# Patient Record
Sex: FEMALE | Race: WHITE | NOT HISPANIC OR LATINO | URBAN - METROPOLITAN AREA
[De-identification: names, ages, dates, MRNs, and addresses within clinical notes are randomized per-mention and may not be internally consistent; named-entity substitution may affect disease eponyms.]

---

## 2017-09-05 ENCOUNTER — OUTPATIENT (OUTPATIENT)
Dept: OUTPATIENT SERVICES | Facility: HOSPITAL | Age: 67
LOS: 1 days | End: 2017-09-05
Payer: MEDICARE

## 2017-09-05 DIAGNOSIS — M54.16 RADICULOPATHY, LUMBAR REGION: ICD-10-CM

## 2017-09-05 PROCEDURE — 77003 FLUOROGUIDE FOR SPINE INJECT: CPT

## 2017-09-05 PROCEDURE — 62323 NJX INTERLAMINAR LMBR/SAC: CPT

## 2017-09-19 ENCOUNTER — OUTPATIENT (OUTPATIENT)
Dept: OUTPATIENT SERVICES | Facility: HOSPITAL | Age: 67
LOS: 1 days | End: 2017-09-19
Payer: MEDICARE

## 2017-09-19 DIAGNOSIS — M54.16 RADICULOPATHY, LUMBAR REGION: ICD-10-CM

## 2017-09-19 PROCEDURE — 77003 FLUOROGUIDE FOR SPINE INJECT: CPT

## 2017-09-19 PROCEDURE — 62323 NJX INTERLAMINAR LMBR/SAC: CPT

## 2017-11-01 ENCOUNTER — APPOINTMENT (OUTPATIENT)
Dept: ORTHOPEDIC SURGERY | Facility: CLINIC | Age: 67
End: 2017-11-01
Payer: MEDICARE

## 2017-11-01 VITALS
HEIGHT: 67 IN | BODY MASS INDEX: 39.08 KG/M2 | SYSTOLIC BLOOD PRESSURE: 154 MMHG | DIASTOLIC BLOOD PRESSURE: 73 MMHG | HEART RATE: 92 BPM | WEIGHT: 249 LBS

## 2017-11-01 PROCEDURE — 99203 OFFICE O/P NEW LOW 30 MIN: CPT

## 2017-11-01 PROCEDURE — 72110 X-RAY EXAM L-2 SPINE 4/>VWS: CPT

## 2017-11-01 PROCEDURE — 72170 X-RAY EXAM OF PELVIS: CPT | Mod: 59

## 2018-07-11 ENCOUNTER — APPOINTMENT (OUTPATIENT)
Dept: ORTHOPEDIC SURGERY | Facility: CLINIC | Age: 68
End: 2018-07-11
Payer: MEDICARE

## 2018-07-11 VITALS — HEIGHT: 67 IN | BODY MASS INDEX: 39.08 KG/M2 | WEIGHT: 249 LBS

## 2018-07-11 DIAGNOSIS — M47.816 SPONDYLOSIS W/OUT MYELOPATHY OR RADICULOPATHY, LUMBAR REGION: ICD-10-CM

## 2018-07-11 PROCEDURE — 99214 OFFICE O/P EST MOD 30 MIN: CPT

## 2018-07-15 ENCOUNTER — FORM ENCOUNTER (OUTPATIENT)
Age: 68
End: 2018-07-15

## 2018-07-16 ENCOUNTER — OUTPATIENT (OUTPATIENT)
Dept: OUTPATIENT SERVICES | Facility: HOSPITAL | Age: 68
LOS: 1 days | End: 2018-07-16
Payer: MEDICARE

## 2018-07-16 ENCOUNTER — APPOINTMENT (OUTPATIENT)
Dept: MRI IMAGING | Facility: CLINIC | Age: 68
End: 2018-07-16
Payer: MEDICARE

## 2018-07-16 DIAGNOSIS — Z00.8 ENCOUNTER FOR OTHER GENERAL EXAMINATION: ICD-10-CM

## 2018-07-16 PROCEDURE — 72148 MRI LUMBAR SPINE W/O DYE: CPT | Mod: 26

## 2018-07-16 PROCEDURE — 72148 MRI LUMBAR SPINE W/O DYE: CPT

## 2018-08-23 ENCOUNTER — OUTPATIENT (OUTPATIENT)
Dept: OUTPATIENT SERVICES | Facility: HOSPITAL | Age: 68
LOS: 1 days | End: 2018-08-23
Payer: MEDICARE

## 2018-08-23 DIAGNOSIS — M54.12 RADICULOPATHY, CERVICAL REGION: ICD-10-CM

## 2018-08-23 PROCEDURE — 77003 FLUOROGUIDE FOR SPINE INJECT: CPT

## 2018-08-23 PROCEDURE — 62321 NJX INTERLAMINAR CRV/THRC: CPT

## 2019-09-16 ENCOUNTER — OUTPATIENT (OUTPATIENT)
Dept: OUTPATIENT SERVICES | Facility: HOSPITAL | Age: 69
LOS: 1 days | End: 2019-09-16
Payer: MEDICARE

## 2019-09-16 DIAGNOSIS — M47.812 SPONDYLOSIS WITHOUT MYELOPATHY OR RADICULOPATHY, CERVICAL REGION: ICD-10-CM

## 2019-09-16 PROCEDURE — 64492 INJ PARAVERT F JNT C/T 3 LEV: CPT | Mod: LT

## 2019-09-16 PROCEDURE — 64491 INJ PARAVERT F JNT C/T 2 LEV: CPT | Mod: LT

## 2019-09-16 PROCEDURE — 64490 INJ PARAVERT F JNT C/T 1 LEV: CPT | Mod: LT

## 2019-09-16 PROCEDURE — 77003 FLUOROGUIDE FOR SPINE INJECT: CPT

## 2019-09-30 ENCOUNTER — OUTPATIENT (OUTPATIENT)
Dept: OUTPATIENT SERVICES | Facility: HOSPITAL | Age: 69
LOS: 1 days | End: 2019-09-30
Payer: MEDICARE

## 2019-09-30 DIAGNOSIS — M47.812 SPONDYLOSIS WITHOUT MYELOPATHY OR RADICULOPATHY, CERVICAL REGION: ICD-10-CM

## 2019-09-30 PROCEDURE — 77003 FLUOROGUIDE FOR SPINE INJECT: CPT

## 2019-09-30 PROCEDURE — 64490 INJ PARAVERT F JNT C/T 1 LEV: CPT | Mod: RT

## 2019-09-30 PROCEDURE — 64491 INJ PARAVERT F JNT C/T 2 LEV: CPT | Mod: RT

## 2019-09-30 PROCEDURE — 64492 INJ PARAVERT F JNT C/T 3 LEV: CPT | Mod: RT

## 2019-12-18 ENCOUNTER — INPATIENT (INPATIENT)
Facility: HOSPITAL | Age: 69
LOS: 2 days | Discharge: HOME | DRG: 683 | End: 2019-12-21
Attending: INTERNAL MEDICINE | Admitting: INTERNAL MEDICINE
Payer: MEDICARE

## 2019-12-18 VITALS
TEMPERATURE: 98 F | DIASTOLIC BLOOD PRESSURE: 44 MMHG | SYSTOLIC BLOOD PRESSURE: 70 MMHG | WEIGHT: 223.99 LBS | RESPIRATION RATE: 15 BRPM | OXYGEN SATURATION: 95 % | HEIGHT: 67 IN | HEART RATE: 89 BPM

## 2019-12-18 DIAGNOSIS — G25.81 RESTLESS LEGS SYNDROME: ICD-10-CM

## 2019-12-18 DIAGNOSIS — E78.5 HYPERLIPIDEMIA, UNSPECIFIED: ICD-10-CM

## 2019-12-18 DIAGNOSIS — T14.90XA INJURY, UNSPECIFIED, INITIAL ENCOUNTER: ICD-10-CM

## 2019-12-18 DIAGNOSIS — N17.9 ACUTE KIDNEY FAILURE, UNSPECIFIED: ICD-10-CM

## 2019-12-18 DIAGNOSIS — Z29.9 ENCOUNTER FOR PROPHYLACTIC MEASURES, UNSPECIFIED: ICD-10-CM

## 2019-12-18 DIAGNOSIS — R53.1 WEAKNESS: ICD-10-CM

## 2019-12-18 DIAGNOSIS — G89.29 OTHER CHRONIC PAIN: ICD-10-CM

## 2019-12-18 DIAGNOSIS — R29.6 REPEATED FALLS: ICD-10-CM

## 2019-12-18 DIAGNOSIS — R59.1 GENERALIZED ENLARGED LYMPH NODES: ICD-10-CM

## 2019-12-18 LAB
ALBUMIN SERPL ELPH-MCNC: 3.4 G/DL — SIGNIFICANT CHANGE UP (ref 3.3–5)
ALP SERPL-CCNC: 89 U/L — SIGNIFICANT CHANGE UP (ref 40–120)
ALT FLD-CCNC: 24 U/L — SIGNIFICANT CHANGE UP (ref 12–78)
ANION GAP SERPL CALC-SCNC: 13 MMOL/L — SIGNIFICANT CHANGE UP (ref 5–17)
AST SERPL-CCNC: 65 U/L — HIGH (ref 15–37)
BASOPHILS # BLD AUTO: 0.03 K/UL — SIGNIFICANT CHANGE UP (ref 0–0.2)
BASOPHILS NFR BLD AUTO: 0.3 % — SIGNIFICANT CHANGE UP (ref 0–2)
BILIRUB SERPL-MCNC: 0.4 MG/DL — SIGNIFICANT CHANGE UP (ref 0.2–1.2)
BUN SERPL-MCNC: 70 MG/DL — HIGH (ref 7–23)
CALCIUM SERPL-MCNC: 9.4 MG/DL — SIGNIFICANT CHANGE UP (ref 8.5–10.1)
CHLORIDE SERPL-SCNC: 103 MMOL/L — SIGNIFICANT CHANGE UP (ref 96–108)
CO2 SERPL-SCNC: 18 MMOL/L — LOW (ref 22–31)
CREAT SERPL-MCNC: 4.8 MG/DL — HIGH (ref 0.5–1.3)
EOSINOPHIL # BLD AUTO: 0.07 K/UL — SIGNIFICANT CHANGE UP (ref 0–0.5)
EOSINOPHIL NFR BLD AUTO: 0.6 % — SIGNIFICANT CHANGE UP (ref 0–6)
GLUCOSE SERPL-MCNC: 122 MG/DL — HIGH (ref 70–99)
HCT VFR BLD CALC: 35.6 % — SIGNIFICANT CHANGE UP (ref 34.5–45)
HGB BLD-MCNC: 11.6 G/DL — SIGNIFICANT CHANGE UP (ref 11.5–15.5)
IMM GRANULOCYTES NFR BLD AUTO: 0.7 % — SIGNIFICANT CHANGE UP (ref 0–1.5)
LACTATE SERPL-SCNC: 1.6 MMOL/L — SIGNIFICANT CHANGE UP (ref 0.7–2)
LYMPHOCYTES # BLD AUTO: 1.21 K/UL — SIGNIFICANT CHANGE UP (ref 1–3.3)
LYMPHOCYTES # BLD AUTO: 10.1 % — LOW (ref 13–44)
MCHC RBC-ENTMCNC: 26.9 PG — LOW (ref 27–34)
MCHC RBC-ENTMCNC: 32.6 GM/DL — SIGNIFICANT CHANGE UP (ref 32–36)
MCV RBC AUTO: 82.6 FL — SIGNIFICANT CHANGE UP (ref 80–100)
MONOCYTES # BLD AUTO: 1.07 K/UL — HIGH (ref 0–0.9)
MONOCYTES NFR BLD AUTO: 8.9 % — SIGNIFICANT CHANGE UP (ref 2–14)
NEUTROPHILS # BLD AUTO: 9.51 K/UL — HIGH (ref 1.8–7.4)
NEUTROPHILS NFR BLD AUTO: 79.4 % — HIGH (ref 43–77)
NRBC # BLD: 0 /100 WBCS — SIGNIFICANT CHANGE UP (ref 0–0)
PLATELET # BLD AUTO: 388 K/UL — SIGNIFICANT CHANGE UP (ref 150–400)
POTASSIUM SERPL-MCNC: 5.1 MMOL/L — SIGNIFICANT CHANGE UP (ref 3.5–5.3)
POTASSIUM SERPL-SCNC: 5.1 MMOL/L — SIGNIFICANT CHANGE UP (ref 3.5–5.3)
PROT SERPL-MCNC: 7.9 G/DL — SIGNIFICANT CHANGE UP (ref 6–8.3)
RBC # BLD: 4.31 M/UL — SIGNIFICANT CHANGE UP (ref 3.8–5.2)
RBC # FLD: 15.2 % — HIGH (ref 10.3–14.5)
SODIUM SERPL-SCNC: 134 MMOL/L — LOW (ref 135–145)
TROPONIN I SERPL-MCNC: <.015 NG/ML — SIGNIFICANT CHANGE UP (ref 0.01–0.04)
WBC # BLD: 11.97 K/UL — HIGH (ref 3.8–10.5)
WBC # FLD AUTO: 11.97 K/UL — HIGH (ref 3.8–10.5)

## 2019-12-18 PROCEDURE — 93010 ELECTROCARDIOGRAM REPORT: CPT

## 2019-12-18 PROCEDURE — 71045 X-RAY EXAM CHEST 1 VIEW: CPT | Mod: 26

## 2019-12-18 PROCEDURE — 99285 EMERGENCY DEPT VISIT HI MDM: CPT

## 2019-12-18 PROCEDURE — 72125 CT NECK SPINE W/O DYE: CPT | Mod: 26

## 2019-12-18 RX ORDER — SODIUM CHLORIDE 9 MG/ML
1000 INJECTION INTRAMUSCULAR; INTRAVENOUS; SUBCUTANEOUS ONCE
Refills: 0 | Status: COMPLETED | OUTPATIENT
Start: 2019-12-18 | End: 2019-12-18

## 2019-12-18 RX ORDER — HEPARIN SODIUM 5000 [USP'U]/ML
5000 INJECTION INTRAVENOUS; SUBCUTANEOUS EVERY 12 HOURS
Refills: 0 | Status: DISCONTINUED | OUTPATIENT
Start: 2019-12-18 | End: 2019-12-21

## 2019-12-18 RX ORDER — OXYCODONE AND ACETAMINOPHEN 5; 325 MG/1; MG/1
1 TABLET ORAL ONCE
Refills: 0 | Status: DISCONTINUED | OUTPATIENT
Start: 2019-12-18 | End: 2019-12-18

## 2019-12-18 RX ADMIN — SODIUM CHLORIDE 1000 MILLILITER(S): 9 INJECTION INTRAMUSCULAR; INTRAVENOUS; SUBCUTANEOUS at 22:42

## 2019-12-18 RX ADMIN — SODIUM CHLORIDE 1000 MILLILITER(S): 9 INJECTION INTRAMUSCULAR; INTRAVENOUS; SUBCUTANEOUS at 20:19

## 2019-12-18 RX ADMIN — OXYCODONE AND ACETAMINOPHEN 1 TABLET(S): 5; 325 TABLET ORAL at 23:23

## 2019-12-18 NOTE — H&P ADULT - NEUROLOGICAL DETAILS
alert and oriented x 3/responds to pain/responds to verbal commands/cranial nerves intact/strength decreased/sensation intact

## 2019-12-18 NOTE — ED PROVIDER NOTE - CARE PLAN
Principal Discharge DX:	Acute renal failure (ARF)  Secondary Diagnosis:	Weakness generalized  Secondary Diagnosis:	Multiple falls

## 2019-12-18 NOTE — H&P ADULT - PROBLEM SELECTOR PLAN 9
-cont home dose oxy 10 q 6 -at  home dose oxy 10 q 6 hrs, Change to 5 mg q 6 hr PRN  - Gabapentin 100 mg 3x day -Renal dose

## 2019-12-18 NOTE — ED PROVIDER NOTE - OBJECTIVE STATEMENT
68 y/o female with PMHx Osteoarthritis, Hyperlipidemia, Obesity presents today c/o weakness x 2 months. pt notes frequent falling, notes walking intermittently with assistance. pt notes episode of syncope yesterday in which she was sitting in her car and woke up 3 hours later. pt admits to head injury and neck pain. pt notes chronic pain to general body without acute change. pt notes chronic LE swelling/weeping without acute change. pt denies vomiting, numbness/weakness, fever, chills, chest pain, SOB, abdominal pain, joint pain, hip pain, or any other complaints.

## 2019-12-18 NOTE — H&P ADULT - PROBLEM SELECTOR PLAN 3
-ED BP 70/44 on arrival   - s/p 1000mL IV bolus x3 in ER  -syncope prerenal, induced ATN and syncope -ED BP 70/44 on arrival   - s/p 1000mL IV bolus x3 in ER, IV fluids NS @ 75 ml /hr  -syncope prerenal, induced ATN and syncope -ED BP 70/44 on arrival ,HOLD Torsemide  - s/p 1000mL IV bolus x3 in ER, IV fluids NS @ 75 ml /hr  -syncope prerenal, induced ATN and syncope

## 2019-12-18 NOTE — ED PROVIDER NOTE - ATTENDING CONTRIBUTION TO CARE
68yo female with multiple falls, weakness worsening over the last few days, no LOC, pt also has chronic wound infection to leg  exam: obese, clear lungs, awake but confused  plan: labs, xr, cultures, ct admit for possible placement  agree with assessment and plan of PA

## 2019-12-18 NOTE — H&P ADULT - NSICDXPASTSURGICALHX_GEN_ALL_CORE_FT
PAST SURGICAL HISTORY:  Bladder Disorder Bladder lift 2000    Ex lap in 2009 for abscess in the baldder     H/O arthroscopy of right knee 2010    History of Colonoscopy     History of Hysterectomy and bladder lift in 2000

## 2019-12-18 NOTE — ED ADULT NURSE NOTE - PSH
Bladder Disorder  Bladder lift 2000  Ex lap in 2009 for abscess in the baldder    H/O arthroscopy of right knee  2010  History of Colonoscopy    History of Hysterectomy and bladder lift in 2000

## 2019-12-18 NOTE — ED ADULT NURSE REASSESSMENT NOTE - NS ED NURSE REASSESS COMMENT FT1
pt has lymph edema and chronic venous stasis ulcers to b/l lower extremities.  pt is currently on levaquin for her lower legs.  pt initially prescribed abx 12/6 for 3 weeks.  rash noted underneath b/l breasts and b/l groin.  pt aware she is admitted, granddaughter at bedside and s/w pt daughter Edelmira.  awaiting admitting orders.

## 2019-12-18 NOTE — ED ADULT NURSE REASSESSMENT NOTE - NS ED NURSE REASSESS COMMENT FT1
pt refused allowing RN to straight cath for urine.  educated pt on importance of straight cath for urine collection, states she will provide urine "when she can."  c/o b/l lower extremity pain medicated with percocet as ordered.

## 2019-12-18 NOTE — ED ADULT TRIAGE NOTE - CHIEF COMPLAINT QUOTE
increased weakness and frequent mechanical falls over the last few days, one syncopal episode yesterday

## 2019-12-18 NOTE — ED PROVIDER NOTE - MUSCULOSKELETAL, MLM
Spine appears normal, range of motion is not limited, (+) b/l LE swelling and erythema (chronic as per patient), no increased warmth or discharge.

## 2019-12-18 NOTE — H&P ADULT - PROBLEM SELECTOR PLAN 10
IMPROVE VTE Individual Risk Assessment        RISK                                                          Points  [  ] Previous VTE                                                3  [  ] Thrombophilia                                             2  [ x ] Lower limb paralysis                                   2        (unable to hold up >15 seconds)    [  ] Current Cancer                                            2         (within 6 months)  [  ] Immobilization > 24 hrs                              1  [  ] ICU/CCU stay > 24 hours                            1  [ 1 ] Age > 60                                                    1  IMPROVE VTE Score ____3_____      dvt ppx heparin 5000 subQ q12

## 2019-12-18 NOTE — H&P ADULT - PROBLEM SELECTOR PLAN 8
-b/l weeping erythematous cellulitis wounds  -cont levofloxcine (pt is on a 21 day supply) given by PCP for cellultis   -wound care evaluation -b/l weeping erythematous cellulitis wounds  -cont Levofloxacin (pt is on a 21 day supply) given by PCP for cellultis   -wound care evaluation -PT eval  -fall precautions  -see syncope above

## 2019-12-18 NOTE — ED ADULT NURSE NOTE - NSIMPLEMENTINTERV_GEN_ALL_ED
Implemented All Fall with Harm Risk Interventions:  [place] to call system. Call bell, personal items and telephone within reach. Instruct patient to call for assistance. Room bathroom lighting operational. Non-slip footwear when patient is off stretcher. Physically safe environment: no spills, clutter or unnecessary equipment. Stretcher in lowest position, wheels locked, appropriate side rails in place. Provide visual cue, wrist band, yellow gown, etc. Monitor gait and stability. Monitor for mental status changes and reorient to person, place, and time. Review medications for side effects contributing to fall risk. Reinforce activity limits and safety measures with patient and family. Provide visual clues: red socks.

## 2019-12-18 NOTE — H&P ADULT - NSHPOUTPATIENTPROVIDERS_GEN_ALL_CORE
PMD- PMD- Dr. James Olivares   neuro- Dr. Henley  vascular- Dr. darling islas  [facility] PMD- Dr. James Olivares   neuro- Dr. Henley  vascular- Dr. darling stephen  [facility]

## 2019-12-18 NOTE — H&P ADULT - NSICDXPASTMEDICALHX_GEN_ALL_CORE_FT
PAST MEDICAL HISTORY:  History of Osteoarthritis     Hyperlipidemia     Obesity (BMI 30-39.9)     Restless Leg Syndrome     Traumatic arthropathy involving lower leg right

## 2019-12-18 NOTE — ED ADULT NURSE NOTE - OBJECTIVE STATEMENT
pt arrived from walk in triage accompanied by her granddaughter.  reports multiple syncopal episodes and frequent falls. pt arrived from walk in triage accompanied by her granddaughter.  reports multiple syncopal episodes and frequent falls.  circumoral cyanosis noted.  b/l hands cyanotic and cold.

## 2019-12-18 NOTE — H&P ADULT - PROBLEM SELECTOR PLAN 1
JUAN M with metabolic acidosis  ? Etiology, Pre Renal, Hypotension induced ATN  -IV Fluids NS @ 75 cc/hr, I/O   -HOLD Lasix, Avoid Nephrotoxics, NO NSAIDS  -AM BMP. Renal Sono, UA & Urine Lytes  Renal DR SHELLY Chapa JUAN M with metabolic acidosis  ? Etiology, Pre Renal, Hypotension induced ATN  -IV Fluids NS @ 75 cc/hr, I/O   -HOLD Lasix/home torsemide, Avoid Nephrotoxics, NO NSAIDS  -AM BMP. Renal Sono, UA & Urine Lytes  Renal DR SHELLY Chapa  -eGFR 9, BUN 70, Cr 4.8 JUAN M with metabolic acidosis  ? Etiology, Pre Renal, Hypotension induced ATN  -IV Fluids NS @ 75 cc/hr, I/O   -HOLD Lasix/home torsemide, Avoid Nephrotoxics, NO NSAIDS hold home aspirin 81   -AM BMP. Renal Sono, UA & Urine Lytes  Renal DR SHELLY Chapa  -eGFR 9, BUN 70, Cr 4.8 JUAN M with metabolic acidosis  - Etiology, Pre Renal, Hypotension induced ATN  -IV Fluids NS @ 75 cc/hr, I/O   -HOLD Lasix/home torsemide, Avoid Nephrotoxics, NO NSAIDS hold home aspirin 81   -AM BMP. Renal Sono, UA & Urine Lytes  Renal DR SHELLY Chapa  -eGFR 9, BUN 70, Cr 4.8  -Renal dose ALl Meds

## 2019-12-18 NOTE — H&P ADULT - NSHPSOCIALHISTORY_GEN_ALL_CORE
quit smoking 10 years ago, 25 year pack hx,   1 liquor 1x a month, denies other drug use   lives with , performs all ADLs, uses walker

## 2019-12-18 NOTE — H&P ADULT - ATTENDING COMMENTS
Pt seen, Examined case & care plan d/w pt, residents at detail.  D/W Grand dtr in ER   AM labs   PT Eval   Renal Eval DR Hebert S/DR Miranda  Wound care Eval in AM

## 2019-12-18 NOTE — ED ADULT NURSE NOTE - PMH
History of Osteoarthritis    Hyperlipidemia    Obesity (BMI 30-39.9)    Restless Leg Syndrome    Traumatic arthropathy involving lower leg  right

## 2019-12-18 NOTE — H&P ADULT - HISTORY OF PRESENT ILLNESS
70 YO F PMHX lymphedema b/l LE, cellulitis b/l LEs (on outpt levofloxacin), b/l knee replacements, RLS, obesity, HLD, oA here for "blacking out" and falling this afternoon. pt states she has been blacking out and falling for the last 4 months 2-4x a week. Pt hit the back of her head and back today and admits to LOC. Fall was unwitnessed happened at home. Pt admits to having eaten breakfast in the morning prior to the fall, does not drink enough water, denies dizziness/physical activity/vertigo/palpitations/tinnitis/vision changes prior to fall. Pt also notes she synopsized yesterday sitting in her car and woke up 3 hours later.      ED vitals temp 97.5, HR 89BP 70/44, SpO2 95% on room air. Pt recieved 1000mL IV bolus x3, percocet 5mg x1. Labs wbc 11.97, Na 134, BUN 70, Cr 4.8, glucose 122, AST 65, eGFR 9, troponin negative x1. CT head chronic ischemic changes bilaterally. CT c spine no fracture or dislocation. CXR small hiatal hernia but no acute findings. Xray fluor guided spine inj epidural showed cervical radiculopathy C5-7. EKG 91 bpm NSR. 68 YO F PMHX lymphedema b/l LE, cellulitis b/l LEs (on outpt levofloxacin), b/l knee replacements, RLS, obesity, HLD, oA here for "blacking out" and falling this afternoon. pt states she has been blacking out and falling for the last 4 months 2-4x a week. Pt hit the back of her head and back today and admits to LOC. Fall was unwitnessed happened at home. Pt admits to having eaten breakfast in the morning prior to the fall, does not drink enough water, denies dizziness/physical activity/vertigo/palpitations/tinnitis/vision changes prior to fall. Pt also notes she synopsized yesterday sitting in her car and woke up 3 hours later.      ED vitals temp 97.5, HR 89BP 70/44, SpO2 95% on room air. Pt received 1000mL IV bolus x3, percocet 5mg x1. Labs wbc 11.97, Na 134, BUN 70, Cr 4.8, glucose 122, AST 65, eGFR 9, troponin negative x1. CT head chronic ischemic changes bilaterally. CT c spine no fracture or dislocation. CXR small hiatal hernia but no acute findings. Xray fluor guided spine inj epidural showed cervical radiculopathy C5-7. EKG 91 bpm NSR. 68 YO F PMHX  chronic lymphedema b/l LE, Lumbar radiculopathy , Chronic back pain, , OA, recently Rxed for  cellulitis b/l LEs  at [facility] (on outpt levofloxacin), b/l knee replacements, RLS, obesity, HLD, here for "blacking out" and falling this afternoon. pt states she has been blacking out and falling for the last 4 months 2-4x a week. Pt hit the back of her head and back today and admits to LOC. Fall was unwitnessed happened at home. Pt admits to having eaten breakfast in the morning prior to the fall, does not drink enough water, denies dizziness/physical activity/vertigo/palpitations/tinnitis/vision changes prior to fall. Pt also notes she synopsized yesterday sitting in her car and woke up 3 hours later.    As per pt she was started on PO Diuretics by PMD 2 days ago for her Lower EXT Edema .    ED vitals temp 97.5, HR 89BP 70/44, SpO2 95% on room air. Pt received 1000mL IV bolus x3, percocet 5mg x1. Labs wbc 11.97, Na 134, BUN 70, Cr 4.8, glucose 122, AST 65, eGFR 9, troponin negative x1. CT head chronic ischemic changes bilaterally. CT c spine no fracture or dislocation. CXR small hiatal hernia but no acute findings. Xray fluor guided spine inj epidural showed cervical radiculopathy C5-7. EKG 91 bpm NSR.

## 2019-12-18 NOTE — H&P ADULT - ASSESSMENT
68 YO F PMHX lymphedema b/l LE, cellulitis b/l LEs (on outpt levofloxacin), b/l knee replacements, RLS, obesity, HLD, oA here for "blacking out" and falling this afternoon. Admit for syncope workup.     pt states she has been blacking out and falling for the last 4 months 2-4x a week. Pt hit the back of her head and back today and admits to LOC. Fall was unwitnessed happened at home. Pt admits to having eaten breakfast in the morning prior to the fall, does not drink enough water, denies dizziness/physical activity/vertigo/palpitations/tinnitis/vision changes prior to fall. Pt also notes she synopsized yesterday sitting in her car and woke up 3 hours later.      ED vitals temp 97.5, HR 89BP 70/44, SpO2 95% on room air. Pt recieved 1000mL IV bolus x3, percocet 5mg x1. Labs wbc 11.97, Na 134, BUN 70, Cr 4.8, glucose 122, AST 65, eGFR 9, troponin negative x1. CT head chronic ischemic changes bilaterally. CT c spine no fracture or dislocation. CXR small hiatal hernia but no acute findings. Xray fluor guided spine inj epidural showed cervical radiculopathy C5-7. EKG 91 bpm NSR.

## 2019-12-18 NOTE — H&P ADULT - NSHPREVIEWOFSYSTEMS_GEN_ALL_CORE
CONSTITUTIONAL: denies fever, chills, fatigue, weakness  HEENT: +neck pain, +head pain, denies blurred visions, sore throat, vertigo, dizziness   SKIN: +cellulitis b/l LE, denies new lesions  CARDIOVASCULAR: denies chest pain, chest pressure, palpitations  RESPIRATORY: denies shortness of breath, sputum production  GASTROINTESTINAL: denies nausea, vomiting, diarrhea, abdominal pain  GENITOURINARY: denies dysuria, discharge  NEUROLOGICAL: denies numbness, headache, focal weakness, tinnitus,   MUSCULOSKELETAL: denies new joint pain, muscle aches  HEMATOLOGIC: denies gross bleeding, bruising  LYMPHATICS: +b/l cellulitis and LE swelling, denies enlarged lymph nodes

## 2019-12-18 NOTE — H&P ADULT - PROBLEM SELECTOR PLAN 4
-PT eval  -see syncope above Chronic PVD venous stasis skin changes with Lymphoedema with skin changes with some oozing from skin.  I doubt pt has cellulitis  STOP Levaquin   -Wound care eval

## 2019-12-18 NOTE — H&P ADULT - SKIN
detailed exam martín errythemtous skin b/l LE severe stasis dermatitis erythematous skin b/l LE/warm and dry

## 2019-12-18 NOTE — H&P ADULT - PROBLEM SELECTOR PLAN 2
-pt "blacked out" prior to fall this afternoon, pt synopsized while sitting in car yesterday and woke up 3 hrs later, blacking out 2-4x a week for the last 4 months  -likely 2/2 hypotension in setting of JUAN M   -admit to remote tele  -CT head: no hemorrhage,  only chronic ischemic changes bilaterally  -orthostatics -pt "blacked out" prior to fall this afternoon, pt synopsized while sitting in car yesterday and woke up 3 hrs later, blacking out 2-4x a week for the last 4 months  -likely 2/2 hypotension in setting of JUAN M   -admit to remote tele  -CT head: no hemorrhage,  only chronic ischemic changes bilaterally  -orthostatics VS , IV Fluids Likely from Hypotension/ GIANLUCA, May have orthostasis from meds  - -pt "blacked out" prior to fall this afternoon,  woke up 3 hrs later, blacking out 2-4x a week for the last 4 months  -likely 2/2 hypotension in setting of GIANLUCA , likely Multifactorial with meds, Hypotension, Gianluca, Possible orthostasis  -admit to remote tele  -CT head: no hemorrhage,  only chronic ischemic changes bilaterally  -orthostatics VS , IV Fluids

## 2019-12-19 ENCOUNTER — TRANSCRIPTION ENCOUNTER (OUTPATIENT)
Age: 69
End: 2019-12-19

## 2019-12-19 DIAGNOSIS — I95.9 HYPOTENSION, UNSPECIFIED: ICD-10-CM

## 2019-12-19 DIAGNOSIS — R53.1 WEAKNESS: ICD-10-CM

## 2019-12-19 DIAGNOSIS — I87.2 VENOUS INSUFFICIENCY (CHRONIC) (PERIPHERAL): ICD-10-CM

## 2019-12-19 DIAGNOSIS — R13.10 DYSPHAGIA, UNSPECIFIED: ICD-10-CM

## 2019-12-19 DIAGNOSIS — N17.9 ACUTE KIDNEY FAILURE, UNSPECIFIED: ICD-10-CM

## 2019-12-19 DIAGNOSIS — R55 SYNCOPE AND COLLAPSE: ICD-10-CM

## 2019-12-19 LAB
ALBUMIN SERPL ELPH-MCNC: 2.6 G/DL — LOW (ref 3.3–5)
ALP SERPL-CCNC: 73 U/L — SIGNIFICANT CHANGE UP (ref 40–120)
ALT FLD-CCNC: 22 U/L — SIGNIFICANT CHANGE UP (ref 12–78)
ANION GAP SERPL CALC-SCNC: 11 MMOL/L — SIGNIFICANT CHANGE UP (ref 5–17)
APPEARANCE UR: CLEAR — SIGNIFICANT CHANGE UP
AST SERPL-CCNC: 64 U/L — HIGH (ref 15–37)
BACTERIA # UR AUTO: ABNORMAL
BILIRUB SERPL-MCNC: 0.4 MG/DL — SIGNIFICANT CHANGE UP (ref 0.2–1.2)
BILIRUB UR-MCNC: ABNORMAL
BUN SERPL-MCNC: 72 MG/DL — HIGH (ref 7–23)
CALCIUM SERPL-MCNC: 8.3 MG/DL — LOW (ref 8.5–10.1)
CHLORIDE SERPL-SCNC: 111 MMOL/L — HIGH (ref 96–108)
CO2 SERPL-SCNC: 16 MMOL/L — LOW (ref 22–31)
COLOR SPEC: YELLOW — SIGNIFICANT CHANGE UP
COMMENT - URINE: SIGNIFICANT CHANGE UP
CREAT SERPL-MCNC: 3.8 MG/DL — HIGH (ref 0.5–1.3)
DIFF PNL FLD: ABNORMAL
EPI CELLS # UR: SIGNIFICANT CHANGE UP
GLUCOSE SERPL-MCNC: 92 MG/DL — SIGNIFICANT CHANGE UP (ref 70–99)
GLUCOSE UR QL: NEGATIVE — SIGNIFICANT CHANGE UP
HCT VFR BLD CALC: 29.6 % — LOW (ref 34.5–45)
HCV AB S/CO SERPL IA: 0.12 S/CO — SIGNIFICANT CHANGE UP (ref 0–0.99)
HCV AB SERPL-IMP: SIGNIFICANT CHANGE UP
HGB BLD-MCNC: 9.6 G/DL — LOW (ref 11.5–15.5)
HYALINE CASTS # UR AUTO: ABNORMAL /LPF
KETONES UR-MCNC: ABNORMAL
LEUKOCYTE ESTERASE UR-ACNC: ABNORMAL
MCHC RBC-ENTMCNC: 26.7 PG — LOW (ref 27–34)
MCHC RBC-ENTMCNC: 32.4 GM/DL — SIGNIFICANT CHANGE UP (ref 32–36)
MCV RBC AUTO: 82.5 FL — SIGNIFICANT CHANGE UP (ref 80–100)
NITRITE UR-MCNC: NEGATIVE — SIGNIFICANT CHANGE UP
NRBC # BLD: 0 /100 WBCS — SIGNIFICANT CHANGE UP (ref 0–0)
PH UR: 5 — SIGNIFICANT CHANGE UP (ref 5–8)
PLATELET # BLD AUTO: 297 K/UL — SIGNIFICANT CHANGE UP (ref 150–400)
POTASSIUM SERPL-MCNC: 4.3 MMOL/L — SIGNIFICANT CHANGE UP (ref 3.5–5.3)
POTASSIUM SERPL-SCNC: 4.3 MMOL/L — SIGNIFICANT CHANGE UP (ref 3.5–5.3)
PROT SERPL-MCNC: 6.4 G/DL — SIGNIFICANT CHANGE UP (ref 6–8.3)
PROT UR-MCNC: 25 MG/DL
RBC # BLD: 3.59 M/UL — LOW (ref 3.8–5.2)
RBC # FLD: 15.2 % — HIGH (ref 10.3–14.5)
RBC CASTS # UR COMP ASSIST: SIGNIFICANT CHANGE UP /HPF (ref 0–4)
SODIUM SERPL-SCNC: 138 MMOL/L — SIGNIFICANT CHANGE UP (ref 135–145)
SP GR SPEC: 1.02 — SIGNIFICANT CHANGE UP (ref 1.01–1.02)
UROBILINOGEN FLD QL: NEGATIVE — SIGNIFICANT CHANGE UP
WBC # BLD: 8.81 K/UL — SIGNIFICANT CHANGE UP (ref 3.8–10.5)
WBC # FLD AUTO: 8.81 K/UL — SIGNIFICANT CHANGE UP (ref 3.8–10.5)
WBC UR QL: ABNORMAL

## 2019-12-19 PROCEDURE — 70450 CT HEAD/BRAIN W/O DYE: CPT | Mod: 26

## 2019-12-19 PROCEDURE — 76775 US EXAM ABDO BACK WALL LIM: CPT | Mod: 26

## 2019-12-19 RX ORDER — OXYCODONE HYDROCHLORIDE 5 MG/1
5 TABLET ORAL EVERY 6 HOURS
Refills: 0 | Status: DISCONTINUED | OUTPATIENT
Start: 2019-12-19 | End: 2019-12-21

## 2019-12-19 RX ORDER — GABAPENTIN 400 MG/1
600 CAPSULE ORAL
Refills: 0 | Status: DISCONTINUED | OUTPATIENT
Start: 2019-12-19 | End: 2019-12-19

## 2019-12-19 RX ORDER — SODIUM CHLORIDE 9 MG/ML
500 INJECTION INTRAMUSCULAR; INTRAVENOUS; SUBCUTANEOUS ONCE
Refills: 0 | Status: COMPLETED | OUTPATIENT
Start: 2019-12-19 | End: 2019-12-19

## 2019-12-19 RX ORDER — ROPINIROLE 8 MG/1
2 TABLET, FILM COATED, EXTENDED RELEASE ORAL ONCE
Refills: 0 | Status: DISCONTINUED | OUTPATIENT
Start: 2019-12-19 | End: 2019-12-19

## 2019-12-19 RX ORDER — ROPINIROLE 8 MG/1
1 TABLET, FILM COATED, EXTENDED RELEASE ORAL
Qty: 0 | Refills: 0 | DISCHARGE

## 2019-12-19 RX ORDER — GABAPENTIN 400 MG/1
300 CAPSULE ORAL DAILY
Refills: 0 | Status: DISCONTINUED | OUTPATIENT
Start: 2019-12-19 | End: 2019-12-19

## 2019-12-19 RX ORDER — LACTOBACILLUS ACIDOPHILUS 100MM CELL
1 CAPSULE ORAL ONCE
Refills: 0 | Status: DISCONTINUED | OUTPATIENT
Start: 2019-12-19 | End: 2019-12-19

## 2019-12-19 RX ORDER — OXYCODONE HYDROCHLORIDE 5 MG/1
10 TABLET ORAL EVERY 6 HOURS
Refills: 0 | Status: DISCONTINUED | OUTPATIENT
Start: 2019-12-19 | End: 2019-12-19

## 2019-12-19 RX ORDER — SODIUM CHLORIDE 9 MG/ML
3 INJECTION INTRAMUSCULAR; INTRAVENOUS; SUBCUTANEOUS EVERY 8 HOURS
Refills: 0 | Status: DISCONTINUED | OUTPATIENT
Start: 2019-12-19 | End: 2019-12-21

## 2019-12-19 RX ORDER — ASPIRIN/CALCIUM CARB/MAGNESIUM 324 MG
81 TABLET ORAL DAILY
Refills: 0 | Status: DISCONTINUED | OUTPATIENT
Start: 2019-12-19 | End: 2019-12-19

## 2019-12-19 RX ORDER — SENNA PLUS 8.6 MG/1
2 TABLET ORAL AT BEDTIME
Refills: 0 | Status: DISCONTINUED | OUTPATIENT
Start: 2019-12-19 | End: 2019-12-21

## 2019-12-19 RX ORDER — SODIUM CHLORIDE 9 MG/ML
1000 INJECTION INTRAMUSCULAR; INTRAVENOUS; SUBCUTANEOUS
Refills: 0 | Status: DISCONTINUED | OUTPATIENT
Start: 2019-12-19 | End: 2019-12-20

## 2019-12-19 RX ORDER — PRAMIPEXOLE DIHYDROCHLORIDE 0.12 MG/1
0.12 TABLET ORAL DAILY
Refills: 0 | Status: DISCONTINUED | OUTPATIENT
Start: 2019-12-19 | End: 2019-12-21

## 2019-12-19 RX ORDER — GABAPENTIN 400 MG/1
100 CAPSULE ORAL THREE TIMES A DAY
Refills: 0 | Status: DISCONTINUED | OUTPATIENT
Start: 2019-12-19 | End: 2019-12-21

## 2019-12-19 RX ORDER — PRAMIPEXOLE DIHYDROCHLORIDE 0.12 MG/1
1.5 TABLET ORAL DAILY
Refills: 0 | Status: DISCONTINUED | OUTPATIENT
Start: 2019-12-19 | End: 2019-12-19

## 2019-12-19 RX ORDER — ROPINIROLE 8 MG/1
2 TABLET, FILM COATED, EXTENDED RELEASE ORAL DAILY
Refills: 0 | Status: DISCONTINUED | OUTPATIENT
Start: 2019-12-19 | End: 2019-12-19

## 2019-12-19 RX ORDER — LACTOBACILLUS ACIDOPHILUS 100MM CELL
1 CAPSULE ORAL DAILY
Refills: 0 | Status: DISCONTINUED | OUTPATIENT
Start: 2019-12-19 | End: 2019-12-21

## 2019-12-19 RX ADMIN — PRAMIPEXOLE DIHYDROCHLORIDE 0.12 MILLIGRAM(S): 0.12 TABLET ORAL at 12:14

## 2019-12-19 RX ADMIN — OXYCODONE HYDROCHLORIDE 10 MILLIGRAM(S): 5 TABLET ORAL at 07:25

## 2019-12-19 RX ADMIN — SODIUM CHLORIDE 75 MILLILITER(S): 9 INJECTION INTRAMUSCULAR; INTRAVENOUS; SUBCUTANEOUS at 06:30

## 2019-12-19 RX ADMIN — OXYCODONE HYDROCHLORIDE 10 MILLIGRAM(S): 5 TABLET ORAL at 06:24

## 2019-12-19 RX ADMIN — SODIUM CHLORIDE 3 MILLILITER(S): 9 INJECTION INTRAMUSCULAR; INTRAVENOUS; SUBCUTANEOUS at 06:21

## 2019-12-19 RX ADMIN — SENNA PLUS 2 TABLET(S): 8.6 TABLET ORAL at 21:33

## 2019-12-19 RX ADMIN — SODIUM CHLORIDE 3 MILLILITER(S): 9 INJECTION INTRAMUSCULAR; INTRAVENOUS; SUBCUTANEOUS at 21:35

## 2019-12-19 RX ADMIN — Medication 1 TABLET(S): at 12:14

## 2019-12-19 RX ADMIN — SODIUM CHLORIDE 3 MILLILITER(S): 9 INJECTION INTRAMUSCULAR; INTRAVENOUS; SUBCUTANEOUS at 14:30

## 2019-12-19 RX ADMIN — SODIUM CHLORIDE 1000 MILLILITER(S): 9 INJECTION INTRAMUSCULAR; INTRAVENOUS; SUBCUTANEOUS at 10:41

## 2019-12-19 RX ADMIN — HEPARIN SODIUM 5000 UNIT(S): 5000 INJECTION INTRAVENOUS; SUBCUTANEOUS at 19:01

## 2019-12-19 RX ADMIN — SODIUM CHLORIDE 75 MILLILITER(S): 9 INJECTION INTRAMUSCULAR; INTRAVENOUS; SUBCUTANEOUS at 21:34

## 2019-12-19 NOTE — PROGRESS NOTE ADULT - PROBLEM SELECTOR PLAN 1
-BUN 70 and Cr 4.80 on admission. Unclear baseline.  -Etiology possibly pre-renal induced ATN.  -Continue IV Fluids NS @ 75 cc/hr.  -Hold Lasix and torsemide, Avoid Nephrotoxics.  -No NSAIDS. Hold home aspirin.  -Follow up Renal Sono, UA & Urine Lytes  -Renal Dr. Hebert consulted. Follow up urine studies. -BUN 70 and Cr 4.80 on admission. Unclear baseline. Today, BUN 72, Cr 3.80.  -Etiology possibly pre-renal induced ATN.  -Continue IV Fluids NS @ 75 cc/hr.  -Hold Lasix and torsemide, Avoid Nephrotoxics.  -No NSAIDS. Hold home aspirin.  -Follow up Renal Sono, UA & Urine Lytes  -Renal Dr. Hebert consulted. Follow up urine studies. -BUN 70 and Cr 4.80 on admission. Today, BUN 72, Cr 3.80. On 12/6/19, BUN 20, Cr 0.9.  -Etiology possibly pre-renal induced ATN.  -Continue IV Fluids NS @ 75 cc/hr.  -Hold Lasix and torsemide, Avoid Nephrotoxics.  -No NSAIDS. Hold home aspirin.  -Follow up Renal Sono, UA & Urine Lytes  -Renal Dr. Hebert consulted. Follow up urine studies. -BUN 70 and Cr 4.80 on admission. Today, BUN 72, Cr 3.80. On 12/6/19, BUN 20, Cr 0.9.  -Etiology possibly pre-renal induced ATN.  -Continue IV Fluids NS @ 75 cc/hr.  -Hold Lasix and torsemide, Avoid Nephrotoxics.  -No NSAIDS. Hold home aspirin.  -Follow up Renal Sono, UA & Urine Lytes  -Renal Dr. Hebert consulted. Follow up urine studies.  -CK 1299. Trend in the am.  -Iron panel ordered in the am. -BUN 70 and Cr 4.80 on admission. Today, BUN 72, Cr 3.80. On 12/6/19, BUN 20, Cr 0.9.  -Etiology possibly pre-renal induced ATN.  -Continue IV Fluids NS @ 75 cc/hr.  -Hold Lasix and torsemide, Avoid Nephrotoxics.  -No NSAIDS. Hold home aspirin.  -Follow up Renal Sono, UA & Urine Lytes  -Renal Dr. Hebert consulted. Follow up urine studies.  -CK 1299. Trend in the am. IV Fluids   -Iron panel ordered in the am.

## 2019-12-19 NOTE — DIETITIAN INITIAL EVALUATION ADULT. - PROBLEM SELECTOR PLAN 8
-b/l weeping erythematous cellulitis wounds  -cont levofloxcine (pt is on a 21 day supply) given by PCP for cellultis   -wound care evaluation

## 2019-12-19 NOTE — DISCHARGE NOTE PROVIDER - NSDCCPCAREPLAN_GEN_ALL_CORE_FT
PRINCIPAL DISCHARGE DIAGNOSIS  Diagnosis: Acute renal failure (ARF)  Assessment and Plan of Treatment: This was likely due to dehydration. You were given IV fluids and seen by Nephrology. You had a renal sono which showed _______________. Medications that effect the kidney were held. You should follow up with your outpatient provider to follow up a basic metabolic panel.      SECONDARY DISCHARGE DIAGNOSES  Diagnosis: Stasis dermatitis of both legs  Assessment and Plan of Treatment: This is likely due to venous stasis which is a chronic condition. Please use compression stockings. Antibiotics were stopped as there was no indication of an infection.    Diagnosis: Chronic pain  Assessment and Plan of Treatment: Please continue Oxycodone and gabapentin as prescribed.    Diagnosis: Dysphagia  Assessment and Plan of Treatment: You were evaluated by speech pathology and recommended a soft diet. Please make sure to sit upright when eating.    Diagnosis: Multiple falls  Assessment and Plan of Treatment: You were evaluated by physical therapy and recommended ________. PRINCIPAL DISCHARGE DIAGNOSIS  Diagnosis: Weakness generalized  Assessment and Plan of Treatment:       SECONDARY DISCHARGE DIAGNOSES  Diagnosis: Acute renal failure (ARF)  Assessment and Plan of Treatment: This was likely due to dehydration. You were given IV fluids and seen by Nephrology. You had a renal sono which was normal. Medications that effect the kidney were held. You should follow up with your outpatient provider to follow up a basic metabolic panel.    Diagnosis: Chronic pain  Assessment and Plan of Treatment: Please continue Oxycodone and gabapentin as prescribed.    Diagnosis: Dysphagia  Assessment and Plan of Treatment: You were evaluated by speech pathology and recommended a soft diet. Please make sure to sit upright when eating and eat soft foods.    Diagnosis: Multiple falls  Assessment and Plan of Treatment: You were evaluated by physical therapy and recommended ________.    Diagnosis: Stasis dermatitis of both legs  Assessment and Plan of Treatment: This is likely due to venous stasis which is a chronic condition. Please use compression stockings. Antibiotics were stopped as there was no indication of an infection. PRINCIPAL DISCHARGE DIAGNOSIS  Diagnosis: Weakness generalized  Assessment and Plan of Treatment: You were admitted due to several episodes of blacking out and several falls. A neurologist evaluated you in the hospital. We imaged your head, which was negative for any bleed. You were found to have low blood pressure in the hospital.  Fall is likely multifactorial and due to dehydration, medications, and low blood pressure.  You were taking gabapentin 300 mg twice per day. You should only take gabapentin 100 mg three times per day as needed. I sent this new prescription to your pharmacy.  The neurologist recommended for you to take the pramipexole at a lower dose 0.125 mg once per day. I sent this new prescription to your pharmacy.      SECONDARY DISCHARGE DIAGNOSES  Diagnosis: Acute renal failure (ARF)  Assessment and Plan of Treatment: This was likely due to dehydration. You were given IV fluids and seen by Nephrology. You had a renal sono which was normal. Medications that effect the kidney were held. You should follow up with your outpatient provider to follow up a basic metabolic panel.    Diagnosis: Chronic pain  Assessment and Plan of Treatment: Please continue Oxycodone home dose medication. Please continue gabapentin 100 mg three times per day. I sent a new prescription to your pharmacy. Only take this medication as needed.    Diagnosis: Dysphagia  Assessment and Plan of Treatment: You were evaluated by speech pathology and recommended a soft diet. Please make sure to sit upright when eating and eat soft foods.    Diagnosis: Multiple falls  Assessment and Plan of Treatment: You were evaluated by physical therapy and recommended to get physical therapy at home.    Diagnosis: Stasis dermatitis of both legs  Assessment and Plan of Treatment: This is likely due to venous stasis which is a chronic condition. Please use compression stockings. Antibiotics were stopped as there was no indication of an infection. A wound care nurse evaluated you while you were in the hospital.    Diagnosis: Stasis dermatitis of both legs  Assessment and Plan of Treatment: This is likely due to venous stasis which is a chronic condition. Please use compression stockings. Antibiotics were stopped as there was no indication of an infection. PRINCIPAL DISCHARGE DIAGNOSIS  Diagnosis: Weakness generalized  Assessment and Plan of Treatment: You were admitted due to several episodes of blacking out and several falls. A neurologist evaluated you in the hospital. We imaged your head, which was negative for any bleed. You were found to have low blood pressure in the hospital.  Fall is likely multifactorial and due to dehydration, medications, and low blood pressure.  You were taking gabapentin 300 mg twice per day. You should only take gabapentin 100 mg three times per day as needed. I sent this new prescription to your pharmacy.  The neurologist recommended for you to take the pramipexole at a lower dose 0.125 mg once per day. I sent this new prescription to your pharmacy.      SECONDARY DISCHARGE DIAGNOSES  Diagnosis: Stasis dermatitis of both legs  Assessment and Plan of Treatment: This is likely due to venous stasis which is a chronic condition. Please use compression stockings. Antibiotics were stopped as there was no indication of an infection.  Wound care Instructions:  1. lidocaine 2% to ulcerations prior to application of clean dressings  2. Apply xeroform dressings to bilateral lower extremeties   3. Apply abdominal pads   4. Cover with Danica wrap  Follow up with vascular surgeon upon discharge.    Diagnosis: Chronic pain  Assessment and Plan of Treatment: Please continue Oxycodone home dose medication. Please continue gabapentin 100 mg three times per day. I sent a new prescription to your pharmacy. Only take this medication as needed.    Diagnosis: Dysphagia  Assessment and Plan of Treatment: You were evaluated by speech pathology and recommended a soft diet. Please make sure to sit upright when eating and eat soft foods.    Diagnosis: Multiple falls  Assessment and Plan of Treatment: You were evaluated by physical therapy and recommended to get physical therapy at home.    Diagnosis: Stasis dermatitis of both legs  Assessment and Plan of Treatment: This is likely due to venous stasis which is a chronic condition. Please use compression stockings. Antibiotics were stopped as there was no indication of an infection. A wound care nurse evaluated you while you were in the hospital.    Diagnosis: Acute renal failure (ARF)  Assessment and Plan of Treatment: This was likely due to dehydration. You were given IV fluids and seen by Nephrology. You had a renal sono which was normal. Medications that effect the kidney were held. You should follow up with your outpatient provider to follow up a basic metabolic panel. PRINCIPAL DISCHARGE DIAGNOSIS  Diagnosis: Weakness generalized  Assessment and Plan of Treatment: You were admitted due to several episodes of blacking out and several falls  at home .likely multifactorial 2/2 Positive orthostasis , with low blood pressure ,2/2 pain meds , Gabapentene,   - You got IV Fluids , STOP  Torsemide while in Hospital.  - A neurologist evaluated you in the hospital. CT scan head   which was negative for any Stroke.  - You were found to have low blood pressure in the hospital.   Fall is likely multifactorial and due to dehydration, medications, and low blood pressure.  You were taking gabapentin 300 mg twice per day.   -You should only take gabapentin 100 mg three times per day as needed. I sent this new prescription to your pharmacy.  The neurologist recommended for you to take the pramipexole at a lower dose 0.125 mg once per day. I sent this new prescription to your pharmacy.      SECONDARY DISCHARGE DIAGNOSES  Diagnosis: Stasis dermatitis of both legs  Assessment and Plan of Treatment: This is likely due to  chronic venous stasis  & Chronic Lymphedema which is a chronic condition. Please Keep your b/l lower EXT elevated  use compression stockings.  - Antibiotics -Levaquin was stopped as there was no indication of an infection/ Cellulitis of legs as per ID   - A wound care nurse evaluated you while you were in the hospital. Continue [facility] care for wound care.    Diagnosis: Acute renal failure (ARF)  Assessment and Plan of Treatment: This was likely due to dehydration.  from Torsemide .JUAN M with Metabolic acidosis  -You were given IV fluids and seen by Nephrology.  - You had a renal sono which was normal.   -Medications that effect the Renal  Function  were held  & dose readjusted  ,   -Restart Torsemide 20 mg 1 tab next week M/W/F   -You should follow up with your outpatient provider to follow up BMP in 1 week with PMD metabolic panel. BMP    Diagnosis: Anemia  Assessment and Plan of Treatment: 2/2 JUAN M, IV Hydrations   Repeat CBC in 1 week with PMD    Diagnosis: Chronic pain  Assessment and Plan of Treatment: Please continue Oxycodone 5 mg 3-4 x day as needed, Decrease the dose in half , follow up with Pain menegement DR Bardales as out pt  - Please continue gabapentin 100 mg three times per day as needed.  - I sent a new prescription to your pharmacy. Only take this medication as needed.    Diagnosis: Stasis dermatitis of both legs  Assessment and Plan of Treatment: This is likely due to chronic  venous stasis & chronic Lymph edema  which is a chronic condition. Please use compression stockings. Antibiotics were stopped as there was no indication of an infection.  Wound care Instructions:  1. lidocaine 2% to ulcerations prior to application of clean dressings  2. Apply xeroform dressings to bilateral lower extremeties   3. Apply abdominal pads daily  4. Cover with Danica wrap daily  Follow up with vascular surgeon upon discharge.    Diagnosis: Lymphedema of both lower extremities  Assessment and Plan of Treatment: Lymphedema of both lower extremities, keep B/L Lower EXT elevated   -Torsemide 20 mg 1 tab on Monday/Wednesday/Friday  -Wound care dressing with home care    Diagnosis: Dysphagia  Assessment and Plan of Treatment: You were evaluated by speech pathology and recommended a soft diet. Please make sure to sit upright when eating and eat soft foods.    Diagnosis: Multiple falls  Assessment and Plan of Treatment: You were evaluated by physical therapy and recommended to get physical therapy at home.

## 2019-12-19 NOTE — CONSULT NOTE ADULT - SUBJECTIVE AND OBJECTIVE BOX
Patient is a 69y old  Female who presents with a chief complaint of frequent falls, "blacking out" (19 Dec 2019 14:43)       HPI:  68 YO F PMHX lymphedema b/l LE, cellulitis b/l LEs (on outpt levofloxacin), b/l knee replacements, RLS, obesity, HLD, oA here for "blacking out" and falling this afternoon. pt states she has been blacking out and falling for the last 4 months 2-4x a week. Pt hit the back of her head and back today and admits to LOC. Fall was unwitnessed happened at home. Pt admits to having eaten breakfast in the morning prior to the fall, does not drink enough water, denies dizziness/physical activity/vertigo/palpitations/tinnitis/vision changes prior to fall. Pt also notes she synopsized yesterday sitting in her car and woke up 3 hours later.      ED vitals temp 97.5, HR 89BP 70/44, SpO2 95% on room air. Pt received 1000mL IV bolus x3, percocet 5mg x1. Labs wbc 11.97, Na 134, BUN 70, Cr 4.8, glucose 122, AST 65, eGFR 9, troponin negative x1. CT head chronic ischemic changes bilaterally. CT c spine no fracture or dislocation. CXR small hiatal hernia but no acute findings. Xray fluor guided spine inj epidural showed cervical radiculopathy C5-7. EKG 91 bpm NSR. (18 Dec 2019 23:50)       PAST MEDICAL & SURGICAL HISTORY:  Obesity (BMI 30-39.9)  Traumatic arthropathy involving lower leg: right  History of Osteoarthritis  Hyperlipidemia  Restless Leg Syndrome  H/O arthroscopy of right knee:   Bladder Disorder: Bladder lift   History of Colonoscopy  Ex lap in  for abscess in the baldder  History of Hysterectomy and bladder lift in        FAMILY HISTORY:  NC    Social History:Non smoker    MEDICATIONS  (STANDING):  heparin  Injectable 5000 Unit(s) SubCutaneous every 12 hours  lactobacillus acidophilus 1 Tablet(s) Oral daily  pramipexole 0.125 milliGRAM(s) Oral daily  senna 2 Tablet(s) Oral at bedtime  sodium chloride 0.9% lock flush 3 milliLiter(s) IV Push every 8 hours  sodium chloride 0.9%. 1000 milliLiter(s) (75 mL/Hr) IV Continuous <Continuous>    MEDICATIONS  (PRN):  gabapentin 100 milliGRAM(s) Oral three times a day PRN pain  oxyCODONE    IR 5 milliGRAM(s) Oral every 6 hours PRN Moderate Pain (4 - 6)   Meds reviewed    Allergies    No Known Allergies    Intolerances         REVIEW OF SYSTEMS:    CONSTITUTIONAL:  No weight loss   EYES: No eye pain, visual disturbances, or discharge  ENMT:  No difficulty hearing, tinnitus, vertigo; No sinus or throat pain  NECK: No pain or stiffness  BREASTS: No pain, masses, or nipple discharge  RESPIRATORY: No SOB. No wheeze. No SERRA  CARDIOVASCULAR: No chest pain, palpitations, dizziness,   GASTROINTESTINAL: No abdominal or epigastric pain. No nausea, vomiting, or hematemesis; No diarrhea or constipation. No melena or hematochezia.Trunckal obesity  GENITOURINARY: No dysuria, frequency, hematuria, or incontinence  NEUROLOGICAL: Lethargy, LOC  SKIN: Diffuse erythema, no blisters  LYMPH NODES: No enlarged glands  ENDOCRINE: No heat or cold intolerance; No hair loss  MUSCULOSKELETAL: No joint pain or swelling   PSYCHIATRIC: No depression, anxiety, mood swings, or difficulty sleeping  HEME/LYMPH: No easy bruising, or bleeding gums  ALLERY AND IMMUNOLOGIC: No hives or eczema      Vital Signs Last 24 Hrs  T(C): 36.4 (19 Dec 2019 15:09), Max: 36.9 (18 Dec 2019 20:50)  T(F): 97.6 (19 Dec 2019 15:09), Max: 98.5 (18 Dec 2019 20:50)  HR: 78 (19 Dec 2019 15:09) (75 - 89)  BP: 90/56 (19 Dec 2019 05:35) (70/44 - 110/53)  BP(mean): --  RR: 96 (19 Dec 2019 15:09) (15 - 96)  SpO2: 96% (19 Dec 2019 05:35) (94% - 100%)  Daily Height in cm: 170.18 (18 Dec 2019 23:50)    Daily Weight in k.2 (19 Dec 2019 14:42)    PHYSICAL EXAM:    GENERAL: NAD  HEAD:  Atraumatic, Normocephalic  EYES: EOMI, conjunctiva and sclera clear  ENMT: no drainage from nares, no drainage from ears  NECK: Supple, neck  veins full  NERVOUS SYSTEM:  Awake answers questions  CHEST/LUNG: Clear to percussion bilaterally; No rales, rhonchi, wheezing, or rubs  HEART: Regular rate and rhythm; No murmurs, rubs, or gallops  ABDOMEN: Soft, Nontender, Nondistended; Bowel sounds present,  EXTREMITIES:  Edema+++  SKIN: No rashes or lesions, pale      LABS:                        9.6    8.81  )-----------( 297      ( 19 Dec 2019 08:21 )             29.6         138  |  111<H>  |  72<H>  ----------------------------<  92  4.3   |  16<L>  |  3.80<H>    Ca    8.3<L>      19 Dec 2019 08:20    TPro  6.4  /  Alb  2.6<L>  /  TBili  0.4  /  DBili  x   /  AST  64<H>  /  ALT  22  /  AlkPhos  73        Urinalysis Basic - ( 19 Dec 2019 11:29 )    Color: Yellow / Appearance: Clear / S.020 / pH: x  Gluc: x / Ketone: Trace  / Bili: Small / Urobili: Negative   Blood: x / Protein: 25 mg/dL / Nitrite: Negative   Leuk Esterase: Moderate / RBC: 0-2 /HPF / WBC 26-50   Sq Epi: x / Non Sq Epi: Few / Bacteria: Moderate              RADIOLOGY & ADDITIONAL TESTS

## 2019-12-19 NOTE — PHARMACOTHERAPY INTERVENTION NOTE - COMMENTS
Patient with restless leg syndrome presenting with blackouts on pramipexole and ropinerole - discussed with Dr Willard and neuro consult to reduce dosage (s/e include syncope and orthostatic hypotension, current SBP 90); accepted

## 2019-12-19 NOTE — PROGRESS NOTE ADULT - ASSESSMENT
70 YO F PMHX lymphedema b/l LE, cellulitis b/l LEs (on outpt levofloxacin), b/l knee replacements, RLS, obesity, HLD, oA here for "blacking out" and falling this afternoon. Admit for syncope workup.     pt states she has been blacking out and falling for the last 4 months 2-4x a week. Pt hit the back of her head and back today and admits to LOC. Fall was unwitnessed happened at home. Pt admits to having eaten breakfast in the morning prior to the fall, does not drink enough water, denies dizziness/physical activity/vertigo/palpitations/tinnitis/vision changes prior to fall. Pt also notes she synopsized yesterday sitting in her car and woke up 3 hours later.      ED vitals temp 97.5, HR 89BP 70/44, SpO2 95% on room air. Pt recieved 1000mL IV bolus x3, percocet 5mg x1. Labs wbc 11.97, Na 134, BUN 70, Cr 4.8, glucose 122, AST 65, eGFR 9, troponin negative x1. CT head chronic ischemic changes bilaterally. CT c spine no fracture or dislocation. CXR small hiatal hernia but no acute findings. Xray fluor guided spine inj epidural showed cervical radiculopathy C5-7. EKG 91 bpm NSR. 68 YO F PMHX lymphedema b/l LE, cellulitis b/l LEs (on outpt levofloxacin), b/l knee replacements, RLS, obesity, HLD, oA here for "blacking out" and falling this afternoon. Admit for syncope workup.

## 2019-12-19 NOTE — SWALLOW BEDSIDE ASSESSMENT ADULT - SWALLOW EVAL: RECOMMENDED FEEDING/EATING TECHNIQUES
maintain upright posture during/after eating for 30 mins/alternate food with liquid/position upright (90 degrees)/oral hygiene/allow for swallow between intakes/crush medication (when feasible)/small sips/bites

## 2019-12-19 NOTE — DISCHARGE NOTE PROVIDER - NSDCACTIVITY_GEN_ALL_CORE
No restrictions No restrictions/Bathing allowed Do not drive or operate machinery/Walking - Indoors allowed/No heavy lifting/straining/Bathing allowed/No restrictions

## 2019-12-19 NOTE — PROGRESS NOTE ADULT - PROBLEM SELECTOR PLAN 3
-ED BP 70/44 on arrival   -s/p 1000mL IV bolus x3 in ER  -Syncope prerenal, induced ATN and syncope -ED BP 70/44 on arrival. 90/56 this morning. Continue to monitor manually.  -s/p 1000mL IV bolus x3 in ER. Continue IV NS @ 75 ccs/hr.  -Syncope prerenal, induced ATN and syncope -ED BP 70/44 on arrival. 90/56 this morning. Continue to monitor manually.  -+ Orthostasis -IV Fluids   -s/p 1000mL IV bolus x3 in ER. Continue IV NS @ 75 ccs/hr.  -Syncope prerenal, induced ATN and syncope

## 2019-12-19 NOTE — PROGRESS NOTE ADULT - PROBLEM SELECTOR PLAN 5
-Physical therapy consulted.  -Fall precautions Patient does not take ropinirole at home. Patient takes pramipexole 1.5 mg PO daily at home, which has been known to cause syncope and hypotension.  -Discussed with neurology and will provide lowest dose 0.125 mg PO daily. Patient does not take ropinirole at home. Patient takes pramipexole 1.5 mg PO daily at home, which has been known to cause syncope and hypotension.  -Discussed with neurology and will provide lowest dose 0.125 mg PO daily.  -Iron panel ordered. FOllow up results. Likely Acute in setting of 2/2 JUAN M, & IV Hydrations with Fluids   CBC in AM

## 2019-12-19 NOTE — PATIENT PROFILE ADULT - COMPLETE THE FOLLOWING IF THE PATIENT REFUSES THE INFLUENZA VACCINE:
How Severe Are Your Spot(S)?: moderate Have Your Spot(S) Been Treated In The Past?: has not been treated Hpi Title: Evaluation of a Skin Lesion Risks/benefits discussed with patient/surrogate

## 2019-12-19 NOTE — DIETITIAN INITIAL EVALUATION ADULT. - PROBLEM SELECTOR PLAN 2
-pt "blacked out" prior to fall this afternoon, pt synopsized while sitting in car yesterday and woke up 3 hrs later, blacking out 2-4x a week for the last 4 months  -likely 2/2 hypotension in setting of JUAN M   -admit to remote tele  -CT head: no hemorrhage,  only chronic ischemic changes bilaterally  -orthostatics

## 2019-12-19 NOTE — CONSULT NOTE ADULT - SUBJECTIVE AND OBJECTIVE BOX
For history of frequent falls, suspect most likely this is multifactorial  decondition component.  possible development of peripheral neuropathy and questionable any type of spinal disc disease from OA.  Doubt that this is a new cerebrovascular accident.  For episode of loss of consciousness, suspect most likely this was syncopal event secondary to cerebral hypoperfusion with the patient being hypotensive. no history to suggest underlying epilepsy.  tele eval if needed  monitor sbp   if needed mirapex rec 0.5 mg, if needed gabapentin rec only 100 tid do to kidney function For history of frequent falls, suspect most likely this is multifactorial  decondition component.  possible development of peripheral neuropathy and questionable any type of spinal disc disease from OA.  Doubt that this is a new cerebrovascular accident.  For episode of loss of consciousness, suspect most likely this was syncopal event secondary to cerebral hypoperfusion with the patient being hypotensive. no history to suggest underlying epilepsy.  tele eval if needed  monitor sbp   if needed mirapex rec 0.125 mg, if needed gabapentin rec only 100 tid do to kidney function

## 2019-12-19 NOTE — SWALLOW BEDSIDE ASSESSMENT ADULT - PHARYNGEAL PHASE
Within functional limits Delayed pharyngeal swallow/Complaints of pharyngeal stasis/Decreased laryngeal elevation

## 2019-12-19 NOTE — DIETITIAN INITIAL EVALUATION ADULT. - SIGNS/SYMPTOMS
as evidenced by elevated BUN 72, Cr 3.8, GFR 11L as evidenced by as evidenced by pt with disordered eating patterns, poor quality diet

## 2019-12-19 NOTE — PROGRESS NOTE ADULT - PROBLEM SELECTOR PLAN 7
Chronic lymphedematous changes bilateral lower extremities. -Continue home dose oxycodone IR 10 mg PO q6h.  -iSTOPed patient. -Continue home dose oxycodone IR 10 mg PO q6h prn.  -iSTOPed patient. -Patient reports difficulty swallowing this morning while eating breakfast.  -Bedside speech eval ordered. Rec soft with thin liquids.

## 2019-12-19 NOTE — CHART NOTE - NSCHARTNOTEFT_GEN_A_CORE
Reference #: [id number]    Others' Prescriptions  Patient Name:	Christina Damian	YOB: 1950  Address:	[address] [place] [address]	Sex:	Female  Rx Written	Rx Dispensed	Drug	Quantity	Days Supply	Prescriber Name  10/07/2019	10/08/2019	oxycodone hcl 10 mg tablet	120	30	StamatosGary  08/26/2019	09/03/2019	oxycodone hcl 10 mg tablet	120	30	StamatosGary  07/29/2019	08/01/2019	oxycodone hcl 10 mg tablet	120	30	Gary Bardales  Patient Name:	Christina Damian	YOB: 1950  Address:	[address] [place] [address]	Sex:	Female  Rx Written	Rx Dispensed	Drug	Quantity	Days Supply	Prescriber Name  05/24/2019	05/30/2019	oxycodone hcl 10 mg tablet	120	30	StamatosGary  04/24/2019	04/27/2019	oxycodone hcl 10 mg tablet	120	30	StamatosGary  03/15/2019	03/18/2019	oxycodone hcl 10 mg tablet	120	30	StamatosGary  02/13/2019	02/14/2019	oxycodone hcl 10 mg tablet	120	30	StamasekousGary  01/11/2019	01/12/2019	oxycodone hcl 10 mg tablet	120	30	SusanmaGary mccrary

## 2019-12-19 NOTE — SWALLOW BEDSIDE ASSESSMENT ADULT - SWALLOW EVAL: PROGNOSIS
DX CONT: penetration noted.                                                                                                                                                                                         PROGNOSIS: Good for recommended diet

## 2019-12-19 NOTE — DISCHARGE NOTE PROVIDER - PROVIDER TOKENS
FREE:[LAST:[Elise],FIRST:[Nathanael],PHONE:[phone]],FAX:[phone]],ADDRESS:[[address] [place] [address]],ESTABLISHEDPATIENT:[T]] FREE:[LAST:[Elise],FIRST:[Nathanael],PHONE:[phone]],FAX:[phone]],ADDRESS:[[address] [place] [address]],ESTABLISHEDPATIENT:[T]],PROVIDER:[[id number]]]

## 2019-12-19 NOTE — DISCHARGE NOTE PROVIDER - NSDCFUADDINST_GEN_ALL_CORE_FT
Follow up with Dr Olivares next week , Recheck CBC, CMP next week with PMD  Follow up with your Pain management MD to cut back your pain meds.

## 2019-12-19 NOTE — PHYSICAL THERAPY INITIAL EVALUATION ADULT - PERTINENT HX OF CURRENT PROBLEM, REHAB EVAL
68 YO F presents for "blacking out" and falling . pt states she has been blacking out and falling x4 months 2-4x a week. Pt hit the back of her head and back today and admits to LOC. Fall was unwitnessed happened at home.  Pt also notes she synopsized day prior to admission sitting in her car and woke up 3 hours later. troponin negative x1. CT head chronic ischemic changes bilaterally. CT c spine no fracture or dislocation. CXR small hiatal hernia but no acute findings.

## 2019-12-19 NOTE — DIETITIAN INITIAL EVALUATION ADULT. - ADD RECOMMEND
Diet education provided on the following topics: reduced intake of concentrated sweets (candy, chocolate, sugar sweetened beverages), balanced healthy meals (protein, vegetable, starch), small frequent meals and snacks. Will monitor need for additional nutrition support.

## 2019-12-19 NOTE — PROGRESS NOTE ADULT - PROBLEM SELECTOR PLAN 2
-Patient admitted for syncopal episode while sitting in car day of admission. History of 'blacking out' 2-4 times per week for the last 4 months.  -Syncope possibly due to hypotension in setting of JUAN M.  -Continue remote tele.  -CT head: no hemorrhage, only chronic ischemic changes.  -orthostatics  -Pending blood culture, urine culture. -Patient admitted for syncopal episode while sitting in car day of admission. History of 'blacking out' 2-4 times per week for the last 4 months.  -Neurology (Dr. Amezcua) consulted - most likely fall is multifactorial decondition component, possible development of peripheral neuropathy and questional type of spinal disc disease from OA. Doubts that this is a new CVA. Suspect syncopal event 2/2 cerebral hypoperfusion due to hypotension. No Hx to suggest epilepsy.  -Continue remote telemetry.  -CT head: no hemorrhage, only chronic ischemic changes.  -Orthostatics negative.  -Pending blood culture, urine culture. -Patient admitted for syncopal episode while sitting in car day of admission. History of 'blacking out' 2-4 times per week for the last 4 months.  -Neurology (Dr. Amezcua) consulted - most likely fall is multifactorial decondition component, possible development of peripheral neuropathy and questional type of spinal disc disease from OA. Doubts that this is a new CVA. Suspect syncopal event 2/2 cerebral hypoperfusion due to hypotension. No Hx to suggest epilepsy.  -Continue remote telemetry.  -CT head: no hemorrhage, only chronic ischemic changes.  -Orthostatics negative. Repeat orthostatics positive (108/ -> 78/49)  -Pending blood culture, urine culture. -Patient admitted for syncopal episode while sitting in car day of admission. History of 'blacking out' 2-4 times per week for the last 4 months.  -Neurology (Dr. Amezcua) consulted - most likely fall is multifactorial decondition component, possible development of peripheral neuropathy and questional type of spinal disc disease from OA. Doubts that this is a new CVA. Suspect syncopal event 2/2 cerebral hypoperfusion due to hypotension. No Hx to suggest epilepsy.  -Continue remote telemetry.  -CT head: no hemorrhage, only chronic ischemic changes.  -Orthostatics negative. Repeat orthostatics positive (108/64 -> 78/49)  -Pending blood culture, urine culture.

## 2019-12-19 NOTE — DIETITIAN INITIAL EVALUATION ADULT. - OTHER INFO
69 year old female with PMH of 69 year old female with PMH of lymphedema B/L lower extremity, OA s/p B/L knee replacements admitted for frequent falls, "blacking out".    Visited with daughter at bedside who provided majority of subjective information as pt c/o discomfort due to lower extremity edema. Overall, pt appeared disengaged and required much encouragement to participate in interview. Lives at home with ex- who has early onset dementia.    Diet recall: breakfast - usually skips, lunch - egg salad sandwich on whole wheat bread, and dinner - ?? whatever leftovers she reheats (spare ribs). Per daughter, consumes lots of sweets - candy, croissants, ice cream, iced tea all throughout the day. Does not typically consume balanced healthy meals daily. Limited food acceptance of fruits/vegetables. Diet recall indicates that diet is of poor quality and disordered eating patterns present.    -230 pounds per patient. Noted with +2 B/L lower edema. Takes biotin, L-lysine, B12, lactobacillus acidophilus prior to admission. +BM 12/18. Per daughter, has endorsed recent difficulty swallowing the past couple of days. Seen by SLP today with recommendation for soft diet with thin liquids.

## 2019-12-19 NOTE — DIETITIAN INITIAL EVALUATION ADULT. - PROBLEM SELECTOR PLAN 1
JUAN M with metabolic acidosis  ? Etiology, Pre Renal, Hypotension induced ATN  -IV Fluids NS @ 75 cc/hr, I/O   -HOLD Lasix/home torsemide, Avoid Nephrotoxics, NO NSAIDS hold home aspirin 81   -AM BMP. Renal Sono, UA & Urine Lytes  Renal DR SHELLY Chapa  -eGFR 9, BUN 70, Cr 4.8

## 2019-12-19 NOTE — CONSULT NOTE ADULT - PROBLEM SELECTOR RECOMMENDATION 9
reviewed photos from recent admission at [facility] and there does appear to have been sig swelling and erythema of right leg during that admission. No evidence for acute cellulitis currently and do not recommend oral or IV abx at this point.  PT would benefit for better control of LE edema but this needs to be managed in context of ARF. As discussed with Dr Peterson would involve our wound care team for treatment recs

## 2019-12-19 NOTE — CONSULT NOTE ADULT - ASSESSMENT
70 YO F PMHX lymphedema b/l LE, cellulitis report of b/l LEs (on outpt levofloxacin), b/l knee replacements, RLS, obesity, HLD, oA here for "blacking out" and falling  for the last 4 months 2-4x a week. Pt hit the back of her head and back today and admits to LOC.

## 2019-12-19 NOTE — DISCHARGE NOTE PROVIDER - CARE PROVIDER_API CALL
Nathanael Olivares  [address] [place] [id number]  Phone: [phone]  Fax: [phone]  Established Patient  Follow Up Time: Nathanael Olivares  [address] [place] [address]  Phone: [phone]  Fax: [phone]  Established Patient  Follow Up Time:     Rich Vallejo)  Vascular Surgery  [address] [facility] [address], [place] [address]  Phone: [phone]  Fax: [phone]  Follow Up Time:

## 2019-12-19 NOTE — CONSULT NOTE ADULT - SUBJECTIVE AND OBJECTIVE BOX
HPI:  70 YO F PMHX lymphedema b/l LE, cellulitis b/l LEs (on outpt levofloxacin), b/l knee replacements, RLS, obesity, HLD, oA here for "blacking out" and falling this afternoon. pt states she has been blacking out and falling for the last 4 months 2-4x a week. Pt hit the back of her head and back today and admits to LOC. Fall was unwitnessed happened at home. Pt admits to having eaten breakfast in the morning prior to the fall, does not drink enough water, denies dizziness/physical activity/vertigo/palpitations/tinnitis/vision changes prior to fall. Pt also notes she synopsized yesterday sitting in her car and woke up 3 hours later.      ED vitals temp 97.5, HR 89BP 70/44, SpO2 95% on room air. Pt received 1000mL IV bolus x3, percocet 5mg x1. Labs wbc 11.97, Na 134, BUN 70, Cr 4.8, glucose 122, AST 65, eGFR 9, troponin negative x1. CT head chronic ischemic changes bilaterally. CT c spine no fracture or dislocation. CXR small hiatal hernia but no acute findings. Xray fluor guided spine inj epidural showed cervical radiculopathy C5-7. EKG 91 bpm NSR. (18 Dec 2019 23:50)      PAST MEDICAL & SURGICAL HISTORY:  Obesity (BMI 30-39.9)  Traumatic arthropathy involving lower leg: right  History of Osteoarthritis  Hyperlipidemia  Restless Leg Syndrome  H/O arthroscopy of right knee:   Bladder Disorder: Bladder lift   History of Colonoscopy  Ex lap in  for abscess in the baldder  History of Hysterectomy and bladder lift in       Antimicrobials      Immunological      Other  gabapentin 100 milliGRAM(s) Oral three times a day PRN  heparin  Injectable 5000 Unit(s) SubCutaneous every 12 hours  lactobacillus acidophilus 1 Tablet(s) Oral daily  oxyCODONE    IR 5 milliGRAM(s) Oral every 6 hours PRN  pramipexole 0.125 milliGRAM(s) Oral daily  senna 2 Tablet(s) Oral at bedtime  sodium chloride 0.9% lock flush 3 milliLiter(s) IV Push every 8 hours  sodium chloride 0.9%. 1000 milliLiter(s) IV Continuous <Continuous>      Allergies    No Known Allergies    Intolerances        SOCIAL HISTORY:  Social History:  quit smoking 10 years ago, 25 year pack hx,   1 liquor 1x a month, denies other drug use   lives with , performs all ADLs, uses walker (18 Dec 2019 23:50)      FAMILY HISTORY: noncontrib      ROS:    EYES:  Negative  blurry vision or double vision  GASTROINTESTINAL:  Negative for nausea, vomiting, diarrhea  -otherwise negative except for subjective    Vital Signs Last 24 Hrs  T(C): 36.2 (19 Dec 2019 05:35), Max: 36.9 (18 Dec 2019 20:50)  T(F): 97.2 (19 Dec 2019 05:35), Max: 98.5 (18 Dec 2019 20:50)  HR: 75 (19 Dec 2019 05:35) (75 - 89)  BP: 90/56 (19 Dec 2019 05:35) (70/44 - 110/53)  BP(mean): --  RR: 17 (19 Dec 2019 05:35) (15 - 18)  SpO2: 96% (19 Dec 2019 05:35) (94% - 100%)    PE:  WDWN in no distress  HEENT:  NC, PERRL, sclerae anicteric, conjunctivae clear, EOMI.  Sinuses nontender, no nasal exudate.  No buccal or pharyngeal lesions, erythema or exudate  Neck:  Supple, no adenopathy  Lungs:  No accessory muscle use, bilaterally clear to auscultation  Cor:  RRR, S1, S2, no murmur appreciated  Abd:  Symmetric, normoactive BS.  Soft, nontender, no masses, guarding or rebound.  Liver and spleen not enlarged  Extrem/Skin: open areas on LE with some weeping, chronic changes, edema, minimal warmth and minimal erythema  Neuro: grossly intact  Musc: sig kyphosis    LABS:                        9.6    8.81  )-----------( 297      ( 19 Dec 2019 08:21 )             29.6       WBC Count: 8.81 K/uL (19 @ 08:21)  WBC Count: 11.97 K/uL (19 @ 21:06)          138  |  111<H>  |  72<H>  ----------------------------<  92  4.3   |  16<L>  |  3.80<H>    Ca    8.3<L>      19 Dec 2019 08:20    TPro  6.4  /  Alb  2.6<L>  /  TBili  0.4  /  DBili  x   /  AST  64<H>  /  ALT  22  /  AlkPhos  73        Creatinine, Serum: 3.80 mg/dL (19 @ 08:20)  Creatinine, Serum: 4.80 mg/dL (19 @ 21:06)      Urinalysis Basic - ( 19 Dec 2019 11:29 )    Color: Yellow / Appearance: Clear / S.020 / pH: x  Gluc: x / Ketone: Trace  / Bili: Small / Urobili: Negative   Blood: x / Protein: 25 mg/dL / Nitrite: Negative   Leuk Esterase: Moderate / RBC: 0-2 /HPF / WBC 26-50   Sq Epi: x / Non Sq Epi: Few / Bacteria: Moderate            MICROBIOLOGY:      RADIOLOGY & ADDITIONAL STUDIES:    --

## 2019-12-19 NOTE — PROGRESS NOTE ADULT - PROBLEM SELECTOR PLAN 9
-Continue home dose oxycodone IR 10 mg PO q6h. Heparin 5000U SC q12h for DVT prophylaxis.     IMPROVE VTE Individual Risk Assessment                                               IMPROVE VTE Score ____3_____ -Continue home dose oxycodone IR 10 mg PO q6h prn.  -iSTOPed patient.

## 2019-12-19 NOTE — DIETITIAN INITIAL EVALUATION ADULT. - ENERGY NEEDS
Wt: 224 pounds Ht:, 67 inches BMI: 35.1 kg/m2, IBW: 135 pounds +/-10%, %IBW: 166%  +2 generalized, B/L leg, ankle edema

## 2019-12-19 NOTE — ED ADULT NURSE REASSESSMENT NOTE - NS ED NURSE REASSESS COMMENT FT1
Patient refusing straight cath for urine. Explained to patient the importance of testing urine. Patient is continent. Patient denies urge to urinate. Kwame Alvarado made aware.

## 2019-12-19 NOTE — PHYSICAL THERAPY INITIAL EVALUATION ADULT - ADDITIONAL COMMENTS
pt lives in a house w/ 3 steps/rail to enter.  Pt report is a community ambulator and is able to drive and car.

## 2019-12-19 NOTE — DIETITIAN INITIAL EVALUATION ADULT. - PROBLEM SELECTOR PLAN 3
-ED BP 70/44 on arrival   - s/p 1000mL IV bolus x3 in ER  -syncope prerenal, induced ATN and syncope

## 2019-12-19 NOTE — PROGRESS NOTE ADULT - PROBLEM SELECTOR PLAN 4
-Physical therapy consulted. Patient with weakness and Hx hof multiple falls.  -Physical therapy consulted and recommended to discharge to home with outpatient PT when medically ready.  -Fall precautions Chronic lymphedematous changes bilateral lower extremities. B/L PVD stasis skin changes   -Patient has Hx of R lower extremity cellulitis and was hospitalized in [place], and was prescribed a 3 week course of Levaquin. Chronic venous stasis dermatitis, chronic oozing , pt has out pt Vascular Sx & Home care RN who comes home for wound dressing, Does NOT look infected   -As an outpatient, she has her legs wrapped and changed every 2 days by a visiting nurse.  -Patient was given Levaquin for 3 weeks from [facility] Discussed with ID and no indication to continue Levaquin.

## 2019-12-19 NOTE — SWALLOW BEDSIDE ASSESSMENT ADULT - COMMENTS
Consult received and chart reviewed. The patient was seen at chairside this afternoon for an initial assessment of swallow function, at which time she was alert and cooperative. Patient's daughter present at bedside throughout this evaluation. Patient reporting feelings of pharyngeal stasis with more dense textures.     Per charting, the patient is a "68 YO F PMHX lymphedema b/l LE, cellulitis b/l LEs (on outpt levofloxacin), b/l knee replacements, RLS, obesity, HLD, oA here for "blacking out" and falling this afternoon. pt states she has been blacking out and falling for the last 4 months 2-4x a week. Pt hit the back of her head and back today and admits to LOC. Fall was unwitnessed happened at home. Pt admits to having eaten breakfast in the morning prior to the fall, does not drink enough water, denies dizziness/physical activity/vertigo/palpitations/tinnitis/vision changes prior to fall. Pt also notes she synopsized yesterday sitting in her car and woke up 3 hours later." CXR revealed, "Small hiatal hernia is suspect. No acute finding." Recent CT of the head revealed, "No acute intracranial hemorrhage or acute territorial infarct." Discussed results and recommendations from this evaluation with the patient, patient's daughter, RN, and MD.

## 2019-12-19 NOTE — CONSULT NOTE ADULT - ASSESSMENT
JUAN M on CKD   Metabolic Acidosis  HTN  LE Edema  Anemia  Polypharmacy    -BLCr 1 (per family)  -JUAN M likely 2/2 diuretics in the setting of decreased PO intake, vs hypotensive episodes  -Check urine lytes  -UA reviewed  -Renal US no hydro  -Cont IVF  -Cont to hold diurtics  -Agree with PRN gabapentin, discussed with patient about only taking if needed, was taking 600mg BID at home  -Check Iron studies JUAN M on CKD   Metabolic Acidosis  HTN  LE Edema  Anemia  Polypharmacy    -BLCr 1 (per family)  -JUAN M likely 2/2 diuretics in the setting of decreased PO intake, vs hypotensive episodes  -Check urine lytes  -UA reviewed  -Renal US no hydro  -Cont IVF  -Cont to hold diurtics  -Agree with PRN gabapentin, discussed with patient about only taking if needed, was taking 600mg BID at home  -Check Iron studies       D/W Dr. huber/frieda  Thank you for the consult will cont to follow.

## 2019-12-19 NOTE — DISCHARGE NOTE PROVIDER - NSDCMRMEDTOKEN_GEN_ALL_CORE_FT
Acidophilus oral tablet: 1 tab(s) orally once a day  Aspirin Enteric Coated 81 mg oral delayed release tablet: 1 tab(s) orally once a day  biotin 5000 mcg oral tablet, disintegratin tab(s) orally once a day  gabapentin 300 mg oral capsule: 2 cap(s) orally 2 times a day  lecithin 1200 mg oral capsule: 1 cap(s) orally once a day  levoFLOXacin 500 mg oral tablet: 1 tab(s) orally every 24 hours  lysine 1000 mg oral tablet: 1 tab(s) orally once a day  oxyCODONE 10 mg oral tablet: 1 tab(s) orally every 6 hours  pramipexole 1.5 mg oral tablet: 1 tab(s) orally once a day  torsemide 20 mg oral tablet: 1 tab(s) orally once a day Acidophilus oral tablet: 1 tab(s) orally once a day  Aspirin Enteric Coated 81 mg oral delayed release tablet: 1 tab(s) orally once a day  biotin 5000 mcg oral tablet, disintegratin tab(s) orally once a day  gabapentin 100 mg oral capsule: 1 cap(s) orally 3 times a day as needed  lecithin 1200 mg oral capsule: 1 cap(s) orally once a day  lysine 1000 mg oral tablet: 1 tab(s) orally once a day  oxyCODONE 10 mg oral tablet: 1 tab(s) orally every 6 hours  pramipexole 0.125 mg oral tablet: 1 tab(s) orally once a day   torsemide 20 mg oral tablet: 1 tab(s) orally once a day Acidophilus oral tablet: 1 tab(s) orally once a day  Aspirin Enteric Coated 81 mg oral delayed release tablet: 1 tab(s) orally once a day  biotin 5000 mcg oral tablet, disintegratin tab(s) orally once a day  fluticasone 50 mcg/inh nasal spray: 1 spray(s) nasal 2 times a day  gabapentin 100 mg oral capsule: 1 cap(s) orally 3 times a day as needed  lecithin 1200 mg oral capsule: 1 cap(s) orally once a day  lidocaine 2% topical gel with applicator: 1 application topically once a day  lysine 1000 mg oral tablet: 1 tab(s) orally once a day  oxyCODONE 10 mg oral tablet: 0.5 tab(s) orally every 6 hours  5 mg tab q 6 hrs as needed  pramipexole 0.125 mg oral tablet: 1 tab(s) orally once a day   torsemide 20 mg oral tablet: 1 tab(s) orally 3 times a week

## 2019-12-19 NOTE — PROGRESS NOTE ADULT - SUBJECTIVE AND OBJECTIVE BOX
DOCUMENTATION IN PROGRESS    Patient is a 69y old  Female who presents with a chief complaint of frequent falls, "blacking out" (19 Dec 2019 07:42)      INTERVAL HPI: 69 year old female with past medical history of lymphedema b/l LE, cellulitis b/l LEs (on outpt levofloxacin), b/l knee replacements, restless leg syndrome, obesity, HLD, OA here for "blacking out" and falling this afternoon. pt states she has been blacking out and falling for the last 4 months 2-4x a week. Pt hit the back of her head and back today and admits to LOC. Fall was unwitnessed happened at home. Patient admits to having eaten breakfast in the morning prior to the fall, does not drink enough water, denies dizziness/physical activity, vertigo, palpitations, tinnitus vision changes prior to fall. Pt also notes she synopsized yesterday sitting in her car and woke up 3 hours later.      ED vitals temp 97.5, HR 89 BP 70/44, SpO2 95% on room air. Pt received 1000 mL IV bolus x3, percocet 5mg x1. Labs wbc 11.97, Na 134, BUN 70, Cr 4.8, glucose 122, AST 65, eGFR 9, troponin negative x1. CT head chronic ischemic changes bilaterally. CT c spine no fracture or dislocation. CXR small hiatal hernia but no acute findings. Xray fluor guided spine inj epidural showed cervical radiculopathy C5-7. EKG 91 bpm NSR.     :     OVERNIGHT EVENTS: Patient did not urinate overnight. Bladder scan ordered.    Home Medications:  Acidophilus oral tablet: 1 tab(s) orally once a day (19 Dec 2019 02:07)  Aspirin Enteric Coated 81 mg oral delayed release tablet: 1 tab(s) orally once a day (19 Dec 2019 02:07)  biotin 5000 mcg oral tablet, disintegratin tab(s) orally once a day (19 Dec 2019 02:07)  gabapentin 300 mg oral capsule: 2 cap(s) orally 2 times a day (19 Dec 2019 02:07)  lecithin 1200 mg oral capsule: 1 cap(s) orally once a day (19 Dec 2019 02:07)  levoFLOXacin 500 mg oral tablet: 1 tab(s) orally every 24 hours (19 Dec 2019 02:07)  lysine 1000 mg oral tablet: 1 tab(s) orally once a day (19 Dec 2019 02:07)  oxyCODONE 10 mg oral tablet: 1 tab(s) orally every 6 hours (19 Dec 2019 02:07)  pramipexole 1.5 mg oral tablet: 1 tab(s) orally once a day (19 Dec 2019 02:07)  rOPINIRole 2 mg oral tablet, extended release: 1 tab(s) orally once a day (19 Dec 2019 02:07)  torsemide 20 mg oral tablet: 1 tab(s) orally once a day (19 Dec 2019 02:07)      MEDICATIONS  (STANDING):  heparin  Injectable 5000 Unit(s) SubCutaneous every 12 hours  lactobacillus acidophilus 1 Tablet(s) Oral daily  oxyCODONE    IR 10 milliGRAM(s) Oral every 6 hours  pramipexole 1.5 milliGRAM(s) Oral daily  rOPINIRole 2 milliGRAM(s) Oral daily  senna 2 Tablet(s) Oral at bedtime  sodium chloride 0.9% lock flush 3 milliLiter(s) IV Push every 8 hours  sodium chloride 0.9%. 1000 milliLiter(s) (75 mL/Hr) IV Continuous <Continuous>    MEDICATIONS  (PRN):  None    Allergies  No Known Allergies      Social History:  quit smoking 10 years ago, 25 year pack hx,   1 liquor 1x a month, denies other drug use   lives with , performs all ADLs, uses walker (18 Dec 2019 23:50)      REVIEW OF SYSTEMS:  CONSTITUTIONAL: No fever, No chills, No fatigue, No myalgia, No Body ache, No Weakness  EYES: No eye pain,  No visual disturbances, No discharge, NO Redness  ENMT:  No ear pain, No nose bleed, No vertigo; No sinus pain, NO throat pain, No Congestion  NECK: No pain, No stiffness  RESPIRATORY: No cough, NO wheezing, No  hemoptysis, NO  shortness of breath  CARDIOVASCULAR: No chest pain, palpitations  GASTROINTESTINAL: No abdominal pain, NO epigastric pain. No nausea, No vomiting; No diarrhea, No constipation. [  ] BM  GENITOURINARY: No dysuria, No frequency, No urgency, No hematuria, NO incontinence  NEUROLOGICAL: No headaches, No dizziness, No numbness, No tingling, No tremors, No weakness  EXT: No Swelling, No Pain, No Edema  SKIN:  [  ] No itching, burning, rashes, or lesions   MUSCULOSKELETAL: No joint pain ,No Jt swelling; No muscle pain, No back pain, No extremity pain  PSYCHIATRIC: No depression,  No anxiety,  No mood swings ,No difficulty sleeping at night  PAIN SCALE: [  ] None  [  ] Other-  ROS Unable to obtain due to - [  ] Dementia  [  ] Lethargy [  ] Drowsy [  ] Sedated [  ] non verbal  REST OF REVIEW Of SYSTEM - [  ] Normal     Vital Signs Last 24 Hrs  T(C): 36.2 (19 Dec 2019 05:35), Max: 36.9 (18 Dec 2019 20:50)  T(F): 97.2 (19 Dec 2019 05:35), Max: 98.5 (18 Dec 2019 20:50)  HR: 75 (19 Dec 2019 05:35) (75 - 89)  BP: 90/56 (19 Dec 2019 05:35) (70/44 - 110/53). 90/56 this morning.  BP(mean): --  RR: 17 (19 Dec 2019 05:35) (15 - 18)  SpO2: 96% (19 Dec 2019 05:35) (94% - 100%)  Finger Stick          PHYSICAL EXAM:  GENERAL:  [  ] NAD , [  ] well appearing, [  ] Agitated, [  ] Drowsy,  [  ] Lethargy, [  ] confused   HEAD:  [  ] Normal, [  ] Other  EYES:  [  ] EOMI, [  ] PERRLA, [  ] conjunctiva and sclera clear normal, [  ] Other,  [  ] Pallor,[  ] Discharge  ENMT:  [  ] Normal, [  ] Moist mucous membranes, [  ] Good dentition, [  ] No Thrush  NECK:  [  ] Supple, [  ] No JVD, [  ] Normal thyroid, [  ] Lymphadenopathy [  ] Other  CHEST/LUNG:  [  ] Clear to auscultation bilaterally, [  ] Breath Sounds equal B/L / Decrease, [  ] poor effort  [  ] No rales, [  ] No rhonchi  [  ]  No wheezing,   HEART:  [  ] Regular rate and rhythm, [  ] tachycardia, [  ] Bradycardia,  [  ] irregular  [  ] No murmurs, No rubs, No gallops, [  ] PPM in place (Mfr:  )  ABDOMEN:  [  ] Soft, [  ] Nontender, [  ] Nondistended, [  ] No mass, [  ] Bowel sounds present, [  ] obese  NERVOUS SYSTEM:  [  ] Alert & Oriented X3, [  ] Nonfocal  [  ] Confusion  [  ] Encephalopathic [  ] Sedated [  ] Unable to assess, [  ] Dementia [  ] Other-  EXTREMITIES: [  ] 2+ Peripheral Pulses, No clubbing, No cyanosis,  [  ] edema B/L lower EXT. [  ] PVD stasis skin changes B/L Lower EXT, [  ] wound  LYMPH: No lymphadenopathy noted  SKIN:  [  ] No rashes or lesions, [  ] Pressure Ulcers, [  ] ecchymosis, [  ] Skin Tears, [  ] Other    DIET: Regular    LABS: Pending    CARDIAC MARKERS ( 18 Dec 2019 21:06 )  <.015 ng/mL / x     / x     / x     / x          RADIOLOGY & ADDITIONAL TESTS:  CT Head without Contrast (): Chronic ischemic changes bilaterally.  CXR (): Small hiatal hernia is suspect. No acute finding.  CT Cervical Spine without Contrast (): No fracture or dislocation.    HEALTH ISSUES - PROBLEM Dx:  Hypotension: Hypotension  Syncope: Syncope  Prophylactic measure: Prophylactic measure  Chronic pain: Chronic pain  Wound: Wound  Lymphadenopathy: Lymphadenopathy  Hyperlipidemia: Hyperlipidemia  Restless Leg Syndrome: Restless Leg Syndrome  Multiple falls: Multiple falls  Weakness generalized: Weakness generalized  JUAN M (acute kidney injury): JUAN M (acute kidney injury)          Consultant(s) Notes Reviewed:  [  ] YES     Care Discussed with [X] Consultants  [ X ] Patient  [  ] Family [  ] HCP [  ]   [  ] Social Service  [  ] RN, [  ] Physical Therapy,[  ] Palliative care team  DVT PPX: [  ] Lovenox, [ X ] S C Heparin, [  ] Coumadin, [  ] Xarelto, [  ] Eliquis, [  ] Pradaxa, [  ] IV Heparin drip, [  ] SCD [  ] Contraindication 2 to GI Bleed,[  ] Ambulation [  ] Contraindicated 2 to  bleed [  ] Contraindicated 2 to Brain Bleed  Advanced directive: [X  ] None, [  ] DNR/DNI Patient is a 69y old  Female who presents with a chief complaint of frequent falls, "blacking out" (19 Dec 2019 07:42)      INTERVAL HPI: 69 year old female with past medical history of lymphedema b/l LE, cellulitis b/l LEs (on outpt levofloxacin), b/l knee replacements, restless leg syndrome, obesity, HLD, OA here for "blacking out" and falling this afternoon. pt states she has been blacking out and falling for the last 4 months 2-4x a week. Pt hit the back of her head and back today and admits to LOC. Fall was unwitnessed happened at home. Patient admits to having eaten breakfast in the morning prior to the fall, does not drink enough water, denies dizziness/physical activity, vertigo, palpitations, tinnitus vision changes prior to fall. Pt also notes she synopsized yesterday sitting in her car and woke up 3 hours later.      ED vitals temp 97.5, HR 89 BP 70/44, SpO2 95% on room air. Pt received 1000 mL IV bolus x3, percocet 5mg x1. Labs wbc 11.97, Na 134, BUN 70, Cr 4.8, glucose 122, AST 65, eGFR 9, troponin negative x1. CT head chronic ischemic changes bilaterally. CT c spine no fracture or dislocation. CXR small hiatal hernia but no acute findings. Xray fluor guided spine inj epidural showed cervical radiculopathy C5-7. EKG 91 bpm NSR.     : Speech bedside swallow eval ordered / patient reported difficulty swallowing. ID evaluated patient today.    OVERNIGHT EVENTS: Patient reports she had another 'black out' episode this morning where she picked up her bowl of cereal and the next thing she knew, the cereal was on the ground and she did not remember the event. She tolerated breakfast fine, although she reports difficulty swallowing. She urinated without pain or difficulty this morning. Last BM yesterday. She reports to feeling cold today. Denied dizziness, shortness of breath, and nausea.    Home Medications:  Acidophilus oral tablet: 1 tab(s) orally once a day (19 Dec 2019 02:07)  Aspirin Enteric Coated 81 mg oral delayed release tablet: 1 tab(s) orally once a day (19 Dec 2019 02:07)  biotin 5000 mcg oral tablet, disintegratin tab(s) orally once a day (19 Dec 2019 02:07)  gabapentin 300 mg oral capsule: 2 cap(s) orally 2 times a day (19 Dec 2019 02:07)  lecithin 1200 mg oral capsule: 1 cap(s) orally once a day (19 Dec 2019 02:07)  levoFLOXacin 500 mg oral tablet: 1 tab(s) orally every 24 hours (19 Dec 2019 02:07)  lysine 1000 mg oral tablet: 1 tab(s) orally once a day (19 Dec 2019 02:07)  oxyCODONE 10 mg oral tablet: 1 tab(s) orally every 6 hours (19 Dec 2019 02:07)  pramipexole 1.5 mg oral tablet: 1 tab(s) orally once a day (19 Dec 2019 02:07)  rOPINIRole 2 mg oral tablet, extended release: 1 tab(s) orally once a day (19 Dec 2019 02:07)  torsemide 20 mg oral tablet: 1 tab(s) orally once a day (19 Dec 2019 02:07)    MEDICATIONS  (STANDING):  heparin  Injectable 5000 Unit(s) SubCutaneous every 12 hours  lactobacillus acidophilus 1 Tablet(s) Oral daily  oxyCODONE    IR 10 milliGRAM(s) Oral every 6 hours  pramipexole 1.5 milliGRAM(s) Oral daily  rOPINIRole 2 milliGRAM(s) Oral daily  senna 2 Tablet(s) Oral at bedtime  sodium chloride 0.9% lock flush 3 milliLiter(s) IV Push every 8 hours  sodium chloride 0.9%. 1000 milliLiter(s) (75 mL/Hr) IV Continuous <Continuous>    MEDICATIONS  (PRN):  None    Allergies  No Known Allergies      Social History:  quit smoking 10 years ago, 25 year pack hx,   1 liquor 1x a month, denies other drug use   lives with , performs all ADLs, uses walker (18 Dec 2019 23:50)      REVIEW OF SYSTEMS:  CONSTITUTIONAL: No fever, No chills, No myalgia,  EYES: No eye pain,  No visual disturbances, No discharge, NO Redness  ENMT:  No ear pain, No nose bleed, No sinus pain, No throat pain, No Congestion  NECK: No pain, No stiffness  RESPIRATORY: No cough, No wheezing, No  hemoptysis, No shortness of breath  CARDIOVASCULAR: No chest pain, palpitations  GASTROINTESTINAL: No abdominal pain, No epigastric pain. No nausea, No vomiting; No diarrhea, No constipation. [ X ] BM yesterday  GENITOURINARY: No dysuria, No frequency, No urgency, No hematuria  NEUROLOGICAL: Admits to episodes of blacking out. Bilateral hand tremors. No headaches, No dizziness, No numbness, No tingling, No weakness  EXT: No Swelling, No Pain, No Edema  SKIN: B/L leg redness.  [  ] No itching, burning, rashes, or lesions   MUSCULOSKELETAL: Admits to chronic knee pain.  No back pain  PSYCHIATRIC: No depression,  No anxiety  PAIN SCALE: [  ] None  [  ] Other-  ROS Unable to obtain due to - [  ] Dementia  [  ] Lethargy [  ] Drowsy [  ] Sedated [  ] non verbal  REST OF REVIEW Of SYSTEM - [  ] Normal     Vital Signs Last 24 Hrs  T(C): 36.2 (19 Dec 2019 05:35), Max: 36.9 (18 Dec 2019 20:50)  T(F): 97.2 (19 Dec 2019 05:35), Max: 98.5 (18 Dec 2019 20:50)  HR: 75 (19 Dec 2019 05:35) (75 - 89)  BP: 90/56 (19 Dec 2019 05:35) (70/44 - 110/53). 90/56 this morning.  BP(mean): --  RR: 17 (19 Dec 2019 05:35) (15 - 18)  SpO2: 96% (19 Dec 2019 05:35) (94% - 100%)  Finger Stick    PHYSICAL EXAM:  GENERAL:  [ X ] NAD , [  ] well appearing, [  ] Agitated, [  ] Drowsy,  [  ] Lethargy, [ X ] confused (mild)   HEAD:  [ X ] Normal, [ ] Other   EYES:  [ X ] EOMI, [ X ] PERRLA, [  ] conjunctiva and sclera clear normal, [  ] Other,  [  ] Pallor,[  ] Discharge  ENMT:  [ X  ] Normal, [  ] Moist mucous membranes, [  ] Good dentition, [  ] No Thrush  NECK: Neck hunched forward  [  ] Supple, [  ] No JVD, [  ] Normal thyroid, [  ] Lymphadenopathy [  ] Other  CHEST/LUNG:  [ X ] Clear to auscultation bilaterally, [ X ] Breath Sounds equal B/L / Decrease, [  ] poor effort  [  ] No rales, [  ] No rhonchi  [ X ]  No wheezing,   HEART:  [ X ] Regular rate and rhythm, [  ] tachycardia, [  ] Bradycardia,  [  ] irregular  [  ] No murmurs, No rubs, No gallops, [  ] PPM in place (Mfr:  )  ABDOMEN:  [ X ] Soft, [ X ] Nontender, [  ] Nondistended, [ X ] No mass, [ X ] Bowel sounds present, [ X ] obese  NERVOUS SYSTEM:  [X  ] Alert & Oriented X3, [  ] Nonfocal  [ X ] Confusion (mild) [  ] Encephalopathic [  ] Sedated [  ] Unable to assess, [  ] Dementia [  ] Other-  EXTREMITIES: [  ] 2+ Peripheral Pulses, No clubbing, No cyanosis,  [ X ] edema B/L lower EXT (L>R). [ X ] PVD stasis skin changes B/L Lower EXT, [  ] wound  LYMPH: No lymphadenopathy noted  SKIN: B/L lower extremity edema with erythema and scaling (L>R) [  ] No rashes or lesions, [  ] Pressure Ulcers, [  ] ecchymosis, [  ] Skin Tears, [  ] Other    DIET: Regular      LABS:  H/H 9.6/29.6, which decreased from 11.6/35.6 yesterday  BUN 72 (H), Cr 3.80 (H). Cr improved from 4.80. BUN worsened from 70.  CK 1299 (H)               9.6    8.81  )-----------( 297      ( 19 Dec 2019 08:21 )             29.6         138  |  111<H>  |  72<H>  ----------------------------<  92  4.3   |  16<L>  |  3.80<H>    Ca    8.3<L>      19 Dec 2019 08:20  TPro  6.4  /  Alb  2.6<L>  /  TBili  0.4  /  DBili  x   /  AST  64<H>  /  ALT  22  /  AlkPhos  73      CARDIAC MARKERS ( 18 Dec 2019 21:06 )  <.015 ng/mL / x     / x     / x     / x        UA (): WBC 26-50, moderate bacteria, trace ketone, trace blood, small bili, moderate leuk esterase      RADIOLOGY & ADDITIONAL TESTS:  CT Head without Contrast (): Chronic ischemic changes bilaterally.  CXR (): Small hiatal hernia is suspect. No acute finding.  CT Cervical Spine without Contrast (): No fracture or dislocation.    HEALTH ISSUES - PROBLEM Dx:  Hypotension: Hypotension  Syncope: Syncope  Prophylactic measure: Prophylactic measure  Chronic pain: Chronic pain  Wound: Wound  Lymphadenopathy: Lymphadenopathy  Hyperlipidemia: Hyperlipidemia  Restless Leg Syndrome: Restless Leg Syndrome  Multiple falls: Multiple falls  Weakness generalized: Weakness generalized  JUNA M (acute kidney injury): JUAN M (acute kidney injury)          Consultant(s) Notes Reviewed:  [  ] YES     Care Discussed with [X] Consultants  [ X ] Patient  [  ] Family [  ] HCP [  ]   [  ] Social Service  [  ] RN, [  ] Physical Therapy,[  ] Palliative care team  DVT PPX: [  ] Lovenox, [ X ] S C Heparin, [  ] Coumadin, [  ] Xarelto, [  ] Eliquis, [  ] Pradaxa, [  ] IV Heparin drip, [  ] SCD [  ] Contraindication 2 to GI Bleed,[  ] Ambulation [  ] Contraindicated 2 to  bleed [  ] Contraindicated 2 to Brain Bleed  Advanced directive: [X  ] None, [  ] DNR/DNI Patient is a 69y old  Female who presents with a chief complaint of frequent falls, "blacking out" (19 Dec 2019 07:42)      INTERVAL HPI: 69 year old female with past medical history of lymphedema b/l LE, cellulitis b/l LEs (on outpt levofloxacin), b/l knee replacements, restless leg syndrome, obesity, HLD, OA here for "blacking out" and falling this afternoon. pt states she has been blacking out and falling for the last 4 months 2-4x a week. Pt hit the back of her head and back today and admits to LOC. Fall was unwitnessed happened at home. Patient admits to having eaten breakfast in the morning prior to the fall, does not drink enough water, denies dizziness/physical activity, vertigo, palpitations, tinnitus vision changes prior to fall. Pt also notes she synopsized yesterday sitting in her car and woke up 3 hours later.      ED vitals temp 97.5, HR 89 BP 70/44, SpO2 95% on room air. Pt received 1000 mL IV bolus x3, percocet 5mg x1. Labs wbc 11.97, Na 134, BUN 70, Cr 4.8, glucose 122, AST 65, eGFR 9, troponin negative x1. CT head chronic ischemic changes bilaterally. CT c spine no fracture or dislocation. CXR small hiatal hernia but no acute findings. Xray fluor guided spine inj epidural showed cervical radiculopathy C5-7. EKG 91 bpm NSR.     : Speech bedside swallow eval ordered / patient reported difficulty swallowing. ID evaluated patient today.    OVERNIGHT EVENTS: Patient reports she had another 'black out' episode this morning where she picked up her bowl of cereal and the next thing she knew, the cereal was on the ground and she did not remember the event. She tolerated breakfast fine, although she reports difficulty swallowing. She urinated without pain or difficulty this morning. Last BM yesterday. She reports to feeling cold today. Denied dizziness, shortness of breath, and nausea.    Home Medications:  Acidophilus oral tablet: 1 tab(s) orally once a day (19 Dec 2019 02:07)  Aspirin Enteric Coated 81 mg oral delayed release tablet: 1 tab(s) orally once a day (19 Dec 2019 02:07)  biotin 5000 mcg oral tablet, disintegratin tab(s) orally once a day (19 Dec 2019 02:07)  gabapentin 300 mg oral capsule: 2 cap(s) orally 2 times a day (19 Dec 2019 02:07)  lecithin 1200 mg oral capsule: 1 cap(s) orally once a day (19 Dec 2019 02:07)  levoFLOXacin 500 mg oral tablet: 1 tab(s) orally every 24 hours (19 Dec 2019 02:07)  lysine 1000 mg oral tablet: 1 tab(s) orally once a day (19 Dec 2019 02:07)  oxyCODONE 10 mg oral tablet: 1 tab(s) orally every 6 hours (19 Dec 2019 02:07)  pramipexole 1.5 mg oral tablet: 1 tab(s) orally once a day (19 Dec 2019 02:07)  rOPINIRole 2 mg oral tablet, extended release: 1 tab(s) orally once a day (19 Dec 2019 02:07)  torsemide 20 mg oral tablet: 1 tab(s) orally once a day (19 Dec 2019 02:07)    MEDICATIONS  (STANDING):  heparin  Injectable 5000 Unit(s) SubCutaneous every 12 hours  lactobacillus acidophilus 1 Tablet(s) Oral daily  oxyCODONE    IR 10 milliGRAM(s) Oral every 6 hours  pramipexole 1.5 milliGRAM(s) Oral daily  rOPINIRole 2 milliGRAM(s) Oral daily  senna 2 Tablet(s) Oral at bedtime  sodium chloride 0.9% lock flush 3 milliLiter(s) IV Push every 8 hours  sodium chloride 0.9%. 1000 milliLiter(s) (75 mL/Hr) IV Continuous <Continuous>    MEDICATIONS  (PRN):  None    Allergies  No Known Allergies      Social History:  quit smoking 10 years ago, 25 year pack hx,   1 liquor 1x a month, denies other drug use   lives with , performs all ADLs, uses walker (18 Dec 2019 23:50)      REVIEW OF SYSTEMS:  CONSTITUTIONAL: No fever, No chills, No myalgia,  EYES: No eye pain,  No visual disturbances, No discharge, NO Redness  ENMT:  No ear pain, No nose bleed, No sinus pain, No throat pain, No Congestion  NECK: No pain, No stiffness  RESPIRATORY: No cough, No wheezing, No  hemoptysis, No shortness of breath  CARDIOVASCULAR: No chest pain, palpitations  GASTROINTESTINAL: No abdominal pain, No epigastric pain. No nausea, No vomiting; No diarrhea, No constipation. [ X ] BM yesterday  GENITOURINARY: No dysuria, No frequency, No urgency, No hematuria  NEUROLOGICAL: Admits to episodes of blacking out. Bilateral hand tremors. No headaches, No dizziness, No numbness, No tingling, No weakness  EXT: No Swelling, No Pain, No Edema  SKIN: B/L leg redness.  [  ] No itching, burning, rashes, or lesions   MUSCULOSKELETAL: Admits to chronic knee pain.  No back pain  PSYCHIATRIC: No depression,  No anxiety  PAIN SCALE: [  ] None  [  ] Other-  ROS Unable to obtain due to - [  ] Dementia  [  ] Lethargy [  ] Drowsy [  ] Sedated [  ] non verbal  REST OF REVIEW Of SYSTEM - [  ] Normal     Vital Signs Last 24 Hrs  T(C): 36.2 (19 Dec 2019 05:35), Max: 36.9 (18 Dec 2019 20:50)  T(F): 97.2 (19 Dec 2019 05:35), Max: 98.5 (18 Dec 2019 20:50)  HR: 75 (19 Dec 2019 05:35) (75 - 89)  BP: 90/56 (19 Dec 2019 05:35) (70/44 - 110/53). 90/56 this morning.  BP(mean): --  RR: 17 (19 Dec 2019 05:35) (15 - 18)  SpO2: 96% (19 Dec 2019 05:35) (94% - 100%)  Finger Stick    PHYSICAL EXAM:  GENERAL:  [ X ] NAD , [  ] well appearing, [  ] Agitated, [  ] Drowsy,  [  ] Lethargy, [ X ] confused (mild)   HEAD:  [ X ] Normal, [ ] Other   EYES:  [ X ] EOMI, [ X ] PERRLA, [  ] conjunctiva and sclera clear normal, [  ] Other,  [  ] Pallor,[  ] Discharge  ENMT:  [ X  ] Normal, [  ] Moist mucous membranes, [  ] Good dentition, [  ] No Thrush  NECK: Neck hunched forward  [  ] Supple, [  ] No JVD, [  ] Normal thyroid, [  ] Lymphadenopathy [  ] Other  CHEST/LUNG:  [ X ] Clear to auscultation bilaterally, [ X ] Breath Sounds equal B/L / Decrease, [  ] poor effort  [  ] No rales, [  ] No rhonchi  [ X ]  No wheezing,   HEART:  [ X ] Regular rate and rhythm, [  ] tachycardia, [  ] Bradycardia,  [  ] irregular  [  ] No murmurs, No rubs, No gallops, [  ] PPM in place (Mfr:  )  ABDOMEN:  [ X ] Soft, [ X ] Nontender, [  ] Nondistended, [ X ] No mass, [ X ] Bowel sounds present, [ X ] obese  NERVOUS SYSTEM:  [X  ] Alert & Oriented X3, [  ] Nonfocal  [ X ] Confusion (mild) [  ] Encephalopathic [  ] Sedated [  ] Unable to assess, [  ] Dementia [  ] Other-  EXTREMITIES: [  ] 2+ Peripheral Pulses, No clubbing, No cyanosis,  [ X ] edema B/L lower EXT (L>R). [ X ] PVD stasis skin changes B/L Lower EXT, [  ] wound  LYMPH: No lymphadenopathy noted  SKIN: B/L lower extremity edema with erythema and scaling (L>R) [  ] No rashes or lesions, [  ] Pressure Ulcers, [  ] ecchymosis, [  ] Skin Tears, [  ] Other    DIET: Regular      LABS:  H/H 9.6/29.6, which decreased from 11.6/35.6 yesterday  BUN 72 (H), Cr 3.80 (H). Cr improved from 4.80. BUN worsened from 70.  CK 1299 (H)               9.6    8.81  )-----------( 297      ( 19 Dec 2019 08:21 )             29.6         138  |  111<H>  |  72<H>  ----------------------------<  92  4.3   |  16<L>  |  3.80<H>    Ca    8.3<L>      19 Dec 2019 08:20  TPro  6.4  /  Alb  2.6<L>  /  TBili  0.4  /  DBili  x   /  AST  64<H>  /  ALT  22  /  AlkPhos  73      CARDIAC MARKERS ( 18 Dec 2019 21:06 )  <.015 ng/mL / x     / x     / x     / x        UA (): WBC 26-50, moderate bacteria, trace ketone, trace blood, small bili, moderate leuk esterase      RADIOLOGY & ADDITIONAL TESTS:  US Kidneys (): No hydronephrosis  CT Head without Contrast (): Chronic ischemic changes bilaterally.  CXR (): Small hiatal hernia is suspect. No acute finding.  CT Cervical Spine without Contrast (): No fracture or dislocation.    HEALTH ISSUES - PROBLEM Dx:  Hypotension  Syncope  Prophylactic measure  Chronic pain  Wound  Lymphadenopathy  Hyperlipidemia  Restless Leg Syndrome  Multiple falls  Weakness generalized  JUAN M (acute kidney injury)      Consultant(s) Notes Reviewed:  [ X ] YES     Care Discussed with [X] Consultants  [ X ] Patient  [  ] Family [  ] HCP [  ]   [  ] Social Service  [  ] RN, [  ] Physical Therapy,[  ] Palliative care team  DVT PPX: [  ] Lovenox, [ X ] S C Heparin, [  ] Coumadin, [  ] Xarelto, [  ] Eliquis, [  ] Pradaxa, [  ] IV Heparin drip, [  ] SCD [  ] Contraindication 2 to GI Bleed,[  ] Ambulation [  ] Contraindicated 2 to  bleed [  ] Contraindicated 2 to Brain Bleed  Advanced directive: [X  ] None, [  ] DNR/DNI Patient is a 69y old  Female who presents with a chief complaint of frequent falls, "blacking out" (19 Dec 2019 07:42)      INTERVAL HPI: 69 year old female with past medical history of lymphedema b/l LE, cellulitis b/l LEs (on outpt levofloxacin), b/l knee replacements, restless leg syndrome, obesity, HLD, OA here for "blacking out" and falling this afternoon. pt states she has been blacking out and falling for the last 4 months 2-4x a week. Pt hit the back of her head and back today and admits to LOC. Fall was unwitnessed happened at home. Patient admits to having eaten breakfast in the morning prior to the fall, does not drink enough water, denies dizziness/physical activity, vertigo, palpitations, tinnitus vision changes prior to fall. Pt also notes she synopsized yesterday sitting in her car and woke up 3 hours later.      ED vitals temp 97.5, HR 89 BP 70/44, SpO2 95% on room air. Pt received 1000 mL IV bolus x3, percocet 5mg x1. Labs wbc 11.97, Na 134, BUN 70, Cr 4.8, glucose 122, AST 65, eGFR 9, troponin negative x1. CT head chronic ischemic changes bilaterally. CT c spine no fracture or dislocation. CXR small hiatal hernia but no acute findings. Xray fluor guided spine inj epidural showed cervical radiculopathy C5-7. EKG 91 bpm NSR.     : Speech bedside swallow eval ordered / patient reported difficulty swallowing. ID evaluated patient today.    OVERNIGHT EVENTS: Patient reports she had another 'black out' episode this morning where she picked up her bowl of cereal and the next thing she knew, the cereal was on the ground and she did not remember the event. She tolerated breakfast fine, although she reports difficulty swallowing. She urinated without pain or difficulty this morning. Last BM yesterday. She reports to feeling cold today. Denied dizziness, shortness of breath, and nausea. JUAN M improving with IV Fluids. Anemia with JUAN M. check Orthostasis. Renal dose Meds.    Home Medications:  Acidophilus oral tablet: 1 tab(s) orally once a day (19 Dec 2019 02:07)  Aspirin Enteric Coated 81 mg oral delayed release tablet: 1 tab(s) orally once a day (19 Dec 2019 02:07)  biotin 5000 mcg oral tablet, disintegratin tab(s) orally once a day (19 Dec 2019 02:07)  gabapentin 300 mg oral capsule: 2 cap(s) orally 2 times a day (19 Dec 2019 02:07)  lecithin 1200 mg oral capsule: 1 cap(s) orally once a day (19 Dec 2019 02:07)  levoFLOXacin 500 mg oral tablet: 1 tab(s) orally every 24 hours (19 Dec 2019 02:07)  lysine 1000 mg oral tablet: 1 tab(s) orally once a day (19 Dec 2019 02:07)  oxyCODONE 10 mg oral tablet: 1 tab(s) orally every 6 hours (19 Dec 2019 02:07)  pramipexole 1.5 mg oral tablet: 1 tab(s) orally once a day (19 Dec 2019 02:07)  rOPINIRole 2 mg oral tablet, extended release: 1 tab(s) orally once a day (19 Dec 2019 02:07)  torsemide 20 mg oral tablet: 1 tab(s) orally once a day (19 Dec 2019 02:07)    MEDICATIONS  (STANDING):  heparin  Injectable 5000 Unit(s) SubCutaneous every 12 hours  lactobacillus acidophilus 1 Tablet(s) Oral daily  oxyCODONE    IR 10 milliGRAM(s) Oral every 6 hours  pramipexole 1.5 milliGRAM(s) Oral daily  rOPINIRole 2 milliGRAM(s) Oral daily  senna 2 Tablet(s) Oral at bedtime  sodium chloride 0.9% lock flush 3 milliLiter(s) IV Push every 8 hours  sodium chloride 0.9%. 1000 milliLiter(s) (75 mL/Hr) IV Continuous <Continuous>    MEDICATIONS  (PRN):  None    Allergies  No Known Allergies      Social History:  quit smoking 10 years ago, 25 year pack hx,   1 liquor 1x a month, denies other drug use   lives with , performs all ADLs, uses walker (18 Dec 2019 23:50)      REVIEW OF SYSTEMS: i am OK  CONSTITUTIONAL: No fever, No chills, No myalgia,  EYES: No eye pain,  No visual disturbances, No discharge, NO Redness  ENMT:  No ear pain, No nose bleed, No sinus pain, No throat pain, No Congestion  NECK: No pain, No stiffness  RESPIRATORY: No cough, No wheezing, No  hemoptysis, No shortness of breath  CARDIOVASCULAR: No chest pain, palpitations  GASTROINTESTINAL: No abdominal pain, No epigastric pain. No nausea, No vomiting; No diarrhea, No constipation. [ X ] BM yesterday  GENITOURINARY: No dysuria, No frequency, No urgency, No hematuria  NEUROLOGICAL: Admits to episodes of blacking out. Bilateral hand tremors. No headaches, No dizziness, No numbness, No tingling, No weakness  EXT: No Swelling, No Pain, No Edema  SKIN: B/L leg redness.++  [  ] No itching, burning, rashes, or lesions   MUSCULOSKELETAL: Admits to chronic knee pain.  No back pain  PSYCHIATRIC: No depression,  No anxiety  PAIN SCALE: [  ] None  [x  ] Other- B/L lower EXT  ROS Unable to obtain due to - [  ] Dementia  [  ] Lethargy [  ] Drowsy [  ] Sedated [  ] non verbal  REST OF REVIEW Of SYSTEM - [ x ] Normal     Vital Signs Last 24 Hrs  T(C): 36.2 (19 Dec 2019 05:35), Max: 36.9 (18 Dec 2019 20:50)  T(F): 97.2 (19 Dec 2019 05:35), Max: 98.5 (18 Dec 2019 20:50)  HR: 75 (19 Dec 2019 05:35) (75 - 89)  BP: 90/56 (19 Dec 2019 05:35) (70/44 - 110/53). 90/56 this morning.  BP(mean): --  RR: 17 (19 Dec 2019 05:35) (15 - 18)  SpO2: 96% (19 Dec 2019 05:35) (94% - 100%)  Finger Stick    PHYSICAL EXAM:  GENERAL:  [ X ] NAD , [x ] well appearing, [  ] Agitated, [  ] Drowsy,  [  ] Lethargy, [ X ] confused (mild)   HEAD:  [ X ] Normal, [ ] Other   EYES:  [ X ] EOMI, [ X ] PERRLA, [  ] conjunctiva and sclera clear normal, [  ] Other,  [  ] Pallor,[  ] Discharge  ENMT:  [ X  ] Normal, [x  ] Dry mucous membranes, [ x ] Good dentition, [ x ] No Thrush  NECK: Neck hunched forward  [ x ] Supple, [ x ] No JVD, [ x ] Normal thyroid, [  ] Lymphadenopathy [  ] Other  CHEST/LUNG:  [ X ] Clear to auscultation bilaterally, [ X ] Breath Sounds equal B/L / Decrease, [  ] poor effort  [x  ] No rales, [ x ] No rhonchi  [ X ]  No wheezing,   HEART:  [ X ] Regular rate and rhythm, [  ] tachycardia, [  ] Bradycardia,  [  ] irregular  [ x ] No murmurs, No rubs, No gallops, [  ] PPM in place (Mfr:  )  ABDOMEN:  [ X ] Soft, [ X ] Nontender, [ x ] Nondistended, [ X ] No mass, [ X ] Bowel sounds present, [ X ] obese  NERVOUS SYSTEM:  [X  ] Alert & Oriented X 2-3, [x  ] Nonfocal  [ X ] Confusion (mild) [  ] Encephalopathic [  ] Sedated [  ] Unable to assess, [  ] Dementia [  ] Other-  EXTREMITIES: [  ] 2+ Peripheral Pulses, No clubbing, No cyanosis,  [ X ] edema B/L lower EXT (L>R). [ X ] severe  PVD stasis skin changes B/L Lower EXT, with erythema with chronic Lymphedema  B/L Lower EXT, venous stasis changes.  [ x ] wound B/L Lower EXT   LYMPH: No lymphadenopathy noted  SKIN: B/L lower extremity edema with erythema and scaling (L>R) [  ] No rashes or lesions, [  ] Pressure Ulcers, [  ] ecchymosis, [  ] Skin Tears, [  ] Other    DIET: Regular      LABS:  H/H 9.6/29.6, which decreased from 11.6/35.6 yesterday  BUN 72 (H), Cr 3.80 (H). Cr improved from 4.80. BUN worsened from 70.  CK 1299 (H)               9.6    8.81  )-----------( 297      ( 19 Dec 2019 08:21 )             29.6     12-19    138  |  111<H>  |  72<H>  ----------------------------<  92  4.3   |  16<L>  |  3.80<H>    Ca    8.3<L>      19 Dec 2019 08:20  TPro  6.4  /  Alb  2.6<L>  /  TBili  0.4  /  DBili  x   /  AST  64<H>  /  ALT  22  /  AlkPhos  73  12-    CARDIAC MARKERS ( 18 Dec 2019 21:06 )  <.015 ng/mL / x     / x     / x     / x        UA (): WBC 26-50, moderate bacteria, trace ketone, trace blood, small bili, moderate leuk esterase      RADIOLOGY & ADDITIONAL TESTS:  US Kidneys (): No hydronephrosis  CT Head without Contrast (): Chronic ischemic changes bilaterally.  CXR (): Small hiatal hernia is suspect. No acute finding.  CT Cervical Spine without Contrast (): No fracture or dislocation.    HEALTH ISSUES - PROBLEM Dx:  Hypotension  Syncope  Prophylactic measure  Chronic pain  Wound  Lymphadenopathy  Hyperlipidemia  Restless Leg Syndrome  Multiple falls  Weakness generalized  JUAN M (acute kidney injury)      Consultant(s) Notes Reviewed:  [ X ] YES     Care Discussed with [X] Consultants  [ X ] Patient  [ x ] Family [x  ] HCP [  ]   [  ] Social Service  [x  ] RN, [  ] Physical Therapy,[  ] Palliative care team  DVT PPX: [  ] Lovenox, [ X ] S C Heparin, [  ] Coumadin, [  ] Xarelto, [  ] Eliquis, [  ] Pradaxa, [  ] IV Heparin drip, [  ] SCD [  ] Contraindication 2 to GI Bleed,[  ] Ambulation [  ] Contraindicated 2 to  bleed [  ] Contraindicated 2 to Brain Bleed  Advanced directive: [X  ] None, [  ] DNR/DNI

## 2019-12-19 NOTE — DISCHARGE NOTE PROVIDER - HOSPITAL COURSE
HPI: 70 YO F PMHX lymphedema b/l LE, cellulitis b/l LEs (on outpt levofloxacin), b/l knee replacements, RLS, obesity, HLD, oA here for "blacking out" and falling this afternoon. pt states she has been blacking out and falling for the last 4 months 2-4x a week. Pt hit the back of her head and back today and admits to LOC. Fall was unwitnessed happened at home. Pt admits to having eaten breakfast in the morning prior to the fall, does not drink enough water, denies dizziness/physical activity/vertigo/palpitations/tinnitis/vision changes prior to fall. Pt also notes she synopsized yesterday sitting in her car and woke up 3 hours later.          ED vitals temp 97.5, HR 89BP 70/44, SpO2 95% on room air. Pt received 1000mL IV bolus x3, percocet 5mg x1. Labs wbc 11.97, Na 134, BUN 70, Cr 4.8, glucose 122, AST 65, eGFR 9, troponin negative x1. CT head chronic ischemic changes bilaterally. CT c spine no fracture or dislocation. CXR small hiatal hernia but no acute findings. Xray fluor guided spine inj epidural showed cervical radiculopathy C5-7. EKG 91 bpm NSR. (18 Dec 2019 23:50)            ---    HOSPITAL COURSE: 70 YO F PMHX lymphedema b/l LE, cellulitis b/l LEs (on outpt levofloxacin), b/l knee replacements, RLS, obesity, HLD, OA here for "blacking out" and falling this afternoon. Admit for syncope workup and JUAN M. Patient was started on IV fluids and her lasix and torsemide were held. Nephrology, Dr. Hebert was consulted. She had a renal sonogram which showed _____________. Patient had iron panel performed which showed _____________.     Syncope likely related to deconditioning vs. dehydration and hypotension. Neurology, Dr. Amezcua consulted. Blood and urine cultures showed ________________. Patient's home med for RLS, pramipexole was decreased to 0.125mg PO qd for renal dosing. Patient complained of difficulty swallowing which has been a problem for a couple of months. She was seen by speech pathology who recommended a soft diet with thin liquids.             ---    CONSULTANTS:     Neuro- Dr. Amezcua    ID- Dr. Scales HPI: 70 YO F PMHX lymphedema b/l LE, cellulitis b/l LEs (on outpt levofloxacin), b/l knee replacements, RLS, obesity, HLD, oA here for "blacking out" and falling this afternoon. pt states she has been blacking out and falling for the last 4 months 2-4x a week. Pt hit the back of her head and back today and admits to LOC. Fall was unwitnessed happened at home. Pt admits to having eaten breakfast in the morning prior to the fall, does not drink enough water, denies dizziness/physical activity/vertigo/palpitations/tinnitis/vision changes prior to fall. Pt also notes she synopsized yesterday sitting in her car and woke up 3 hours later.          ED vitals temp 97.5, HR 89BP 70/44, SpO2 95% on room air. Pt received 1000mL IV bolus x3, percocet 5mg x1. Labs wbc 11.97, Na 134, BUN 70, Cr 4.8, glucose 122, AST 65, eGFR 9, troponin negative x1. CT head chronic ischemic changes bilaterally. CT c spine no fracture or dislocation. CXR small hiatal hernia but no acute findings. Xray fluor guided spine inj epidural showed cervical radiculopathy C5-7. EKG 91 bpm NSR. (18 Dec 2019 23:50)            ---    HOSPITAL COURSE: 70 YO F PMHX lymphedema b/l LE, cellulitis b/l LEs (on outpt levofloxacin), b/l knee replacements, RLS, obesity, HLD, OA here for "blacking out" and falling this afternoon. Admit for syncope workup and JUA NM. Patient was started on IV fluids and her lasix and torsemide were held. Nephrology, Dr. Hebert was consulted. She had a renal sonogram which showed no hydronephrosis. Patient had iron panel performed which showed iron 26 (slightly low), normal TIBC 292, low iron saturation at 9%.         Syncope likely related to deconditioning vs. dehydration and hypotension. Neurology, Dr. Amezcua consulted. Blood cultures no growth to date. Patient's home med for RLS, pramipexole was decreased to 0.125mg PO qd for renal dosing. Neurologist recommended for patient to take this dose as an outpatient. Patient complained of difficulty swallowing which has been a problem for a couple of months. She was seen by speech pathology who recommended a soft diet with thin liquids.             ---    CONSULTANTS:     Neuro- Dr. Amezcua    ID- Dr. Scales HPI: 68 YO F PMHX lymphedema b/l LE, cellulitis b/l LEs (on outpt levofloxacin), b/l knee replacements, RLS, obesity, HLD, oA here for "blacking out" and falling this afternoon. pt states she has been blacking out and falling for the last 4 months 2-4x a week. Pt hit the back of her head and back today and admits to LOC. Fall was unwitnessed happened at home. Pt admits to having eaten breakfast in the morning prior to the fall, does not drink enough water, denies dizziness/physical activity/vertigo/palpitations/tinnitis/vision changes prior to fall. Pt also notes she synopsized yesterday sitting in her car and woke up 3 hours later.          ED vitals temp 97.5, HR 89BP 70/44, SpO2 95% on room air. Pt received 1000mL IV bolus x3, percocet 5mg x1. Labs wbc 11.97, Na 134, BUN 70, Cr 4.8, glucose 122, AST 65, eGFR 9, troponin negative x1. CT head chronic ischemic changes bilaterally. CT c spine no fracture or dislocation. CXR small hiatal hernia but no acute findings. Xray fluor guided spine inj epidural showed cervical radiculopathy C5-7. EKG 91 bpm NSR. (18 Dec 2019 23:50)            ---    HOSPITAL COURSE: 68 YO F PMHX lymphedema b/l LE, cellulitis b/l LEs (on outpt levofloxacin), b/l knee replacements, RLS, obesity, HLD, OA here for "blacking out" and falling this afternoon. Admit for syncope workup and JUAN M. Patient was started on IV fluids and her lasix and torsemide were held. Nephrology, Dr. Hebert was consulted. She had a renal sonogram which showed no hydronephrosis. Patient had iron panel performed which showed iron 26 (slightly low), normal TIBC 292, low iron saturation at 9%.         Syncope likely related to deconditioning vs. dehydration and hypotension. Neurology, Dr. Amezcua consulted. Blood cultures no growth to date. Patient's home med for RLS, pramipexole was decreased to 0.125mg PO qd for renal dosing. Neurologist recommended for patient to take this dose as an outpatient. Patient complained of difficulty swallowing which has been a problem for a couple of months. She was seen by speech pathology who recommended a soft diet with thin liquids.             ---    CONSULTANTS:     Neuro- Dr. Amezcua    ID- Dr. Scales        Vital Signs            Physical Exam HPI: 70 YO F PMHX lymphedema b/l LE, cellulitis b/l LEs (on outpt levofloxacin), b/l knee replacements, RLS, obesity, HLD, oA here for "blacking out" and falling this afternoon. pt states she has been blacking out and falling for the last 4 months 2-4x a week. Pt hit the back of her head and back today and admits to LOC. Fall was unwitnessed happened at home. Pt admits to having eaten breakfast in the morning prior to the fall, does not drink enough water, denies dizziness/physical activity/vertigo/palpitations/tinnitis/vision changes prior to fall. Pt also notes she synopsized yesterday sitting in her car and woke up 3 hours later.          ED vitals temp 97.5, HR 89BP 70/44, SpO2 95% on room air. Pt received 1000mL IV bolus x3, percocet 5mg x1. Labs wbc 11.97, Na 134, BUN 70, Cr 4.8, glucose 122, AST 65, eGFR 9, troponin negative x1. CT head chronic ischemic changes bilaterally. CT c spine no fracture or dislocation. CXR small hiatal hernia but no acute findings. Xray fluor guided spine inj epidural showed cervical radiculopathy C5-7. EKG 91 bpm NSR. (18 Dec 2019 23:50)            ---    HOSPITAL COURSE: 70 YO F PMHX lymphedema b/l LE, cellulitis b/l LEs (on outpt levofloxacin), b/l knee replacements, RLS, obesity, HLD, OA here for "blacking out" and falling this afternoon. Admit for syncope workup and JUAN M. Patient was started on IV fluids and her lasix and torsemide were held. Nephrology, Dr. Hebert was consulted. She had a renal sonogram which showed no hydronephrosis. Patient had iron panel performed which showed iron 26 (slightly low), normal TIBC 292, low iron saturation at 9%.         Syncope likely related to deconditioning vs. dehydration vs. orthostatic hypotension. Neurology, Dr. Amezcua consulted. Blood cultures no growth to date. Patient's home med for RLS, pramipexole was decreased to 0.125mg PO qd for renal dosing. Neurologist recommended for patient to take this dose as an outpatient. Recommended for patient to decrease home gabapentin dose from 300 mg PO BID to 100 mg PO TID. New pramipexole and gabapentin prescriptions were sent to pharmacy. Patient complained of difficulty swallowing which has been a problem for a couple of months. She was seen by speech pathology who recommended a soft diet with thin liquids.         ID evaluated patient and did not recommend for patient to continue the levaquin for cellulitis. Wound care evaluated patient. Recommended to shower prior to dressing change, lidocaine 2% to ulcerations prior to application of clean dressings; Aquacel, covered by xtrasorb dressing secured with kerlix QOD, ADALBERTO-TBI for compression. RN educated in the care of this patients wound care.         ---    CONSULTANTS:     Neuro- Dr. Amezcua    ID- Dr. Scales        Vital Signs            Physical Exam HPI: 70 YO F PMHX lymphedema b/l LE, cellulitis b/l LEs (on outpt levofloxacin), b/l knee replacements, RLS, obesity, HLD, oA here for "blacking out" and falling this afternoon. pt states she has been blacking out and falling for the last 4 months 2-4x a week. Pt hit the back of her head and back today and admits to LOC. Fall was unwitnessed happened at home. Pt admits to having eaten breakfast in the morning prior to the fall, does not drink enough water, denies dizziness/physical activity/vertigo/palpitations/tinnitis/vision changes prior to fall. Pt also notes she synopsized yesterday sitting in her car and woke up 3 hours later.          ED vitals temp 97.5, HR 89BP 70/44, SpO2 95% on room air. Pt received 1000mL IV bolus x3, percocet 5mg x1. Labs wbc 11.97, Na 134, BUN 70, Cr 4.8, glucose 122, AST 65, eGFR 9, troponin negative x1. CT head chronic ischemic changes bilaterally. CT c spine no fracture or dislocation. CXR small hiatal hernia but no acute findings. Xray fluor guided spine inj epidural showed cervical radiculopathy C5-7. EKG 91 bpm NSR. (18 Dec 2019 23:50)            ---    HOSPITAL COURSE: 70 YO F PMHX lymphedema b/l LE, cellulitis b/l LEs (on outpt levofloxacin), b/l knee replacements, RLS, obesity, HLD, OA here for "blacking out" and falling this afternoon. Admit for syncope workup and JUAN M. Patient was started on IV fluids and her lasix and torsemide were held. Nephrology, Dr. Hebert was consulted. Torsemide on HOLD,  She had a renal sonogram which showed no hydronephrosis. Patient had iron panel performed which showed iron 26 (slightly low), normal TIBC 292, low iron saturation at 9%. , IV Hydration continued, started on Bicitra for metabolic acidosis for 3 doses,         Syncope likely related to deconditioning vs. dehydration vs. orthostatic hypotension. Neurology, Dr. Amezcua consulted. Blood cultures no growth to date. Patient's home med for RLS, pramipexole was decreased to 0.125mg PO qd for renal dosing. Neurologist  Dr Amezcua   recommended for patient to take this dose as an outpatient. Recommended for patient to decrease home gabapentin dose from 300 mg PO BID to 100 mg PO TID. PRN  New pramipexole and gabapentin prescriptions were sent to pharmacy. Patient complained of difficulty swallowing which has been a problem for a couple of months. She was seen by speech pathology who recommended a soft diet with thin liquids. Pt's Cr improved & return to base line normal Value, restart Torsemide M/W/F next week , with repeat Labs with PMD, H/H low stable, improved, Anemia 2/2 JUAN M & IV Fluids.        ID  Dr Scales/Dr Manjarrez evaluated patient and did NOT recommend for patient to continue the Levaquin for cellulitis. Wound care evaluated patient. Recommended to shower prior to dressing change, lidocaine 2% to ulcerations prior to application of clean dressings; Aquacel, covered by xtrasorb dressing secured with kerlix QOD, ADALBERTO-TBI for compression. RN educated in the care of this patients wound care.         ---    CONSULTANTS:     Neuro- Dr. Amezcua    ID- Dr. Scales

## 2019-12-19 NOTE — PROGRESS NOTE ADULT - PROBLEM SELECTOR PLAN 8
-Bilateral weeping erythematous cellulitis wounds  -Patient takes levofloxacin (patient is on a 21 day supply) given by PCP for cellulitis. Currently being held.  -RN Wound care consulted. -Patient reports difficulty swallowing this morning while eating breakfast.  -Bedside speech eval ordered. Follow up results. -Patient reports difficulty swallowing this morning while eating breakfast.  -Bedside speech eval ordered. Rec soft with thin liquids. Patient with weakness and Hx hof multiple falls.  -Physical therapy consulted and recommended to discharge to home with outpatient PT when medically ready.  -Fall precautions

## 2019-12-19 NOTE — SWALLOW BEDSIDE ASSESSMENT ADULT - ASR SWALLOW ASPIRATION MONITOR
change of breathing pattern/throat clearing/oral hygiene/fever/gurgly voice/pneumonia/position upright (90Y)/cough/upper respiratory infection

## 2019-12-19 NOTE — DIETITIAN INITIAL EVALUATION ADULT. - ETIOLOGY
related to renal dysfunction related to related to pt states difficulty making lifestyle changes, lack of value for behavior change

## 2019-12-19 NOTE — SWALLOW BEDSIDE ASSESSMENT ADULT - SWALLOW EVAL: DIAGNOSIS
1. The patient demonstrated functional oral management of puree and thin liquid textures marked by adequate bolus collection, transfer, and posterior transport. 2. The patient demonstrated a mild oral dysphagia for solids marked by delayed bolus collection, transfer, and posterior transport. Trace lingual residue noted subsequent to deglutition which cleared with a liquid wash. 3. The patient demonstrated a functional pharyngeal phase of the swallow for puree and thin liquid textures marked by a timely pharyngeal swallow trigger with adequate hyolaryngeal elevation upon digital palpation without any evidence of airway penetration. 4. The patient demonstrated a mild pharyngeal dysphagia for solid textures marked by a delayed pharyngeal swallow trigger with reduced hyolaryngeal elevation upon digital palpation resulting in multiple swallows and patient report of feelings of pharyngeal stasis which cleared with utilization of a liquid wash. No further overt s/s suggestive of airway

## 2019-12-19 NOTE — PROGRESS NOTE ADULT - PROBLEM SELECTOR PLAN 6
Continue home dose ropinirole 2m ER PO daily and Pramipexole 1.5 mg PO daily. Chronic lymphedematous changes bilateral lower extremities.  -Patient has Hx of B/L weeping erythematous cellulitis and was hospitalized in [place].  -Patient was given Levaquin for 3 weeks. Discussed with ID and no indication to continue Levaquin. Chronic lymphedematous changes bilateral lower extremities.  -Patient has Hx of R lower extremity cellulitis and was hospitalized in [place], and was prescribed a 3 week course of Levaquin.   -As an outpatient, she has her legs wrapped and changed every 2 days by a visiting nurse.  -Patient was given Levaquin for 3 weeks. Discussed with ID and no indication to continue Levaquin. Chronic lymphedematous changes bilateral lower extremities. B/L PVD stasis skin changes   -Patient has Hx of R lower extremity cellulitis and was hospitalized in [place], and was prescribed a 3 week course of Levaquin.   -As an outpatient, she has her legs wrapped and changed every 2 days by a visiting nurse.  -Patient was given Levaquin for 3 weeks. Discussed with ID and no indication to continue Levaquin. Patient does not take ropinirole at home. Patient takes pramipexole 1.5 mg PO daily at home, which has been known to cause syncope and hypotension.  -Discussed with neurology and will provide lowest dose 0.125 mg PO daily.  -Iron panel ordered. FOllow up results.

## 2019-12-20 ENCOUNTER — TRANSCRIPTION ENCOUNTER (OUTPATIENT)
Age: 69
End: 2019-12-20

## 2019-12-20 DIAGNOSIS — I87.2 VENOUS INSUFFICIENCY (CHRONIC) (PERIPHERAL): ICD-10-CM

## 2019-12-20 DIAGNOSIS — I89.0 LYMPHEDEMA, NOT ELSEWHERE CLASSIFIED: ICD-10-CM

## 2019-12-20 LAB
ANION GAP SERPL CALC-SCNC: 11 MMOL/L — SIGNIFICANT CHANGE UP (ref 5–17)
BUN SERPL-MCNC: 58 MG/DL — HIGH (ref 7–23)
CALCIUM SERPL-MCNC: 8.9 MG/DL — SIGNIFICANT CHANGE UP (ref 8.5–10.1)
CALCIUM SERPL-MCNC: 9 MG/DL — SIGNIFICANT CHANGE UP (ref 8.4–10.5)
CHLORIDE SERPL-SCNC: 113 MMOL/L — HIGH (ref 96–108)
CK SERPL-CCNC: 939 U/L — HIGH (ref 26–192)
CO2 SERPL-SCNC: 16 MMOL/L — LOW (ref 22–31)
CREAT SERPL-MCNC: 2 MG/DL — HIGH (ref 0.5–1.3)
FERRITIN SERPL-MCNC: 86 NG/ML — SIGNIFICANT CHANGE UP (ref 15–150)
FOLATE SERPL-MCNC: 4.1 NG/ML — LOW
GLUCOSE SERPL-MCNC: 102 MG/DL — HIGH (ref 70–99)
HBA1C BLD-MCNC: 6.4 % — HIGH (ref 4–5.6)
HCT VFR BLD CALC: 32.6 % — LOW (ref 34.5–45)
HGB BLD-MCNC: 10.6 G/DL — LOW (ref 11.5–15.5)
IRON SATN MFR SERPL: 26 UG/DL — LOW (ref 30–160)
IRON SATN MFR SERPL: 9 % — LOW (ref 14–50)
MCHC RBC-ENTMCNC: 26.4 PG — LOW (ref 27–34)
MCHC RBC-ENTMCNC: 32.5 GM/DL — SIGNIFICANT CHANGE UP (ref 32–36)
MCV RBC AUTO: 81.3 FL — SIGNIFICANT CHANGE UP (ref 80–100)
NRBC # BLD: 0 /100 WBCS — SIGNIFICANT CHANGE UP (ref 0–0)
PLATELET # BLD AUTO: 337 K/UL — SIGNIFICANT CHANGE UP (ref 150–400)
POTASSIUM SERPL-MCNC: 4.3 MMOL/L — SIGNIFICANT CHANGE UP (ref 3.5–5.3)
POTASSIUM SERPL-SCNC: 4.3 MMOL/L — SIGNIFICANT CHANGE UP (ref 3.5–5.3)
PTH-INTACT FLD-MCNC: 46 PG/ML — SIGNIFICANT CHANGE UP (ref 15–65)
RBC # BLD: 4.01 M/UL — SIGNIFICANT CHANGE UP (ref 3.8–5.2)
RBC # FLD: 15.4 % — HIGH (ref 10.3–14.5)
SODIUM SERPL-SCNC: 140 MMOL/L — SIGNIFICANT CHANGE UP (ref 135–145)
TIBC SERPL-MCNC: 292 UG/DL — SIGNIFICANT CHANGE UP (ref 220–430)
TRANSFERRIN SERPL-MCNC: 237 MG/DL — SIGNIFICANT CHANGE UP (ref 200–360)
UIBC SERPL-MCNC: 266 UG/DL — SIGNIFICANT CHANGE UP (ref 110–370)
VIT B12 SERPL-MCNC: 1897 PG/ML — HIGH (ref 232–1245)
WBC # BLD: 8.08 K/UL — SIGNIFICANT CHANGE UP (ref 3.8–10.5)
WBC # FLD AUTO: 8.08 K/UL — SIGNIFICANT CHANGE UP (ref 3.8–10.5)

## 2019-12-20 RX ORDER — CITRIC ACID/SODIUM CITRATE 300-500 MG
30 SOLUTION, ORAL ORAL
Refills: 0 | Status: COMPLETED | OUTPATIENT
Start: 2019-12-20 | End: 2019-12-21

## 2019-12-20 RX ORDER — LIDOCAINE 4 G/100G
1 CREAM TOPICAL DAILY
Refills: 0 | Status: DISCONTINUED | OUTPATIENT
Start: 2019-12-20 | End: 2019-12-21

## 2019-12-20 RX ORDER — PRAMIPEXOLE DIHYDROCHLORIDE 0.12 MG/1
1 TABLET ORAL
Qty: 30 | Refills: 0
Start: 2019-12-20 | End: 2020-01-18

## 2019-12-20 RX ORDER — GABAPENTIN 400 MG/1
1 CAPSULE ORAL
Qty: 90 | Refills: 0
Start: 2019-12-20 | End: 2020-01-18

## 2019-12-20 RX ORDER — SODIUM CHLORIDE 9 MG/ML
1000 INJECTION, SOLUTION INTRAVENOUS
Refills: 0 | Status: DISCONTINUED | OUTPATIENT
Start: 2019-12-20 | End: 2019-12-21

## 2019-12-20 RX ADMIN — OXYCODONE HYDROCHLORIDE 5 MILLIGRAM(S): 5 TABLET ORAL at 22:35

## 2019-12-20 RX ADMIN — Medication 30 MILLILITER(S): at 18:00

## 2019-12-20 RX ADMIN — HEPARIN SODIUM 5000 UNIT(S): 5000 INJECTION INTRAVENOUS; SUBCUTANEOUS at 17:47

## 2019-12-20 RX ADMIN — SODIUM CHLORIDE 3 MILLILITER(S): 9 INJECTION INTRAMUSCULAR; INTRAVENOUS; SUBCUTANEOUS at 05:49

## 2019-12-20 RX ADMIN — OXYCODONE HYDROCHLORIDE 5 MILLIGRAM(S): 5 TABLET ORAL at 23:35

## 2019-12-20 RX ADMIN — LIDOCAINE 1 APPLICATION(S): 4 CREAM TOPICAL at 13:31

## 2019-12-20 RX ADMIN — SODIUM CHLORIDE 60 MILLILITER(S): 9 INJECTION, SOLUTION INTRAVENOUS at 11:06

## 2019-12-20 RX ADMIN — GABAPENTIN 100 MILLIGRAM(S): 400 CAPSULE ORAL at 11:45

## 2019-12-20 RX ADMIN — Medication 1 TABLET(S): at 11:45

## 2019-12-20 RX ADMIN — SODIUM CHLORIDE 3 MILLILITER(S): 9 INJECTION INTRAMUSCULAR; INTRAVENOUS; SUBCUTANEOUS at 21:58

## 2019-12-20 RX ADMIN — HEPARIN SODIUM 5000 UNIT(S): 5000 INJECTION INTRAVENOUS; SUBCUTANEOUS at 05:49

## 2019-12-20 RX ADMIN — PRAMIPEXOLE DIHYDROCHLORIDE 0.12 MILLIGRAM(S): 0.12 TABLET ORAL at 11:45

## 2019-12-20 RX ADMIN — SODIUM CHLORIDE 3 MILLILITER(S): 9 INJECTION INTRAMUSCULAR; INTRAVENOUS; SUBCUTANEOUS at 13:30

## 2019-12-20 NOTE — PROGRESS NOTE ADULT - PROBLEM SELECTOR PLAN 4
Patient with weakness and Hx hof multiple falls.  -Physical therapy consulted and recommended to discharge to home with outpatient PT when medically ready.  -Fall precautions -As an outpatient, patient takes pramipexole 1.5 mg PO daily at home, which has been known to cause syncope and hypotension.  -Discussed with neurology and will provide lowest dose 0.125 mg PO daily.  -Iron panel: Iron 26 (L), TIBC 292 (wnl), % iron 9% (L), transferrin 237 (wnl) Stable H/H , Acute Anemia 2/2 JUAN M & IV Hydration  -CBC in AM

## 2019-12-20 NOTE — PROGRESS NOTE ADULT - SUBJECTIVE AND OBJECTIVE BOX
DOCUMENTATION IN PROGRESS    Patient is a 69y old  Female who presents with a chief complaint of frequent falls, "blacking out" (19 Dec 2019 07:42)    INTERVAL HPI: 69 year old female with past medical history of lymphedema b/l LE, cellulitis b/l LEs (on outpt levofloxacin), b/l knee replacements, restless leg syndrome, obesity, HLD, OA here for "blacking out" and falling this afternoon. pt states she has been blacking out and falling for the last 4 months 2-4x a week. Pt hit the back of her head and back today and admits to LOC. Fall was unwitnessed happened at home. Patient admits to having eaten breakfast in the morning prior to the fall, does not drink enough water, denies dizziness/physical activity, vertigo, palpitations, tinnitus vision changes prior to fall. Pt also notes she synopsized yesterday sitting in her car and woke up 3 hours later.      : Speech bedside swallow eval ordered / patient reported difficulty swallowing. ID evaluated patient today.  : Pt was advised to sleep with legs elevated, although pt insisted on sleeping in chair overnight.    OVERNIGHT EVENTS: NONE    Home Medications:  Acidophilus oral tablet: 1 tab(s) orally once a day (19 Dec 2019 02:07)  Aspirin Enteric Coated 81 mg oral delayed release tablet: 1 tab(s) orally once a day (19 Dec 2019 02:07)  biotin 5000 mcg oral tablet, disintegratin tab(s) orally once a day (19 Dec 2019 02:07)  gabapentin 300 mg oral capsule: 2 cap(s) orally 2 times a day (19 Dec 2019 02:07)  lecithin 1200 mg oral capsule: 1 cap(s) orally once a day (19 Dec 2019 02:07)  levoFLOXacin 500 mg oral tablet: 1 tab(s) orally every 24 hours (19 Dec 2019 02:07)  lysine 1000 mg oral tablet: 1 tab(s) orally once a day (19 Dec 2019 02:07)  oxyCODONE 10 mg oral tablet: 1 tab(s) orally every 6 hours (19 Dec 2019 02:07)  pramipexole 1.5 mg oral tablet: 1 tab(s) orally once a day (19 Dec 2019 02:07)  rOPINIRole 2 mg oral tablet, extended release: 1 tab(s) orally once a day (19 Dec 2019 02:07)  torsemide 20 mg oral tablet: 1 tab(s) orally once a day (19 Dec 2019 02:07)    MEDICATIONS  (STANDING):  heparin  Injectable 5000 Unit(s) SubCutaneous every 12 hours  lactobacillus acidophilus 1 Tablet(s) Oral daily  pramipexole 0.125 milliGRAM(s) Oral daily  senna 2 Tablet(s) Oral at bedtime  sodium chloride 0.9% lock flush 3 milliLiter(s) IV Push every 8 hours  sodium chloride 0.9%. 1000 milliLiter(s) (75 mL/Hr) IV Continuous <Continuous>    MEDICATIONS  (PRN):  gabapentin 100 milliGRAM(s) Oral three times a day PRN pain  oxyCODONE    IR 5 milliGRAM(s) Oral every 6 hours PRN Moderate Pain (4 - 6)    Allergies  No Known Allergies    Social History:  quit smoking 10 years ago, 25 year pack hx,   1 liquor 1x a month, denies other drug use   lives with , performs all ADLs, uses walker (18 Dec 2019 23:50)      REVIEW OF SYSTEMS: i am OK  CONSTITUTIONAL: No fever, No chills, No myalgia,  EYES: No eye pain,  No visual disturbances, No discharge, NO Redness  ENMT:  No ear pain, No nose bleed, No sinus pain, No throat pain, No Congestion  NECK: No pain, No stiffness  RESPIRATORY: No cough, No wheezing, No  hemoptysis, No shortness of breath  CARDIOVASCULAR: No chest pain, palpitations  GASTROINTESTINAL: No abdominal pain, No epigastric pain. No nausea, No vomiting; No diarrhea, No constipation. [ X ] BM yesterday  GENITOURINARY: No dysuria, No frequency, No urgency, No hematuria  NEUROLOGICAL: Admits to episodes of blacking out. Bilateral hand tremors. No headaches, No dizziness, No numbness, No tingling, No weakness  EXT: No Swelling, No Pain, No Edema  SKIN: B/L leg redness.++  [  ] No itching, burning, rashes, or lesions   MUSCULOSKELETAL: Admits to chronic knee pain.  No back pain  PSYCHIATRIC: No depression,  No anxiety  PAIN SCALE: [  ] None  [x  ] Other- B/L lower EXT  ROS Unable to obtain due to - [  ] Dementia  [  ] Lethargy [  ] Drowsy [  ] Sedated [  ] non verbal  REST OF REVIEW Of SYSTEM - [ x ] Normal     Vital Signs Last 24 Hrs  T(C): 36.6 (20 Dec 2019 05:50), Max: 36.6 (20 Dec 2019 05:50)  T(F): 97.8 (20 Dec 2019 05:50), Max: 97.8 (20 Dec 2019 05:50)  HR: 88 (20 Dec 2019 05:50) (78 - 92)  BP: 103/66 (20 Dec 2019 05:50) (100/64 - 103/66)  BP(mean): --  RR: 17 (20 Dec 2019 05:50) (17 - 96)  SpO2: 98% (20 Dec 2019 05:50) (98% - 99%)    PHYSICAL EXAM:  GENERAL:  [ X ] NAD , [x ] well appearing, [  ] Agitated, [  ] Drowsy,  [  ] Lethargy, [ X ] confused (mild)   HEAD:  [ X ] Normal, [ ] Other   EYES:  [ X ] EOMI, [ X ] PERRLA, [  ] conjunctiva and sclera clear normal, [  ] Other,  [  ] Pallor,[  ] Discharge  ENMT:  [ X  ] Normal, [x  ] Dry mucous membranes, [ x ] Good dentition, [ x ] No Thrush  NECK: Neck hunched forward  [ x ] Supple, [ x ] No JVD, [ x ] Normal thyroid, [  ] Lymphadenopathy [  ] Other  CHEST/LUNG:  [ X ] Clear to auscultation bilaterally, [ X ] Breath Sounds equal B/L / Decrease, [  ] poor effort  [x  ] No rales, [ x ] No rhonchi  [ X ]  No wheezing,   HEART:  [ X ] Regular rate and rhythm, [  ] tachycardia, [  ] Bradycardia,  [  ] irregular  [ x ] No murmurs, No rubs, No gallops, [  ] PPM in place (Mfr:  )  ABDOMEN:  [ X ] Soft, [ X ] Nontender, [ x ] Nondistended, [ X ] No mass, [ X ] Bowel sounds present, [ X ] obese  NERVOUS SYSTEM:  [X  ] Alert & Oriented X 2-3, [x  ] Nonfocal  [ X ] Confusion (mild) [  ] Encephalopathic [  ] Sedated [  ] Unable to assess, [  ] Dementia [  ] Other-  EXTREMITIES: [  ] 2+ Peripheral Pulses, No clubbing, No cyanosis,  [ X ] edema B/L lower EXT (L>R). [ X ] severe  PVD stasis skin changes B/L Lower EXT, with erythema with chronic Lymphedema  B/L Lower EXT, venous stasis changes.  [ x ] wound B/L Lower EXT   LYMPH: No lymphadenopathy noted  SKIN: B/L lower extremity edema with erythema and scaling (L>R) [  ] No rashes or lesions, [  ] Pressure Ulcers, [  ] ecchymosis, [  ] Skin Tears, [  ] Other    DIET: Regular    LABS PENDING    UA (): WBC 26-50, moderate bacteria, trace ketone, trace blood, small bili, moderate leuk esterase      RADIOLOGY & ADDITIONAL TESTS:  US Kidneys (): No hydronephrosis  CT Head without Contrast (): Chronic ischemic changes bilaterally.  CXR (): Small hiatal hernia is suspect. No acute finding.  CT Cervical Spine without Contrast (): No fracture or dislocation.    HEALTH ISSUES - PROBLEM Dx:  Hypotension  Syncope  Prophylactic measure  Chronic pain  Wound  Lymphadenopathy  Hyperlipidemia  Restless Leg Syndrome  Multiple falls  Weakness generalized  JUAN M (acute kidney injury)      Consultant(s) Notes Reviewed:  [ X ] YES     Care Discussed with [X] Consultants  [ X ] Patient  [ x ] Family [x  ] HCP [  ]   [  ] Social Service  [x  ] RN, [  ] Physical Therapy,[  ] Palliative care team  DVT PPX: [  ] Lovenox, [ X ] S C Heparin, [  ] Coumadin, [  ] Xarelto, [  ] Eliquis, [  ] Pradaxa, [  ] IV Heparin drip, [  ] SCD [  ] Contraindication 2 to GI Bleed,[  ] Ambulation [  ] Contraindicated 2 to  bleed [  ] Contraindicated 2 to Brain Bleed  Advanced directive: [X  ] None, [  ] DNR/DNI Patient is a 69y old  Female who presents with a chief complaint of frequent falls, "blacking out" (19 Dec 2019 07:42)    INTERVAL HPI: 69 year old female with past medical history of lymphedema b/l LE, cellulitis b/l LEs (on outpt levofloxacin), b/l knee replacements, restless leg syndrome, obesity, HLD, OA here for "blacking out" and falling this afternoon. pt states she has been blacking out and falling for the last 4 months 2-4x a week. Pt hit the back of her head and back today and admits to LOC. Fall was unwitnessed happened at home. Patient admits to having eaten breakfast in the morning prior to the fall, does not drink enough water, denies dizziness/physical activity, vertigo, palpitations, tinnitus vision changes prior to fall. Pt also notes she synopsized yesterday sitting in her car and woke up 3 hours later.      : Speech bedside swallow eval ordered / patient reported difficulty swallowing. ID evaluated patient today.  : Pt was advised to sleep with legs elevated, although pt insisted on sleeping in chair overnight. Pt feels cold overnight.    OVERNIGHT EVENTS: NONE    Home Medications:  Acidophilus oral tablet: 1 tab(s) orally once a day (19 Dec 2019 02:07)  Aspirin Enteric Coated 81 mg oral delayed release tablet: 1 tab(s) orally once a day (19 Dec 2019 02:07)  biotin 5000 mcg oral tablet, disintegratin tab(s) orally once a day (19 Dec 2019 02:07)  gabapentin 300 mg oral capsule: 2 cap(s) orally 2 times a day (19 Dec 2019 02:07)  lecithin 1200 mg oral capsule: 1 cap(s) orally once a day (19 Dec 2019 02:07)  levoFLOXacin 500 mg oral tablet: 1 tab(s) orally every 24 hours (19 Dec 2019 02:07)  lysine 1000 mg oral tablet: 1 tab(s) orally once a day (19 Dec 2019 02:07)  oxyCODONE 10 mg oral tablet: 1 tab(s) orally every 6 hours (19 Dec 2019 02:07)  pramipexole 1.5 mg oral tablet: 1 tab(s) orally once a day (19 Dec 2019 02:07)  rOPINIRole 2 mg oral tablet, extended release: 1 tab(s) orally once a day (19 Dec 2019 02:07)  torsemide 20 mg oral tablet: 1 tab(s) orally once a day (19 Dec 2019 02:07)    MEDICATIONS  (STANDING):  heparin  Injectable 5000 Unit(s) SubCutaneous every 12 hours  lactobacillus acidophilus 1 Tablet(s) Oral daily  pramipexole 0.125 milliGRAM(s) Oral daily  senna 2 Tablet(s) Oral at bedtime  sodium chloride 0.9% lock flush 3 milliLiter(s) IV Push every 8 hours  sodium chloride 0.9%. 1000 milliLiter(s) (75 mL/Hr) IV Continuous <Continuous>    MEDICATIONS  (PRN):  gabapentin 100 milliGRAM(s) Oral three times a day PRN pain  oxyCODONE    IR 5 milliGRAM(s) Oral every 6 hours PRN Moderate Pain (4 - 6)    Allergies  No Known Allergies    Social History:  quit smoking 10 years ago, 25 year pack hx,   1 liquor 1x a month, denies other drug use   lives with , performs all ADLs, uses walker (18 Dec 2019 23:50)      REVIEW OF SYSTEMS: i am OK  CONSTITUTIONAL: No fever, No chills, No myalgia,  EYES: No eye pain,  No visual disturbances, No discharge, NO Redness  ENMT:  No ear pain, No nose bleed, No sinus pain, No throat pain, No Congestion  NECK: No pain, No stiffness  RESPIRATORY: No cough, No wheezing, No  hemoptysis, No shortness of breath  CARDIOVASCULAR: No chest pain, palpitations  GASTROINTESTINAL: No abdominal pain, No epigastric pain. No nausea, No vomiting; No diarrhea, No constipation. [ X ] BM yesterday  GENITOURINARY: No dysuria, No frequency, No urgency, No hematuria  NEUROLOGICAL: Admits to episodes of blacking out. Bilateral hand tremors. No headaches, No dizziness, No numbness, No tingling, No weakness  EXT: No Swelling, No Pain, No Edema  SKIN: B/L leg redness.++  [  ] No itching, burning, rashes, or lesions   MUSCULOSKELETAL: Admits to chronic knee pain.  No back pain  PSYCHIATRIC: No depression,  No anxiety  PAIN SCALE: [  ] None  [x  ] Other- B/L lower EXT  ROS Unable to obtain due to - [  ] Dementia  [  ] Lethargy [  ] Drowsy [  ] Sedated [  ] non verbal  REST OF REVIEW Of SYSTEM - [ x ] Normal     Vital Signs Last 24 Hrs  T(C): 36.6 (20 Dec 2019 05:50), Max: 36.6 (20 Dec 2019 05:50)  T(F): 97.8 (20 Dec 2019 05:50), Max: 97.8 (20 Dec 2019 05:50)  HR: 88 (20 Dec 2019 05:50) (78 - 92)  BP: 103/66 (20 Dec 2019 05:50) (100/64 - 103/66)  BP(mean): --  RR: 17 (20 Dec 2019 05:50) (17 - 96)  SpO2: 98% (20 Dec 2019 05:50) (98% - 99%)    PHYSICAL EXAM:  GENERAL:  [ X ] NAD , [x ] well appearing, [  ] Agitated, [  ] Drowsy,  [  ] Lethargy, [ X ] confused (mild)   HEAD:  [ X ] Normal, [ ] Other   EYES:  [ X ] EOMI, [ X ] PERRLA, [  ] conjunctiva and sclera clear normal, [  ] Other,  [  ] Pallor,[  ] Discharge  ENMT:  [ X  ] Normal, [x  ] Dry mucous membranes, [ x ] Good dentition, [ x ] No Thrush  NECK: Neck hunched forward  [ x ] Supple, [ x ] No JVD, [ x ] Normal thyroid, [  ] Lymphadenopathy [  ] Other  CHEST/LUNG:  [ X ] Clear to auscultation bilaterally, [ X ] Breath Sounds equal B/L / Decrease, [  ] poor effort  [x  ] No rales, [ x ] No rhonchi  [ X ]  No wheezing,   HEART:  [ X ] Regular rate and rhythm, [  ] tachycardia, [  ] Bradycardia,  [  ] irregular  [ x ] No murmurs, No rubs, No gallops, [  ] PPM in place (Mfr:  )  ABDOMEN:  [ X ] Soft, [ X ] Nontender, [ x ] Nondistended, [ X ] No mass, [ X ] Bowel sounds present, [ X ] obese  NERVOUS SYSTEM:  [X  ] Alert & Oriented X 2-3, [x  ] Nonfocal  [ X ] Confusion (mild) [  ] Encephalopathic [  ] Sedated [  ] Unable to assess, [  ] Dementia [  ] Other-  EXTREMITIES: [  ] 2+ Peripheral Pulses, No clubbing, No cyanosis,  [ X ] edema B/L lower EXT (L>R). [ X ] severe  PVD stasis skin changes B/L Lower EXT, with erythema with chronic Lymphedema  B/L Lower EXT, venous stasis changes.  [ x ] wound B/L Lower EXT   LYMPH: No lymphadenopathy noted  SKIN: B/L lower extremity edema with erythema and scaling (L>R) [  ] No rashes or lesions, [  ] Pressure Ulcers, [  ] ecchymosis, [  ] Skin Tears, [  ] Other    DIET: Regular    LABS:  H/H 10.6/32.6 from 9.6/29.6 yesterday.  BUN 58, Cr 2.00, which improved from 72/3.80 yesterday.   today, which improved from 1299.                        10.6   8.08  )-----------( 337      ( 20 Dec 2019 08:35 )             32.6     12-    140  |  113<H>  |  58<H>  ----------------------------<  102<H>  4.3   |  16<L>  |  2.00<H>    Ca    8.9      20 Dec 2019 08:35  TPro  6.4  /  Alb  2.6<L>  /  TBili  0.4  /  DBili  x   /  AST  64<H>  /  ALT  22  /  AlkPhos  73        UA (): WBC 26-50, moderate bacteria, trace ketone, trace blood, small bili, moderate leuk esterase      RADIOLOGY & ADDITIONAL TESTS:  US Kidneys (): No hydronephrosis  CT Head without Contrast (): Chronic ischemic changes bilaterally.  CXR (): Small hiatal hernia is suspect. No acute finding.  CT Cervical Spine without Contrast (): No fracture or dislocation.    HEALTH ISSUES - PROBLEM Dx:  Hypotension  Syncope  Prophylactic measure  Chronic pain  Wound  Lymphadenopathy  Hyperlipidemia  Restless Leg Syndrome  Multiple falls  Weakness generalized  JUAN M (acute kidney injury)      Consultant(s) Notes Reviewed:  [ X ] YES     Care Discussed with [X] Consultants  [ X ] Patient  [ x ] Family [x  ] HCP [  ]   [  ] Social Service  [x  ] RN, [  ] Physical Therapy,[  ] Palliative care team  DVT PPX: [  ] Lovenox, [ X ] S C Heparin, [  ] Coumadin, [  ] Xarelto, [  ] Eliquis, [  ] Pradaxa, [  ] IV Heparin drip, [  ] SCD [  ] Contraindication 2 to GI Bleed,[  ] Ambulation [  ] Contraindicated 2 to  bleed [  ] Contraindicated 2 to Brain Bleed  Advanced directive: [X  ] None, [  ] DNR/DNI Patient is a 69y old  Female who presents with a chief complaint of frequent falls, "blacking out" (19 Dec 2019 07:42)    INTERVAL HPI: 69 year old female with past medical history of lymphedema b/l LE, cellulitis b/l LEs (on outpt levofloxacin), b/l knee replacements, restless leg syndrome, obesity, HLD, OA here for "blacking out" and falling this afternoon. pt states she has been blacking out and falling for the last 4 months 2-4x a week. Pt hit the back of her head and back today and admits to LOC. Fall was unwitnessed happened at home. Patient admits to having eaten breakfast in the morning prior to the fall, does not drink enough water, denies dizziness/physical activity, vertigo, palpitations, tinnitus vision changes prior to fall. Pt also notes she synopsized yesterday sitting in her car and woke up 3 hours later.      : Speech bedside swallow eval ordered 2/ patient reported difficulty swallowing. ID evaluated patient today.  : Pt was advised to sleep with legs elevated, although pt insisted on sleeping in chair overnight. Pt feels cold overnight. seen by Wound Care RN today, Cr Improving,     OVERNIGHT EVENTS: NONE    Home Medications:  Acidophilus oral tablet: 1 tab(s) orally once a day (19 Dec 2019 02:07)  Aspirin Enteric Coated 81 mg oral delayed release tablet: 1 tab(s) orally once a day (19 Dec 2019 02:07)  biotin 5000 mcg oral tablet, disintegratin tab(s) orally once a day (19 Dec 2019 02:07)  gabapentin 300 mg oral capsule: 2 cap(s) orally 2 times a day (19 Dec 2019 02:07)  lecithin 1200 mg oral capsule: 1 cap(s) orally once a day (19 Dec 2019 02:07)  levoFLOXacin 500 mg oral tablet: 1 tab(s) orally every 24 hours (19 Dec 2019 02:07)  lysine 1000 mg oral tablet: 1 tab(s) orally once a day (19 Dec 2019 02:07)  oxyCODONE 10 mg oral tablet: 1 tab(s) orally every 6 hours (19 Dec 2019 02:07)  pramipexole 1.5 mg oral tablet: 1 tab(s) orally once a day (19 Dec 2019 02:07)  rOPINIRole 2 mg oral tablet, extended release: 1 tab(s) orally once a day (19 Dec 2019 02:07)  torsemide 20 mg oral tablet: 1 tab(s) orally once a day (19 Dec 2019 02:07)    MEDICATIONS  (STANDING):  heparin  Injectable 5000 Unit(s) SubCutaneous every 12 hours  lactobacillus acidophilus 1 Tablet(s) Oral daily  pramipexole 0.125 milliGRAM(s) Oral daily  senna 2 Tablet(s) Oral at bedtime  sodium chloride 0.9% lock flush 3 milliLiter(s) IV Push every 8 hours  sodium chloride 0.9%. 1000 milliLiter(s) (75 mL/Hr) IV Continuous <Continuous>    MEDICATIONS  (PRN):  gabapentin 100 milliGRAM(s) Oral three times a day PRN pain  oxyCODONE    IR 5 milliGRAM(s) Oral every 6 hours PRN Moderate Pain (4 - 6)    Allergies  No Known Allergies    Social History:  quit smoking 10 years ago, 25 year pack hx,   1 liquor 1x a month, denies other drug use   lives with , performs all ADLs, uses walker (18 Dec 2019 23:50)      REVIEW OF SYSTEMS: i am OK  CONSTITUTIONAL: No fever, No chills, No myalgia,  EYES: No eye pain,  No visual disturbances, No discharge, NO Redness  ENMT:  No ear pain, No nose bleed, No sinus pain, No throat pain, No Congestion  NECK: No pain, No stiffness  RESPIRATORY: No cough, No wheezing, No  hemoptysis, No shortness of breath  CARDIOVASCULAR: No chest pain, palpitations  GASTROINTESTINAL: No abdominal pain, No epigastric pain. No nausea, No vomiting; No diarrhea, No constipation. [ X ] BM yesterday  GENITOURINARY: No dysuria, No frequency, No urgency, No hematuria  NEUROLOGICAL: Admits to episodes of blacking out. Bilateral hand tremors. No headaches, No dizziness, No numbness, No tingling, No weakness  EXT: No Swelling, No Pain, No Edema  SKIN: B/L leg redness.++  [  ] No itching, burning, rashes, or lesions   MUSCULOSKELETAL: Admits to chronic knee pain.  No back pain  PSYCHIATRIC: No depression,  No anxiety  PAIN SCALE: [  ] None  [x  ] Other- B/L lower EXT apin  ROS Unable to obtain due to - [  ] Dementia  [  ] Lethargy [  ] Drowsy [  ] Sedated [  ] non verbal  REST OF REVIEW Of SYSTEM - [ x ] Normal     Vital Signs Last 24 Hrs  T(C): 36.6 (20 Dec 2019 05:50), Max: 36.6 (20 Dec 2019 05:50)  T(F): 97.8 (20 Dec 2019 05:50), Max: 97.8 (20 Dec 2019 05:50)  HR: 88 (20 Dec 2019 05:50) (78 - 92)  BP: 103/66 (20 Dec 2019 05:50) (100/64 - 103/66)  BP(mean): --  RR: 17 (20 Dec 2019 05:50) (17 - 96)  SpO2: 98% (20 Dec 2019 05:50) (98% - 99%)    PHYSICAL EXAM:  GENERAL:  [ X ] NAD , [x ] well appearing, [  ] Agitated, [  ] Drowsy,  [  ] Lethargy, [ X ] confused (mild)   HEAD:  [ X ] Normal, [ ] Other   EYES:  [ X ] EOMI, [ X ] PERRLA, [  ] conjunctiva and sclera clear normal, [  ] Other,  [  ] Pallor,[  ] Discharge  ENMT:  [ X  ] Normal, [x  ] Dry mucous membranes, [ x ] Good dentition, [ x ] No Thrush  NECK: Neck hunched forward  [ x ] Supple, [ x ] No JVD, [ x ] Normal thyroid, [  ] Lymphadenopathy [  ] Other  CHEST/LUNG:  [ X ] Clear to auscultation bilaterally, [ X ] Breath Sounds equal B/L / Decrease, [  ] poor effort  [x  ] No rales, [ x ] No rhonchi  [ X ]  No wheezing,   HEART:  [ X ] Regular rate and rhythm, [  ] tachycardia, [  ] Bradycardia,  [  ] irregular  [ x ] No murmurs, No rubs, No gallops, [  ] PPM in place (Mfr:  )  ABDOMEN:  [ X ] Soft, [ X ] Nontender, [ x ] Nondistended, [ X ] No mass, [ X ] Bowel sounds present, [ X ] obese  NERVOUS SYSTEM:  [X  ] Alert & Oriented X 2-3, [x  ] Nonfocal  [ X ] Confusion (mild) [  ] Encephalopathic [  ] Sedated [  ] Unable to assess, [  ] Dementia [  ] Other-  EXTREMITIES: [  ] 2+ Peripheral Pulses, No clubbing, No cyanosis,  [ X ] edema B/L lower EXT (L>R). [ X ] severe  PVD stasis skin changes B/L Lower EXT, with erythema with chronic Lymphedema  B/L Lower EXT, venous stasis changes.  [ x ] wound B/L Lower EXT oozing   LYMPH: No lymphadenopathy noted  SKIN: B/L lower extremity edema with erythema and scaling (L>R) [  ] No rashes or lesions, [  ] Pressure Ulcers, [  ] ecchymosis, [  ] Skin Tears, [  ] Other    DIET: Regular    LABS:  H/H 10.6/32.6 from 9.6/29.6 yesterday.  BUN 58, Cr 2.00, which improved from 72/3.80 yesterday.   today, which improved from 1299.                        10.6   8.08  )-----------( 337      ( 20 Dec 2019 08:35 )             32.6     12-    140  |  113<H>  |  58<H>  ----------------------------<  102<H>  4.3   |  16<L>  |  2.00<H>    Ca    8.9      20 Dec 2019 08:35  TPro  6.4  /  Alb  2.6<L>  /  TBili  0.4  /  DBili  x   /  AST  64<H>  /  ALT  22  /  AlkPhos  73        UA (): WBC 26-50, moderate bacteria, trace ketone, trace blood, small bili, moderate leuk esterase      RADIOLOGY & ADDITIONAL TESTS:  US Kidneys (): No hydronephrosis  CT Head without Contrast (): Chronic ischemic changes bilaterally.  CXR (): Small hiatal hernia is suspect. No acute finding.  CT Cervical Spine without Contrast (): No fracture or dislocation.    HEALTH ISSUES - PROBLEM Dx:  Hypotension  Syncope  Prophylactic measure  Chronic pain  Wound  Lymphadenopathy  Hyperlipidemia  Restless Leg Syndrome  Multiple falls  Weakness generalized  JUAN M (acute kidney injury)      Consultant(s) Notes Reviewed:  [ X ] YES     Care Discussed with [X] Consultants  [ X ] Patient  [ x ] Family [x  ] HCP [  ]   [  ] Social Service  [x  ] RN, [  ] Physical Therapy,[  ] Palliative care team  DVT PPX: [  ] Lovenox, [ X ] S C Heparin, [  ] Coumadin, [  ] Xarelto, [  ] Eliquis, [  ] Pradaxa, [  ] IV Heparin drip, [  ] SCD [  ] Contraindication 2 to GI Bleed,[  ] Ambulation [  ] Contraindicated 2 to  bleed [  ] Contraindicated 2 to Brain Bleed  Advanced directive: [X  ] None, [  ] DNR/DNI

## 2019-12-20 NOTE — PROGRESS NOTE ADULT - PROBLEM SELECTOR PLAN 9
Heparin 5000U SC q12h for DVT prophylaxis.     IMPROVE VTE Individual Risk Assessment                                               IMPROVE VTE Score ____3_____ -Patient reports difficulty swallowing this morning while eating breakfast.  -Bedside speech eval ordered. Rec soft with thin liquids.

## 2019-12-20 NOTE — PROGRESS NOTE ADULT - PROBLEM SELECTOR PLAN 1
-BUN 70 and Cr 4.80 on admission. Today, BUN and Cr ______. Yesterday, BUN 72, Cr 3.80. On 12/6/19, BUN 20, Cr 0.9.  -Nephrology on board: JUAN M likely 2/2 diuretics in the setting of decreased PO intake vs. hypotensive episodes. Urine lytes pending. Patient should only take gabapentin prn as patient was taking gabapentin 600 mg PO BID at home.  -Iron studies  -Continue IV Fluids NS @ 75 cc/hr.  -Hold Lasix and torsemide, Avoid Nephrotoxics.  -No NSAIDS. Hold home aspirin.  -Follow up Renal Sono, UA & Urine Lytes  -Renal Dr. Hebert consulted. Follow up urine studies.  -CK 1299. Trend in the am. IV Fluids   -Iron panel ordered in the am. -BUN 70 and Cr 4.80 on admission. Today, BUN 58, Cr 2.00, which improved BUN 72, Cr 3.80. On 12/6/19, BUN 20, Cr 0.9.  -Nephrology on board: JUAN M likely 2/2 diuretics in the setting of decreased PO intake vs. hypotensive episodes. Urine lytes pending. Patient should only take gabapentin prn as patient was taking gabapentin 600 mg PO BID at home.  -Iron studies  -Continue IV Fluids NS @ 75 cc/hr.  -Hold Lasix and torsemide, Avoid Nephrotoxics.  -No NSAIDS. Hold home aspirin.  -Follow up Renal Sono, UA & Urine Lytes  -Renal Dr. Hebert consulted. Follow up urine studies.  - today, which improved from 1299.  -LR @ 60 ccs/hr for 24 hours (stop on 12/21, 09:30).  -Pending iron panel. -BUN 70 and Cr 4.80 on admission. Today, BUN 58, Cr 2.00, which improved BUN 72, Cr 3.80. On 12/6/19, BUN 20, Cr 0.9.  -Nephrology on board: JUAN M likely 2/2 diuretics in the setting of decreased PO intake vs. hypotensive episodes. Urine lytes pending. Patient should only take gabapentin prn as patient was taking gabapentin 600 mg PO BID at home.  -Iron studies  -Continue IV Fluids NS @ 75 cc/hr.  -Hold Lasix and torsemide, Avoid Nephrotoxics.  -No NSAIDS. Hold home aspirin.  -Follow up Renal Sono, UA & Urine Lytes  -Renal Dr. Hebert consulted. Follow up urine studies.  - today, which improved from 1299.  -LR @ 60 ccs/hr for 24 hours (stop on 12/21, 09:30).  -Iron panel: Iron 26 (L), TIBC 292 (wnl), % iron 9% (L), transferrin 237 (wnl) -BUN 70 and Cr 4.80 on admission. Today, BUN 58, Cr 2.00, which improved BUN 72, Cr 3.80. On 12/6/19, BUN 20, Cr 0.9.  -Nephrology on board: JUAN M likely 2/2 diuretics in the setting of decreased PO intake vs. hypotensive episodes. Urine lytes pending. Patient should only take gabapentin prn as patient was taking gabapentin 600 mg PO BID at home.  -Iron studies: iron 26 (L), TIBC 292, iron saturation % 9 (L)  -Continue IV Fluids NS @ 75 cc/hr.  -Hold Lasix and torsemide, Avoid Nephrotoxics.  -No NSAIDS. Hold home aspirin.  -Renal Sono no hydronephrosis, UA & Urine Lytes  -Renal Dr. Hebert consulted. Follow up urine studies.  - today, which improved from 1299.  -LR @ 60 ccs/hr for 24 hours (stop on 12/21, 09:30).  -Iron panel: Iron 26 (L), TIBC 292 (wnl), % iron 9% (L), transferrin 237 (wnl) JUAN M with Metabolic Acidosis -started on Bicitra 2x day for 1 day  --BUN 70 and Cr 4.80 on admission. Today, BUN 58, Cr 2.00, which improved BUN 72, Cr 3.80. On 12/6/19, BUN 20, Cr 0.9.  -Nephrology on board: JUAN M likely 2/2 diuretics in the setting of decreased PO intake vs. hypotensive episodes. Urine lytes pending.   -- gabapentin 600 mg PO BID at home.  -Iron studies: iron 26 (L), TIBC 292, iron saturation % 9 (L)  -Continue IV Fluids NS @ 75 cc/hr.  -Hold Lasix and torsemide, Avoid Nephrotoxics.  -No NSAIDS. Hold home aspirin.  -Renal Sono no hydronephrosis, UA & Urine Lytes  -Renal Dr. Hebert consulted. Follow up urine studies.  - today, which improved from 1299.  -LR @ 60 ccs/hr for 24 hours (stop on 12/21, 09:30).  -Iron panel: Iron 26 (L), TIBC 292 (wnl), % iron 9% (L), transferrin 237 (wnl)

## 2019-12-20 NOTE — PROGRESS NOTE ADULT - PROBLEM SELECTOR PLAN 6
Chronic lymphedematous changes bilateral lower extremities. B/L PVD stasis skin changes   -Patient has Hx of R lower extremity cellulitis and was hospitalized in [place], and was prescribed a 3 week course of Levaquin.   -As an outpatient, she has her legs wrapped and changed every 2 days by a visiting nurse.  -Patient was given Levaquin for 3 weeks. Discussed with ID and no indication to continue Levaquin. Chronic lymphedematous changes bilateral lower extremities. B/L PVD stasis skin changes   -Patient has Hx of R lower extremity cellulitis and was hospitalized in [place], and was prescribed a 3 week course of Levaquin.   -As an outpatient, she has her legs wrapped and changed every 2 days by a visiting nurse.  -Patient was given Levaquin for 3 weeks. Discussed with ID and no indication to continue Levaquin.  -Wound care consulted: shower prior to dressing change, lidocaine 2% to ulcerations prior to application of clean dressings, Aquacel, covered by xtrasorb dressing secured with kerlix QOD. ADALBERTO-TBI for compression. Patient with weakness and Hx hof multiple falls. + Orthostasis   -Physical therapy consulted and recommended to discharge to home with outpatient PT when medically ready.  -Fall precautions, IV Fluids LR for 1 day -As an outpatient, patient takes pramipexole 1.5 mg PO daily at home, which has been known to cause syncope and hypotension.  -Discussed with neurology and will provide lowest dose 0.125 mg PO daily.  -Iron panel: Iron 26 (L), TIBC 292 (wnl), % iron 9% (L), transferrin 237 (wnl)

## 2019-12-20 NOTE — PROGRESS NOTE ADULT - PROBLEM SELECTOR PLAN 3
-ED BP 70/44 on arrival. 90/56 this morning. Continue to monitor manually.  -+ Orthostasis -IV Fluids   -s/p 1000mL IV bolus x3 in ER. Continue IV NS @ 75 ccs/hr.  -Syncope prerenal, induced ATN and syncope -ED BP 70/44 on arrival. 90/56 this morning. Continue to monitor manually.  -+ Orthostasis -IV Fluids x 24 hours.  -Syncope prerenal, induced ATN and syncope -ED BP 70/44 on arrival. 103/66 this morning. Continue to monitor manually.  -+ Orthostasis -IV Fluids x 24 hours.  -Syncope prerenal, induced ATN and syncope Chronic lymphedematous changes bilateral lower extremities. B/L PVD stasis skin changes   -Patient has Hx of R lower extremity cellulitis and was hospitalized in [place], and was prescribed a 3 week course of Levaquin.   -As an outpatient, she has her legs wrapped and changed every 2 days by a visiting nurse.  -Patient was given Levaquin for 3 weeks. Discussed with ID and no indication to continue Levaquin.  -Wound care consulted: shower prior to dressing change, lidocaine 2% to ulcerations prior to application of clean dressings, Aquacel, covered by xtrasorb dressing secured with kerlix QOD. ADALBERTO-TBI for compression.

## 2019-12-20 NOTE — PROGRESS NOTE ADULT - PROBLEM SELECTOR PLAN 5
Patient does not take ropinirole at home. Patient takes pramipexole 1.5 mg PO daily at home, which has been known to cause syncope and hypotension.  -Discussed with neurology and will provide lowest dose 0.125 mg PO daily.  -Iron panel ordered. FOllow up results. Patient does not take ropinirole at home. Patient takes pramipexole 1.5 mg PO daily at home, which has been known to cause syncope and hypotension.  -Discussed with neurology and will provide lowest dose 0.125 mg PO daily.  -Iron panel ordered. Patient does not take ropinirole at home. Patient takes pramipexole 1.5 mg PO daily at home, which has been known to cause syncope and hypotension.  -Discussed with neurology and will provide lowest dose 0.125 mg PO daily.  -Iron panel: Iron 26 (L), TIBC 292 (wnl), % iron 9% (L), transferrin 237 (wnl) -As an outpatient, patient takes pramipexole 1.5 mg PO daily at home, which has been known to cause syncope and hypotension.  -Discussed with neurology and will provide lowest dose 0.125 mg PO daily.  -Iron panel: Iron 26 (L), TIBC 292 (wnl), % iron 9% (L), transferrin 237 (wnl) -ED BP 70/44 on arrival. 103/66 this morning. Continue to monitor manually.  -+ Orthostasis -IV Fluids x 24 hours.  -Syncope prerenal, induced ATN and syncope

## 2019-12-20 NOTE — ADVANCED PRACTICE NURSE CONSULT - ASSESSMENT
Patient is a 70yo female admitted for acute renal failure HX of: Obesity, RLS, HLD, hypotension, syncope, lymphedema presenting with bilateral lower extremity weeping edema with painful ulcerations: legs are weeping copious serous drainage, pain to the touch 10/10.

## 2019-12-20 NOTE — PROGRESS NOTE ADULT - ASSESSMENT
70 YO F PMHX lymphedema b/l LE, cellulitis b/l LEs (on outpt levofloxacin), b/l knee replacements, RLS, obesity, HLD, oA here for "blacking out" and falling this afternoon. Admit for syncope workup.

## 2019-12-20 NOTE — PROGRESS NOTE ADULT - PROBLEM SELECTOR PLAN 7
-Continue home dose oxycodone IR 10 mg PO q6h prn.  -iSTOPed patient. Patient with weakness and Hx hof multiple falls. + Orthostasis   -Physical therapy consulted and recommended to discharge to home with outpatient PT when medically ready.  -Fall precautions, IV Fluids LR for 1 day

## 2019-12-20 NOTE — PROGRESS NOTE ADULT - SUBJECTIVE AND OBJECTIVE BOX
Neurology follow up note    VERONICA [id number]      Interval History:    Patient feels ok no new complaints no leg pain     MEDICATIONS    citric acid/sodium citrate Solution 30 milliLiter(s) Oral two times a day  gabapentin 100 milliGRAM(s) Oral three times a day PRN  heparin  Injectable 5000 Unit(s) SubCutaneous every 12 hours  lactated ringers. 1000 milliLiter(s) IV Continuous <Continuous>  lactobacillus acidophilus 1 Tablet(s) Oral daily  lidocaine 2% Gel 1 Application(s) Topical daily  oxyCODONE    IR 5 milliGRAM(s) Oral every 6 hours PRN  pramipexole 0.125 milliGRAM(s) Oral daily  senna 2 Tablet(s) Oral at bedtime  sodium chloride 0.9% lock flush 3 milliLiter(s) IV Push every 8 hours      Allergies    No Known Allergies    Intolerances            Vital Signs Last 24 Hrs  T(C): 36.6 (20 Dec 2019 05:50), Max: 36.6 (20 Dec 2019 05:50)  T(F): 97.8 (20 Dec 2019 05:50), Max: 97.8 (20 Dec 2019 05:50)  HR: 88 (20 Dec 2019 05:50) (78 - 92)  BP: 103/66 (20 Dec 2019 05:50) (100/64 - 103/66)  BP(mean): --  RR: 17 (20 Dec 2019 05:50) (17 - 96)  SpO2: 98% (20 Dec 2019 05:50) (98% - 99%)      REVIEW OF SYSTEMS:  Constitutional:  No fever, chills, or night sweats.  Head:  Positive episodes of lightheadedness that occurs while standing, but also while sitting.  Eyes:  No double vision or blurry vision.  Ears:  No ringing in the ears.  Neck:  Positive history of occasional neck pain, but has osteoarthritis.  Respiratory:  No shortness of breath.  Cardiovascular:  No chest pain.  Abdomen:  No nausea, vomiting, or abdominal pain.  Extremities/Neurological:  Occasional pain in her legs.  Musculoskeletal:  Occasional joint pain.  Genitourinary:  No burning upon urination.    PHYSICAL EXAMINATION:  .  HEENT:  Head:  Normocephalic, atraumatic.  Eyes:  No scleral icterus.  Ears:  Hearing bilaterally intact.  NECK:  Supple.  RESPIRATORY:  Decreased breath sounds bilaterally, but gives poor effort.  CARDIOVASCULAR:  S1 and S2 heard.  ABDOMEN:  Obese, soft, and nontender.  EXTREMITIES:  Positive signs of poor circulation with redness noticed in bilateral legs.      NEUROLOGIC:  The patient is awake and alert.  Extraocular movements were intact.  The patient's neck was in a flexed position from osteoarthritis.  Motor:  Bilateral upper were 4/5, bilateral lower were 3+/5.  Sensory:  Bilateral upper and lower intact to light touch.  No resting tremor was noted.  The patient was able to ambulate with her walker with a hunched position.              LABS:  CBC Full  -  ( 20 Dec 2019 08:35 )  WBC Count : 8.08 K/uL  RBC Count : 4.01 M/uL  Hemoglobin : 10.6 g/dL  Hematocrit : 32.6 %  Platelet Count - Automated : 337 K/uL  Mean Cell Volume : 81.3 fl  Mean Cell Hemoglobin : 26.4 pg  Mean Cell Hemoglobin Concentration : 32.5 gm/dL  Auto Neutrophil # : x  Auto Lymphocyte # : x  Auto Monocyte # : x  Auto Eosinophil # : x  Auto Basophil # : x  Auto Neutrophil % : x  Auto Lymphocyte % : x  Auto Monocyte % : x  Auto Eosinophil % : x  Auto Basophil % : x    Urinalysis Basic - ( 19 Dec 2019 11:29 )    Color: Yellow / Appearance: Clear / S.020 / pH: x  Gluc: x / Ketone: Trace  / Bili: Small / Urobili: Negative   Blood: x / Protein: 25 mg/dL / Nitrite: Negative   Leuk Esterase: Moderate / RBC: 0-2 /HPF / WBC 26-50   Sq Epi: x / Non Sq Epi: Few / Bacteria: Moderate          140  |  113<H>  |  58<H>  ----------------------------<  102<H>  4.3   |  16<L>  |  2.00<H>    Ca    8.9      20 Dec 2019 08:35    TPro  6.4  /  Alb  2.6<L>  /  TBili  0.4  /  DBili  x   /  AST  64<H>  /  ALT  22  /  AlkPhos  73  12-    Hemoglobin A1C: Hemoglobin A1C, Whole Blood: 6.4 % ( @ 10:37)      LIVER FUNCTIONS - ( 19 Dec 2019 08:20 )  Alb: 2.6 g/dL / Pro: 6.4 g/dL / ALK PHOS: 73 U/L / ALT: 22 U/L / AST: 64 U/L / GGT: x           Vitamin B12 Vitamin B12, Serum: 1897 pg/mL (12-20 @ 10:33)          RADIOLOGY    ANALYSIS AND PLAN:  A 69-year-old with episodes of fall, episode of loss of consciousness, restless leg syndrome.  1.	For episode of loss of consciousness, suspect most likely these are syncopal events, most likely secondary to lack of blood flow to the cerebrum secondary to hypotension.  I doubt that these are underlying seizure events.  2.	I would recommend telemetry evaluation as needed.  3.	Monitor systolic blood pressure.  4.	For history of frequent falls, suspect this could be multifactorial secondary to syncopal event secondary to age-related changes.  The patient is mostly sedentary, spinal disc disease, poor posture, body habitus, and possibly development of underlying peripheral neuropathy.  5.	Fall precaution.  6.	For history of restless leg syndrome secondary to elevated renal function, I will decrease the patient's Mirapex dose of 0.125 mg once a day as needed.  Based on renal function, will adjust the medication accordingly as per patient at present RLS stable   7.	For pain, possibly peripheral neuropathy, peripheral vascular disease, would decrease the patient's gabapentin dose to 100 mg three times a day based on kidney function.  Based on her pain and kidney function, will adjust the dose as needed.  8.	For acute renal failure, would recommend Nephrology followup.  9.	IV fluids if possible.  10.	Fall precaution.  11.	Physical Therapy evaluation.  12.	Spoke with the daughter at the bedside.  13.	Spoke with the patient's outside neurologist, Dr. Henley, who agrees with my plan yesterday  14.	Edelmira, her telephone number is [phone] spoke to her yesterday called today went to voicemail       Physical therapy evaluation if possible and as tolerated.  OOB to chair/ambulation with assistance only if possible.      Greater than 45 minutes spent in direct patient care reviewing  the notes, lab data/ imaging , discussion with multidisciplinary team.

## 2019-12-20 NOTE — PROGRESS NOTE ADULT - PROBLEM SELECTOR PLAN 2
-Patient admitted for syncopal episode while sitting in car day of admission. History of 'blacking out' 2-4 times per week for the last 4 months.  -Neurology (Dr. Amezcua) consulted - most likely fall is multifactorial decondition component, possible development of peripheral neuropathy and questional type of spinal disc disease from OA. Doubts that this is a new CVA. Suspect syncopal event 2/2 cerebral hypoperfusion due to hypotension. No Hx to suggest epilepsy.  -Continue remote telemetry.  -CT head: no hemorrhage, only chronic ischemic changes.  -Orthostatics negative. Repeat orthostatics positive (108/64 -> 78/49)  -Pending blood culture, urine culture. -Patient admitted for syncopal episode while sitting in car day of admission. History of 'blacking out' 2-4 times per week for the last 4 months.  -Neurology (Dr. Amezcua) consulted - most likely fall is multifactorial decondition component, possible development of peripheral neuropathy and questionable type of spinal disc disease from OA. Doubts that this is a new CVA. Suspect syncopal event 2/2 cerebral hypoperfusion due to hypotension. No Hx to suggest epilepsy.  -Continue remote telemetry.  -CT head: no hemorrhage, only chronic ischemic changes.  -Orthostatics negative. Repeat orthostatics positive (108/64 -> 78/49)  -Pending blood culture, urine culture. -Patient admitted for syncopal episode while sitting in car day of admission. History of 'blacking out' 2-4 times per week for the last 4 months.  -Neurology (Dr. Amezcua) consulted - most likely fall is multifactorial decondition component, possible development of peripheral neuropathy and questionable type of spinal disc disease from OA. Doubts that this is a new CVA. Suspect syncopal event 2/2 cerebral hypoperfusion due to hypotension. No Hx to suggest epilepsy.  -Will lower gabapentin dose from home dose of 300 mg BID to 100 mg PO TID prn. Will lower pramipexole dose to lowest dose 0.125 mg PO daily. Will send new gabapentin and pramipexole prescriptions to pharmacy.  -D/c remote telemetry.  -CT head: no hemorrhage, only chronic ischemic changes.  -Orthostatics positive 12/19 (108/64 -> 78/49). Will repeat orthostatics today.  -Blood culture no growth to date.

## 2019-12-20 NOTE — PROGRESS NOTE ADULT - SUBJECTIVE AND OBJECTIVE BOX
[facility], Division of Infectious Diseases  MARIELA eJan A. Lee  [phone]    Name: VERONICA WHALEN  Age: 69y  Gender: Female  MRN: [id number]    Interval History--  Notes reviewed. No new complaints.  Won't go to bed or keep legs elevated unless they are wrapped.    Past Medical History--  Obesity (BMI 30-39.9)  Traumatic arthropathy involving lower leg  History of Osteoarthritis  Hyperlipidemia  Restless Leg Syndrome  H/O arthroscopy of right knee  Bladder Disorder  History of Colonoscopy  Ex lap in 2009 for abscess in the baldder  History of Hysterectomy and bladder lift in 2000      For details regarding the patient's social history, family history, and other miscellaneous elements, please refer the initial infectious diseases consultation and/or the admitting history and physical examination for this admission.    Allergies    No Known Allergies    Intolerances        Medications--  Antibiotics:    Immunologic:    Other:  citric acid/sodium citrate Solution  gabapentin PRN  heparin  Injectable  lactated ringers.  lactobacillus acidophilus  oxyCODONE    IR PRN  pramipexole  senna  sodium chloride 0.9% lock flush      Review of Systems--  A 10-point review of systems was obtained.  Review of systems otherwise negative except as previously noted.    Physical Examination--  Vital Signs: T(F): 97.8 (12-20-19 @ 05:50), Max: 97.8 (12-20-19 @ 05:50)  HR: 88 (12-20-19 @ 05:50)  BP: 103/66 (12-20-19 @ 05:50)  RR: 17 (12-20-19 @ 05:50)  SpO2: 98% (12-20-19 @ 05:50)  Wt(kg): --  General: Nontoxic-appearing Female in no acute distress.  HEENT: AT/NC. Anicteric. Conjunctiva pink and moist. Oropharynx clear. Denti  Neck: Not rigid. No sense of mass.  Nodes: None palpable.  Lungs: Clear bilaterally without rales, wheezing or rhonchi  Heart: Regular rate and rhythm. No Murmur. No rub. No gallop. No palpable thrill.  Abdomen: Bowel sounds present and normoactive. Soft. Nondistended. Nontender. No sense of mass. No organomegaly. Obese.   Extremities: No cyanosis or clubbing. Lymphedema with venous stasis dermatitis, weeping edema. No cellulitis. B TKR incisions well healed.   Skin: Warm. Dry. Good turgor. No rash. No vasculitic stigmata.  Psychiatric: Appropriate affect and mood for situation.         Laboratory Studies--  CBC                        10.6   8.08  )-----------( 337      ( 20 Dec 2019 08:35 )             32.6       Chemistries  12-20    140  |  113<H>  |  58<H>  ----------------------------<  102<H>  4.3   |  16<L>  |  2.00<H>    Ca    8.9      20 Dec 2019 08:35    TPro  6.4  /  Alb  2.6<L>  /  TBili  0.4  /  DBili  x   /  AST  64<H>  /  ALT  22  /  AlkPhos  73  12-19      Culture Data  Culture - Blood (collected 19 Dec 2019 00:26)  Source: .Blood Blood  Preliminary Report (20 Dec 2019 01:01):    No growth to date.    Culture - Blood (collected 19 Dec 2019 00:26)  Source: .Blood Blood  Preliminary Report (20 Dec 2019 01:01):    No growth to date.

## 2019-12-20 NOTE — PROGRESS NOTE ADULT - PROBLEM SELECTOR PLAN 8
-Patient reports difficulty swallowing this morning while eating breakfast.  -Bedside speech eval ordered. Rec soft with thin liquids. -Continue home dose oxycodone IR 10 mg PO q6h prn.  -iSTOPed patient.

## 2019-12-20 NOTE — PROGRESS NOTE ADULT - ASSESSMENT
JUAN M on CKD   Hyperchloremic Metabolic Acidosis  HTN  LE Edema  Anemia  Polypharmacy    -BLCr 1 (per family)  -JUAN M likely 2/2 diuretics in the setting of decreased PO intake, vs hypotensive episodes,  -UA reviewed  -Renal US no hydro  -Cont IVF, change to LR  -Cont to hold diurtics  -Agree with PRN gabapentin, discussed with patient about only taking if needed, was taking 600mg BID at home  -Check Iron studies  -Give 3 doses bicitra  -Creatinine improving     D/W Dr. huber/frieda  Thank you for the consult will cont to follow.

## 2019-12-20 NOTE — PROGRESS NOTE ADULT - SUBJECTIVE AND OBJECTIVE BOX
Patient is a 69y old  Female who presents with a chief complaint of frequent falls, "blacking out" (19 Dec 2019 14:43)       HPI:  70 YO F PMHX lymphedema b/l LE, cellulitis b/l LEs (on outpt levofloxacin), b/l knee replacements, RLS, obesity, HLD, oA here for "blacking out" and falling this afternoon. pt states she has been blacking out and falling for the last 4 months 2-4x a week. Pt hit the back of her head and back today and admits to LOC. Fall was unwitnessed happened at home. Pt admits to having eaten breakfast in the morning prior to the fall, does not drink enough water, denies dizziness/physical activity/vertigo/palpitations/tinnitis/vision changes prior to fall. Pt also notes she synopsized yesterday sitting in her car and woke up 3 hours later.      Patient more awake today.  No N/V/SOB.  No dizziness. Urinating well.     PAST MEDICAL & SURGICAL HISTORY:  Obesity (BMI 30-39.9)  Traumatic arthropathy involving lower leg: right  History of Osteoarthritis  Hyperlipidemia  Restless Leg Syndrome  H/O arthroscopy of right knee:   Bladder Disorder: Bladder lift   History of Colonoscopy  Ex lap in  for abscess in the baldder  History of Hysterectomy and bladder lift in        FAMILY HISTORY:  NC    Social History:Non smoker    MEDICATIONS  (STANDING):  heparin  Injectable 5000 Unit(s) SubCutaneous every 12 hours  lactobacillus acidophilus 1 Tablet(s) Oral daily  pramipexole 0.125 milliGRAM(s) Oral daily  senna 2 Tablet(s) Oral at bedtime  sodium chloride 0.9% lock flush 3 milliLiter(s) IV Push every 8 hours  sodium chloride 0.9%. 1000 milliLiter(s) (75 mL/Hr) IV Continuous <Continuous>    MEDICATIONS  (PRN):  gabapentin 100 milliGRAM(s) Oral three times a day PRN pain  oxyCODONE    IR 5 milliGRAM(s) Oral every 6 hours PRN Moderate Pain (4 - 6)   Meds reviewed    Allergies    No Known Allergies    Intolerances      Vital Signs Last 24 Hrs  T(C): 36.4 (19 Dec 2019 15:09), Max: 36.9 (18 Dec 2019 20:50)  T(F): 97.6 (19 Dec 2019 15:09), Max: 98.5 (18 Dec 2019 20:50)  HR: 78 (19 Dec 2019 15:09) (75 - 89)  BP: 90/56 (19 Dec 2019 05:35) (70/44 - 110/53)  BP(mean): --  RR: 96 (19 Dec 2019 15:09) (15 - 96)  SpO2: 96% (19 Dec 2019 05:35) (94% - 100%)  Daily Height in cm: 170.18 (18 Dec 2019 23:50)    Daily Weight in k.2 (19 Dec 2019 14:42)    PHYSICAL EXAM:    GENERAL: NAD  HEAD:  Atraumatic, Normocephalic  EYES: EOMI, conjunctiva and sclera clear  ENMT: no drainage from nares, no drainage from ears  NECK: Supple, neck  veins full  NERVOUS SYSTEM:  Awake answers questions  CHEST/LUNG: Clear to percussion bilaterally; No rales, rhonchi, wheezing, or rubs  HEART: Regular rate and rhythm; No murmurs, rubs, or gallops  ABDOMEN: Soft, Nontender, Nondistended; Bowel sounds present,  EXTREMITIES:  Edema+++  SKIN: No rashes or lesions, pale      LABS:                        9.6    8.81  )-----------( 297      ( 19 Dec 2019 08:21 )             29.6     12-19    138  |  111<H>  |  72<H>  ----------------------------<  92  4.3   |  16<L>  |  3.80<H>    Ca    8.3<L>      19 Dec 2019 08:20    TPro  6.4  /  Alb  2.6<L>  /  TBili  0.4  /  DBili  x   /  AST  64<H>  /  ALT  22  /  AlkPhos  73  12-19      Urinalysis Basic - ( 19 Dec 2019 11:29 )    Color: Yellow / Appearance: Clear / S.020 / pH: x  Gluc: x / Ketone: Trace  / Bili: Small / Urobili: Negative   Blood: x / Protein: 25 mg/dL / Nitrite: Negative   Leuk Esterase: Moderate / RBC: 0-2 /HPF / WBC 26-50   Sq Epi: x / Non Sq Epi: Few / Bacteria: Moderate              RADIOLOGY & ADDITIONAL TESTS

## 2019-12-20 NOTE — DISCHARGE NOTE NURSING/CASE MANAGEMENT/SOCIAL WORK - PATIENT PORTAL LINK FT
You can access the FollowMyHealth Patient Portal offered by [facility] by registering at the following website: [email or web address]. By joining [employer]’s FollowMyHealth portal, you will also be able to view your health information using other applications (apps) compatible with our system.

## 2019-12-20 NOTE — ADVANCED PRACTICE NURSE CONSULT - RECOMMEDATIONS
1. Shower prior to dressing change  2. lidocaine 2% to ulcerations prior to application of clean dressings  3. Aquacel, covered by xtrasorb dressing secured with kerlix QOD  4. ADALBERTO-TBI for compression  RN educated in the care of this patients wound care  MD made aware of recommendations orders placed. 1. Shower prior to dressing change  2. lidocaine 2% to ulcerations prior to application of clean dressings  3. Aquacel, covered by xtrasorb dressing secured with kerlix QOD  4. ADALBERTO-TBI for compression  RN educated in the care of this patients wound care  MD made aware of recommendations orders placed.   Dressing changed with RN

## 2019-12-20 NOTE — PROGRESS NOTE ADULT - ASSESSMENT
No concern of cellulitis on exam  Patient will remain at increased risk for infection indefinitely.    Suggestions--  Wound care evaluation  Compression  Elevation  Serial exams      D/W patient and her daughter. All questions answered to the best of my ability.     Thank you for the courtesy of this referral.  I'll sign off at this time.     Héctor Manjarrez MD  [phone]

## 2019-12-21 VITALS
OXYGEN SATURATION: 96 % | HEART RATE: 88 BPM | DIASTOLIC BLOOD PRESSURE: 53 MMHG | SYSTOLIC BLOOD PRESSURE: 98 MMHG | TEMPERATURE: 98 F | RESPIRATION RATE: 18 BRPM

## 2019-12-21 DIAGNOSIS — D64.9 ANEMIA, UNSPECIFIED: ICD-10-CM

## 2019-12-21 LAB
-  AMIKACIN: SIGNIFICANT CHANGE UP
-  AZTREONAM: SIGNIFICANT CHANGE UP
-  CEFEPIME: SIGNIFICANT CHANGE UP
-  CEFTAZIDIME: SIGNIFICANT CHANGE UP
-  CIPROFLOXACIN: SIGNIFICANT CHANGE UP
-  GENTAMICIN: SIGNIFICANT CHANGE UP
-  IMIPENEM: SIGNIFICANT CHANGE UP
-  LEVOFLOXACIN: SIGNIFICANT CHANGE UP
-  MEROPENEM: SIGNIFICANT CHANGE UP
-  PIPERACILLIN/TAZOBACTAM: SIGNIFICANT CHANGE UP
-  TOBRAMYCIN: SIGNIFICANT CHANGE UP
ANION GAP SERPL CALC-SCNC: 10 MMOL/L — SIGNIFICANT CHANGE UP (ref 5–17)
APPEARANCE UR: CLEAR — SIGNIFICANT CHANGE UP
BILIRUB UR-MCNC: NEGATIVE — SIGNIFICANT CHANGE UP
BUN SERPL-MCNC: 38 MG/DL — HIGH (ref 7–23)
CALCIUM SERPL-MCNC: 8.8 MG/DL — SIGNIFICANT CHANGE UP (ref 8.5–10.1)
CALCIUM UR-MCNC: 1.7 MG/DL — SIGNIFICANT CHANGE UP
CHLORIDE SERPL-SCNC: 113 MMOL/L — HIGH (ref 96–108)
CHLORIDE UR-SCNC: <20 MMOL/L — SIGNIFICANT CHANGE UP
CO2 SERPL-SCNC: 18 MMOL/L — LOW (ref 22–31)
COLOR SPEC: YELLOW — SIGNIFICANT CHANGE UP
CREAT ?TM UR-MCNC: 111 MG/DL — SIGNIFICANT CHANGE UP
CREAT SERPL-MCNC: 1.2 MG/DL — SIGNIFICANT CHANGE UP (ref 0.5–1.3)
CULTURE RESULTS: SIGNIFICANT CHANGE UP
DIFF PNL FLD: NEGATIVE — SIGNIFICANT CHANGE UP
GLUCOSE SERPL-MCNC: 113 MG/DL — HIGH (ref 70–99)
GLUCOSE UR QL: NEGATIVE — SIGNIFICANT CHANGE UP
HCT VFR BLD CALC: 32.6 % — LOW (ref 34.5–45)
HGB BLD-MCNC: 10.5 G/DL — LOW (ref 11.5–15.5)
KETONES UR-MCNC: NEGATIVE — SIGNIFICANT CHANGE UP
LEUKOCYTE ESTERASE UR-ACNC: ABNORMAL
MCHC RBC-ENTMCNC: 26.4 PG — LOW (ref 27–34)
MCHC RBC-ENTMCNC: 32.2 GM/DL — SIGNIFICANT CHANGE UP (ref 32–36)
MCV RBC AUTO: 81.9 FL — SIGNIFICANT CHANGE UP (ref 80–100)
METHOD TYPE: SIGNIFICANT CHANGE UP
NITRITE UR-MCNC: NEGATIVE — SIGNIFICANT CHANGE UP
NRBC # BLD: 0 /100 WBCS — SIGNIFICANT CHANGE UP (ref 0–0)
ORGANISM # SPEC MICROSCOPIC CNT: SIGNIFICANT CHANGE UP
ORGANISM # SPEC MICROSCOPIC CNT: SIGNIFICANT CHANGE UP
OSMOLALITY UR: 536 MOSM/KG — SIGNIFICANT CHANGE UP (ref 50–1200)
PH UR: 5 — SIGNIFICANT CHANGE UP (ref 5–8)
PLATELET # BLD AUTO: 307 K/UL — SIGNIFICANT CHANGE UP (ref 150–400)
POTASSIUM SERPL-MCNC: 3.9 MMOL/L — SIGNIFICANT CHANGE UP (ref 3.5–5.3)
POTASSIUM SERPL-SCNC: 3.9 MMOL/L — SIGNIFICANT CHANGE UP (ref 3.5–5.3)
POTASSIUM UR-SCNC: 35.1 MMOL/L — SIGNIFICANT CHANGE UP
PROT ?TM UR-MCNC: 23 MG/DL — HIGH (ref 0–12)
PROT UR-MCNC: 25 MG/DL
PROT/CREAT UR-RTO: 0.2 RATIO — SIGNIFICANT CHANGE UP (ref 0–0.2)
RBC # BLD: 3.98 M/UL — SIGNIFICANT CHANGE UP (ref 3.8–5.2)
RBC # FLD: 15.5 % — HIGH (ref 10.3–14.5)
SODIUM SERPL-SCNC: 141 MMOL/L — SIGNIFICANT CHANGE UP (ref 135–145)
SODIUM UR-SCNC: <20 MMOL/L — SIGNIFICANT CHANGE UP
SP GR SPEC: 1.01 — SIGNIFICANT CHANGE UP (ref 1.01–1.02)
SPECIMEN SOURCE: SIGNIFICANT CHANGE UP
UROBILINOGEN FLD QL: NEGATIVE — SIGNIFICANT CHANGE UP
UUN UR-MCNC: 1092 MG/DL — SIGNIFICANT CHANGE UP
WBC # BLD: 6.57 K/UL — SIGNIFICANT CHANGE UP (ref 3.8–10.5)
WBC # FLD AUTO: 6.57 K/UL — SIGNIFICANT CHANGE UP (ref 3.8–10.5)

## 2019-12-21 PROCEDURE — 83935 ASSAY OF URINE OSMOLALITY: CPT

## 2019-12-21 PROCEDURE — 36415 COLL VENOUS BLD VENIPUNCTURE: CPT

## 2019-12-21 PROCEDURE — 83605 ASSAY OF LACTIC ACID: CPT

## 2019-12-21 PROCEDURE — 82570 ASSAY OF URINE CREATININE: CPT

## 2019-12-21 PROCEDURE — 83540 ASSAY OF IRON: CPT

## 2019-12-21 PROCEDURE — 76775 US EXAM ABDO BACK WALL LIM: CPT

## 2019-12-21 PROCEDURE — 70450 CT HEAD/BRAIN W/O DYE: CPT

## 2019-12-21 PROCEDURE — 94640 AIRWAY INHALATION TREATMENT: CPT

## 2019-12-21 PROCEDURE — 84466 ASSAY OF TRANSFERRIN: CPT

## 2019-12-21 PROCEDURE — 83970 ASSAY OF PARATHORMONE: CPT

## 2019-12-21 PROCEDURE — 87186 SC STD MICRODIL/AGAR DIL: CPT

## 2019-12-21 PROCEDURE — 84133 ASSAY OF URINE POTASSIUM: CPT

## 2019-12-21 PROCEDURE — 82746 ASSAY OF FOLIC ACID SERUM: CPT

## 2019-12-21 PROCEDURE — 83550 IRON BINDING TEST: CPT

## 2019-12-21 PROCEDURE — 84540 ASSAY OF URINE/UREA-N: CPT

## 2019-12-21 PROCEDURE — 80048 BASIC METABOLIC PNL TOTAL CA: CPT

## 2019-12-21 PROCEDURE — 82607 VITAMIN B-12: CPT

## 2019-12-21 PROCEDURE — 85027 COMPLETE CBC AUTOMATED: CPT

## 2019-12-21 PROCEDURE — 82550 ASSAY OF CK (CPK): CPT

## 2019-12-21 PROCEDURE — 84300 ASSAY OF URINE SODIUM: CPT

## 2019-12-21 PROCEDURE — 86803 HEPATITIS C AB TEST: CPT

## 2019-12-21 PROCEDURE — 81001 URINALYSIS AUTO W/SCOPE: CPT

## 2019-12-21 PROCEDURE — 82728 ASSAY OF FERRITIN: CPT

## 2019-12-21 PROCEDURE — 82436 ASSAY OF URINE CHLORIDE: CPT

## 2019-12-21 PROCEDURE — 71045 X-RAY EXAM CHEST 1 VIEW: CPT

## 2019-12-21 PROCEDURE — 84484 ASSAY OF TROPONIN QUANT: CPT

## 2019-12-21 PROCEDURE — 72125 CT NECK SPINE W/O DYE: CPT

## 2019-12-21 PROCEDURE — 80053 COMPREHEN METABOLIC PANEL: CPT

## 2019-12-21 PROCEDURE — 82310 ASSAY OF CALCIUM: CPT

## 2019-12-21 PROCEDURE — 84156 ASSAY OF PROTEIN URINE: CPT

## 2019-12-21 PROCEDURE — 87040 BLOOD CULTURE FOR BACTERIA: CPT

## 2019-12-21 PROCEDURE — 93005 ELECTROCARDIOGRAM TRACING: CPT

## 2019-12-21 PROCEDURE — 99285 EMERGENCY DEPT VISIT HI MDM: CPT | Mod: 25

## 2019-12-21 PROCEDURE — 87086 URINE CULTURE/COLONY COUNT: CPT

## 2019-12-21 PROCEDURE — 82340 ASSAY OF CALCIUM IN URINE: CPT

## 2019-12-21 PROCEDURE — 83036 HEMOGLOBIN GLYCOSYLATED A1C: CPT

## 2019-12-21 PROCEDURE — 92610 EVALUATE SWALLOWING FUNCTION: CPT

## 2019-12-21 PROCEDURE — 97162 PT EVAL MOD COMPLEX 30 MIN: CPT

## 2019-12-21 RX ORDER — PRAMIPEXOLE DIHYDROCHLORIDE 0.12 MG/1
1 TABLET ORAL
Qty: 30 | Refills: 0
Start: 2019-12-21 | End: 2020-01-19

## 2019-12-21 RX ORDER — GABAPENTIN 400 MG/1
1 CAPSULE ORAL
Qty: 90 | Refills: 0
Start: 2019-12-21 | End: 2020-01-19

## 2019-12-21 RX ORDER — CITRIC ACID/SODIUM CITRATE 300-500 MG
30 SOLUTION, ORAL ORAL
Refills: 0 | Status: DISCONTINUED | OUTPATIENT
Start: 2019-12-21 | End: 2019-12-21

## 2019-12-21 RX ORDER — FLUTICASONE PROPIONATE 50 MCG
1 SPRAY, SUSPENSION NASAL
Qty: 0 | Refills: 0 | DISCHARGE
Start: 2019-12-21

## 2019-12-21 RX ORDER — FLUTICASONE PROPIONATE 50 MCG
1 SPRAY, SUSPENSION NASAL
Refills: 0 | Status: DISCONTINUED | OUTPATIENT
Start: 2019-12-21 | End: 2019-12-21

## 2019-12-21 RX ORDER — LIDOCAINE 4 G/100G
1 CREAM TOPICAL
Qty: 500 | Refills: 0
Start: 2019-12-21 | End: 2020-03-19

## 2019-12-21 RX ADMIN — SODIUM CHLORIDE 3 MILLILITER(S): 9 INJECTION INTRAMUSCULAR; INTRAVENOUS; SUBCUTANEOUS at 05:58

## 2019-12-21 RX ADMIN — Medication 30 MILLILITER(S): at 07:26

## 2019-12-21 RX ADMIN — Medication 30 MILLILITER(S): at 17:06

## 2019-12-21 RX ADMIN — PRAMIPEXOLE DIHYDROCHLORIDE 0.12 MILLIGRAM(S): 0.12 TABLET ORAL at 14:59

## 2019-12-21 RX ADMIN — Medication 1 TABLET(S): at 14:59

## 2019-12-21 RX ADMIN — OXYCODONE HYDROCHLORIDE 5 MILLIGRAM(S): 5 TABLET ORAL at 09:02

## 2019-12-21 RX ADMIN — Medication 1 SPRAY(S): at 16:43

## 2019-12-21 RX ADMIN — OXYCODONE HYDROCHLORIDE 5 MILLIGRAM(S): 5 TABLET ORAL at 08:03

## 2019-12-21 NOTE — PROGRESS NOTE ADULT - PROBLEM SELECTOR PLAN 3
Chronic lymphedematous changes bilateral lower extremities. B/L PVD stasis skin changes   -Patient has Hx of R lower extremity cellulitis and was hospitalized in [place], and was prescribed a 3 week course of Levaquin.   -As an outpatient, she has her legs wrapped and changed every 2 days by a visiting nurse.  -Patient was given Levaquin for 3 weeks. Discussed with ID and no indication to continue Levaquin.  -Wound care consulted: shower prior to dressing change, lidocaine 2% to ulcerations prior to application of clean dressings, Aquacel, covered by xtrasorb dressing secured with kerlix QOD. ADALBERTO-TBI for compression.

## 2019-12-21 NOTE — PROGRESS NOTE ADULT - REASON FOR ADMISSION
frequent falls, "blacking out"

## 2019-12-21 NOTE — PROGRESS NOTE ADULT - PROBLEM SELECTOR PLAN 10
Heparin 5000U SC q12h for DVT prophylaxis.     IMPROVE VTE Individual Risk Assessment                                               IMPROVE VTE Score ____3_____

## 2019-12-21 NOTE — PROGRESS NOTE ADULT - ASSESSMENT
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  Hyperchloremic Metabolic Acidosis  HTN  LE Edema  Anemia  Polypharmacy    -BLCr 1   -JUAN M likely 2/2 diuretics in the setting of decreased PO intake, vs hypotensive episodes  -Renal indices are back to baseline range  -UA reviewed  -Renal US no hydro  -Off IVF now; oral hydration encouraged  -Can resume low dose diuretics by tomorrow 12/22/19 if patient remains stable  -Agree with PRN gabapentin, discussed with patient about only taking if needed, was taking 600mg BID at home  -Hgb stable; iron studies reviewed; can start on oral Iron BID  -Acidosis is improving; give another 2 doses of Bicitra today      Thank you for the consult will cont to follow.

## 2019-12-21 NOTE — PROGRESS NOTE ADULT - PROBLEM SELECTOR PLAN 4
-As an outpatient, patient takes pramipexole 1.5 mg PO daily at home, which has been known to cause syncope and hypotension.  -Discussed with neurology and will provide lowest dose 0.125 mg PO daily.  -Iron panel: Iron 26 (L), TIBC 292 (wnl), % iron 9% (L), transferrin 237 (wnl)

## 2019-12-21 NOTE — PROGRESS NOTE ADULT - PROBLEM SELECTOR PLAN 9
-Patient reports difficulty swallowing this morning while eating breakfast.  -Bedside speech eval ordered. Rec soft with thin liquids.

## 2019-12-21 NOTE — PROGRESS NOTE ADULT - ATTENDING COMMENTS
Pt seen, Examined, case & care plan d/w pt, Dtr, at detail.  D/C Home today with Home care  PT --> Out pt PT  Total d/c care time is 45 minutes   Copy of albs given to pt on D/C
Pt seen, Examined, case & care plan d/w pt, residents , & wound care RN at detail.  D/W Dr Manjarrez-ID- s/off case.  AM labs  PT --> Out pt PT
Pt seen, Examined, case & care plan d/w pt,s Dtr at detail. All concerns d/w dtr about Fall, Meds/ Pain meds/  Orthostasis with meds.  D/W Renal - Dr STEPHANIE MAYS at detail.  AM labs   Office records obtain fro PMD's office.

## 2019-12-21 NOTE — PROGRESS NOTE ADULT - PROBLEM SELECTOR PLAN 2
Likely Acute anemia 2/2 JUAN M/ IV Hydration   --Iron panel: Iron 26 (L), TIBC 292 (wnl), % iron 9% (L), transferrin 237 (wnl)  H/H stable, today   CBC as out pt

## 2019-12-21 NOTE — PROGRESS NOTE ADULT - PROBLEM SELECTOR PLAN 6
-Patient admitted for syncopal episode while sitting in car day of admission. History of 'blacking out' 2-4 times per week for the last 4 months.  -Neurology (Dr. Amezcua) consulted - most likely fall is multifactorial decondition component, possible development of peripheral neuropathy and questionable type of spinal disc disease from OA. No CVA. Suspect syncopal event 2/2 cerebral hypoperfusion due to hypotension. No Hx to suggest epilepsy.  -Will lower gabapentin dose from home dose of 300 mg BID to 100 mg PO TID prn. Will lower pramipexole dose to lowest dose 0.125 mg PO daily. Will send new gabapentin and pramipexole prescriptions to pharmacy.  -D/c remote telemetry.  -CT head: no hemorrhage, only chronic ischemic changes.  -Orthostatics positive 12/19 (108/64 -> 78/49).   -Blood culture no growth to date.

## 2019-12-21 NOTE — PROGRESS NOTE ADULT - PROBLEM SELECTOR PLAN 1
JUAN M with Metabolic Acidosis -started on Bicitra 2x day & 1 dose today  JUAN M Resolved with IV Fluids , Po diet   -On Bicitra 1 tab 2x day x 3 doses as per DR Miranda , OK to d/c pt today with out pt follow up with PMD  -Nephrology Dr Miranda : JUAN M likely 2/2 diuretics in the setting of decreased PO intake &  hypotensive episodes.   - Meds dose readjusted, Torsemide on HOLD restart as out pt next week, M/W/F   - Avoid Nephrotoxics.-No NSAIDS.   -Renal Sono- no hydronephrosis,  - today, which improved from 1299.

## 2019-12-21 NOTE — PROGRESS NOTE ADULT - SUBJECTIVE AND OBJECTIVE BOX
Patient is a 69y old  Female who presents with a chief complaint of frequent falls, "blacking out" (19 Dec 2019 14:43)       HPI:  70 YO F PMHX lymphedema b/l LE, cellulitis b/l LEs (on outpt levofloxacin), b/l knee replacements, RLS, obesity, HLD, oA here for "blacking out" and falling this afternoon. pt states she has been blacking out and falling for the last 4 months 2-4x a week. Pt hit the back of her head and back today and admits to LOC. Fall was unwitnessed happened at home. Pt admits to having eaten breakfast in the morning prior to the fall, does not drink enough water, denies dizziness/physical activity/vertigo/palpitations/tinnitis/vision changes prior to fall. Pt also notes she synopsized yesterday sitting in her car and woke up 3 hours later.      Patient more awake today.  No N/V/SOB.  No dizziness. Urinating well.     PAST MEDICAL & SURGICAL HISTORY:  Obesity (BMI 30-39.9)  Traumatic arthropathy involving lower leg: right  History of Osteoarthritis  Hyperlipidemia  Restless Leg Syndrome  H/O arthroscopy of right knee:   Bladder Disorder: Bladder lift   History of Colonoscopy  Ex lap in  for abscess in the baldder  History of Hysterectomy and bladder lift in        FAMILY HISTORY:  NC    Social History:Non smoker    MEDICATIONS  (STANDING):  heparin  Injectable 5000 Unit(s) SubCutaneous every 12 hours  lactobacillus acidophilus 1 Tablet(s) Oral daily  pramipexole 0.125 milliGRAM(s) Oral daily  senna 2 Tablet(s) Oral at bedtime  sodium chloride 0.9% lock flush 3 milliLiter(s) IV Push every 8 hours  sodium chloride 0.9%. 1000 milliLiter(s) (75 mL/Hr) IV Continuous <Continuous>    MEDICATIONS  (PRN):  gabapentin 100 milliGRAM(s) Oral three times a day PRN pain  oxyCODONE    IR 5 milliGRAM(s) Oral every 6 hours PRN Moderate Pain (4 - 6)   Meds reviewed    Allergies    No Known Allergies    Intolerances      Vital Signs Last 24 Hrs  T(C): 36.4 (19 Dec 2019 15:09), Max: 36.9 (18 Dec 2019 20:50)  T(F): 97.6 (19 Dec 2019 15:09), Max: 98.5 (18 Dec 2019 20:50)  HR: 78 (19 Dec 2019 15:09) (75 - 89)  BP: 90/56 (19 Dec 2019 05:35) (70/44 - 110/53)  BP(mean): --  RR: 96 (19 Dec 2019 15:09) (15 - 96)  SpO2: 96% (19 Dec 2019 05:35) (94% - 100%)  Daily Height in cm: 170.18 (18 Dec 2019 23:50)    Daily Weight in k.2 (19 Dec 2019 14:42)    PHYSICAL EXAM:    GENERAL: NAD  HEAD:  Atraumatic, Normocephalic  EYES: EOMI, conjunctiva and sclera clear  ENMT: no drainage from nares, no drainage from ears  NECK: Supple, neck  veins full  NERVOUS SYSTEM:  Awake answers questions  CHEST/LUNG: Clear to percussion bilaterally; No rales, rhonchi, wheezing, or rubs  HEART: Regular rate and rhythm; No murmurs, rubs, or gallops  ABDOMEN: Soft, Nontender, Nondistended; Bowel sounds present,  EXTREMITIES:  Edema+++  SKIN: No rashes or lesions, pale      LABS:                         10.5   6.57  )-----------( 307      ( 21 Dec 2019 08:42 )             32.6     12-21    141  |  113<H>  |  38<H>  ----------------------------<  113<H>  3.9   |  18<L>  |  1.20    Ca    8.8      21 Dec 2019 08:42        Urinalysis Basic - ( 21 Dec 2019 00:33 )    Color: Yellow / Appearance: Clear / S.015 / pH: x  Gluc: x / Ketone: Negative  / Bili: Negative / Urobili: Negative   Blood: x / Protein: 25 mg/dL / Nitrite: Negative   Leuk Esterase: Trace / RBC: 0-2 /HPF / WBC 3-5   Sq Epi: x / Non Sq Epi: Occasional / Bacteria: Few              RADIOLOGY & ADDITIONAL TESTS:

## 2019-12-21 NOTE — PROGRESS NOTE ADULT - SUBJECTIVE AND OBJECTIVE BOX
Patient is a 69y old  Female who presents with a chief complaint of frequent falls, "blacking out" (21 Dec 2019 10:45)      INTERVAL HPI:  69 year old female with past medical history of lymphedema b/l LE, cellulitis b/l LEs (on outpt levofloxacin), b/l knee replacements, restless leg syndrome, obesity, HLD, OA here for "blacking out" and falling this afternoon. pt states she has been blacking out and falling for the last 4 months 2-4x a week. Pt hit the back of her head and back today and admits to LOC. Fall was unwitnessed happened at home. Patient admits to having eaten breakfast in the morning prior to the fall, does not drink enough water, denies dizziness/physical activity, vertigo, palpitations, tinnitus vision changes prior to fall. Pt also notes she synopsized yesterday sitting in her car and woke up 3 hours later.      :  Pt seen, examined, Speech bedside swallow eval ordered 2/ patient reported difficulty swallowing. ID evaluated patient today.  :  pt seen, Examined, Pt was advised to sleep with legs elevated, although pt insisted on sleeping in chair overnight. Pt feels cold overnight. seen by Wound Care RN today, Cr Improving,    : pt seen, Examined, ,OOB to chair, feels better, stable for d/c home as d/w Renal- Dr Miranda, pt  eating well, Low stable H/H , JUAN M improved. MA improving, Dtr at bed side .    OVERNIGHT EVENTS: NONE    Home Medications:  Acidophilus oral tablet: 1 tab(s) orally once a day (19 Dec 2019 11:05)  Aspirin Enteric Coated 81 mg oral delayed release tablet: 1 tab(s) orally once a day (19 Dec 2019 11:05)  biotin 5000 mcg oral tablet, disintegratin tab(s) orally once a day (19 Dec 2019 11:05)  fluticasone 50 mcg/inh nasal spray: 1 spray(s) nasal 2 times a day (21 Dec 2019 13:55)  lecithin 1200 mg oral capsule: 1 cap(s) orally once a day (19 Dec 2019 11:05)  lysine 1000 mg oral tablet: 1 tab(s) orally once a day (19 Dec 2019 11:05)  oxyCODONE 10 mg oral tablet: 0.5 tab(s) orally every 6 hours  5 mg tab q 6 hrs as needed (21 Dec 2019 13:47)  torsemide 20 mg oral tablet: 1 tab(s) orally 3 times a week  On // (21 Dec 2019 13:47)      MEDICATIONS  (STANDING):  citric acid/sodium citrate Solution 30 milliLiter(s) Oral two times a day  fluticasone propionate 50 MICROgram(s)/spray Nasal Spray 1 Spray(s) Both Nostrils two times a day  guaiFENesin  milliGRAM(s) Oral every 12 hours  heparin  Injectable 5000 Unit(s) SubCutaneous every 12 hours  lactated ringers. 1000 milliLiter(s) (60 mL/Hr) IV Continuous <Continuous>  lactobacillus acidophilus 1 Tablet(s) Oral daily  lidocaine 2% Gel 1 Application(s) Topical daily  pramipexole 0.125 milliGRAM(s) Oral daily  senna 2 Tablet(s) Oral at bedtime  sodium chloride 0.9% lock flush 3 milliLiter(s) IV Push every 8 hours    MEDICATIONS  (PRN):  gabapentin 100 milliGRAM(s) Oral three times a day PRN pain  oxyCODONE    IR 5 milliGRAM(s) Oral every 6 hours PRN Moderate Pain (4 - 6)      Allergies    No Known Allergies    Intolerances        Social History:  quit smoking 10 years ago, 25 year pack hx,   1 liquor 1x a month, denies other drug use   lives with , performs all ADLs, uses walker (18 Dec 2019 23:50)      REVIEW OF SYSTEMS: i feel better.  CONSTITUTIONAL: No fever, No chills, No fatigue, No myalgia, No Body ache, No Weakness  EYES: No eye pain,  No visual disturbances, No discharge, NO Redness  ENMT:  No ear pain, No nose bleed, No vertigo; No sinus pain, NO throat pain, No Congestion  NECK: No pain, No stiffness  RESPIRATORY: No cough, NO wheezing, No  hemoptysis, NO  shortness of breath  CARDIOVASCULAR: No chest pain, palpitations  GASTROINTESTINAL: No abdominal pain, NO epigastric pain. No nausea, No vomiting; No diarrhea, No constipation. [x  ] BM as per pt  GENITOURINARY: No dysuria, No frequency, No urgency, No hematuria, NO incontinence  NEUROLOGICAL: No headaches, No dizziness, No numbness, No tingling, No tremors, No weakness  EXT: No Swelling, No Pain, No Edema  SKIN:  [ x ] No itching, burning, rashes, or lesions , ++ Pain in legs   MUSCULOSKELETAL: No joint pain ,No Jt swelling; No muscle pain, No back pain, No extremity pain  PSYCHIATRIC: No depression,  No anxiety,  No mood swings ,No difficulty sleeping at night  PAIN SCALE: [ x ] None  [x  ] Other-B/L lower EXT pain & soreness   ROS Unable to obtain due to - [  ] Dementia  [  ] Lethargy [  ] Drowsy [  ] Sedated [  ] non verbal  REST OF REVIEW Of SYSTEM - [x] Normal     Vital Signs Last 24 Hrs  T(C): 36.7 (21 Dec 2019 13:04), Max: 36.9 (20 Dec 2019 21:26)  T(F): 98 (21 Dec 2019 13:04), Max: 98.4 (20 Dec 2019 21:26)  HR: 88 (21 Dec 2019 13:04) (87 - 104)  BP: 98/53 (21 Dec 2019 13:04) (94/62 - 109/68)  BP(mean): --  RR: 18 (21 Dec 2019 13:04) (18 - 18)  SpO2: 96% (21 Dec 2019 13:04) (96% - 97%)  Finger Stick         @ 07:  -   @ 07:00  --------------------------------------------------------  IN: 750 mL / OUT: 0 mL / NET: 750 mL     @ 07:  -   @ 14:28  --------------------------------------------------------  IN: 600 mL / OUT: 0 mL / NET: 600 mL        PHYSICAL EXAM: OOB to chair   GENERAL:  [x  ] NAD , [ x ] well appearing, [  ] Agitated, [  ] Drowsy,  [  ] Lethargy, [  ] confused   HEAD:  [ x ] Normal, [  ] Other  EYES:  [ x ] EOMI, [x ] PERRLA, [  ] conjunctiva and sclera clear normal, [  ] Other,  [  ] Pallor,[  ] Discharge  ENMT:  [ x ] Normal, [ x ] Moist mucous membranes, [x  ] Good dentition, [x  ] No Thrush  NECK:  [ x ] Supple, [ x No JVD, [x] Normal thyroid, [  ] Lymphadenopathy [  ] Other  CHEST/LUNG:  [x  ] Clear to auscultation bilaterally, [ x ] Breath Sounds equal B/L [  ] poor effort  [ x ] No rales, [x  ] No rhonchi  [x]  No wheezing,   HEART:  [x  ] Regular rate and rhythm, [  ] tachycardia, [  ] Bradycardia,  [  ] irregular  [  ] No murmurs, No rubs, No gallops, [  ] PPM in place (Mfr:  )  ABDOMEN:  [ x ] Soft, [ x ] Nontender, [x  ] Nondistended, [x ] No mass, [x  ] Bowel sounds present, [x  ] obese  NERVOUS SYSTEM:  [x  ] Alert & Oriented X3, [ x ] Nonfocal  [  ] Confusion  [  ] Encephalopathic [x  ]severe  PVD stasis skin changes B/L Lower EXT with erythema on legs & B/L Feet with pain /sensitive skin ,some  Oozing , chronic Lymph edema B/L Lower EXT [x  ] wound-B/L Lower ext  with dressings, NO Drainage, NO Necrotic skin, No smell,  LYMPH: No lymphadenopathy noted  SKIN:  [ x ] No rashes or lesions, [  ] Pressure Ulcers, [  ] ecchymosis, [  ] Skin Tears, [x  ] Other- Chronic Stasis dermatitis as above     DIET: DASH,soft    LABS:                        10.5   6.57  )-----------( 307      ( 21 Dec 2019 08:42 )             32.6     21 Dec 2019 08:42    141    |  113    |  38     ----------------------------<  113    3.9     |  18     |  1.20     Ca    8.8        21 Dec 2019 08:42        Urinalysis Basic - ( 21 Dec 2019 00:33 )    Color: Yellow / Appearance: Clear / S.015 / pH: x  Gluc: x / Ketone: Negative  / Bili: Negative / Urobili: Negative   Blood: x / Protein: 25 mg/dL / Nitrite: Negative   Leuk Esterase: Trace / RBC: 0-2 /HPF / WBC 3-5   Sq Epi: x / Non Sq Epi: Occasional / Bacteria: Few        Culture Results:   50,000 - 99,000 CFU/mL Gram Negative Rods ( @ 16:44)  Culture Results:   No growth to date. ( @ 00:26)  Culture Results:   No growth to date. ( @ 00:26)      culture blood  -- .Urine Catheterized  16:44    culture urine  --   @ 16:44      CARDIAC MARKERS ( 20 Dec 2019 08:35 )  x     / x     / 939 U/L / x     / x      CARDIAC MARKERS ( 19 Dec 2019 08:20 )  x     / x     / 1299 U/L / x     / x      CARDIAC MARKERS ( 18 Dec 2019 21:06 )  <.015 ng/mL / x     / x     / x     / x        Culture - Urine (collected 19 Dec 2019 16:44)  Source: .Urine Catheterized  Preliminary Report (21 Dec 2019 00:11):    50,000 - 99,000 CFU/mL Gram Negative Rods    Culture - Blood (collected 19 Dec 2019 00:26)  Source: .Blood Blood  Preliminary Report (20 Dec 2019 01:01):    No growth to date.    Culture - Blood (collected 19 Dec 2019 00:26)  Source: .Blood Blood  Preliminary Report (20 Dec 2019 01:01):    No growth to date.         Anemia Panel:  Iron Total, Serum: 26 ug/dL (19 @ 10:34)  Iron - Total Binding Capacity.: 292 ug/dL (19 @ 10:34)  Ferritin, Serum: 86 ng/mL (19 @ 10:33)  Folate, Serum: 4.1 ng/mL (19 @ 10:33)  Vitamin B12, Serum: 1897 pg/mL (19 @ 10:33)      Thyroid Panel:        RADIOLOGY & ADDITIONAL TESTS: NONE      HEALTH ISSUES - PROBLEM Dx:  Stasis dermatitis of both legs: This is likely due to venous stasis which is a chronic condition. Please use compression stockings. Antibiotics were stopped as there was no indication of an infection. A wound care nurse evaluated you while you were in the hospital.  Weakness generalized  Acute renal failure (ARF): Acute renal failure (ARF)  Stasis dermatitis of both legs: Stasis dermatitis of both legs  Dysphagia: Dysphagia  Hypotension: Hypotension  Syncope: Syncope  Prophylactic measure: Prophylactic measure  Chronic pain: Chronic pain  Wound: Wound  Lymphadenopathy: Lymphadenopathy  Hyperlipidemia: Hyperlipidemia  Restless Leg Syndrome: Restless Leg Syndrome  Multiple falls: Multiple falls  Weakness generalized: Weakness generalized  JUAN M (acute kidney injury): JUAN M (acute kidney injury)  Lymphedema of both lower extremities: Lymphedema of both lower extremities      Consultant(s) Notes Reviewed:  [ x ] YES     Care Discussed with [X] Consultants  [x  ] Patient  [ x ] Family- Dtr [  ] HCP [ x ]   [  ] Social Service  [ x ] RN, [  ] Physical Therapy,[  ] Palliative care team  DVT PPX: [  ] Lovenox, [ x ] S C Heparin, [  ] Coumadin, [  ] Xarelto, [  ] Eliquis, [  ] Pradaxa, [  ] IV Heparin drip, [  ] SCD [  ] Contraindication 2 to GI Bleed,[  ] Ambulation [  ] Contraindicated 2 to  bleed [  ] Contraindicated 2 to Brain Bleed  Advanced directive: [x  ] None, [  ] DNR/DNI

## 2019-12-24 LAB
CULTURE RESULTS: SIGNIFICANT CHANGE UP
CULTURE RESULTS: SIGNIFICANT CHANGE UP
SPECIMEN SOURCE: SIGNIFICANT CHANGE UP
SPECIMEN SOURCE: SIGNIFICANT CHANGE UP

## 2020-01-03 ENCOUNTER — APPOINTMENT (OUTPATIENT)
Dept: WOUND CARE | Facility: HOSPITAL | Age: 70
End: 2020-01-03
Payer: MEDICARE

## 2020-01-03 ENCOUNTER — OUTPATIENT (OUTPATIENT)
Dept: OUTPATIENT SERVICES | Facility: HOSPITAL | Age: 70
LOS: 1 days | Discharge: HOME | End: 2020-01-03
Payer: MEDICARE

## 2020-01-03 VITALS
TEMPERATURE: 97.3 F | OXYGEN SATURATION: 96 % | BODY MASS INDEX: 33.12 KG/M2 | DIASTOLIC BLOOD PRESSURE: 76 MMHG | HEART RATE: 93 BPM | RESPIRATION RATE: 20 BRPM | SYSTOLIC BLOOD PRESSURE: 117 MMHG | HEIGHT: 67 IN | WEIGHT: 211 LBS

## 2020-01-03 DIAGNOSIS — L97.801 NON-PRESSURE CHRONIC ULCER OF OTHER PART OF UNSPECIFIED LOWER LEG LIMITED TO BREAKDOWN OF SKIN: ICD-10-CM

## 2020-01-03 PROCEDURE — 99204 OFFICE O/P NEW MOD 45 MIN: CPT

## 2020-01-03 PROCEDURE — G0463: CPT

## 2020-01-03 RX ORDER — TOFACITINIB 11 MG/1
11 TABLET, FILM COATED, EXTENDED RELEASE ORAL
Qty: 90 | Refills: 0 | Status: DISCONTINUED | COMMUNITY
Start: 2018-05-21 | End: 2020-01-03

## 2020-01-03 RX ORDER — DOXYCYCLINE HYCLATE 100 MG/1
100 TABLET ORAL
Qty: 20 | Refills: 0 | Status: DISCONTINUED | COMMUNITY
Start: 2018-06-08 | End: 2020-01-03

## 2020-01-03 RX ORDER — HYDROXYCHLOROQUINE SULFATE 200 MG/1
200 TABLET, FILM COATED ORAL
Qty: 90 | Refills: 0 | Status: DISCONTINUED | COMMUNITY
Start: 2018-06-27 | End: 2020-01-03

## 2020-01-03 RX ORDER — HYDROMORPHONE HYDROCHLORIDE 2 MG/1
2 TABLET ORAL
Qty: 5 | Refills: 0 | Status: DISCONTINUED | COMMUNITY
Start: 2018-06-06 | End: 2020-01-03

## 2020-01-03 RX ORDER — LEFLUNOMIDE 10 MG/1
10 TABLET, FILM COATED ORAL
Qty: 90 | Refills: 0 | Status: DISCONTINUED | COMMUNITY
Start: 2018-06-27 | End: 2020-01-03

## 2020-01-03 RX ORDER — OXYCODONE HYDROCHLORIDE AND ACETAMINOPHEN 10; 325 MG/1; MG/1
TABLET ORAL
Refills: 0 | Status: DISCONTINUED | COMMUNITY
End: 2020-01-03

## 2020-01-03 RX ORDER — PRAMIPEXOLE DIHYDROCHLORIDE 1.5 MG/1
1.5 TABLET ORAL
Refills: 0 | Status: DISCONTINUED | COMMUNITY
End: 2020-01-03

## 2020-01-03 NOTE — VITALS
[Pain related to present condition?] : The patient's  pain is related to present condition. [] : No [de-identified] : 6/10 [FreeTextEntry3] : Bilateral legs  [FreeTextEntry1] : No direct contact  [FreeTextEntry2] : Gabapentin, Oxycodone [FreeTextEntry4] : Protective dressing

## 2020-01-03 NOTE — PLAN
[FreeTextEntry1] : Silver alginate dressings with Ace wraps.\par Patient states that she is unable to elevate her legs because of pain but will try to do a little. She was made aware that keeping her legs down will make control of her edema. her weeping, and her ulcer care more difficult\par Return one week.

## 2020-01-03 NOTE — PHYSICAL EXAM
[Normal Breath Sounds] : Normal breath sounds [Normal Rate and Rhythm] : normal rate and rhythm [Normal Heart Sounds] : normal heart sounds [2+] : right 2+ [0] : left 0 [Ankle Swelling (On Exam)] : present [Varicose Veins Of Lower Extremities] : bilaterally [Ankle Swelling On The Left] : moderate [] : bilaterally [Ankle Swelling Bilaterally] : severe [Skin Ulcer] : ulcer [Alert] : alert [Oriented to Person] : oriented to person [Oriented to Place] : oriented to place [Oriented to Time] : oriented to time [Calm] : calm [4 x 4] : 4 x 4  [JVD] : no jugular venous distention  [Abdomen Tenderness] : ~T ~M No abdominal tenderness [Purpura] : no purpura  [Petechiae] : no petechiae [Skin Induration] : no induration [de-identified] : Clear. Eyeglasses in place [de-identified] : WDWN obese WF in Nad [de-identified] : Supple [de-identified] : Healed incisions overlying both knees [de-identified] : Both lower legs edematous with thickening and hyperpigmentation of skin. Weeping from both lower legs with skin maceration present. Ulcer posterior right calf with viable soft tissue at base. Hyperesthesia to light palpation and touch of both lower legs. No odor, no pus, no cellulitis. Ulcer right lower leg shows no undermining, no tunneling, no exposure of bone, tendon, muscle. Areas of epidermal loss with dermal exposure at weeping sites both lower legs. [de-identified] : No neurovascular deficits noted.\par  [FreeTextEntry1] : Right posterior leg  [FreeTextEntry2] : 4 [FreeTextEntry3] : 2.8 [FreeTextEntry4] : 0.1 [de-identified] : Serous/sanguinous [de-identified] : Expressed comfort post ACE application. [de-identified] : Woundveil, Silver alginate [de-identified] : Cleansed with NS\par Kerlix  [de-identified] : No neurovascular deficits noted.\par  [FreeTextEntry7] : Left leg - Scattered open weeping areas  [de-identified] : Serous/sanguinous [de-identified] : Expressed comfort post ACE application. [de-identified] : Woundveil then Silver alginate [de-identified] : Cleansed with NS\par Kerlix  [TWNoteComboBox4] : Moderate [de-identified] : Dorsalis Pedis: 0\par Posterior Tibialis: 0\par Doppler pulses: Present\par Extremity color: Pink \par Extremity temperature: Warm \par Capillary refill: < 3 sec\par \par  [de-identified] : Normal [de-identified] : None [de-identified] : None [de-identified] : 100% [de-identified] : No [de-identified] : Ace wraps [de-identified] : 3x Weekly [de-identified] : Primary Dressing [de-identified] : Moderate [de-identified] : Macerated [de-identified] : None [de-identified] : None [de-identified] : 100% [de-identified] : Ace wraps [de-identified] : 3x Weekly [de-identified] : Primary Dressing

## 2020-01-03 NOTE — REASON FOR VISIT
[Consultation] : a consultation visit [Other: _____] : [unfilled] [FreeTextEntry1] : Patient developed wound on the back of her right leg 8 months ago that she describes as a gushing blister like wound. Patient was seeing vascular surgeon Dr. Vergara who was treating patient with edwin-boot. Patient receiving home care ([facility]) every other day. Patient states that the wound isn't getting better nor getting worse.

## 2020-01-03 NOTE — REVIEW OF SYSTEMS
[Arthralgias] : arthralgias [Eyesight Problems] : eyesight problems [Joint Pain] : joint pain [Joint Stiffness] : joint stiffness [Limb Pain] : limb pain [Limb Swelling] : limb swelling [Skin Wound] : skin wound [Easy Bruising] : a tendency for easy bruising [Negative] : Psychiatric [de-identified] : Chronic lower leg pain and tenderness

## 2020-01-03 NOTE — ASSESSMENT
[Verbal] : Verbal [Written] : Written [Demo] : Demo [Patient] : Patient [Home Health Provider] : Home Health Provider [Fair - mild discomfort, physical impairment, low acceptance] : Fair - mild discomfort, physical impairment, low acceptance [Verbalizes knowledge/Understanding] : Verbalizes knowledge/understanding [Dressing changes] : dressing changes [Skin Care] : skin care [Signs and symptoms of infection] : sign and symptoms of infection [Venous Disease] : venous disease [Nutrition] : nutrition [How and When to Call] : how and when to call [Labs and Tests] : labs and tests [Pain Management] : pain management [Compression Therapy] : compression therapy [Patient responsibility to plan of care] : patient responsibility to plan of care [Home Health] : home health [] : Yes [Stable] : stable [Home] : Home [Walker] : Walker [Faxed - Long Term Care/Home Health Agency] : Long Term Care/Home Health Agency: Faxed [FreeTextEntry2] : Compression therapy \par Localized wound care\par Infection prevention \par Edema prevention \par  [FreeTextEntry4] : Auth submitted for vascular studies \par Blood work from December reviewed by MD. Patient with elevated A1C. \par Follow up in 1 week  [FreeTextEntry1] : [facility]

## 2020-01-03 NOTE — HISTORY OF PRESENT ILLNESS
[FreeTextEntry1] : The patient is a 69 year old female who presents with an ulcer of the right lower leg, presumably secondary to venous insufficiency. She has had bilateral total knee surgery as well as "a bad back" and cannot elevate her legs at all. In addition, she has chronic tenderness to both lower legs for which she takes Gabapentin. She claims that the right leg ulcer has been present for eight months. \par .

## 2020-01-07 DIAGNOSIS — M54.16 RADICULOPATHY, LUMBAR REGION: ICD-10-CM

## 2020-01-07 DIAGNOSIS — L97.811 NON-PRESSURE CHRONIC ULCER OF OTHER PART OF RIGHT LOWER LEG LIMITED TO BREAKDOWN OF SKIN: ICD-10-CM

## 2020-01-07 DIAGNOSIS — Z82.49 FAMILY HISTORY OF ISCHEMIC HEART DISEASE AND OTHER DISEASES OF THE CIRCULATORY SYSTEM: ICD-10-CM

## 2020-01-07 DIAGNOSIS — G25.81 RESTLESS LEGS SYNDROME: ICD-10-CM

## 2020-01-07 DIAGNOSIS — I83.228 VARICOSE VEINS OF LEFT LOWER EXTREMITY WITH BOTH ULCER OF OTHER PART OF LOWER EXTREMITY AND INFLAMMATION: ICD-10-CM

## 2020-01-07 DIAGNOSIS — M16.9 OSTEOARTHRITIS OF HIP, UNSPECIFIED: ICD-10-CM

## 2020-01-07 DIAGNOSIS — M47.816 SPONDYLOSIS WITHOUT MYELOPATHY OR RADICULOPATHY, LUMBAR REGION: ICD-10-CM

## 2020-01-07 DIAGNOSIS — L97.821 NON-PRESSURE CHRONIC ULCER OF OTHER PART OF LEFT LOWER LEG LIMITED TO BREAKDOWN OF SKIN: ICD-10-CM

## 2020-01-07 DIAGNOSIS — Z90.710 ACQUIRED ABSENCE OF BOTH CERVIX AND UTERUS: ICD-10-CM

## 2020-01-07 DIAGNOSIS — M17.12 UNILATERAL PRIMARY OSTEOARTHRITIS, LEFT KNEE: ICD-10-CM

## 2020-01-07 DIAGNOSIS — I83.218 VARICOSE VEINS OF RIGHT LOWER EXTREMITY WITH BOTH ULCER OF OTHER PART OF LOWER EXTREMITY AND INFLAMMATION: ICD-10-CM

## 2020-01-07 DIAGNOSIS — Z79.82 LONG TERM (CURRENT) USE OF ASPIRIN: ICD-10-CM

## 2020-01-07 DIAGNOSIS — Z72.0 TOBACCO USE: ICD-10-CM

## 2020-01-07 DIAGNOSIS — Z79.899 OTHER LONG TERM (CURRENT) DRUG THERAPY: ICD-10-CM

## 2020-01-07 DIAGNOSIS — E78.00 PURE HYPERCHOLESTEROLEMIA, UNSPECIFIED: ICD-10-CM

## 2020-01-07 DIAGNOSIS — Z96.659 PRESENCE OF UNSPECIFIED ARTIFICIAL KNEE JOINT: ICD-10-CM

## 2020-01-10 ENCOUNTER — OUTPATIENT (OUTPATIENT)
Dept: OUTPATIENT SERVICES | Facility: HOSPITAL | Age: 70
LOS: 1 days | Discharge: HOME | End: 2020-01-10
Payer: MEDICARE

## 2020-01-10 ENCOUNTER — APPOINTMENT (OUTPATIENT)
Dept: WOUND CARE | Facility: HOSPITAL | Age: 70
End: 2020-01-10
Payer: MEDICARE

## 2020-01-10 VITALS
SYSTOLIC BLOOD PRESSURE: 107 MMHG | HEIGHT: 67 IN | WEIGHT: 211 LBS | RESPIRATION RATE: 20 BRPM | BODY MASS INDEX: 33.12 KG/M2 | TEMPERATURE: 97.5 F | HEART RATE: 86 BPM | DIASTOLIC BLOOD PRESSURE: 64 MMHG | OXYGEN SATURATION: 96 %

## 2020-01-10 DIAGNOSIS — I83.218 VARICOSE VEINS OF RIGHT LOWER EXTREMITY WITH BOTH ULCER OF OTHER PART OF LOWER EXTREMITY AND INFLAMMATION: ICD-10-CM

## 2020-01-10 PROCEDURE — G0463: CPT

## 2020-01-10 PROCEDURE — 99213 OFFICE O/P EST LOW 20 MIN: CPT

## 2020-01-10 NOTE — PHYSICAL EXAM
[4 x 4] : 4 x 4  [Abdominal Pad] : Abdominal Pad [Normal Breath Sounds] : Normal breath sounds [Normal Heart Sounds] : normal heart sounds [Normal Rate and Rhythm] : normal rate and rhythm [2+] : left 2+ [0] : right 0 [Ankle Swelling (On Exam)] : present [Ankle Swelling On The Left] : moderate [Varicose Veins Of Lower Extremities] : bilaterally [] : bilaterally [Ankle Swelling Bilaterally] : severe [Skin Ulcer] : ulcer [Alert] : alert [Oriented to Person] : oriented to person [Oriented to Place] : oriented to place [Oriented to Time] : oriented to time [Calm] : calm [JVD] : no jugular venous distention  [Purpura] : no purpura  [Petechiae] : no petechiae [Abdomen Tenderness] : ~T ~M No abdominal tenderness [de-identified] : WDWN obese WF in NAD [Skin Induration] : no induration [de-identified] : Supple [de-identified] : Clear. Eyeglasses in place [de-identified] : Healed incisions overlying both knees [de-identified] : Ulcer right posterior calf very clean with viable soft tissue at base. Hevy weeping from both lower legs with skin maceration present. [FreeTextEntry1] : Posterior leg  [FreeTextEntry3] : 3.0 [FreeTextEntry2] : 3.3 [FreeTextEntry4] : 0.1 [de-identified] : Expressed comfort post ACE application. [de-identified] : Serous/sanguinous [de-identified] : Silver alginate [de-identified] : \par  [de-identified] : Cleansed with Normal Saline. \par Kerlix  [FreeTextEntry7] : Leg - Scattered open weeping areas  [de-identified] : Serous/sanguinous [de-identified] : Silver alginate [de-identified] : Cleansed with Normal Saline. \par Kerlix  [de-identified] : Expressed comfort post ACE application. [TWNoteComboBox4] : Large [TWNoteComboBox1] : Right [de-identified] : Dorsalis Pedis: 0\par Posterior Tibialis: 0\par Doppler pulses: Present\par Extremity color: Pink \par Extremity temperature: Warm \par Capillary refill: < 3 sec\par \par  [de-identified] : Macerated [de-identified] : None [de-identified] : No [de-identified] : Ace wraps [de-identified] : 100% [de-identified] : None [de-identified] : Primary Dressing [de-identified] : Daily [TWNoteComboBox9] : Left [de-identified] : Large [de-identified] : No [de-identified] : None [de-identified] : Macerated [de-identified] : None [de-identified] : No [de-identified] : 100% [de-identified] : Ace wraps [de-identified] : Primary Dressing [de-identified] : Daily

## 2020-01-10 NOTE — ASSESSMENT
[Demo] : Demo [Verbal] : Verbal [Patient] : Patient [Home Health Provider] : Home Health Provider [Fair - mild discomfort, physical impairment, low acceptance] : Fair - mild discomfort, physical impairment, low acceptance [Skin Care] : skin care [Dressing changes] : dressing changes [Verbalizes knowledge/Understanding] : Verbalizes knowledge/understanding [Signs and symptoms of infection] : sign and symptoms of infection [Venous Disease] : venous disease [Nutrition] : nutrition [How and When to Call] : how and when to call [Labs and Tests] : labs and tests [Pain Management] : pain management [Compression Therapy] : compression therapy [Home Health] : home health [Patient responsibility to plan of care] : patient responsibility to plan of care [Stable] : stable [Home] : Home [Walker] : Walker [Faxed - Long Term Care/Home Health Agency] : Long Term Care/Home Health Agency: Faxed [] : No [FreeTextEntry2] : Localized Wound Care \par Compression therapy \par Infection Prevention  \par Edema Control \par  [FreeTextEntry1] : [facility] [FreeTextEntry4] : Pt has Vascular Studies on Monday 1/13/19\par Pt has appointment with Cardiologist Wednesday 1/151/19 for Syncope. \par Pt to F/U to [facility] in 1 Week

## 2020-01-10 NOTE — HISTORY OF PRESENT ILLNESS
[FreeTextEntry1] : The patient is a 69 year old female who presents for follow-up of an ulcer of her right lower leg. She has very heavy weeping from both lower legs with skin maceration.

## 2020-01-10 NOTE — VITALS
[] : No [Pain related to present condition?] : The patient's  pain is not related to present condition.

## 2020-01-10 NOTE — REVIEW OF SYSTEMS
[Eyesight Problems] : eyesight problems [Arthralgias] : arthralgias [Joint Stiffness] : joint stiffness [Joint Pain] : joint pain [Limb Pain] : limb pain [Limb Swelling] : limb swelling [Skin Wound] : skin wound [Easy Bruising] : a tendency for easy bruising [Negative] : Endocrine [de-identified] : Chronic lower leg pain and tenderness

## 2020-01-10 NOTE — PLAN
[FreeTextEntry1] : Silver alginate dressings with ABD pads and Ace wraps\par Dressings to be changed every day in view of the heavy weeping.\par Return one week.

## 2020-01-13 ENCOUNTER — OUTPATIENT (OUTPATIENT)
Dept: OUTPATIENT SERVICES | Facility: HOSPITAL | Age: 70
LOS: 1 days | End: 2020-01-13
Payer: MEDICARE

## 2020-01-13 DIAGNOSIS — M16.9 OSTEOARTHRITIS OF HIP, UNSPECIFIED: ICD-10-CM

## 2020-01-13 DIAGNOSIS — Z96.659 PRESENCE OF UNSPECIFIED ARTIFICIAL KNEE JOINT: ICD-10-CM

## 2020-01-13 DIAGNOSIS — I83.218 VARICOSE VEINS OF RIGHT LOWER EXTREMITY WITH BOTH ULCER OF OTHER PART OF LOWER EXTREMITY AND INFLAMMATION: ICD-10-CM

## 2020-01-13 DIAGNOSIS — L97.821 NON-PRESSURE CHRONIC ULCER OF OTHER PART OF LEFT LOWER LEG LIMITED TO BREAKDOWN OF SKIN: ICD-10-CM

## 2020-01-13 DIAGNOSIS — Z79.82 LONG TERM (CURRENT) USE OF ASPIRIN: ICD-10-CM

## 2020-01-13 DIAGNOSIS — Z90.710 ACQUIRED ABSENCE OF BOTH CERVIX AND UTERUS: ICD-10-CM

## 2020-01-13 DIAGNOSIS — Z82.49 FAMILY HISTORY OF ISCHEMIC HEART DISEASE AND OTHER DISEASES OF THE CIRCULATORY SYSTEM: ICD-10-CM

## 2020-01-13 DIAGNOSIS — M17.12 UNILATERAL PRIMARY OSTEOARTHRITIS, LEFT KNEE: ICD-10-CM

## 2020-01-13 DIAGNOSIS — L97.811 NON-PRESSURE CHRONIC ULCER OF OTHER PART OF RIGHT LOWER LEG LIMITED TO BREAKDOWN OF SKIN: ICD-10-CM

## 2020-01-13 DIAGNOSIS — M54.16 RADICULOPATHY, LUMBAR REGION: ICD-10-CM

## 2020-01-13 DIAGNOSIS — G25.81 RESTLESS LEGS SYNDROME: ICD-10-CM

## 2020-01-13 DIAGNOSIS — I83.228 VARICOSE VEINS OF LEFT LOWER EXTREMITY WITH BOTH ULCER OF OTHER PART OF LOWER EXTREMITY AND INFLAMMATION: ICD-10-CM

## 2020-01-13 DIAGNOSIS — E78.00 PURE HYPERCHOLESTEROLEMIA, UNSPECIFIED: ICD-10-CM

## 2020-01-13 DIAGNOSIS — M47.816 SPONDYLOSIS WITHOUT MYELOPATHY OR RADICULOPATHY, LUMBAR REGION: ICD-10-CM

## 2020-01-13 DIAGNOSIS — Z79.899 OTHER LONG TERM (CURRENT) DRUG THERAPY: ICD-10-CM

## 2020-01-13 DIAGNOSIS — Z72.0 TOBACCO USE: ICD-10-CM

## 2020-01-13 PROCEDURE — 93923 UPR/LXTR ART STDY 3+ LVLS: CPT

## 2020-01-13 PROCEDURE — 93923 UPR/LXTR ART STDY 3+ LVLS: CPT | Mod: 26

## 2020-01-17 ENCOUNTER — APPOINTMENT (OUTPATIENT)
Dept: SURGERY | Facility: HOSPITAL | Age: 70
End: 2020-01-17
Payer: MEDICARE

## 2020-01-17 ENCOUNTER — OUTPATIENT (OUTPATIENT)
Dept: OUTPATIENT SERVICES | Facility: HOSPITAL | Age: 70
LOS: 1 days | Discharge: HOME | End: 2020-01-17
Payer: MEDICARE

## 2020-01-17 ENCOUNTER — APPOINTMENT (OUTPATIENT)
Dept: WOUND CARE | Facility: HOSPITAL | Age: 70
End: 2020-01-17

## 2020-01-17 VITALS
HEART RATE: 84 BPM | SYSTOLIC BLOOD PRESSURE: 146 MMHG | HEIGHT: 67 IN | OXYGEN SATURATION: 98 % | TEMPERATURE: 97.2 F | DIASTOLIC BLOOD PRESSURE: 68 MMHG | RESPIRATION RATE: 20 BRPM | BODY MASS INDEX: 33.12 KG/M2 | WEIGHT: 211 LBS

## 2020-01-17 DIAGNOSIS — I83.218 VARICOSE VEINS OF RIGHT LOWER EXTREMITY WITH BOTH ULCER OF OTHER PART OF LOWER EXTREMITY AND INFLAMMATION: ICD-10-CM

## 2020-01-17 PROCEDURE — 99213 OFFICE O/P EST LOW 20 MIN: CPT

## 2020-01-17 PROCEDURE — G0463: CPT

## 2020-01-17 NOTE — PHYSICAL EXAM
[4 x 4] : 4 x 4  [Abdominal Pad] : Abdominal Pad [Normal Breath Sounds] : Normal breath sounds [Normal Heart Sounds] : normal heart sounds [1+] : left 1+ [Ankle Swelling (On Exam)] : present [Varicose Veins Of Lower Extremities] : bilaterally [Ankle Swelling Bilaterally] : severe [] : bilaterally [Ankle Swelling On The Left] : moderate [Alert] : alert [Oriented to Person] : oriented to person [Calm] : calm [JVD] : no jugular venous distention  [de-identified] : WD/WN in no acute distress. [de-identified] : WNL [de-identified] : ELSYL [de-identified] : WNL [de-identified] : Bilateral stage 4 lymphedema with elephantiasis, right posterior leg ulcer is clean, base is red and viable, moderate weeping, lef leg few minute ulcers, no infection, no cellulitis.  [FreeTextEntry1] : Posterior leg  [FreeTextEntry2] : 3.2 [FreeTextEntry3] : 2.8 [FreeTextEntry4] : 0.1 [de-identified] : Serous/sanguinous [de-identified] : Circulatory and neuromuscular function WNL post ACE application. Patient verbalizes they feel 'comfortable' post ACE application to the lower extremities.  [de-identified] : NSC,Silver alginate,DD  [de-identified] : \par  [FreeTextEntry7] : Leg - Scattered open weeping areas  [de-identified] : Serous/sanguinous [de-identified] : Circulatory and neuromuscular function WNL post ACE application. Patient verbalizes they feel 'comfortable' post ACE application to the lower extremities.  [de-identified] : NSC,Silver alginate,DD  [TWNoteComboBox1] : Right [TWNoteComboBox4] : Large [de-identified] : Macerated [de-identified] : None [de-identified] : None [de-identified] : 100% [de-identified] : No [de-identified] : Ace wraps [de-identified] : 3x Weekly [de-identified] : Secondary Dressing [TWNoteComboBox9] : Left [de-identified] : Large [de-identified] : No [de-identified] : Macerated [de-identified] : None [de-identified] : None [de-identified] : No [de-identified] : 100% [de-identified] : Secondary Dressing [de-identified] : Ace wraps [de-identified] : 3x Weekly

## 2020-01-17 NOTE — ASSESSMENT
[Patient] : Patient [Demo] : Demo [Verbal] : Verbal [Home Health Provider] : Home Health Provider [Verbalizes knowledge/Understanding] : Verbalizes knowledge/understanding [Fair - mild discomfort, physical impairment, low acceptance] : Fair - mild discomfort, physical impairment, low acceptance [Dressing changes] : dressing changes [Signs and symptoms of infection] : sign and symptoms of infection [Skin Care] : skin care [Nutrition] : nutrition [Venous Disease] : venous disease [Labs and Tests] : labs and tests [How and When to Call] : how and when to call [Home Health] : home health [Pain Management] : pain management [Compression Therapy] : compression therapy [Patient responsibility to plan of care] : patient responsibility to plan of care [Stable] : stable [Home] : Home [Walker] : Walker [Faxed - Long Term Care/Home Health Agency] : Long Term Care/Home Health Agency: Faxed [] : Yes [FreeTextEntry2] : Infection Prevention  \par Localized Wound Care \par Compression therapy \par Edema Control/ compression \par Weight management/ nutrition \par  [FreeTextEntry4] : No signs/symptoms of infection.\par MD reviewed vascular studies with pt \par Follow up to [facility] in one week  [FreeTextEntry1] : Bilateral chronic stage 4 lymphedema, stasis ulcers, no infection.

## 2020-01-17 NOTE — PLAN
[facility] -   Critical Care Medicine  Consult Note    Patient Name: Rea Vail  MRN: [id number]  Admission Date: 2/12/2018  Hospital Length of Stay: 0 days  Code Status: Prior  Attending Physician: Kai Escamilla MD   Primary Care Provider: Estiven Oseguera MD   Principal Problem: Acute on chronic respiratory failure with hypoxia and hypercapnia      Subjective:     HPI:  68 y/o hospitalized from 2/7-2/9/2018 for Hypercarbic respiratory failure and new onset Atrial Fibrillation  presnets to Emergency Room with Altered Mental Status x 1 day. He reports progressive dyspnea, wheezing, bilateral leg edema, orthopnea and PND for the past two weeks.Patient reports cough and difficulty breathing and denies vomiting, abdominal pain and diarrhea. Patient denies recent sick contacts.   Patient does not have new pets. Patient does not have a history of asthma. Patient does not have a history of environmental allergens. Patient has not traveled recently. Patient does have a history of smoking. He smoked 3 PPD for 20 years ( 60 pack years). He quit smoking 13 years ago.  PPD was Negative. Brought in by [facility]/ICU Course:  2/12 Confused and disorientated in Emergency Room with elevated pCO2    Past Medical History:   Diagnosis Date    Anemia 2013    Asthma     CHF (congestive heart failure)     COPD (chronic obstructive pulmonary disease)     COPD (chronic obstructive pulmonary disease) 2012    Diverticular disease     Gout 2012    Hyperlipidemia     Hypertension     Other and unspecified hyperlipidemia     Stroke        No past surgical history on file.    Review of patient's allergies indicates:   Allergen Reactions    Cephalexin Anaphylaxis       Family History     Problem Relation (Age of Onset)    Stroke Cousin        Social History Main Topics    Smoking status: Former Smoker     Types: Cigarettes     Quit date: 7/9/2006    Smokeless tobacco: Not on file    Alcohol use  No    Drug use: No    Sexual activity: Not on file         Review of Systems   Constitutional: Positive for diaphoresis and fatigue. Negative for fever and unexpected weight change.   HENT: Positive for congestion. Negative for postnasal drip, rhinorrhea, sinus pressure and sneezing.    Eyes: Negative.    Respiratory: Positive for cough, choking, shortness of breath, wheezing and stridor.    Cardiovascular: Positive for palpitations and leg swelling. Negative for chest pain.   Gastrointestinal: Positive for nausea. Negative for abdominal pain.   Endocrine: Negative.    Genitourinary: Negative.    Musculoskeletal: Positive for arthralgias. Negative for back pain.   Skin: Negative for rash.   Allergic/Immunologic: Negative.    Neurological: Positive for dizziness, weakness and light-headedness. Negative for syncope.   Hematological: Negative.  Negative for adenopathy. Does not bruise/bleed easily.   Psychiatric/Behavioral: Positive for confusion. Negative for dysphoric mood and sleep disturbance. The patient is not nervous/anxious.      Objective:     Vital Signs (Most Recent):  Temp: 97.5 °F (36.4 °C) (02/12/18 1454)  Pulse: 81 (02/12/18 1541)  Resp: 18 (02/12/18 1501)  BP: 115/75 (02/12/18 1541)  SpO2: 96 % (02/12/18 1501) Vital Signs (24h Range):  Temp:  [97.5 °F (36.4 °C)] 97.5 °F (36.4 °C)  Pulse:  [77-81] 81  Resp:  [16-18] 18  SpO2:  [95 %-97 %] 96 %  BP: (110-129)/(68-82) 115/75        There is no height or weight on file to calculate BMI.    No intake or output data in the 24 hours ending 02/12/18 1613    Physical Exam   Constitutional: He appears well-developed and well-nourished.   HENT:   Head: Normocephalic and atraumatic.   Mouth/Throat: Oropharyngeal exudate present.   Eyes: Conjunctivae are normal. Pupils are equal, round, and reactive to light.   Neck: Neck supple. No JVD present. No tracheal deviation present. No thyromegaly present.   Cardiovascular: Normal rate, regular rhythm and normal heart  sounds.    No murmur heard.  Pulmonary/Chest: Effort normal. He has decreased breath sounds. He has wheezes in the right lower field and the left lower field. He has no rhonchi. He has no rales.   Abdominal: Soft. Bowel sounds are normal.   Musculoskeletal: Normal range of motion. He exhibits no edema or tenderness.   Lymphadenopathy:     He has no cervical adenopathy.   Neurological: He displays abnormal reflex. A sensory deficit is present. He exhibits abnormal muscle tone.   Right hemiparesis   Skin: Skin is warm and dry.   Nursing note and vitals reviewed.      Vents:       Lines/Drains/Airways     Peripheral Intravenous Line                 Peripheral IV - Single Lumen 02/09/18 2100 Right Forearm 2 days         Peripheral IV - Single Lumen 02/12/18 1355 Left Forearm less than 1 day                Significant Labs:    CBC/Anemia Profile:    Recent Labs  Lab 02/12/18  1441   WBC 6.18   HGB 12.8*   HCT 44.5      MCV 90   RDW 15.4*        Chemistries:    Recent Labs  Lab 02/12/18  1441      K 3.8   CL 96   CO2 34*   BUN 41*   CREATININE 1.6*   CALCIUM 8.9   ALBUMIN 2.6*   PROT 6.0   BILITOT 0.6   ALKPHOS 80   ALT 28   AST 21       BMP:   Recent Labs  Lab 02/12/18  1441         K 3.8   CL 96   CO2 34*   BUN 41*   CREATININE 1.6*   CALCIUM 8.9     CMP:   Recent Labs  Lab 02/12/18  1441      K 3.8   CL 96   CO2 34*      BUN 41*   CREATININE 1.6*   CALCIUM 8.9   PROT 6.0   ALBUMIN 2.6*   BILITOT 0.6   ALKPHOS 80   AST 21   ALT 28   ANIONGAP 11   EGFRNONAA 43*     All pertinent labs within the past 24 hours have been reviewed.    Significant Imaging:   CXR: I have reviewed all pertinent results/findings within the past 24 hours and my personal findings are:  RLL infitlrate, COPD     Assessment/Plan:     Pulmonary   * Acute on chronic respiratory failure with hypoxia and hypercapnia    Bronchodilators, low flow oxygen , BiPAP        Other   Abnormal CXR    CT of chest             Critical Care Daily Checklist:    A: Awake: RASS Goal/Actual Goal:    Actual:     B: Spontaneous Breathing Trial Performed?     C: SAT & SBT Coordinated?  na                      D: Delirium: CAM-ICU     E: Early Mobility Performed? No   F: Feeding Goal:    Status:     Current Diet Order   No orders of the defined types were placed in this encounter.      AS: Analgesia/Sedation Not needed   T: Thromboembolic Prophylaxis yes   H: HOB > 300 Yes   U: Stress Ulcer Prophylaxis (if needed) added   G: Glucose Control fair   B: Bowel Function     I: Indwelling Catheter (Lines & [place]) Necessity needed   D: De-escalation of Antimicrobials/Pharmacotherapies na    Plan for the day/ETD Admit to ICU from ER    Code Status:  Family/Goals of Care: Prior  discussed     Critical Care Time: 50 minutes  Critical secondary to Patient has a condition that poses threat to life and bodily function: Severe Respiratory Distress     Critical care was time spent personally by me on the following activities: development of treatment plan with patient or surrogate and bedside caregivers, discussions with consultants, evaluation of patient's response to treatment, examination of patient, ordering and performing treatments and interventions, ordering and review of laboratory studies, ordering and review of radiographic studies, pulse oximetry, re-evaluation of patient's condition. This critical care time did not overlap with that of any other provider or involve time for any procedures.    Thank you for your consult. I will follow-up with patient. Please contact us if you have any additional questions.     Bob Ziegler MD  Critical Care Medicine  [facility] -    [FreeTextEntry1] : Silver Alginate, dry dressing, ACE wrap, RTO in one week.\par

## 2020-01-19 DIAGNOSIS — L97.821 NON-PRESSURE CHRONIC ULCER OF OTHER PART OF LEFT LOWER LEG LIMITED TO BREAKDOWN OF SKIN: ICD-10-CM

## 2020-01-19 DIAGNOSIS — I83.228 VARICOSE VEINS OF LEFT LOWER EXTREMITY WITH BOTH ULCER OF OTHER PART OF LOWER EXTREMITY AND INFLAMMATION: ICD-10-CM

## 2020-01-19 DIAGNOSIS — Z82.49 FAMILY HISTORY OF ISCHEMIC HEART DISEASE AND OTHER DISEASES OF THE CIRCULATORY SYSTEM: ICD-10-CM

## 2020-01-19 DIAGNOSIS — E78.00 PURE HYPERCHOLESTEROLEMIA, UNSPECIFIED: ICD-10-CM

## 2020-01-19 DIAGNOSIS — Z72.0 TOBACCO USE: ICD-10-CM

## 2020-01-19 DIAGNOSIS — Z90.710 ACQUIRED ABSENCE OF BOTH CERVIX AND UTERUS: ICD-10-CM

## 2020-01-19 DIAGNOSIS — I83.218 VARICOSE VEINS OF RIGHT LOWER EXTREMITY WITH BOTH ULCER OF OTHER PART OF LOWER EXTREMITY AND INFLAMMATION: ICD-10-CM

## 2020-01-19 DIAGNOSIS — Z79.82 LONG TERM (CURRENT) USE OF ASPIRIN: ICD-10-CM

## 2020-01-19 DIAGNOSIS — Z96.659 PRESENCE OF UNSPECIFIED ARTIFICIAL KNEE JOINT: ICD-10-CM

## 2020-01-19 DIAGNOSIS — Z79.899 OTHER LONG TERM (CURRENT) DRUG THERAPY: ICD-10-CM

## 2020-01-19 DIAGNOSIS — G25.81 RESTLESS LEGS SYNDROME: ICD-10-CM

## 2020-01-19 DIAGNOSIS — M16.9 OSTEOARTHRITIS OF HIP, UNSPECIFIED: ICD-10-CM

## 2020-01-19 DIAGNOSIS — M54.16 RADICULOPATHY, LUMBAR REGION: ICD-10-CM

## 2020-01-19 DIAGNOSIS — L97.811 NON-PRESSURE CHRONIC ULCER OF OTHER PART OF RIGHT LOWER LEG LIMITED TO BREAKDOWN OF SKIN: ICD-10-CM

## 2020-01-19 DIAGNOSIS — M47.816 SPONDYLOSIS WITHOUT MYELOPATHY OR RADICULOPATHY, LUMBAR REGION: ICD-10-CM

## 2020-01-19 DIAGNOSIS — M17.12 UNILATERAL PRIMARY OSTEOARTHRITIS, LEFT KNEE: ICD-10-CM

## 2020-01-21 ENCOUNTER — NON-APPOINTMENT (OUTPATIENT)
Age: 70
End: 2020-01-21

## 2020-01-21 ENCOUNTER — APPOINTMENT (OUTPATIENT)
Dept: CARDIOLOGY | Facility: CLINIC | Age: 70
End: 2020-01-21
Payer: MEDICARE

## 2020-01-21 VITALS
OXYGEN SATURATION: 97 % | SYSTOLIC BLOOD PRESSURE: 113 MMHG | BODY MASS INDEX: 31.39 KG/M2 | HEIGHT: 67 IN | WEIGHT: 200 LBS | DIASTOLIC BLOOD PRESSURE: 73 MMHG | HEART RATE: 88 BPM

## 2020-01-21 DIAGNOSIS — M06.9 RHEUMATOID ARTHRITIS, UNSPECIFIED: ICD-10-CM

## 2020-01-21 DIAGNOSIS — R09.89 OTHER SPECIFIED SYMPTOMS AND SIGNS INVOLVING THE CIRCULATORY AND RESPIRATORY SYSTEMS: ICD-10-CM

## 2020-01-21 DIAGNOSIS — Z72.0 TOBACCO USE: ICD-10-CM

## 2020-01-21 PROCEDURE — 93000 ELECTROCARDIOGRAM COMPLETE: CPT

## 2020-01-21 PROCEDURE — 99205 OFFICE O/P NEW HI 60 MIN: CPT

## 2020-01-21 NOTE — PHYSICAL EXAM
[General Appearance - Well Developed] : well developed [Well Groomed] : well groomed [Normal Appearance] : normal appearance [General Appearance - Well Nourished] : well nourished [No Deformities] : no deformities [General Appearance - In No Acute Distress] : no acute distress [Normal Oral Mucosa] : normal oral mucosa [Normal Jugular Venous A Waves Present] : normal jugular venous A waves present [Normal Jugular Venous V Waves Present] : normal jugular venous V waves present [No Jugular Venous Hagen A Waves] : no jugular venous hagen A waves [Not Palpable] : not palpable [Normal Rate] : normal [Normal S1] : normal S1 [Normal S2] : normal S2 [2+] : left 2+ [1+] : left 1+ [No Abnormalities] : the abdominal aorta was not enlarged and no bruit was heard [Respiration, Rhythm And Depth] : normal respiratory rhythm and effort [Exaggerated Use Of Accessory Muscles For Inspiration] : no accessory muscle use [Auscultation Breath Sounds / Voice Sounds] : lungs were clear to auscultation bilaterally [Bowel Sounds] : normal bowel sounds [Abdomen Soft] : soft [Abdomen Tenderness] : non-tender [Nail Clubbing] : no clubbing of the fingernails [Cyanosis, Localized] : no localized cyanosis [Skin Color & Pigmentation] : normal skin color and pigmentation [Skin Turgor] : normal skin turgor [] : no rash [Oriented To Time, Place, And Person] : oriented to person, place, and time [Impaired Insight] : insight and judgment were intact [No Anxiety] : not feeling anxious [II] : a grade 2 [___ +] : bilateral [unfilled]U+ pretibial pitting edema [S3] : no S3 [S4] : no S4 [Right Carotid Bruit] : no bruit heard over the right carotid [Left Carotid Bruit] : no bruit heard over the left carotid [Left Femoral Bruit] : no bruit heard over the left femoral artery [Right Femoral Bruit] : no bruit heard over the right femoral artery [FreeTextEntry1] : Walks with a walker

## 2020-01-21 NOTE — DISCUSSION/SUMMARY
[FreeTextEntry1] : This is a 70-year-old white female with rheumatoid arthritis, and chronic lymphedema of the legs. Most likely she has no significant underlying heart disease.\par \par She will schedule an echocardiogram to check on her heart structurally and rule out any pulmonary hypertension. Will do a carotid ultrasound as a screening test for vascular disease. If these 2 tests are okay I would not pursue any further cardiac evaluation as she has additional symptoms.\par \par We did discuss her recent hospitalization and the need to keep herself well hydrated. If she starts to get any symptoms of feeling like she might blackout she would need evaluation and probably need hydration.\par \par We did discuss lifestyle including diet, exercise, and weight control. I answered all of her questions.\par \par If any blood work I would appreciate a copy for my review.

## 2020-01-21 NOTE — REVIEW OF SYSTEMS
[Recent Weight Loss (___ Lbs)] : recent [unfilled] ~Ulb weight loss [Eyeglasses] : currently wearing eyeglasses [Joint Pain] : joint pain [Lower Ext Edema] : lower extremity edema [Skin: A Rash] : rash: [Negative] : Genitourinary [Fever] : no fever [Headache] : no headache [Chills] : no chills [Feeling Fatigued] : not feeling fatigued [Blurry Vision] : no blurred vision [Seeing Double (Diplopia)] : no diplopia [Dizziness] : no dizziness [Depression] : no depression [Anxiety] : no anxiety [Excessive Thirst] : no polydipsia [Easy Bleeding] : no tendency for easy bleeding [Easy Bruising] : no tendency for easy bruising [FreeTextEntry3] : Difficulty swallowing

## 2020-01-21 NOTE — HISTORY OF PRESENT ILLNESS
[FreeTextEntry1] : I saw Christina Damian in the office today for cardiac evaluation.She is a 70-year-old white female who was recently admitted to [facility] 12/19 with episodes of frequent falls and blacking out occurring over the past 4 months. In the hospital evaluation revealed acute kidney insufficiency felt to be due to dehydration. Episodes of falling and loss of consciousness were felt to be due to dehydration and poor perfusion to the brain. Creatinine went from 3.8 down to 1.2 with hydration. There was no evidence for CVA. She had been admitted to [facility] previously with similar symptoms. Since the hospitalization she is feeling better. She has had no further episodes of feeling like she would black out.\par \par Her past medical history is significant for the absence of hypertension, diabetes, hyperlipidemia. She has no family history of heart disease. She stopped smoking 6 years ago. She's never had a significant cardiac workup. She has no known history of heart disease.\par \par She is relatively sedentary and walks with a walker. She has no chest pain, shortness of breath, or palpitation.\par \par A resting ECG demonstrates sinus rhythm. There is poor R  Wave progression in the anterior leads.

## 2020-01-24 ENCOUNTER — OUTPATIENT (OUTPATIENT)
Dept: OUTPATIENT SERVICES | Facility: HOSPITAL | Age: 70
LOS: 1 days | Discharge: HOME | End: 2020-01-24
Payer: MEDICARE

## 2020-01-24 ENCOUNTER — APPOINTMENT (OUTPATIENT)
Dept: SURGERY | Facility: HOSPITAL | Age: 70
End: 2020-01-24
Payer: MEDICARE

## 2020-01-24 VITALS
TEMPERATURE: 97 F | RESPIRATION RATE: 20 BRPM | DIASTOLIC BLOOD PRESSURE: 61 MMHG | OXYGEN SATURATION: 100 % | BODY MASS INDEX: 31.39 KG/M2 | WEIGHT: 200 LBS | HEIGHT: 67 IN | HEART RATE: 81 BPM | SYSTOLIC BLOOD PRESSURE: 117 MMHG

## 2020-01-24 DIAGNOSIS — I83.218 VARICOSE VEINS OF RIGHT LOWER EXTREMITY WITH BOTH ULCER OF OTHER PART OF LOWER EXTREMITY AND INFLAMMATION: ICD-10-CM

## 2020-01-24 PROCEDURE — 99213 OFFICE O/P EST LOW 20 MIN: CPT

## 2020-01-24 PROCEDURE — G0463: CPT

## 2020-01-24 NOTE — ASSESSMENT
[Verbal] : Verbal [Patient] : Patient [Good - alert, interested, motivated] : Good - alert, interested, motivated [Verbalizes knowledge/Understanding] : Verbalizes knowledge/understanding [Skin Care] : skin care [Dressing changes] : dressing changes [How and When to Call] : how and when to call [Signs and symptoms of infection] : sign and symptoms of infection [Compression Therapy] : compression therapy [Patient responsibility to plan of care] : patient responsibility to plan of care [Stable] : stable [Home] : Home [Walker] : Walker [Not Applicable - Long Term Care/Home Health Agency] : Long Term Care/Home Health Agency: Not Applicable [] : No [FreeTextEntry2] : Restore Skin Integrity\par Infection Control\par Localized wound care\par  [FreeTextEntry4] : F/U to [facility] in 1 week [FreeTextEntry1] : Bilateral stasis ulcer and chronic lymphedema, no infection.

## 2020-01-24 NOTE — PHYSICAL EXAM
[Normal Breath Sounds] : Normal breath sounds [Normal Heart Sounds] : normal heart sounds [Ankle Swelling (On Exam)] : present [1+] : left 1+ [Varicose Veins Of Lower Extremities] : bilaterally [Ankle Swelling Bilaterally] : severe [] : present [Ankle Swelling On The Left] : moderate [Alert] : alert [Oriented to Person] : oriented to person [Calm] : calm [JVD] : no jugular venous distention  [de-identified] : WD/WN in no acute distress. [de-identified] : WNL [de-identified] : ELSYL [de-identified] : Bilateral stage 4 lymphedema with elephantiasis, right posterior leg ulcer is clean, base is red and viable, moderate weeping, lef leg few minute ulcers, no infection, no cellulitis.  [de-identified] : WNL [FreeTextEntry1] : Right Posterior Leg [FreeTextEntry2] : 3.1 [FreeTextEntry3] : 2.4 [FreeTextEntry4] : 0.2 [de-identified] : Serosanguineous [de-identified] : Intact [de-identified] : Silver Alginate [de-identified] : Cleansed with Normal Saline\par  [FreeTextEntry7] : Left Leg- Scattered Open Weeping areas [de-identified] : Serosanguineous [de-identified] : Silver Alginate [de-identified] : Cleansed with Normal Saline\par  [TWNoteComboBox4] : Moderate [TWNoteComboBox5] : No [de-identified] : No [de-identified] : None [de-identified] : None [de-identified] : 100% [de-identified] : No [de-identified] : Ace wraps [de-identified] : 3x Weekly [de-identified] : Large [de-identified] : No [de-identified] : No [de-identified] : Macerated [de-identified] : None [de-identified] : None [de-identified] : 100% [de-identified] : No [de-identified] : Ace wraps [de-identified] : 3x Weekly

## 2020-01-26 DIAGNOSIS — Z82.49 FAMILY HISTORY OF ISCHEMIC HEART DISEASE AND OTHER DISEASES OF THE CIRCULATORY SYSTEM: ICD-10-CM

## 2020-01-26 DIAGNOSIS — L97.821 NON-PRESSURE CHRONIC ULCER OF OTHER PART OF LEFT LOWER LEG LIMITED TO BREAKDOWN OF SKIN: ICD-10-CM

## 2020-01-26 DIAGNOSIS — Z79.899 OTHER LONG TERM (CURRENT) DRUG THERAPY: ICD-10-CM

## 2020-01-26 DIAGNOSIS — M16.9 OSTEOARTHRITIS OF HIP, UNSPECIFIED: ICD-10-CM

## 2020-01-26 DIAGNOSIS — L97.811 NON-PRESSURE CHRONIC ULCER OF OTHER PART OF RIGHT LOWER LEG LIMITED TO BREAKDOWN OF SKIN: ICD-10-CM

## 2020-01-26 DIAGNOSIS — I83.228 VARICOSE VEINS OF LEFT LOWER EXTREMITY WITH BOTH ULCER OF OTHER PART OF LOWER EXTREMITY AND INFLAMMATION: ICD-10-CM

## 2020-01-26 DIAGNOSIS — G25.81 RESTLESS LEGS SYNDROME: ICD-10-CM

## 2020-01-26 DIAGNOSIS — M54.16 RADICULOPATHY, LUMBAR REGION: ICD-10-CM

## 2020-01-26 DIAGNOSIS — Z96.659 PRESENCE OF UNSPECIFIED ARTIFICIAL KNEE JOINT: ICD-10-CM

## 2020-01-26 DIAGNOSIS — Z79.82 LONG TERM (CURRENT) USE OF ASPIRIN: ICD-10-CM

## 2020-01-26 DIAGNOSIS — M47.816 SPONDYLOSIS WITHOUT MYELOPATHY OR RADICULOPATHY, LUMBAR REGION: ICD-10-CM

## 2020-01-26 DIAGNOSIS — E78.00 PURE HYPERCHOLESTEROLEMIA, UNSPECIFIED: ICD-10-CM

## 2020-01-26 DIAGNOSIS — Z72.0 TOBACCO USE: ICD-10-CM

## 2020-01-26 DIAGNOSIS — I83.218 VARICOSE VEINS OF RIGHT LOWER EXTREMITY WITH BOTH ULCER OF OTHER PART OF LOWER EXTREMITY AND INFLAMMATION: ICD-10-CM

## 2020-01-26 DIAGNOSIS — M17.12 UNILATERAL PRIMARY OSTEOARTHRITIS, LEFT KNEE: ICD-10-CM

## 2020-01-26 DIAGNOSIS — Z90.710 ACQUIRED ABSENCE OF BOTH CERVIX AND UTERUS: ICD-10-CM

## 2020-01-29 ENCOUNTER — APPOINTMENT (OUTPATIENT)
Dept: CARDIOLOGY | Facility: CLINIC | Age: 70
End: 2020-01-29
Payer: MEDICARE

## 2020-01-29 PROCEDURE — 93306 TTE W/DOPPLER COMPLETE: CPT

## 2020-02-06 ENCOUNTER — APPOINTMENT (OUTPATIENT)
Dept: CARDIOLOGY | Facility: CLINIC | Age: 70
End: 2020-02-06
Payer: MEDICARE

## 2020-02-06 PROCEDURE — 93880 EXTRACRANIAL BILAT STUDY: CPT

## 2020-02-07 ENCOUNTER — OUTPATIENT (OUTPATIENT)
Dept: OUTPATIENT SERVICES | Facility: HOSPITAL | Age: 70
LOS: 1 days | Discharge: HOME | End: 2020-02-07
Payer: MEDICARE

## 2020-02-07 ENCOUNTER — APPOINTMENT (OUTPATIENT)
Dept: WOUND CARE | Facility: HOSPITAL | Age: 70
End: 2020-02-07
Payer: MEDICARE

## 2020-02-07 VITALS
OXYGEN SATURATION: 98 % | TEMPERATURE: 97.1 F | SYSTOLIC BLOOD PRESSURE: 113 MMHG | DIASTOLIC BLOOD PRESSURE: 69 MMHG | HEIGHT: 67 IN | RESPIRATION RATE: 18 BRPM | BODY MASS INDEX: 31.39 KG/M2 | HEART RATE: 80 BPM | WEIGHT: 200 LBS

## 2020-02-07 DIAGNOSIS — I83.218 VARICOSE VEINS OF RIGHT LOWER EXTREMITY WITH BOTH ULCER OF OTHER PART OF LOWER EXTREMITY AND INFLAMMATION: ICD-10-CM

## 2020-02-07 PROCEDURE — 99213 OFFICE O/P EST LOW 20 MIN: CPT

## 2020-02-07 PROCEDURE — G0463: CPT

## 2020-02-07 NOTE — HISTORY OF PRESENT ILLNESS
[FreeTextEntry1] : The patient is a 70 year old female who presents for follow-up of a right lower leg ulcer and chronic weeping lymphedema. She is progressing well. Her vascular studies indicated good arterial inflow bilaterally.

## 2020-02-07 NOTE — PHYSICAL EXAM
[1+] : left 1+ [Ankle Swelling (On Exam)] : present [Varicose Veins Of Lower Extremities] : bilaterally [Ankle Swelling Bilaterally] : severe [] : bilaterally [Ankle Swelling On The Left] : moderate [Skin Ulcer] : ulcer [Alert] : alert [Oriented to Person] : oriented to person [Oriented to Place] : oriented to place [Oriented to Time] : oriented to time [Calm] : calm [4 x 4] : 4 x 4  [Abdominal Pad] : Abdominal Pad [JVD] : no jugular venous distention  [Purpura] : no purpura  [Abdomen Tenderness] : ~T ~M No abdominal tenderness [Petechiae] : no petechiae [Skin Induration] : no induration [de-identified] : WD/WN in no acute distress. [de-identified] : WNL [de-identified] : Ulcer posterior right calf clean and granulating well. Weeping lateral left lower leg, but less than before. [de-identified] : ELSYL [FreeTextEntry2] : 3.0 [FreeTextEntry1] : Right posterior Leg [FreeTextEntry3] : 2.3 [FreeTextEntry4] : 0.2 [de-identified] : Radha [de-identified] : Serosanguineous [de-identified] : Intact [FreeTextEntry7] : Left Leg- scattered weeping areas [de-identified] : Cleansed with Normal Saline\par  [de-identified] : Serosanguineous [de-identified] : Wound Veil, Silver Alginate [de-identified] : Scattered macerated areas [de-identified] : Cleansed with Normal Saline\par  [TWNoteComboBox4] : Small [TWNoteComboBox5] : No [de-identified] : No [de-identified] : None [de-identified] : None [de-identified] : No [de-identified] : 100% [de-identified] : Ace wraps [de-identified] : 3x Weekly [de-identified] : Moderate [de-identified] : No [de-identified] : No [de-identified] : None [de-identified] : None [de-identified] : Ace wraps [de-identified] : 3x Weekly

## 2020-02-07 NOTE — PLAN
[FreeTextEntry1] : Patient referred to lymphedema center\par Radha to ulcer right lower leg\par Wound veil and silver alginate to weeping site left lower leg\par Ace wraps\par Return one week

## 2020-02-07 NOTE — ASSESSMENT
[Patient] : Patient [Verbal] : Verbal [Verbalizes knowledge/Understanding] : Verbalizes knowledge/understanding [Good - alert, interested, motivated] : Good - alert, interested, motivated [Signs and symptoms of infection] : sign and symptoms of infection [Skin Care] : skin care [Dressing changes] : dressing changes [How and When to Call] : how and when to call [Patient responsibility to plan of care] : patient responsibility to plan of care [Stable] : stable [Ambulatory] : Ambulatory [Home] : Home [Not Applicable - Long Term Care/Home Health Agency] : Long Term Care/Home Health Agency: Not Applicable [FreeTextEntry2] : Restore Skin Integrity\par Infection Control\par Localized wound care\par  [] : No [FreeTextEntry4] : F/U to [facility] in 1 week\par MD recommended opt follow up with Lymphedema clinic, Information provided. Pt stated understanding.\par

## 2020-02-09 DIAGNOSIS — L97.811 NON-PRESSURE CHRONIC ULCER OF OTHER PART OF RIGHT LOWER LEG LIMITED TO BREAKDOWN OF SKIN: ICD-10-CM

## 2020-02-09 DIAGNOSIS — M54.16 RADICULOPATHY, LUMBAR REGION: ICD-10-CM

## 2020-02-09 DIAGNOSIS — Z90.710 ACQUIRED ABSENCE OF BOTH CERVIX AND UTERUS: ICD-10-CM

## 2020-02-09 DIAGNOSIS — E78.00 PURE HYPERCHOLESTEROLEMIA, UNSPECIFIED: ICD-10-CM

## 2020-02-09 DIAGNOSIS — L97.821 NON-PRESSURE CHRONIC ULCER OF OTHER PART OF LEFT LOWER LEG LIMITED TO BREAKDOWN OF SKIN: ICD-10-CM

## 2020-02-09 DIAGNOSIS — M17.12 UNILATERAL PRIMARY OSTEOARTHRITIS, LEFT KNEE: ICD-10-CM

## 2020-02-09 DIAGNOSIS — Z82.49 FAMILY HISTORY OF ISCHEMIC HEART DISEASE AND OTHER DISEASES OF THE CIRCULATORY SYSTEM: ICD-10-CM

## 2020-02-09 DIAGNOSIS — Z96.659 PRESENCE OF UNSPECIFIED ARTIFICIAL KNEE JOINT: ICD-10-CM

## 2020-02-09 DIAGNOSIS — G25.81 RESTLESS LEGS SYNDROME: ICD-10-CM

## 2020-02-09 DIAGNOSIS — I83.218 VARICOSE VEINS OF RIGHT LOWER EXTREMITY WITH BOTH ULCER OF OTHER PART OF LOWER EXTREMITY AND INFLAMMATION: ICD-10-CM

## 2020-02-09 DIAGNOSIS — I83.228 VARICOSE VEINS OF LEFT LOWER EXTREMITY WITH BOTH ULCER OF OTHER PART OF LOWER EXTREMITY AND INFLAMMATION: ICD-10-CM

## 2020-02-09 DIAGNOSIS — Z79.899 OTHER LONG TERM (CURRENT) DRUG THERAPY: ICD-10-CM

## 2020-02-09 DIAGNOSIS — Z72.0 TOBACCO USE: ICD-10-CM

## 2020-02-09 DIAGNOSIS — Z79.82 LONG TERM (CURRENT) USE OF ASPIRIN: ICD-10-CM

## 2020-02-09 DIAGNOSIS — M47.816 SPONDYLOSIS WITHOUT MYELOPATHY OR RADICULOPATHY, LUMBAR REGION: ICD-10-CM

## 2020-02-09 DIAGNOSIS — M16.9 OSTEOARTHRITIS OF HIP, UNSPECIFIED: ICD-10-CM

## 2020-02-17 LAB
CHOLEST SERPL-MCNC: 133 MG/DL
CHOLEST/HDLC SERPL: 3.2 RATIO
HDLC SERPL-MCNC: 41 MG/DL
LDLC SERPL CALC-MCNC: 73 MG/DL
TRIGL SERPL-MCNC: 95 MG/DL

## 2020-02-18 ENCOUNTER — OUTPATIENT (OUTPATIENT)
Dept: OUTPATIENT SERVICES | Facility: HOSPITAL | Age: 70
LOS: 1 days | Discharge: HOME | End: 2020-02-18
Payer: MEDICARE

## 2020-02-18 ENCOUNTER — APPOINTMENT (OUTPATIENT)
Dept: WOUND CARE | Facility: HOSPITAL | Age: 70
End: 2020-02-18
Payer: MEDICARE

## 2020-02-18 VITALS
BODY MASS INDEX: 32.18 KG/M2 | DIASTOLIC BLOOD PRESSURE: 67 MMHG | OXYGEN SATURATION: 100 % | TEMPERATURE: 97.4 F | SYSTOLIC BLOOD PRESSURE: 107 MMHG | WEIGHT: 205 LBS | RESPIRATION RATE: 18 BRPM | HEART RATE: 78 BPM | HEIGHT: 67 IN

## 2020-02-18 DIAGNOSIS — I83.218 VARICOSE VEINS OF RIGHT LOWER EXTREMITY WITH BOTH ULCER OF OTHER PART OF LOWER EXTREMITY AND INFLAMMATION: ICD-10-CM

## 2020-02-18 PROCEDURE — 99213 OFFICE O/P EST LOW 20 MIN: CPT

## 2020-02-18 PROCEDURE — G0463: CPT

## 2020-02-18 NOTE — HISTORY OF PRESENT ILLNESS
[FreeTextEntry1] : The patient is a 70 year old female who presents for follow-up of weeping edema both lower legs and an ulcer of her right lower leg. She did not keep her appointment at the lymphedema center last week because she could not find a parking space. She has not improved.

## 2020-02-18 NOTE — REVIEW OF SYSTEMS
[Eyesight Problems] : eyesight problems [Arthralgias] : arthralgias [Joint Pain] : joint pain [Joint Stiffness] : joint stiffness [Limb Swelling] : limb swelling [Limb Pain] : limb pain [Skin Wound] : skin wound [Easy Bruising] : a tendency for easy bruising [Negative] : Psychiatric [de-identified] : Chronic lower leg pain and tenderness

## 2020-02-18 NOTE — PHYSICAL EXAM
[4 x 4] : 4 x 4  [Abdominal Pad] : Abdominal Pad [Normal Rate and Rhythm] : normal rate and rhythm [2+] : left 2+ [0] : left 0 [Ankle Swelling (On Exam)] : present [Varicose Veins Of Lower Extremities] : bilaterally [Ankle Swelling On The Left] : moderate [] : present [Ankle Swelling Bilaterally] : severe [Skin Ulcer] : ulcer [Alert] : alert [Oriented to Person] : oriented to person [Oriented to Place] : oriented to place [Oriented to Time] : oriented to time [Calm] : calm [JVD] : no jugular venous distention  [Abdomen Tenderness] : ~T ~M No abdominal tenderness [Purpura] : no purpura  [Petechiae] : no petechiae [Skin Induration] : no induration [de-identified] : WDWN obese WF in NAD [de-identified] : Clear.  [de-identified] : Supple [de-identified] : Healed incisions overlying both knees [de-identified] : Ulcer right posterior calf unchanged in size with viable soft tissue at base. Heavy drainage from left lower leg with extensive skin maceration. [FreeTextEntry1] : Posterior Leg  [FreeTextEntry3] : 2.7 [FreeTextEntry4] : 0.2 [FreeTextEntry2] : 4.0 [de-identified] : with moderate weeping  [FreeTextEntry7] : Leg- scattered weeping areas [de-identified] : Cleansed with Normal saline\par  [de-identified] : with weeping  [de-identified] : Cleansed with Normal saline\par  [TWNoteComboBox4] : Moderate [TWNoteComboBox1] : Right [TWNoteComboBox6] : Venous [de-identified] : No [TWNoteComboBox5] : No [de-identified] : Macerated [de-identified] : None [de-identified] : 100% [de-identified] : None [de-identified] : No [de-identified] : Ace wraps [de-identified] : Large [TWNoteComboBox9] : Left [de-identified] : No [de-identified] : Macerated [de-identified] : Venous [de-identified] : No [de-identified] : None [de-identified] : None [de-identified] : 100% [de-identified] : No [de-identified] : Ace wraps

## 2020-02-18 NOTE — PLAN
[FreeTextEntry1] : Silver alginate to ulcer right lower leg. Silver alginate and ABD pads to left lower leg. Ace wraps to both lower legs. Elevate legs\par Keep next appointment at Lymphedema center\par Continue Diuretics\par Return one week

## 2020-02-18 NOTE — ASSESSMENT
[Verbal] : Verbal [Patient] : Patient [Verbalizes knowledge/Understanding] : Verbalizes knowledge/understanding [Fair - mild discomfort, physical impairment, low acceptance] : Fair - mild discomfort, physical impairment, low acceptance [Dressing changes] : dressing changes [Signs and symptoms of infection] : sign and symptoms of infection [How and When to Call] : how and when to call [Compression Therapy] : compression therapy [Patient responsibility to plan of care] : patient responsibility to plan of care [] : Yes [Stable] : stable [Home] : Home [Ambulatory] : Ambulatory [Not Applicable - Long Term Care/Home Health Agency] : Long Term Care/Home Health Agency: Not Applicable [FreeTextEntry4] : F/U  [FreeTextEntry2] : Infection Prevention\par Edema Control\par F/U

## 2020-02-28 ENCOUNTER — OUTPATIENT (OUTPATIENT)
Dept: OUTPATIENT SERVICES | Facility: HOSPITAL | Age: 70
LOS: 1 days | Discharge: HOME | End: 2020-02-28
Payer: MEDICARE

## 2020-02-28 ENCOUNTER — APPOINTMENT (OUTPATIENT)
Dept: WOUND CARE | Facility: HOSPITAL | Age: 70
End: 2020-02-28
Payer: MEDICARE

## 2020-02-28 VITALS
HEIGHT: 67 IN | WEIGHT: 205 LBS | SYSTOLIC BLOOD PRESSURE: 139 MMHG | OXYGEN SATURATION: 8 % | TEMPERATURE: 97.6 F | RESPIRATION RATE: 20 BRPM | DIASTOLIC BLOOD PRESSURE: 64 MMHG | BODY MASS INDEX: 32.18 KG/M2 | HEART RATE: 83 BPM

## 2020-02-28 DIAGNOSIS — E78.00 PURE HYPERCHOLESTEROLEMIA, UNSPECIFIED: ICD-10-CM

## 2020-02-28 DIAGNOSIS — Z79.82 LONG TERM (CURRENT) USE OF ASPIRIN: ICD-10-CM

## 2020-02-28 DIAGNOSIS — M16.9 OSTEOARTHRITIS OF HIP, UNSPECIFIED: ICD-10-CM

## 2020-02-28 DIAGNOSIS — I83.228 VARICOSE VEINS OF LEFT LOWER EXTREMITY WITH BOTH ULCER OF OTHER PART OF LOWER EXTREMITY AND INFLAMMATION: ICD-10-CM

## 2020-02-28 DIAGNOSIS — M47.816 SPONDYLOSIS WITHOUT MYELOPATHY OR RADICULOPATHY, LUMBAR REGION: ICD-10-CM

## 2020-02-28 DIAGNOSIS — M17.12 UNILATERAL PRIMARY OSTEOARTHRITIS, LEFT KNEE: ICD-10-CM

## 2020-02-28 DIAGNOSIS — M54.16 RADICULOPATHY, LUMBAR REGION: ICD-10-CM

## 2020-02-28 DIAGNOSIS — L97.821 NON-PRESSURE CHRONIC ULCER OF OTHER PART OF LEFT LOWER LEG LIMITED TO BREAKDOWN OF SKIN: ICD-10-CM

## 2020-02-28 DIAGNOSIS — Z72.0 TOBACCO USE: ICD-10-CM

## 2020-02-28 DIAGNOSIS — G25.81 RESTLESS LEGS SYNDROME: ICD-10-CM

## 2020-02-28 DIAGNOSIS — Z82.49 FAMILY HISTORY OF ISCHEMIC HEART DISEASE AND OTHER DISEASES OF THE CIRCULATORY SYSTEM: ICD-10-CM

## 2020-02-28 DIAGNOSIS — I83.218 VARICOSE VEINS OF RIGHT LOWER EXTREMITY WITH BOTH ULCER OF OTHER PART OF LOWER EXTREMITY AND INFLAMMATION: ICD-10-CM

## 2020-02-28 DIAGNOSIS — L97.811 NON-PRESSURE CHRONIC ULCER OF OTHER PART OF RIGHT LOWER LEG LIMITED TO BREAKDOWN OF SKIN: ICD-10-CM

## 2020-02-28 DIAGNOSIS — Z79.899 OTHER LONG TERM (CURRENT) DRUG THERAPY: ICD-10-CM

## 2020-02-28 DIAGNOSIS — Z96.659 PRESENCE OF UNSPECIFIED ARTIFICIAL KNEE JOINT: ICD-10-CM

## 2020-02-28 DIAGNOSIS — Z90.710 ACQUIRED ABSENCE OF BOTH CERVIX AND UTERUS: ICD-10-CM

## 2020-02-28 PROCEDURE — 99213 OFFICE O/P EST LOW 20 MIN: CPT

## 2020-02-28 PROCEDURE — G0463: CPT

## 2020-02-28 NOTE — PLAN
[FreeTextEntry1] : Patient given a written note for lymphedema center with diagnosis codes.\par Silver alginate, wound veil, Ace wraps, leg elevation\par Return one week.

## 2020-02-28 NOTE — ADDENDUM
[FreeTextEntry1] : As an addendum, I called the lymphedema center and was told that the patient has an appointment for 3/5 at 1:00pm. I have supplied a note with diagnosis codes.

## 2020-02-28 NOTE — PHYSICAL EXAM
[4 x 4] : 4 x 4  [Abdominal Pad] : Abdominal Pad [Ankle Swelling (On Exam)] : present [1+] : left 1+ [Ankle Swelling Bilaterally] : severe [Varicose Veins Of Lower Extremities] : bilaterally [] : bilaterally [Ankle Swelling On The Left] : moderate [Skin Ulcer] : ulcer [Alert] : alert [Oriented to Person] : oriented to person [Oriented to Place] : oriented to place [Oriented to Time] : oriented to time [Calm] : calm [Abdomen Tenderness] : ~T ~M No abdominal tenderness [Purpura] : no purpura  [JVD] : no jugular venous distention  [Skin Induration] : no induration [Petechiae] : no petechiae [de-identified] : WNL [de-identified] : WD/WN in no acute distress. [de-identified] : Ulcer right posterior calf clean with more granulation tissue and new skin at margin. Both legs are edematous and weeping, left > right, with maceration of skin.. [de-identified] : ELSYL [FreeTextEntry1] : Right Posterior Leg [FreeTextEntry2] : 3.6 [FreeTextEntry3] : 2.1 [FreeTextEntry4] : 0.3 [de-identified] : Serosanguineous [de-identified] : Silver Alginate [de-identified] : Cleansed with Normal Saline\par  [FreeTextEntry7] : Left Leg- scattered weeping areas [de-identified] : Dry Protective Dressing [de-identified] : Serosanguineous [de-identified] : Cleansed with Normal Saline\par  [TWNoteComboBox4] : Large [de-identified] : No [TWNoteComboBox5] : No [de-identified] : Macerated [de-identified] : None [de-identified] : None [de-identified] : 100% [de-identified] : Ace wraps [de-identified] : No [de-identified] : Daily [de-identified] : Large [de-identified] : No [de-identified] : Macerated [de-identified] : No [de-identified] : Mild [de-identified] : None [de-identified] : Ace wraps [de-identified] : Daily

## 2020-02-28 NOTE — HISTORY OF PRESENT ILLNESS
[FreeTextEntry1] : The patient is a 70 year old female who presents for follow-up of a venous stasis ulcer of her right lower leg as well as weeping edema of both lower legs. She was denied an appointment at lymphedema center because she was told that she needs a prescription.

## 2020-02-28 NOTE — ASSESSMENT
[Verbal] : Verbal [Patient] : Patient [Good - alert, interested, motivated] : Good - alert, interested, motivated [Dressing changes] : dressing changes [Verbalizes knowledge/Understanding] : Verbalizes knowledge/understanding [Signs and symptoms of infection] : sign and symptoms of infection [How and When to Call] : how and when to call [Compression Therapy] : compression therapy [Patient responsibility to plan of care] : patient responsibility to plan of care [Stable] : stable [Home] : Home [Walker] : Walker [Faxed - Long Term Care/Home Health Agency] : Long Term Care/Home Health Agency: Faxed [FreeTextEntry2] : Restore Skin Integrity\par Infection Control\par Localized wound care\par Compression Therapy\par Develop Realistic expectations and measurable treatments nursing POC to reduce, eliminate or develop acceptable pain limit tolerance\par  [] : No [FreeTextEntry4] : Pt was referred to lymphedema clinic, pt stated she has appointment on Thursday 3/5/2020, A referral letter was provided to patient per MD.\par Pt states she takes oxycodone at night to help relieve pain which? does help but during the day time she tolerates the pain. Pt states she elevates and uses compression daily.\par F/U to [facility] in 1 week [FreeTextEntry1] : [facility]

## 2020-03-02 DIAGNOSIS — M54.16 RADICULOPATHY, LUMBAR REGION: ICD-10-CM

## 2020-03-02 DIAGNOSIS — Z82.49 FAMILY HISTORY OF ISCHEMIC HEART DISEASE AND OTHER DISEASES OF THE CIRCULATORY SYSTEM: ICD-10-CM

## 2020-03-02 DIAGNOSIS — Z96.659 PRESENCE OF UNSPECIFIED ARTIFICIAL KNEE JOINT: ICD-10-CM

## 2020-03-02 DIAGNOSIS — L97.821 NON-PRESSURE CHRONIC ULCER OF OTHER PART OF LEFT LOWER LEG LIMITED TO BREAKDOWN OF SKIN: ICD-10-CM

## 2020-03-02 DIAGNOSIS — M16.9 OSTEOARTHRITIS OF HIP, UNSPECIFIED: ICD-10-CM

## 2020-03-02 DIAGNOSIS — Z79.899 OTHER LONG TERM (CURRENT) DRUG THERAPY: ICD-10-CM

## 2020-03-02 DIAGNOSIS — G25.81 RESTLESS LEGS SYNDROME: ICD-10-CM

## 2020-03-02 DIAGNOSIS — L97.811 NON-PRESSURE CHRONIC ULCER OF OTHER PART OF RIGHT LOWER LEG LIMITED TO BREAKDOWN OF SKIN: ICD-10-CM

## 2020-03-02 DIAGNOSIS — E78.00 PURE HYPERCHOLESTEROLEMIA, UNSPECIFIED: ICD-10-CM

## 2020-03-02 DIAGNOSIS — Z79.82 LONG TERM (CURRENT) USE OF ASPIRIN: ICD-10-CM

## 2020-03-02 DIAGNOSIS — M47.816 SPONDYLOSIS WITHOUT MYELOPATHY OR RADICULOPATHY, LUMBAR REGION: ICD-10-CM

## 2020-03-02 DIAGNOSIS — I83.228 VARICOSE VEINS OF LEFT LOWER EXTREMITY WITH BOTH ULCER OF OTHER PART OF LOWER EXTREMITY AND INFLAMMATION: ICD-10-CM

## 2020-03-02 DIAGNOSIS — I83.218 VARICOSE VEINS OF RIGHT LOWER EXTREMITY WITH BOTH ULCER OF OTHER PART OF LOWER EXTREMITY AND INFLAMMATION: ICD-10-CM

## 2020-03-02 DIAGNOSIS — Z90.710 ACQUIRED ABSENCE OF BOTH CERVIX AND UTERUS: ICD-10-CM

## 2020-03-02 DIAGNOSIS — M17.12 UNILATERAL PRIMARY OSTEOARTHRITIS, LEFT KNEE: ICD-10-CM

## 2020-03-02 DIAGNOSIS — Z72.0 TOBACCO USE: ICD-10-CM

## 2020-03-06 ENCOUNTER — OUTPATIENT (OUTPATIENT)
Dept: OUTPATIENT SERVICES | Facility: HOSPITAL | Age: 70
LOS: 1 days | Discharge: HOME | End: 2020-03-06
Payer: MEDICARE

## 2020-03-06 ENCOUNTER — APPOINTMENT (OUTPATIENT)
Dept: OBGYN | Facility: HOSPITAL | Age: 70
End: 2020-03-06
Payer: MEDICARE

## 2020-03-06 VITALS
DIASTOLIC BLOOD PRESSURE: 73 MMHG | TEMPERATURE: 97.1 F | OXYGEN SATURATION: 100 % | RESPIRATION RATE: 21 BRPM | SYSTOLIC BLOOD PRESSURE: 119 MMHG | HEART RATE: 86 BPM

## 2020-03-06 DIAGNOSIS — I83.218 VARICOSE VEINS OF RIGHT LOWER EXTREMITY WITH BOTH ULCER OF OTHER PART OF LOWER EXTREMITY AND INFLAMMATION: ICD-10-CM

## 2020-03-06 PROCEDURE — 99213 OFFICE O/P EST LOW 20 MIN: CPT

## 2020-03-06 PROCEDURE — 29581 APPL MULTLAYER CMPRN SYS LEG: CPT | Mod: 50

## 2020-03-06 NOTE — REVIEW OF SYSTEMS
[Eyesight Problems] : eyesight problems [Arthralgias] : arthralgias [Joint Pain] : joint pain [Joint Stiffness] : joint stiffness [Limb Pain] : limb pain [Limb Swelling] : limb swelling [Skin Wound] : skin wound [Easy Bruising] : a tendency for easy bruising [Negative] : Endocrine [de-identified] : Chronic lower leg pain and tenderness

## 2020-03-06 NOTE — PHYSICAL EXAM
[4 x 4] : 4 x 4  [Abdominal Pad] : Abdominal Pad [JVD] : no jugular venous distention  [Normal Thyroid] : the thyroid was normal [Normal Breath Sounds] : Normal breath sounds [Normal Heart Sounds] : normal heart sounds [Normal Rate and Rhythm] : normal rate and rhythm [] : bilaterally [Ankle Swelling On The Left] : moderate [Abdomen Masses] : No abdominal massess [Abdomen Tenderness] : ~T ~M No abdominal tenderness [Tender] : nontender [Enlarged] : not enlarged [Alert] : not alert [Oriented to Person] : oriented to person [Oriented to Place] : oriented to place [Oriented to Time] : oriented to time [Calm] : calm [de-identified] : elderly WF, NAD, WD,Wn, alert, Ox 3. [de-identified] : No neurovascular deficits noted.\par  [FreeTextEntry1] : Right Posterior Leg [FreeTextEntry2] : 3.1 [FreeTextEntry3] : 1 [FreeTextEntry4] : 0.2 [de-identified] : Serosanguineous [de-identified] : Coban Expressed comfort post Coban application. [de-identified] : Silver Alginate [de-identified] : Cleansed with Normal Saline\par Kerlix \par  [de-identified] : No neurovascular deficits noted.\par  [FreeTextEntry7] : Left Leg- scattered weeping areas [de-identified] : Serosanguineous [de-identified] : Expressed comfort post Coban application.  [de-identified] : Silver alginate [de-identified] : Cleansed with Normal Saline\par Kerlix  [TWNoteComboBox4] : Moderate [TWNoteComboBox5] : No [de-identified] : Macerated [de-identified] : Mild [de-identified] : None [de-identified] : 100% [de-identified] : No [de-identified] : Multilayer other compression wrap [de-identified] : Weekly [de-identified] : Compression [de-identified] : Moderate [de-identified] : Macerated [de-identified] : Mild [de-identified] : 100% [de-identified] : None [de-identified] : No [de-identified] : Multilayer other compression wrap [de-identified] : Weekly [de-identified] : Compression

## 2020-03-06 NOTE — HISTORY OF PRESENT ILLNESS
[FreeTextEntry1] : 71 yo WF, here for f/u for  chronic bilateral VSU. The VSU continue to ooze and painful espec. in the LLE.\par Recent ABIs showed RLE 1.2 and LLE 1.0. Being followed by pain Rx center and has rx for oxycodone.

## 2020-03-06 NOTE — ASSESSMENT
[Verbal] : Verbal [Written] : Written [Patient] : Patient [Home Health Provider] : Home Health Provider [Good - alert, interested, motivated] : Good - alert, interested, motivated [Verbalizes knowledge/Understanding] : Verbalizes knowledge/understanding [Dressing changes] : dressing changes [Skin Care] : skin care [Signs and symptoms of infection] : sign and symptoms of infection [How and When to Call] : how and when to call [Pain Management] : pain management [Compression Therapy] : compression therapy [Home Health] : home health [Patient responsibility to plan of care] : patient responsibility to plan of care [Stable] : stable [Home] : Home [Walker] : Walker [Faxed - Long Term Care/Home Health Agency] : Long Term Care/Home Health Agency: Faxed [Demo] : Demo [] : Yes [FreeTextEntry2] : Restore Skin Integrity\par Infection Control\par Localized wound care\par Compression Therapy\par Acceptable pain tolerance levels deemed to be 4/10.\par \par  [FreeTextEntry4] : Patient seeing pain management monthly. Educated to try and dangle her feet at times to relieve pain and to take pain medication before wound care. \par Suggested patient contact her cardiologist to possible change dieretic regiment.\par Patient went to lymphedema center and she stated they couldn't help her until drainage subsides. \par Follow up 3/10/20 for dressing change. \par Home care on hold [FreeTextEntry1] : [facility]

## 2020-03-06 NOTE — VITALS
[Pain related to present condition?] : The patient's  pain is related to present condition. [Burning] : burning [Tender] : tender [Occasional] : occasional [] : No [de-identified] : 7/10 [FreeTextEntry3] : Bilateral legs weeping areas  [FreeTextEntry1] : Oxycodone. Lowering legs.  [FreeTextEntry2] : Long periods of elevation and direct contact  [FreeTextEntry4] : Legs lowered during treatment and while waiting for the doctor.

## 2020-03-07 ENCOUNTER — OUTPATIENT (OUTPATIENT)
Dept: OUTPATIENT SERVICES | Facility: HOSPITAL | Age: 70
LOS: 1 days | Discharge: HOME | End: 2020-03-07
Payer: MEDICARE

## 2020-03-07 ENCOUNTER — APPOINTMENT (OUTPATIENT)
Dept: OBGYN | Facility: HOSPITAL | Age: 70
End: 2020-03-07
Payer: MEDICARE

## 2020-03-07 VITALS
BODY MASS INDEX: 32.18 KG/M2 | DIASTOLIC BLOOD PRESSURE: 67 MMHG | SYSTOLIC BLOOD PRESSURE: 125 MMHG | OXYGEN SATURATION: 100 % | HEIGHT: 67 IN | RESPIRATION RATE: 18 BRPM | WEIGHT: 205 LBS | HEART RATE: 88 BPM | TEMPERATURE: 97 F

## 2020-03-07 DIAGNOSIS — E11.621 TYPE 2 DIABETES MELLITUS WITH FOOT ULCER: ICD-10-CM

## 2020-03-07 DIAGNOSIS — I83.218 VARICOSE VEINS OF RIGHT LOWER EXTREMITY WITH BOTH ULCER OF OTHER PART OF LOWER EXTREMITY AND INFLAMMATION: ICD-10-CM

## 2020-03-07 PROCEDURE — 29581 APPL MULTLAYER CMPRN SYS LEG: CPT | Mod: 50

## 2020-03-07 PROCEDURE — G0463: CPT | Mod: 25

## 2020-03-07 PROCEDURE — 99214 OFFICE O/P EST MOD 30 MIN: CPT | Mod: 25

## 2020-03-07 RX ORDER — SULFAMETHOXAZOLE AND TRIMETHOPRIM 800; 160 MG/1; MG/1
800-160 TABLET ORAL TWICE DAILY
Qty: 20 | Refills: 0 | Status: COMPLETED | COMMUNITY
Start: 2020-03-07 | End: 2020-03-17

## 2020-03-08 DIAGNOSIS — Z79.899 OTHER LONG TERM (CURRENT) DRUG THERAPY: ICD-10-CM

## 2020-03-08 DIAGNOSIS — E78.00 PURE HYPERCHOLESTEROLEMIA, UNSPECIFIED: ICD-10-CM

## 2020-03-08 DIAGNOSIS — M54.16 RADICULOPATHY, LUMBAR REGION: ICD-10-CM

## 2020-03-08 DIAGNOSIS — Z90.710 ACQUIRED ABSENCE OF BOTH CERVIX AND UTERUS: ICD-10-CM

## 2020-03-08 DIAGNOSIS — G25.81 RESTLESS LEGS SYNDROME: ICD-10-CM

## 2020-03-08 DIAGNOSIS — Z82.49 FAMILY HISTORY OF ISCHEMIC HEART DISEASE AND OTHER DISEASES OF THE CIRCULATORY SYSTEM: ICD-10-CM

## 2020-03-08 DIAGNOSIS — L97.811 NON-PRESSURE CHRONIC ULCER OF OTHER PART OF RIGHT LOWER LEG LIMITED TO BREAKDOWN OF SKIN: ICD-10-CM

## 2020-03-08 DIAGNOSIS — Z96.659 PRESENCE OF UNSPECIFIED ARTIFICIAL KNEE JOINT: ICD-10-CM

## 2020-03-08 DIAGNOSIS — I83.218 VARICOSE VEINS OF RIGHT LOWER EXTREMITY WITH BOTH ULCER OF OTHER PART OF LOWER EXTREMITY AND INFLAMMATION: ICD-10-CM

## 2020-03-08 DIAGNOSIS — Z79.82 LONG TERM (CURRENT) USE OF ASPIRIN: ICD-10-CM

## 2020-03-08 DIAGNOSIS — M16.9 OSTEOARTHRITIS OF HIP, UNSPECIFIED: ICD-10-CM

## 2020-03-08 DIAGNOSIS — Z72.0 TOBACCO USE: ICD-10-CM

## 2020-03-08 DIAGNOSIS — M47.816 SPONDYLOSIS WITHOUT MYELOPATHY OR RADICULOPATHY, LUMBAR REGION: ICD-10-CM

## 2020-03-08 DIAGNOSIS — I83.228 VARICOSE VEINS OF LEFT LOWER EXTREMITY WITH BOTH ULCER OF OTHER PART OF LOWER EXTREMITY AND INFLAMMATION: ICD-10-CM

## 2020-03-08 DIAGNOSIS — I83.893 VARICOSE VEINS OF BILATERAL LOWER EXTREMITIES WITH OTHER COMPLICATIONS: ICD-10-CM

## 2020-03-08 DIAGNOSIS — M17.12 UNILATERAL PRIMARY OSTEOARTHRITIS, LEFT KNEE: ICD-10-CM

## 2020-03-08 DIAGNOSIS — L97.821 NON-PRESSURE CHRONIC ULCER OF OTHER PART OF LEFT LOWER LEG LIMITED TO BREAKDOWN OF SKIN: ICD-10-CM

## 2020-03-09 ENCOUNTER — OUTPATIENT (OUTPATIENT)
Dept: OUTPATIENT SERVICES | Facility: HOSPITAL | Age: 70
LOS: 1 days | Discharge: HOME | End: 2020-03-09
Payer: MEDICARE

## 2020-03-09 ENCOUNTER — APPOINTMENT (OUTPATIENT)
Dept: PLASTIC SURGERY | Facility: HOSPITAL | Age: 70
End: 2020-03-09
Payer: MEDICARE

## 2020-03-09 VITALS
HEART RATE: 97 BPM | RESPIRATION RATE: 20 BRPM | BODY MASS INDEX: 32.18 KG/M2 | OXYGEN SATURATION: 96 % | DIASTOLIC BLOOD PRESSURE: 66 MMHG | WEIGHT: 205 LBS | TEMPERATURE: 97.6 F | SYSTOLIC BLOOD PRESSURE: 122 MMHG | HEIGHT: 67 IN

## 2020-03-09 DIAGNOSIS — Z82.49 FAMILY HISTORY OF ISCHEMIC HEART DISEASE AND OTHER DISEASES OF THE CIRCULATORY SYSTEM: ICD-10-CM

## 2020-03-09 DIAGNOSIS — Z90.710 ACQUIRED ABSENCE OF BOTH CERVIX AND UTERUS: ICD-10-CM

## 2020-03-09 DIAGNOSIS — Z79.82 LONG TERM (CURRENT) USE OF ASPIRIN: ICD-10-CM

## 2020-03-09 DIAGNOSIS — M16.9 OSTEOARTHRITIS OF HIP, UNSPECIFIED: ICD-10-CM

## 2020-03-09 DIAGNOSIS — Z79.899 OTHER LONG TERM (CURRENT) DRUG THERAPY: ICD-10-CM

## 2020-03-09 DIAGNOSIS — Z96.659 PRESENCE OF UNSPECIFIED ARTIFICIAL KNEE JOINT: ICD-10-CM

## 2020-03-09 DIAGNOSIS — I83.218 VARICOSE VEINS OF RIGHT LOWER EXTREMITY WITH BOTH ULCER OF OTHER PART OF LOWER EXTREMITY AND INFLAMMATION: ICD-10-CM

## 2020-03-09 DIAGNOSIS — I83.228 VARICOSE VEINS OF LEFT LOWER EXTREMITY WITH BOTH ULCER OF OTHER PART OF LOWER EXTREMITY AND INFLAMMATION: ICD-10-CM

## 2020-03-09 DIAGNOSIS — L97.801 NON-PRESSURE CHRONIC ULCER OF OTHER PART OF UNSPECIFIED LOWER LEG LIMITED TO BREAKDOWN OF SKIN: ICD-10-CM

## 2020-03-09 DIAGNOSIS — L97.821 NON-PRESSURE CHRONIC ULCER OF OTHER PART OF LEFT LOWER LEG LIMITED TO BREAKDOWN OF SKIN: ICD-10-CM

## 2020-03-09 DIAGNOSIS — E78.00 PURE HYPERCHOLESTEROLEMIA, UNSPECIFIED: ICD-10-CM

## 2020-03-09 DIAGNOSIS — L97.811 NON-PRESSURE CHRONIC ULCER OF OTHER PART OF RIGHT LOWER LEG LIMITED TO BREAKDOWN OF SKIN: ICD-10-CM

## 2020-03-09 DIAGNOSIS — Z72.0 TOBACCO USE: ICD-10-CM

## 2020-03-09 DIAGNOSIS — M54.16 RADICULOPATHY, LUMBAR REGION: ICD-10-CM

## 2020-03-09 DIAGNOSIS — M17.12 UNILATERAL PRIMARY OSTEOARTHRITIS, LEFT KNEE: ICD-10-CM

## 2020-03-09 DIAGNOSIS — G25.81 RESTLESS LEGS SYNDROME: ICD-10-CM

## 2020-03-09 DIAGNOSIS — M47.816 SPONDYLOSIS WITHOUT MYELOPATHY OR RADICULOPATHY, LUMBAR REGION: ICD-10-CM

## 2020-03-09 PROCEDURE — ZZZZZ: CPT

## 2020-03-09 PROCEDURE — G0463: CPT

## 2020-03-10 ENCOUNTER — APPOINTMENT (OUTPATIENT)
Dept: WOUND CARE | Facility: HOSPITAL | Age: 70
End: 2020-03-10

## 2020-03-10 NOTE — PHYSICAL EXAM
[Normal Thyroid] : the thyroid was normal [Normal Breath Sounds] : Normal breath sounds [Normal Heart Sounds] : normal heart sounds [Normal Rate and Rhythm] : normal rate and rhythm [Ankle Swelling (On Exam)] : present [Ankle Swelling Bilaterally] : bilaterally  [] : bilaterally [Ankle Swelling On The Left] : moderate [Alert] : alert [Oriented to Person] : oriented to person [Oriented to Place] : oriented to place [Oriented to Time] : oriented to time [Calm] : calm [4 x 4] : 4 x 4  [Abdominal Pad] : Abdominal Pad [JVD] : no jugular venous distention  [Abdomen Masses] : No abdominal massess [Abdomen Tenderness] : ~T ~M No abdominal tenderness [Tender] : nontender [Enlarged] : not enlarged [de-identified] : elderly adult WF, NAD, WD, WN, alert, Ox 3. afebile [de-identified] : \par  [FreeTextEntry1] : Posterior Leg [FreeTextEntry2] : 3.1 [FreeTextEntry3] : 1.8 [FreeTextEntry4] : 0.2 [de-identified] : Serosanguineous [de-identified] : Coban Expressed comfort post Coban application. [de-identified] : NSC,Silver Alginate,DD  [de-identified] : \par  [FreeTextEntry7] : Left Leg- scattered weeping areas [de-identified] : Serosanguineous/ Green drainage  [de-identified] : Expressed comfort post Coban application.  [de-identified] : NSC,Silver alginate,DD [TWNoteComboBox1] : Right [TWNoteComboBox4] : None [TWNoteComboBox5] : No [de-identified] : Macerated [de-identified] : Mild [de-identified] : None [de-identified] : 100% [de-identified] : No [de-identified] : Multilayer other compression wrap [de-identified] : 2x Weekly [de-identified] : Secondary Dressing [TWNoteComboBox9] : Left [de-identified] : Large [de-identified] : Macerated [de-identified] : Mild [de-identified] : None [de-identified] : 100% [de-identified] : No [de-identified] : Multilayer other compression wrap [de-identified] : 3x Weekly [de-identified] : Secondary Dressing

## 2020-03-10 NOTE — ASSESSMENT
[Verbal] : Verbal [Written] : Written [Demo] : Demo [Patient] : Patient [Home Health Provider] : Home Health Provider [Good - alert, interested, motivated] : Good - alert, interested, motivated [Verbalizes knowledge/Understanding] : Verbalizes knowledge/understanding [Dressing changes] : dressing changes [Skin Care] : skin care [Signs and symptoms of infection] : sign and symptoms of infection [How and When to Call] : how and when to call [Pain Management] : pain management [Compression Therapy] : compression therapy [Home Health] : home health [Patient responsibility to plan of care] : patient responsibility to plan of care [] : Yes [Stable] : stable [Home] : Home [Walker] : Walker [Faxed - Long Term Care/Home Health Agency] : Long Term Care/Home Health Agency: Faxed [FreeTextEntry2] : Infection Prevention\par Weight management/ nutrition \par Compression Therapy\par Achieving pain tolerance levels within the Pts limits of 4/10.\par Focus on maintaining pain level within acceptable limits. \par \par  [FreeTextEntry4] : Pt to follow up Monday 3/9 for dressing change due to increased drainage \par MD escribed Bactrim DS \par  [FreeTextEntry1] : [facility] ON [facility]

## 2020-03-10 NOTE — REVIEW OF SYSTEMS
[Eyesight Problems] : eyesight problems [Arthralgias] : arthralgias [Joint Pain] : joint pain [Joint Stiffness] : joint stiffness [Limb Pain] : limb pain [Limb Swelling] : limb swelling [Skin Wound] : skin wound [Easy Bruising] : a tendency for easy bruising [Negative] : Endocrine [de-identified] : Chronic lower leg pain and tenderness

## 2020-03-10 NOTE — HISTORY OF PRESENT ILLNESS
[FreeTextEntry1] : 71 yo WF, called earlier today stating that her coban wrap was soaking through. Bilateral coban wraps had just been put on yesterday, but pt called and stated that the wetness was dripping down her leg/foot on the left side. Told to come in immediately today. Recent ABIs were 1.0 to 1.2.Some mild erythema seen in the LLE.

## 2020-03-11 ENCOUNTER — APPOINTMENT (OUTPATIENT)
Dept: WOUND CARE | Facility: HOSPITAL | Age: 70
End: 2020-03-11
Payer: MEDICARE

## 2020-03-11 ENCOUNTER — OUTPATIENT (OUTPATIENT)
Dept: OUTPATIENT SERVICES | Facility: HOSPITAL | Age: 70
LOS: 1 days | Discharge: HOME | End: 2020-03-11
Payer: MEDICARE

## 2020-03-11 VITALS
OXYGEN SATURATION: 97 % | WEIGHT: 205 LBS | DIASTOLIC BLOOD PRESSURE: 58 MMHG | TEMPERATURE: 97.3 F | BODY MASS INDEX: 32.18 KG/M2 | SYSTOLIC BLOOD PRESSURE: 128 MMHG | HEART RATE: 78 BPM | HEIGHT: 67 IN | RESPIRATION RATE: 20 BRPM

## 2020-03-11 DIAGNOSIS — I83.218 VARICOSE VEINS OF RIGHT LOWER EXTREMITY WITH BOTH ULCER OF OTHER PART OF LOWER EXTREMITY AND INFLAMMATION: ICD-10-CM

## 2020-03-11 PROCEDURE — G0463: CPT

## 2020-03-11 PROCEDURE — 99213 OFFICE O/P EST LOW 20 MIN: CPT

## 2020-03-12 NOTE — HISTORY OF PRESENT ILLNESS
[FreeTextEntry1] : The patient is a 70 year old female who presents for follow-up of venous stasis ulcers of both legs with chronic lymphedema and heavy weeping.

## 2020-03-12 NOTE — ASSESSMENT
[Verbal] : Verbal [Patient] : Patient [Good - alert, interested, motivated] : Good - alert, interested, motivated [Verbalizes knowledge/Understanding] : Verbalizes knowledge/understanding [Dressing changes] : dressing changes [Skin Care] : skin care [Signs and symptoms of infection] : sign and symptoms of infection [How and When to Call] : how and when to call [Compression Therapy] : compression therapy [Patient responsibility to plan of care] : patient responsibility to plan of care [Stable] : stable [Home] : Home [Ambulatory] : Ambulatory [Not Applicable - Long Term Care/Home Health Agency] : Long Term Care/Home Health Agency: Not Applicable [] : No [FreeTextEntry2] : Restore Skin Integrity\par Infection Control\par Localized wound care\par Compression Therapy\par Develop Realistic expectations and measurable treatments nursing POC to reduce, eliminate or develop acceptable pain limit tolerance [FreeTextEntry3] : unchanged [FreeTextEntry4] : F/U to [facility] Friday for Dressing Change, 1 week for assessment

## 2020-03-12 NOTE — PHYSICAL EXAM
[4 x 4] : 4 x 4  [Abdominal Pad] : Abdominal Pad [Normal Thyroid] : the thyroid was normal [Normal Rate and Rhythm] : normal rate and rhythm [] : bilaterally [Ankle Swelling On The Left] : moderate [Oriented to Person] : oriented to person [Oriented to Place] : oriented to place [Oriented to Time] : oriented to time [Calm] : calm [JVD] : no jugular venous distention  [Abdomen Masses] : No abdominal massess [Abdomen Tenderness] : ~T ~M No abdominal tenderness [Tender] : nontender [Enlarged] : not enlarged [Alert] : not alert [de-identified] : elderly WF, NAD, WD,WN, alert, Ox 3. [de-identified] : Edema both lower legs, left > right. Heavy serous drainage present with maceration  and loss of epidermis both lower legs. Dermis exposed at each site. [FreeTextEntry1] : Right Posterior Leg [FreeTextEntry2] : 3.1 [FreeTextEntry3] : 1.8 [FreeTextEntry4] : 0.2 [de-identified] : Serosanguineous [de-identified] : Silver Alginate [de-identified] : Cleansed with Normal Saline\par  [FreeTextEntry7] : Left Leg [de-identified] : Serosanguineous [de-identified] : Silver Alginate [de-identified] : Cleansed with Normal Saline\par  [TWNoteComboBox4] : Large [TWNoteComboBox5] : No [de-identified] : No [de-identified] : Macerated [de-identified] : None [de-identified] : None [de-identified] : 100% [de-identified] : No [de-identified] : Ace wraps [de-identified] : 2x Weekly [de-identified] : Large [de-identified] : No [de-identified] : No [de-identified] : Macerated [de-identified] : None [de-identified] : None [de-identified] : 100% [de-identified] : No [de-identified] : Ace wraps [de-identified] : 2x Weekly

## 2020-03-12 NOTE — REVIEW OF SYSTEMS
[Eyesight Problems] : eyesight problems [Arthralgias] : arthralgias [Joint Pain] : joint pain [Joint Stiffness] : joint stiffness [Limb Pain] : limb pain [Limb Swelling] : limb swelling [Skin Wound] : skin wound [Easy Bruising] : a tendency for easy bruising [Negative] : Endocrine [de-identified] : Chronic lower leg pain and tenderness

## 2020-03-12 NOTE — VITALS
[Pain related to present condition?] : The patient's  pain is related to present condition. [] : No [FreeTextEntry3] : Bilateral Legs [FreeTextEntry1] : Not Touching [FreeTextEntry2] : Touching

## 2020-03-12 NOTE — PLAN
[FreeTextEntry1] : Patient to speak to her PCP about improving her diuretic regimen.\par Elevate legs\par Silver alginate, ABD pads, bulky dressings, Ace wraps to both lower legs\par Change dressings at least daily\par Return for dressing change in two days\par

## 2020-03-13 ENCOUNTER — APPOINTMENT (OUTPATIENT)
Dept: WOUND CARE | Facility: HOSPITAL | Age: 70
End: 2020-03-13

## 2020-03-13 ENCOUNTER — OUTPATIENT (OUTPATIENT)
Dept: OUTPATIENT SERVICES | Facility: HOSPITAL | Age: 70
LOS: 1 days | Discharge: HOME | End: 2020-03-13
Payer: MEDICARE

## 2020-03-13 VITALS
SYSTOLIC BLOOD PRESSURE: 127 MMHG | RESPIRATION RATE: 20 BRPM | HEART RATE: 84 BPM | WEIGHT: 208 LBS | TEMPERATURE: 98.4 F | BODY MASS INDEX: 32.65 KG/M2 | DIASTOLIC BLOOD PRESSURE: 73 MMHG | OXYGEN SATURATION: 96 % | HEIGHT: 67 IN

## 2020-03-13 DIAGNOSIS — I83.218 VARICOSE VEINS OF RIGHT LOWER EXTREMITY WITH BOTH ULCER OF OTHER PART OF LOWER EXTREMITY AND INFLAMMATION: ICD-10-CM

## 2020-03-13 PROCEDURE — G0463: CPT

## 2020-03-13 PROCEDURE — ZZZZZ: CPT

## 2020-03-14 DIAGNOSIS — G25.81 RESTLESS LEGS SYNDROME: ICD-10-CM

## 2020-03-14 DIAGNOSIS — Z79.899 OTHER LONG TERM (CURRENT) DRUG THERAPY: ICD-10-CM

## 2020-03-14 DIAGNOSIS — Z96.659 PRESENCE OF UNSPECIFIED ARTIFICIAL KNEE JOINT: ICD-10-CM

## 2020-03-14 DIAGNOSIS — L97.821 NON-PRESSURE CHRONIC ULCER OF OTHER PART OF LEFT LOWER LEG LIMITED TO BREAKDOWN OF SKIN: ICD-10-CM

## 2020-03-14 DIAGNOSIS — E78.00 PURE HYPERCHOLESTEROLEMIA, UNSPECIFIED: ICD-10-CM

## 2020-03-14 DIAGNOSIS — Z72.0 TOBACCO USE: ICD-10-CM

## 2020-03-14 DIAGNOSIS — I83.218 VARICOSE VEINS OF RIGHT LOWER EXTREMITY WITH BOTH ULCER OF OTHER PART OF LOWER EXTREMITY AND INFLAMMATION: ICD-10-CM

## 2020-03-14 DIAGNOSIS — L97.811 NON-PRESSURE CHRONIC ULCER OF OTHER PART OF RIGHT LOWER LEG LIMITED TO BREAKDOWN OF SKIN: ICD-10-CM

## 2020-03-14 DIAGNOSIS — Z90.710 ACQUIRED ABSENCE OF BOTH CERVIX AND UTERUS: ICD-10-CM

## 2020-03-14 DIAGNOSIS — M17.12 UNILATERAL PRIMARY OSTEOARTHRITIS, LEFT KNEE: ICD-10-CM

## 2020-03-14 DIAGNOSIS — M54.16 RADICULOPATHY, LUMBAR REGION: ICD-10-CM

## 2020-03-14 DIAGNOSIS — M47.816 SPONDYLOSIS WITHOUT MYELOPATHY OR RADICULOPATHY, LUMBAR REGION: ICD-10-CM

## 2020-03-14 DIAGNOSIS — I83.228 VARICOSE VEINS OF LEFT LOWER EXTREMITY WITH BOTH ULCER OF OTHER PART OF LOWER EXTREMITY AND INFLAMMATION: ICD-10-CM

## 2020-03-14 DIAGNOSIS — Z79.82 LONG TERM (CURRENT) USE OF ASPIRIN: ICD-10-CM

## 2020-03-14 DIAGNOSIS — M16.9 OSTEOARTHRITIS OF HIP, UNSPECIFIED: ICD-10-CM

## 2020-03-14 DIAGNOSIS — Z82.49 FAMILY HISTORY OF ISCHEMIC HEART DISEASE AND OTHER DISEASES OF THE CIRCULATORY SYSTEM: ICD-10-CM

## 2020-03-16 ENCOUNTER — APPOINTMENT (OUTPATIENT)
Dept: SURGERY | Facility: HOSPITAL | Age: 70
End: 2020-03-16
Payer: MEDICARE

## 2020-03-16 ENCOUNTER — OUTPATIENT (OUTPATIENT)
Dept: OUTPATIENT SERVICES | Facility: HOSPITAL | Age: 70
LOS: 1 days | Discharge: HOME | End: 2020-03-16
Payer: MEDICARE

## 2020-03-16 VITALS
BODY MASS INDEX: 32.65 KG/M2 | DIASTOLIC BLOOD PRESSURE: 68 MMHG | SYSTOLIC BLOOD PRESSURE: 115 MMHG | RESPIRATION RATE: 18 BRPM | WEIGHT: 208 LBS | HEIGHT: 67 IN | TEMPERATURE: 98.2 F | OXYGEN SATURATION: 99 % | HEART RATE: 84 BPM

## 2020-03-16 DIAGNOSIS — I83.218 VARICOSE VEINS OF RIGHT LOWER EXTREMITY WITH BOTH ULCER OF OTHER PART OF LOWER EXTREMITY AND INFLAMMATION: ICD-10-CM

## 2020-03-16 DIAGNOSIS — I83.228 VARICOSE VEINS OF LEFT LOWER EXTREMITY WITH BOTH ULCER OF OTHER PART OF LOWER EXTREMITY AND INFLAMMATION: ICD-10-CM

## 2020-03-16 DIAGNOSIS — L97.811 NON-PRESSURE CHRONIC ULCER OF OTHER PART OF RIGHT LOWER LEG LIMITED TO BREAKDOWN OF SKIN: ICD-10-CM

## 2020-03-16 DIAGNOSIS — L97.821 NON-PRESSURE CHRONIC ULCER OF OTHER PART OF LEFT LOWER LEG LIMITED TO BREAKDOWN OF SKIN: ICD-10-CM

## 2020-03-16 PROCEDURE — G0463: CPT

## 2020-03-16 PROCEDURE — ZZZZZ: CPT

## 2020-03-18 ENCOUNTER — APPOINTMENT (OUTPATIENT)
Dept: WOUND CARE | Facility: HOSPITAL | Age: 70
End: 2020-03-18
Payer: MEDICARE

## 2020-03-18 ENCOUNTER — OUTPATIENT (OUTPATIENT)
Dept: OUTPATIENT SERVICES | Facility: HOSPITAL | Age: 70
LOS: 1 days | Discharge: HOME | End: 2020-03-18
Payer: MEDICARE

## 2020-03-18 VITALS
BODY MASS INDEX: 32.65 KG/M2 | HEART RATE: 82 BPM | DIASTOLIC BLOOD PRESSURE: 66 MMHG | SYSTOLIC BLOOD PRESSURE: 114 MMHG | OXYGEN SATURATION: 100 % | RESPIRATION RATE: 18 BRPM | HEIGHT: 67 IN | WEIGHT: 208 LBS | TEMPERATURE: 98.1 F

## 2020-03-18 DIAGNOSIS — I83.218 VARICOSE VEINS OF RIGHT LOWER EXTREMITY WITH BOTH ULCER OF OTHER PART OF LOWER EXTREMITY AND INFLAMMATION: ICD-10-CM

## 2020-03-18 PROCEDURE — 99214 OFFICE O/P EST MOD 30 MIN: CPT

## 2020-03-18 PROCEDURE — G0463: CPT

## 2020-03-18 PROCEDURE — 99213 OFFICE O/P EST LOW 20 MIN: CPT

## 2020-03-18 RX ORDER — ROPINIROLE 5 MG/1
5 TABLET, FILM COATED ORAL AT BEDTIME
Refills: 0 | Status: DISCONTINUED | COMMUNITY
End: 2020-03-18

## 2020-03-18 NOTE — HISTORY OF PRESENT ILLNESS
[FreeTextEntry1] : pt seen for left foot pain. pt relates she has been seeing Dr. Shafer (outside podiatrist) for her left foot pain but was advised to see for a second opinion. pt relates she had an imaging done but is unsure what kind. pt relates that she was advised to wear a CAM boot but relates she has not been able to tolerate it.

## 2020-03-18 NOTE — HISTORY OF PRESENT ILLNESS
[FreeTextEntry1] : The patient is a 70 year old female who presents for follow-up of venous stasis disease of both lower legs with an ulcer of the right calf. Her diuretic regimen has been improved by her PCP, and her legs are improved.

## 2020-03-18 NOTE — PHYSICAL EXAM
[1+] : left 1+ [Ankle Swelling (On Exam)] : present [Ankle Swelling Bilaterally] : bilaterally  [Ankle Swelling On The Left] : moderate [JVD] : no jugular venous distention  [de-identified] : calm [de-identified] : Tenderness to palpation of left plantar and plantar medial calcaneus and along posterior tibial tendon. pt unable to do left heel raise test. [de-identified] : no open lesions to the left foot [de-identified] : Assessed by MD Tanner [FreeTextEntry1] : Right Posterior Leg [de-identified] : Serosanguineous [de-identified] : Intact [de-identified] : Silver Alginate [de-identified] : Cleansed with Normal Saline\par  [de-identified] : Assessed by MD Tanner [FreeTextEntry7] : Left Leg- scattered weeping areas- no open wounds [de-identified] : Serosanguineous [de-identified] : slightly macerated [de-identified] : Silver Alginate [de-identified] : Cleansed with Normal Saline\par  [de-identified] : Left Heel- No open wound  [de-identified] : No Treatment at this time [de-identified] : Cleansed with Normal Saline\par  [TWNoteComboBox4] : Small [TWNoteComboBox5] : No [de-identified] : No [de-identified] : None [de-identified] : None [de-identified] : 100% [de-identified] : No [de-identified] : Ace wraps [de-identified] : 3x Weekly [de-identified] : Moderate [de-identified] : No [de-identified] : No [de-identified] : None [de-identified] : None [de-identified] : None [de-identified] : No [de-identified] : Ace wraps [de-identified] : 3x Weekly

## 2020-03-18 NOTE — VITALS
[Pain related to present condition?] : The patient's  pain is related to present condition. [Throbbing] : throbbing [Occasional] : occasional [] : No [de-identified] : No Pain 0/10

## 2020-03-18 NOTE — ASSESSMENT
[Not Applicable - Long Term Care/Home Health Agency] : Long Term Care/Home Health Agency: Not Applicable [Verbal] : Verbal [Patient] : Patient [Good - alert, interested, motivated] : Good - alert, interested, motivated [Verbalizes knowledge/Understanding] : Verbalizes knowledge/understanding [Dressing changes] : dressing changes [Skin Care] : skin care [Pressure relief] : pressure relief [Signs and symptoms of infection] : sign and symptoms of infection [How and When to Call] : how and when to call [Patient responsibility to plan of care] : patient responsibility to plan of care [] : Yes [Stable] : stable [Home] : Home [Walker] : Walker [Faxed - Long Term Care/Home Health Agency] : Long Term Care/Home Health Agency: Faxed [Compression Therapy] : compression therapy [FreeTextEntry2] : Restore Skin Integrity\par Infection Control\par Localized wound care\par Compression Therapy\par Develop Realistic expectations and measurable treatments nursing POC to reduce, eliminate or develop acceptable pain limit tolerance [FreeTextEntry4] : Photos Taken\par HC initiated today [facility]\par F/U to [facility] in 1 week

## 2020-03-18 NOTE — PLAN
[FreeTextEntry1] : Patient examined and evaluated at this time.\par Continue local wound care and offloading.\par Upon discussion with Dr. Shafer via phone, CT was performed to rule out fracture. No fractures on CT scan at this time. Recommended an MRI to be done for the left lower extremity which patient will have done at an outside facility.

## 2020-03-18 NOTE — ASSESSMENT
[FreeTextEntry2] : Restore Skin Integrity\par Infection Control\par Localized wound care\par Compression Therapy\par Develop Realistic expectations and measurable treatments nursing POC to reduce, eliminate or develop acceptable pain limit tolerance [FreeTextEntry4] : Photos Taken\par F/U to [facility] in Friday and Monday for dressing change, 1 week for assessment [FreeTextEntry1] : [facility]

## 2020-03-18 NOTE — REVIEW OF SYSTEMS
[Eyesight Problems] : eyesight problems [Arthralgias] : arthralgias [Joint Pain] : joint pain [Joint Stiffness] : joint stiffness [Limb Pain] : limb pain [Limb Swelling] : limb swelling [Skin Wound] : skin wound [Easy Bruising] : a tendency for easy bruising [Negative] : Endocrine [de-identified] : Chronic lower leg pain and tenderness

## 2020-03-18 NOTE — PHYSICAL EXAM
[Normal Thyroid] : the thyroid was normal [Normal Rate and Rhythm] : normal rate and rhythm [] : bilaterally [Ankle Swelling On The Left] : moderate [Oriented to Person] : oriented to person [Oriented to Place] : oriented to place [Oriented to Time] : oriented to time [Calm] : calm [4 x 4] : 4 x 4  [Abdominal Pad] : Abdominal Pad [JVD] : no jugular venous distention  [Abdomen Masses] : No abdominal massess [Abdomen Tenderness] : ~T ~M No abdominal tenderness [Tender] : nontender [Enlarged] : not enlarged [Alert] : not alert [de-identified] : WDWN obese elderly WF in NAD [de-identified] : Edema of both lower legs improved somewhat with weeping present in smaller amounts, left > right. Macerated skin of both lower legs improved. Some dermis still exposed, but less than before. Ulcer right calf very clean with viable soft tissue at base. [FreeTextEntry1] : Right Posterior Leg [FreeTextEntry2] : 3.0 [FreeTextEntry3] : 1.8 [FreeTextEntry4] : 0.2 [de-identified] : Serosanguineous [de-identified] : Intact [de-identified] : Silver Alginate [de-identified] : Cleansed with Normal Saline\par  [FreeTextEntry7] : Left Leg- scattered weeping areas no open wound [de-identified] : Serosanguineous [de-identified] : slight maceration [de-identified] : Silver Alginate [de-identified] : Cleansed with Normal Saline\par  [de-identified] : Assessed by MD Tanner [de-identified] : Left Heel- No Open Wound [TWNoteComboBox4] : Small [TWNoteComboBox5] : No [de-identified] : No [de-identified] : None [de-identified] : None [de-identified] : 100% [de-identified] : No [de-identified] : Ace wraps [de-identified] : 2x Weekly [de-identified] : Small [de-identified] : No [de-identified] : No [de-identified] : None [de-identified] : None [de-identified] : 100% [de-identified] : No [de-identified] : Ace wraps [de-identified] : 2x Weekly

## 2020-03-18 NOTE — PLAN
[FreeTextEntry1] : Patient to be seen by podiatry for heel pain\par Continue silver alginate with Ace wraps\par  Return one week

## 2020-03-18 NOTE — REVIEW OF SYSTEMS
[Fever] : no fever [Eye Pain] : no eye pain [Earache] : no earache [Chest Pain] : no chest pain [Cough] : no cough [Abdominal Pain] : no abdominal pain [As Noted in HPI] : as noted in HPI [Limb Pain] : limb pain [Skin Wound] : skin wound [FreeTextEntry9] : Pain to left plantar heel and along PT tendon [de-identified] : venous stasis wound to the right lower extremity

## 2020-03-21 DIAGNOSIS — L97.821 NON-PRESSURE CHRONIC ULCER OF OTHER PART OF LEFT LOWER LEG LIMITED TO BREAKDOWN OF SKIN: ICD-10-CM

## 2020-03-21 DIAGNOSIS — Z79.82 LONG TERM (CURRENT) USE OF ASPIRIN: ICD-10-CM

## 2020-03-21 DIAGNOSIS — Z82.49 FAMILY HISTORY OF ISCHEMIC HEART DISEASE AND OTHER DISEASES OF THE CIRCULATORY SYSTEM: ICD-10-CM

## 2020-03-21 DIAGNOSIS — M65.272 CALCIFIC TENDINITIS, LEFT ANKLE AND FOOT: ICD-10-CM

## 2020-03-21 DIAGNOSIS — E78.00 PURE HYPERCHOLESTEROLEMIA, UNSPECIFIED: ICD-10-CM

## 2020-03-21 DIAGNOSIS — M47.816 SPONDYLOSIS WITHOUT MYELOPATHY OR RADICULOPATHY, LUMBAR REGION: ICD-10-CM

## 2020-03-21 DIAGNOSIS — I83.228 VARICOSE VEINS OF LEFT LOWER EXTREMITY WITH BOTH ULCER OF OTHER PART OF LOWER EXTREMITY AND INFLAMMATION: ICD-10-CM

## 2020-03-21 DIAGNOSIS — Z96.659 PRESENCE OF UNSPECIFIED ARTIFICIAL KNEE JOINT: ICD-10-CM

## 2020-03-21 DIAGNOSIS — Z90.710 ACQUIRED ABSENCE OF BOTH CERVIX AND UTERUS: ICD-10-CM

## 2020-03-21 DIAGNOSIS — G25.81 RESTLESS LEGS SYNDROME: ICD-10-CM

## 2020-03-21 DIAGNOSIS — Z79.899 OTHER LONG TERM (CURRENT) DRUG THERAPY: ICD-10-CM

## 2020-03-21 DIAGNOSIS — M16.9 OSTEOARTHRITIS OF HIP, UNSPECIFIED: ICD-10-CM

## 2020-03-21 DIAGNOSIS — Z72.0 TOBACCO USE: ICD-10-CM

## 2020-03-21 DIAGNOSIS — M17.12 UNILATERAL PRIMARY OSTEOARTHRITIS, LEFT KNEE: ICD-10-CM

## 2020-03-21 DIAGNOSIS — L97.811 NON-PRESSURE CHRONIC ULCER OF OTHER PART OF RIGHT LOWER LEG LIMITED TO BREAKDOWN OF SKIN: ICD-10-CM

## 2020-03-21 DIAGNOSIS — I83.218 VARICOSE VEINS OF RIGHT LOWER EXTREMITY WITH BOTH ULCER OF OTHER PART OF LOWER EXTREMITY AND INFLAMMATION: ICD-10-CM

## 2020-03-21 DIAGNOSIS — M54.16 RADICULOPATHY, LUMBAR REGION: ICD-10-CM

## 2020-03-21 DIAGNOSIS — M72.2 PLANTAR FASCIAL FIBROMATOSIS: ICD-10-CM

## 2020-03-25 ENCOUNTER — APPOINTMENT (OUTPATIENT)
Dept: WOUND CARE | Facility: HOSPITAL | Age: 70
End: 2020-03-25

## 2020-03-25 ENCOUNTER — APPOINTMENT (OUTPATIENT)
Dept: PLASTIC SURGERY | Facility: HOSPITAL | Age: 70
End: 2020-03-25

## 2020-03-27 ENCOUNTER — APPOINTMENT (OUTPATIENT)
Dept: WOUND CARE | Facility: HOSPITAL | Age: 70
End: 2020-03-27
Payer: MEDICARE

## 2020-03-27 ENCOUNTER — OUTPATIENT (OUTPATIENT)
Dept: OUTPATIENT SERVICES | Facility: HOSPITAL | Age: 70
LOS: 1 days | Discharge: HOME | End: 2020-03-27
Payer: MEDICARE

## 2020-03-27 VITALS
WEIGHT: 203 LBS | HEART RATE: 86 BPM | TEMPERATURE: 98.1 F | BODY MASS INDEX: 31.86 KG/M2 | OXYGEN SATURATION: 98 % | DIASTOLIC BLOOD PRESSURE: 76 MMHG | HEIGHT: 67 IN | RESPIRATION RATE: 20 BRPM | SYSTOLIC BLOOD PRESSURE: 138 MMHG

## 2020-03-27 DIAGNOSIS — I83.218 VARICOSE VEINS OF RIGHT LOWER EXTREMITY WITH BOTH ULCER OF OTHER PART OF LOWER EXTREMITY AND INFLAMMATION: ICD-10-CM

## 2020-03-27 PROCEDURE — 99213 OFFICE O/P EST LOW 20 MIN: CPT

## 2020-03-27 PROCEDURE — G0463: CPT

## 2020-03-27 NOTE — PLAN
[FreeTextEntry1] : Patient examined and evaluated at this time.\par Continue local wound care and offloading.\par Will obtain MRI of left foot

## 2020-03-27 NOTE — REVIEW OF SYSTEMS
[Fever] : no fever [Eye Pain] : no eye pain [Earache] : no earache [Chest Pain] : no chest pain [Cough] : no cough [Abdominal Pain] : no abdominal pain [As Noted in HPI] : as noted in HPI [Limb Pain] : limb pain [Skin Wound] : skin wound [FreeTextEntry9] : Pain to left plantar heel and along PT tendon [de-identified] : bilateral lower leg venous stasis ulceration down to skin with stasis dermatitis

## 2020-03-27 NOTE — HISTORY OF PRESENT ILLNESS
[FreeTextEntry1] : pt seen for left foot pain and bilateral lower leg venous stasis ulceration down to skin with stasis dermatitis

## 2020-03-27 NOTE — ASSESSMENT
[Verbal] : Verbal [Patient] : Patient [Good - alert, interested, motivated] : Good - alert, interested, motivated [Verbalizes knowledge/Understanding] : Verbalizes knowledge/understanding [Dressing changes] : dressing changes [Skin Care] : skin care [Signs and symptoms of infection] : sign and symptoms of infection [How and When to Call] : how and when to call [Compression Therapy] : compression therapy [Patient responsibility to plan of care] : patient responsibility to plan of care [Stable] : stable [Home] : Home [Walker] : Walker [Faxed - Long Term Care/Home Health Agency] : Long Term Care/Home Health Agency: Faxed [] : No [FreeTextEntry2] : Infection Prevention\par Localized wound care\par Compression Therapy\par Weight management/ nutrition \par Achieving pain tolerance levels within the Pts limits of 0/10.\par Develop Realistic expectations and measurable goals in nursing POC to reduce, eliminate or develop acceptable pain limit tolerance.\par Utilization of offloading, taking deep breaths and guided imagery to reduce discomfort PRN.  [FreeTextEntry4] : No signs/symptoms of infection. \par Auth submitted for MRI\par Follow up to [facility] in one week for assessment and Monday for dressing change  [FreeTextEntry1] : [facility]

## 2020-03-27 NOTE — PHYSICAL EXAM
[4 x 4] : 4 x 4  [Abdominal Pad] : Abdominal Pad [JVD] : no jugular venous distention  [1+] : left 1+ [Ankle Swelling (On Exam)] : present [Ankle Swelling Bilaterally] : bilaterally  [Ankle Swelling On The Left] : moderate [de-identified] : calm [de-identified] : Tenderness to palpation of left plantar and plantar medial calcaneus and along posterior tibial tendon. pt unable to do left heel raise test. [de-identified] : bilateral lower leg venous stasis ulceration down to skin with stasis dermatitis [FreeTextEntry1] : Right Posterior Leg [FreeTextEntry2] : 3.0 [FreeTextEntry3] : 2.1 [FreeTextEntry4] : 0.2 [de-identified] : Serosanguineous [de-identified] : Intact [de-identified] : Circulatory and neuromuscular function WNL post ACE application. Patient verbalizes they feel 'comfortable' post ACE application to the lower extremities.  [de-identified] : CYNTHIA,Hydrofera blue,DD  [FreeTextEntry7] : Leg- scattered open weeping excoriations less than 1cm2  [de-identified] : Serosanguineous [de-identified] : slight maceration [de-identified] : Circulatory and neuromuscular function WNL post ACE application. Patient verbalizes they feel 'comfortable' post ACE application to the lower extremities.  [de-identified] : CYNTHIA,Hydrofera blue,DD  [TWNoteComboBox1] : Right [TWNoteComboBox4] : Large [TWNoteComboBox5] : No [de-identified] : No [de-identified] : None [de-identified] : None [de-identified] : 100% [de-identified] : No [de-identified] : Ace wraps [de-identified] : 3x Weekly [de-identified] : Secondary Dressing [TWNoteComboBox9] : Left [de-identified] : Large [de-identified] : No [de-identified] : No [de-identified] : None [de-identified] : None [de-identified] : 100% [de-identified] : No [de-identified] : Ace wraps [de-identified] : 3x Weekly [de-identified] : Secondary Dressing

## 2020-03-29 DIAGNOSIS — M17.12 UNILATERAL PRIMARY OSTEOARTHRITIS, LEFT KNEE: ICD-10-CM

## 2020-03-29 DIAGNOSIS — M16.9 OSTEOARTHRITIS OF HIP, UNSPECIFIED: ICD-10-CM

## 2020-03-29 DIAGNOSIS — Z72.0 TOBACCO USE: ICD-10-CM

## 2020-03-29 DIAGNOSIS — Z79.82 LONG TERM (CURRENT) USE OF ASPIRIN: ICD-10-CM

## 2020-03-29 DIAGNOSIS — Z96.659 PRESENCE OF UNSPECIFIED ARTIFICIAL KNEE JOINT: ICD-10-CM

## 2020-03-29 DIAGNOSIS — M65.272 CALCIFIC TENDINITIS, LEFT ANKLE AND FOOT: ICD-10-CM

## 2020-03-29 DIAGNOSIS — E78.00 PURE HYPERCHOLESTEROLEMIA, UNSPECIFIED: ICD-10-CM

## 2020-03-29 DIAGNOSIS — G25.81 RESTLESS LEGS SYNDROME: ICD-10-CM

## 2020-03-29 DIAGNOSIS — Z90.710 ACQUIRED ABSENCE OF BOTH CERVIX AND UTERUS: ICD-10-CM

## 2020-03-29 DIAGNOSIS — I83.218 VARICOSE VEINS OF RIGHT LOWER EXTREMITY WITH BOTH ULCER OF OTHER PART OF LOWER EXTREMITY AND INFLAMMATION: ICD-10-CM

## 2020-03-29 DIAGNOSIS — M54.16 RADICULOPATHY, LUMBAR REGION: ICD-10-CM

## 2020-03-29 DIAGNOSIS — M72.2 PLANTAR FASCIAL FIBROMATOSIS: ICD-10-CM

## 2020-03-29 DIAGNOSIS — L97.811 NON-PRESSURE CHRONIC ULCER OF OTHER PART OF RIGHT LOWER LEG LIMITED TO BREAKDOWN OF SKIN: ICD-10-CM

## 2020-03-29 DIAGNOSIS — I83.228 VARICOSE VEINS OF LEFT LOWER EXTREMITY WITH BOTH ULCER OF OTHER PART OF LOWER EXTREMITY AND INFLAMMATION: ICD-10-CM

## 2020-03-29 DIAGNOSIS — Z79.899 OTHER LONG TERM (CURRENT) DRUG THERAPY: ICD-10-CM

## 2020-03-29 DIAGNOSIS — L97.821 NON-PRESSURE CHRONIC ULCER OF OTHER PART OF LEFT LOWER LEG LIMITED TO BREAKDOWN OF SKIN: ICD-10-CM

## 2020-03-29 DIAGNOSIS — Z82.49 FAMILY HISTORY OF ISCHEMIC HEART DISEASE AND OTHER DISEASES OF THE CIRCULATORY SYSTEM: ICD-10-CM

## 2020-03-29 DIAGNOSIS — M47.816 SPONDYLOSIS WITHOUT MYELOPATHY OR RADICULOPATHY, LUMBAR REGION: ICD-10-CM

## 2020-03-30 ENCOUNTER — OUTPATIENT (OUTPATIENT)
Dept: OUTPATIENT SERVICES | Facility: HOSPITAL | Age: 70
LOS: 1 days | Discharge: HOME | End: 2020-03-30
Payer: MEDICARE

## 2020-03-30 ENCOUNTER — APPOINTMENT (OUTPATIENT)
Dept: WOUND CARE | Facility: HOSPITAL | Age: 70
End: 2020-03-30

## 2020-03-30 DIAGNOSIS — I83.218 VARICOSE VEINS OF RIGHT LOWER EXTREMITY WITH BOTH ULCER OF OTHER PART OF LOWER EXTREMITY AND INFLAMMATION: ICD-10-CM

## 2020-03-30 DIAGNOSIS — I83.228 VARICOSE VEINS OF LEFT LOWER EXTREMITY WITH BOTH ULCER OF OTHER PART OF LOWER EXTREMITY AND INFLAMMATION: ICD-10-CM

## 2020-03-30 DIAGNOSIS — L97.821 NON-PRESSURE CHRONIC ULCER OF OTHER PART OF LEFT LOWER LEG LIMITED TO BREAKDOWN OF SKIN: ICD-10-CM

## 2020-03-30 DIAGNOSIS — L97.811 NON-PRESSURE CHRONIC ULCER OF OTHER PART OF RIGHT LOWER LEG LIMITED TO BREAKDOWN OF SKIN: ICD-10-CM

## 2020-03-30 PROCEDURE — ZZZZZ: CPT

## 2020-03-30 PROCEDURE — G0463: CPT

## 2020-04-03 ENCOUNTER — OUTPATIENT (OUTPATIENT)
Dept: OUTPATIENT SERVICES | Facility: HOSPITAL | Age: 70
LOS: 1 days | Discharge: HOME | End: 2020-04-03
Payer: MEDICARE

## 2020-04-03 ENCOUNTER — APPOINTMENT (OUTPATIENT)
Dept: WOUND CARE | Facility: HOSPITAL | Age: 70
End: 2020-04-03
Payer: MEDICARE

## 2020-04-03 VITALS
HEIGHT: 67 IN | HEART RATE: 103 BPM | WEIGHT: 203 LBS | TEMPERATURE: 97.1 F | DIASTOLIC BLOOD PRESSURE: 78 MMHG | BODY MASS INDEX: 31.86 KG/M2 | RESPIRATION RATE: 20 BRPM | SYSTOLIC BLOOD PRESSURE: 153 MMHG

## 2020-04-03 DIAGNOSIS — I83.218 VARICOSE VEINS OF RIGHT LOWER EXTREMITY WITH BOTH ULCER OF OTHER PART OF LOWER EXTREMITY AND INFLAMMATION: ICD-10-CM

## 2020-04-03 PROCEDURE — G0463: CPT

## 2020-04-03 PROCEDURE — 99213 OFFICE O/P EST LOW 20 MIN: CPT

## 2020-04-03 NOTE — ASSESSMENT
[Dressing changes] : dressing changes [Foot Care] : foot care [Skin Care] : skin care [Pressure relief] : pressure relief [Signs and symptoms of infection] : sign and symptoms of infection [Venous Disease] : venous disease [How and When to Call] : how and when to call [Pain Management] : pain management [Off-loading] : off-loading [Compression Therapy] : compression therapy [Patient responsibility to plan of care] : patient responsibility to plan of care [Stable] : stable [Home] : Home [Walker] : Walker [Faxed - Long Term Care/Home Health Agency] : Long Term Care/Home Health Agency: Faxed [Verbal] : Verbal [Handout] : Handout [Patient] : Patient [Good - alert, interested, motivated] : Good - alert, interested, motivated [Verbalizes knowledge/Understanding] : Verbalizes knowledge/understanding [] : No [FreeTextEntry2] : Promote optimal skin integrity, offloading, infection prevention, edema control\par  [FreeTextEntry3] : right leg wound increased in size [FreeTextEntry4] : Dr. Lea\par MRI for left heel pending authorization\par f/u 1 week [FreeTextEntry1] : [place]

## 2020-04-03 NOTE — PHYSICAL EXAM
[4 x 4] : 4 x 4  [Abdominal Pad] : Abdominal Pad [1+] : left 1+ [Ankle Swelling (On Exam)] : present [Ankle Swelling Bilaterally] : bilaterally  [Ankle Swelling On The Left] : moderate [JVD] : no jugular venous distention  [de-identified] : calm [de-identified] : Tenderness to palpation of left plantar and plantar medial calcaneus and along posterior tibial tendon. pt unable to do left heel raise test. [de-identified] : bilateral lower leg venous stasis ulceration down to skin with stasis dermatitis [FreeTextEntry1] : Right posterior leg - weeping edema [FreeTextEntry2] : 3.6 [FreeTextEntry3] : 2.4 [FreeTextEntry4] : 0.2 [de-identified] : serosanguineous [de-identified] : scattered excoriations [de-identified] : <25% [de-identified] : Hydrofera blue [de-identified] : NSC [FreeTextEntry7] : Left leg - scattered excoriations - weeping edema [de-identified] : serosanguineous [de-identified] : mild maceration [de-identified] : Hydrofera Blue [de-identified] : NSC [TWNoteComboBox4] : Large [de-identified] : None [de-identified] : >75% [de-identified] : Ace wraps [de-identified] : 3x Weekly [de-identified] : Large [de-identified] : None [de-identified] : Ace wraps [de-identified] : 3x Weekly

## 2020-04-03 NOTE — PLAN
[FreeTextEntry1] : Patient examined and evaluated at this time.\par Continue local wound care and offloading.\par Will obtain MRI of left foot, auth pending.

## 2020-04-03 NOTE — REVIEW OF SYSTEMS
[As Noted in HPI] : as noted in HPI [Limb Pain] : limb pain [Skin Wound] : skin wound [Fever] : no fever [Eye Pain] : no eye pain [Earache] : no earache [Chest Pain] : no chest pain [Cough] : no cough [Abdominal Pain] : no abdominal pain [FreeTextEntry9] : Pain to left plantar heel and along PT tendon [de-identified] : bilateral lower leg venous stasis ulceration down to skin with stasis dermatitis

## 2020-04-03 NOTE — VITALS
[Pain related to present condition?] : The patient's  pain is related to present condition. [Sharp] : sharp [Burning] : burning [] : No [de-identified] : B [FreeTextEntry3] : Bilateral legs and left hell [FreeTextEntry1] : offloading [FreeTextEntry2] : dressing changes [FreeTextEntry4] : offloading

## 2020-04-05 DIAGNOSIS — L97.821 NON-PRESSURE CHRONIC ULCER OF OTHER PART OF LEFT LOWER LEG LIMITED TO BREAKDOWN OF SKIN: ICD-10-CM

## 2020-04-05 DIAGNOSIS — M65.272 CALCIFIC TENDINITIS, LEFT ANKLE AND FOOT: ICD-10-CM

## 2020-04-05 DIAGNOSIS — M72.2 PLANTAR FASCIAL FIBROMATOSIS: ICD-10-CM

## 2020-04-05 DIAGNOSIS — Z79.899 OTHER LONG TERM (CURRENT) DRUG THERAPY: ICD-10-CM

## 2020-04-05 DIAGNOSIS — Z79.82 LONG TERM (CURRENT) USE OF ASPIRIN: ICD-10-CM

## 2020-04-05 DIAGNOSIS — Z96.659 PRESENCE OF UNSPECIFIED ARTIFICIAL KNEE JOINT: ICD-10-CM

## 2020-04-05 DIAGNOSIS — M16.9 OSTEOARTHRITIS OF HIP, UNSPECIFIED: ICD-10-CM

## 2020-04-05 DIAGNOSIS — Z82.49 FAMILY HISTORY OF ISCHEMIC HEART DISEASE AND OTHER DISEASES OF THE CIRCULATORY SYSTEM: ICD-10-CM

## 2020-04-05 DIAGNOSIS — M47.816 SPONDYLOSIS WITHOUT MYELOPATHY OR RADICULOPATHY, LUMBAR REGION: ICD-10-CM

## 2020-04-05 DIAGNOSIS — I83.228 VARICOSE VEINS OF LEFT LOWER EXTREMITY WITH BOTH ULCER OF OTHER PART OF LOWER EXTREMITY AND INFLAMMATION: ICD-10-CM

## 2020-04-05 DIAGNOSIS — M54.16 RADICULOPATHY, LUMBAR REGION: ICD-10-CM

## 2020-04-05 DIAGNOSIS — Z90.710 ACQUIRED ABSENCE OF BOTH CERVIX AND UTERUS: ICD-10-CM

## 2020-04-05 DIAGNOSIS — L97.811 NON-PRESSURE CHRONIC ULCER OF OTHER PART OF RIGHT LOWER LEG LIMITED TO BREAKDOWN OF SKIN: ICD-10-CM

## 2020-04-05 DIAGNOSIS — E78.00 PURE HYPERCHOLESTEROLEMIA, UNSPECIFIED: ICD-10-CM

## 2020-04-05 DIAGNOSIS — Z72.0 TOBACCO USE: ICD-10-CM

## 2020-04-05 DIAGNOSIS — G25.81 RESTLESS LEGS SYNDROME: ICD-10-CM

## 2020-04-05 DIAGNOSIS — M17.12 UNILATERAL PRIMARY OSTEOARTHRITIS, LEFT KNEE: ICD-10-CM

## 2020-04-05 DIAGNOSIS — I83.218 VARICOSE VEINS OF RIGHT LOWER EXTREMITY WITH BOTH ULCER OF OTHER PART OF LOWER EXTREMITY AND INFLAMMATION: ICD-10-CM

## 2020-04-10 ENCOUNTER — APPOINTMENT (OUTPATIENT)
Dept: WOUND CARE | Facility: HOSPITAL | Age: 70
End: 2020-04-10
Payer: MEDICARE

## 2020-04-10 ENCOUNTER — OUTPATIENT (OUTPATIENT)
Dept: OUTPATIENT SERVICES | Facility: HOSPITAL | Age: 70
LOS: 1 days | Discharge: HOME | End: 2020-04-10
Payer: MEDICARE

## 2020-04-10 VITALS
BODY MASS INDEX: 31.86 KG/M2 | TEMPERATURE: 98.1 F | RESPIRATION RATE: 20 BRPM | HEART RATE: 71 BPM | WEIGHT: 203 LBS | OXYGEN SATURATION: 96 % | HEIGHT: 67 IN

## 2020-04-10 VITALS — SYSTOLIC BLOOD PRESSURE: 140 MMHG | DIASTOLIC BLOOD PRESSURE: 68 MMHG

## 2020-04-10 DIAGNOSIS — I83.218 VARICOSE VEINS OF RIGHT LOWER EXTREMITY WITH BOTH ULCER OF OTHER PART OF LOWER EXTREMITY AND INFLAMMATION: ICD-10-CM

## 2020-04-10 PROCEDURE — G0463: CPT

## 2020-04-10 PROCEDURE — 99213 OFFICE O/P EST LOW 20 MIN: CPT

## 2020-04-10 NOTE — VITALS
[Pain related to present condition?] : The patient's  pain is related to present condition. [Sharp] : sharp [Burning] : burning [Aching] : aching [Tender] : tender [] : No [FreeTextEntry3] : bilateral legs and left heel [FreeTextEntry1] : offloading heel [FreeTextEntry2] : dressing changes [FreeTextEntry4] : offloading [FreeTextEntry5] : increased diuretic

## 2020-04-10 NOTE — ASSESSMENT
[Verbal] : Verbal [Handout] : Handout [Patient] : Patient [Good - alert, interested, motivated] : Good - alert, interested, motivated [Verbalizes knowledge/Understanding] : Verbalizes knowledge/understanding [Dressing changes] : dressing changes [Skin Care] : skin care [Signs and symptoms of infection] : sign and symptoms of infection [Venous Disease] : venous disease [How and When to Call] : how and when to call [Pain Management] : pain management [Off-loading] : off-loading [Compression Therapy] : compression therapy [Home Health] : home health [Patient responsibility to plan of care] : patient responsibility to plan of care [Stable] : stable [Home] : Home [Walker] : Walker [Faxed - Long Term Care/Home Health Agency] : Long Term Care/Home Health Agency: Faxed [] : No [FreeTextEntry2] : Promote optimal skin integrity, offloading, infection prevention, edema control, pain management\par  [FreeTextEntry4] : Dr. Lea\par Patient reports that she had MRI of left foot this [facility].  Patient reports that she was not able to sit still long enough and had to schedule another MRI next week.  DPM aware.\par Patient reports that she called her PCP regarding increasing diuretic.  PCP increased diuretic dosage to from daily to bid, reporting for a short period of time r/t compromised kidney function.  DPM aware.\par f/u 1 week [FreeTextEntry1] : [facility]

## 2020-04-10 NOTE — REVIEW OF SYSTEMS
[Fever] : no fever [Eye Pain] : no eye pain [Earache] : no earache [Chest Pain] : no chest pain [Cough] : no cough [Abdominal Pain] : no abdominal pain [As Noted in HPI] : as noted in HPI [Limb Pain] : limb pain [Skin Wound] : skin wound [FreeTextEntry9] : Pain to left plantar heel and along PT tendon [de-identified] : bilateral lower leg venous stasis ulceration down to skin with stasis dermatitis

## 2020-04-10 NOTE — PLAN
[FreeTextEntry1] : Patient examined and evaluated at this time.\par Continue local wound care and offloading.\par Will obtain MRI of left foot.

## 2020-04-10 NOTE — PHYSICAL EXAM
[4 x 4] : 4 x 4  [Abdominal Pad] : Abdominal Pad [JVD] : no jugular venous distention  [1+] : left 1+ [Ankle Swelling (On Exam)] : present [Ankle Swelling Bilaterally] : bilaterally  [Ankle Swelling On The Left] : moderate [de-identified] : calm [de-identified] : Tenderness to palpation of left plantar and plantar medial calcaneus and along posterior tibial tendon. pt unable to do left heel raise test. [de-identified] : bilateral lower leg venous stasis ulceration down to skin with stasis dermatitis [FreeTextEntry1] : Right posterior leg - weeping edema [FreeTextEntry2] : 3.3 [FreeTextEntry3] : 2.4 [FreeTextEntry4] : 0.2 [de-identified] : serosanguineous [de-identified] : scattered excoriations [de-identified] : <25% [de-identified] : Hydrofera blue [de-identified] : NSC [FreeTextEntry7] : Left leg - scattered excoriations - weeping edema [de-identified] : serosanguineous [de-identified] : mild maceration [de-identified] : Hydrofera Blue [de-identified] : NSC [TWNoteComboBox4] : Moderate [de-identified] : None [de-identified] : >75% [de-identified] : Ace wraps [de-identified] : 3x Weekly [de-identified] : Moderate [de-identified] : None [de-identified] : Ace wraps [de-identified] : 3x Weekly

## 2020-04-12 DIAGNOSIS — Z79.82 LONG TERM (CURRENT) USE OF ASPIRIN: ICD-10-CM

## 2020-04-12 DIAGNOSIS — L97.821 NON-PRESSURE CHRONIC ULCER OF OTHER PART OF LEFT LOWER LEG LIMITED TO BREAKDOWN OF SKIN: ICD-10-CM

## 2020-04-12 DIAGNOSIS — G25.81 RESTLESS LEGS SYNDROME: ICD-10-CM

## 2020-04-12 DIAGNOSIS — I83.228 VARICOSE VEINS OF LEFT LOWER EXTREMITY WITH BOTH ULCER OF OTHER PART OF LOWER EXTREMITY AND INFLAMMATION: ICD-10-CM

## 2020-04-12 DIAGNOSIS — M17.12 UNILATERAL PRIMARY OSTEOARTHRITIS, LEFT KNEE: ICD-10-CM

## 2020-04-12 DIAGNOSIS — L97.811 NON-PRESSURE CHRONIC ULCER OF OTHER PART OF RIGHT LOWER LEG LIMITED TO BREAKDOWN OF SKIN: ICD-10-CM

## 2020-04-12 DIAGNOSIS — Z79.899 OTHER LONG TERM (CURRENT) DRUG THERAPY: ICD-10-CM

## 2020-04-12 DIAGNOSIS — I83.218 VARICOSE VEINS OF RIGHT LOWER EXTREMITY WITH BOTH ULCER OF OTHER PART OF LOWER EXTREMITY AND INFLAMMATION: ICD-10-CM

## 2020-04-12 DIAGNOSIS — Z90.710 ACQUIRED ABSENCE OF BOTH CERVIX AND UTERUS: ICD-10-CM

## 2020-04-12 DIAGNOSIS — M16.9 OSTEOARTHRITIS OF HIP, UNSPECIFIED: ICD-10-CM

## 2020-04-12 DIAGNOSIS — M47.816 SPONDYLOSIS WITHOUT MYELOPATHY OR RADICULOPATHY, LUMBAR REGION: ICD-10-CM

## 2020-04-12 DIAGNOSIS — Z82.49 FAMILY HISTORY OF ISCHEMIC HEART DISEASE AND OTHER DISEASES OF THE CIRCULATORY SYSTEM: ICD-10-CM

## 2020-04-12 DIAGNOSIS — M65.272 CALCIFIC TENDINITIS, LEFT ANKLE AND FOOT: ICD-10-CM

## 2020-04-12 DIAGNOSIS — Z96.659 PRESENCE OF UNSPECIFIED ARTIFICIAL KNEE JOINT: ICD-10-CM

## 2020-04-12 DIAGNOSIS — E78.00 PURE HYPERCHOLESTEROLEMIA, UNSPECIFIED: ICD-10-CM

## 2020-04-12 DIAGNOSIS — M54.16 RADICULOPATHY, LUMBAR REGION: ICD-10-CM

## 2020-04-12 DIAGNOSIS — Z72.0 TOBACCO USE: ICD-10-CM

## 2020-04-12 DIAGNOSIS — M72.2 PLANTAR FASCIAL FIBROMATOSIS: ICD-10-CM

## 2020-04-17 ENCOUNTER — OUTPATIENT (OUTPATIENT)
Dept: OUTPATIENT SERVICES | Facility: HOSPITAL | Age: 70
LOS: 1 days | Discharge: HOME | End: 2020-04-17
Payer: MEDICARE

## 2020-04-17 ENCOUNTER — APPOINTMENT (OUTPATIENT)
Dept: SURGERY | Facility: HOSPITAL | Age: 70
End: 2020-04-17
Payer: MEDICARE

## 2020-04-17 ENCOUNTER — APPOINTMENT (OUTPATIENT)
Dept: WOUND CARE | Facility: HOSPITAL | Age: 70
End: 2020-04-17

## 2020-04-17 VITALS
OXYGEN SATURATION: 97 % | SYSTOLIC BLOOD PRESSURE: 133 MMHG | HEIGHT: 67 IN | HEART RATE: 91 BPM | WEIGHT: 203 LBS | BODY MASS INDEX: 31.86 KG/M2 | RESPIRATION RATE: 20 BRPM | DIASTOLIC BLOOD PRESSURE: 71 MMHG | TEMPERATURE: 97.7 F

## 2020-04-17 DIAGNOSIS — I83.218 VARICOSE VEINS OF RIGHT LOWER EXTREMITY WITH BOTH ULCER OF OTHER PART OF LOWER EXTREMITY AND INFLAMMATION: ICD-10-CM

## 2020-04-17 PROCEDURE — 99213 OFFICE O/P EST LOW 20 MIN: CPT

## 2020-04-17 PROCEDURE — G0463: CPT

## 2020-04-17 NOTE — ASSESSMENT
[Verbal] : Verbal [Patient] : Patient [Good - alert, interested, motivated] : Good - alert, interested, motivated [Verbalizes knowledge/Understanding] : Verbalizes knowledge/understanding [Dressing changes] : dressing changes [Skin Care] : skin care [Signs and symptoms of infection] : sign and symptoms of infection [How and When to Call] : how and when to call [Labs and Tests] : labs and tests [Compression Therapy] : compression therapy [Patient responsibility to plan of care] : patient responsibility to plan of care [] : Yes [Stable] : stable [Home] : Home [Walker] : Walker [Faxed - Long Term Care/Home Health Agency] : Long Term Care/Home Health Agency: Faxed [FreeTextEntry2] : Restore Skin Integrity\par Infection Control\par Localized wound care\par Compression Therapy\par Develop Realistic expectations and measurable treatments nursing POC to reduce, eliminate or develop acceptable pain limit tolerance [FreeTextEntry4] : Photos Taken\par F/U to [facility] in 1 week [FreeTextEntry1] : Bilateral stasis ulcers are stable, plantar fasciitis is better today.

## 2020-04-17 NOTE — PHYSICAL EXAM
[4 x 4] : 4 x 4  [Abdominal Pad] : Abdominal Pad [JVD] : no jugular venous distention  [Normal Breath Sounds] : Normal breath sounds [Normal Heart Sounds] : normal heart sounds [1+] : left 1+ [0] : left 0 [Ankle Swelling (On Exam)] : present [Varicose Veins Of Lower Extremities] : bilaterally [Ankle Swelling Bilaterally] : severe [] : bilaterally [Ankle Swelling On The Left] : moderate [Alert] : alert [Oriented to Person] : oriented to person [Calm] : calm [de-identified] : WD/WN in no acute distress. Morbidly obese [de-identified] : WNL [de-identified] : ELSYL [de-identified] : WNL [de-identified] : Bilateral legs chronic edema, bilateral ulcers are clean with some weeping, bases are red and viable, no infection, periwound skin is intact with no cellulitis, plantar gasciatis is better today.  [FreeTextEntry1] : Right Posterior Leg- Weeping Edema [FreeTextEntry2] : 3.7 [FreeTextEntry3] : 3.1 [FreeTextEntry4] : 0.2 [de-identified] : Serosanguineous [de-identified] : Hydrofera Blue [de-identified] : Cleansed with Normal Saline\par  [FreeTextEntry7] : Left Leg- Scattered Excoriations- weeping edema [de-identified] : Serosanguineous [de-identified] : Hydrofera Blue [de-identified] : Cleansed with Normal Saline\par  [TWNoteComboBox4] : Large [TWNoteComboBox5] : No [de-identified] : No [de-identified] : Macerated [de-identified] : None [de-identified] : None [de-identified] : 100% [de-identified] : No [de-identified] : Ace wraps [de-identified] : 3x Weekly [de-identified] : Large [de-identified] : No [de-identified] : No [de-identified] : Macerated [de-identified] : None [de-identified] : None [de-identified] : 100% [de-identified] : No [de-identified] : Ace wraps [de-identified] : 3x Weekly

## 2020-04-17 NOTE — PLAN
[FreeTextEntry1] : Right leg Hydrofera Blue, left leg Calcium Alginate, bilateral ACE wrap, RTO in two weeks.

## 2020-04-19 DIAGNOSIS — I83.218 VARICOSE VEINS OF RIGHT LOWER EXTREMITY WITH BOTH ULCER OF OTHER PART OF LOWER EXTREMITY AND INFLAMMATION: ICD-10-CM

## 2020-04-19 DIAGNOSIS — Z79.82 LONG TERM (CURRENT) USE OF ASPIRIN: ICD-10-CM

## 2020-04-19 DIAGNOSIS — Z90.710 ACQUIRED ABSENCE OF BOTH CERVIX AND UTERUS: ICD-10-CM

## 2020-04-19 DIAGNOSIS — M16.9 OSTEOARTHRITIS OF HIP, UNSPECIFIED: ICD-10-CM

## 2020-04-19 DIAGNOSIS — M65.272 CALCIFIC TENDINITIS, LEFT ANKLE AND FOOT: ICD-10-CM

## 2020-04-19 DIAGNOSIS — M17.12 UNILATERAL PRIMARY OSTEOARTHRITIS, LEFT KNEE: ICD-10-CM

## 2020-04-19 DIAGNOSIS — L97.821 NON-PRESSURE CHRONIC ULCER OF OTHER PART OF LEFT LOWER LEG LIMITED TO BREAKDOWN OF SKIN: ICD-10-CM

## 2020-04-19 DIAGNOSIS — L97.811 NON-PRESSURE CHRONIC ULCER OF OTHER PART OF RIGHT LOWER LEG LIMITED TO BREAKDOWN OF SKIN: ICD-10-CM

## 2020-04-19 DIAGNOSIS — G25.81 RESTLESS LEGS SYNDROME: ICD-10-CM

## 2020-04-19 DIAGNOSIS — M54.16 RADICULOPATHY, LUMBAR REGION: ICD-10-CM

## 2020-04-19 DIAGNOSIS — M47.816 SPONDYLOSIS WITHOUT MYELOPATHY OR RADICULOPATHY, LUMBAR REGION: ICD-10-CM

## 2020-04-19 DIAGNOSIS — I83.228 VARICOSE VEINS OF LEFT LOWER EXTREMITY WITH BOTH ULCER OF OTHER PART OF LOWER EXTREMITY AND INFLAMMATION: ICD-10-CM

## 2020-04-19 DIAGNOSIS — Z79.899 OTHER LONG TERM (CURRENT) DRUG THERAPY: ICD-10-CM

## 2020-04-19 DIAGNOSIS — M72.2 PLANTAR FASCIAL FIBROMATOSIS: ICD-10-CM

## 2020-04-19 DIAGNOSIS — Z96.659 PRESENCE OF UNSPECIFIED ARTIFICIAL KNEE JOINT: ICD-10-CM

## 2020-04-19 DIAGNOSIS — E78.00 PURE HYPERCHOLESTEROLEMIA, UNSPECIFIED: ICD-10-CM

## 2020-04-19 DIAGNOSIS — Z72.0 TOBACCO USE: ICD-10-CM

## 2020-04-19 DIAGNOSIS — Z82.49 FAMILY HISTORY OF ISCHEMIC HEART DISEASE AND OTHER DISEASES OF THE CIRCULATORY SYSTEM: ICD-10-CM

## 2020-04-20 ENCOUNTER — APPOINTMENT (OUTPATIENT)
Dept: WOUND CARE | Facility: HOSPITAL | Age: 70
End: 2020-04-20
Payer: MEDICARE

## 2020-04-20 ENCOUNTER — OUTPATIENT (OUTPATIENT)
Dept: OUTPATIENT SERVICES | Facility: HOSPITAL | Age: 70
LOS: 1 days | Discharge: HOME | End: 2020-04-20
Payer: MEDICARE

## 2020-04-20 VITALS
OXYGEN SATURATION: 96 % | WEIGHT: 203 LBS | RESPIRATION RATE: 20 BRPM | HEIGHT: 67 IN | BODY MASS INDEX: 31.86 KG/M2 | TEMPERATURE: 97.8 F | SYSTOLIC BLOOD PRESSURE: 129 MMHG | HEART RATE: 90 BPM | DIASTOLIC BLOOD PRESSURE: 62 MMHG

## 2020-04-20 DIAGNOSIS — M65.272 CALCIFIC TENDINITIS, LEFT ANKLE AND FOOT: ICD-10-CM

## 2020-04-20 DIAGNOSIS — Z82.49 FAMILY HISTORY OF ISCHEMIC HEART DISEASE AND OTHER DISEASES OF THE CIRCULATORY SYSTEM: ICD-10-CM

## 2020-04-20 DIAGNOSIS — I83.228 VARICOSE VEINS OF LEFT LOWER EXTREMITY WITH BOTH ULCER OF OTHER PART OF LOWER EXTREMITY AND INFLAMMATION: ICD-10-CM

## 2020-04-20 DIAGNOSIS — Z72.0 TOBACCO USE: ICD-10-CM

## 2020-04-20 DIAGNOSIS — Z79.82 LONG TERM (CURRENT) USE OF ASPIRIN: ICD-10-CM

## 2020-04-20 DIAGNOSIS — Z96.659 PRESENCE OF UNSPECIFIED ARTIFICIAL KNEE JOINT: ICD-10-CM

## 2020-04-20 DIAGNOSIS — S91.309D UNSPECIFIED OPEN WOUND, UNSPECIFIED FOOT, SUBSEQUENT ENCOUNTER: ICD-10-CM

## 2020-04-20 DIAGNOSIS — Z79.899 OTHER LONG TERM (CURRENT) DRUG THERAPY: ICD-10-CM

## 2020-04-20 DIAGNOSIS — E78.00 PURE HYPERCHOLESTEROLEMIA, UNSPECIFIED: ICD-10-CM

## 2020-04-20 DIAGNOSIS — Z90.710 ACQUIRED ABSENCE OF BOTH CERVIX AND UTERUS: ICD-10-CM

## 2020-04-20 DIAGNOSIS — I83.218 VARICOSE VEINS OF RIGHT LOWER EXTREMITY WITH BOTH ULCER OF OTHER PART OF LOWER EXTREMITY AND INFLAMMATION: ICD-10-CM

## 2020-04-20 DIAGNOSIS — L97.821 NON-PRESSURE CHRONIC ULCER OF OTHER PART OF LEFT LOWER LEG LIMITED TO BREAKDOWN OF SKIN: ICD-10-CM

## 2020-04-20 DIAGNOSIS — M72.2 PLANTAR FASCIAL FIBROMATOSIS: ICD-10-CM

## 2020-04-20 DIAGNOSIS — M54.16 RADICULOPATHY, LUMBAR REGION: ICD-10-CM

## 2020-04-20 DIAGNOSIS — L97.811 NON-PRESSURE CHRONIC ULCER OF OTHER PART OF RIGHT LOWER LEG LIMITED TO BREAKDOWN OF SKIN: ICD-10-CM

## 2020-04-20 DIAGNOSIS — M17.12 UNILATERAL PRIMARY OSTEOARTHRITIS, LEFT KNEE: ICD-10-CM

## 2020-04-20 DIAGNOSIS — G25.81 RESTLESS LEGS SYNDROME: ICD-10-CM

## 2020-04-20 DIAGNOSIS — M47.816 SPONDYLOSIS WITHOUT MYELOPATHY OR RADICULOPATHY, LUMBAR REGION: ICD-10-CM

## 2020-04-20 PROCEDURE — 20550 NJX 1 TENDON SHEATH/LIGAMENT: CPT | Mod: LT

## 2020-04-20 PROCEDURE — 99213 OFFICE O/P EST LOW 20 MIN: CPT

## 2020-04-22 NOTE — REVIEW OF SYSTEMS
[As Noted in HPI] : as noted in HPI [Limb Pain] : limb pain [Skin Wound] : skin wound [Fever] : no fever [Eye Pain] : no eye pain [Cough] : no cough [Earache] : no earache [Chest Pain] : no chest pain [de-identified] : bilateral lower leg venous stasis ulceration down to skin with stasis dermatitis [FreeTextEntry9] : Pain to left plantar heel [Abdominal Pain] : no abdominal pain

## 2020-04-22 NOTE — PHYSICAL EXAM
[Ankle Swelling (On Exam)] : present [1+] : right 1+ [Ankle Swelling On The Left] : moderate [Ankle Swelling Bilaterally] : bilaterally  [JVD] : no jugular venous distention  [de-identified] : bilateral lower leg venous stasis ulceration down to skin with stasis dermatitis [de-identified] : calm [de-identified] : Tenderness to palpation of left plantar and plantar medial calcaneus [de-identified] : Small amount of Blood Loss, No Pain During or Post Procedure, Pt tolerated Well.\par  [de-identified] : Left Plantar Foot- Plantar fascitis- No Open Wound [de-identified] : No treatment [de-identified] : Corticosteroid injection: 1mL of kenalog 40, and 1mL of dexamethasone 4mg/mL [de-identified] : 1% Lidocaine Injection [de-identified] : Cleansed with Normal Saline\par \par  [de-identified] : Other

## 2020-04-22 NOTE — ASSESSMENT
[Verbal] : Verbal [Patient] : Patient [Good - alert, interested, motivated] : Good - alert, interested, motivated [Dressing changes] : dressing changes [Skin Care] : skin care [Verbalizes knowledge/Understanding] : Verbalizes knowledge/understanding [How and When to Call] : how and when to call [Signs and symptoms of infection] : sign and symptoms of infection [Patient responsibility to plan of care] : patient responsibility to plan of care [Stable] : stable [Home] : Home [Walker] : Walker [Not Applicable - Long Term Care/Home Health Agency] : Long Term Care/Home Health Agency: Not Applicable [] : No [FreeTextEntry3] : new [FreeTextEntry4] : F/U to [facility] in 2 weeks [FreeTextEntry2] : Maintain optimal skin integrity\par

## 2020-04-22 NOTE — PHYSICAL EXAM
[1+] : right 1+ [Ankle Swelling (On Exam)] : present [Ankle Swelling Bilaterally] : bilaterally  [Ankle Swelling On The Left] : moderate [JVD] : no jugular venous distention  [de-identified] : calm [de-identified] : bilateral lower leg venous stasis ulceration down to skin with stasis dermatitis [de-identified] : Tenderness to palpation of left plantar and plantar medial calcaneus [de-identified] : Small amount of Blood Loss, No Pain During or Post Procedure, Pt tolerated Well.\par  [de-identified] : No treatment [de-identified] : Left Plantar Foot- Plantar fascitis- No Open Wound [de-identified] : Corticosteroid injection: 1mL of kenalog 40, and 1mL of dexamethasone 4mg/mL [de-identified] : 1% Lidocaine Injection [de-identified] : Cleansed with Normal Saline\par \par  [de-identified] : Other

## 2020-04-22 NOTE — ASSESSMENT
[Verbal] : Verbal [Patient] : Patient [Good - alert, interested, motivated] : Good - alert, interested, motivated [Skin Care] : skin care [Verbalizes knowledge/Understanding] : Verbalizes knowledge/understanding [Dressing changes] : dressing changes [How and When to Call] : how and when to call [Signs and symptoms of infection] : sign and symptoms of infection [Patient responsibility to plan of care] : patient responsibility to plan of care [Stable] : stable [Home] : Home [Not Applicable - Long Term Care/Home Health Agency] : Long Term Care/Home Health Agency: Not Applicable [Walker] : Walker [] : No [FreeTextEntry2] : Maintain optimal skin integrity\par  [FreeTextEntry3] : new [FreeTextEntry4] : F/U to [facility] in 2 weeks

## 2020-04-22 NOTE — PLAN
[FreeTextEntry1] : Patient examined and evaluated at this time.\par Continue local wound care and offloading.\par MRI reviewed with patient.\par Corticosteroid injection consisting of 1mL of 1% lidocaine plain, 1mL of kenalog 40, and 1mL of dexamethasone 4mg/mL administered to the plantar aspect of the left heel. Patient tolerated procedure well and felt relief.

## 2020-04-22 NOTE — REVIEW OF SYSTEMS
[Limb Pain] : limb pain [As Noted in HPI] : as noted in HPI [Skin Wound] : skin wound [Eye Pain] : no eye pain [Fever] : no fever [Chest Pain] : no chest pain [Cough] : no cough [Earache] : no earache [de-identified] : bilateral lower leg venous stasis ulceration down to skin with stasis dermatitis [FreeTextEntry9] : Pain to left plantar heel [Abdominal Pain] : no abdominal pain

## 2020-04-22 NOTE — VITALS
[Stabbing] : stabbing [Throbbing] : throbbing [Occasional] : occasional [Penetrating] : penetrating [Shooting] : shooting [] : No [FreeTextEntry3] : Left Foot

## 2020-04-24 ENCOUNTER — APPOINTMENT (OUTPATIENT)
Dept: SURGERY | Facility: HOSPITAL | Age: 70
End: 2020-04-24

## 2020-05-01 ENCOUNTER — OUTPATIENT (OUTPATIENT)
Dept: OUTPATIENT SERVICES | Facility: HOSPITAL | Age: 70
LOS: 1 days | Discharge: HOME | End: 2020-05-01
Payer: MEDICARE

## 2020-05-01 ENCOUNTER — APPOINTMENT (OUTPATIENT)
Dept: WOUND CARE | Facility: HOSPITAL | Age: 70
End: 2020-05-01
Payer: MEDICARE

## 2020-05-01 VITALS
SYSTOLIC BLOOD PRESSURE: 147 MMHG | RESPIRATION RATE: 99 BRPM | HEART RATE: 94 BPM | WEIGHT: 203 LBS | HEIGHT: 67 IN | DIASTOLIC BLOOD PRESSURE: 82 MMHG | TEMPERATURE: 97.6 F | BODY MASS INDEX: 31.86 KG/M2

## 2020-05-01 DIAGNOSIS — M72.2 PLANTAR FASCIAL FIBROMATOSIS: ICD-10-CM

## 2020-05-01 DIAGNOSIS — E11.621 TYPE 2 DIABETES MELLITUS WITH FOOT ULCER: ICD-10-CM

## 2020-05-01 DIAGNOSIS — M65.272 CALCIFIC TENDINITIS, LEFT ANKLE AND FOOT: ICD-10-CM

## 2020-05-01 PROCEDURE — G0463: CPT

## 2020-05-01 PROCEDURE — 99213 OFFICE O/P EST LOW 20 MIN: CPT

## 2020-05-01 NOTE — PHYSICAL EXAM
[Abdominal Pad] : Abdominal Pad [4 x 4] : 4 x 4  [JVD] : no jugular venous distention  [1+] : left 1+ [Ankle Swelling (On Exam)] : present [Ankle Swelling On The Left] : moderate [de-identified] : calm [Ankle Swelling Bilaterally] : bilaterally  [de-identified] : Tenderness to palpation of left plantar and plantar medial calcaneus [FreeTextEntry1] : Right posterior leg - weeping edema [de-identified] : bilateral lower leg venous stasis ulceration down to skin with stasis dermatitis [FreeTextEntry4] : 0.2 [FreeTextEntry3] : 2.4 [FreeTextEntry2] : 3.6 [de-identified] : serosanguineous [de-identified] : scattered excoriations [de-identified] : Hydrofera blue [de-identified] : NSC [de-identified] : mild maceration [de-identified] : serosanguineous [FreeTextEntry7] : Left leg - scattered excoriations - weeping edema [de-identified] : Hydrofera Blue [de-identified] : NSC [TWNoteComboBox4] : Large [de-identified] : NSC [de-identified] : Left plantar heel - peeling epithelium [de-identified] : False [de-identified] : 100% [de-identified] : None [de-identified] : 3x Weekly [de-identified] : Ace wraps [de-identified] : None [de-identified] : Large [de-identified] : Ace wraps [de-identified] : 3x Weekly

## 2020-05-01 NOTE — ASSESSMENT
[Verbal] : Verbal [Demo] : Demo [Handout] : Handout [Other: ___] : [unfilled] [Good - alert, interested, motivated] : Good - alert, interested, motivated [Verbalizes knowledge/Understanding] : Verbalizes knowledge/understanding [Dressing changes] : dressing changes [Foot Care] : foot care [Skin Care] : skin care [Pressure relief] : pressure relief [Signs and symptoms of infection] : sign and symptoms of infection [Venous Disease] : venous disease [How and When to Call] : how and when to call [Pain Management] : pain management [Off-loading] : off-loading [Compression Therapy] : compression therapy [Home Health] : home health [Patient responsibility to plan of care] : patient responsibility to plan of care [Stable] : stable [Home] : Home [Walker] : Walker [Faxed - Long Term Care/Home Health Agency] : Long Term Care/Home Health Agency: Faxed [FreeTextEntry2] : Promote optimal skin integrity, offloading, infection prevention, edema control, pain management\par  [] : No [FreeTextEntry4] : Dr. Lea\par Patient reports that after steroid injection to left heel wound last week, the pain returned several hours later.  \par DPM recommended that patient return on Monday for MD visit and application of Coban multilayer compression wrap and Tuesday to evaluate Coban tx.\par f/u Monday\par  [FreeTextEntry3] : No change in wound status.  Patient reports left heel pain continues. [FreeTextEntry1] : [facility]

## 2020-05-01 NOTE — VITALS
[Aching] : aching [Stabbing] : stabbing [Throbbing] : throbbing [Shooting] : shooting [FreeTextEntry3] : Left heel [] : No [FreeTextEntry4] : Patient reports that she has an appt. with pain management doctor on Monday. [FreeTextEntry2] : Night time [FreeTextEntry1] : pain medication barely takes the edge off

## 2020-05-01 NOTE — REVIEW OF SYSTEMS
[Eye Pain] : no eye pain [Fever] : no fever [Earache] : no earache [Chest Pain] : no chest pain [Abdominal Pain] : no abdominal pain [Cough] : no cough [As Noted in HPI] : as noted in HPI [Limb Pain] : limb pain [Skin Wound] : skin wound [de-identified] : bilateral lower leg venous stasis ulceration down to skin with stasis dermatitis [FreeTextEntry9] : Pain to left plantar heel

## 2020-05-01 NOTE — PLAN
[FreeTextEntry1] : Patient examined and evaluated at this time.\par Continue local wound care and offloading.\par Patient to return in 1 week for coban compression dressing.

## 2020-05-03 DIAGNOSIS — M16.9 OSTEOARTHRITIS OF HIP, UNSPECIFIED: ICD-10-CM

## 2020-05-03 DIAGNOSIS — Z72.0 TOBACCO USE: ICD-10-CM

## 2020-05-03 DIAGNOSIS — M65.272 CALCIFIC TENDINITIS, LEFT ANKLE AND FOOT: ICD-10-CM

## 2020-05-03 DIAGNOSIS — M72.2 PLANTAR FASCIAL FIBROMATOSIS: ICD-10-CM

## 2020-05-03 DIAGNOSIS — M17.12 UNILATERAL PRIMARY OSTEOARTHRITIS, LEFT KNEE: ICD-10-CM

## 2020-05-03 DIAGNOSIS — E78.00 PURE HYPERCHOLESTEROLEMIA, UNSPECIFIED: ICD-10-CM

## 2020-05-03 DIAGNOSIS — M54.16 RADICULOPATHY, LUMBAR REGION: ICD-10-CM

## 2020-05-03 DIAGNOSIS — Z82.49 FAMILY HISTORY OF ISCHEMIC HEART DISEASE AND OTHER DISEASES OF THE CIRCULATORY SYSTEM: ICD-10-CM

## 2020-05-03 DIAGNOSIS — L97.821 NON-PRESSURE CHRONIC ULCER OF OTHER PART OF LEFT LOWER LEG LIMITED TO BREAKDOWN OF SKIN: ICD-10-CM

## 2020-05-03 DIAGNOSIS — G25.81 RESTLESS LEGS SYNDROME: ICD-10-CM

## 2020-05-03 DIAGNOSIS — Z79.82 LONG TERM (CURRENT) USE OF ASPIRIN: ICD-10-CM

## 2020-05-03 DIAGNOSIS — I83.218 VARICOSE VEINS OF RIGHT LOWER EXTREMITY WITH BOTH ULCER OF OTHER PART OF LOWER EXTREMITY AND INFLAMMATION: ICD-10-CM

## 2020-05-03 DIAGNOSIS — M47.816 SPONDYLOSIS WITHOUT MYELOPATHY OR RADICULOPATHY, LUMBAR REGION: ICD-10-CM

## 2020-05-03 DIAGNOSIS — I83.228 VARICOSE VEINS OF LEFT LOWER EXTREMITY WITH BOTH ULCER OF OTHER PART OF LOWER EXTREMITY AND INFLAMMATION: ICD-10-CM

## 2020-05-03 DIAGNOSIS — Z96.659 PRESENCE OF UNSPECIFIED ARTIFICIAL KNEE JOINT: ICD-10-CM

## 2020-05-03 DIAGNOSIS — Z79.899 OTHER LONG TERM (CURRENT) DRUG THERAPY: ICD-10-CM

## 2020-05-03 DIAGNOSIS — L97.811 NON-PRESSURE CHRONIC ULCER OF OTHER PART OF RIGHT LOWER LEG LIMITED TO BREAKDOWN OF SKIN: ICD-10-CM

## 2020-05-03 DIAGNOSIS — Z90.710 ACQUIRED ABSENCE OF BOTH CERVIX AND UTERUS: ICD-10-CM

## 2020-05-04 ENCOUNTER — OUTPATIENT (OUTPATIENT)
Dept: OUTPATIENT SERVICES | Facility: HOSPITAL | Age: 70
LOS: 1 days | Discharge: HOME | End: 2020-05-04
Payer: MEDICARE

## 2020-05-04 ENCOUNTER — APPOINTMENT (OUTPATIENT)
Dept: WOUND CARE | Facility: HOSPITAL | Age: 70
End: 2020-05-04
Payer: MEDICARE

## 2020-05-04 VITALS
DIASTOLIC BLOOD PRESSURE: 71 MMHG | OXYGEN SATURATION: 98 % | HEIGHT: 67 IN | BODY MASS INDEX: 31.86 KG/M2 | WEIGHT: 203 LBS | RESPIRATION RATE: 20 BRPM | SYSTOLIC BLOOD PRESSURE: 134 MMHG | TEMPERATURE: 97.7 F | HEART RATE: 104 BPM

## 2020-05-04 DIAGNOSIS — M65.272 CALCIFIC TENDINITIS, LEFT ANKLE AND FOOT: ICD-10-CM

## 2020-05-04 PROCEDURE — 99213 OFFICE O/P EST LOW 20 MIN: CPT

## 2020-05-04 PROCEDURE — 29581 APPL MULTLAYER CMPRN SYS LEG: CPT | Mod: 50

## 2020-05-04 NOTE — ASSESSMENT
[Verbal] : Verbal [Patient] : Patient [Verbalizes knowledge/Understanding] : Verbalizes knowledge/understanding [Good - alert, interested, motivated] : Good - alert, interested, motivated [Skin Care] : skin care [Dressing changes] : dressing changes [Compression Therapy] : compression therapy [How and When to Call] : how and when to call [Signs and symptoms of infection] : sign and symptoms of infection [] : Yes [Patient responsibility to plan of care] : patient responsibility to plan of care [Stable] : stable [Home] : Home [Walker] : Walker [Faxed - Long Term Care/Home Health Agency] : Long Term Care/Home Health Agency: Faxed [FreeTextEntry2] : Restore Skin Integrity\par Infection Control\par Localized wound care\par Compression Therapy\par Develop Realistic expectations and measurable treatments nursing POC to reduce, eliminate or develop acceptable pain limit tolerance [FreeTextEntry4] : Photos Taken\par F/U to [facility] Tuesday 5/5/2020 and Weds 5/6/2020 for dressing change, have MD assess drainage for continuation of coban application [FreeTextEntry1] : [facility]

## 2020-05-04 NOTE — VITALS
[Pain related to present condition?] : The patient's  pain is related to present condition. [] : No [FreeTextEntry3] : Left Leg, Right Leg [FreeTextEntry1] : Oxycodone

## 2020-05-04 NOTE — REVIEW OF SYSTEMS
[Fever] : no fever [Eye Pain] : no eye pain [Earache] : no earache [Chest Pain] : no chest pain [Cough] : no cough [Abdominal Pain] : no abdominal pain [Limb Pain] : limb pain [As Noted in HPI] : as noted in HPI [Skin Wound] : skin wound [FreeTextEntry9] : Pain to left plantar heel [de-identified] : bilateral lower leg venous stasis ulceration down to skin with stasis dermatitis

## 2020-05-04 NOTE — PLAN
[FreeTextEntry1] : Patient examined and evaluated at this time.\par Continue local wound care and offloading.\par Patient to return in 1 day for coban compression dressing.

## 2020-05-04 NOTE — PHYSICAL EXAM
[4 x 4] : 4 x 4  [Abdominal Pad] : Abdominal Pad [JVD] : no jugular venous distention  [1+] : left 1+ [Ankle Swelling (On Exam)] : present [Ankle Swelling Bilaterally] : bilaterally  [Ankle Swelling On The Left] : moderate [de-identified] : calm [de-identified] : Tenderness to palpation of left plantar and plantar medial calcaneus [de-identified] : bilateral lower leg venous stasis ulceration down to skin with stasis dermatitis [FreeTextEntry1] : Right Posterior Leg- Weeping Edema [FreeTextEntry2] : 3.5 [FreeTextEntry3] : 2.4 [FreeTextEntry4] : 0.2 [de-identified] : Serosanguineous [de-identified] : Slightly Macerated  [de-identified] : Coban Compression [de-identified] : Hydrofera Blue [FreeTextEntry7] : Left Leg- Scattered excoriations- weeping edema  [de-identified] : Cleansed with Normal Saline\par  [de-identified] : Serosanguineous [de-identified] : Coban Compression [de-identified] : Hydrofera Blue [de-identified] : Cleansed with Normal Saline\par  [de-identified] : Left Plantar Heel- peeling epithelium [de-identified] : No Treatment  [TWNoteComboBox4] : Moderate [TWNoteComboBox5] : No [de-identified] : None [de-identified] : None [de-identified] : No [de-identified] : 100% [de-identified] : Daily [de-identified] : Multilayer other compression wrap [de-identified] : No [de-identified] : No [de-identified] : Large [de-identified] : No [de-identified] : Macerated [de-identified] : None [de-identified] : None [de-identified] : None [de-identified] : Multilayer other compression wrap [de-identified] : Daily

## 2020-05-05 ENCOUNTER — APPOINTMENT (OUTPATIENT)
Dept: SURGERY | Facility: HOSPITAL | Age: 70
End: 2020-05-05
Payer: MEDICARE

## 2020-05-05 ENCOUNTER — OUTPATIENT (OUTPATIENT)
Dept: OUTPATIENT SERVICES | Facility: HOSPITAL | Age: 70
LOS: 1 days | Discharge: HOME | End: 2020-05-05
Payer: MEDICARE

## 2020-05-05 VITALS
OXYGEN SATURATION: 99 % | TEMPERATURE: 98 F | RESPIRATION RATE: 20 BRPM | DIASTOLIC BLOOD PRESSURE: 72 MMHG | HEART RATE: 97 BPM | SYSTOLIC BLOOD PRESSURE: 142 MMHG

## 2020-05-05 DIAGNOSIS — L97.821 NON-PRESSURE CHRONIC ULCER OF OTHER PART OF LEFT LOWER LEG LIMITED TO BREAKDOWN OF SKIN: ICD-10-CM

## 2020-05-05 DIAGNOSIS — Z72.0 TOBACCO USE: ICD-10-CM

## 2020-05-05 DIAGNOSIS — G25.81 RESTLESS LEGS SYNDROME: ICD-10-CM

## 2020-05-05 DIAGNOSIS — Z82.49 FAMILY HISTORY OF ISCHEMIC HEART DISEASE AND OTHER DISEASES OF THE CIRCULATORY SYSTEM: ICD-10-CM

## 2020-05-05 DIAGNOSIS — M17.12 UNILATERAL PRIMARY OSTEOARTHRITIS, LEFT KNEE: ICD-10-CM

## 2020-05-05 DIAGNOSIS — I83.218 VARICOSE VEINS OF RIGHT LOWER EXTREMITY WITH BOTH ULCER OF OTHER PART OF LOWER EXTREMITY AND INFLAMMATION: ICD-10-CM

## 2020-05-05 DIAGNOSIS — L97.811 NON-PRESSURE CHRONIC ULCER OF OTHER PART OF RIGHT LOWER LEG LIMITED TO BREAKDOWN OF SKIN: ICD-10-CM

## 2020-05-05 DIAGNOSIS — I83.228 VARICOSE VEINS OF LEFT LOWER EXTREMITY WITH BOTH ULCER OF OTHER PART OF LOWER EXTREMITY AND INFLAMMATION: ICD-10-CM

## 2020-05-05 DIAGNOSIS — M47.816 SPONDYLOSIS WITHOUT MYELOPATHY OR RADICULOPATHY, LUMBAR REGION: ICD-10-CM

## 2020-05-05 DIAGNOSIS — M16.9 OSTEOARTHRITIS OF HIP, UNSPECIFIED: ICD-10-CM

## 2020-05-05 DIAGNOSIS — M65.272 CALCIFIC TENDINITIS, LEFT ANKLE AND FOOT: ICD-10-CM

## 2020-05-05 DIAGNOSIS — M54.16 RADICULOPATHY, LUMBAR REGION: ICD-10-CM

## 2020-05-05 DIAGNOSIS — Z96.659 PRESENCE OF UNSPECIFIED ARTIFICIAL KNEE JOINT: ICD-10-CM

## 2020-05-05 DIAGNOSIS — E78.00 PURE HYPERCHOLESTEROLEMIA, UNSPECIFIED: ICD-10-CM

## 2020-05-05 DIAGNOSIS — Z79.82 LONG TERM (CURRENT) USE OF ASPIRIN: ICD-10-CM

## 2020-05-05 DIAGNOSIS — Z90.710 ACQUIRED ABSENCE OF BOTH CERVIX AND UTERUS: ICD-10-CM

## 2020-05-05 DIAGNOSIS — Z79.899 OTHER LONG TERM (CURRENT) DRUG THERAPY: ICD-10-CM

## 2020-05-05 PROCEDURE — ZZZZZ: CPT

## 2020-05-05 PROCEDURE — 29581 APPL MULTLAYER CMPRN SYS LEG: CPT | Mod: 50

## 2020-05-06 ENCOUNTER — OUTPATIENT (OUTPATIENT)
Dept: OUTPATIENT SERVICES | Facility: HOSPITAL | Age: 70
LOS: 1 days | Discharge: HOME | End: 2020-05-06
Payer: MEDICARE

## 2020-05-06 ENCOUNTER — APPOINTMENT (OUTPATIENT)
Dept: WOUND CARE | Facility: HOSPITAL | Age: 70
End: 2020-05-06
Payer: MEDICARE

## 2020-05-06 VITALS
RESPIRATION RATE: 20 BRPM | OXYGEN SATURATION: 97 % | HEIGHT: 67 IN | BODY MASS INDEX: 31.86 KG/M2 | DIASTOLIC BLOOD PRESSURE: 72 MMHG | SYSTOLIC BLOOD PRESSURE: 157 MMHG | TEMPERATURE: 97 F | HEART RATE: 96 BPM | WEIGHT: 203 LBS

## 2020-05-06 DIAGNOSIS — I83.218 VARICOSE VEINS OF RIGHT LOWER EXTREMITY WITH BOTH ULCER OF OTHER PART OF LOWER EXTREMITY AND INFLAMMATION: ICD-10-CM

## 2020-05-06 DIAGNOSIS — L97.811 NON-PRESSURE CHRONIC ULCER OF OTHER PART OF RIGHT LOWER LEG LIMITED TO BREAKDOWN OF SKIN: ICD-10-CM

## 2020-05-06 DIAGNOSIS — L97.821 NON-PRESSURE CHRONIC ULCER OF OTHER PART OF LEFT LOWER LEG LIMITED TO BREAKDOWN OF SKIN: ICD-10-CM

## 2020-05-06 DIAGNOSIS — M65.272 CALCIFIC TENDINITIS, LEFT ANKLE AND FOOT: ICD-10-CM

## 2020-05-06 DIAGNOSIS — I83.228 VARICOSE VEINS OF LEFT LOWER EXTREMITY WITH BOTH ULCER OF OTHER PART OF LOWER EXTREMITY AND INFLAMMATION: ICD-10-CM

## 2020-05-06 PROCEDURE — ZZZZZ: CPT

## 2020-05-06 PROCEDURE — 29581 APPL MULTLAYER CMPRN SYS LEG: CPT | Mod: 50

## 2020-05-07 ENCOUNTER — APPOINTMENT (OUTPATIENT)
Dept: OBGYN | Facility: HOSPITAL | Age: 70
End: 2020-05-07
Payer: MEDICARE

## 2020-05-07 ENCOUNTER — OUTPATIENT (OUTPATIENT)
Dept: OUTPATIENT SERVICES | Facility: HOSPITAL | Age: 70
LOS: 1 days | Discharge: HOME | End: 2020-05-07
Payer: MEDICARE

## 2020-05-07 VITALS
WEIGHT: 203 LBS | HEART RATE: 99 BPM | HEIGHT: 67 IN | SYSTOLIC BLOOD PRESSURE: 142 MMHG | BODY MASS INDEX: 31.86 KG/M2 | OXYGEN SATURATION: 99 % | RESPIRATION RATE: 20 BRPM | DIASTOLIC BLOOD PRESSURE: 68 MMHG | TEMPERATURE: 97 F

## 2020-05-07 DIAGNOSIS — L97.811 NON-PRESSURE CHRONIC ULCER OF OTHER PART OF RIGHT LOWER LEG LIMITED TO BREAKDOWN OF SKIN: ICD-10-CM

## 2020-05-07 DIAGNOSIS — M65.272 CALCIFIC TENDINITIS, LEFT ANKLE AND FOOT: ICD-10-CM

## 2020-05-07 DIAGNOSIS — I83.228 VARICOSE VEINS OF LEFT LOWER EXTREMITY WITH BOTH ULCER OF OTHER PART OF LOWER EXTREMITY AND INFLAMMATION: ICD-10-CM

## 2020-05-07 DIAGNOSIS — I83.218 VARICOSE VEINS OF RIGHT LOWER EXTREMITY WITH BOTH ULCER OF OTHER PART OF LOWER EXTREMITY AND INFLAMMATION: ICD-10-CM

## 2020-05-07 DIAGNOSIS — L97.821 NON-PRESSURE CHRONIC ULCER OF OTHER PART OF LEFT LOWER LEG LIMITED TO BREAKDOWN OF SKIN: ICD-10-CM

## 2020-05-07 PROCEDURE — ZZZZZ: CPT

## 2020-05-07 PROCEDURE — 29581 APPL MULTLAYER CMPRN SYS LEG: CPT | Mod: 50

## 2020-05-08 ENCOUNTER — OUTPATIENT (OUTPATIENT)
Dept: OUTPATIENT SERVICES | Facility: HOSPITAL | Age: 70
LOS: 1 days | Discharge: HOME | End: 2020-05-08
Payer: MEDICARE

## 2020-05-08 ENCOUNTER — APPOINTMENT (OUTPATIENT)
Dept: OBGYN | Facility: HOSPITAL | Age: 70
End: 2020-05-08
Payer: MEDICARE

## 2020-05-08 VITALS
OXYGEN SATURATION: 96 % | WEIGHT: 203 LBS | DIASTOLIC BLOOD PRESSURE: 35 MMHG | SYSTOLIC BLOOD PRESSURE: 132 MMHG | TEMPERATURE: 98.5 F | HEART RATE: 87 BPM | BODY MASS INDEX: 31.86 KG/M2 | RESPIRATION RATE: 20 BRPM | HEIGHT: 67 IN

## 2020-05-08 DIAGNOSIS — I83.228 VARICOSE VEINS OF LEFT LOWER EXTREMITY WITH BOTH ULCER OF OTHER PART OF LOWER EXTREMITY AND INFLAMMATION: ICD-10-CM

## 2020-05-08 DIAGNOSIS — I83.218 VARICOSE VEINS OF RIGHT LOWER EXTREMITY WITH BOTH ULCER OF OTHER PART OF LOWER EXTREMITY AND INFLAMMATION: ICD-10-CM

## 2020-05-08 DIAGNOSIS — M65.272 CALCIFIC TENDINITIS, LEFT ANKLE AND FOOT: ICD-10-CM

## 2020-05-08 DIAGNOSIS — L97.811 NON-PRESSURE CHRONIC ULCER OF OTHER PART OF RIGHT LOWER LEG LIMITED TO BREAKDOWN OF SKIN: ICD-10-CM

## 2020-05-08 DIAGNOSIS — L97.821 NON-PRESSURE CHRONIC ULCER OF OTHER PART OF LEFT LOWER LEG LIMITED TO BREAKDOWN OF SKIN: ICD-10-CM

## 2020-05-08 PROCEDURE — ZZZZZ: CPT

## 2020-05-08 PROCEDURE — 29581 APPL MULTLAYER CMPRN SYS LEG: CPT | Mod: 50

## 2020-05-11 ENCOUNTER — OUTPATIENT (OUTPATIENT)
Dept: OUTPATIENT SERVICES | Facility: HOSPITAL | Age: 70
LOS: 1 days | Discharge: HOME | End: 2020-05-11
Payer: MEDICARE

## 2020-05-11 ENCOUNTER — APPOINTMENT (OUTPATIENT)
Dept: OBGYN | Facility: HOSPITAL | Age: 70
End: 2020-05-11
Payer: MEDICARE

## 2020-05-11 VITALS
TEMPERATURE: 97.5 F | BODY MASS INDEX: 31.86 KG/M2 | OXYGEN SATURATION: 96 % | SYSTOLIC BLOOD PRESSURE: 156 MMHG | HEART RATE: 100 BPM | HEIGHT: 67 IN | DIASTOLIC BLOOD PRESSURE: 70 MMHG | RESPIRATION RATE: 20 BRPM | WEIGHT: 203 LBS

## 2020-05-11 DIAGNOSIS — M65.272 CALCIFIC TENDINITIS, LEFT ANKLE AND FOOT: ICD-10-CM

## 2020-05-11 PROCEDURE — 29581 APPL MULTLAYER CMPRN SYS LEG: CPT | Mod: 50

## 2020-05-11 PROCEDURE — 99213 OFFICE O/P EST LOW 20 MIN: CPT

## 2020-05-11 NOTE — PLAN
[FreeTextEntry1] : leg elevation\par Dakin's solution applied.\par hydrofera blue/DD/coban\par Dsg change qod\par F/u 1 wk.

## 2020-05-11 NOTE — PHYSICAL EXAM
[4 x 4] : 4 x 4  [Abdominal Pad] : Abdominal Pad [Normal Thyroid] : the thyroid was normal [Normal Breath Sounds] : Normal breath sounds [Normal Heart Sounds] : normal heart sounds [Normal Rate and Rhythm] : normal rate and rhythm [] : bilaterally [Ankle Swelling On The Left] : moderate [Alert] : alert [Oriented to Person] : oriented to person [Oriented to Place] : oriented to place [Oriented to Time] : oriented to time [Calm] : calm [Abdomen Masses] : No abdominal massess [JVD] : no jugular venous distention  [Abdomen Tenderness] : ~T ~M No abdominal tenderness [Tender] : nontender [de-identified] : elderly WF, NAD, alert, Ox3. [FreeTextEntry1] : Right Posterior Leg [FreeTextEntry2] : 3.4 [FreeTextEntry3] : 2.2 [FreeTextEntry4] : 0.1 [de-identified] : some greenish Serosanguineous [de-identified] : Coban Compression [de-identified] : Hydrofera Blue [de-identified] : Cleansed with Normal Saline\par \par Cleansed with Dankins sol. Rinsed with Normal Saline- Today Only [FreeTextEntry7] : Left Leg- scattered excoriated areas [de-identified] : some greenish drainage, Serosanguineous [de-identified] : Coban Compression [de-identified] : Hydrofera Blue [de-identified] : Cleansed with Normal Saline\par \par Cleansed with Dankins sol. Rinsed with Normal Saline- Today Only [TWNoteComboBox4] : Moderate [TWNoteComboBox5] : No [de-identified] : No [de-identified] : Macerated [de-identified] : Mild [de-identified] : None [de-identified] : 100% [de-identified] : Multilayer other compression wrap [de-identified] : No [de-identified] : Daily [de-identified] : Large [de-identified] : No [de-identified] : No [de-identified] : None [de-identified] : Macerated [de-identified] : None [de-identified] : 100% [de-identified] : No [de-identified] : Multilayer other compression wrap [de-identified] : Daily

## 2020-05-11 NOTE — HISTORY OF PRESENT ILLNESS
[FreeTextEntry1] : 69 yo WF, here for F/u of bilateral chronic VSU/lymphedema of LLE. Has been coming to the [facility] almost daily for dsg changes. Some green drainage noted today bilaterally. Dakin's solution applied. Encouraged leg elevation along with compression.

## 2020-05-11 NOTE — REVIEW OF SYSTEMS
[As Noted in HPI] : as noted in HPI [Limb Pain] : limb pain [Skin Wound] : skin wound [Fever] : no fever [Eye Pain] : no eye pain [Earache] : no earache [Chest Pain] : no chest pain [Cough] : no cough [Abdominal Pain] : no abdominal pain [FreeTextEntry9] : Pain to left plantar heel [de-identified] : bilateral lower leg venous stasis ulceration down to skin with stasis dermatitis

## 2020-05-11 NOTE — ASSESSMENT
[Verbal] : Verbal [Patient] : Patient [Good - alert, interested, motivated] : Good - alert, interested, motivated [Dressing changes] : dressing changes [Verbalizes knowledge/Understanding] : Verbalizes knowledge/understanding [Skin Care] : skin care [Signs and symptoms of infection] : sign and symptoms of infection [How and When to Call] : how and when to call [Compression Therapy] : compression therapy [Patient responsibility to plan of care] : patient responsibility to plan of care [] : Yes [Stable] : stable [Home] : Home [Walker] : Walker [Faxed - Long Term Care/Home Health Agency] : Long Term Care/Home Health Agency: Faxed [FreeTextEntry2] : Restore Skin Integrity\par Infection Control\par Localized wound care\par Compression Therapy\par Develop Realistic expectations and measurable treatments nursing POC to reduce, eliminate or develop acceptable pain limit tolerance [FreeTextEntry4] : Photos Taken\par F/U to [facility] in Friday for dressing change 1 week for assessment \par  [FreeTextEntry1] : [facility]

## 2020-05-14 DIAGNOSIS — Z90.710 ACQUIRED ABSENCE OF BOTH CERVIX AND UTERUS: ICD-10-CM

## 2020-05-14 DIAGNOSIS — Z72.0 TOBACCO USE: ICD-10-CM

## 2020-05-14 DIAGNOSIS — G25.81 RESTLESS LEGS SYNDROME: ICD-10-CM

## 2020-05-14 DIAGNOSIS — Z79.82 LONG TERM (CURRENT) USE OF ASPIRIN: ICD-10-CM

## 2020-05-14 DIAGNOSIS — Z79.899 OTHER LONG TERM (CURRENT) DRUG THERAPY: ICD-10-CM

## 2020-05-14 DIAGNOSIS — L97.821 NON-PRESSURE CHRONIC ULCER OF OTHER PART OF LEFT LOWER LEG LIMITED TO BREAKDOWN OF SKIN: ICD-10-CM

## 2020-05-14 DIAGNOSIS — Z96.659 PRESENCE OF UNSPECIFIED ARTIFICIAL KNEE JOINT: ICD-10-CM

## 2020-05-14 DIAGNOSIS — M47.816 SPONDYLOSIS WITHOUT MYELOPATHY OR RADICULOPATHY, LUMBAR REGION: ICD-10-CM

## 2020-05-14 DIAGNOSIS — L97.811 NON-PRESSURE CHRONIC ULCER OF OTHER PART OF RIGHT LOWER LEG LIMITED TO BREAKDOWN OF SKIN: ICD-10-CM

## 2020-05-14 DIAGNOSIS — M65.272 CALCIFIC TENDINITIS, LEFT ANKLE AND FOOT: ICD-10-CM

## 2020-05-14 DIAGNOSIS — E78.00 PURE HYPERCHOLESTEROLEMIA, UNSPECIFIED: ICD-10-CM

## 2020-05-14 DIAGNOSIS — M17.12 UNILATERAL PRIMARY OSTEOARTHRITIS, LEFT KNEE: ICD-10-CM

## 2020-05-14 DIAGNOSIS — I83.218 VARICOSE VEINS OF RIGHT LOWER EXTREMITY WITH BOTH ULCER OF OTHER PART OF LOWER EXTREMITY AND INFLAMMATION: ICD-10-CM

## 2020-05-14 DIAGNOSIS — I83.228 VARICOSE VEINS OF LEFT LOWER EXTREMITY WITH BOTH ULCER OF OTHER PART OF LOWER EXTREMITY AND INFLAMMATION: ICD-10-CM

## 2020-05-14 DIAGNOSIS — M54.16 RADICULOPATHY, LUMBAR REGION: ICD-10-CM

## 2020-05-14 DIAGNOSIS — Z82.49 FAMILY HISTORY OF ISCHEMIC HEART DISEASE AND OTHER DISEASES OF THE CIRCULATORY SYSTEM: ICD-10-CM

## 2020-05-14 DIAGNOSIS — M16.9 OSTEOARTHRITIS OF HIP, UNSPECIFIED: ICD-10-CM

## 2020-05-15 ENCOUNTER — APPOINTMENT (OUTPATIENT)
Dept: WOUND CARE | Facility: HOSPITAL | Age: 70
End: 2020-05-15
Payer: MEDICARE

## 2020-05-15 ENCOUNTER — OUTPATIENT (OUTPATIENT)
Dept: OUTPATIENT SERVICES | Facility: HOSPITAL | Age: 70
LOS: 1 days | Discharge: HOME | End: 2020-05-15
Payer: MEDICARE

## 2020-05-15 VITALS
DIASTOLIC BLOOD PRESSURE: 76 MMHG | HEIGHT: 67 IN | WEIGHT: 203 LBS | RESPIRATION RATE: 20 BRPM | BODY MASS INDEX: 31.86 KG/M2 | HEART RATE: 90 BPM | TEMPERATURE: 97.6 F | SYSTOLIC BLOOD PRESSURE: 141 MMHG | OXYGEN SATURATION: 95 %

## 2020-05-15 DIAGNOSIS — I83.228 VARICOSE VEINS OF LEFT LOWER EXTREMITY WITH BOTH ULCER OF OTHER PART OF LOWER EXTREMITY AND INFLAMMATION: ICD-10-CM

## 2020-05-15 DIAGNOSIS — M65.272 CALCIFIC TENDINITIS, LEFT ANKLE AND FOOT: ICD-10-CM

## 2020-05-15 DIAGNOSIS — I83.218 VARICOSE VEINS OF RIGHT LOWER EXTREMITY WITH BOTH ULCER OF OTHER PART OF LOWER EXTREMITY AND INFLAMMATION: ICD-10-CM

## 2020-05-15 DIAGNOSIS — L97.821 NON-PRESSURE CHRONIC ULCER OF OTHER PART OF LEFT LOWER LEG LIMITED TO BREAKDOWN OF SKIN: ICD-10-CM

## 2020-05-15 DIAGNOSIS — L97.811 NON-PRESSURE CHRONIC ULCER OF OTHER PART OF RIGHT LOWER LEG LIMITED TO BREAKDOWN OF SKIN: ICD-10-CM

## 2020-05-15 PROCEDURE — ZZZZZ: CPT

## 2020-05-15 PROCEDURE — 29581 APPL MULTLAYER CMPRN SYS LEG: CPT | Mod: 50

## 2020-05-18 ENCOUNTER — APPOINTMENT (OUTPATIENT)
Dept: OBGYN | Facility: HOSPITAL | Age: 70
End: 2020-05-18

## 2020-05-18 ENCOUNTER — OUTPATIENT (OUTPATIENT)
Dept: OUTPATIENT SERVICES | Facility: HOSPITAL | Age: 70
LOS: 1 days | Discharge: HOME | End: 2020-05-18
Payer: MEDICARE

## 2020-05-18 ENCOUNTER — APPOINTMENT (OUTPATIENT)
Dept: WOUND CARE | Facility: HOSPITAL | Age: 70
End: 2020-05-18
Payer: MEDICARE

## 2020-05-18 ENCOUNTER — APPOINTMENT (OUTPATIENT)
Dept: CARDIOLOGY | Facility: CLINIC | Age: 70
End: 2020-05-18

## 2020-05-18 VITALS
WEIGHT: 203 LBS | DIASTOLIC BLOOD PRESSURE: 73 MMHG | HEIGHT: 67 IN | HEART RATE: 88 BPM | BODY MASS INDEX: 31.86 KG/M2 | SYSTOLIC BLOOD PRESSURE: 150 MMHG | OXYGEN SATURATION: 99 % | RESPIRATION RATE: 20 BRPM | TEMPERATURE: 98.1 F

## 2020-05-18 DIAGNOSIS — M65.272 CALCIFIC TENDINITIS, LEFT ANKLE AND FOOT: ICD-10-CM

## 2020-05-18 PROCEDURE — 99213 OFFICE O/P EST LOW 20 MIN: CPT

## 2020-05-18 PROCEDURE — 29581 APPL MULTLAYER CMPRN SYS LEG: CPT | Mod: 50

## 2020-05-18 NOTE — PLAN
[FreeTextEntry1] : Patient examined and evaluated at this time.\par Continue local wound care and offloading.\par Patient to return in 1 week for follow up.

## 2020-05-18 NOTE — REVIEW OF SYSTEMS
[Fever] : no fever [Eye Pain] : no eye pain [Earache] : no earache [Chest Pain] : no chest pain [Abdominal Pain] : no abdominal pain [Cough] : no cough [de-identified] : bilateral lower leg venous stasis ulceration down to skin with stasis dermatitis [FreeTextEntry9] : Pain to left plantar heel

## 2020-05-18 NOTE — ASSESSMENT
[FreeTextEntry2] : Infection Prevention\par Weight management/ nutrition \par Compression Therapy\par Achieving pain tolerance levels within the Pts limits of 0/10.\par Develop Realistic expectations and measurable goals in nursing POC to reduce, eliminate or develop acceptable pain limit tolerance.\par Pt educated on the utilization of offloading, taking deep breaths and guided imagery to reduce discomfort PRN.  [FreeTextEntry4] : No signs/symptoms of infection. \par F/U to [facility] on Wednesday and Friday for dressing change 1 week for assessment \par Pt stated she will follow up to [facility] for dressing changes she no longer wishes to have [facility] come to her home  [FreeTextEntry1] : [facility]

## 2020-05-18 NOTE — PHYSICAL EXAM
[JVD] : no jugular venous distention  [Ankle Swelling On The Left] : moderate [1+] : left 1+ [Abdomen Masses] : No abdominal massess [Abdomen Tenderness] : ~T ~M No abdominal tenderness [Enlarged] : not enlarged [Tender] : nontender [de-identified] : calm [de-identified] : Tenderness to palpation of left plantar and plantar medial calcaneus [de-identified] : bilateral lower leg venous stasis ulceration down to skin with stasis dermatitis [FreeTextEntry1] : Posterior Leg [FreeTextEntry2] : 3.4 [FreeTextEntry3] : 2.2 [de-identified] : some greenish Serosanguineous [FreeTextEntry4] : 0.1 [de-identified] : Coban Compression [de-identified] : CYNTHIA,Hydrofera Blue,DD  [FreeTextEntry7] : Left Leg- scattered excoriated areas [de-identified] : Coban Compression [de-identified] : some greenish drainage, Serosanguineous [TWNoteComboBox1] : Right [de-identified] : CYNTHIA,Hydrofera Blue,DD  [TWNoteComboBox5] : No [TWNoteComboBox4] : Moderate [de-identified] : No [de-identified] : Macerated [de-identified] : Mild [de-identified] : None [de-identified] : 100% [de-identified] : No [de-identified] : Multilayer other compression wrap [de-identified] : Secondary Dressing [de-identified] : 3x Weekly [de-identified] : Large [de-identified] : No [de-identified] : None [de-identified] : No [de-identified] : Macerated [de-identified] : None [de-identified] : 100% [de-identified] : Multilayer other compression wrap [de-identified] : No [de-identified] : 3x Weekly [de-identified] : Secondary Dressing

## 2020-05-20 ENCOUNTER — OUTPATIENT (OUTPATIENT)
Dept: OUTPATIENT SERVICES | Facility: HOSPITAL | Age: 70
LOS: 1 days | Discharge: HOME | End: 2020-05-20
Payer: MEDICARE

## 2020-05-20 ENCOUNTER — APPOINTMENT (OUTPATIENT)
Dept: OBGYN | Facility: HOSPITAL | Age: 70
End: 2020-05-20
Payer: MEDICARE

## 2020-05-20 VITALS
TEMPERATURE: 98 F | WEIGHT: 203 LBS | RESPIRATION RATE: 20 BRPM | OXYGEN SATURATION: 98 % | BODY MASS INDEX: 31.86 KG/M2 | SYSTOLIC BLOOD PRESSURE: 140 MMHG | HEIGHT: 67 IN | DIASTOLIC BLOOD PRESSURE: 78 MMHG | HEART RATE: 91 BPM

## 2020-05-20 DIAGNOSIS — L97.821 NON-PRESSURE CHRONIC ULCER OF OTHER PART OF LEFT LOWER LEG LIMITED TO BREAKDOWN OF SKIN: ICD-10-CM

## 2020-05-20 DIAGNOSIS — M47.816 SPONDYLOSIS WITHOUT MYELOPATHY OR RADICULOPATHY, LUMBAR REGION: ICD-10-CM

## 2020-05-20 DIAGNOSIS — L97.811 NON-PRESSURE CHRONIC ULCER OF OTHER PART OF RIGHT LOWER LEG LIMITED TO BREAKDOWN OF SKIN: ICD-10-CM

## 2020-05-20 DIAGNOSIS — Z96.659 PRESENCE OF UNSPECIFIED ARTIFICIAL KNEE JOINT: ICD-10-CM

## 2020-05-20 DIAGNOSIS — I83.218 VARICOSE VEINS OF RIGHT LOWER EXTREMITY WITH BOTH ULCER OF OTHER PART OF LOWER EXTREMITY AND INFLAMMATION: ICD-10-CM

## 2020-05-20 DIAGNOSIS — M54.16 RADICULOPATHY, LUMBAR REGION: ICD-10-CM

## 2020-05-20 DIAGNOSIS — M16.9 OSTEOARTHRITIS OF HIP, UNSPECIFIED: ICD-10-CM

## 2020-05-20 DIAGNOSIS — Z79.82 LONG TERM (CURRENT) USE OF ASPIRIN: ICD-10-CM

## 2020-05-20 DIAGNOSIS — I83.228 VARICOSE VEINS OF LEFT LOWER EXTREMITY WITH BOTH ULCER OF OTHER PART OF LOWER EXTREMITY AND INFLAMMATION: ICD-10-CM

## 2020-05-20 DIAGNOSIS — M17.12 UNILATERAL PRIMARY OSTEOARTHRITIS, LEFT KNEE: ICD-10-CM

## 2020-05-20 DIAGNOSIS — M65.272 CALCIFIC TENDINITIS, LEFT ANKLE AND FOOT: ICD-10-CM

## 2020-05-20 DIAGNOSIS — Z72.0 TOBACCO USE: ICD-10-CM

## 2020-05-20 DIAGNOSIS — Z82.49 FAMILY HISTORY OF ISCHEMIC HEART DISEASE AND OTHER DISEASES OF THE CIRCULATORY SYSTEM: ICD-10-CM

## 2020-05-20 DIAGNOSIS — Z90.710 ACQUIRED ABSENCE OF BOTH CERVIX AND UTERUS: ICD-10-CM

## 2020-05-20 DIAGNOSIS — E78.00 PURE HYPERCHOLESTEROLEMIA, UNSPECIFIED: ICD-10-CM

## 2020-05-20 DIAGNOSIS — Z79.899 OTHER LONG TERM (CURRENT) DRUG THERAPY: ICD-10-CM

## 2020-05-20 DIAGNOSIS — G25.81 RESTLESS LEGS SYNDROME: ICD-10-CM

## 2020-05-20 PROCEDURE — 29581 APPL MULTLAYER CMPRN SYS LEG: CPT | Mod: 50

## 2020-05-20 PROCEDURE — ZZZZZ: CPT

## 2020-05-22 ENCOUNTER — APPOINTMENT (OUTPATIENT)
Dept: SURGERY | Facility: HOSPITAL | Age: 70
End: 2020-05-22
Payer: MEDICARE

## 2020-05-22 ENCOUNTER — OUTPATIENT (OUTPATIENT)
Dept: OUTPATIENT SERVICES | Facility: HOSPITAL | Age: 70
LOS: 1 days | Discharge: HOME | End: 2020-05-22
Payer: MEDICARE

## 2020-05-22 VITALS
TEMPERATURE: 97.8 F | SYSTOLIC BLOOD PRESSURE: 154 MMHG | WEIGHT: 203 LBS | RESPIRATION RATE: 20 BRPM | HEIGHT: 67 IN | BODY MASS INDEX: 31.86 KG/M2 | HEART RATE: 53 BPM | DIASTOLIC BLOOD PRESSURE: 77 MMHG

## 2020-05-22 DIAGNOSIS — I83.218 VARICOSE VEINS OF RIGHT LOWER EXTREMITY WITH BOTH ULCER OF OTHER PART OF LOWER EXTREMITY AND INFLAMMATION: ICD-10-CM

## 2020-05-22 DIAGNOSIS — L97.811 NON-PRESSURE CHRONIC ULCER OF OTHER PART OF RIGHT LOWER LEG LIMITED TO BREAKDOWN OF SKIN: ICD-10-CM

## 2020-05-22 DIAGNOSIS — L97.821 NON-PRESSURE CHRONIC ULCER OF OTHER PART OF LEFT LOWER LEG LIMITED TO BREAKDOWN OF SKIN: ICD-10-CM

## 2020-05-22 DIAGNOSIS — I83.228 VARICOSE VEINS OF LEFT LOWER EXTREMITY WITH BOTH ULCER OF OTHER PART OF LOWER EXTREMITY AND INFLAMMATION: ICD-10-CM

## 2020-05-22 DIAGNOSIS — M86.272 SUBACUTE OSTEOMYELITIS, LEFT ANKLE AND FOOT: ICD-10-CM

## 2020-05-22 PROCEDURE — 29581 APPL MULTLAYER CMPRN SYS LEG: CPT | Mod: 50

## 2020-05-22 PROCEDURE — ZZZZZ: CPT

## 2020-05-26 ENCOUNTER — OUTPATIENT (OUTPATIENT)
Dept: OUTPATIENT SERVICES | Facility: HOSPITAL | Age: 70
LOS: 1 days | Discharge: HOME | End: 2020-05-26
Payer: MEDICARE

## 2020-05-26 ENCOUNTER — APPOINTMENT (OUTPATIENT)
Dept: WOUND CARE | Facility: HOSPITAL | Age: 70
End: 2020-05-26
Payer: MEDICARE

## 2020-05-26 VITALS
DIASTOLIC BLOOD PRESSURE: 73 MMHG | BODY MASS INDEX: 31.86 KG/M2 | RESPIRATION RATE: 20 BRPM | TEMPERATURE: 97.2 F | WEIGHT: 203 LBS | SYSTOLIC BLOOD PRESSURE: 137 MMHG | HEIGHT: 67 IN | OXYGEN SATURATION: 98 % | HEART RATE: 84 BPM

## 2020-05-26 DIAGNOSIS — M47.816 SPONDYLOSIS WITHOUT MYELOPATHY OR RADICULOPATHY, LUMBAR REGION: ICD-10-CM

## 2020-05-26 DIAGNOSIS — E78.00 PURE HYPERCHOLESTEROLEMIA, UNSPECIFIED: ICD-10-CM

## 2020-05-26 DIAGNOSIS — M54.16 RADICULOPATHY, LUMBAR REGION: ICD-10-CM

## 2020-05-26 DIAGNOSIS — Z96.659 PRESENCE OF UNSPECIFIED ARTIFICIAL KNEE JOINT: ICD-10-CM

## 2020-05-26 DIAGNOSIS — M65.272 CALCIFIC TENDINITIS, LEFT ANKLE AND FOOT: ICD-10-CM

## 2020-05-26 DIAGNOSIS — Z79.82 LONG TERM (CURRENT) USE OF ASPIRIN: ICD-10-CM

## 2020-05-26 DIAGNOSIS — M17.12 UNILATERAL PRIMARY OSTEOARTHRITIS, LEFT KNEE: ICD-10-CM

## 2020-05-26 DIAGNOSIS — Z90.710 ACQUIRED ABSENCE OF BOTH CERVIX AND UTERUS: ICD-10-CM

## 2020-05-26 DIAGNOSIS — G25.81 RESTLESS LEGS SYNDROME: ICD-10-CM

## 2020-05-26 DIAGNOSIS — L97.811 NON-PRESSURE CHRONIC ULCER OF OTHER PART OF RIGHT LOWER LEG LIMITED TO BREAKDOWN OF SKIN: ICD-10-CM

## 2020-05-26 DIAGNOSIS — I83.228 VARICOSE VEINS OF LEFT LOWER EXTREMITY WITH BOTH ULCER OF OTHER PART OF LOWER EXTREMITY AND INFLAMMATION: ICD-10-CM

## 2020-05-26 DIAGNOSIS — I83.218 VARICOSE VEINS OF RIGHT LOWER EXTREMITY WITH BOTH ULCER OF OTHER PART OF LOWER EXTREMITY AND INFLAMMATION: ICD-10-CM

## 2020-05-26 DIAGNOSIS — Z82.49 FAMILY HISTORY OF ISCHEMIC HEART DISEASE AND OTHER DISEASES OF THE CIRCULATORY SYSTEM: ICD-10-CM

## 2020-05-26 DIAGNOSIS — M16.9 OSTEOARTHRITIS OF HIP, UNSPECIFIED: ICD-10-CM

## 2020-05-26 DIAGNOSIS — Z72.0 TOBACCO USE: ICD-10-CM

## 2020-05-26 DIAGNOSIS — Z79.899 OTHER LONG TERM (CURRENT) DRUG THERAPY: ICD-10-CM

## 2020-05-26 DIAGNOSIS — L97.821 NON-PRESSURE CHRONIC ULCER OF OTHER PART OF LEFT LOWER LEG LIMITED TO BREAKDOWN OF SKIN: ICD-10-CM

## 2020-05-26 PROCEDURE — 29581 APPL MULTLAYER CMPRN SYS LEG: CPT | Mod: 50

## 2020-05-26 PROCEDURE — 99213 OFFICE O/P EST LOW 20 MIN: CPT

## 2020-05-26 NOTE — VITALS
[Pain related to present condition?] : The patient's  pain is related to present condition. [Throbbing] : throbbing [Occasional] : occasional [] : No [FreeTextEntry3] : Bilateral legs  [de-identified] : 5/10    Acceptable pain tolerance levels deemed to be 3/10. [FreeTextEntry1] : Dry dressings and Oxycodone  [FreeTextEntry2] : Wet dressings  [FreeTextEntry4] : Multilayer compression therapy and double padded dressings.

## 2020-05-26 NOTE — REVIEW OF SYSTEMS
[Limb Pain] : limb pain [As Noted in HPI] : as noted in HPI [Skin Wound] : skin wound [Fever] : no fever [Eye Pain] : no eye pain [Earache] : no earache [Chest Pain] : no chest pain [Cough] : no cough [Abdominal Pain] : no abdominal pain [de-identified] : bilateral lower leg venous stasis ulceration down to skin with stasis dermatitis [FreeTextEntry9] : Pain to left plantar heel

## 2020-05-26 NOTE — PHYSICAL EXAM
[Normal Thyroid] : the thyroid was normal [Normal Heart Sounds] : normal heart sounds [Normal Breath Sounds] : Normal breath sounds [Normal Rate and Rhythm] : normal rate and rhythm [1+] : left 1+ [Ankle Swelling Bilaterally] : bilaterally  [Ankle Swelling (On Exam)] : present [] : present [Ankle Swelling On The Left] : moderate [Oriented to Person] : oriented to person [Alert] : alert [Oriented to Place] : oriented to place [Oriented to Time] : oriented to time [Calm] : calm [4 x 4] : 4 x 4  [Abdominal Pad] : Abdominal Pad [JVD] : no jugular venous distention  [Abdomen Tenderness] : ~T ~M No abdominal tenderness [Abdomen Masses] : No abdominal massess [Enlarged] : not enlarged [Tender] : nontender [de-identified] : calm [de-identified] : Tenderness to palpation of left plantar and plantar medial calcaneus [de-identified] : bilateral lower leg venous stasis ulceration down to skin with stasis dermatitis [FreeTextEntry2] : 4 [FreeTextEntry1] : Right posterior leg  [FreeTextEntry3] : 2.4 [FreeTextEntry4] : 0.1 [de-identified] : Serous/sanguinous [de-identified] : Coban  Expressed comfort post Coban application. [de-identified] : Cleansed with NS\par Kerlix  [de-identified] : Hydrofera blue  [FreeTextEntry7] : Left leg - Scattered excoriated areas  [de-identified] : Serous/sanguinous [de-identified] : Coban Expressed comfort post Coban application. [de-identified] : Hydrofera blue  [de-identified] : Cleansed with NS\par Kerlix  [TWNoteComboBox4] : Large [de-identified] : Macerated [de-identified] : None [de-identified] : None [de-identified] : No [de-identified] : 100% [de-identified] : 3x Weekly [de-identified] : Multilayer other compression wrap [de-identified] : Primary Dressing [de-identified] : Large [de-identified] : Macerated [de-identified] : None [de-identified] : 100% [de-identified] : None [de-identified] : No [de-identified] : Multilayer other compression wrap [de-identified] : 3x Weekly [de-identified] : Primary Dressing

## 2020-05-26 NOTE — ASSESSMENT
[Verbal] : Verbal [Written] : Written [Demo] : Demo [Patient] : Patient [Fair - mild discomfort, physical impairment, low acceptance] : Fair - mild discomfort, physical impairment, low acceptance [Verbalizes knowledge/Understanding] : Verbalizes knowledge/understanding [Skin Care] : skin care [Dressing changes] : dressing changes [Signs and symptoms of infection] : sign and symptoms of infection [Venous Disease] : venous disease [How and When to Call] : how and when to call [Nutrition] : nutrition [Pain Management] : pain management [Compression Therapy] : compression therapy [Patient responsibility to plan of care] : patient responsibility to plan of care [] : Yes [Stable] : stable [Home] : Home [Walker] : Walker [Not Applicable - Long Term Care/Home Health Agency] : Long Term Care/Home Health Agency: Not Applicable [FreeTextEntry3] : P [FreeTextEntry2] : Infection prevention \par Edema prevention \par Localized wound care \par Promote optimal nutrition\par Compression therapy   [FreeTextEntry4] : Dr. Jerome Rose consult submitted. \par Dressing change 5/28/20 and 5/29/20 then 3 times a week \par Assessment in 1 week

## 2020-05-28 ENCOUNTER — OUTPATIENT (OUTPATIENT)
Dept: OUTPATIENT SERVICES | Facility: HOSPITAL | Age: 70
LOS: 1 days | Discharge: HOME | End: 2020-05-28
Payer: MEDICARE

## 2020-05-28 ENCOUNTER — APPOINTMENT (OUTPATIENT)
Dept: OBGYN | Facility: HOSPITAL | Age: 70
End: 2020-05-28
Payer: MEDICARE

## 2020-05-28 VITALS
TEMPERATURE: 97.5 F | BODY MASS INDEX: 31.86 KG/M2 | OXYGEN SATURATION: 97 % | WEIGHT: 203 LBS | SYSTOLIC BLOOD PRESSURE: 124 MMHG | DIASTOLIC BLOOD PRESSURE: 69 MMHG | RESPIRATION RATE: 20 BRPM | HEART RATE: 92 BPM | HEIGHT: 67 IN

## 2020-05-28 DIAGNOSIS — I83.218 VARICOSE VEINS OF RIGHT LOWER EXTREMITY WITH BOTH ULCER OF OTHER PART OF LOWER EXTREMITY AND INFLAMMATION: ICD-10-CM

## 2020-05-28 DIAGNOSIS — M65.272 CALCIFIC TENDINITIS, LEFT ANKLE AND FOOT: ICD-10-CM

## 2020-05-28 DIAGNOSIS — L97.811 NON-PRESSURE CHRONIC ULCER OF OTHER PART OF RIGHT LOWER LEG LIMITED TO BREAKDOWN OF SKIN: ICD-10-CM

## 2020-05-28 DIAGNOSIS — L97.821 NON-PRESSURE CHRONIC ULCER OF OTHER PART OF LEFT LOWER LEG LIMITED TO BREAKDOWN OF SKIN: ICD-10-CM

## 2020-05-28 DIAGNOSIS — I83.228 VARICOSE VEINS OF LEFT LOWER EXTREMITY WITH BOTH ULCER OF OTHER PART OF LOWER EXTREMITY AND INFLAMMATION: ICD-10-CM

## 2020-05-28 PROCEDURE — ZZZZZ: CPT

## 2020-05-28 PROCEDURE — G0463: CPT

## 2020-05-29 ENCOUNTER — APPOINTMENT (OUTPATIENT)
Dept: SURGERY | Facility: HOSPITAL | Age: 70
End: 2020-05-29
Payer: MEDICARE

## 2020-05-29 ENCOUNTER — OUTPATIENT (OUTPATIENT)
Dept: OUTPATIENT SERVICES | Facility: HOSPITAL | Age: 70
LOS: 1 days | Discharge: HOME | End: 2020-05-29
Payer: MEDICARE

## 2020-05-29 VITALS
SYSTOLIC BLOOD PRESSURE: 137 MMHG | HEIGHT: 67 IN | WEIGHT: 203 LBS | RESPIRATION RATE: 20 BRPM | TEMPERATURE: 97.1 F | DIASTOLIC BLOOD PRESSURE: 70 MMHG | OXYGEN SATURATION: 96 % | BODY MASS INDEX: 31.86 KG/M2 | HEART RATE: 88 BPM

## 2020-05-29 DIAGNOSIS — I83.218 VARICOSE VEINS OF RIGHT LOWER EXTREMITY WITH BOTH ULCER OF OTHER PART OF LOWER EXTREMITY AND INFLAMMATION: ICD-10-CM

## 2020-05-29 DIAGNOSIS — L97.811 NON-PRESSURE CHRONIC ULCER OF OTHER PART OF RIGHT LOWER LEG LIMITED TO BREAKDOWN OF SKIN: ICD-10-CM

## 2020-05-29 DIAGNOSIS — M65.272 CALCIFIC TENDINITIS, LEFT ANKLE AND FOOT: ICD-10-CM

## 2020-05-29 DIAGNOSIS — L97.821 NON-PRESSURE CHRONIC ULCER OF OTHER PART OF LEFT LOWER LEG LIMITED TO BREAKDOWN OF SKIN: ICD-10-CM

## 2020-05-29 DIAGNOSIS — I83.228 VARICOSE VEINS OF LEFT LOWER EXTREMITY WITH BOTH ULCER OF OTHER PART OF LOWER EXTREMITY AND INFLAMMATION: ICD-10-CM

## 2020-05-29 PROCEDURE — 29581 APPL MULTLAYER CMPRN SYS LEG: CPT | Mod: 50

## 2020-05-29 PROCEDURE — ZZZZZ: CPT

## 2020-06-01 ENCOUNTER — OUTPATIENT (OUTPATIENT)
Dept: OUTPATIENT SERVICES | Facility: HOSPITAL | Age: 70
LOS: 1 days | Discharge: HOME | End: 2020-06-01
Payer: MEDICARE

## 2020-06-01 ENCOUNTER — NON-APPOINTMENT (OUTPATIENT)
Age: 70
End: 2020-06-01

## 2020-06-01 ENCOUNTER — APPOINTMENT (OUTPATIENT)
Dept: WOUND CARE | Facility: HOSPITAL | Age: 70
End: 2020-06-01
Payer: MEDICARE

## 2020-06-01 VITALS
HEART RATE: 80 BPM | OXYGEN SATURATION: 96 % | TEMPERATURE: 97.4 F | DIASTOLIC BLOOD PRESSURE: 69 MMHG | SYSTOLIC BLOOD PRESSURE: 134 MMHG | RESPIRATION RATE: 17 BRPM

## 2020-06-01 DIAGNOSIS — M65.272 CALCIFIC TENDINITIS, LEFT ANKLE AND FOOT: ICD-10-CM

## 2020-06-01 PROCEDURE — 29581 APPL MULTLAYER CMPRN SYS LEG: CPT | Mod: 50

## 2020-06-01 PROCEDURE — 99213 OFFICE O/P EST LOW 20 MIN: CPT

## 2020-06-01 NOTE — PLAN
[FreeTextEntry1] : Patient examined and evaluated at this time.\par Continue local wound care and offloading.\par Pt to see Dr. Miranda for vascular evaluation of venous insufficiency.\par Patient to return in 1 week for follow up.

## 2020-06-01 NOTE — REVIEW OF SYSTEMS
[Fever] : no fever [Eye Pain] : no eye pain [Earache] : no earache [Chest Pain] : no chest pain [Cough] : no cough [Abdominal Pain] : no abdominal pain [As Noted in HPI] : as noted in HPI [Skin Wound] : skin wound [FreeTextEntry9] : Pain to left plantar heel [Limb Pain] : limb pain [de-identified] : bilateral lower leg venous stasis ulceration down to skin with stasis dermatitis

## 2020-06-01 NOTE — PHYSICAL EXAM
[4 x 4] : 4 x 4  [Abdominal Pad] : Abdominal Pad [JVD] : no jugular venous distention  [Normal Thyroid] : the thyroid was normal [Normal Breath Sounds] : Normal breath sounds [Normal Heart Sounds] : normal heart sounds [Normal Rate and Rhythm] : normal rate and rhythm [1+] : left 1+ [Ankle Swelling (On Exam)] : present [Ankle Swelling Bilaterally] : bilaterally  [] : bilaterally [Ankle Swelling On The Left] : moderate [Abdomen Masses] : No abdominal massess [Abdomen Tenderness] : ~T ~M No abdominal tenderness [Tender] : nontender [Enlarged] : not enlarged [Alert] : alert [Oriented to Person] : oriented to person [Oriented to Place] : oriented to place [Oriented to Time] : oriented to time [Calm] : calm [de-identified] : calm [de-identified] : Tenderness to palpation of left plantar and plantar medial calcaneus [de-identified] : bilateral lower leg venous stasis ulceration down to skin with stasis dermatitis [de-identified] : No neurovascular deficits noted.\par  [FreeTextEntry1] : Right posterior leg  [FreeTextEntry2] : 3.9 [FreeTextEntry3] : 2.3 [FreeTextEntry4] : 0.1 [de-identified] : Serous/sanguinous [de-identified] : Mild  [de-identified] : Coban Expressed comfort post Coban  application. [de-identified] : Hydrofera blue, Coban [de-identified] : Cleansed with NS\par Xtrasorb\par Kerlix  [de-identified] : No neurovascular deficits noted.\par  [FreeTextEntry7] : Left leg - Scattered excoriated areas   [de-identified] : Serous/sanguinous [de-identified] : Mild  [de-identified] : Coban Expressed comfort post Coban  application. [de-identified] : Cleansed with NS\par Kerlix \par Xtrasorb [de-identified] : Hydrofera blue, Coban  [TWNoteComboBox4] : Moderate [de-identified] : None [de-identified] : Macerated [de-identified] : None [de-identified] : 100% [de-identified] : No [de-identified] : Primary Dressing [de-identified] : Multilayer other compression wrap [de-identified] : 3x Weekly [de-identified] : Macerated [de-identified] : Large [de-identified] : 100% [de-identified] : Mild [de-identified] : None [de-identified] : No [de-identified] : Multilayer other compression wrap [de-identified] : 3x Weekly [de-identified] : Primary Dressing

## 2020-06-01 NOTE — VITALS
[Pain related to present condition?] : The patient's  pain is related to present condition. [Burning] : burning [Occasional] : occasional [] : No [de-identified] : 5/10     Acceptable pain tolerance levels deemed to be 3/10. [FreeTextEntry3] : Bilateral legs wound areas [FreeTextEntry1] : dry dressings  [FreeTextEntry2] : Wet dressings  [FreeTextEntry4] : Patient seeing pain management. Xtrasorb used for extra absorption.

## 2020-06-01 NOTE — ASSESSMENT
[Written] : Written [Verbal] : Verbal [Demo] : Demo [Fair - mild discomfort, physical impairment, low acceptance] : Fair - mild discomfort, physical impairment, low acceptance [Verbalizes knowledge/Understanding] : Verbalizes knowledge/understanding [Dressing changes] : dressing changes [Venous Disease] : venous disease [Skin Care] : skin care [Signs and symptoms of infection] : sign and symptoms of infection [Labs and Tests] : labs and tests [How and When to Call] : how and when to call [Pain Management] : pain management [Compression Therapy] : compression therapy [Patient responsibility to plan of care] : patient responsibility to plan of care [Stable] : stable [Home] : Home [Walker] : Walker [Not Applicable - Long Term Care/Home Health Agency] : Long Term Care/Home Health Agency: Not Applicable [FreeTextEntry4] : Patient scheduled to see Dr. Cano. She stated she is having an " Ultrasound" preformed but patient doesn't know exactly what test it is. \par DPM to talk to Dr. Cano to conform. \par Dressing changes 3x a week \par Follow up uin 1 week  [FreeTextEntry2] : Infection prevention\par Localized wound care \par Acceptable pain tolerance levels deemed to be 3/10.\par Goal of remaining pain free regarding wounds.\par Edema prevention \par \par \par \par  [] : No

## 2020-06-02 DIAGNOSIS — L97.811 NON-PRESSURE CHRONIC ULCER OF OTHER PART OF RIGHT LOWER LEG LIMITED TO BREAKDOWN OF SKIN: ICD-10-CM

## 2020-06-02 DIAGNOSIS — L97.821 NON-PRESSURE CHRONIC ULCER OF OTHER PART OF LEFT LOWER LEG LIMITED TO BREAKDOWN OF SKIN: ICD-10-CM

## 2020-06-02 DIAGNOSIS — M47.816 SPONDYLOSIS WITHOUT MYELOPATHY OR RADICULOPATHY, LUMBAR REGION: ICD-10-CM

## 2020-06-02 DIAGNOSIS — I83.218 VARICOSE VEINS OF RIGHT LOWER EXTREMITY WITH BOTH ULCER OF OTHER PART OF LOWER EXTREMITY AND INFLAMMATION: ICD-10-CM

## 2020-06-02 DIAGNOSIS — Z79.82 LONG TERM (CURRENT) USE OF ASPIRIN: ICD-10-CM

## 2020-06-02 DIAGNOSIS — M65.272 CALCIFIC TENDINITIS, LEFT ANKLE AND FOOT: ICD-10-CM

## 2020-06-02 DIAGNOSIS — Z90.710 ACQUIRED ABSENCE OF BOTH CERVIX AND UTERUS: ICD-10-CM

## 2020-06-02 DIAGNOSIS — Z82.49 FAMILY HISTORY OF ISCHEMIC HEART DISEASE AND OTHER DISEASES OF THE CIRCULATORY SYSTEM: ICD-10-CM

## 2020-06-02 DIAGNOSIS — Z72.0 TOBACCO USE: ICD-10-CM

## 2020-06-02 DIAGNOSIS — M17.12 UNILATERAL PRIMARY OSTEOARTHRITIS, LEFT KNEE: ICD-10-CM

## 2020-06-02 DIAGNOSIS — Z96.659 PRESENCE OF UNSPECIFIED ARTIFICIAL KNEE JOINT: ICD-10-CM

## 2020-06-02 DIAGNOSIS — M16.9 OSTEOARTHRITIS OF HIP, UNSPECIFIED: ICD-10-CM

## 2020-06-02 DIAGNOSIS — I83.228 VARICOSE VEINS OF LEFT LOWER EXTREMITY WITH BOTH ULCER OF OTHER PART OF LOWER EXTREMITY AND INFLAMMATION: ICD-10-CM

## 2020-06-02 DIAGNOSIS — E78.00 PURE HYPERCHOLESTEROLEMIA, UNSPECIFIED: ICD-10-CM

## 2020-06-02 DIAGNOSIS — M54.16 RADICULOPATHY, LUMBAR REGION: ICD-10-CM

## 2020-06-02 DIAGNOSIS — Z79.899 OTHER LONG TERM (CURRENT) DRUG THERAPY: ICD-10-CM

## 2020-06-02 DIAGNOSIS — G25.81 RESTLESS LEGS SYNDROME: ICD-10-CM

## 2020-06-03 ENCOUNTER — OUTPATIENT (OUTPATIENT)
Dept: OUTPATIENT SERVICES | Facility: HOSPITAL | Age: 70
LOS: 1 days | Discharge: HOME | End: 2020-06-03
Payer: MEDICARE

## 2020-06-03 ENCOUNTER — APPOINTMENT (OUTPATIENT)
Dept: WOUND CARE | Facility: HOSPITAL | Age: 70
End: 2020-06-03
Payer: MEDICARE

## 2020-06-03 VITALS
SYSTOLIC BLOOD PRESSURE: 129 MMHG | WEIGHT: 203 LBS | BODY MASS INDEX: 31.86 KG/M2 | OXYGEN SATURATION: 97 % | HEART RATE: 93 BPM | DIASTOLIC BLOOD PRESSURE: 67 MMHG | HEIGHT: 67 IN | TEMPERATURE: 97.7 F | RESPIRATION RATE: 20 BRPM

## 2020-06-03 DIAGNOSIS — L97.821 NON-PRESSURE CHRONIC ULCER OF OTHER PART OF LEFT LOWER LEG LIMITED TO BREAKDOWN OF SKIN: ICD-10-CM

## 2020-06-03 DIAGNOSIS — I83.218 VARICOSE VEINS OF RIGHT LOWER EXTREMITY WITH BOTH ULCER OF OTHER PART OF LOWER EXTREMITY AND INFLAMMATION: ICD-10-CM

## 2020-06-03 DIAGNOSIS — L97.811 NON-PRESSURE CHRONIC ULCER OF OTHER PART OF RIGHT LOWER LEG LIMITED TO BREAKDOWN OF SKIN: ICD-10-CM

## 2020-06-03 DIAGNOSIS — M65.272 CALCIFIC TENDINITIS, LEFT ANKLE AND FOOT: ICD-10-CM

## 2020-06-03 DIAGNOSIS — I83.228 VARICOSE VEINS OF LEFT LOWER EXTREMITY WITH BOTH ULCER OF OTHER PART OF LOWER EXTREMITY AND INFLAMMATION: ICD-10-CM

## 2020-06-03 PROCEDURE — 29581 APPL MULTLAYER CMPRN SYS LEG: CPT | Mod: 50

## 2020-06-03 PROCEDURE — ZZZZZ: CPT

## 2020-06-04 ENCOUNTER — OUTPATIENT (OUTPATIENT)
Dept: OUTPATIENT SERVICES | Facility: HOSPITAL | Age: 70
LOS: 1 days | End: 2020-06-04
Payer: MEDICARE

## 2020-06-04 ENCOUNTER — RESULT REVIEW (OUTPATIENT)
Age: 70
End: 2020-06-04

## 2020-06-04 DIAGNOSIS — I89.0 LYMPHEDEMA, NOT ELSEWHERE CLASSIFIED: ICD-10-CM

## 2020-06-04 PROCEDURE — 93970 EXTREMITY STUDY: CPT

## 2020-06-04 PROCEDURE — 93923 UPR/LXTR ART STDY 3+ LVLS: CPT | Mod: 26

## 2020-06-04 PROCEDURE — 93970 EXTREMITY STUDY: CPT | Mod: 26

## 2020-06-04 PROCEDURE — 93923 UPR/LXTR ART STDY 3+ LVLS: CPT

## 2020-06-05 ENCOUNTER — OUTPATIENT (OUTPATIENT)
Dept: OUTPATIENT SERVICES | Facility: HOSPITAL | Age: 70
LOS: 1 days | Discharge: HOME | End: 2020-06-05
Payer: MEDICARE

## 2020-06-05 ENCOUNTER — APPOINTMENT (OUTPATIENT)
Dept: WOUND CARE | Facility: HOSPITAL | Age: 70
End: 2020-06-05
Payer: MEDICARE

## 2020-06-05 VITALS
DIASTOLIC BLOOD PRESSURE: 70 MMHG | SYSTOLIC BLOOD PRESSURE: 143 MMHG | HEART RATE: 85 BPM | RESPIRATION RATE: 16 BRPM | TEMPERATURE: 97.1 F | OXYGEN SATURATION: 96 %

## 2020-06-05 DIAGNOSIS — I83.218 VARICOSE VEINS OF RIGHT LOWER EXTREMITY WITH BOTH ULCER OF OTHER PART OF LOWER EXTREMITY AND INFLAMMATION: ICD-10-CM

## 2020-06-05 DIAGNOSIS — I83.228 VARICOSE VEINS OF LEFT LOWER EXTREMITY WITH BOTH ULCER OF OTHER PART OF LOWER EXTREMITY AND INFLAMMATION: ICD-10-CM

## 2020-06-05 DIAGNOSIS — L97.811 NON-PRESSURE CHRONIC ULCER OF OTHER PART OF RIGHT LOWER LEG LIMITED TO BREAKDOWN OF SKIN: ICD-10-CM

## 2020-06-05 DIAGNOSIS — M65.272 CALCIFIC TENDINITIS, LEFT ANKLE AND FOOT: ICD-10-CM

## 2020-06-05 DIAGNOSIS — L97.821 NON-PRESSURE CHRONIC ULCER OF OTHER PART OF LEFT LOWER LEG LIMITED TO BREAKDOWN OF SKIN: ICD-10-CM

## 2020-06-05 PROCEDURE — 29581 APPL MULTLAYER CMPRN SYS LEG: CPT | Mod: 50

## 2020-06-05 PROCEDURE — ZZZZZ: CPT

## 2020-06-08 ENCOUNTER — APPOINTMENT (OUTPATIENT)
Dept: WOUND CARE | Facility: HOSPITAL | Age: 70
End: 2020-06-08
Payer: MEDICARE

## 2020-06-08 ENCOUNTER — OUTPATIENT (OUTPATIENT)
Dept: OUTPATIENT SERVICES | Facility: HOSPITAL | Age: 70
LOS: 1 days | Discharge: HOME | End: 2020-06-08
Payer: MEDICARE

## 2020-06-08 VITALS
DIASTOLIC BLOOD PRESSURE: 77 MMHG | HEART RATE: 94 BPM | SYSTOLIC BLOOD PRESSURE: 152 MMHG | HEIGHT: 67 IN | TEMPERATURE: 97.4 F | RESPIRATION RATE: 20 BRPM | WEIGHT: 203 LBS | OXYGEN SATURATION: 98 % | BODY MASS INDEX: 31.86 KG/M2

## 2020-06-08 DIAGNOSIS — L97.821 NON-PRESSURE CHRONIC ULCER OF OTHER PART OF LEFT LOWER LEG LIMITED TO BREAKDOWN OF SKIN: ICD-10-CM

## 2020-06-08 DIAGNOSIS — M16.9 OSTEOARTHRITIS OF HIP, UNSPECIFIED: ICD-10-CM

## 2020-06-08 DIAGNOSIS — Z79.82 LONG TERM (CURRENT) USE OF ASPIRIN: ICD-10-CM

## 2020-06-08 DIAGNOSIS — Z90.710 ACQUIRED ABSENCE OF BOTH CERVIX AND UTERUS: ICD-10-CM

## 2020-06-08 DIAGNOSIS — Z96.659 PRESENCE OF UNSPECIFIED ARTIFICIAL KNEE JOINT: ICD-10-CM

## 2020-06-08 DIAGNOSIS — I83.218 VARICOSE VEINS OF RIGHT LOWER EXTREMITY WITH BOTH ULCER OF OTHER PART OF LOWER EXTREMITY AND INFLAMMATION: ICD-10-CM

## 2020-06-08 DIAGNOSIS — M54.16 RADICULOPATHY, LUMBAR REGION: ICD-10-CM

## 2020-06-08 DIAGNOSIS — M65.272 CALCIFIC TENDINITIS, LEFT ANKLE AND FOOT: ICD-10-CM

## 2020-06-08 DIAGNOSIS — Z72.0 TOBACCO USE: ICD-10-CM

## 2020-06-08 DIAGNOSIS — Z79.899 OTHER LONG TERM (CURRENT) DRUG THERAPY: ICD-10-CM

## 2020-06-08 DIAGNOSIS — M47.816 SPONDYLOSIS WITHOUT MYELOPATHY OR RADICULOPATHY, LUMBAR REGION: ICD-10-CM

## 2020-06-08 DIAGNOSIS — G25.81 RESTLESS LEGS SYNDROME: ICD-10-CM

## 2020-06-08 DIAGNOSIS — M17.12 UNILATERAL PRIMARY OSTEOARTHRITIS, LEFT KNEE: ICD-10-CM

## 2020-06-08 DIAGNOSIS — E78.00 PURE HYPERCHOLESTEROLEMIA, UNSPECIFIED: ICD-10-CM

## 2020-06-08 DIAGNOSIS — Z82.49 FAMILY HISTORY OF ISCHEMIC HEART DISEASE AND OTHER DISEASES OF THE CIRCULATORY SYSTEM: ICD-10-CM

## 2020-06-08 DIAGNOSIS — I83.228 VARICOSE VEINS OF LEFT LOWER EXTREMITY WITH BOTH ULCER OF OTHER PART OF LOWER EXTREMITY AND INFLAMMATION: ICD-10-CM

## 2020-06-08 DIAGNOSIS — L97.811 NON-PRESSURE CHRONIC ULCER OF OTHER PART OF RIGHT LOWER LEG LIMITED TO BREAKDOWN OF SKIN: ICD-10-CM

## 2020-06-08 PROCEDURE — 29581 APPL MULTLAYER CMPRN SYS LEG: CPT | Mod: 50

## 2020-06-08 PROCEDURE — 99213 OFFICE O/P EST LOW 20 MIN: CPT

## 2020-06-08 NOTE — PHYSICAL EXAM
[JVD] : no jugular venous distention  [Normal Thyroid] : the thyroid was normal [Normal Heart Sounds] : normal heart sounds [Normal Breath Sounds] : Normal breath sounds [Normal Rate and Rhythm] : normal rate and rhythm [1+] : left 1+ [Ankle Swelling (On Exam)] : present [Ankle Swelling Bilaterally] : bilaterally  [] : bilaterally [Ankle Swelling On The Left] : moderate [Abdomen Masses] : No abdominal massess [Tender] : nontender [Abdomen Tenderness] : ~T ~M No abdominal tenderness [Enlarged] : not enlarged [Alert] : alert [Oriented to Person] : oriented to person [Oriented to Place] : oriented to place [Oriented to Time] : oriented to time [Calm] : calm [de-identified] : Tenderness to palpation of left plantar and plantar medial calcaneus [de-identified] : calm [FreeTextEntry2] : 3.0 [de-identified] : bilateral lower leg venous stasis ulceration down to skin with stasis dermatitis [FreeTextEntry1] : Right Posterior Leg [FreeTextEntry4] : 0.1 [FreeTextEntry3] : 2.4 [de-identified] : Moderate Serosanguineous [de-identified] : Intact [de-identified] : 100% [de-identified] : Coban [de-identified] : Hydrofera Blue/ Dry Dressing & Kerlix [de-identified] : Large Serosanguineous [FreeTextEntry7] : Left Leg- multiple scattered weeping areas [de-identified] : Cleansed with Normal saline\par  [de-identified] : Coban [de-identified] : Hydrofera Blue/ Dry Dressing & Kerlix [de-identified] : Cleansed with Normal saline\par  [de-identified] : None [de-identified] : No [de-identified] : None

## 2020-06-08 NOTE — REVIEW OF SYSTEMS
[Eye Pain] : no eye pain [Fever] : no fever [Earache] : no earache [Chest Pain] : no chest pain [Cough] : no cough [Abdominal Pain] : no abdominal pain [Limb Pain] : limb pain [As Noted in HPI] : as noted in HPI [Skin Wound] : skin wound [FreeTextEntry9] : Pain to left plantar heel [de-identified] : bilateral lower leg venous stasis ulceration down to skin with stasis dermatitis

## 2020-06-08 NOTE — ASSESSMENT
[Verbal] : Verbal [Demo] : Demo [Patient] : Patient [Fair - mild discomfort, physical impairment, low acceptance] : Fair - mild discomfort, physical impairment, low acceptance [Verbalizes knowledge/Understanding] : Verbalizes knowledge/understanding [Dressing changes] : dressing changes [Foot Care] : foot care [Skin Care] : skin care [Pressure relief] : pressure relief [Signs and symptoms of infection] : sign and symptoms of infection [Venous Disease] : venous disease [How and When to Call] : how and when to call [Off-loading] : off-loading [Compression Therapy] : compression therapy [Patient responsibility to plan of care] : patient responsibility to plan of care [] : Yes [Stable] : stable [Home] : Home [Walker] : Walker [FreeTextEntry4] : Dr Lea/ Photos taken\par Vascular studies results discussed with pt by Dr Lea\par Pt states she has appointment to Dr Jreome Rose (Vascular surgeon) for Consult on Monday, 06/15/20 as had been recommended by Dr Lea\par F/U to [facility] on Wednesday, 06/10/20 & Friday, 06/12/20 for dressing change & then Monday, 06/15/20 for assessment [FreeTextEntry2] : Alteration in skin integrity- promote optimal skin integrity\par

## 2020-06-10 ENCOUNTER — APPOINTMENT (OUTPATIENT)
Dept: PODIATRY | Facility: HOSPITAL | Age: 70
End: 2020-06-10
Payer: MEDICARE

## 2020-06-10 ENCOUNTER — OUTPATIENT (OUTPATIENT)
Dept: OUTPATIENT SERVICES | Facility: HOSPITAL | Age: 70
LOS: 1 days | Discharge: HOME | End: 2020-06-10
Payer: MEDICARE

## 2020-06-10 VITALS
HEART RATE: 96 BPM | HEIGHT: 67 IN | OXYGEN SATURATION: 98 % | SYSTOLIC BLOOD PRESSURE: 149 MMHG | DIASTOLIC BLOOD PRESSURE: 72 MMHG | TEMPERATURE: 97.3 F | RESPIRATION RATE: 20 BRPM | BODY MASS INDEX: 31.86 KG/M2 | WEIGHT: 203 LBS

## 2020-06-10 DIAGNOSIS — L97.812 NON-PRESSURE CHRONIC ULCER OF OTHER PART OF RIGHT LOWER LEG WITH FAT LAYER EXPOSED: ICD-10-CM

## 2020-06-10 DIAGNOSIS — I83.218 VARICOSE VEINS OF RIGHT LOWER EXTREMITY WITH BOTH ULCER OF OTHER PART OF LOWER EXTREMITY AND INFLAMMATION: ICD-10-CM

## 2020-06-10 DIAGNOSIS — M65.272 CALCIFIC TENDINITIS, LEFT ANKLE AND FOOT: ICD-10-CM

## 2020-06-10 DIAGNOSIS — L97.822 NON-PRESSURE CHRONIC ULCER OF OTHER PART OF LEFT LOWER LEG WITH FAT LAYER EXPOSED: ICD-10-CM

## 2020-06-10 DIAGNOSIS — I83.228 VARICOSE VEINS OF LEFT LOWER EXTREMITY WITH BOTH ULCER OF OTHER PART OF LOWER EXTREMITY AND INFLAMMATION: ICD-10-CM

## 2020-06-10 PROCEDURE — 29581 APPL MULTLAYER CMPRN SYS LEG: CPT | Mod: 50

## 2020-06-10 PROCEDURE — ZZZZZ: CPT

## 2020-06-12 ENCOUNTER — APPOINTMENT (OUTPATIENT)
Dept: PODIATRY | Facility: HOSPITAL | Age: 70
End: 2020-06-12
Payer: MEDICARE

## 2020-06-12 ENCOUNTER — OUTPATIENT (OUTPATIENT)
Dept: OUTPATIENT SERVICES | Facility: HOSPITAL | Age: 70
LOS: 1 days | Discharge: HOME | End: 2020-06-12
Payer: MEDICARE

## 2020-06-12 VITALS
DIASTOLIC BLOOD PRESSURE: 65 MMHG | SYSTOLIC BLOOD PRESSURE: 133 MMHG | OXYGEN SATURATION: 99 % | RESPIRATION RATE: 20 BRPM | HEART RATE: 84 BPM | TEMPERATURE: 98 F | BODY MASS INDEX: 31.86 KG/M2 | WEIGHT: 203 LBS | HEIGHT: 67 IN

## 2020-06-12 DIAGNOSIS — L97.811 NON-PRESSURE CHRONIC ULCER OF OTHER PART OF RIGHT LOWER LEG LIMITED TO BREAKDOWN OF SKIN: ICD-10-CM

## 2020-06-12 DIAGNOSIS — L97.821 NON-PRESSURE CHRONIC ULCER OF OTHER PART OF LEFT LOWER LEG LIMITED TO BREAKDOWN OF SKIN: ICD-10-CM

## 2020-06-12 DIAGNOSIS — I83.228 VARICOSE VEINS OF LEFT LOWER EXTREMITY WITH BOTH ULCER OF OTHER PART OF LOWER EXTREMITY AND INFLAMMATION: ICD-10-CM

## 2020-06-12 DIAGNOSIS — I83.218 VARICOSE VEINS OF RIGHT LOWER EXTREMITY WITH BOTH ULCER OF OTHER PART OF LOWER EXTREMITY AND INFLAMMATION: ICD-10-CM

## 2020-06-12 DIAGNOSIS — M65.272 CALCIFIC TENDINITIS, LEFT ANKLE AND FOOT: ICD-10-CM

## 2020-06-12 PROCEDURE — ZZZZZ: CPT

## 2020-06-12 PROCEDURE — 29581 APPL MULTLAYER CMPRN SYS LEG: CPT | Mod: 50

## 2020-06-15 ENCOUNTER — OUTPATIENT (OUTPATIENT)
Dept: OUTPATIENT SERVICES | Facility: HOSPITAL | Age: 70
LOS: 1 days | Discharge: HOME | End: 2020-06-15
Payer: MEDICARE

## 2020-06-15 ENCOUNTER — APPOINTMENT (OUTPATIENT)
Dept: WOUND CARE | Facility: HOSPITAL | Age: 70
End: 2020-06-15
Payer: MEDICARE

## 2020-06-15 ENCOUNTER — NON-APPOINTMENT (OUTPATIENT)
Age: 70
End: 2020-06-15

## 2020-06-15 ENCOUNTER — APPOINTMENT (OUTPATIENT)
Dept: VASCULAR SURGERY | Facility: CLINIC | Age: 70
End: 2020-06-15
Payer: MEDICARE

## 2020-06-15 VITALS
HEIGHT: 67 IN | TEMPERATURE: 98.9 F | WEIGHT: 203 LBS | RESPIRATION RATE: 20 BRPM | SYSTOLIC BLOOD PRESSURE: 153 MMHG | OXYGEN SATURATION: 97 % | DIASTOLIC BLOOD PRESSURE: 77 MMHG | BODY MASS INDEX: 31.86 KG/M2 | HEART RATE: 100 BPM

## 2020-06-15 VITALS
BODY MASS INDEX: 31.86 KG/M2 | DIASTOLIC BLOOD PRESSURE: 76 MMHG | SYSTOLIC BLOOD PRESSURE: 135 MMHG | HEIGHT: 67 IN | WEIGHT: 203 LBS | TEMPERATURE: 98.2 F | RESPIRATION RATE: 20 BRPM | OXYGEN SATURATION: 97 % | HEART RATE: 96 BPM

## 2020-06-15 DIAGNOSIS — M65.272 CALCIFIC TENDINITIS, LEFT ANKLE AND FOOT: ICD-10-CM

## 2020-06-15 DIAGNOSIS — T14.8XXA OTHER INJURY OF UNSPECIFIED BODY REGION, INITIAL ENCOUNTER: ICD-10-CM

## 2020-06-15 PROCEDURE — 29581 APPL MULTLAYER CMPRN SYS LEG: CPT | Mod: 50

## 2020-06-15 PROCEDURE — 99213 OFFICE O/P EST LOW 20 MIN: CPT

## 2020-06-15 PROCEDURE — 99203 OFFICE O/P NEW LOW 30 MIN: CPT

## 2020-06-15 NOTE — ASSESSMENT
[FreeTextEntry1] : 71 yo F with long standing lymphedema. \par Compliant with therapy. \par No arterial insuf. \par R GSV incompetency but main problem is lymphedema. \par \par  [Other: _____] : [unfilled]

## 2020-06-15 NOTE — PHYSICAL EXAM
[2+] : left 2+ [Ankle Swelling (On Exam)] : present [] : not present [Ankle Swelling Bilaterally] : severe [Varicose Veins Of Lower Extremities] : not present [Skin Ulcer] : ulcer [Skin Induration] : induration [de-identified] : B shin-mid thigh lymphedema dermatitis, mild erythema, areas of skin break down

## 2020-06-15 NOTE — PLAN
[FreeTextEntry1] : Patient examined and evaluated at this time.\par Continue local wound care and offloading.\par No vascular intervention at this time.\par Patient to return in 1 week for follow up.

## 2020-06-15 NOTE — HISTORY OF PRESENT ILLNESS
[FreeTextEntry1] : 69 yo F with long standing hx of BLE lymphedema. She states her problems started after B knee replacement surgery. She used to go to a lymphedema clinic prior to COVID pandemia but now they are closed for good. Has been going to  and has been very regular with wraps. She has drainage from both LE. Sleeps in a chair-recleiner. Legs are elevated but not above her heart. Does have back problems and she cant use a bed. Found a  new lymphedema clinic but they would not start therapy till legs stop draining.

## 2020-06-15 NOTE — ASSESSMENT
[Verbal] : Verbal [Demo] : Demo [Written] : Written [Good - alert, interested, motivated] : Good - alert, interested, motivated [Verbalizes knowledge/Understanding] : Verbalizes knowledge/understanding [Patient] : Patient [Dressing changes] : dressing changes [Skin Care] : skin care [Signs and symptoms of infection] : sign and symptoms of infection [Venous Disease] : venous disease [Pain Management] : pain management [How and When to Call] : how and when to call [Compression Therapy] : compression therapy [Patient responsibility to plan of care] : patient responsibility to plan of care [] : Yes [Stable] : stable [Home] : Home [Walker] : Walker [Not Applicable - Long Term Care/Home Health Agency] : Long Term Care/Home Health Agency: Not Applicable [FreeTextEntry2] : Infection prevention\par Localized wound care \par Acceptable pain tolerance levels deemed to be 3/10.\par Goal of remaining pain free regarding wounds.\par Compression therapy\par Edema prevention  [FreeTextEntry4] : Dressing changes 3 times a week \par Follow up in 1 week

## 2020-06-15 NOTE — REVIEW OF SYSTEMS
[As Noted in HPI] : as noted in HPI [Limb Pain] : limb pain [Skin Wound] : skin wound [Eye Pain] : no eye pain [Fever] : no fever [Earache] : no earache [Chest Pain] : no chest pain [Cough] : no cough [Abdominal Pain] : no abdominal pain [FreeTextEntry9] : Pain to left plantar heel [de-identified] : bilateral lower leg venous stasis ulceration down to skin with stasis dermatitis

## 2020-06-15 NOTE — PHYSICAL EXAM
[Normal Thyroid] : the thyroid was normal [Normal Breath Sounds] : Normal breath sounds [Normal Heart Sounds] : normal heart sounds [Normal Rate and Rhythm] : normal rate and rhythm [1+] : left 1+ [Ankle Swelling (On Exam)] : present [Ankle Swelling Bilaterally] : bilaterally  [] : bilaterally [Ankle Swelling On The Left] : moderate [Alert] : alert [Oriented to Person] : oriented to person [Oriented to Place] : oriented to place [Oriented to Time] : oriented to time [Calm] : calm [Please See PDF for [employer] Analytics] : Please See PDF for [employer]. [JVD] : no jugular venous distention  [Abdomen Masses] : No abdominal massess [Abdomen Tenderness] : ~T ~M No abdominal tenderness [Tender] : nontender [Enlarged] : not enlarged [de-identified] : calm [de-identified] : Tenderness to palpation of left plantar and plantar medial calcaneus [de-identified] : bilateral lower leg venous stasis ulceration down to skin with stasis dermatitis

## 2020-06-15 NOTE — VITALS
[Pain related to present condition?] : The patient's  pain is related to present condition. [Sharp] : sharp [Tender] : tender [Occasional] : occasional [] : No [de-identified] : 5/10     Acceptable pain tolerance levels deemed to be 3/10. [FreeTextEntry3] : Bilateral legs  [FreeTextEntry1] : Wound care per MD order  [FreeTextEntry2] : Direct contact  [FreeTextEntry4] : Thick padded dressings

## 2020-06-15 NOTE — DATA REVIEWED
[FreeTextEntry1] : EXAM: DUPLEX SCAN EXT VEINS LOWER BI \par \par \par PROCEDURE DATE: 06/04/2020 \par \par \par \par INTERPRETATION: CLINICAL INFORMATION: Bilateral calf swelling. \par \par COMPARISON: Left lower extremity venous Doppler from May 5, 2015. \par \par TECHNIQUE: Duplex sonography of the BILATERAL LOWER extremity veins with \par color and spectral Doppler, with and without compression. \par \par FINDINGS: \par There is normal compressibility of the bilateral common femoral, femoral and \par popliteal veins. \par Doppler examination shows normal spontaneous and phasic flow. \par \par No calf vein thrombosis is detected. \par \par Venous insufficiency in the greater saphenous veins from the proximal thigh \par to distal thigh. \par \par IMPRESSION: \par No evidence of deep venous thrombosis in either lower extremity. \par \par \par \par ABIs Normal

## 2020-06-15 NOTE — REVIEW OF SYSTEMS
[Limb Swelling] : limb swelling [As Noted in HPI] : as noted in HPI [Skin Wound] : skin wound [Negative] : Heme/Lymph

## 2020-06-16 DIAGNOSIS — Z72.0 TOBACCO USE: ICD-10-CM

## 2020-06-16 DIAGNOSIS — L97.821 NON-PRESSURE CHRONIC ULCER OF OTHER PART OF LEFT LOWER LEG LIMITED TO BREAKDOWN OF SKIN: ICD-10-CM

## 2020-06-16 DIAGNOSIS — M47.816 SPONDYLOSIS WITHOUT MYELOPATHY OR RADICULOPATHY, LUMBAR REGION: ICD-10-CM

## 2020-06-16 DIAGNOSIS — I83.228 VARICOSE VEINS OF LEFT LOWER EXTREMITY WITH BOTH ULCER OF OTHER PART OF LOWER EXTREMITY AND INFLAMMATION: ICD-10-CM

## 2020-06-16 DIAGNOSIS — I83.218 VARICOSE VEINS OF RIGHT LOWER EXTREMITY WITH BOTH ULCER OF OTHER PART OF LOWER EXTREMITY AND INFLAMMATION: ICD-10-CM

## 2020-06-16 DIAGNOSIS — M17.12 UNILATERAL PRIMARY OSTEOARTHRITIS, LEFT KNEE: ICD-10-CM

## 2020-06-16 DIAGNOSIS — L97.811 NON-PRESSURE CHRONIC ULCER OF OTHER PART OF RIGHT LOWER LEG LIMITED TO BREAKDOWN OF SKIN: ICD-10-CM

## 2020-06-16 DIAGNOSIS — Z96.659 PRESENCE OF UNSPECIFIED ARTIFICIAL KNEE JOINT: ICD-10-CM

## 2020-06-16 DIAGNOSIS — Z82.49 FAMILY HISTORY OF ISCHEMIC HEART DISEASE AND OTHER DISEASES OF THE CIRCULATORY SYSTEM: ICD-10-CM

## 2020-06-16 DIAGNOSIS — Z90.710 ACQUIRED ABSENCE OF BOTH CERVIX AND UTERUS: ICD-10-CM

## 2020-06-16 DIAGNOSIS — G25.81 RESTLESS LEGS SYNDROME: ICD-10-CM

## 2020-06-16 DIAGNOSIS — M16.9 OSTEOARTHRITIS OF HIP, UNSPECIFIED: ICD-10-CM

## 2020-06-16 DIAGNOSIS — E78.00 PURE HYPERCHOLESTEROLEMIA, UNSPECIFIED: ICD-10-CM

## 2020-06-16 DIAGNOSIS — M65.272 CALCIFIC TENDINITIS, LEFT ANKLE AND FOOT: ICD-10-CM

## 2020-06-16 DIAGNOSIS — Z79.82 LONG TERM (CURRENT) USE OF ASPIRIN: ICD-10-CM

## 2020-06-16 DIAGNOSIS — Z79.899 OTHER LONG TERM (CURRENT) DRUG THERAPY: ICD-10-CM

## 2020-06-16 DIAGNOSIS — M54.16 RADICULOPATHY, LUMBAR REGION: ICD-10-CM

## 2020-06-17 ENCOUNTER — APPOINTMENT (OUTPATIENT)
Dept: PODIATRY | Facility: HOSPITAL | Age: 70
End: 2020-06-17
Payer: MEDICARE

## 2020-06-17 ENCOUNTER — OUTPATIENT (OUTPATIENT)
Dept: OUTPATIENT SERVICES | Facility: HOSPITAL | Age: 70
LOS: 1 days | Discharge: HOME | End: 2020-06-17
Payer: MEDICARE

## 2020-06-17 VITALS
OXYGEN SATURATION: 97 % | BODY MASS INDEX: 31.86 KG/M2 | RESPIRATION RATE: 20 BRPM | HEART RATE: 81 BPM | TEMPERATURE: 97.7 F | HEIGHT: 67 IN | DIASTOLIC BLOOD PRESSURE: 71 MMHG | WEIGHT: 203 LBS | SYSTOLIC BLOOD PRESSURE: 119 MMHG

## 2020-06-17 DIAGNOSIS — I83.218 VARICOSE VEINS OF RIGHT LOWER EXTREMITY WITH BOTH ULCER OF OTHER PART OF LOWER EXTREMITY AND INFLAMMATION: ICD-10-CM

## 2020-06-17 DIAGNOSIS — M65.272 CALCIFIC TENDINITIS, LEFT ANKLE AND FOOT: ICD-10-CM

## 2020-06-17 DIAGNOSIS — L97.811 NON-PRESSURE CHRONIC ULCER OF OTHER PART OF RIGHT LOWER LEG LIMITED TO BREAKDOWN OF SKIN: ICD-10-CM

## 2020-06-17 DIAGNOSIS — I83.228 VARICOSE VEINS OF LEFT LOWER EXTREMITY WITH BOTH ULCER OF OTHER PART OF LOWER EXTREMITY AND INFLAMMATION: ICD-10-CM

## 2020-06-17 DIAGNOSIS — L97.821 NON-PRESSURE CHRONIC ULCER OF OTHER PART OF LEFT LOWER LEG LIMITED TO BREAKDOWN OF SKIN: ICD-10-CM

## 2020-06-17 PROCEDURE — ZZZZZ: CPT

## 2020-06-17 PROCEDURE — 29581 APPL MULTLAYER CMPRN SYS LEG: CPT | Mod: 50

## 2020-06-19 ENCOUNTER — APPOINTMENT (OUTPATIENT)
Dept: PODIATRY | Facility: HOSPITAL | Age: 70
End: 2020-06-19
Payer: MEDICARE

## 2020-06-19 ENCOUNTER — OUTPATIENT (OUTPATIENT)
Dept: OUTPATIENT SERVICES | Facility: HOSPITAL | Age: 70
LOS: 1 days | Discharge: HOME | End: 2020-06-19
Payer: MEDICARE

## 2020-06-19 VITALS
RESPIRATION RATE: 21 BRPM | DIASTOLIC BLOOD PRESSURE: 76 MMHG | SYSTOLIC BLOOD PRESSURE: 144 MMHG | TEMPERATURE: 97 F | OXYGEN SATURATION: 98 % | HEART RATE: 88 BPM

## 2020-06-19 DIAGNOSIS — I83.228 VARICOSE VEINS OF LEFT LOWER EXTREMITY WITH BOTH ULCER OF OTHER PART OF LOWER EXTREMITY AND INFLAMMATION: ICD-10-CM

## 2020-06-19 DIAGNOSIS — I83.218 VARICOSE VEINS OF RIGHT LOWER EXTREMITY WITH BOTH ULCER OF OTHER PART OF LOWER EXTREMITY AND INFLAMMATION: ICD-10-CM

## 2020-06-19 DIAGNOSIS — M65.272 CALCIFIC TENDINITIS, LEFT ANKLE AND FOOT: ICD-10-CM

## 2020-06-19 DIAGNOSIS — L97.821 NON-PRESSURE CHRONIC ULCER OF OTHER PART OF LEFT LOWER LEG LIMITED TO BREAKDOWN OF SKIN: ICD-10-CM

## 2020-06-19 DIAGNOSIS — L97.811 NON-PRESSURE CHRONIC ULCER OF OTHER PART OF RIGHT LOWER LEG LIMITED TO BREAKDOWN OF SKIN: ICD-10-CM

## 2020-06-19 PROCEDURE — ZZZZZ: CPT

## 2020-06-19 PROCEDURE — 29581 APPL MULTLAYER CMPRN SYS LEG: CPT | Mod: 50

## 2020-06-22 ENCOUNTER — OUTPATIENT (OUTPATIENT)
Dept: OUTPATIENT SERVICES | Facility: HOSPITAL | Age: 70
LOS: 1 days | Discharge: HOME | End: 2020-06-22
Payer: MEDICARE

## 2020-06-22 ENCOUNTER — NON-APPOINTMENT (OUTPATIENT)
Age: 70
End: 2020-06-22

## 2020-06-22 ENCOUNTER — APPOINTMENT (OUTPATIENT)
Dept: WOUND CARE | Facility: HOSPITAL | Age: 70
End: 2020-06-22
Payer: MEDICARE

## 2020-06-22 VITALS
OXYGEN SATURATION: 95 % | SYSTOLIC BLOOD PRESSURE: 144 MMHG | BODY MASS INDEX: 31.86 KG/M2 | WEIGHT: 203 LBS | RESPIRATION RATE: 20 BRPM | HEIGHT: 67 IN | HEART RATE: 89 BPM | TEMPERATURE: 98.6 F | DIASTOLIC BLOOD PRESSURE: 69 MMHG

## 2020-06-22 DIAGNOSIS — M16.9 OSTEOARTHRITIS OF HIP, UNSPECIFIED: ICD-10-CM

## 2020-06-22 DIAGNOSIS — I83.218 VARICOSE VEINS OF RIGHT LOWER EXTREMITY WITH BOTH ULCER OF OTHER PART OF LOWER EXTREMITY AND INFLAMMATION: ICD-10-CM

## 2020-06-22 DIAGNOSIS — M65.272 CALCIFIC TENDINITIS, LEFT ANKLE AND FOOT: ICD-10-CM

## 2020-06-22 DIAGNOSIS — Z96.659 PRESENCE OF UNSPECIFIED ARTIFICIAL KNEE JOINT: ICD-10-CM

## 2020-06-22 DIAGNOSIS — M17.12 UNILATERAL PRIMARY OSTEOARTHRITIS, LEFT KNEE: ICD-10-CM

## 2020-06-22 DIAGNOSIS — M47.816 SPONDYLOSIS WITHOUT MYELOPATHY OR RADICULOPATHY, LUMBAR REGION: ICD-10-CM

## 2020-06-22 DIAGNOSIS — Z79.899 OTHER LONG TERM (CURRENT) DRUG THERAPY: ICD-10-CM

## 2020-06-22 DIAGNOSIS — E78.00 PURE HYPERCHOLESTEROLEMIA, UNSPECIFIED: ICD-10-CM

## 2020-06-22 DIAGNOSIS — M54.16 RADICULOPATHY, LUMBAR REGION: ICD-10-CM

## 2020-06-22 DIAGNOSIS — I83.228 VARICOSE VEINS OF LEFT LOWER EXTREMITY WITH BOTH ULCER OF OTHER PART OF LOWER EXTREMITY AND INFLAMMATION: ICD-10-CM

## 2020-06-22 DIAGNOSIS — Z79.82 LONG TERM (CURRENT) USE OF ASPIRIN: ICD-10-CM

## 2020-06-22 DIAGNOSIS — Z90.710 ACQUIRED ABSENCE OF BOTH CERVIX AND UTERUS: ICD-10-CM

## 2020-06-22 DIAGNOSIS — Z72.0 TOBACCO USE: ICD-10-CM

## 2020-06-22 DIAGNOSIS — G25.81 RESTLESS LEGS SYNDROME: ICD-10-CM

## 2020-06-22 DIAGNOSIS — L97.821 NON-PRESSURE CHRONIC ULCER OF OTHER PART OF LEFT LOWER LEG LIMITED TO BREAKDOWN OF SKIN: ICD-10-CM

## 2020-06-22 DIAGNOSIS — L97.811 NON-PRESSURE CHRONIC ULCER OF OTHER PART OF RIGHT LOWER LEG LIMITED TO BREAKDOWN OF SKIN: ICD-10-CM

## 2020-06-22 DIAGNOSIS — I83.893 VARICOSE VEINS OF BILATERAL LOWER EXTREMITIES WITH OTHER COMPLICATIONS: ICD-10-CM

## 2020-06-22 DIAGNOSIS — Z82.49 FAMILY HISTORY OF ISCHEMIC HEART DISEASE AND OTHER DISEASES OF THE CIRCULATORY SYSTEM: ICD-10-CM

## 2020-06-22 PROCEDURE — G0463: CPT

## 2020-06-22 PROCEDURE — 99213 OFFICE O/P EST LOW 20 MIN: CPT

## 2020-06-22 NOTE — PHYSICAL EXAM
[Please See PDF for [employer] Analytics] : Please See PDF for [employer]. [Normal Thyroid] : the thyroid was normal [Normal Breath Sounds] : Normal breath sounds [Normal Heart Sounds] : normal heart sounds [Normal Rate and Rhythm] : normal rate and rhythm [1+] : left 1+ [Ankle Swelling (On Exam)] : present [Ankle Swelling Bilaterally] : bilaterally  [] : bilaterally [Ankle Swelling On The Left] : moderate [Alert] : alert [Oriented to Person] : oriented to person [Oriented to Place] : oriented to place [Oriented to Time] : oriented to time [Calm] : calm [JVD] : no jugular venous distention  [Abdomen Tenderness] : ~T ~M No abdominal tenderness [Abdomen Masses] : No abdominal massess [Tender] : nontender [Enlarged] : not enlarged [de-identified] : Tenderness to palpation of left plantar and plantar medial calcaneus [de-identified] : bilateral lower leg venous stasis ulceration down to skin with stasis dermatitis [de-identified] : calm

## 2020-06-22 NOTE — PLAN
[FreeTextEntry1] : Patient examined and evaluated at this time.\par Continue local wound care and offloading.\par No vascular intervention at this time.\par Pt advised to use lymphedema pumps at home.\par Patient to return in 1 week for follow up.

## 2020-06-22 NOTE — REVIEW OF SYSTEMS
[As Noted in HPI] : as noted in HPI [Limb Pain] : limb pain [Skin Wound] : skin wound [Fever] : no fever [Eye Pain] : no eye pain [Earache] : no earache [Chest Pain] : no chest pain [Abdominal Pain] : no abdominal pain [FreeTextEntry9] : Pain to left plantar heel [Cough] : no cough [de-identified] : bilateral lower leg venous stasis ulceration down to skin with stasis dermatitis

## 2020-06-22 NOTE — ASSESSMENT
[Verbal] : Verbal [Patient] : Patient [Good - alert, interested, motivated] : Good - alert, interested, motivated [Verbalizes knowledge/Understanding] : Verbalizes knowledge/understanding [Dressing changes] : dressing changes [Skin Care] : skin care [Signs and symptoms of infection] : sign and symptoms of infection [How and When to Call] : how and when to call [Patient responsibility to plan of care] : patient responsibility to plan of care [] : Yes [Home] : Home [Stable] : stable [Walker] : Walker [Not Applicable - Long Term Care/Home Health Agency] : Long Term Care/Home Health Agency: Not Applicable [FreeTextEntry2] : Restore Skin Integrity\par Infection Control\par Localized wound care\par Maintain acceptable pain levels at satisfactory relief.\par Demonstrates use of both nonpharmacological and pharmacological pain relief strategies [FreeTextEntry4] : Photos Taken\par F/U to [facility] for Dressing Change, Please have MD assess Drainage and increased swelling, 1 week for assessment \par Pt to try lymphedema pumps at home and to bring in compression stockings to weds visit.

## 2020-06-24 ENCOUNTER — APPOINTMENT (OUTPATIENT)
Dept: WOUND CARE | Facility: HOSPITAL | Age: 70
End: 2020-06-24
Payer: MEDICARE

## 2020-06-24 ENCOUNTER — OUTPATIENT (OUTPATIENT)
Dept: OUTPATIENT SERVICES | Facility: HOSPITAL | Age: 70
LOS: 1 days | Discharge: HOME | End: 2020-06-24
Payer: MEDICARE

## 2020-06-24 VITALS
BODY MASS INDEX: 31.86 KG/M2 | SYSTOLIC BLOOD PRESSURE: 146 MMHG | HEIGHT: 67 IN | WEIGHT: 203 LBS | DIASTOLIC BLOOD PRESSURE: 67 MMHG | TEMPERATURE: 97.6 F | OXYGEN SATURATION: 97 % | HEART RATE: 82 BPM | RESPIRATION RATE: 20 BRPM

## 2020-06-24 DIAGNOSIS — I83.218 VARICOSE VEINS OF RIGHT LOWER EXTREMITY WITH BOTH ULCER OF OTHER PART OF LOWER EXTREMITY AND INFLAMMATION: ICD-10-CM

## 2020-06-24 DIAGNOSIS — L97.821 NON-PRESSURE CHRONIC ULCER OF OTHER PART OF LEFT LOWER LEG LIMITED TO BREAKDOWN OF SKIN: ICD-10-CM

## 2020-06-24 DIAGNOSIS — M65.272 CALCIFIC TENDINITIS, LEFT ANKLE AND FOOT: ICD-10-CM

## 2020-06-24 DIAGNOSIS — I83.228 VARICOSE VEINS OF LEFT LOWER EXTREMITY WITH BOTH ULCER OF OTHER PART OF LOWER EXTREMITY AND INFLAMMATION: ICD-10-CM

## 2020-06-24 DIAGNOSIS — L97.811 NON-PRESSURE CHRONIC ULCER OF OTHER PART OF RIGHT LOWER LEG LIMITED TO BREAKDOWN OF SKIN: ICD-10-CM

## 2020-06-24 PROCEDURE — G0463: CPT

## 2020-06-24 PROCEDURE — ZZZZZ: CPT

## 2020-06-30 ENCOUNTER — APPOINTMENT (OUTPATIENT)
Dept: WOUND CARE | Facility: HOSPITAL | Age: 70
End: 2020-06-30
Payer: MEDICARE

## 2020-06-30 ENCOUNTER — OUTPATIENT (OUTPATIENT)
Dept: OUTPATIENT SERVICES | Facility: HOSPITAL | Age: 70
LOS: 1 days | Discharge: HOME | End: 2020-06-30
Payer: MEDICARE

## 2020-06-30 VITALS
TEMPERATURE: 98.1 F | WEIGHT: 203 LBS | BODY MASS INDEX: 31.86 KG/M2 | HEIGHT: 67 IN | OXYGEN SATURATION: 97 % | HEART RATE: 104 BPM | SYSTOLIC BLOOD PRESSURE: 149 MMHG | RESPIRATION RATE: 20 BRPM | DIASTOLIC BLOOD PRESSURE: 72 MMHG

## 2020-06-30 DIAGNOSIS — I83.218 VARICOSE VEINS OF RIGHT LOWER EXTREMITY WITH BOTH ULCER OF OTHER PART OF LOWER EXTREMITY AND INFLAMMATION: ICD-10-CM

## 2020-06-30 PROCEDURE — 29581 APPL MULTLAYER CMPRN SYS LEG: CPT | Mod: 50

## 2020-06-30 PROCEDURE — 99213 OFFICE O/P EST LOW 20 MIN: CPT

## 2020-07-01 DIAGNOSIS — Z90.710 ACQUIRED ABSENCE OF BOTH CERVIX AND UTERUS: ICD-10-CM

## 2020-07-01 DIAGNOSIS — M54.16 RADICULOPATHY, LUMBAR REGION: ICD-10-CM

## 2020-07-01 DIAGNOSIS — M47.816 SPONDYLOSIS WITHOUT MYELOPATHY OR RADICULOPATHY, LUMBAR REGION: ICD-10-CM

## 2020-07-01 DIAGNOSIS — M16.9 OSTEOARTHRITIS OF HIP, UNSPECIFIED: ICD-10-CM

## 2020-07-01 DIAGNOSIS — Z72.0 TOBACCO USE: ICD-10-CM

## 2020-07-01 DIAGNOSIS — L97.821 NON-PRESSURE CHRONIC ULCER OF OTHER PART OF LEFT LOWER LEG LIMITED TO BREAKDOWN OF SKIN: ICD-10-CM

## 2020-07-01 DIAGNOSIS — Z79.899 OTHER LONG TERM (CURRENT) DRUG THERAPY: ICD-10-CM

## 2020-07-01 DIAGNOSIS — I83.228 VARICOSE VEINS OF LEFT LOWER EXTREMITY WITH BOTH ULCER OF OTHER PART OF LOWER EXTREMITY AND INFLAMMATION: ICD-10-CM

## 2020-07-01 DIAGNOSIS — Z79.82 LONG TERM (CURRENT) USE OF ASPIRIN: ICD-10-CM

## 2020-07-01 DIAGNOSIS — Z82.49 FAMILY HISTORY OF ISCHEMIC HEART DISEASE AND OTHER DISEASES OF THE CIRCULATORY SYSTEM: ICD-10-CM

## 2020-07-01 DIAGNOSIS — I83.218 VARICOSE VEINS OF RIGHT LOWER EXTREMITY WITH BOTH ULCER OF OTHER PART OF LOWER EXTREMITY AND INFLAMMATION: ICD-10-CM

## 2020-07-01 DIAGNOSIS — L97.811 NON-PRESSURE CHRONIC ULCER OF OTHER PART OF RIGHT LOWER LEG LIMITED TO BREAKDOWN OF SKIN: ICD-10-CM

## 2020-07-01 DIAGNOSIS — M17.12 UNILATERAL PRIMARY OSTEOARTHRITIS, LEFT KNEE: ICD-10-CM

## 2020-07-01 DIAGNOSIS — Z96.659 PRESENCE OF UNSPECIFIED ARTIFICIAL KNEE JOINT: ICD-10-CM

## 2020-07-01 DIAGNOSIS — M65.272 CALCIFIC TENDINITIS, LEFT ANKLE AND FOOT: ICD-10-CM

## 2020-07-01 DIAGNOSIS — E78.00 PURE HYPERCHOLESTEROLEMIA, UNSPECIFIED: ICD-10-CM

## 2020-07-01 DIAGNOSIS — G25.81 RESTLESS LEGS SYNDROME: ICD-10-CM

## 2020-07-07 ENCOUNTER — OUTPATIENT (OUTPATIENT)
Dept: OUTPATIENT SERVICES | Facility: HOSPITAL | Age: 70
LOS: 1 days | Discharge: HOME | End: 2020-07-07
Payer: MEDICARE

## 2020-07-07 ENCOUNTER — APPOINTMENT (OUTPATIENT)
Dept: WOUND CARE | Facility: HOSPITAL | Age: 70
End: 2020-07-07
Payer: MEDICARE

## 2020-07-07 VITALS
HEIGHT: 67 IN | DIASTOLIC BLOOD PRESSURE: 70 MMHG | HEART RATE: 88 BPM | WEIGHT: 203 LBS | RESPIRATION RATE: 20 BRPM | OXYGEN SATURATION: 100 % | BODY MASS INDEX: 31.86 KG/M2 | SYSTOLIC BLOOD PRESSURE: 143 MMHG | TEMPERATURE: 97.5 F

## 2020-07-07 DIAGNOSIS — I83.218 VARICOSE VEINS OF RIGHT LOWER EXTREMITY WITH BOTH ULCER OF OTHER PART OF LOWER EXTREMITY AND INFLAMMATION: ICD-10-CM

## 2020-07-07 PROCEDURE — 99213 OFFICE O/P EST LOW 20 MIN: CPT

## 2020-07-07 PROCEDURE — 29581 APPL MULTLAYER CMPRN SYS LEG: CPT | Mod: 50

## 2020-07-07 NOTE — PLAN
[FreeTextEntry1] : Patient examined and evaluated at this time.\par Continue local wound care and offloading with silver alginate, dry dressing, ACE.\par No vascular intervention at this time.\par Referral to lymphedema clinic.\par Patient to return in 1 week for follow up.

## 2020-07-07 NOTE — ASSESSMENT
[Verbal] : Verbal [Patient] : Patient [Verbalizes knowledge/Understanding] : Verbalizes knowledge/understanding [Good - alert, interested, motivated] : Good - alert, interested, motivated [Dressing changes] : dressing changes [Skin Care] : skin care [Signs and symptoms of infection] : sign and symptoms of infection [Compression Therapy] : compression therapy [How and When to Call] : how and when to call [Patient responsibility to plan of care] : patient responsibility to plan of care [Home] : Home [Stable] : stable [Not Applicable - Long Term Care/Home Health Agency] : Long Term Care/Home Health Agency: Not Applicable [Walker] : Walker [] : No [FreeTextEntry2] : Restore Skin Integrity\par Infection Control\par Localized wound care\par Compression Therapy\par Develop Realistic expectations and measurable treatments nursing POC to reduce, eliminate or develop acceptable pain limit tolerance [FreeTextEntry4] : F/U to [facility] in 1 week

## 2020-07-07 NOTE — REVIEW OF SYSTEMS
[Fever] : no fever [Eye Pain] : no eye pain [Earache] : no earache [Chest Pain] : no chest pain [Cough] : no cough [Abdominal Pain] : no abdominal pain [As Noted in HPI] : as noted in HPI [Limb Pain] : limb pain [FreeTextEntry9] : Pain to left plantar heel [Skin Wound] : skin wound [de-identified] : bilateral lower leg venous stasis ulceration down to skin with stasis dermatitis

## 2020-07-07 NOTE — HISTORY OF PRESENT ILLNESS
[FreeTextEntry1] : pt seen for left foot pain and bilateral lower leg venous stasis ulceration down to skin with stasis dermatitis. pt relates she has been trying to change her dressing at home by herself since her last visit.

## 2020-07-07 NOTE — PHYSICAL EXAM
[4 x 4] : 4 x 4  [Abdominal Pad] : Abdominal Pad [JVD] : no jugular venous distention  [Normal Thyroid] : the thyroid was normal [Normal Breath Sounds] : Normal breath sounds [Normal Heart Sounds] : normal heart sounds [Normal Rate and Rhythm] : normal rate and rhythm [Ankle Swelling (On Exam)] : present [1+] : left 1+ [Ankle Swelling Bilaterally] : bilaterally  [] : bilaterally [Ankle Swelling On The Left] : moderate [Abdomen Tenderness] : ~T ~M No abdominal tenderness [Abdomen Masses] : No abdominal massess [Enlarged] : not enlarged [Tender] : nontender [Oriented to Person] : oriented to person [Alert] : alert [Oriented to Place] : oriented to place [Oriented to Time] : oriented to time [Calm] : calm [de-identified] : Tenderness to palpation of left plantar and plantar medial calcaneus [de-identified] : calm [de-identified] : bilateral lower leg venous stasis ulceration down to skin with stasis dermatitis [FreeTextEntry3] : 2.6 [FreeTextEntry2] : 4.5 [FreeTextEntry1] : Right Posterior Leg [FreeTextEntry4] : 0.2 [de-identified] : Serosanguineous [de-identified] : Calcium Alginate [de-identified] : Excoriated [de-identified] : Cleansed with Normal Saline\par  [de-identified] : 1-25% [FreeTextEntry7] : Left Leg- Scattered weeping areas  [de-identified] : Serosanguineous [de-identified] : Calcium Alginate [de-identified] : Cleansed with Normal Saline\par  [TWNoteComboBox4] : Moderate [de-identified] : None [TWNoteComboBox5] : No [de-identified] : None [de-identified] : No [de-identified] : >75% [de-identified] : Yes [de-identified] : Ace wraps [de-identified] : No [de-identified] : Large [de-identified] : 3x Weekly [de-identified] : None [de-identified] : No [de-identified] : Macerated [de-identified] : None [de-identified] : 100% [de-identified] : No [de-identified] : Ace wraps [de-identified] : 3x Weekly

## 2020-07-07 NOTE — VITALS
[Pain related to present condition?] : The patient's  pain is related to present condition. [] : No [FreeTextEntry1] : Oxycodone  [FreeTextEntry3] : Bilateral Legs

## 2020-07-08 ENCOUNTER — APPOINTMENT (OUTPATIENT)
Dept: CARDIOLOGY | Facility: CLINIC | Age: 70
End: 2020-07-08
Payer: MEDICARE

## 2020-07-08 VITALS
SYSTOLIC BLOOD PRESSURE: 176 MMHG | BODY MASS INDEX: 34.53 KG/M2 | HEART RATE: 84 BPM | DIASTOLIC BLOOD PRESSURE: 70 MMHG | WEIGHT: 220 LBS | OXYGEN SATURATION: 96 % | HEIGHT: 67 IN

## 2020-07-08 DIAGNOSIS — M79.89 OTHER SPECIFIED SOFT TISSUE DISORDERS: ICD-10-CM

## 2020-07-08 PROCEDURE — 99214 OFFICE O/P EST MOD 30 MIN: CPT

## 2020-07-08 NOTE — REVIEW OF SYSTEMS
[Recent Weight Loss (___ Lbs)] : recent [unfilled] ~Ulb weight loss [Lower Ext Edema] : lower extremity edema [Eyeglasses] : currently wearing eyeglasses [Joint Pain] : joint pain [Skin: A Rash] : rash: [Negative] : Gastrointestinal [Headache] : no headache [Fever] : no fever [Chills] : no chills [Blurry Vision] : no blurred vision [Feeling Fatigued] : not feeling fatigued [Seeing Double (Diplopia)] : no diplopia [Depression] : no depression [Dizziness] : no dizziness [Anxiety] : no anxiety [Excessive Thirst] : no polydipsia [Easy Bleeding] : no tendency for easy bleeding [Easy Bruising] : no tendency for easy bruising [FreeTextEntry3] : Difficulty swallowing

## 2020-07-08 NOTE — REASON FOR VISIT
[Follow-Up - Clinic] : a clinic follow-up of [Aortic Stenosis] : aortic stenosis [Syncope] : syncope

## 2020-07-08 NOTE — HISTORY OF PRESENT ILLNESS
[FreeTextEntry1] : I saw Christina Damian in the office today for cardiac follow up.She is a 70-year-old white female who was recently admitted to [facility] 12/19 with episodes of frequent falls and blacking out occurring over the past 4 months. In the hospital evaluation revealed acute kidney insufficiency felt to be due to dehydration. Episodes of falling and loss of consciousness were felt to be due to dehydration and poor perfusion to the brain. Creatinine went from 3.8 down to 1.2 with hydration. There was no evidence for CVA. She had been admitted to [facility] previously with similar symptoms. Since the hospitalization she is feeling better. She has had no further episodes of feeling like she would black out.\par \par Her past medical history is significant for the absence of hypertension, diabetes, hyperlipidemia. She has no family history of heart disease. She stopped smoking 6 years ago. She's never had a significant cardiac workup. She has no known history of heart disease.\par \par She is relatively sedentary and walks with a walker. She has no chest pain, shortness of breath, or palpitation.\par \par She had an ADALBERTO performed 1/20 that was normal. Carotid Doppler 2/20 showed mild to moderate plaque. Echocardiogram 1/20 showed an ejection fraction of 60% with possible bicuspid aortic valve. There is mild aortic stenosis with a peak gradient of 18. Mild TR with PA pressure of 24. Stage I diastolic dysfunction. Blood work performed 2/20 demonstrated a cholesterol of 135, triglycerides 95, HDL 41, and LDL 73.\par \par With good hydration she said no further episodes of syncope. She is followed in the wound center for her bilateral leg edema being treated with with wrapping, edema pump and leg elevation. Edema is still weeping but slowly getting better up.

## 2020-07-08 NOTE — PHYSICAL EXAM
[General Appearance - Well Developed] : well developed [Well Groomed] : well groomed [Normal Appearance] : normal appearance [General Appearance - Well Nourished] : well nourished [General Appearance - In No Acute Distress] : no acute distress [No Deformities] : no deformities [Normal Oral Mucosa] : normal oral mucosa [Normal Jugular Venous A Waves Present] : normal jugular venous A waves present [Normal Jugular Venous V Waves Present] : normal jugular venous V waves present [No Jugular Venous Hagen A Waves] : no jugular venous hagen A waves [Respiration, Rhythm And Depth] : normal respiratory rhythm and effort [Auscultation Breath Sounds / Voice Sounds] : lungs were clear to auscultation bilaterally [Exaggerated Use Of Accessory Muscles For Inspiration] : no accessory muscle use [Abdomen Soft] : soft [Bowel Sounds] : normal bowel sounds [Abdomen Tenderness] : non-tender [Nail Clubbing] : no clubbing of the fingernails [Skin Color & Pigmentation] : normal skin color and pigmentation [Cyanosis, Localized] : no localized cyanosis [] : no rash [Skin Turgor] : normal skin turgor [Impaired Insight] : insight and judgment were intact [Oriented To Time, Place, And Person] : oriented to person, place, and time [No Anxiety] : not feeling anxious [Normal Rate] : normal [Not Palpable] : not palpable [Normal S1] : normal S1 [Normal S2] : normal S2 [II] : a grade 2 [2+] : left 2+ [1+] : left 1+ [No Abnormalities] : the abdominal aorta was not enlarged and no bruit was heard [___ +] : bilateral [unfilled]U+ pretibial pitting edema [FreeTextEntry1] : Walks with a walker [S3] : no S3 [Left Carotid Bruit] : no bruit heard over the left carotid [Right Carotid Bruit] : no bruit heard over the right carotid [S4] : no S4 [Right Femoral Bruit] : no bruit heard over the right femoral artery [Left Femoral Bruit] : no bruit heard over the left femoral artery

## 2020-07-08 NOTE — DISCUSSION/SUMMARY
[FreeTextEntry1] : The patient's cardiac status is stable. She has no chest pain, shortness of breath,palpatation or lightheadedness. Blood pressure and cholesterol OK without meds. Had to stop aspirin because of bleeding under the skin. \par \par Discussed results od echo and carotid doppler. Will stay on medication. Will see patient in 3 months.

## 2020-07-10 DIAGNOSIS — L97.811 NON-PRESSURE CHRONIC ULCER OF OTHER PART OF RIGHT LOWER LEG LIMITED TO BREAKDOWN OF SKIN: ICD-10-CM

## 2020-07-10 DIAGNOSIS — Z96.659 PRESENCE OF UNSPECIFIED ARTIFICIAL KNEE JOINT: ICD-10-CM

## 2020-07-10 DIAGNOSIS — G25.81 RESTLESS LEGS SYNDROME: ICD-10-CM

## 2020-07-10 DIAGNOSIS — Z79.899 OTHER LONG TERM (CURRENT) DRUG THERAPY: ICD-10-CM

## 2020-07-10 DIAGNOSIS — Z82.49 FAMILY HISTORY OF ISCHEMIC HEART DISEASE AND OTHER DISEASES OF THE CIRCULATORY SYSTEM: ICD-10-CM

## 2020-07-10 DIAGNOSIS — Z72.0 TOBACCO USE: ICD-10-CM

## 2020-07-10 DIAGNOSIS — L97.821 NON-PRESSURE CHRONIC ULCER OF OTHER PART OF LEFT LOWER LEG LIMITED TO BREAKDOWN OF SKIN: ICD-10-CM

## 2020-07-10 DIAGNOSIS — Z90.710 ACQUIRED ABSENCE OF BOTH CERVIX AND UTERUS: ICD-10-CM

## 2020-07-10 DIAGNOSIS — M65.272 CALCIFIC TENDINITIS, LEFT ANKLE AND FOOT: ICD-10-CM

## 2020-07-10 DIAGNOSIS — E78.00 PURE HYPERCHOLESTEROLEMIA, UNSPECIFIED: ICD-10-CM

## 2020-07-10 DIAGNOSIS — M54.16 RADICULOPATHY, LUMBAR REGION: ICD-10-CM

## 2020-07-10 DIAGNOSIS — M16.9 OSTEOARTHRITIS OF HIP, UNSPECIFIED: ICD-10-CM

## 2020-07-10 DIAGNOSIS — M17.12 UNILATERAL PRIMARY OSTEOARTHRITIS, LEFT KNEE: ICD-10-CM

## 2020-07-10 DIAGNOSIS — I83.228 VARICOSE VEINS OF LEFT LOWER EXTREMITY WITH BOTH ULCER OF OTHER PART OF LOWER EXTREMITY AND INFLAMMATION: ICD-10-CM

## 2020-07-10 DIAGNOSIS — Z79.82 LONG TERM (CURRENT) USE OF ASPIRIN: ICD-10-CM

## 2020-07-10 DIAGNOSIS — M47.816 SPONDYLOSIS WITHOUT MYELOPATHY OR RADICULOPATHY, LUMBAR REGION: ICD-10-CM

## 2020-07-10 DIAGNOSIS — I83.218 VARICOSE VEINS OF RIGHT LOWER EXTREMITY WITH BOTH ULCER OF OTHER PART OF LOWER EXTREMITY AND INFLAMMATION: ICD-10-CM

## 2020-07-10 NOTE — H&P ADULT - I WAS PHYSICALLY PRESENT FOR THE KEY PORTIONS OF THE EVALUATION AND MANAGEMENT (E/M) SERVICE PROVIDED.  I AGREE WITH THE ABOVE HISTORY, PHYSICAL, AND PLAN WHICH I HAVE REVIEWED AND EDITED WHERE APPROPRIATE
UNITS  7. STOP STEGLATRO 5 MG DAILY. DOES NOT WANT,   8. CHECK SUGARS AC HS  9. CHECK LAB AS ORDERED. 10.  FOLLOW WITH PCP AND ENDOCRINOLOGIST OF CHOICE SINCE I AM RETIRING 20    Orders Placed This Encounter   Procedures    POCT glycosylated hemoglobin (Hb A1C)     Orders Placed This Encounter   Medications    insulin glargine (BASAGLAR KWIKPEN) 100 UNIT/ML injection pen     Sig: INJECT 60 UNITS INTO THE SKIN NIGHTLY    Pt wants a 90 day supply     Dispense:  15 pen     Refill:  0     PATIENT OUT OF MED    insulin lispro, 1 Unit Dial, (HUMALOG KWIKPEN) 100 UNIT/ML SOPN     Si TO  18  UNITS TID BEFORE MEALS     Dispense:  15 pen     Refill:  0    Insulin Pen Needle 32G X 4 MM MISC     Sig: USE FOUR TIMES DAILY WITH INSULIN PENS     Dispense:  360 each     Refill:  0    blood glucose test strips (ASCENSIA AUTODISC VI;ONE TOUCH ULTRA TEST VI) strip     Sig: Use with associated glucose meter. CHECK SUGARS 4 TIMES DAILY     Dispense:  400 strip     Refill:  0        No follow-ups on file. 2018        Patient given educational materials - see patient instructions. Discussed use, benefit, and side effects of prescribed medications. All patient questions answered. Pt voiced understanding. Reviewed health maintenance. Instructed to continue current medications, diet and exercise. Patient agreed with treatment plan. Follow up as directed.        Electronically signed by Leena Miner MD
Statement Selected

## 2020-07-13 ENCOUNTER — OUTPATIENT (OUTPATIENT)
Dept: OUTPATIENT SERVICES | Facility: HOSPITAL | Age: 70
LOS: 1 days | Discharge: HOME | End: 2020-07-13
Payer: MEDICARE

## 2020-07-13 ENCOUNTER — APPOINTMENT (OUTPATIENT)
Dept: WOUND CARE | Facility: HOSPITAL | Age: 70
End: 2020-07-13
Payer: MEDICARE

## 2020-07-13 VITALS
WEIGHT: 220 LBS | TEMPERATURE: 97.4 F | OXYGEN SATURATION: 97 % | SYSTOLIC BLOOD PRESSURE: 154 MMHG | DIASTOLIC BLOOD PRESSURE: 70 MMHG | RESPIRATION RATE: 20 BRPM | HEIGHT: 67 IN | HEART RATE: 78 BPM | BODY MASS INDEX: 34.53 KG/M2

## 2020-07-13 DIAGNOSIS — I83.218 VARICOSE VEINS OF RIGHT LOWER EXTREMITY WITH BOTH ULCER OF OTHER PART OF LOWER EXTREMITY AND INFLAMMATION: ICD-10-CM

## 2020-07-13 PROCEDURE — 99213 OFFICE O/P EST LOW 20 MIN: CPT

## 2020-07-13 PROCEDURE — G0463: CPT

## 2020-07-13 NOTE — PLAN
[FreeTextEntry1] : Patient examined and evaluated at this time.\par Continue local wound care and offloading with silver alginate, dry dressing, ACE.\par No vascular intervention at this time.\par Patient to return in 1 week for follow up.

## 2020-07-13 NOTE — ASSESSMENT
[Verbal] : Verbal [Demo] : Demo [Patient] : Patient [Good - alert, interested, motivated] : Good - alert, interested, motivated [Verbalizes knowledge/Understanding] : Verbalizes knowledge/understanding [Dressing changes] : dressing changes [Skin Care] : skin care [Pressure relief] : pressure relief [Signs and symptoms of infection] : sign and symptoms of infection [How and When to Call] : how and when to call [Off-loading] : off-loading [Compression Therapy] : compression therapy [Patient responsibility to plan of care] : patient responsibility to plan of care [] : Yes [Stable] : stable [Home] : Home [Walker] : Walker [FreeTextEntry2] : Alteration in skin integrity- promote optimal skin integrity\par  [FreeTextEntry4] : Dr Lea/ Photos taken\par Pt uses Lymphedema pumps daily as recommended by Dr Lea\par F/U to  [facility] in 1 week

## 2020-07-13 NOTE — PHYSICAL EXAM
[Normal Thyroid] : the thyroid was normal [Normal Breath Sounds] : Normal breath sounds [Normal Heart Sounds] : normal heart sounds [Normal Rate and Rhythm] : normal rate and rhythm [1+] : right 1+ [Ankle Swelling Bilaterally] : bilaterally  [Ankle Swelling (On Exam)] : present [] : present [Ankle Swelling On The Left] : moderate [Alert] : alert [Oriented to Person] : oriented to person [Oriented to Place] : oriented to place [Oriented to Time] : oriented to time [Calm] : calm [4 x 4] : 4 x 4  [Abdominal Pad] : Abdominal Pad [JVD] : no jugular venous distention  [Abdomen Masses] : No abdominal massess [Abdomen Tenderness] : ~T ~M No abdominal tenderness [Tender] : nontender [Enlarged] : not enlarged [de-identified] : calm [de-identified] : Tenderness to palpation of left plantar and plantar medial calcaneus [de-identified] : bilateral lower leg venous stasis ulceration down to skin with stasis dermatitis [FreeTextEntry1] : Right Posterior Leg [FreeTextEntry2] : 4.2 [FreeTextEntry4] : 0.1 [FreeTextEntry3] : 2.5 [de-identified] : Intact [de-identified] : Moderate Serosanguineous [de-identified] : Alginate/ Dry Dressing & Kerlix [de-identified] : Cleansed with Normal saline\par  [FreeTextEntry7] : Left Leg- Scattered weeping areas [de-identified] : Moderate Serosanguineous [de-identified] : Intact [de-identified] : Alginate/ Dry Dressing & Kerlix [de-identified] : Cleansed with Normal saline\par  [de-identified] : None [de-identified] : None [de-identified] : 100% [de-identified] : No [de-identified] : Daily [de-identified] : None [de-identified] : None [de-identified] : No [de-identified] : 100% [de-identified] : Ace wraps [de-identified] : Daily

## 2020-07-13 NOTE — REVIEW OF SYSTEMS
[As Noted in HPI] : as noted in HPI [Limb Pain] : limb pain [Skin Wound] : skin wound [Fever] : no fever [Eye Pain] : no eye pain [Earache] : no earache [Chest Pain] : no chest pain [Cough] : no cough [FreeTextEntry9] : Pain to left plantar heel [Abdominal Pain] : no abdominal pain [de-identified] : bilateral lower leg venous stasis ulceration down to skin with stasis dermatitis

## 2020-07-18 DIAGNOSIS — M17.12 UNILATERAL PRIMARY OSTEOARTHRITIS, LEFT KNEE: ICD-10-CM

## 2020-07-18 DIAGNOSIS — Z72.0 TOBACCO USE: ICD-10-CM

## 2020-07-18 DIAGNOSIS — L97.821 NON-PRESSURE CHRONIC ULCER OF OTHER PART OF LEFT LOWER LEG LIMITED TO BREAKDOWN OF SKIN: ICD-10-CM

## 2020-07-18 DIAGNOSIS — Z79.899 OTHER LONG TERM (CURRENT) DRUG THERAPY: ICD-10-CM

## 2020-07-18 DIAGNOSIS — Z96.659 PRESENCE OF UNSPECIFIED ARTIFICIAL KNEE JOINT: ICD-10-CM

## 2020-07-18 DIAGNOSIS — Z82.49 FAMILY HISTORY OF ISCHEMIC HEART DISEASE AND OTHER DISEASES OF THE CIRCULATORY SYSTEM: ICD-10-CM

## 2020-07-18 DIAGNOSIS — E78.00 PURE HYPERCHOLESTEROLEMIA, UNSPECIFIED: ICD-10-CM

## 2020-07-18 DIAGNOSIS — M65.272 CALCIFIC TENDINITIS, LEFT ANKLE AND FOOT: ICD-10-CM

## 2020-07-18 DIAGNOSIS — L97.811 NON-PRESSURE CHRONIC ULCER OF OTHER PART OF RIGHT LOWER LEG LIMITED TO BREAKDOWN OF SKIN: ICD-10-CM

## 2020-07-18 DIAGNOSIS — I83.218 VARICOSE VEINS OF RIGHT LOWER EXTREMITY WITH BOTH ULCER OF OTHER PART OF LOWER EXTREMITY AND INFLAMMATION: ICD-10-CM

## 2020-07-18 DIAGNOSIS — Z90.710 ACQUIRED ABSENCE OF BOTH CERVIX AND UTERUS: ICD-10-CM

## 2020-07-18 DIAGNOSIS — Z79.82 LONG TERM (CURRENT) USE OF ASPIRIN: ICD-10-CM

## 2020-07-18 DIAGNOSIS — M16.9 OSTEOARTHRITIS OF HIP, UNSPECIFIED: ICD-10-CM

## 2020-07-18 DIAGNOSIS — I83.228 VARICOSE VEINS OF LEFT LOWER EXTREMITY WITH BOTH ULCER OF OTHER PART OF LOWER EXTREMITY AND INFLAMMATION: ICD-10-CM

## 2020-07-18 DIAGNOSIS — G25.81 RESTLESS LEGS SYNDROME: ICD-10-CM

## 2020-07-18 DIAGNOSIS — M54.16 RADICULOPATHY, LUMBAR REGION: ICD-10-CM

## 2020-07-18 DIAGNOSIS — M47.816 SPONDYLOSIS WITHOUT MYELOPATHY OR RADICULOPATHY, LUMBAR REGION: ICD-10-CM

## 2020-07-20 ENCOUNTER — APPOINTMENT (OUTPATIENT)
Dept: WOUND CARE | Facility: HOSPITAL | Age: 70
End: 2020-07-20
Payer: MEDICARE

## 2020-07-20 ENCOUNTER — OUTPATIENT (OUTPATIENT)
Dept: OUTPATIENT SERVICES | Facility: HOSPITAL | Age: 70
LOS: 1 days | Discharge: HOME | End: 2020-07-20
Payer: MEDICARE

## 2020-07-20 VITALS
OXYGEN SATURATION: 95 % | RESPIRATION RATE: 20 BRPM | DIASTOLIC BLOOD PRESSURE: 70 MMHG | TEMPERATURE: 97.6 F | HEART RATE: 96 BPM | BODY MASS INDEX: 34.53 KG/M2 | HEIGHT: 67 IN | SYSTOLIC BLOOD PRESSURE: 148 MMHG | WEIGHT: 220 LBS

## 2020-07-20 DIAGNOSIS — Z82.49 FAMILY HISTORY OF ISCHEMIC HEART DISEASE AND OTHER DISEASES OF THE CIRCULATORY SYSTEM: ICD-10-CM

## 2020-07-20 DIAGNOSIS — I35.0 NONRHEUMATIC AORTIC (VALVE) STENOSIS: ICD-10-CM

## 2020-07-20 DIAGNOSIS — Z96.649 PRESENCE OF UNSPECIFIED ARTIFICIAL HIP JOINT: ICD-10-CM

## 2020-07-20 DIAGNOSIS — M47.816 SPONDYLOSIS WITHOUT MYELOPATHY OR RADICULOPATHY, LUMBAR REGION: ICD-10-CM

## 2020-07-20 DIAGNOSIS — I83.228 VARICOSE VEINS OF LEFT LOWER EXTREMITY WITH BOTH ULCER OF OTHER PART OF LOWER EXTREMITY AND INFLAMMATION: ICD-10-CM

## 2020-07-20 DIAGNOSIS — Z79.82 LONG TERM (CURRENT) USE OF ASPIRIN: ICD-10-CM

## 2020-07-20 DIAGNOSIS — Z90.710 ACQUIRED ABSENCE OF BOTH CERVIX AND UTERUS: ICD-10-CM

## 2020-07-20 DIAGNOSIS — M16.9 OSTEOARTHRITIS OF HIP, UNSPECIFIED: ICD-10-CM

## 2020-07-20 DIAGNOSIS — G25.81 RESTLESS LEGS SYNDROME: ICD-10-CM

## 2020-07-20 DIAGNOSIS — L97.811 NON-PRESSURE CHRONIC ULCER OF OTHER PART OF RIGHT LOWER LEG LIMITED TO BREAKDOWN OF SKIN: ICD-10-CM

## 2020-07-20 DIAGNOSIS — M54.16 RADICULOPATHY, LUMBAR REGION: ICD-10-CM

## 2020-07-20 DIAGNOSIS — Z96.659 PRESENCE OF UNSPECIFIED ARTIFICIAL KNEE JOINT: ICD-10-CM

## 2020-07-20 DIAGNOSIS — M17.12 UNILATERAL PRIMARY OSTEOARTHRITIS, LEFT KNEE: ICD-10-CM

## 2020-07-20 DIAGNOSIS — Z79.899 OTHER LONG TERM (CURRENT) DRUG THERAPY: ICD-10-CM

## 2020-07-20 DIAGNOSIS — E78.00 PURE HYPERCHOLESTEROLEMIA, UNSPECIFIED: ICD-10-CM

## 2020-07-20 DIAGNOSIS — Z72.0 TOBACCO USE: ICD-10-CM

## 2020-07-20 DIAGNOSIS — L97.821 NON-PRESSURE CHRONIC ULCER OF OTHER PART OF LEFT LOWER LEG LIMITED TO BREAKDOWN OF SKIN: ICD-10-CM

## 2020-07-20 DIAGNOSIS — M65.272 CALCIFIC TENDINITIS, LEFT ANKLE AND FOOT: ICD-10-CM

## 2020-07-20 DIAGNOSIS — I83.218 VARICOSE VEINS OF RIGHT LOWER EXTREMITY WITH BOTH ULCER OF OTHER PART OF LOWER EXTREMITY AND INFLAMMATION: ICD-10-CM

## 2020-07-20 PROCEDURE — 99213 OFFICE O/P EST LOW 20 MIN: CPT

## 2020-07-20 PROCEDURE — G0463: CPT

## 2020-07-20 NOTE — PHYSICAL EXAM
[Abdominal Pad] : Abdominal Pad [4 x 4] : 4 x 4  [JVD] : no jugular venous distention  [Normal Thyroid] : the thyroid was normal [Normal Heart Sounds] : normal heart sounds [Normal Rate and Rhythm] : normal rate and rhythm [Normal Breath Sounds] : Normal breath sounds [Ankle Swelling Bilaterally] : bilaterally  [Ankle Swelling (On Exam)] : present [1+] : left 1+ [Ankle Swelling On The Left] : moderate [] : present [Abdomen Tenderness] : ~T ~M No abdominal tenderness [Tender] : nontender [Abdomen Masses] : No abdominal massess [Enlarged] : not enlarged [Oriented to Person] : oriented to person [Alert] : alert [Calm] : calm [Oriented to Time] : oriented to time [Oriented to Place] : oriented to place [de-identified] : calm [de-identified] : Tenderness to palpation of left plantar and plantar medial calcaneus [de-identified] : bilateral lower leg venous stasis ulceration down to skin with stasis dermatitis [FreeTextEntry2] : 3.2 [FreeTextEntry3] : 2.4 [FreeTextEntry1] : Right posterior leg  [de-identified] : Expressed comfort post ACE application. [FreeTextEntry4] : 0.1 [de-identified] : Serous/sanguinous [de-identified] : Alginate  [FreeTextEntry9] : 10 [de-identified] : Cleansed with NS\par Kerlix  [FreeTextEntry8] : 11.6 [FreeTextEntry7] : Left lateral to posterior leg  [de-identified] : Expressed comfort post ACE application. [de-identified] : Alginate  [de-identified] : Serous/sanguinous [de-identified] : 0.1 [de-identified] : 6.5 [de-identified] : Cleansed with NS\par Kerlix  [de-identified] : Right lateral leg (New)  [de-identified] : Expressed comfort post ACE application. [de-identified] : 3.5 [de-identified] : Serous/sanguinous [de-identified] : 0.1 [TWNoteComboBox4] : Moderate [de-identified] : Alginate  [de-identified] : Cleansed with NS\par Kerlix  [de-identified] : Normal [de-identified] : >75% [de-identified] : None [de-identified] : None [de-identified] : Every other day [de-identified] : Ace wraps [de-identified] : No [de-identified] : Primary Dressing [de-identified] : None [de-identified] : Moderate [de-identified] : Macerated [de-identified] : None [de-identified] : Ace wraps [de-identified] : >75% [de-identified] : Every other day [de-identified] : No [de-identified] : Normal [de-identified] : Primary Dressing [de-identified] : Moderate [de-identified] : >75% [de-identified] : None [de-identified] : None [de-identified] : Primary Dressing [de-identified] : No [de-identified] : Ace wraps [de-identified] : Every other day

## 2020-07-20 NOTE — HISTORY OF PRESENT ILLNESS
[FreeTextEntry1] : pt seen for left foot pain and bilateral lower leg venous stasis ulceration down to skin with stasis dermatitis. pt relates she has been changing her dressing at home by herself since her last visit.

## 2020-07-20 NOTE — ASSESSMENT
[Written] : Written [Demo] : Demo [Verbal] : Verbal [Needs reinforcement] : needs reinforcement [Patient] : Patient [Good - alert, interested, motivated] : Good - alert, interested, motivated [Dressing changes] : dressing changes [Foot Care] : foot care [Signs and symptoms of infection] : sign and symptoms of infection [Skin Care] : skin care [Venous Disease] : venous disease [Pain Management] : pain management [How and When to Call] : how and when to call [Patient responsibility to plan of care] : patient responsibility to plan of care [Compression Therapy] : compression therapy [Home] : Home [Stable] : stable [Not Applicable - Long Term Care/Home Health Agency] : Long Term Care/Home Health Agency: Not Applicable [Walker] : Walker [FreeTextEntry2] : Infection prevention\par Localized wound care \par Goal of remaining pain free regarding wounds.\par Compression therapy \par Edema prevention  [] : No [FreeTextEntry3] : Patient with new wound  [FreeTextEntry4] : Follow up in 1 week

## 2020-07-20 NOTE — PLAN
[FreeTextEntry1] : Patient examined and evaluated at this time.\par Continue local wound care and offloading with alginate, dry dressing, compression socks, and ACE.\par No vascular intervention at this time.\par Patient to return in 1 week for follow up.

## 2020-07-20 NOTE — REVIEW OF SYSTEMS
[Fever] : no fever [Earache] : no earache [Eye Pain] : no eye pain [Chest Pain] : no chest pain [Abdominal Pain] : no abdominal pain [As Noted in HPI] : as noted in HPI [Cough] : no cough [Skin Wound] : skin wound [FreeTextEntry9] : Pain to left plantar heel [Limb Pain] : limb pain [de-identified] : bilateral lower leg venous stasis ulceration down to skin with stasis dermatitis

## 2020-07-21 NOTE — PHYSICAL EXAM
[Normal Thyroid] : the thyroid was normal [Normal Rate and Rhythm] : normal rate and rhythm [Normal Breath Sounds] : Normal breath sounds [Normal Heart Sounds] : normal heart sounds [Ankle Swelling Bilaterally] : bilaterally  [1+] : left 1+ [Ankle Swelling (On Exam)] : present [] : present [Ankle Swelling On The Left] : moderate [Alert] : alert [Oriented to Person] : oriented to person [Oriented to Place] : oriented to place [Oriented to Time] : oriented to time [Calm] : calm [4 x 4] : 4 x 4  [Abdominal Pad] : Abdominal Pad [JVD] : no jugular venous distention  [Abdomen Masses] : No abdominal massess [Tender] : nontender [Abdomen Tenderness] : ~T ~M No abdominal tenderness [Enlarged] : not enlarged [de-identified] : Tenderness to palpation of left plantar and plantar medial calcaneus [de-identified] : calm [de-identified] : bilateral lower leg venous stasis ulceration down to skin with stasis dermatitis [de-identified] : 0.1 [FreeTextEntry8] : 6.0 [FreeTextEntry9] : 3.5 [de-identified] : e [de-identified] : Macerated [de-identified] : No [de-identified] : Ace wraps [de-identified] : 3x Weekly [de-identified] : None [de-identified] : Macerated [de-identified] : Primary Dressing [de-identified] : 100% [de-identified] : Ace wraps [de-identified] : None [de-identified] : 3x Weekly [de-identified] : Primary Dressing [FreeTextEntry3] : 2.4 [FreeTextEntry1] : Right Posterior Leg [FreeTextEntry2] : 3.0 [de-identified] : Intact [FreeTextEntry4] : 0.1 [de-identified] : Moderate Serosanguineous [de-identified] : 100% [de-identified] : [place] [de-identified] : Dressing & Kerlix [FreeTextEntry7] : Left Leg- multiple scattered weeping areas [de-identified] : Cleansed with Normal saline\par  [de-identified] : Large Serosanguineous [de-identified] : [place]an [de-identified] : , Dry Dressing & Kerlix [de-identified] : None [de-identified] : Cleansed with Normal saline\par  [de-identified] : None [de-identified] : No

## 2020-07-21 NOTE — ASSESSMENT
[Demo] : Demo [Verbal] : Verbal [Verbalizes knowledge/Understanding] : Verbalizes knowledge/understanding [Patient] : Patient [Fair - mild discomfort, physical impairment, low acceptance] : Fair - mild discomfort, physical impairment, low acceptance [Dressing changes] : dressing changes [Foot Care] : foot care [Skin Care] : skin care [Pressure relief] : pressure relief [Signs and symptoms of infection] : sign and symptoms of infection [How and When to Call] : how and when to call [Venous Disease] : venous disease [Compression Therapy] : compression therapy [Off-loading] : off-loading [Patient responsibility to plan of care] : patient responsibility to plan of care [] : Yes [Stable] : stable [Primary Care Physician] : Primary Care Physician [Not Applicable - Long Term Care/Home Health Agency] : Long Term Care/Home Health Agency: Not Applicable [Ambulatory] : Ambulatory [FreeTextEntry2] : Restore skin integrity; infection control; Compression, elevation/edema control\par \par  [FreeTextEntry4] : Pt continues to use lymphedema pumps she purchased through a catalog.  [facility] unable to secure Rx for other (sleeve) lymphedema pumps due to open wounds.  Pt advised by Dr Lea to continue using her pumps and try to increase the duration of time used daily.  Pt expressed understanding.  [facility] supply order requested by task to [employer] today. Pt provided with supplies today until order received in mail. [FreeTextEntry1] : Pt to F/U weekly; Pt continues to use lymphedema pumps she purchased through a catalog.  [facility] unable to secure Rx for other (sleeve) lymphedema pumps due to open wounds.  Pt advised by Dr Lea to continue using her pumps and try to increase the duration of time used daily.  Pt expressed understanding.  [facility] supply order requested by task to [facility] today. Pt provided with supplies today until order received in mail.

## 2020-07-21 NOTE — REVIEW OF SYSTEMS
[As Noted in HPI] : as noted in HPI [Limb Pain] : limb pain [Skin Wound] : skin wound [Fever] : no fever [Earache] : no earache [Eye Pain] : no eye pain [Chest Pain] : no chest pain [Cough] : no cough [Abdominal Pain] : no abdominal pain [FreeTextEntry9] : Pain to left plantar heel [de-identified] : bilateral lower leg venous stasis ulceration down to skin with stasis dermatitis

## 2020-07-29 ENCOUNTER — APPOINTMENT (OUTPATIENT)
Dept: WOUND CARE | Facility: HOSPITAL | Age: 70
End: 2020-07-29
Payer: MEDICARE

## 2020-07-29 ENCOUNTER — OUTPATIENT (OUTPATIENT)
Dept: OUTPATIENT SERVICES | Facility: HOSPITAL | Age: 70
LOS: 1 days | Discharge: HOME | End: 2020-07-29
Payer: MEDICARE

## 2020-07-29 VITALS
HEIGHT: 67 IN | BODY MASS INDEX: 34.53 KG/M2 | HEART RATE: 81 BPM | OXYGEN SATURATION: 98 % | TEMPERATURE: 97.9 F | SYSTOLIC BLOOD PRESSURE: 133 MMHG | WEIGHT: 220 LBS | RESPIRATION RATE: 20 BRPM | DIASTOLIC BLOOD PRESSURE: 70 MMHG

## 2020-07-29 DIAGNOSIS — Z90.710 ACQUIRED ABSENCE OF BOTH CERVIX AND UTERUS: ICD-10-CM

## 2020-07-29 DIAGNOSIS — Z79.82 LONG TERM (CURRENT) USE OF ASPIRIN: ICD-10-CM

## 2020-07-29 DIAGNOSIS — Z72.0 TOBACCO USE: ICD-10-CM

## 2020-07-29 DIAGNOSIS — M16.9 OSTEOARTHRITIS OF HIP, UNSPECIFIED: ICD-10-CM

## 2020-07-29 DIAGNOSIS — Z82.49 FAMILY HISTORY OF ISCHEMIC HEART DISEASE AND OTHER DISEASES OF THE CIRCULATORY SYSTEM: ICD-10-CM

## 2020-07-29 DIAGNOSIS — E78.00 PURE HYPERCHOLESTEROLEMIA, UNSPECIFIED: ICD-10-CM

## 2020-07-29 DIAGNOSIS — M54.16 RADICULOPATHY, LUMBAR REGION: ICD-10-CM

## 2020-07-29 DIAGNOSIS — I35.0 NONRHEUMATIC AORTIC (VALVE) STENOSIS: ICD-10-CM

## 2020-07-29 DIAGNOSIS — I83.218 VARICOSE VEINS OF RIGHT LOWER EXTREMITY WITH BOTH ULCER OF OTHER PART OF LOWER EXTREMITY AND INFLAMMATION: ICD-10-CM

## 2020-07-29 DIAGNOSIS — M47.816 SPONDYLOSIS WITHOUT MYELOPATHY OR RADICULOPATHY, LUMBAR REGION: ICD-10-CM

## 2020-07-29 DIAGNOSIS — G25.81 RESTLESS LEGS SYNDROME: ICD-10-CM

## 2020-07-29 DIAGNOSIS — L97.811 NON-PRESSURE CHRONIC ULCER OF OTHER PART OF RIGHT LOWER LEG LIMITED TO BREAKDOWN OF SKIN: ICD-10-CM

## 2020-07-29 DIAGNOSIS — Z79.899 OTHER LONG TERM (CURRENT) DRUG THERAPY: ICD-10-CM

## 2020-07-29 DIAGNOSIS — I83.228 VARICOSE VEINS OF LEFT LOWER EXTREMITY WITH BOTH ULCER OF OTHER PART OF LOWER EXTREMITY AND INFLAMMATION: ICD-10-CM

## 2020-07-29 DIAGNOSIS — Z96.659 PRESENCE OF UNSPECIFIED ARTIFICIAL KNEE JOINT: ICD-10-CM

## 2020-07-29 DIAGNOSIS — L97.821 NON-PRESSURE CHRONIC ULCER OF OTHER PART OF LEFT LOWER LEG LIMITED TO BREAKDOWN OF SKIN: ICD-10-CM

## 2020-07-29 DIAGNOSIS — M65.272 CALCIFIC TENDINITIS, LEFT ANKLE AND FOOT: ICD-10-CM

## 2020-07-29 DIAGNOSIS — Z96.649 PRESENCE OF UNSPECIFIED ARTIFICIAL HIP JOINT: ICD-10-CM

## 2020-07-29 PROCEDURE — 99213 OFFICE O/P EST LOW 20 MIN: CPT

## 2020-07-29 PROCEDURE — G0463: CPT

## 2020-07-29 NOTE — ASSESSMENT
[Verbal] : Verbal [Written] : Written [Demo] : Demo [Patient] : Patient [Good - alert, interested, motivated] : Good - alert, interested, motivated [Foot Care] : foot care [Needs reinforcement] : needs reinforcement [Dressing changes] : dressing changes [Venous Disease] : venous disease [Skin Care] : skin care [Signs and symptoms of infection] : sign and symptoms of infection [How and When to Call] : how and when to call [Pain Management] : pain management [Patient responsibility to plan of care] : patient responsibility to plan of care [Compression Therapy] : compression therapy [Home] : Home [Walker] : Walker [Stable] : stable [Not Applicable - Long Term Care/Home Health Agency] : Long Term Care/Home Health Agency: Not Applicable [] : No [FreeTextEntry2] : Infection prevention\par Localized wound care \par Goal of remaining pain free regarding wounds.\par Compression therapy \par Edema prevention  [FreeTextEntry4] : Photos Taken/Venu\par DPM recommends Lidocaine cream be applied to left heel at the beginning of next visit.\par F/U to [facility] in 1 week

## 2020-07-29 NOTE — VITALS
[] : No [FreeTextEntry2] : Walking [FreeTextEntry3] : Bilateral legs [FreeTextEntry1] : Oxycodone [de-identified] : patient reports pain is 9/10. [FreeTextEntry4] : Oxyxodone at home

## 2020-07-29 NOTE — REVIEW OF SYSTEMS
[Fever] : no fever [Earache] : no earache [Eye Pain] : no eye pain [Abdominal Pain] : no abdominal pain [Chest Pain] : no chest pain [Cough] : no cough [As Noted in HPI] : as noted in HPI [Limb Pain] : limb pain [Skin Wound] : skin wound [de-identified] : bilateral lower leg venous stasis ulceration down to skin with stasis dermatitis [FreeTextEntry9] : Pain to left plantar heel

## 2020-07-29 NOTE — PHYSICAL EXAM
[Abdominal Pad] : Abdominal Pad [4 x 4] : 4 x 4  [JVD] : no jugular venous distention  [Normal Breath Sounds] : Normal breath sounds [Normal Thyroid] : the thyroid was normal [Normal Heart Sounds] : normal heart sounds [Normal Rate and Rhythm] : normal rate and rhythm [1+] : right 1+ [Ankle Swelling Bilaterally] : bilaterally  [Ankle Swelling (On Exam)] : present [] : bilaterally [Ankle Swelling On The Left] : moderate [Abdomen Masses] : No abdominal massess [Tender] : nontender [Abdomen Tenderness] : ~T ~M No abdominal tenderness [Alert] : alert [Oriented to Person] : oriented to person [Enlarged] : not enlarged [Oriented to Time] : oriented to time [Calm] : calm [Oriented to Place] : oriented to place [de-identified] : calm [de-identified] : bilateral lower leg venous stasis ulceration down to skin with stasis dermatitis [de-identified] : Tenderness to palpation of left plantar and plantar medial calcaneus [FreeTextEntry1] : Right posterior leg  [FreeTextEntry2] : 3.2 [de-identified] : serosanguineous [FreeTextEntry4] : 0.1 [FreeTextEntry3] : 2.2 [de-identified] : NSC [de-identified] : Alginate  [FreeTextEntry8] : 11.6 [FreeTextEntry9] : 6.5 [FreeTextEntry7] : Left lateral to posterior leg  [de-identified] : serosanguineous [de-identified] : 0.1 [de-identified] : NSC [de-identified] : Mild Maceration [de-identified] : Alginate  [de-identified] : Right lateral leg  [de-identified] : 7.0 [de-identified] : 0.1 [de-identified] : 4.0 [de-identified] : Intact [de-identified] : serosanguineous [de-identified] : Alginate  [de-identified] : NSC [TWNoteComboBox4] : Large [de-identified] : None [de-identified] : Normal [de-identified] : None [de-identified] : No [de-identified] : >75% [de-identified] : Ace wraps [de-identified] : Daily [de-identified] : Primary Dressing [de-identified] : None [de-identified] : Large [de-identified] : Macerated [de-identified] : >75% [de-identified] : No [de-identified] : None [de-identified] : Ace wraps [de-identified] : Daily [de-identified] : Primary Dressing [de-identified] : Large [de-identified] : None [de-identified] : None [de-identified] : Normal [de-identified] : 100% [de-identified] : Ace wraps [de-identified] : Daily [de-identified] : No

## 2020-08-05 ENCOUNTER — OUTPATIENT (OUTPATIENT)
Dept: OUTPATIENT SERVICES | Facility: HOSPITAL | Age: 70
LOS: 1 days | Discharge: HOME | End: 2020-08-05
Payer: MEDICARE

## 2020-08-05 ENCOUNTER — APPOINTMENT (OUTPATIENT)
Dept: WOUND CARE | Facility: HOSPITAL | Age: 70
End: 2020-08-05
Payer: MEDICARE

## 2020-08-05 VITALS
OXYGEN SATURATION: 95 % | RESPIRATION RATE: 20 BRPM | HEIGHT: 67 IN | SYSTOLIC BLOOD PRESSURE: 129 MMHG | BODY MASS INDEX: 34.53 KG/M2 | HEART RATE: 84 BPM | TEMPERATURE: 98 F | DIASTOLIC BLOOD PRESSURE: 69 MMHG | WEIGHT: 220 LBS

## 2020-08-05 DIAGNOSIS — I83.218 VARICOSE VEINS OF RIGHT LOWER EXTREMITY WITH BOTH ULCER OF OTHER PART OF LOWER EXTREMITY AND INFLAMMATION: ICD-10-CM

## 2020-08-05 PROCEDURE — G0463: CPT

## 2020-08-05 PROCEDURE — 99213 OFFICE O/P EST LOW 20 MIN: CPT

## 2020-08-05 NOTE — VITALS
[Aching] : aching [Tender] : tender [Throbbing] : throbbing [] : No [FreeTextEntry3] : Left Heel [FreeTextEntry1] : Lidocaine, sleep, offloading insert [FreeTextEntry4] : Oxycodone [FreeTextEntry2] : walking

## 2020-08-05 NOTE — ASSESSMENT
[Verbal] : Verbal [Patient] : Patient [Demo] : Demo [Good - alert, interested, motivated] : Good - alert, interested, motivated [Needs reinforcement] : needs reinforcement [Dressing changes] : dressing changes [Signs and symptoms of infection] : sign and symptoms of infection [Foot Care] : foot care [Skin Care] : skin care [How and When to Call] : how and when to call [Pain Management] : pain management [Venous Disease] : venous disease [Compression Therapy] : compression therapy [Patient responsibility to plan of care] : patient responsibility to plan of care [Stable] : stable [Home] : Home [Walker] : Walker [Not Applicable - Long Term Care/Home Health Agency] : Long Term Care/Home Health Agency: Not Applicable [FreeTextEntry2] : Infection prevention\par Localized wound care \par Goal of remaining pain free regarding wounds.\par Compression therapy \par Edema prevention  [] : No [FreeTextEntry4] : DPM recommends Lidocaine cream be applied to left heel at the beginning of next visit.\par Sent a Task to [employer] to order Supplies for Pt. \par F/U to [facility] in 1 week

## 2020-08-05 NOTE — PHYSICAL EXAM
[4 x 4] : 4 x 4  [Abdominal Pad] : Abdominal Pad [JVD] : no jugular venous distention  [Normal Thyroid] : the thyroid was normal [Normal Breath Sounds] : Normal breath sounds [Normal Heart Sounds] : normal heart sounds [Normal Rate and Rhythm] : normal rate and rhythm [Ankle Swelling (On Exam)] : present [1+] : right 1+ [Ankle Swelling Bilaterally] : bilaterally  [Ankle Swelling On The Left] : moderate [] : present [Abdomen Masses] : No abdominal massess [Abdomen Tenderness] : ~T ~M No abdominal tenderness [Enlarged] : not enlarged [Tender] : nontender [Alert] : alert [Oriented to Person] : oriented to person [Oriented to Place] : oriented to place [Oriented to Time] : oriented to time [Calm] : calm [de-identified] : calm [de-identified] : Tenderness to palpation of left plantar and plantar medial calcaneus [de-identified] : bilateral lower leg venous stasis ulceration down to skin with stasis dermatitis [FreeTextEntry1] : Right posterior leg  [FreeTextEntry3] : 2.0 [FreeTextEntry4] : 0.1 [FreeTextEntry2] : 3.6 [de-identified] : serosanguineous [de-identified] : Calcium Alginate  [FreeTextEntry7] : Left lateral to posterior leg  [de-identified] : Cleansed with Normal Saline.  [FreeTextEntry8] : 10.5 [FreeTextEntry9] : 5.5 [de-identified] : 0.1 [de-identified] : Mild Maceration [de-identified] : serosanguineous [de-identified] : Calcium Alginate  [de-identified] : Cleansed with Normal Saline.  [de-identified] : 5.6 [de-identified] : Right lateral leg  [de-identified] : 0.1 [de-identified] : 3.2 [de-identified] : Intact [de-identified] : Calcium Alginate  [de-identified] : serosanguineous [TWNoteComboBox4] : Moderate [de-identified] : Cleansed with Normal Saline.  [de-identified] : Normal [de-identified] : None [de-identified] : 100% [de-identified] : None [de-identified] : Ace wraps [de-identified] : No [de-identified] : Primary Dressing [de-identified] : Daily [de-identified] : Large [de-identified] : None [de-identified] : No [de-identified] : Ace wraps [de-identified] : None [de-identified] : 100% [de-identified] : Large [de-identified] : Primary Dressing [de-identified] : Daily [de-identified] : Normal [de-identified] : None [de-identified] : None [de-identified] : 100% [de-identified] : No [de-identified] : Daily [de-identified] : Ace wraps

## 2020-08-05 NOTE — REVIEW OF SYSTEMS
[Eye Pain] : no eye pain [Fever] : no fever [Chest Pain] : no chest pain [Cough] : no cough [Earache] : no earache [As Noted in HPI] : as noted in HPI [Abdominal Pain] : no abdominal pain [Limb Pain] : limb pain [Skin Wound] : skin wound [FreeTextEntry9] : Pain to left plantar heel [de-identified] : bilateral lower leg venous stasis ulceration down to skin with stasis dermatitis

## 2020-08-06 DIAGNOSIS — Z79.899 OTHER LONG TERM (CURRENT) DRUG THERAPY: ICD-10-CM

## 2020-08-06 DIAGNOSIS — L97.821 NON-PRESSURE CHRONIC ULCER OF OTHER PART OF LEFT LOWER LEG LIMITED TO BREAKDOWN OF SKIN: ICD-10-CM

## 2020-08-06 DIAGNOSIS — Z72.0 TOBACCO USE: ICD-10-CM

## 2020-08-06 DIAGNOSIS — Z96.659 PRESENCE OF UNSPECIFIED ARTIFICIAL KNEE JOINT: ICD-10-CM

## 2020-08-06 DIAGNOSIS — Z90.710 ACQUIRED ABSENCE OF BOTH CERVIX AND UTERUS: ICD-10-CM

## 2020-08-06 DIAGNOSIS — E78.00 PURE HYPERCHOLESTEROLEMIA, UNSPECIFIED: ICD-10-CM

## 2020-08-06 DIAGNOSIS — Z96.649 PRESENCE OF UNSPECIFIED ARTIFICIAL HIP JOINT: ICD-10-CM

## 2020-08-06 DIAGNOSIS — Z82.49 FAMILY HISTORY OF ISCHEMIC HEART DISEASE AND OTHER DISEASES OF THE CIRCULATORY SYSTEM: ICD-10-CM

## 2020-08-06 DIAGNOSIS — G25.81 RESTLESS LEGS SYNDROME: ICD-10-CM

## 2020-08-06 DIAGNOSIS — I35.0 NONRHEUMATIC AORTIC (VALVE) STENOSIS: ICD-10-CM

## 2020-08-06 DIAGNOSIS — M17.12 UNILATERAL PRIMARY OSTEOARTHRITIS, LEFT KNEE: ICD-10-CM

## 2020-08-06 DIAGNOSIS — Z79.82 LONG TERM (CURRENT) USE OF ASPIRIN: ICD-10-CM

## 2020-08-06 DIAGNOSIS — L97.811 NON-PRESSURE CHRONIC ULCER OF OTHER PART OF RIGHT LOWER LEG LIMITED TO BREAKDOWN OF SKIN: ICD-10-CM

## 2020-08-06 DIAGNOSIS — M16.9 OSTEOARTHRITIS OF HIP, UNSPECIFIED: ICD-10-CM

## 2020-08-06 DIAGNOSIS — M47.816 SPONDYLOSIS WITHOUT MYELOPATHY OR RADICULOPATHY, LUMBAR REGION: ICD-10-CM

## 2020-08-06 DIAGNOSIS — I83.218 VARICOSE VEINS OF RIGHT LOWER EXTREMITY WITH BOTH ULCER OF OTHER PART OF LOWER EXTREMITY AND INFLAMMATION: ICD-10-CM

## 2020-08-06 DIAGNOSIS — M54.16 RADICULOPATHY, LUMBAR REGION: ICD-10-CM

## 2020-08-06 DIAGNOSIS — M65.272 CALCIFIC TENDINITIS, LEFT ANKLE AND FOOT: ICD-10-CM

## 2020-08-06 DIAGNOSIS — I83.228 VARICOSE VEINS OF LEFT LOWER EXTREMITY WITH BOTH ULCER OF OTHER PART OF LOWER EXTREMITY AND INFLAMMATION: ICD-10-CM

## 2020-08-12 ENCOUNTER — APPOINTMENT (OUTPATIENT)
Dept: WOUND CARE | Facility: HOSPITAL | Age: 70
End: 2020-08-12
Payer: MEDICARE

## 2020-08-12 ENCOUNTER — OUTPATIENT (OUTPATIENT)
Dept: OUTPATIENT SERVICES | Facility: HOSPITAL | Age: 70
LOS: 1 days | Discharge: HOME | End: 2020-08-12
Payer: MEDICARE

## 2020-08-12 VITALS
HEART RATE: 91 BPM | RESPIRATION RATE: 20 BRPM | HEIGHT: 67 IN | BODY MASS INDEX: 34.53 KG/M2 | OXYGEN SATURATION: 95 % | SYSTOLIC BLOOD PRESSURE: 156 MMHG | WEIGHT: 220 LBS | TEMPERATURE: 98.4 F | DIASTOLIC BLOOD PRESSURE: 66 MMHG

## 2020-08-12 DIAGNOSIS — I83.218 VARICOSE VEINS OF RIGHT LOWER EXTREMITY WITH BOTH ULCER OF OTHER PART OF LOWER EXTREMITY AND INFLAMMATION: ICD-10-CM

## 2020-08-12 PROCEDURE — G0463: CPT

## 2020-08-12 PROCEDURE — 99213 OFFICE O/P EST LOW 20 MIN: CPT

## 2020-08-12 NOTE — REVIEW OF SYSTEMS
[Fever] : no fever [Eye Pain] : no eye pain [Earache] : no earache [Chest Pain] : no chest pain [Cough] : no cough [Abdominal Pain] : no abdominal pain [Limb Pain] : limb pain [As Noted in HPI] : as noted in HPI [Skin Wound] : skin wound [FreeTextEntry9] : Pain to left plantar heel [de-identified] : bilateral lower leg venous stasis ulceration down to skin with stasis dermatitis

## 2020-08-12 NOTE — PHYSICAL EXAM
[Abdominal Pad] : Abdominal Pad [4 x 4] : 4 x 4  [JVD] : no jugular venous distention  [Normal Thyroid] : the thyroid was normal [Normal Breath Sounds] : Normal breath sounds [Normal Heart Sounds] : normal heart sounds [Normal Rate and Rhythm] : normal rate and rhythm [1+] : left 1+ [Ankle Swelling (On Exam)] : present [Ankle Swelling Bilaterally] : bilaterally  [] : bilaterally [Ankle Swelling On The Left] : moderate [Abdomen Masses] : No abdominal massess [Abdomen Tenderness] : ~T ~M No abdominal tenderness [Tender] : nontender [Oriented to Person] : oriented to person [Alert] : alert [Enlarged] : not enlarged [Oriented to Place] : oriented to place [Calm] : calm [Oriented to Time] : oriented to time [de-identified] : Tenderness to palpation of left plantar and plantar medial calcaneus [de-identified] : calm [de-identified] : bilateral lower leg venous stasis ulceration down to skin with stasis dermatitis [FreeTextEntry2] : 3.4 [FreeTextEntry3] : 1.8 [de-identified] : Serosanguineous  [FreeTextEntry4] : 0.1 [de-identified] : Pt denies any pain or discomfort post ACE application  [de-identified] : No neurovascular deficit noted  [FreeTextEntry7] : Lateral to Posterior Leg  [FreeTextEntry8] : 11.0 [de-identified] : Cleansed with Normal Saline \par Kerlix [de-identified] : Serosanguineous  [FreeTextEntry9] : 6.0 [de-identified] : 0.1 [de-identified] : Pt denies any pain or discomfort post ACE application  [de-identified] : Calcium Alginate  [de-identified] : Cleansed with Normal Saline\par Kerlix [de-identified] : Lateral Leg  [de-identified] : 7.5 [de-identified] : 4.0 [de-identified] : No neurovascular deficit noted\par  [de-identified] : Serosanguineous  [de-identified] : 0.1 [de-identified] : Pt denies any pain or discomfort post ACE application  [de-identified] : Calcium Alginate  [de-identified] : Cleansed with Normal Saline \par Kerlix [de-identified] : Callus shaved by physician  [de-identified] : NONE [FreeTextEntry1] : Heel - Callus - No Open wound  [de-identified] : None [de-identified] : No [TWNoteComboBox4] : Large [de-identified] : Normal [de-identified] : No [de-identified] : 100% [de-identified] : None [de-identified] : Ace wraps [de-identified] : Daily [TWNoteComboBox9] : Left [de-identified] : Large [de-identified] : No [de-identified] : No [de-identified] : Normal [de-identified] : None [de-identified] : None [de-identified] : 100% [de-identified] : No [de-identified] : Ace wraps [de-identified] : No [de-identified] : Large [de-identified] : Right [de-identified] : No [de-identified] : None [de-identified] : Normal [de-identified] : Daily [de-identified] : 100% [de-identified] : Ace wraps [de-identified] : None [de-identified] : 2.5% Lidocaine Topical [TWNoteComboBox1] : Left

## 2020-08-12 NOTE — ASSESSMENT
[Written] : Written [Verbal] : Verbal [Patient] : Patient [Good - alert, interested, motivated] : Good - alert, interested, motivated [Verbalizes knowledge/Understanding] : Verbalizes knowledge/understanding [Dressing changes] : dressing changes [Foot Care] : foot care [Skin Care] : skin care [Signs and symptoms of infection] : sign and symptoms of infection [Pressure relief] : pressure relief [How and When to Call] : how and when to call [Venous Disease] : venous disease [Off-loading] : off-loading [Patient responsibility to plan of care] : patient responsibility to plan of care [Compression Therapy] : compression therapy [] : Yes [Home] : Home [Stable] : stable [Walker] : Walker [Faxed - Long Term Care/Home Health Agency] : Long Term Care/Home Health Agency: Faxed [FreeTextEntry2] : Infection Prevention \par Localized wound care\par Compression therapy\par Offloading/Pressure Relief  [FreeTextEntry1] : [facility] [FreeTextEntry4] : F/U in one week\par Home Health Face to Face Encounter Certification form filled out \par Pt choice of Home care is [facility]

## 2020-08-12 NOTE — VITALS
[Aching] : aching [] : No [de-identified] : pt states that she takes oxycodone HS and has some relief  [FreeTextEntry1] : When pt is a sleep  [FreeTextEntry3] : Bilateral Lower legs [FreeTextEntry2] : Standing [FreeTextEntry4] : elevation and compression

## 2020-08-13 DIAGNOSIS — I83.228 VARICOSE VEINS OF LEFT LOWER EXTREMITY WITH BOTH ULCER OF OTHER PART OF LOWER EXTREMITY AND INFLAMMATION: ICD-10-CM

## 2020-08-13 DIAGNOSIS — Z82.49 FAMILY HISTORY OF ISCHEMIC HEART DISEASE AND OTHER DISEASES OF THE CIRCULATORY SYSTEM: ICD-10-CM

## 2020-08-13 DIAGNOSIS — Z90.710 ACQUIRED ABSENCE OF BOTH CERVIX AND UTERUS: ICD-10-CM

## 2020-08-13 DIAGNOSIS — M54.16 RADICULOPATHY, LUMBAR REGION: ICD-10-CM

## 2020-08-13 DIAGNOSIS — I35.0 NONRHEUMATIC AORTIC (VALVE) STENOSIS: ICD-10-CM

## 2020-08-13 DIAGNOSIS — Z96.649 PRESENCE OF UNSPECIFIED ARTIFICIAL HIP JOINT: ICD-10-CM

## 2020-08-13 DIAGNOSIS — L84 CORNS AND CALLOSITIES: ICD-10-CM

## 2020-08-13 DIAGNOSIS — Z96.659 PRESENCE OF UNSPECIFIED ARTIFICIAL KNEE JOINT: ICD-10-CM

## 2020-08-13 DIAGNOSIS — I83.218 VARICOSE VEINS OF RIGHT LOWER EXTREMITY WITH BOTH ULCER OF OTHER PART OF LOWER EXTREMITY AND INFLAMMATION: ICD-10-CM

## 2020-08-13 DIAGNOSIS — L97.821 NON-PRESSURE CHRONIC ULCER OF OTHER PART OF LEFT LOWER LEG LIMITED TO BREAKDOWN OF SKIN: ICD-10-CM

## 2020-08-13 DIAGNOSIS — Z72.0 TOBACCO USE: ICD-10-CM

## 2020-08-13 DIAGNOSIS — L97.811 NON-PRESSURE CHRONIC ULCER OF OTHER PART OF RIGHT LOWER LEG LIMITED TO BREAKDOWN OF SKIN: ICD-10-CM

## 2020-08-13 DIAGNOSIS — M17.12 UNILATERAL PRIMARY OSTEOARTHRITIS, LEFT KNEE: ICD-10-CM

## 2020-08-13 DIAGNOSIS — Z79.82 LONG TERM (CURRENT) USE OF ASPIRIN: ICD-10-CM

## 2020-08-13 DIAGNOSIS — M47.816 SPONDYLOSIS WITHOUT MYELOPATHY OR RADICULOPATHY, LUMBAR REGION: ICD-10-CM

## 2020-08-13 DIAGNOSIS — M65.272 CALCIFIC TENDINITIS, LEFT ANKLE AND FOOT: ICD-10-CM

## 2020-08-13 DIAGNOSIS — M16.9 OSTEOARTHRITIS OF HIP, UNSPECIFIED: ICD-10-CM

## 2020-08-13 DIAGNOSIS — E78.00 PURE HYPERCHOLESTEROLEMIA, UNSPECIFIED: ICD-10-CM

## 2020-08-13 DIAGNOSIS — Z79.899 OTHER LONG TERM (CURRENT) DRUG THERAPY: ICD-10-CM

## 2020-08-13 DIAGNOSIS — G25.81 RESTLESS LEGS SYNDROME: ICD-10-CM

## 2020-08-20 ENCOUNTER — APPOINTMENT (OUTPATIENT)
Dept: WOUND CARE | Facility: HOSPITAL | Age: 70
End: 2020-08-20
Payer: MEDICARE

## 2020-08-20 ENCOUNTER — OUTPATIENT (OUTPATIENT)
Dept: OUTPATIENT SERVICES | Facility: HOSPITAL | Age: 70
LOS: 1 days | Discharge: HOME | End: 2020-08-20
Payer: MEDICARE

## 2020-08-20 VITALS
DIASTOLIC BLOOD PRESSURE: 76 MMHG | RESPIRATION RATE: 20 BRPM | WEIGHT: 220 LBS | TEMPERATURE: 97.8 F | SYSTOLIC BLOOD PRESSURE: 149 MMHG | HEIGHT: 67 IN | BODY MASS INDEX: 34.53 KG/M2 | HEART RATE: 92 BPM | OXYGEN SATURATION: 97 %

## 2020-08-20 DIAGNOSIS — I83.218 VARICOSE VEINS OF RIGHT LOWER EXTREMITY WITH BOTH ULCER OF OTHER PART OF LOWER EXTREMITY AND INFLAMMATION: ICD-10-CM

## 2020-08-20 PROCEDURE — G0463: CPT

## 2020-08-20 PROCEDURE — 99213 OFFICE O/P EST LOW 20 MIN: CPT

## 2020-08-20 NOTE — REVIEW OF SYSTEMS
[As Noted in HPI] : as noted in HPI [Limb Pain] : limb pain [Skin Wound] : skin wound [Eye Pain] : no eye pain [Fever] : no fever [Earache] : no earache [Chest Pain] : no chest pain [Cough] : no cough [Abdominal Pain] : no abdominal pain [de-identified] : bilateral lower leg venous stasis ulceration down to skin with stasis dermatitis [FreeTextEntry9] : Pain to left plantar heel

## 2020-08-20 NOTE — ASSESSMENT
[Verbal] : Verbal [Good - alert, interested, motivated] : Good - alert, interested, motivated [Patient] : Patient [Verbalizes knowledge/Understanding] : Verbalizes knowledge/understanding [Dressing changes] : dressing changes [Skin Care] : skin care [Signs and symptoms of infection] : sign and symptoms of infection [Compression Therapy] : compression therapy [How and When to Call] : how and when to call [Patient responsibility to plan of care] : patient responsibility to plan of care [Stable] : stable [Home] : Home [Ambulatory] : Ambulatory [Not Applicable - Long Term Care/Home Health Agency] : Long Term Care/Home Health Agency: Not Applicable [FreeTextEntry4] : F/U to [facility] in 1 week\par \par  [] : No [FreeTextEntry2] : Restore Skin Integrity\par Infection Control\par Localized wound care\par Maintain acceptable pain levels at satisfactory relief.\par Demonstrates use of both nonpharmacological and pharmacological pain relief strategies

## 2020-08-20 NOTE — PHYSICAL EXAM
[4 x 4] : 4 x 4  [Abdominal Pad] : Abdominal Pad [Normal Thyroid] : the thyroid was normal [Normal Breath Sounds] : Normal breath sounds [Normal Heart Sounds] : normal heart sounds [Normal Rate and Rhythm] : normal rate and rhythm [Ankle Swelling Bilaterally] : bilaterally  [1+] : left 1+ [Ankle Swelling (On Exam)] : present [] : bilaterally [Ankle Swelling On The Left] : moderate [Oriented to Person] : oriented to person [Alert] : alert [Oriented to Place] : oriented to place [Oriented to Time] : oriented to time [Calm] : calm [JVD] : no jugular venous distention  [Abdomen Masses] : No abdominal massess [Abdomen Tenderness] : ~T ~M No abdominal tenderness [Tender] : nontender [Enlarged] : not enlarged [de-identified] : calm [de-identified] : Tenderness to palpation of left plantar and plantar medial calcaneus [de-identified] : bilateral lower leg venous stasis ulceration down to skin with stasis dermatitis [FreeTextEntry2] : 3.0 [FreeTextEntry3] : 1.4 [FreeTextEntry4] : 0.3 [de-identified] : Serosanguineous [de-identified] : Excoriation [de-identified] : 1-25% [de-identified] : Coban Compression [de-identified] : Cleansed with Normal Saline\par  [FreeTextEntry7] : Left Lateral to Posterior Leg [FreeTextEntry8] : 10.0 [FreeTextEntry9] : 10.2 [de-identified] : 0.1 [de-identified] : Serosanguineous [de-identified] : Calcium Alginate [de-identified] : Coban Compression [de-identified] : Cleansed with Normal Saline\par  [de-identified] : Right Lateral Leg [de-identified] : 5.9 [de-identified] : 5.5 [de-identified] : 0.1 [de-identified] : Serosanguineous [de-identified] :  Excoriation [de-identified] : Coban Compression [de-identified] : Cleansed with Normal Saline\par  [de-identified] : Calcium Alginate [FreeTextEntry1] : Left Heel- Callus- No Open Wound [de-identified] : NONE [TWNoteComboBox4] : Large [TWNoteComboBox5] : No [de-identified] : No [de-identified] : None [de-identified] : Macerated [de-identified] : None [de-identified] : >75% [de-identified] : Yes [de-identified] : Daily [de-identified] : Multilayer other compression wrap [de-identified] : Large [de-identified] : No [de-identified] : No [de-identified] : None [de-identified] : Macerated [de-identified] : None [de-identified] : Multilayer other compression wrap [de-identified] : Daily [de-identified] : No [de-identified] : 100% [de-identified] : No [de-identified] : Large [de-identified] : No [de-identified] : None [de-identified] : Macerated [de-identified] : None [de-identified] : 100% [de-identified] : No [de-identified] : Multilayer other compression wrap [de-identified] : Daily

## 2020-08-23 DIAGNOSIS — M16.9 OSTEOARTHRITIS OF HIP, UNSPECIFIED: ICD-10-CM

## 2020-08-23 DIAGNOSIS — M17.12 UNILATERAL PRIMARY OSTEOARTHRITIS, LEFT KNEE: ICD-10-CM

## 2020-08-23 DIAGNOSIS — I35.0 NONRHEUMATIC AORTIC (VALVE) STENOSIS: ICD-10-CM

## 2020-08-23 DIAGNOSIS — L84 CORNS AND CALLOSITIES: ICD-10-CM

## 2020-08-23 DIAGNOSIS — L97.811 NON-PRESSURE CHRONIC ULCER OF OTHER PART OF RIGHT LOWER LEG LIMITED TO BREAKDOWN OF SKIN: ICD-10-CM

## 2020-08-23 DIAGNOSIS — Z72.0 TOBACCO USE: ICD-10-CM

## 2020-08-23 DIAGNOSIS — L97.821 NON-PRESSURE CHRONIC ULCER OF OTHER PART OF LEFT LOWER LEG LIMITED TO BREAKDOWN OF SKIN: ICD-10-CM

## 2020-08-23 DIAGNOSIS — Z96.649 PRESENCE OF UNSPECIFIED ARTIFICIAL HIP JOINT: ICD-10-CM

## 2020-08-23 DIAGNOSIS — E78.00 PURE HYPERCHOLESTEROLEMIA, UNSPECIFIED: ICD-10-CM

## 2020-08-23 DIAGNOSIS — Z79.899 OTHER LONG TERM (CURRENT) DRUG THERAPY: ICD-10-CM

## 2020-08-23 DIAGNOSIS — M54.16 RADICULOPATHY, LUMBAR REGION: ICD-10-CM

## 2020-08-23 DIAGNOSIS — I83.218 VARICOSE VEINS OF RIGHT LOWER EXTREMITY WITH BOTH ULCER OF OTHER PART OF LOWER EXTREMITY AND INFLAMMATION: ICD-10-CM

## 2020-08-23 DIAGNOSIS — G25.81 RESTLESS LEGS SYNDROME: ICD-10-CM

## 2020-08-23 DIAGNOSIS — Z96.659 PRESENCE OF UNSPECIFIED ARTIFICIAL KNEE JOINT: ICD-10-CM

## 2020-08-23 DIAGNOSIS — Z90.710 ACQUIRED ABSENCE OF BOTH CERVIX AND UTERUS: ICD-10-CM

## 2020-08-23 DIAGNOSIS — M47.816 SPONDYLOSIS WITHOUT MYELOPATHY OR RADICULOPATHY, LUMBAR REGION: ICD-10-CM

## 2020-08-23 DIAGNOSIS — Z82.49 FAMILY HISTORY OF ISCHEMIC HEART DISEASE AND OTHER DISEASES OF THE CIRCULATORY SYSTEM: ICD-10-CM

## 2020-08-23 DIAGNOSIS — I83.228 VARICOSE VEINS OF LEFT LOWER EXTREMITY WITH BOTH ULCER OF OTHER PART OF LOWER EXTREMITY AND INFLAMMATION: ICD-10-CM

## 2020-08-23 DIAGNOSIS — M65.272 CALCIFIC TENDINITIS, LEFT ANKLE AND FOOT: ICD-10-CM

## 2020-08-23 DIAGNOSIS — Z79.82 LONG TERM (CURRENT) USE OF ASPIRIN: ICD-10-CM

## 2020-08-28 ENCOUNTER — OUTPATIENT (OUTPATIENT)
Dept: OUTPATIENT SERVICES | Facility: HOSPITAL | Age: 70
LOS: 1 days | Discharge: HOME | End: 2020-08-28
Payer: MEDICARE

## 2020-08-28 ENCOUNTER — APPOINTMENT (OUTPATIENT)
Dept: WOUND CARE | Facility: HOSPITAL | Age: 70
End: 2020-08-28
Payer: MEDICARE

## 2020-08-28 VITALS
WEIGHT: 220 LBS | DIASTOLIC BLOOD PRESSURE: 77 MMHG | OXYGEN SATURATION: 99 % | HEART RATE: 97 BPM | RESPIRATION RATE: 20 BRPM | TEMPERATURE: 97.3 F | HEIGHT: 67 IN | BODY MASS INDEX: 34.53 KG/M2 | SYSTOLIC BLOOD PRESSURE: 151 MMHG

## 2020-08-28 DIAGNOSIS — I83.218 VARICOSE VEINS OF RIGHT LOWER EXTREMITY WITH BOTH ULCER OF OTHER PART OF LOWER EXTREMITY AND INFLAMMATION: ICD-10-CM

## 2020-08-28 PROCEDURE — 99213 OFFICE O/P EST LOW 20 MIN: CPT

## 2020-08-28 PROCEDURE — 29581 APPL MULTLAYER CMPRN SYS LEG: CPT | Mod: 50

## 2020-08-28 NOTE — PHYSICAL EXAM
[Normal Thyroid] : the thyroid was normal [Normal Breath Sounds] : Normal breath sounds [Normal Heart Sounds] : normal heart sounds [Normal Rate and Rhythm] : normal rate and rhythm [1+] : left 1+ [Ankle Swelling (On Exam)] : present [Ankle Swelling Bilaterally] : bilaterally  [] : bilaterally [Ankle Swelling On The Left] : moderate [Alert] : alert [Oriented to Person] : oriented to person [Oriented to Place] : oriented to place [Oriented to Time] : oriented to time [Calm] : calm [4 x 4] : 4 x 4  [Abdominal Pad] : Abdominal Pad [JVD] : no jugular venous distention  [Abdomen Masses] : No abdominal massess [Tender] : nontender [Abdomen Tenderness] : ~T ~M No abdominal tenderness [Enlarged] : not enlarged [de-identified] : calm [de-identified] : Tenderness to palpation of left plantar and plantar medial calcaneus [de-identified] : bilateral lower leg venous stasis ulceration down to skin with stasis dermatitis [FreeTextEntry2] : 3.0 [FreeTextEntry3] : 2.0 [de-identified] : Excoriation [FreeTextEntry4] : 0.2 [de-identified] : Serosanguineous [de-identified] : 1-25% [de-identified] : Coban Compression [FreeTextEntry9] : 10.2 [FreeTextEntry8] : 10.0 [FreeTextEntry7] : Left Lateral to Posterior Leg [de-identified] : Cleansed with Normal Saline\par  [de-identified] : Serosanguineous [de-identified] : Calcium Alginate [de-identified] : Coban Compression [de-identified] : 0.1 [de-identified] : 5.9 [de-identified] : Cleansed with Normal Saline\par  [de-identified] : Right Lateral Leg [de-identified] : 5.5 [de-identified] : 0.1 [de-identified] : Serosanguineous [de-identified] :  Excoriation [de-identified] : Coban Compression [de-identified] : Calcium Alginate [de-identified] : Cleansed with Normal Saline\par  [de-identified] : NONE [FreeTextEntry1] : Left Heel- Callus- No Open Wound [TWNoteComboBox5] : No [TWNoteComboBox4] : Large [de-identified] : No [de-identified] : >75% [de-identified] : Macerated [de-identified] : None [de-identified] : None [de-identified] : 3x Weekly [de-identified] : Multilayer other compression wrap [de-identified] : Yes [de-identified] : Large [de-identified] : No [de-identified] : Primary Dressing [de-identified] : No [de-identified] : None [de-identified] : Macerated [de-identified] : None [de-identified] : No [de-identified] : Multilayer other compression wrap [de-identified] : 100% [de-identified] : Large [de-identified] : 3x Weekly [de-identified] : Primary Dressing [de-identified] : Macerated [de-identified] : No [de-identified] : No [de-identified] : None [de-identified] : None [de-identified] : 100% [de-identified] : No [de-identified] : Multilayer other compression wrap [de-identified] : 3x Weekly [de-identified] : Primary Dressing

## 2020-08-28 NOTE — PLAN
[FreeTextEntry1] : Patient examined and evaluated at this time.\par Continue local wound care and offloading with alginate, multilayer compression.\par No vascular intervention at this time.\par Patient to return in 1 week for follow up.

## 2020-08-28 NOTE — VITALS
[Pain related to present condition?] : The patient's  pain is related to present condition. [Burning] : burning [] : No [FreeTextEntry4] : elevation [FreeTextEntry3] : Bilateral legs [FreeTextEntry1] : pain medications and elevation [FreeTextEntry2] : walking, pressure

## 2020-08-28 NOTE — ASSESSMENT
[Verbal] : Verbal [Patient] : Patient [Good - alert, interested, motivated] : Good - alert, interested, motivated [Verbalizes knowledge/Understanding] : Verbalizes knowledge/understanding [Dressing changes] : dressing changes [Skin Care] : skin care [Signs and symptoms of infection] : sign and symptoms of infection [How and When to Call] : how and when to call [Compression Therapy] : compression therapy [Patient responsibility to plan of care] : patient responsibility to plan of care [] : Yes [Stable] : stable [Home] : Home [Ambulatory] : Ambulatory [Not Applicable - Long Term Care/Home Health Agency] : Long Term Care/Home Health Agency: Not Applicable [Venous Disease] : venous disease [Home Health] : home health [FreeTextEntry2] : Restore Skin Integrity\par Edema control\par Infection Control\par Localized wound care\par Maintain acceptable pain levels at satisfactory relief.\par Demonstrates use of both nonpharmacological and pharmacological pain relief strategies [FreeTextEntry4] : F/U to [facility] in 2 weeks;  Homecare to change 3X weekly- assessment weeks homecare will change 2X during the week.\par \par  [FreeTextEntry1] : [facility]

## 2020-08-28 NOTE — REVIEW OF SYSTEMS
[Eye Pain] : no eye pain [Fever] : no fever [Chest Pain] : no chest pain [Earache] : no earache [Cough] : no cough [As Noted in HPI] : as noted in HPI [Limb Pain] : limb pain [Abdominal Pain] : no abdominal pain [FreeTextEntry9] : Pain to left plantar heel [de-identified] : bilateral lower leg venous stasis ulceration down to skin with stasis dermatitis [Skin Wound] : skin wound

## 2020-08-30 DIAGNOSIS — M65.272 CALCIFIC TENDINITIS, LEFT ANKLE AND FOOT: ICD-10-CM

## 2020-08-30 DIAGNOSIS — Z79.899 OTHER LONG TERM (CURRENT) DRUG THERAPY: ICD-10-CM

## 2020-08-30 DIAGNOSIS — Z79.82 LONG TERM (CURRENT) USE OF ASPIRIN: ICD-10-CM

## 2020-08-30 DIAGNOSIS — Z96.649 PRESENCE OF UNSPECIFIED ARTIFICIAL HIP JOINT: ICD-10-CM

## 2020-08-30 DIAGNOSIS — M16.9 OSTEOARTHRITIS OF HIP, UNSPECIFIED: ICD-10-CM

## 2020-08-30 DIAGNOSIS — I35.0 NONRHEUMATIC AORTIC (VALVE) STENOSIS: ICD-10-CM

## 2020-08-30 DIAGNOSIS — I83.228 VARICOSE VEINS OF LEFT LOWER EXTREMITY WITH BOTH ULCER OF OTHER PART OF LOWER EXTREMITY AND INFLAMMATION: ICD-10-CM

## 2020-08-30 DIAGNOSIS — Z90.710 ACQUIRED ABSENCE OF BOTH CERVIX AND UTERUS: ICD-10-CM

## 2020-08-30 DIAGNOSIS — L97.811 NON-PRESSURE CHRONIC ULCER OF OTHER PART OF RIGHT LOWER LEG LIMITED TO BREAKDOWN OF SKIN: ICD-10-CM

## 2020-08-30 DIAGNOSIS — G25.81 RESTLESS LEGS SYNDROME: ICD-10-CM

## 2020-08-30 DIAGNOSIS — L84 CORNS AND CALLOSITIES: ICD-10-CM

## 2020-08-30 DIAGNOSIS — Z72.0 TOBACCO USE: ICD-10-CM

## 2020-08-30 DIAGNOSIS — M17.12 UNILATERAL PRIMARY OSTEOARTHRITIS, LEFT KNEE: ICD-10-CM

## 2020-08-30 DIAGNOSIS — M47.816 SPONDYLOSIS WITHOUT MYELOPATHY OR RADICULOPATHY, LUMBAR REGION: ICD-10-CM

## 2020-08-30 DIAGNOSIS — L97.821 NON-PRESSURE CHRONIC ULCER OF OTHER PART OF LEFT LOWER LEG LIMITED TO BREAKDOWN OF SKIN: ICD-10-CM

## 2020-08-30 DIAGNOSIS — Z96.659 PRESENCE OF UNSPECIFIED ARTIFICIAL KNEE JOINT: ICD-10-CM

## 2020-08-30 DIAGNOSIS — I83.218 VARICOSE VEINS OF RIGHT LOWER EXTREMITY WITH BOTH ULCER OF OTHER PART OF LOWER EXTREMITY AND INFLAMMATION: ICD-10-CM

## 2020-08-30 DIAGNOSIS — M54.16 RADICULOPATHY, LUMBAR REGION: ICD-10-CM

## 2020-08-30 DIAGNOSIS — E78.00 PURE HYPERCHOLESTEROLEMIA, UNSPECIFIED: ICD-10-CM

## 2020-08-30 DIAGNOSIS — Z82.49 FAMILY HISTORY OF ISCHEMIC HEART DISEASE AND OTHER DISEASES OF THE CIRCULATORY SYSTEM: ICD-10-CM

## 2020-09-11 ENCOUNTER — OUTPATIENT (OUTPATIENT)
Dept: OUTPATIENT SERVICES | Facility: HOSPITAL | Age: 70
LOS: 1 days | Discharge: HOME | End: 2020-09-11
Payer: MEDICARE

## 2020-09-11 ENCOUNTER — APPOINTMENT (OUTPATIENT)
Dept: WOUND CARE | Facility: HOSPITAL | Age: 70
End: 2020-09-11
Payer: MEDICARE

## 2020-09-11 VITALS
TEMPERATURE: 97.9 F | BODY MASS INDEX: 34.53 KG/M2 | HEART RATE: 82 BPM | DIASTOLIC BLOOD PRESSURE: 74 MMHG | WEIGHT: 220 LBS | OXYGEN SATURATION: 98 % | RESPIRATION RATE: 20 BRPM | SYSTOLIC BLOOD PRESSURE: 154 MMHG | HEIGHT: 67 IN

## 2020-09-11 DIAGNOSIS — L84 CORNS AND CALLOSITIES: ICD-10-CM

## 2020-09-11 DIAGNOSIS — I83.218 VARICOSE VEINS OF RIGHT LOWER EXTREMITY WITH BOTH ULCER OF OTHER PART OF LOWER EXTREMITY AND INFLAMMATION: ICD-10-CM

## 2020-09-11 PROCEDURE — 99213 OFFICE O/P EST LOW 20 MIN: CPT

## 2020-09-11 PROCEDURE — 29581 APPL MULTLAYER CMPRN SYS LEG: CPT | Mod: 50

## 2020-09-11 NOTE — REVIEW OF SYSTEMS
[As Noted in HPI] : as noted in HPI [Limb Pain] : limb pain [Skin Wound] : skin wound [Fever] : no fever [Eye Pain] : no eye pain [Earache] : no earache [Chest Pain] : no chest pain [Cough] : no cough [Abdominal Pain] : no abdominal pain [FreeTextEntry9] : Pain to left plantar heel [de-identified] : bilateral lower leg venous stasis ulceration down to skin with stasis dermatitis improving

## 2020-09-11 NOTE — ASSESSMENT
[Patient] : Patient [Verbal] : Verbal [Verbalizes knowledge/Understanding] : Verbalizes knowledge/understanding [Good - alert, interested, motivated] : Good - alert, interested, motivated [Dressing changes] : dressing changes [Skin Care] : skin care [Signs and symptoms of infection] : sign and symptoms of infection [Compression Therapy] : compression therapy [How and When to Call] : how and when to call [Patient responsibility to plan of care] : patient responsibility to plan of care [] : Yes [Stable] : stable [Home] : Home [Walker] : Walker [Faxed - Long Term Care/Home Health Agency] : Long Term Care/Home Health Agency: Faxed [FreeTextEntry4] : Photos Taken\par F/U to [facility] in 2 weeks [FreeTextEntry2] : Restore Skin Integrity\par Infection Control\par Localized wound care\par Maintain acceptable pain levels at satisfactory relief.\par Demonstrates use of both nonpharmacological and pharmacological pain relief strategies [FreeTextEntry1] : [facility]

## 2020-09-11 NOTE — HISTORY OF PRESENT ILLNESS
[FreeTextEntry1] : pt seen for left foot pain and bilateral lower leg venous stasis ulceration down to skin with stasis dermatitis. pt relates she has been getting her dressings changed at home.

## 2020-09-11 NOTE — PHYSICAL EXAM
[Abdominal Pad] : Abdominal Pad [4 x 4] : 4 x 4  [Normal Thyroid] : the thyroid was normal [Normal Breath Sounds] : Normal breath sounds [Normal Heart Sounds] : normal heart sounds [Normal Rate and Rhythm] : normal rate and rhythm [Ankle Swelling (On Exam)] : present [1+] : left 1+ [Ankle Swelling Bilaterally] : bilaterally  [] : bilaterally [Ankle Swelling On The Left] : moderate [Alert] : alert [Oriented to Person] : oriented to person [Oriented to Place] : oriented to place [Oriented to Time] : oriented to time [Calm] : calm [JVD] : no jugular venous distention  [Abdomen Masses] : No abdominal massess [Abdomen Tenderness] : ~T ~M No abdominal tenderness [Tender] : nontender [Enlarged] : not enlarged [de-identified] : calm [de-identified] : bilateral lower leg venous stasis ulceration down to skin with stasis dermatitis improving [de-identified] : Tenderness to palpation of left plantar and plantar medial calcaneus [FreeTextEntry1] : Right Posterior Leg [FreeTextEntry2] : 3.0 [FreeTextEntry4] : 0.3 [FreeTextEntry3] : 2.0 [de-identified] : Serosanguineous [de-identified] : Coban Compression [de-identified] : 5% [de-identified] : Calcium Alginate [de-identified] : Cleansed with Normal Saline\par  [FreeTextEntry7] : Left Lateral to Posterior Leg [FreeTextEntry8] : 12.0 [de-identified] : 0.1 [FreeTextEntry9] : 12.5 [de-identified] : Serosanguineous [de-identified] : Coban Compression [de-identified] : Calcium Alginate [de-identified] : Cleansed with Normal Saline\par  [de-identified] : 8.5 [de-identified] : Right Lateral Leg [de-identified] : 7.5 [de-identified] : Serosanguineous [de-identified] : 0.2 [de-identified] : Calcium Alginate [de-identified] : Coban Compression [de-identified] : Cleansed with Normal Saline\par  [TWNoteComboBox4] : Moderate [TWNoteComboBox5] : No [de-identified] : No [de-identified] : Erythema [de-identified] : None [de-identified] : None [de-identified] : Yes [de-identified] : >75% [de-identified] : Multilayer other compression wrap [de-identified] : 3x Weekly [de-identified] : Moderate [de-identified] : No [de-identified] : No [de-identified] : Erythema [de-identified] : None [de-identified] : None [de-identified] : 100% [de-identified] : No [de-identified] : Multilayer other compression wrap [de-identified] : 3x Weekly [de-identified] : Moderate [de-identified] : No [de-identified] : No [de-identified] : Erythema [de-identified] : None [de-identified] : 100% [de-identified] : None [de-identified] : No [de-identified] : Multilayer other compression wrap [de-identified] : 3x Weekly

## 2020-09-13 DIAGNOSIS — Z72.0 TOBACCO USE: ICD-10-CM

## 2020-09-13 DIAGNOSIS — Z82.49 FAMILY HISTORY OF ISCHEMIC HEART DISEASE AND OTHER DISEASES OF THE CIRCULATORY SYSTEM: ICD-10-CM

## 2020-09-13 DIAGNOSIS — E78.00 PURE HYPERCHOLESTEROLEMIA, UNSPECIFIED: ICD-10-CM

## 2020-09-13 DIAGNOSIS — M16.9 OSTEOARTHRITIS OF HIP, UNSPECIFIED: ICD-10-CM

## 2020-09-13 DIAGNOSIS — L84 CORNS AND CALLOSITIES: ICD-10-CM

## 2020-09-13 DIAGNOSIS — M47.816 SPONDYLOSIS WITHOUT MYELOPATHY OR RADICULOPATHY, LUMBAR REGION: ICD-10-CM

## 2020-09-13 DIAGNOSIS — L97.811 NON-PRESSURE CHRONIC ULCER OF OTHER PART OF RIGHT LOWER LEG LIMITED TO BREAKDOWN OF SKIN: ICD-10-CM

## 2020-09-13 DIAGNOSIS — L97.821 NON-PRESSURE CHRONIC ULCER OF OTHER PART OF LEFT LOWER LEG LIMITED TO BREAKDOWN OF SKIN: ICD-10-CM

## 2020-09-13 DIAGNOSIS — I35.0 NONRHEUMATIC AORTIC (VALVE) STENOSIS: ICD-10-CM

## 2020-09-13 DIAGNOSIS — M54.16 RADICULOPATHY, LUMBAR REGION: ICD-10-CM

## 2020-09-13 DIAGNOSIS — Z90.710 ACQUIRED ABSENCE OF BOTH CERVIX AND UTERUS: ICD-10-CM

## 2020-09-13 DIAGNOSIS — Z96.649 PRESENCE OF UNSPECIFIED ARTIFICIAL HIP JOINT: ICD-10-CM

## 2020-09-13 DIAGNOSIS — G25.81 RESTLESS LEGS SYNDROME: ICD-10-CM

## 2020-09-13 DIAGNOSIS — Z79.82 LONG TERM (CURRENT) USE OF ASPIRIN: ICD-10-CM

## 2020-09-13 DIAGNOSIS — I83.218 VARICOSE VEINS OF RIGHT LOWER EXTREMITY WITH BOTH ULCER OF OTHER PART OF LOWER EXTREMITY AND INFLAMMATION: ICD-10-CM

## 2020-09-13 DIAGNOSIS — Z79.899 OTHER LONG TERM (CURRENT) DRUG THERAPY: ICD-10-CM

## 2020-09-13 DIAGNOSIS — Z96.659 PRESENCE OF UNSPECIFIED ARTIFICIAL KNEE JOINT: ICD-10-CM

## 2020-09-13 DIAGNOSIS — I83.228 VARICOSE VEINS OF LEFT LOWER EXTREMITY WITH BOTH ULCER OF OTHER PART OF LOWER EXTREMITY AND INFLAMMATION: ICD-10-CM

## 2020-09-13 DIAGNOSIS — M17.12 UNILATERAL PRIMARY OSTEOARTHRITIS, LEFT KNEE: ICD-10-CM

## 2020-09-13 DIAGNOSIS — M65.272 CALCIFIC TENDINITIS, LEFT ANKLE AND FOOT: ICD-10-CM

## 2020-09-25 ENCOUNTER — APPOINTMENT (OUTPATIENT)
Dept: WOUND CARE | Facility: HOSPITAL | Age: 70
End: 2020-09-25
Payer: MEDICARE

## 2020-09-25 ENCOUNTER — OUTPATIENT (OUTPATIENT)
Dept: OUTPATIENT SERVICES | Facility: HOSPITAL | Age: 70
LOS: 1 days | Discharge: HOME | End: 2020-09-25
Payer: MEDICARE

## 2020-09-25 VITALS
TEMPERATURE: 97 F | HEIGHT: 67 IN | SYSTOLIC BLOOD PRESSURE: 139 MMHG | DIASTOLIC BLOOD PRESSURE: 75 MMHG | WEIGHT: 220 LBS | RESPIRATION RATE: 20 BRPM | OXYGEN SATURATION: 98 % | HEART RATE: 99 BPM | BODY MASS INDEX: 34.53 KG/M2

## 2020-09-25 VITALS
SYSTOLIC BLOOD PRESSURE: 139 MMHG | BODY MASS INDEX: 34.53 KG/M2 | HEIGHT: 67 IN | HEART RATE: 99 BPM | DIASTOLIC BLOOD PRESSURE: 75 MMHG | RESPIRATION RATE: 20 BRPM | TEMPERATURE: 97 F | WEIGHT: 220 LBS | OXYGEN SATURATION: 98 %

## 2020-09-25 DIAGNOSIS — I83.218 VARICOSE VEINS OF RIGHT LOWER EXTREMITY WITH BOTH ULCER OF OTHER PART OF LOWER EXTREMITY AND INFLAMMATION: ICD-10-CM

## 2020-09-25 PROCEDURE — 29581 APPL MULTLAYER CMPRN SYS LEG: CPT | Mod: 50

## 2020-09-25 PROCEDURE — 99213 OFFICE O/P EST LOW 20 MIN: CPT

## 2020-09-25 NOTE — ASSESSMENT
[Verbal] : Verbal [Demo] : Demo [Patient] : Patient [Good - alert, interested, motivated] : Good - alert, interested, motivated [Verbalizes knowledge/Understanding] : Verbalizes knowledge/understanding [Dressing changes] : dressing changes [Skin Care] : skin care [Signs and symptoms of infection] : sign and symptoms of infection [How and When to Call] : how and when to call [Compression Therapy] : compression therapy [Patient responsibility to plan of care] : patient responsibility to plan of care [] : Yes [Stable] : stable [Home] : Home [Walker] : Walker [Faxed - Long Term Care/Home Health Agency] : Long Term Care/Home Health Agency: Faxed [FreeTextEntry2] : Restore Skin Integrity\par Infection Control\par Localized wound care\par Maintain acceptable pain levels at satisfactory relief.\par Demonstrates use of both nonpharmacological and pharmacological pain relief strategies [FreeTextEntry4] : Photos Taken\par F/U to [facility] in 2 weeks [FreeTextEntry1] : [facility]

## 2020-09-25 NOTE — PHYSICAL EXAM
[4 x 4] : 4 x 4  [Abdominal Pad] : Abdominal Pad [Normal Thyroid] : the thyroid was normal [Normal Breath Sounds] : Normal breath sounds [Normal Heart Sounds] : normal heart sounds [Normal Rate and Rhythm] : normal rate and rhythm [1+] : left 1+ [Ankle Swelling (On Exam)] : present [Ankle Swelling Bilaterally] : bilaterally  [] : bilaterally [Ankle Swelling On The Left] : moderate [Alert] : alert [Oriented to Person] : oriented to person [Oriented to Place] : oriented to place [Oriented to Time] : oriented to time [Calm] : calm [JVD] : no jugular venous distention  [Abdomen Masses] : No abdominal massess [Abdomen Tenderness] : ~T ~M No abdominal tenderness [Tender] : nontender [Enlarged] : not enlarged [de-identified] : calm [de-identified] : Tenderness to palpation of left plantar and plantar medial calcaneus [de-identified] : bilateral lower leg venous stasis ulceration down to skin with stasis dermatitis improving [FreeTextEntry1] : Right Posterior Leg [FreeTextEntry2] : 3.0 [FreeTextEntry3] : 2.0 [FreeTextEntry4] : 0.2 [de-identified] : Serosanguineous [de-identified] : Excoriating Weeping Area [de-identified] : Coban Compression [de-identified] : Calcium Alginate [de-identified] : Cleansed with Normal Saline\par  [FreeTextEntry7] : Left Lateral to Posterior Leg [FreeTextEntry8] : 10.0 [FreeTextEntry9] : 5.7 [de-identified] : 0.1 [de-identified] : Serosanguineous [de-identified] : Intact [de-identified] : Coban Compression [de-identified] : Calcium Alginate [de-identified] : Cleansed with Normal Saline\par  [de-identified] : Right Lateral Leg [de-identified] : 7.6 [de-identified] : 6.5 [de-identified] : 0.1 [de-identified] : Serosanguineous [de-identified] : Intact [de-identified] : Coban Compression [de-identified] : Calcium Alginate [de-identified] : Cleansed with Normal Saline\par  [TWNoteComboBox4] : Moderate [TWNoteComboBox5] : No [de-identified] : No [de-identified] : None [de-identified] : None [de-identified] : 100% [de-identified] : No [de-identified] : Multilayer other compression wrap [de-identified] : 3x Weekly [de-identified] : Moderate [de-identified] : No [de-identified] : No [de-identified] : Erythema [de-identified] : None [de-identified] : None [de-identified] : 100% [de-identified] : No [de-identified] : Multilayer other compression wrap [de-identified] : 3x Weekly [de-identified] : Moderate [de-identified] : No [de-identified] : No [de-identified] : None [de-identified] : None [de-identified] : 100% [de-identified] : No [de-identified] : Multilayer other compression wrap [de-identified] : 3x Weekly

## 2020-09-25 NOTE — PLAN
[FreeTextEntry1] : Patient examined and evaluated at this time.\par Continue local wound care and offloading with alginate, multilayer compression.\par Patient to return in 1 week for follow up.

## 2020-09-28 ENCOUNTER — APPOINTMENT (OUTPATIENT)
Dept: CARDIOLOGY | Facility: CLINIC | Age: 70
End: 2020-09-28
Payer: MEDICARE

## 2020-09-28 VITALS
BODY MASS INDEX: 35.63 KG/M2 | WEIGHT: 227 LBS | OXYGEN SATURATION: 96 % | HEART RATE: 90 BPM | DIASTOLIC BLOOD PRESSURE: 70 MMHG | HEIGHT: 67 IN | SYSTOLIC BLOOD PRESSURE: 135 MMHG

## 2020-09-28 DIAGNOSIS — Z79.899 OTHER LONG TERM (CURRENT) DRUG THERAPY: ICD-10-CM

## 2020-09-28 DIAGNOSIS — M47.816 SPONDYLOSIS WITHOUT MYELOPATHY OR RADICULOPATHY, LUMBAR REGION: ICD-10-CM

## 2020-09-28 DIAGNOSIS — Z90.710 ACQUIRED ABSENCE OF BOTH CERVIX AND UTERUS: ICD-10-CM

## 2020-09-28 DIAGNOSIS — M65.272 CALCIFIC TENDINITIS, LEFT ANKLE AND FOOT: ICD-10-CM

## 2020-09-28 DIAGNOSIS — I83.228 VARICOSE VEINS OF LEFT LOWER EXTREMITY WITH BOTH ULCER OF OTHER PART OF LOWER EXTREMITY AND INFLAMMATION: ICD-10-CM

## 2020-09-28 DIAGNOSIS — I83.218 VARICOSE VEINS OF RIGHT LOWER EXTREMITY WITH BOTH ULCER OF OTHER PART OF LOWER EXTREMITY AND INFLAMMATION: ICD-10-CM

## 2020-09-28 DIAGNOSIS — E78.00 PURE HYPERCHOLESTEROLEMIA, UNSPECIFIED: ICD-10-CM

## 2020-09-28 DIAGNOSIS — Z96.649 PRESENCE OF UNSPECIFIED ARTIFICIAL HIP JOINT: ICD-10-CM

## 2020-09-28 DIAGNOSIS — G25.81 RESTLESS LEGS SYNDROME: ICD-10-CM

## 2020-09-28 DIAGNOSIS — I35.0 NONRHEUMATIC AORTIC (VALVE) STENOSIS: ICD-10-CM

## 2020-09-28 DIAGNOSIS — Z72.0 TOBACCO USE: ICD-10-CM

## 2020-09-28 DIAGNOSIS — L97.821 NON-PRESSURE CHRONIC ULCER OF OTHER PART OF LEFT LOWER LEG LIMITED TO BREAKDOWN OF SKIN: ICD-10-CM

## 2020-09-28 DIAGNOSIS — L84 CORNS AND CALLOSITIES: ICD-10-CM

## 2020-09-28 DIAGNOSIS — M54.16 RADICULOPATHY, LUMBAR REGION: ICD-10-CM

## 2020-09-28 DIAGNOSIS — Z82.49 FAMILY HISTORY OF ISCHEMIC HEART DISEASE AND OTHER DISEASES OF THE CIRCULATORY SYSTEM: ICD-10-CM

## 2020-09-28 DIAGNOSIS — Z96.659 PRESENCE OF UNSPECIFIED ARTIFICIAL KNEE JOINT: ICD-10-CM

## 2020-09-28 DIAGNOSIS — L97.811 NON-PRESSURE CHRONIC ULCER OF OTHER PART OF RIGHT LOWER LEG LIMITED TO BREAKDOWN OF SKIN: ICD-10-CM

## 2020-09-28 DIAGNOSIS — M17.12 UNILATERAL PRIMARY OSTEOARTHRITIS, LEFT KNEE: ICD-10-CM

## 2020-09-28 DIAGNOSIS — M16.9 OSTEOARTHRITIS OF HIP, UNSPECIFIED: ICD-10-CM

## 2020-09-28 PROCEDURE — 99214 OFFICE O/P EST MOD 30 MIN: CPT

## 2020-09-28 NOTE — HISTORY OF PRESENT ILLNESS
[FreeTextEntry1] : I saw Christina Damian in the office today for cardiac follow up. The patient is scheduled for neck surgery 10/21/20 at [facility] repair chronic abnormalities in the bones..She is a 70-year-old white female who was admitted to [facility] 12/19 with episodes of frequent falls and blacking out occurring over the past 4 months. In the hospital evaluation revealed acute kidney insufficiency felt to be due to dehydration. Episodes of falling and loss of consciousness were felt to be due to dehydration and poor perfusion to the brain. Creatinine went from 3.8 down to 1.2 with hydration. There was no evidence for CVA. She had been admitted to [facility] previously with similar symptoms. Since the hospitalization she is feeling better. She has had no further episodes of feeling like she would black out. She is keeping herself well hydrated.\par \par Her past medical history is significant for the absence of hypertension, diabetes, hyperlipidemia. She has no family history of heart disease. She stopped smoking 8 years ago. She's never had a significant cardiac workup. She has no known history of heart disease.\par \par She is relatively sedentary and walks with a walker. She has no chest pain, shortness of breath, or palpitation.\par \par She had an ADALBERTO performed 1/20 that was normal. Carotid Doppler 2/20 showed mild to moderate plaque. Echocardiogram 1/20 showed an ejection fraction of 60% with possible bicuspid aortic valve. There is mild aortic stenosis with a peak gradient of 18. Mild TR with PA pressure of 24. Stage I diastolic dysfunction. Blood work performed 2/20 demonstrated a cholesterol of 135, triglycerides 95, HDL 41, and LDL 73.\par \par With good hydration she said no further episodes of syncope. She is followed in the wound center for her bilateral leg edema being treated with with wrapping, edema pump and leg elevation. Edema is slowly getting better. \par \par ECG performed 9/20 demonstrated sinus rhythm and is within normal limits.

## 2020-09-28 NOTE — DISCUSSION/SUMMARY
[FreeTextEntry1] : This is a 70-year-old white female without any risk factors for heart disease who has chronic bilateral leg edema due to venous insufficiency, being treated at the [facility]. She is status post bilateral knee replacement and hip replacement.\par \par This past year she had carotid Doppler demonstrating mild nonobstructing plaque and an echocardiogram that showed mild aortic stenosis. She had normal ADALBERTO. She had very good cholesterol readings with an HDL 41 LDL 73.\par \par Her cardiac status appears stable and there are no cardiac contraindication to the planned surgery. She has a normal ejection fraction without any significant peripheral vascular disease. She denies any chest pain and there is no signs or symptoms of heart failure. No special precautions other routine hemodynamic monitoring should be required.

## 2020-09-28 NOTE — REVIEW OF SYSTEMS
[Recent Weight Loss (___ Lbs)] : recent [unfilled] ~Ulb weight loss [Eyeglasses] : currently wearing eyeglasses [Lower Ext Edema] : lower extremity edema [Joint Pain] : joint pain [Skin: A Rash] : rash: [Negative] : Genitourinary [Fever] : no fever [Headache] : no headache [Chills] : no chills [Feeling Fatigued] : not feeling fatigued [Blurry Vision] : no blurred vision [Seeing Double (Diplopia)] : no diplopia [Dizziness] : no dizziness [Depression] : no depression [Anxiety] : no anxiety [Excessive Thirst] : no polydipsia [Easy Bleeding] : no tendency for easy bleeding [Easy Bruising] : no tendency for easy bruising [FreeTextEntry3] : Difficulty swallowing

## 2020-09-28 NOTE — PHYSICAL EXAM
[General Appearance - Well Developed] : well developed [Normal Appearance] : normal appearance [Well Groomed] : well groomed [General Appearance - Well Nourished] : well nourished [No Deformities] : no deformities [General Appearance - In No Acute Distress] : no acute distress [Normal Oral Mucosa] : normal oral mucosa [Normal Jugular Venous A Waves Present] : normal jugular venous A waves present [Normal Jugular Venous V Waves Present] : normal jugular venous V waves present [No Jugular Venous Hagen A Waves] : no jugular venous hagen A waves [Respiration, Rhythm And Depth] : normal respiratory rhythm and effort [Exaggerated Use Of Accessory Muscles For Inspiration] : no accessory muscle use [Auscultation Breath Sounds / Voice Sounds] : lungs were clear to auscultation bilaterally [Bowel Sounds] : normal bowel sounds [Abdomen Soft] : soft [Abdomen Tenderness] : non-tender [Nail Clubbing] : no clubbing of the fingernails [Cyanosis, Localized] : no localized cyanosis [Skin Color & Pigmentation] : normal skin color and pigmentation [Skin Turgor] : normal skin turgor [] : no rash [Oriented To Time, Place, And Person] : oriented to person, place, and time [Impaired Insight] : insight and judgment were intact [No Anxiety] : not feeling anxious [Not Palpable] : not palpable [Normal Rate] : normal [Normal S1] : normal S1 [Normal S2] : normal S2 [II] : a grade 2 [2+] : left 2+ [1+] : left 1+ [No Abnormalities] : the abdominal aorta was not enlarged and no bruit was heard [___ +] : bilateral [unfilled]U+ pretibial pitting edema [FreeTextEntry1] : Walks with a walker [S3] : no S3 [S4] : no S4 [Right Carotid Bruit] : no bruit heard over the right carotid [Left Carotid Bruit] : no bruit heard over the left carotid [Right Femoral Bruit] : no bruit heard over the right femoral artery [Left Femoral Bruit] : no bruit heard over the left femoral artery

## 2020-10-09 ENCOUNTER — OUTPATIENT (OUTPATIENT)
Dept: OUTPATIENT SERVICES | Facility: HOSPITAL | Age: 70
LOS: 1 days | Discharge: HOME | End: 2020-10-09
Payer: MEDICARE

## 2020-10-09 ENCOUNTER — APPOINTMENT (OUTPATIENT)
Dept: WOUND CARE | Facility: HOSPITAL | Age: 70
End: 2020-10-09
Payer: MEDICARE

## 2020-10-09 VITALS
TEMPERATURE: 98.3 F | OXYGEN SATURATION: 95 % | SYSTOLIC BLOOD PRESSURE: 155 MMHG | WEIGHT: 227 LBS | DIASTOLIC BLOOD PRESSURE: 72 MMHG | HEART RATE: 82 BPM | BODY MASS INDEX: 35.63 KG/M2 | HEIGHT: 67 IN | RESPIRATION RATE: 20 BRPM

## 2020-10-09 DIAGNOSIS — L97.821 NON-PRESSURE CHRONIC ULCER OF OTHER PART OF LEFT LOWER LEG LIMITED TO BREAKDOWN OF SKIN: ICD-10-CM

## 2020-10-09 DIAGNOSIS — I83.228 VARICOSE VEINS OF LEFT LOWER EXTREMITY WITH BOTH ULCER OF OTHER PART OF LOWER EXTREMITY AND INFLAMMATION: ICD-10-CM

## 2020-10-09 DIAGNOSIS — L97.819 VARICOSE VEINS OF RIGHT LOWER EXTREMITY WITH BOTH ULCER OF OTHER PART OF LOWER EXTREMITY AND INFLAMMATION: ICD-10-CM

## 2020-10-09 DIAGNOSIS — I83.218 VARICOSE VEINS OF RIGHT LOWER EXTREMITY WITH BOTH ULCER OF OTHER PART OF LOWER EXTREMITY AND INFLAMMATION: ICD-10-CM

## 2020-10-09 DIAGNOSIS — L97.829 VARICOSE VEINS OF LEFT LOWER EXTREMITY WITH BOTH ULCER OF OTHER PART OF LOWER EXTREMITY AND INFLAMMATION: ICD-10-CM

## 2020-10-09 DIAGNOSIS — L97.811 NON-PRESSURE CHRONIC ULCER OF OTHER PART OF RIGHT LOWER LEG LIMITED TO BREAKDOWN OF SKIN: ICD-10-CM

## 2020-10-09 DIAGNOSIS — M65.272 CALCIFIC TENDINITIS, LEFT ANKLE AND FOOT: ICD-10-CM

## 2020-10-09 PROCEDURE — 99213 OFFICE O/P EST LOW 20 MIN: CPT

## 2020-10-09 PROCEDURE — 29581 APPL MULTLAYER CMPRN SYS LEG: CPT | Mod: 50

## 2020-10-09 NOTE — PHYSICAL EXAM
[4 x 4] : 4 x 4  [Abdominal Pad] : Abdominal Pad [JVD] : no jugular venous distention  [Normal Thyroid] : the thyroid was normal [Normal Breath Sounds] : Normal breath sounds [Normal Heart Sounds] : normal heart sounds [Normal Rate and Rhythm] : normal rate and rhythm [1+] : left 1+ [Ankle Swelling (On Exam)] : present [Ankle Swelling Bilaterally] : bilaterally  [] : bilaterally [Ankle Swelling On The Left] : moderate [Abdomen Masses] : No abdominal massess [Abdomen Tenderness] : ~T ~M No abdominal tenderness [Tender] : nontender [Enlarged] : not enlarged [Alert] : alert [Oriented to Person] : oriented to person [Oriented to Place] : oriented to place [Oriented to Time] : oriented to time [Calm] : calm [de-identified] : calm [de-identified] : Tenderness to palpation of left plantar and plantar medial calcaneus [de-identified] : bilateral lower leg venous stasis ulceration down to skin with stasis dermatitis improving [FreeTextEntry1] : Right Posterior Leg [FreeTextEntry2] : 3.3 [FreeTextEntry3] : 2.1 [FreeTextEntry4] : 0.2 [de-identified] : Serosanguineous [de-identified] : Macerated excoriation  [de-identified] : 90% [de-identified] : Coban Compression [de-identified] : Calcium Alginate [de-identified] : Cleansed with Normal Saline\par  [FreeTextEntry7] : Left Lateral to Posterior Leg- weeping excoriated areas within measurements  [FreeTextEntry8] : 12.0 [FreeTextEntry9] : 14.5 [de-identified] : 0.1 [de-identified] : Serosanguineous [de-identified] : Intact [de-identified] : Coban Compression [de-identified] : Calcium Alginate [de-identified] : Cleansed with Normal Saline\par  [de-identified] : Right Lateral Leg- weeping excoriated areas within measurements  [de-identified] : 6.5 [de-identified] : 15.5 [de-identified] : 0.1 [de-identified] : Serosanguineous [de-identified] : Intact [de-identified] : Coban Compression [de-identified] : Calcium Alginate [de-identified] : Cleansed with Normal Saline\par  [TWNoteComboBox4] : Moderate [TWNoteComboBox5] : No [de-identified] : No [de-identified] : None [de-identified] : None [de-identified] : <20% [de-identified] : Yes [de-identified] : Multilayer other compression wrap [de-identified] : 3x Weekly [de-identified] : Large [de-identified] : No [de-identified] : No [de-identified] : None [de-identified] : None [de-identified] : 100% [de-identified] : No [de-identified] : Multilayer other compression wrap [de-identified] : 3x Weekly [de-identified] : Large [de-identified] : No [de-identified] : No [de-identified] : None [de-identified] : None [de-identified] : 100% [de-identified] : No [de-identified] : Multilayer other compression wrap [de-identified] : 3x Weekly

## 2020-10-09 NOTE — ASSESSMENT
[Verbal] : Verbal [Patient] : Patient [Good - alert, interested, motivated] : Good - alert, interested, motivated [Verbalizes knowledge/Understanding] : Verbalizes knowledge/understanding [Dressing changes] : dressing changes [Skin Care] : skin care [Signs and symptoms of infection] : sign and symptoms of infection [How and When to Call] : how and when to call [Compression Therapy] : compression therapy [Patient responsibility to plan of care] : patient responsibility to plan of care [] : Yes [Stable] : stable [Home] : Home [Walker] : Walker [Faxed - Long Term Care/Home Health Agency] : Long Term Care/Home Health Agency: Faxed [FreeTextEntry2] : Restore Skin Integrity\par Infection Control\par Localized wound care\par Maintain acceptable pain levels at satisfactory relief.\par Demonstrates use of both nonpharmacological and pharmacological pain relief strategies [FreeTextEntry4] : Photos Taken\par F/U to [facility] in 1 week [FreeTextEntry1] : [facility]

## 2020-10-12 DIAGNOSIS — E78.00 PURE HYPERCHOLESTEROLEMIA, UNSPECIFIED: ICD-10-CM

## 2020-10-12 DIAGNOSIS — L97.811 NON-PRESSURE CHRONIC ULCER OF OTHER PART OF RIGHT LOWER LEG LIMITED TO BREAKDOWN OF SKIN: ICD-10-CM

## 2020-10-12 DIAGNOSIS — Z90.710 ACQUIRED ABSENCE OF BOTH CERVIX AND UTERUS: ICD-10-CM

## 2020-10-12 DIAGNOSIS — L97.821 NON-PRESSURE CHRONIC ULCER OF OTHER PART OF LEFT LOWER LEG LIMITED TO BREAKDOWN OF SKIN: ICD-10-CM

## 2020-10-12 DIAGNOSIS — I83.228 VARICOSE VEINS OF LEFT LOWER EXTREMITY WITH BOTH ULCER OF OTHER PART OF LOWER EXTREMITY AND INFLAMMATION: ICD-10-CM

## 2020-10-12 DIAGNOSIS — I83.218 VARICOSE VEINS OF RIGHT LOWER EXTREMITY WITH BOTH ULCER OF OTHER PART OF LOWER EXTREMITY AND INFLAMMATION: ICD-10-CM

## 2020-10-12 DIAGNOSIS — L84 CORNS AND CALLOSITIES: ICD-10-CM

## 2020-10-12 DIAGNOSIS — M47.816 SPONDYLOSIS WITHOUT MYELOPATHY OR RADICULOPATHY, LUMBAR REGION: ICD-10-CM

## 2020-10-12 DIAGNOSIS — Z96.659 PRESENCE OF UNSPECIFIED ARTIFICIAL KNEE JOINT: ICD-10-CM

## 2020-10-12 DIAGNOSIS — Z72.0 TOBACCO USE: ICD-10-CM

## 2020-10-12 DIAGNOSIS — G25.81 RESTLESS LEGS SYNDROME: ICD-10-CM

## 2020-10-12 DIAGNOSIS — I35.0 NONRHEUMATIC AORTIC (VALVE) STENOSIS: ICD-10-CM

## 2020-10-12 DIAGNOSIS — M65.272 CALCIFIC TENDINITIS, LEFT ANKLE AND FOOT: ICD-10-CM

## 2020-10-12 DIAGNOSIS — M54.16 RADICULOPATHY, LUMBAR REGION: ICD-10-CM

## 2020-10-12 DIAGNOSIS — M17.12 UNILATERAL PRIMARY OSTEOARTHRITIS, LEFT KNEE: ICD-10-CM

## 2020-10-12 DIAGNOSIS — M16.9 OSTEOARTHRITIS OF HIP, UNSPECIFIED: ICD-10-CM

## 2020-10-12 DIAGNOSIS — Z79.899 OTHER LONG TERM (CURRENT) DRUG THERAPY: ICD-10-CM

## 2020-10-12 DIAGNOSIS — Z82.49 FAMILY HISTORY OF ISCHEMIC HEART DISEASE AND OTHER DISEASES OF THE CIRCULATORY SYSTEM: ICD-10-CM

## 2020-11-28 ENCOUNTER — INPATIENT (INPATIENT)
Facility: HOSPITAL | Age: 70
LOS: 2 days | Discharge: HOME | DRG: 178 | End: 2020-12-01
Attending: STUDENT IN AN ORGANIZED HEALTH CARE EDUCATION/TRAINING PROGRAM | Admitting: FAMILY MEDICINE
Payer: MEDICARE

## 2020-11-28 VITALS
HEIGHT: 67 IN | DIASTOLIC BLOOD PRESSURE: 81 MMHG | OXYGEN SATURATION: 100 % | RESPIRATION RATE: 20 BRPM | WEIGHT: 199.96 LBS | HEART RATE: 100 BPM | SYSTOLIC BLOOD PRESSURE: 153 MMHG | TEMPERATURE: 98 F

## 2020-11-28 DIAGNOSIS — Z98.890 OTHER SPECIFIED POSTPROCEDURAL STATES: Chronic | ICD-10-CM

## 2020-11-28 DIAGNOSIS — J18.9 PNEUMONIA, UNSPECIFIED ORGANISM: ICD-10-CM

## 2020-11-28 LAB
ALBUMIN SERPL ELPH-MCNC: 3.4 G/DL — SIGNIFICANT CHANGE UP (ref 3.3–5)
ALP SERPL-CCNC: 112 U/L — SIGNIFICANT CHANGE UP (ref 40–120)
ALT FLD-CCNC: 50 U/L — SIGNIFICANT CHANGE UP (ref 12–78)
AMYLASE P1 CFR SERPL: 25 U/L — SIGNIFICANT CHANGE UP (ref 25–125)
ANION GAP SERPL CALC-SCNC: 14 MMOL/L — SIGNIFICANT CHANGE UP (ref 5–17)
APPEARANCE UR: ABNORMAL
APTT BLD: 25.3 SEC — LOW (ref 27.5–35.5)
AST SERPL-CCNC: 89 U/L — HIGH (ref 15–37)
BACTERIA # UR AUTO: ABNORMAL
BASE EXCESS BLDV CALC-SCNC: -0.1 MMOL/L — SIGNIFICANT CHANGE UP (ref -2–2)
BILIRUB SERPL-MCNC: 0.5 MG/DL — SIGNIFICANT CHANGE UP (ref 0.2–1.2)
BILIRUB UR-MCNC: NEGATIVE — SIGNIFICANT CHANGE UP
BLD GP AB SCN SERPL QL: SIGNIFICANT CHANGE UP
BLOOD GAS COMMENTS, VENOUS: SIGNIFICANT CHANGE UP
BUN SERPL-MCNC: 29 MG/DL — HIGH (ref 7–23)
CALCIUM SERPL-MCNC: 9.3 MG/DL — SIGNIFICANT CHANGE UP (ref 8.5–10.1)
CHLORIDE SERPL-SCNC: 103 MMOL/L — SIGNIFICANT CHANGE UP (ref 96–108)
CO2 SERPL-SCNC: 22 MMOL/L — SIGNIFICANT CHANGE UP (ref 22–31)
COLOR SPEC: YELLOW — SIGNIFICANT CHANGE UP
COMMENT - URINE: SIGNIFICANT CHANGE UP
CREAT SERPL-MCNC: 1.3 MG/DL — SIGNIFICANT CHANGE UP (ref 0.5–1.3)
CRP SERPL-MCNC: 12.26 MG/DL — HIGH (ref 0–0.4)
DIFF PNL FLD: ABNORMAL
EPI CELLS # UR: SIGNIFICANT CHANGE UP
GLUCOSE SERPL-MCNC: 134 MG/DL — HIGH (ref 70–99)
GLUCOSE UR QL: NEGATIVE — SIGNIFICANT CHANGE UP
HCO3 BLDV-SCNC: 25 MMOL/L — SIGNIFICANT CHANGE UP (ref 21–29)
HCT VFR BLD CALC: 31.9 % — LOW (ref 34.5–45)
HGB BLD-MCNC: 10.3 G/DL — LOW (ref 11.5–15.5)
HOROWITZ INDEX BLDV+IHG-RTO: 21 — SIGNIFICANT CHANGE UP
INR BLD: 1.32 RATIO — HIGH (ref 0.88–1.16)
KETONES UR-MCNC: NEGATIVE — SIGNIFICANT CHANGE UP
LEUKOCYTE ESTERASE UR-ACNC: ABNORMAL
LIDOCAIN IGE QN: 66 U/L — LOW (ref 73–393)
MCHC RBC-ENTMCNC: 28.5 PG — SIGNIFICANT CHANGE UP (ref 27–34)
MCHC RBC-ENTMCNC: 32.3 GM/DL — SIGNIFICANT CHANGE UP (ref 32–36)
MCV RBC AUTO: 88.1 FL — SIGNIFICANT CHANGE UP (ref 80–100)
NITRITE UR-MCNC: POSITIVE
NRBC # BLD: 0 /100 WBCS — SIGNIFICANT CHANGE UP (ref 0–0)
PCO2 BLDV: 33 MMHG — LOW (ref 35–50)
PH BLDV: 7.46 — HIGH (ref 7.35–7.45)
PH UR: 5 — SIGNIFICANT CHANGE UP (ref 5–8)
PLATELET # BLD AUTO: 366 K/UL — SIGNIFICANT CHANGE UP (ref 150–400)
PO2 BLDV: 117 MMHG — HIGH (ref 25–45)
POTASSIUM SERPL-MCNC: 3.7 MMOL/L — SIGNIFICANT CHANGE UP (ref 3.5–5.3)
POTASSIUM SERPL-SCNC: 3.7 MMOL/L — SIGNIFICANT CHANGE UP (ref 3.5–5.3)
PROCALCITONIN SERPL-MCNC: 0.16 NG/ML — HIGH (ref 0–0.04)
PROT SERPL-MCNC: 9 G/DL — HIGH (ref 6–8.3)
PROT UR-MCNC: 30 MG/DL
PROTHROM AB SERPL-ACNC: 15.2 SEC — HIGH (ref 10.6–13.6)
RBC # BLD: 3.62 M/UL — LOW (ref 3.8–5.2)
RBC # FLD: 14.6 % — HIGH (ref 10.3–14.5)
RBC CASTS # UR COMP ASSIST: SIGNIFICANT CHANGE UP /HPF (ref 0–4)
SAO2 % BLDV: 98 % — HIGH (ref 67–88)
SARS-COV-2 RNA SPEC QL NAA+PROBE: SIGNIFICANT CHANGE UP
SODIUM SERPL-SCNC: 139 MMOL/L — SIGNIFICANT CHANGE UP (ref 135–145)
SP GR SPEC: 1.02 — SIGNIFICANT CHANGE UP (ref 1.01–1.02)
UROBILINOGEN FLD QL: NEGATIVE — SIGNIFICANT CHANGE UP
WBC # BLD: 12.1 K/UL — HIGH (ref 3.8–10.5)
WBC # FLD AUTO: 12.1 K/UL — HIGH (ref 3.8–10.5)
WBC UR QL: >50

## 2020-11-28 PROCEDURE — 99223 1ST HOSP IP/OBS HIGH 75: CPT

## 2020-11-28 PROCEDURE — 71045 X-RAY EXAM CHEST 1 VIEW: CPT | Mod: 26

## 2020-11-28 PROCEDURE — 71250 CT THORAX DX C-: CPT | Mod: 26

## 2020-11-28 PROCEDURE — 74176 CT ABD & PELVIS W/O CONTRAST: CPT | Mod: 26

## 2020-11-28 PROCEDURE — 93010 ELECTROCARDIOGRAM REPORT: CPT

## 2020-11-28 PROCEDURE — 99223 1ST HOSP IP/OBS HIGH 75: CPT | Mod: AI

## 2020-11-28 PROCEDURE — 99285 EMERGENCY DEPT VISIT HI MDM: CPT

## 2020-11-28 RX ORDER — OXYCODONE HYDROCHLORIDE 5 MG/1
0.5 TABLET ORAL
Qty: 0 | Refills: 0 | DISCHARGE

## 2020-11-28 RX ORDER — SODIUM CHLORIDE 9 MG/ML
1900 INJECTION INTRAMUSCULAR; INTRAVENOUS; SUBCUTANEOUS ONCE
Refills: 0 | Status: COMPLETED | OUTPATIENT
Start: 2020-11-28 | End: 2020-11-28

## 2020-11-28 RX ORDER — ONDANSETRON 8 MG/1
4 TABLET, FILM COATED ORAL ONCE
Refills: 0 | Status: COMPLETED | OUTPATIENT
Start: 2020-11-28 | End: 2020-11-28

## 2020-11-28 RX ORDER — ACETAMINOPHEN 500 MG
650 TABLET ORAL EVERY 6 HOURS
Refills: 0 | Status: DISCONTINUED | OUTPATIENT
Start: 2020-11-28 | End: 2020-12-01

## 2020-11-28 RX ORDER — ENOXAPARIN SODIUM 100 MG/ML
40 INJECTION SUBCUTANEOUS DAILY
Refills: 0 | Status: DISCONTINUED | OUTPATIENT
Start: 2020-11-28 | End: 2020-12-01

## 2020-11-28 RX ORDER — SODIUM CHLORIDE 9 MG/ML
1000 INJECTION INTRAMUSCULAR; INTRAVENOUS; SUBCUTANEOUS ONCE
Refills: 0 | Status: DISCONTINUED | OUTPATIENT
Start: 2020-11-28 | End: 2020-11-28

## 2020-11-28 RX ORDER — HYDROXYCHLOROQUINE SULFATE 200 MG
400 TABLET ORAL DAILY
Refills: 0 | Status: DISCONTINUED | OUTPATIENT
Start: 2020-11-28 | End: 2020-12-01

## 2020-11-28 RX ORDER — VANCOMYCIN HCL 1 G
125 VIAL (EA) INTRAVENOUS EVERY 6 HOURS
Refills: 0 | Status: DISCONTINUED | OUTPATIENT
Start: 2020-11-28 | End: 2020-11-30

## 2020-11-28 RX ORDER — ALBUTEROL 90 UG/1
2 AEROSOL, METERED ORAL EVERY 4 HOURS
Refills: 0 | Status: DISCONTINUED | OUTPATIENT
Start: 2020-11-28 | End: 2020-12-01

## 2020-11-28 RX ORDER — LACTOBACILLUS ACIDOPHILUS 100MM CELL
1 CAPSULE ORAL
Refills: 0 | Status: DISCONTINUED | OUTPATIENT
Start: 2020-11-28 | End: 2020-12-01

## 2020-11-28 RX ORDER — POTASSIUM CHLORIDE 20 MEQ
20 PACKET (EA) ORAL
Refills: 0 | Status: DISCONTINUED | OUTPATIENT
Start: 2020-11-28 | End: 2020-11-29

## 2020-11-28 RX ORDER — AZITHROMYCIN 500 MG/1
500 TABLET, FILM COATED ORAL ONCE
Refills: 0 | Status: COMPLETED | OUTPATIENT
Start: 2020-11-28 | End: 2020-11-28

## 2020-11-28 RX ORDER — POTASSIUM CHLORIDE 20 MEQ
20 PACKET (EA) ORAL
Refills: 0 | Status: DISCONTINUED | OUTPATIENT
Start: 2020-11-28 | End: 2020-11-28

## 2020-11-28 RX ORDER — OXYCODONE AND ACETAMINOPHEN 5; 325 MG/1; MG/1
2 TABLET ORAL EVERY 6 HOURS
Refills: 0 | Status: DISCONTINUED | OUTPATIENT
Start: 2020-11-28 | End: 2020-12-01

## 2020-11-28 RX ORDER — LECITHIN 1200 MG
1 CAPSULE ORAL
Qty: 0 | Refills: 0 | DISCHARGE

## 2020-11-28 RX ORDER — PIPERACILLIN AND TAZOBACTAM 4; .5 G/20ML; G/20ML
3.38 INJECTION, POWDER, LYOPHILIZED, FOR SOLUTION INTRAVENOUS EVERY 8 HOURS
Refills: 0 | Status: DISCONTINUED | OUTPATIENT
Start: 2020-11-28 | End: 2020-12-01

## 2020-11-28 RX ORDER — CYCLOBENZAPRINE HYDROCHLORIDE 10 MG/1
10 TABLET, FILM COATED ORAL THREE TIMES A DAY
Refills: 0 | Status: DISCONTINUED | OUTPATIENT
Start: 2020-11-28 | End: 2020-12-01

## 2020-11-28 RX ORDER — OXYCODONE AND ACETAMINOPHEN 5; 325 MG/1; MG/1
1 TABLET ORAL EVERY 6 HOURS
Refills: 0 | Status: DISCONTINUED | OUTPATIENT
Start: 2020-11-28 | End: 2020-12-01

## 2020-11-28 RX ORDER — SULFASALAZINE 500 MG
1500 TABLET ORAL THREE TIMES A DAY
Refills: 0 | Status: DISCONTINUED | OUTPATIENT
Start: 2020-11-28 | End: 2020-12-01

## 2020-11-28 RX ORDER — LIDOCAINE 4 G/100G
1 CREAM TOPICAL DAILY
Refills: 0 | Status: DISCONTINUED | OUTPATIENT
Start: 2020-11-28 | End: 2020-12-01

## 2020-11-28 RX ORDER — MORPHINE SULFATE 50 MG/1
4 CAPSULE, EXTENDED RELEASE ORAL ONCE
Refills: 0 | Status: COMPLETED | OUTPATIENT
Start: 2020-11-28 | End: 2020-11-28

## 2020-11-28 RX ORDER — LANOLIN ALCOHOL/MO/W.PET/CERES
1 CREAM (GRAM) TOPICAL
Qty: 0 | Refills: 0 | DISCHARGE

## 2020-11-28 RX ORDER — ASPIRIN/CALCIUM CARB/MAGNESIUM 324 MG
1 TABLET ORAL
Qty: 0 | Refills: 0 | DISCHARGE

## 2020-11-28 RX ORDER — PRAMIPEXOLE DIHYDROCHLORIDE 0.12 MG/1
1.5 TABLET ORAL
Refills: 0 | Status: DISCONTINUED | OUTPATIENT
Start: 2020-11-28 | End: 2020-12-01

## 2020-11-28 RX ORDER — PANTOPRAZOLE SODIUM 20 MG/1
40 TABLET, DELAYED RELEASE ORAL ONCE
Refills: 0 | Status: COMPLETED | OUTPATIENT
Start: 2020-11-28 | End: 2020-11-28

## 2020-11-28 RX ORDER — CEFTRIAXONE 500 MG/1
1000 INJECTION, POWDER, FOR SOLUTION INTRAMUSCULAR; INTRAVENOUS ONCE
Refills: 0 | Status: COMPLETED | OUTPATIENT
Start: 2020-11-28 | End: 2020-11-28

## 2020-11-28 RX ADMIN — CEFTRIAXONE 100 MILLIGRAM(S): 500 INJECTION, POWDER, FOR SOLUTION INTRAMUSCULAR; INTRAVENOUS at 08:46

## 2020-11-28 RX ADMIN — ONDANSETRON 4 MILLIGRAM(S): 8 TABLET, FILM COATED ORAL at 06:15

## 2020-11-28 RX ADMIN — LIDOCAINE 1 PATCH: 4 CREAM TOPICAL at 19:00

## 2020-11-28 RX ADMIN — Medication 1 TABLET(S): at 17:35

## 2020-11-28 RX ADMIN — AZITHROMYCIN 255 MILLIGRAM(S): 500 TABLET, FILM COATED ORAL at 10:12

## 2020-11-28 RX ADMIN — PIPERACILLIN AND TAZOBACTAM 25 GRAM(S): 4; .5 INJECTION, POWDER, LYOPHILIZED, FOR SOLUTION INTRAVENOUS at 22:57

## 2020-11-28 RX ADMIN — Medication 400 MILLIGRAM(S): at 12:48

## 2020-11-28 RX ADMIN — SODIUM CHLORIDE 1900 MILLILITER(S): 9 INJECTION INTRAMUSCULAR; INTRAVENOUS; SUBCUTANEOUS at 07:36

## 2020-11-28 RX ADMIN — LIDOCAINE 1 PATCH: 4 CREAM TOPICAL at 12:49

## 2020-11-28 RX ADMIN — Medication 20 MILLIEQUIVALENT(S): at 17:35

## 2020-11-28 RX ADMIN — ENOXAPARIN SODIUM 40 MILLIGRAM(S): 100 INJECTION SUBCUTANEOUS at 12:48

## 2020-11-28 RX ADMIN — Medication 500 MILLIGRAM(S): at 15:31

## 2020-11-28 RX ADMIN — SODIUM CHLORIDE 1900 MILLILITER(S): 9 INJECTION INTRAMUSCULAR; INTRAVENOUS; SUBCUTANEOUS at 07:15

## 2020-11-28 RX ADMIN — Medication 20 MILLIGRAM(S): at 17:41

## 2020-11-28 RX ADMIN — PRAMIPEXOLE DIHYDROCHLORIDE 1.5 MILLIGRAM(S): 0.12 TABLET ORAL at 21:01

## 2020-11-28 RX ADMIN — Medication 1500 MILLIGRAM(S): at 22:57

## 2020-11-28 RX ADMIN — PANTOPRAZOLE SODIUM 40 MILLIGRAM(S): 20 TABLET, DELAYED RELEASE ORAL at 06:15

## 2020-11-28 RX ADMIN — PIPERACILLIN AND TAZOBACTAM 25 GRAM(S): 4; .5 INJECTION, POWDER, LYOPHILIZED, FOR SOLUTION INTRAVENOUS at 15:31

## 2020-11-28 RX ADMIN — Medication 125 MILLIGRAM(S): at 17:41

## 2020-11-28 RX ADMIN — OXYCODONE AND ACETAMINOPHEN 2 TABLET(S): 5; 325 TABLET ORAL at 22:57

## 2020-11-28 NOTE — CONSULT NOTE ADULT - SUBJECTIVE AND OBJECTIVE BOX
Date/Time Patient Seen:  		  Referring MD:   Data Reviewed	       Patient is a 70y old  Female who presents with a chief complaint of abd pain, vomiting (28 Nov 2020 10:17)      Subjective/HPI  in bed  seen and examined  vs and meds reviewed  labs reviewed  h and p reviewed  post op - recent c spine surgery at [facility] -   lives at home - with ex   has children and grand children  ex smoker  retired  alert - verbal    69 y/o F PMHx lymphedema, HLD, RA, recent cervical spine surgery at [facility] presented to ED with complaint of nausea and vomiting x1 day. Patient with recent cervical spine surgery past month with prolonged hospital course complicated by C. diff infection (completed treatment course) and dysphagia (s/p PEG tube placement). Patient returned home on 11/24/20, states she was doing well until yesterday when she began experiencing abdominal pain, followed by nausea and then had episode of vomiting. Patient states vomitus was "brownish" in color.  Patient states they placed PEG because she was having difficulty swallowing, but by the time she came home, she was tolerating oral intake. As per daughter, patient was sent home on soft diet with nectar thick liquids. Patient states at home she has been using a walker to ambulate, but felt weak the past day. Patient denied any CP, SOB, palpitations, lightheadedness, dizziness, dysuria, cough, sputum production.  Of note, daughter states they have been trying to set up an appointment to have PEG tube removed as outpatient, but would prefer if patient could have procedure done while at hospital.    In ED, patient noted ot have leukocytosis, UA with >50 WBC, positive nitrite, moderate leuks, and moderate bacteria. Patient with CT Chest/Abd/Pelvis which showed groundglass opacities concerning for infections. As per daughter, patient tested negative for COVID x3 while at [facility], and denies any sick contacts since she has been home.     Social History:  Social History (marital status, living situation, occupation, tobacco use, alcohol and drug use, and sexual history): , lives at home with ex-, former smoker (quit 10 years ago), occasional drinker, denies drug use     Tobacco Screening:  · Core Measure Site	Yes  · Has the patient used tobacco in the past 30 days?	No    Risk Assessment:    Present on Admission:  Deep Venous Thrombosis	no  Pulmonary Embolus	no     Heart Failure:  Does this patient have a history of or has been diagnosed with heart failure? no.  PAST MEDICAL & SURGICAL HISTORY:  Obesity (BMI 30-39.9)    Traumatic arthropathy involving lower leg  right    History of Osteoarthritis    Hyperlipidemia    Restless Leg Syndrome    H/O neck surgery    H/O arthroscopy of right knee  2010    Bladder Disorder  Bladder lift 2000    History of Colonoscopy    Ex lap in 2009 for abscess in the bladder    History of Hysterectomy and bladder lift in 2000          Medication list         MEDICATIONS  (STANDING):  enoxaparin Injectable 40 milliGRAM(s) SubCutaneous daily  hydroxychloroquine 400 milliGRAM(s) Oral daily  lactobacillus acidophilus 1 Tablet(s) Oral two times a day with meals  lidocaine   Patch 1 Patch Transdermal daily  potassium chloride   Solution 20 milliEquivalent(s) Oral two times a day  pramipexole 1.5 milliGRAM(s) Oral <User Schedule>  sulfaSALAzine 1500 milliGRAM(s) Oral three times a day  torsemide 20 milliGRAM(s) Oral two times a day    MEDICATIONS  (PRN):  acetaminophen   Tablet .. 650 milliGRAM(s) Oral every 6 hours PRN Mild Pain (1 - 3)  cyclobenzaprine 10 milliGRAM(s) Oral three times a day PRN Muscle Spasm  oxycodone    5 mG/acetaminophen 325 mG 1 Tablet(s) Oral every 6 hours PRN Moderate Pain (4 - 6)  oxycodone    5 mG/acetaminophen 325 mG 2 Tablet(s) Oral every 6 hours PRN Severe Pain (7 - 10)         Vitals log        ICU Vital Signs Last 24 Hrs  T(C): 36.6 (28 Nov 2020 05:38), Max: 36.6 (28 Nov 2020 05:38)  T(F): 97.9 (28 Nov 2020 05:38), Max: 97.9 (28 Nov 2020 05:38)  HR: 100 (28 Nov 2020 05:38) (100 - 100)  BP: 153/81 (28 Nov 2020 05:38) (153/81 - 153/81)  BP(mean): --  ABP: --  ABP(mean): --  RR: 20 (28 Nov 2020 06:00) (20 - 20)  SpO2: 94% (28 Nov 2020 06:00) (94% - 100%)           Input and Output:  I&O's Detail      Lab Data                        10.3   12.10 )-----------( 366      ( 28 Nov 2020 06:53 )             31.9     11-28    139  |  103  |  29<H>  ----------------------------<  134<H>  3.7   |  22  |  1.30    Ca    9.3      28 Nov 2020 06:53    TPro  9.0<H>  /  Alb  3.4  /  TBili  0.5  /  DBili  x   /  AST  89<H>  /  ALT  50  /  AlkPhos  112  11-28            Review of Systems	  n.v.    Objective     Physical Examination    heart s1s2  lung dec BS  abd soft  head nc  head at  neck - shows scar from Surgery  cn grossly int      Pertinent Lab findings & Imaging      Garcias:  NO   Adequate UO     I&O's Detail           Discussed with:     Cultures:	        Radiology    EXAM:  CT CHEST                          EXAM:  CT ABDOMEN AND PELVIS                            PROCEDURE DATE:  11/28/2020          INTERPRETATION:  CLINICAL INFORMATION: Severe abdominal pain and vomiting.    COMPARISON: CT scan of the abdomen and pelvis from 9/16/2015    PROCEDURE:  CT of the Chest, Abdomen and Pelvis was performed without intravenous contrast.  Intravenous contrast: None.  Oral contrast: None.  Sagittal and coronal reformats were performed.    FINDINGS:  CHEST:  LUNGS AND LARGE AIRWAYS: Patent central airways. Moderate emphysematous changes. Mild groundglass patchy opacities in the right middle lobe and right lower lobe inferiorly. Atelectatic changes at the left lung base.  PLEURA: No pleural effusion.  VESSELS: Within normal limits.  HEART: Heart size is normal. No pericardial effusion.  MEDIASTINUM AND MARYBEL: No lymphadenopathy. Hiatal hernia.  CHEST WALL AND LOWER NECK: Within normal limits.    ABDOMEN AND PELVIS:  LIVER: Within normal limits.  BILE DUCTS: Normal caliber.  GALLBLADDER: Status post cholecystectomy.  SPLEEN: Within normal limits.  PANCREAS: Within normal limits.  ADRENALS: Within normal limits.  KIDNEYS/URETERS: Within normal limits.    BLADDER: Garcias catheter within the bladder. Bladder is collapsed and limited in evaluation due to artifact from right hip prosthesis.  REPRODUCTIVE ORGANS: Uterus not visualized. Correlate with surgical history.    BOWEL: No bowel obstruction. Appendix unremarkable. Large bowel is under distended which limits evaluation. A gastrostomy tube is present.  PERITONEUM: No ascites.  VESSELS: Vascular calcifications  RETROPERITONEUM/LYMPH NODES: No retroperitoneal lymphadenopathy. Mildly prominent right external iliac lymph node measuring 1.0 x 1.5 cm which measured 0.7 x 1.2 cm and 9/16/2015. This is nonspecific.  ABDOMINAL WALL: Postsurgical changes. Umbilical hernia containing fat.  BONES: Degenerative changes of bone. Status post right hip arthroplasty fixation rods in the lower cervical and thoracic spine.    IMPRESSION:    Mild groundglass patchy opacities in the right middle lobe and right lower lobe inferiorly raising concern for infection. Clinical correlation is necessary.    Moderate COPD. Gastrostomy tube. Hiatal hernia. Borderline size right external iliac lymph node which is mildly enlarged compared with 2015 and nonspecific.            GEORGES HANNA MD; Attending Radiologist

## 2020-11-28 NOTE — ED ADULT NURSE NOTE - PSH
Bladder Disorder  Bladder lift 2000  Ex lap in 2009 for abscess in the baldder    H/O arthroscopy of right knee  2010  H/O neck surgery    History of Colonoscopy    History of Hysterectomy and bladder lift in 2000

## 2020-11-28 NOTE — ED ADULT NURSE NOTE - OBJECTIVE STATEMENT
70 yr old female presents to the ED with c/o abdominal pain and NV that started this morning. Drowsy but oriented x 4. Was driven here by friend. Pt reports she had neck repair surgery 3 weeks ago and stayed in the hospital x 1 week. Stayed in Rehab x 1 week. Has been home x 1 week. Skin pale, cold to the touch, and dry. Peripheral pulses weak. Pt reports dark emesis. Navneet upper abdominal quadrants tender on exam. Peg tube noted to RUQ. IAD rash noted to navneet groin. Stage I bilateral gluteals. Stage II left gluteal. Navneet lower extremities red and hot to the touch. Pt ambulates minimally and with a rolling walker. Urine yellow, cloudy, and malodorous. O2 sat 88-92% on room air. will continue to monitor.

## 2020-11-28 NOTE — ED PROVIDER NOTE - OBJECTIVE STATEMENT
71 y/o female h/o Recent cervical spine laminectomy,   lymphedema, osteoarthritis,  HLD PEG C/C  Upper abdominal pain with nausea and vomiting, dark emesis  after midnight, she was in her usual good state of health on Thursday and had a good meal , the following day, Friday,  she began experiencing abdominal discomfort and consumed very little food.  No CP, no SOB, no fever, no chills, no urinary symptoms, no COVID symptoms, no GIB.

## 2020-11-28 NOTE — ED ADULT NURSE REASSESSMENT NOTE - NS ED NURSE REASSESS COMMENT FT1
received pt in stretcher, pt alert and oriented, comfortable appearance, pt denies pain at this time, awaiting bed. received pt in stretcher, pt alert and oriented, comfortable appearance, pt denies pain at this time, awaiting bed, PEG tube intact.

## 2020-11-28 NOTE — ED ADULT NURSE NOTE - CHPI ED NUR SYMPTOMS NEG
no diarrhea/no abdominal distension/no burning urination/no fever/no blood in stool/no chills/no dysuria/no hematuria

## 2020-11-28 NOTE — ED PROVIDER NOTE - CARE PLAN
Principal Discharge DX:	Abdominal pain   Principal Discharge DX:	Pneumonia  Secondary Diagnosis:	UTI (urinary tract infection)

## 2020-11-28 NOTE — ED ADULT NURSE REASSESSMENT NOTE - NS ED NURSE REASSESS COMMENT FT1
pt remains in er taken to ct scan on monitored transport returned to er refused rectal temp iv infusing denies nausea at present

## 2020-11-28 NOTE — ED PROVIDER NOTE - CLINICAL SUMMARY MEDICAL DECISION MAKING FREE TEXT BOX
RUQ abdominal pain, anorexia  and  vomiting ...    IVF labs U/A ED sepsis CT Chest abdomen and pelvis, ,  Gall bladder sonogram Rx analgesia, Protonix

## 2020-11-28 NOTE — ED ADULT NURSE NOTE - ED COMFORT CARE
TV/Patient informed/Incontinence care/Family informed/Explanation of wait/Warm blanket/Pillow/Darkened room

## 2020-11-28 NOTE — CONSULT NOTE ADULT - SUBJECTIVE AND OBJECTIVE BOX
[facility]  INFECTIOUS DISEASES   [address] [place] [address]  Tel: [phone]     Fax: [phone]  =======================================================    [id number]  VERONICA WHALEN     CC: abdominal pain, vomiting     HPI:  69 y/o woman with PMH of RA, lymphedema, recent cervical spine surgery, laminectomy at [facility] was admitted with nausea and vomiting for one day.   After surgery as per her about 3 weeks ago, she had a prolonged hospital course complicated by C. diff infection and dysphagia (s/p PEG tube placement). Patient returned home on 20, after rehab stay. Since one day PTA she started getting abdominal pain with nausea and vomiting. No bleeding.  In ED, patient noted ot have leukocytosis, UA with >50 WBC, positive nitrite. Patient with CT Chest/Abd/Pelvis which showed ground-glass opacities concerning for infections. As per daughter, patient tested negative for COVID x3 while at [facility], and denies any sick contacts since she has been home.   She has been given one dose of ceftriaxone and azithromycin and ID was called for further recommendation.     PAST MEDICAL & SURGICAL HISTORY:  Obesity (BMI 30-39.9)  Traumatic arthropathy involving lower leg  right  History of Osteoarthritis  Hyperlipidemia  Restless Leg Syndrome  H/O neck surgery  H/O arthroscopy of right knee    Bladder Disorder  Bladder lift   History of Colonoscopy  Ex lap in  for abscess in the baldder  History of Hysterectomy and bladder lift in     Social Hx: No smoking, ETOH or drugs     FAMILY HISTORY:  No pertinent family history in first degree relatives    Allergies  No Known Allergies    Antibiotics:  Ceftriaxone and azithromycin     REVIEW OF SYSTEMS:  CONSTITUTIONAL:  No Fever or chills  HEENT:  No diplopia or blurred vision.  No sore throat or runny nose.  CARDIOVASCULAR:  No chest pain or SOB.  RESPIRATORY:  No cough, shortness of breath, PND or orthopnea.  GASTROINTESTINAL: + nausea, vomiting, no diarrhea. + abdominal pain   GENITOURINARY:  No dysuria, frequency or urgency. No Blood in urine  MUSCULOSKELETAL:  no joint aches, no muscle pain  SKIN:  No change in skin, hair or nails.  NEUROLOGIC:  No paresthesias, fasciculations, seizures or weakness.  PSYCHIATRIC:  No disorder of thought or mood.  ENDOCRINE:  No heat or cold intolerance, polyuria or polydipsia.  HEMATOLOGICAL:  No easy bruising or bleeding.     Physical Exam:  Vital Signs Last 24 Hrs  T(C): 36.6 (2020 05:38), Max: 36.6 (2020 05:38)  T(F): 97.9 (2020 05:38), Max: 97.9 (2020 05:38)  HR: 100 (:38) (100 - 100)  BP: 153/81 (2020 05:38) (153/81 - 153/81)  RR: 20 (2020 06:) (20 - 20)  SpO2: 94% (2020 06:00) (94% - 100%)  Height (cm): 170.2 ( @ 05:38)  Weight (kg): 90.7 ( @ 05:38)  BMI (kg/m2): 31.3 ( @ 05:38)  BSA (m2): 2.02 ( @ 05:38)  GEN: NAD  HEENT: normocephalic and atraumatic. EOMI. PERRL.    NECK: Supple.  No lymphadenopathy   LUNGS: Clear to auscultation.  HEART: Regular rate and rhythm without murmur.  ABDOMEN: Soft, moderate tenderness on epigastric area, PEG in place, looks fine. Positive bowel sounds.    : No CVA tenderness  EXTREMITIES: Without edema.  NEUROLOGIC: grossly intact.  PSYCHIATRIC: Appropriate affect .  SKIN: No ulceration or induration present.    Labs:      139  |  103  |  29<H>  ----------------------------<  134<H>  3.7   |  22  |  1.30    Ca    9.3      2020 06:53    TPro  9.0<H>  /  Alb  3.4  /  TBili  0.5  /  DBili  x   /  AST  89<H>  /  ALT  50  /  AlkPhos  112                          10.3   12.10 )-----------( 366      ( 2020 06:53 )             31.9     PT/INR - ( 2020 06:53 )   PT: 15.2 sec;   INR: 1.32 ratio    PTT - ( 2020 06:53 )  PTT:25.3 sec  Urinalysis Basic - ( 2020 07:20 )    Color: Yellow / Appearance: Slightly Turbid / S.020 / pH: x  Gluc: x / Ketone: Negative  / Bili: Negative / Urobili: Negative   Blood: x / Protein: 30 mg/dL / Nitrite: Positive   Leuk Esterase: Moderate / RBC: 0-2 /HPF / WBC >50   Sq Epi: x / Non Sq Epi: Few / Bacteria: Moderate    LIVER FUNCTIONS - ( 2020 06:53 )  Alb: 3.4 g/dL / Pro: 9.0 g/dL / ALK PHOS: 112 U/L / ALT: 50 U/L / AST: 89 U/L / GGT: x           COVID-19 PCR: NotDetec (20 @ 06:53)    All imaging and other data have been reviewed.  < from: CT Chest No Cont (20 @ 08:36) >  IMPRESSION:  Mild groundglass patchy opacities in the right middle lobe and right lower lobe inferiorly raising concern for infection. Clinical correlation is necessary.  Moderate COPD. Gastrostomy tube. Hiatal hernia. Borderline size right external iliac lymph node which is mildly enlarged compared with 2015 and nonspecific.      Assessment and Plan:   69 y/o woman with PMH of RA, lymphedema, recent cervical spine surgery, laminectomy at [facility] was admitted with nausea and vomiting for one day.   After surgery as per her about 3 weeks ago, she had a prolonged hospital course complicated by C. diff infection and dysphagia (s/p PEG tube placement). Patient returned home on 20, after rehab stay. Since one day PTA she started getting abdominal pain with nausea and vomiting.  GI symptoms could be related to sepsis syndrome form UTI or pneumonia especially aspiration due to problem with swallowing and episodes of vomiting.   Since had C diff recently she could get recurrence so will start po vanco to prevent it.     UTI, Possible pneumonia (Aspiration vs HCAP), PEG tube, recent C diff, recent laminectomy   - Blood culture x 2   - UA with high WBC and nitrate, will follow UC  - Chest CT with RML and RLL GGO could be aspiration or pneumonia since recently was in the hospital    - Will send Nasal PCR and urinary Legionella ag  - Will send Procalcitonin   - Will start Zosyn 3.375gm q8h to cover HCAP and aspiration as well as UTI  - Based on culture results and improvement will decide about the course of treatment  - Will start on Vancomycin po 125mg q6h for now    Thank you for courtesy of this consult.     Will follow.    Marito Torre MD  Division of Infectious Diseases   Cell [phone] between 8am and 6pm   After 6pm and weekends please call ID service at [phone].

## 2020-11-28 NOTE — CONSULT NOTE ADULT - PROBLEM SELECTOR RECOMMENDATION 9
69 y/o F PMHx lymphedema, HLD, RA, recent cervical spine surgery at [facility] presented to ED with complaint of nausea and vomiting x1 day being admitted with PNA, UTI.  recent c spine surgery complicated by PEG placement - and C Diff -   poss PNA - CT chest reviewed - caution with ABX - recent C Diff infection - will check CRP and Procalcitonin - De - Escalation and ABX stewardship ...  keep HOB elev  Anti emetics  monitor for Dyspepsia  may need SLP eval  CT chest also shows Emphysema - pt is an ex smoker - will add Proventil PRN for sob and or wheezing  pt is on Plaquenil for RA  pt is on Torsemide for Lymphedema hx  appears weak and deconditioned - needs assist with ADL - PT assessment - fall prec  DVT p

## 2020-11-28 NOTE — H&P ADULT - NSHPOUTPATIENTPROVIDERS_GEN_ALL_CORE
Dr. Nathanael Dimas (PCP)  Dr. Menezes (Spinal)  Dr. Harvey (Rheumatologist)  Dr. Holder (Neurologist)

## 2020-11-28 NOTE — H&P ADULT - NSICDXPASTSURGICALHX_GEN_ALL_CORE_FT
PAST SURGICAL HISTORY:  Bladder Disorder Bladder lift 2000    Ex lap in 2009 for abscess in the baldder     H/O arthroscopy of right knee 2010    H/O neck surgery     History of Colonoscopy     History of Hysterectomy and bladder lift in 2000

## 2020-11-28 NOTE — H&P ADULT - HISTORY OF PRESENT ILLNESS
69 y/o F PMHx lymphedema, HLD, RA, recent cervical spine surgery at [facility] presented to ED with complaint of nausea and vomiting x1 day. Patient with recent cervical spine surgery past month with prolonged hospital course complicated by C. diff infection (completed treatment course) and dysphagia (s/p PEG tube placement). Patient returned home on 11/24/20, states she was doing well until yesterday when she began experiencing abdominal pain, followed by nausea and then had episode of vomiting. Patient states vomitus was "brownish" in color.  Patient states they placed PEG because she was having difficulty swallowing, but by the time she came home, she was tolerating oral intake. As per daughter, patient was sent home on soft diet with nectar thick liquids. Patient states at home she has been using a walker to ambulate, but felt weak the past day. Patient denied any CP, SOB, palpitations, lightheadedness, dizziness, dysuria, cough, sputum production.  Of note, daughter states they have been trying to set up an appointment to have PEG tube removed as outpatient, but would prefer if patient could have procedure done while at hospital.    In ED, patient noted ot have leukocytosis, UA with >50 WBC, positive nitrite, moderate leuks, and moderate bacteria. Patient with CT Chest/Abd/Pelvis which showed groundglass opacities concerning for infections. As per daughter, patient tested negative for COVID x3 while at [facility], and denies any sick contacts since she has been home.

## 2020-11-28 NOTE — ED ADULT NURSE NOTE - NSIMPLEMENTINTERV_GEN_ALL_ED
Implemented All Fall Risk Interventions:  [place] to call system. Call bell, personal items and telephone within reach. Instruct patient to call for assistance. Room bathroom lighting operational. Non-slip footwear when patient is off stretcher. Physically safe environment: no spills, clutter or unnecessary equipment. Stretcher in lowest position, wheels locked, appropriate side rails in place. Provide visual cue, wrist band, yellow gown, etc. Monitor gait and stability. Monitor for mental status changes and reorient to person, place, and time. Review medications for side effects contributing to fall risk. Reinforce activity limits and safety measures with patient and family. Implemented All Fall with Harm Risk Interventions:  [place] to call system. Call bell, personal items and telephone within reach. Instruct patient to call for assistance. Room bathroom lighting operational. Non-slip footwear when patient is off stretcher. Physically safe environment: no spills, clutter or unnecessary equipment. Stretcher in lowest position, wheels locked, appropriate side rails in place. Provide visual cue, wrist band, yellow gown, etc. Monitor gait and stability. Monitor for mental status changes and reorient to person, place, and time. Review medications for side effects contributing to fall risk. Reinforce activity limits and safety measures with patient and family. Provide visual clues: red socks.

## 2020-11-29 LAB
ALBUMIN SERPL ELPH-MCNC: 2.5 G/DL — LOW (ref 3.3–5)
ALP SERPL-CCNC: 75 U/L — SIGNIFICANT CHANGE UP (ref 40–120)
ALT FLD-CCNC: 28 U/L — SIGNIFICANT CHANGE UP (ref 12–78)
ANION GAP SERPL CALC-SCNC: 9 MMOL/L — SIGNIFICANT CHANGE UP (ref 5–17)
AST SERPL-CCNC: 33 U/L — SIGNIFICANT CHANGE UP (ref 15–37)
BASOPHILS # BLD AUTO: 0.02 K/UL — SIGNIFICANT CHANGE UP (ref 0–0.2)
BASOPHILS NFR BLD AUTO: 0.3 % — SIGNIFICANT CHANGE UP (ref 0–2)
BILIRUB SERPL-MCNC: 0.3 MG/DL — SIGNIFICANT CHANGE UP (ref 0.2–1.2)
BUN SERPL-MCNC: 15 MG/DL — SIGNIFICANT CHANGE UP (ref 7–23)
CALCIUM SERPL-MCNC: 8.6 MG/DL — SIGNIFICANT CHANGE UP (ref 8.5–10.1)
CHLORIDE SERPL-SCNC: 106 MMOL/L — SIGNIFICANT CHANGE UP (ref 96–108)
CO2 SERPL-SCNC: 27 MMOL/L — SIGNIFICANT CHANGE UP (ref 22–31)
CREAT SERPL-MCNC: 1.1 MG/DL — SIGNIFICANT CHANGE UP (ref 0.5–1.3)
EOSINOPHIL # BLD AUTO: 0.21 K/UL — SIGNIFICANT CHANGE UP (ref 0–0.5)
EOSINOPHIL NFR BLD AUTO: 2.7 % — SIGNIFICANT CHANGE UP (ref 0–6)
GLUCOSE SERPL-MCNC: 80 MG/DL — SIGNIFICANT CHANGE UP (ref 70–99)
HCT VFR BLD CALC: 28.8 % — LOW (ref 34.5–45)
HGB BLD-MCNC: 9 G/DL — LOW (ref 11.5–15.5)
IMM GRANULOCYTES NFR BLD AUTO: 0.4 % — SIGNIFICANT CHANGE UP (ref 0–1.5)
LYMPHOCYTES # BLD AUTO: 2.22 K/UL — SIGNIFICANT CHANGE UP (ref 1–3.3)
LYMPHOCYTES # BLD AUTO: 28.1 % — SIGNIFICANT CHANGE UP (ref 13–44)
MAGNESIUM SERPL-MCNC: 2 MG/DL — SIGNIFICANT CHANGE UP (ref 1.6–2.6)
MCHC RBC-ENTMCNC: 28 PG — SIGNIFICANT CHANGE UP (ref 27–34)
MCHC RBC-ENTMCNC: 31.3 GM/DL — LOW (ref 32–36)
MCV RBC AUTO: 89.7 FL — SIGNIFICANT CHANGE UP (ref 80–100)
MONOCYTES # BLD AUTO: 0.84 K/UL — SIGNIFICANT CHANGE UP (ref 0–0.9)
MONOCYTES NFR BLD AUTO: 10.6 % — SIGNIFICANT CHANGE UP (ref 2–14)
MRSA PCR RESULT.: DETECTED
NEUTROPHILS # BLD AUTO: 4.58 K/UL — SIGNIFICANT CHANGE UP (ref 1.8–7.4)
NEUTROPHILS NFR BLD AUTO: 57.9 % — SIGNIFICANT CHANGE UP (ref 43–77)
NRBC # BLD: 0 /100 WBCS — SIGNIFICANT CHANGE UP (ref 0–0)
PHOSPHATE SERPL-MCNC: 4.4 MG/DL — SIGNIFICANT CHANGE UP (ref 2.5–4.5)
PLATELET # BLD AUTO: 296 K/UL — SIGNIFICANT CHANGE UP (ref 150–400)
POTASSIUM SERPL-MCNC: 3.1 MMOL/L — LOW (ref 3.5–5.3)
POTASSIUM SERPL-SCNC: 3.1 MMOL/L — LOW (ref 3.5–5.3)
PROT SERPL-MCNC: 7.3 G/DL — SIGNIFICANT CHANGE UP (ref 6–8.3)
RBC # BLD: 3.21 M/UL — LOW (ref 3.8–5.2)
RBC # FLD: 14.7 % — HIGH (ref 10.3–14.5)
S AUREUS DNA NOSE QL NAA+PROBE: DETECTED
SARS-COV-2 IGG SERPL QL IA: NEGATIVE — SIGNIFICANT CHANGE UP
SARS-COV-2 IGM SERPL IA-ACNC: <0.1 INDEX — SIGNIFICANT CHANGE UP
SODIUM SERPL-SCNC: 142 MMOL/L — SIGNIFICANT CHANGE UP (ref 135–145)
WBC # BLD: 7.9 K/UL — SIGNIFICANT CHANGE UP (ref 3.8–10.5)
WBC # FLD AUTO: 7.9 K/UL — SIGNIFICANT CHANGE UP (ref 3.8–10.5)

## 2020-11-29 PROCEDURE — 99233 SBSQ HOSP IP/OBS HIGH 50: CPT

## 2020-11-29 RX ORDER — POTASSIUM CHLORIDE 20 MEQ
40 PACKET (EA) ORAL
Refills: 0 | Status: COMPLETED | OUTPATIENT
Start: 2020-11-29 | End: 2020-11-30

## 2020-11-29 RX ADMIN — Medication 1500 MILLIGRAM(S): at 14:24

## 2020-11-29 RX ADMIN — Medication 125 MILLIGRAM(S): at 18:11

## 2020-11-29 RX ADMIN — OXYCODONE AND ACETAMINOPHEN 2 TABLET(S): 5; 325 TABLET ORAL at 00:43

## 2020-11-29 RX ADMIN — Medication 1500 MILLIGRAM(S): at 06:51

## 2020-11-29 RX ADMIN — Medication 20 MILLIGRAM(S): at 06:51

## 2020-11-29 RX ADMIN — ENOXAPARIN SODIUM 40 MILLIGRAM(S): 100 INJECTION SUBCUTANEOUS at 12:22

## 2020-11-29 RX ADMIN — Medication 1 TABLET(S): at 08:39

## 2020-11-29 RX ADMIN — Medication 40 MILLIEQUIVALENT(S): at 22:05

## 2020-11-29 RX ADMIN — PIPERACILLIN AND TAZOBACTAM 25 GRAM(S): 4; .5 INJECTION, POWDER, LYOPHILIZED, FOR SOLUTION INTRAVENOUS at 22:06

## 2020-11-29 RX ADMIN — Medication 1 TABLET(S): at 18:11

## 2020-11-29 RX ADMIN — Medication 125 MILLIGRAM(S): at 12:22

## 2020-11-29 RX ADMIN — LIDOCAINE 1 PATCH: 4 CREAM TOPICAL at 12:22

## 2020-11-29 RX ADMIN — Medication 20 MILLIEQUIVALENT(S): at 06:51

## 2020-11-29 RX ADMIN — PRAMIPEXOLE DIHYDROCHLORIDE 1.5 MILLIGRAM(S): 0.12 TABLET ORAL at 22:06

## 2020-11-29 RX ADMIN — Medication 125 MILLIGRAM(S): at 06:51

## 2020-11-29 RX ADMIN — Medication 400 MILLIGRAM(S): at 12:22

## 2020-11-29 RX ADMIN — LIDOCAINE 1 PATCH: 4 CREAM TOPICAL at 00:30

## 2020-11-29 RX ADMIN — Medication 125 MILLIGRAM(S): at 00:41

## 2020-11-29 RX ADMIN — Medication 40 MILLIEQUIVALENT(S): at 10:52

## 2020-11-29 RX ADMIN — PIPERACILLIN AND TAZOBACTAM 25 GRAM(S): 4; .5 INJECTION, POWDER, LYOPHILIZED, FOR SOLUTION INTRAVENOUS at 06:51

## 2020-11-29 RX ADMIN — PIPERACILLIN AND TAZOBACTAM 25 GRAM(S): 4; .5 INJECTION, POWDER, LYOPHILIZED, FOR SOLUTION INTRAVENOUS at 14:23

## 2020-11-29 NOTE — PHYSICAL THERAPY INITIAL EVALUATION ADULT - PERTINENT HX OF CURRENT PROBLEM, REHAB EVAL
Pt is 70 y.o. F with recent C/S sx at [facility] presented to ED with N+V x1day, admitted for PNA and UTI.

## 2020-11-29 NOTE — CONSULT NOTE ADULT - ASSESSMENT
nausea/vomiting  dysphagia    no longer uses peg  peg was placed 3 weeks ago, would continue to keep in place and remove as outpatient in 3 weeks to allow gastrocutaneous tract to mature  cont PPI  diet as tolerated  will follow

## 2020-11-29 NOTE — CONSULT NOTE ADULT - SUBJECTIVE AND OBJECTIVE BOX
[facility]  Niles Salomon PA-C  [address] [place], [place] [address] [address]  [phone]      Chief Complaint:  Patient is a 70y old  Female who presents with a chief complaint of abd pain, vomiting (2020 06:18)      HPI: 69 y/o F PMHx lymphedema, HLD, RA, recent cervical spine surgery at [facility] presented to ED with complaint of nausea and vomiting x1 day. Patient with recent cervical spine surgery past month with prolonged hospital course complicated by C. diff infection (completed treatment course) and dysphagia (s/p PEG tube placement). Patient returned home on 20, states she was doing well until yesterday when she began experiencing abdominal pain, followed by nausea and then had episode of vomiting. Patient states vomitus was "brownish" in color.  Patient states they placed PEG because she was having difficulty swallowing, but by the time she came home, she was tolerating oral intake. As per daughter, patient was sent home on soft diet with nectar thick liquids. Patient states at home she has been using a walker to ambulate, but felt weak the past day. Patient denied any CP, SOB, palpitations, lightheadedness, dizziness, dysuria, cough, sputum production.  Of note, daughter states they have been trying to set up an appointment to have PEG tube removed as outpatient, but would prefer if patient could have procedure done while at hospital.    Allergies:  No Known Allergies      Medications:  acetaminophen   Tablet .. 650 milliGRAM(s) Oral every 6 hours PRN  ALBUTerol    90 MICROgram(s) HFA Inhaler 2 Puff(s) Inhalation every 4 hours PRN  cyclobenzaprine 10 milliGRAM(s) Oral three times a day PRN  enoxaparin Injectable 40 milliGRAM(s) SubCutaneous daily  hydroxychloroquine 400 milliGRAM(s) Oral daily  lactobacillus acidophilus 1 Tablet(s) Oral two times a day with meals  lidocaine   Patch 1 Patch Transdermal daily  oxycodone    5 mG/acetaminophen 325 mG 1 Tablet(s) Oral every 6 hours PRN  oxycodone    5 mG/acetaminophen 325 mG 2 Tablet(s) Oral every 6 hours PRN  piperacillin/tazobactam IVPB.. 3.375 Gram(s) IV Intermittent every 8 hours  potassium chloride   Powder 40 milliEquivalent(s) Oral two times a day  pramipexole 1.5 milliGRAM(s) Oral <User Schedule>  sulfaSALAzine 1500 milliGRAM(s) Oral three times a day  torsemide 20 milliGRAM(s) Oral two times a day  vancomycin    Solution 125 milliGRAM(s) Oral every 6 hours      PMHX/PSHX:  Obesity (BMI 30-39.9)    Traumatic arthropathy involving lower leg    History of Osteoarthritis    Hyperlipidemia    Restless Leg Syndrome    H/O neck surgery    H/O arthroscopy of right knee    Bladder Disorder    History of Colonoscopy    Ex lap in  for abscess in the baldder    History of Hysterectomy and bladder lift in         Family history:  No pertinent family history in first degree relatives        Social History:     ROS:     General:  No wt loss, fevers, chills, night sweats, fatigue,   Eyes:  Good vision, no reported pain  ENT:  No sore throat, pain, runny nose, dysphagia  CV:  No pain, palpitations, hypo/hypertension  Resp:  No dyspnea, cough, tachypnea, wheezing  GI:  No pain, + nausea, + vomiting, No diarrhea, No constipation, No weight loss, No fever, No pruritis, No rectal bleeding, No tarry stools, No dysphagia,  :  No pain, bleeding, incontinence, nocturia  Muscle:  No pain, weakness  Neuro:  No weakness, tingling, memory problems  Psych:  No fatigue, insomnia, mood problems, depression  Endocrine:  No polyuria, polydipsia, cold/heat intolerance  Heme:  No petechiae, ecchymosis, easy bruisability  Skin:  No rash, tattoos, scars, edema      PHYSICAL EXAM:   Vital Signs:  Vital Signs Last 24 Hrs  T(C): 36.4 (2020 05:52), Max: 36.8 (2020 19:15)  T(F): 97.6 (2020 05:52), Max: 98.3 (2020 23:00)  HR: 71 (2020 05:52) (71 - 98)  BP: 101/59 (2020 05:52) (101/59 - 121/67)  BP(mean): --  RR: 20 (2020 05:52) (16 - 20)  SpO2: 97% (2020 06:16) (92% - 97%)  Daily     Daily     GENERAL:  Appears stated age, well-groomed, well-nourished, no distress  HEENT:  NC/AT,  conjunctivae clear and pink, no thyromegaly, nodules, adenopathy, no JVD, sclera -anicteric  CHEST:  Full & symmetric excursion, no increased effort, breath sounds clear  HEART:  Regular rhythm, S1, S2, no murmur/rub/S3/S4, no abdominal bruit, no edema  ABDOMEN:  Soft, non-tender, + PEG   EXTEREMITIES:  no cyanosis,clubbing or edema  SKIN:  No rash/erythema/ecchymoses/petechiae/wounds/abscess/warm/dry  NEURO:  Alert, oriented, no asterixis, no tremor, no encephalopathy    LABS:                        9.0    7.90  )-----------( 296      ( 2020 08:16 )             28.8     11-    142  |  106  |  15  ----------------------------<  80  3.1<L>   |  27  |  1.10    Ca    8.6      2020 08:16  Phos  4.4     11-  Mg     2.0         TPro  7.3  /  Alb  2.5<L>  /  TBili  0.3  /  DBili  x   /  AST  33  /  ALT  28  /  AlkPhos  75  11-    LIVER FUNCTIONS - ( 2020 08:16 )  Alb: 2.5 g/dL / Pro: 7.3 g/dL / ALK PHOS: 75 U/L / ALT: 28 U/L / AST: 33 U/L / GGT: x           PT/INR - ( 2020 06:53 )   PT: 15.2 sec;   INR: 1.32 ratio         PTT - ( 2020 06:53 )  PTT:25.3 sec  Urinalysis Basic - ( 2020 07:20 )    Color: Yellow / Appearance: Slightly Turbid / S.020 / pH: x  Gluc: x / Ketone: Negative  / Bili: Negative / Urobili: Negative   Blood: x / Protein: 30 mg/dL / Nitrite: Positive   Leuk Esterase: Moderate / RBC: 0-2 /HPF / WBC >50   Sq Epi: x / Non Sq Epi: Few / Bacteria: Moderate          Imaging:

## 2020-11-29 NOTE — PROGRESS NOTE ADULT - SUBJECTIVE AND OBJECTIVE BOX
Date/Time Patient Seen:  		  Referring MD:   Data Reviewed	       Patient is a 70y old  Female who presents with a chief complaint of abd pain, vomiting (28 Nov 2020 12:18)      Subjective/HPI     PAST MEDICAL & SURGICAL HISTORY:  Obesity (BMI 30-39.9)    Traumatic arthropathy involving lower leg  right    History of Osteoarthritis    Hyperlipidemia    Restless Leg Syndrome    H/O neck surgery    H/O arthroscopy of right knee  2010    Bladder Disorder  Bladder lift 2000    History of Colonoscopy    Ex lap in 2009 for abscess in the baldder    History of Hysterectomy and bladder lift in 2000          Medication list         MEDICATIONS  (STANDING):  enoxaparin Injectable 40 milliGRAM(s) SubCutaneous daily  hydroxychloroquine 400 milliGRAM(s) Oral daily  lactobacillus acidophilus 1 Tablet(s) Oral two times a day with meals  lidocaine   Patch 1 Patch Transdermal daily  piperacillin/tazobactam IVPB.. 3.375 Gram(s) IV Intermittent every 8 hours  potassium chloride   Powder 20 milliEquivalent(s) Oral two times a day  pramipexole 1.5 milliGRAM(s) Oral <User Schedule>  sulfaSALAzine 1500 milliGRAM(s) Oral three times a day  torsemide 20 milliGRAM(s) Oral two times a day  vancomycin    Solution 125 milliGRAM(s) Oral every 6 hours    MEDICATIONS  (PRN):  acetaminophen   Tablet .. 650 milliGRAM(s) Oral every 6 hours PRN Mild Pain (1 - 3)  ALBUTerol    90 MICROgram(s) HFA Inhaler 2 Puff(s) Inhalation every 4 hours PRN Shortness of Breath and/or Wheezing  cyclobenzaprine 10 milliGRAM(s) Oral three times a day PRN Muscle Spasm  oxycodone    5 mG/acetaminophen 325 mG 1 Tablet(s) Oral every 6 hours PRN Moderate Pain (4 - 6)  oxycodone    5 mG/acetaminophen 325 mG 2 Tablet(s) Oral every 6 hours PRN Severe Pain (7 - 10)         Vitals log        ICU Vital Signs Last 24 Hrs  T(C): 36.4 (29 Nov 2020 05:52), Max: 36.8 (28 Nov 2020 19:15)  T(F): 97.6 (29 Nov 2020 05:52), Max: 98.3 (28 Nov 2020 23:00)  HR: 71 (29 Nov 2020 05:52) (71 - 98)  BP: 101/59 (29 Nov 2020 05:52) (101/59 - 124/62)  BP(mean): --  ABP: --  ABP(mean): --  RR: 20 (29 Nov 2020 05:52) (16 - 20)  SpO2: 97% (29 Nov 2020 06:16) (92% - 97%)           Input and Output:  I&O's Detail      Lab Data                        10.3   12.10 )-----------( 366      ( 28 Nov 2020 06:53 )             31.9     11-28    139  |  103  |  29<H>  ----------------------------<  134<H>  3.7   |  22  |  1.30    Ca    9.3      28 Nov 2020 06:53    TPro  9.0<H>  /  Alb  3.4  /  TBili  0.5  /  DBili  x   /  AST  89<H>  /  ALT  50  /  AlkPhos  112  11-28            Review of Systems	      Objective     Physical Examination    heart s1s2  lung dec BS  abd soft      Pertinent Lab findings & Imaging      Katerine:  NO   Adequate UO     I&O's Detail           Discussed with:     Cultures:	        Radiology

## 2020-11-29 NOTE — PHYSICAL THERAPY INITIAL EVALUATION ADULT - CRITERIA FOR SKILLED THERAPEUTIC INTERVENTIONS
anticipated discharge recommendation/rehab potential/predicted duration of therapy intervention/functional limitations in following categories/risk reduction/prevention/impairments found/therapy frequency

## 2020-11-29 NOTE — PROGRESS NOTE ADULT - PROBLEM SELECTOR PLAN 1
69 y/o F PMHx lymphedema, HLD, RA, recent cervical spine surgery at [facility] presented to ED with complaint of nausea and vomiting x1 day being admitted with PNA, UTI.  recent c spine surgery complicated by PEG placement - and C Diff -   poss PNA - CT chest reviewed - caution with ABX - recent C Diff Infection De - Escalation and ABX stewardship ...  curr on Zosyn and Vanco PO - CRP and Procalcitonin noted - doubt Legionella - nasal MRSA screen pending report  ID eval reviewed -   keep HOB elev  Anti emetics  monitor for Dyspepsia  SLP eval pending -   CT chest also shows Emphysema - pt is an ex smoker - will add Proventil PRN for sob and or wheezing  pt is on Plaquenil for RA  pt is on Torsemide for Lymphedema hx  appears weak and deconditioned - needs assist with ADL - PT assessment - fall prec  DVT p.

## 2020-11-29 NOTE — PROGRESS NOTE ADULT - ASSESSMENT
69 y/o F PMHx lymphedema, HLD, RA, recent cervical spine surgery at [facility] presented to ED with complaint of nausea and vomiting x1 day being admitted with PNA, UTI.    #PNA  -CT chest showing R middle and lower lobe groundglass opacities  -suspect early vs. chronic aspiration pneumonitis - no evidence of uncontrolled infectious process  -on zosyn for presumed urinary source and gram negative or anaerobic PNA  -ID, Pulm consulted - recommendations noted and appreciated  -COVID negative  -UCx w/ GNR's but no symptoms suggests asymptomatic bacteriuria  -BCx ngtd    #UTI  -as above    #Abdominal pain/vomiting  -abdominal pain improved on admission - no abd pain or nausea since the time of admission. rec supportive care.   -recent PEG at [facility], however patient tolerating PO intake  -dysphagia diet with nectar thick liquids, speech and swallow consulted  -GI following, no intervention recommended at this time    #RA  -Continue home meds    #Recent cervical spine surgery  -Continue pain control PRN    #Lymphedema  -Continue torsemide  -Patient states she follows with wound care    #DVT ppx  -Lovenox for DVT ppx

## 2020-11-29 NOTE — PROGRESS NOTE ADULT - SUBJECTIVE AND OBJECTIVE BOX
[facility]  INFECTIOUS DISEASES   [address] [place] [address]  Tel: [phone]     Fax: [phone]  =======================================================    MRN-[id number]  VERONICA GORUDYEFF     Follow up; UTI and possible pneumonia     No more vomiting, still has mild abdominal pain. Has tolerated farina this morning.     PAST MEDICAL & SURGICAL HISTORY:  Obesity (BMI 30-39.9)  Traumatic arthropathy involving lower leg  right  History of Osteoarthritis  Hyperlipidemia  Restless Leg Syndrome  H/O neck surgery  H/O arthroscopy of right knee  2010  Bladder Disorder  Bladder lift 2000  History of Colonoscopy  Ex lap in 2009 for abscess in the baldder  History of Hysterectomy and bladder lift in 2000    Social Hx: No smoking, ETOH or drugs     FAMILY HISTORY:  No pertinent family history in first degree relatives    Allergies  No Known Allergies    Antibiotics:  Ceftriaxone and azithromycin     REVIEW OF SYSTEMS:  CONSTITUTIONAL:  No Fever or chills  HEENT:  No diplopia or blurred vision.  No sore throat or runny nose.  CARDIOVASCULAR:  No chest pain or SOB.  RESPIRATORY:  No cough, shortness of breath, PND or orthopnea.  GASTROINTESTINAL: + nausea, vomiting, no diarrhea. + abdominal pain   GENITOURINARY:  No dysuria, frequency or urgency. No Blood in urine  MUSCULOSKELETAL:  no joint aches, no muscle pain  SKIN:  No change in skin, hair or nails.  NEUROLOGIC:  No paresthesias, fasciculations, seizures or weakness.  PSYCHIATRIC:  No disorder of thought or mood.  ENDOCRINE:  No heat or cold intolerance, polyuria or polydipsia.  HEMATOLOGICAL:  No easy bruising or bleeding.     Physical Exam:  Vital Signs Last 24 Hrs  T(C): 36.4 (29 Nov 2020 05:52), Max: 36.8 (28 Nov 2020 19:15)  T(F): 97.6 (29 Nov 2020 05:52), Max: 98.3 (28 Nov 2020 23:00)  HR: 71 (29 Nov 2020 05:52) (71 - 98)  BP: 101/59 (29 Nov 2020 05:52) (101/59 - 121/67)  BP(mean): --  RR: 20 (29 Nov 2020 05:52) (16 - 20)  SpO2: 97% (29 Nov 2020 06:16) (92% - 97%)  GEN: NAD  HEENT: normocephalic and atraumatic. EOMI. PERRL.    NECK: Supple.  No lymphadenopathy   LUNGS: Clear to auscultation.  HEART: Regular rate and rhythm   ABDOMEN: Soft, moderate tenderness on epigastric area, PEG in place, looks fine. Positive bowel sounds.    : No CVA tenderness  EXTREMITIES: Without edema.  NEUROLOGIC: grossly intact.  PSYCHIATRIC: Appropriate affect .  SKIN: No ulceration or induration present.    Labs:                        9.0    7.90  )-----------( 296      ( 29 Nov 2020 08:16 )             28.8      11-29    142  |  106  |  15  ----------------------------<  80  3.1<L>   |  27  |  1.10    Ca    8.6      29 Nov 2020 08:16  Phos  4.4     11-29  Mg     2.0     11-29    TPro  7.3  /  Alb  2.5<L>  /  TBili  0.3  /  DBili  x   /  AST  33  /  ALT  28  /  AlkPhos  75  11-29    Culture - Urine (collected 11-28-20 @ 11:28)  Source: .Urine Catheterized    Culture - Blood (collected 11-28-20 @ 11:27)  Source: .Blood Blood-Peripheral    Culture - Blood (collected 11-28-20 @ 11:27)  Source: .Blood Blood-Peripheral    WBC Count: 7.90 K/uL (11-29-20 @ 08:16)  WBC Count: 12.10 K/uL (11-28-20 @ 06:53)    Creatinine, Serum: 1.10 mg/dL (11-29-20 @ 08:16)  Creatinine, Serum: 1.30 mg/dL (11-28-20 @ 06:53)    C-Reactive Protein, Serum: 12.26 mg/dL (11-28-20 @ 16:53)    Procalcitonin, Serum: 0.16 ng/mL (11-28-20 @ 14:44)    COVID-19 IgG Antibody Index: <0.10 Index (11-28-20 @ 16:39)  COVID-19 IgG Antibody Interpretation: Negative (11-28-20 @ 16:39)  COVID-19 PCR: NotDetec (11-28-20 @ 06:53)      All imaging and other data have been reviewed.  < from: CT Chest No Cont (11.28.20 @ 08:36) >  IMPRESSION:  Mild groundglass patchy opacities in the right middle lobe and right lower lobe inferiorly raising concern for infection. Clinical correlation is necessary.  Moderate COPD. Gastrostomy tube. Hiatal hernia. Borderline size right external iliac lymph node which is mildly enlarged compared with 2015 and nonspecific.    Assessment and Plan:   71 y/o woman with PMH of RA, lymphedema, recent cervical spine surgery, laminectomy at [facility] was admitted with nausea and vomiting for one day.   After surgery as per her about 3 weeks ago, she had a prolonged hospital course complicated by C. diff infection and dysphagia (s/p PEG tube placement). Patient returned home on 11/24/20, after rehab stay. Since one day PTA she started getting abdominal pain with nausea and vomiting.  GI symptoms could be related to sepsis syndrome form UTI or pneumonia especially aspiration due to problem with swallowing and episodes of vomiting.   Since had C diff recently she could get recurrence so will start po vanco to prevent it.     UTI, Possible pneumonia (Aspiration vs HCAP), PEG tube, recent C diff, recent laminectomy   - Blood culture x 2 NGTD  - UA with high WBC and nitrate, UC growing ecoli, will follow sensitivity   - Chest CT with RML and RLL GGO could be aspiration or pneumonia since recently was in the hospital    - Nasal PCR and urinary Legionella ag are pending  - Procalcitonin 0.16 HCAP less likely    - Leukocytosis 12k-->7k  - Continue Zosyn 3.375gm q8h to cover aspiration as well as UTI  - Based on culture results will decide about the course of treatment  - Continue Vancomycin po 125mg q6h since on antibiotic, due to recent c diff    Will follow.    Marito Torre MD  Division of Infectious Diseases   Cell [phone] between 8am and 6pm   After 6pm and weekends please call ID service at [phone].

## 2020-11-29 NOTE — PHYSICAL THERAPY INITIAL EVALUATION ADULT - ADDITIONAL COMMENTS
Pt states she lives with ex  in [facility] with 3STE and HR, uses rollator for ambulation and was fully independent functionally prior to C/S sx.

## 2020-11-29 NOTE — PROGRESS NOTE ADULT - SUBJECTIVE AND OBJECTIVE BOX
Name: VERONICA WHALEN  MRN: [id number]  LOCATION: [place] [address] W1    ----  Patient is a 70y old  Female who presents with a chief complaint of abd pain, vomiting (2020 12:13)      FROM ADMISSION H+P:   HPI:  71 y/o F PMHx lymphedema, HLD, RA, recent cervical spine surgery at [facility] presented to ED with complaint of nausea and vomiting x1 day. Patient with recent cervical spine surgery past month with prolonged hospital course complicated by C. diff infection (completed treatment course) and dysphagia (s/p PEG tube placement). Patient returned home on 20, states she was doing well until yesterday when she began experiencing abdominal pain, followed by nausea and then had episode of vomiting. Patient states vomitus was "brownish" in color.  Patient states they placed PEG because she was having difficulty swallowing, but by the time she came home, she was tolerating oral intake. As per daughter, patient was sent home on soft diet with nectar thick liquids. Patient states at home she has been using a walker to ambulate, but felt weak the past day. Patient denied any CP, SOB, palpitations, lightheadedness, dizziness, dysuria, cough, sputum production.  Of note, daughter states they have been trying to set up an appointment to have PEG tube removed as outpatient, but would prefer if patient could have procedure done while at hospital.    ----  INTERVAL HPI/OVERNIGHT EVENTS: Pt seen and evaluated at the bedside. No acute overnight events occurred. The patient reports feeling "okay". She has no new compalints. Feels weak, feels tired. Admits generally feeling run down. She struggles to eat w/ the neck brace so she takes it off for meals and eats slowly. Denies cough, dyspnea or mucous production. Denies dysuria, urgency, frequency. Has no CP, palpitations. Has no new joint discomfort. Leg edema is chronic and is better than it usually is. Otherwise, admits feeling like she's in her chronic stable state and was "hoping I was could home today"    ----  PAST MEDICAL & SURGICAL HISTORY:  Obesity (BMI 30-39.9)    Traumatic arthropathy involving lower leg  right    History of Osteoarthritis    Hyperlipidemia    Restless Leg Syndrome    H/O neck surgery    H/O arthroscopy of right knee      Bladder Disorder  Bladder lift     History of Colonoscopy    Ex lap in  for abscess in the baldder    History of Hysterectomy and bladder lift in         FAMILY HISTORY:  No pertinent family history in first degree relatives        Allergies    No Known Allergies    Intolerances        ----  ANTIMICROBIALS:  hydroxychloroquine 400 milliGRAM(s) Oral daily  piperacillin/tazobactam IVPB.. 3.375 Gram(s) IV Intermittent every 8 hours  vancomycin    Solution 125 milliGRAM(s) Oral every 6 hours    CARDIOVASCULAR:  torsemide 20 milliGRAM(s) Oral two times a day    GASTROINTESTINAL:  sulfaSALAzine 1500 milliGRAM(s) Oral three times a day    PULMONARY:  ALBUTerol    90 MICROgram(s) HFA Inhaler 2 Puff(s) Inhalation every 4 hours PRN      ----  REVIEW OF SYSTEMS:  comprehensive ROS as per HPI     ----  PHYSICAL EXAM:  GENERAL: patient appears pale, chronically ill, weak, debilitated, sitting up in chair at bedside  ENMT: oropharynx clear without erythema, dry mucous membranes  LUNGS: reduced air entry b/l, no bibasilar rales, no accessory muscle use or tachypnea  HEART: soft S1/S2, regular rate and rhythm, no murmurs noted, has chronic appearing firm 2+ pitting lymphedema in b/l LE  GASTROINTESTINAL: abdomen is soft, nontender, nondistended, normoactive bowel sounds, no palpable masses  MUSCULOSKELETAL: no clubbing or cyanosis, no obvious deformity  NEUROLOGIC: awake, alert, readily interactive, good muscle tone in 4 extremities    T(C): 36.3 (20 @ 15:07), Max: 36.8 (20 @ 19:15)  HR: 78 (20 @ 15:07) (71 - 98)  BP: 97/55 (20 @ 15:07) (97/55 - 121/67)  RR: 20 (20 @ 05:52) (16 - 20)  SpO2: 95% (20 @ 15:07) (92% - 97%)  Wt(kg): --    ----  INTAKE & OUTPUT:  I&O's Summary      LABS:                        9.0    7.90  )-----------( 296      ( 2020 08:16 )             28.8         142  |  106  |  15  ----------------------------<  80  3.1<L>   |  27  |  1.10    Ca    8.6      2020 08:16  Phos  4.4       Mg     2.0         TPro  7.3  /  Alb  2.5<L>  /  TBili  0.3  /  DBili  x   /  AST  33  /  ALT  28  /  AlkPhos  75      PT/INR - ( 2020 06:53 )   PT: 15.2 sec;   INR: 1.32 ratio         PTT - ( 2020 06:53 )  PTT:25.3 sec  Urinalysis Basic - ( 2020 07:20 )    Color: Yellow / Appearance: Slightly Turbid / S.020 / pH: x  Gluc: x / Ketone: Negative  / Bili: Negative / Urobili: Negative   Blood: x / Protein: 30 mg/dL / Nitrite: Positive   Leuk Esterase: Moderate / RBC: 0-2 /HPF / WBC >50   Sq Epi: x / Non Sq Epi: Few / Bacteria: Moderate      CAPILLARY BLOOD GLUCOSE            Culture - Urine (collected 20 @ 11:28)  Source: .Urine Catheterized  Preliminary Report (20 @ 10:50):    >100,000 CFU/ml Escherichia coli    Culture - Blood (collected 20 @ 11:27)  Source: .Blood Blood-Peripheral  Preliminary Report (20 @ 12:01):    No growth to date.    Culture - Blood (collected 20 @ 11:27)  Source: .Blood Blood-Peripheral  Preliminary Report (20 @ 12:01):    No growth to date.        ----  Personally reviewed:  Vital sign trends: [ x ] yes    [  ] no     [  ] n/a  Laboratory results: [  x] yes    [  ] no     [  ] n/a  Radiology results: [ x ] yes    [  ] no     [  ] n/a  Culture results: [ x ] yes    [  ] no     [  ] n/a  Consultant recommendations: [x  ] yes    [  ] no     [  ] n/a

## 2020-11-30 LAB
-  AMIKACIN: SIGNIFICANT CHANGE UP
-  AMOXICILLIN/CLAVULANIC ACID: SIGNIFICANT CHANGE UP
-  AMPICILLIN/SULBACTAM: SIGNIFICANT CHANGE UP
-  AMPICILLIN: SIGNIFICANT CHANGE UP
-  AZTREONAM: SIGNIFICANT CHANGE UP
-  CEFAZOLIN: SIGNIFICANT CHANGE UP
-  CEFEPIME: SIGNIFICANT CHANGE UP
-  CEFOXITIN: SIGNIFICANT CHANGE UP
-  CEFTRIAXONE: SIGNIFICANT CHANGE UP
-  CIPROFLOXACIN: SIGNIFICANT CHANGE UP
-  ERTAPENEM: SIGNIFICANT CHANGE UP
-  GENTAMICIN: SIGNIFICANT CHANGE UP
-  IMIPENEM: SIGNIFICANT CHANGE UP
-  LEVOFLOXACIN: SIGNIFICANT CHANGE UP
-  MEROPENEM: SIGNIFICANT CHANGE UP
-  NITROFURANTOIN: SIGNIFICANT CHANGE UP
-  PIPERACILLIN/TAZOBACTAM: SIGNIFICANT CHANGE UP
-  TIGECYCLINE: SIGNIFICANT CHANGE UP
-  TOBRAMYCIN: SIGNIFICANT CHANGE UP
-  TRIMETHOPRIM/SULFAMETHOXAZOLE: SIGNIFICANT CHANGE UP
ALBUMIN SERPL ELPH-MCNC: 2.4 G/DL — LOW (ref 3.3–5)
ALP SERPL-CCNC: 69 U/L — SIGNIFICANT CHANGE UP (ref 40–120)
ALT FLD-CCNC: 25 U/L — SIGNIFICANT CHANGE UP (ref 12–78)
ANION GAP SERPL CALC-SCNC: 6 MMOL/L — SIGNIFICANT CHANGE UP (ref 5–17)
AST SERPL-CCNC: 21 U/L — SIGNIFICANT CHANGE UP (ref 15–37)
BASOPHILS # BLD AUTO: 0.04 K/UL — SIGNIFICANT CHANGE UP (ref 0–0.2)
BASOPHILS NFR BLD AUTO: 0.6 % — SIGNIFICANT CHANGE UP (ref 0–2)
BILIRUB DIRECT SERPL-MCNC: <.1 MG/DL — SIGNIFICANT CHANGE UP (ref 0.05–0.2)
BILIRUB INDIRECT FLD-MCNC: >0.1 MG/DL — LOW (ref 0.2–1)
BILIRUB SERPL-MCNC: 0.2 MG/DL — SIGNIFICANT CHANGE UP (ref 0.2–1.2)
BUN SERPL-MCNC: 11 MG/DL — SIGNIFICANT CHANGE UP (ref 7–23)
CALCIUM SERPL-MCNC: 8.4 MG/DL — LOW (ref 8.5–10.1)
CHLORIDE SERPL-SCNC: 108 MMOL/L — SIGNIFICANT CHANGE UP (ref 96–108)
CO2 SERPL-SCNC: 29 MMOL/L — SIGNIFICANT CHANGE UP (ref 22–31)
CREAT SERPL-MCNC: 1 MG/DL — SIGNIFICANT CHANGE UP (ref 0.5–1.3)
CULTURE RESULTS: SIGNIFICANT CHANGE UP
EOSINOPHIL # BLD AUTO: 0.26 K/UL — SIGNIFICANT CHANGE UP (ref 0–0.5)
EOSINOPHIL NFR BLD AUTO: 3.8 % — SIGNIFICANT CHANGE UP (ref 0–6)
GLUCOSE SERPL-MCNC: 82 MG/DL — SIGNIFICANT CHANGE UP (ref 70–99)
HCT VFR BLD CALC: 27.6 % — LOW (ref 34.5–45)
HGB BLD-MCNC: 8.7 G/DL — LOW (ref 11.5–15.5)
IMM GRANULOCYTES NFR BLD AUTO: 0.3 % — SIGNIFICANT CHANGE UP (ref 0–1.5)
LYMPHOCYTES # BLD AUTO: 2.22 K/UL — SIGNIFICANT CHANGE UP (ref 1–3.3)
LYMPHOCYTES # BLD AUTO: 32.1 % — SIGNIFICANT CHANGE UP (ref 13–44)
MAGNESIUM SERPL-MCNC: 2 MG/DL — SIGNIFICANT CHANGE UP (ref 1.6–2.6)
MCHC RBC-ENTMCNC: 28.4 PG — SIGNIFICANT CHANGE UP (ref 27–34)
MCHC RBC-ENTMCNC: 31.5 GM/DL — LOW (ref 32–36)
MCV RBC AUTO: 90.2 FL — SIGNIFICANT CHANGE UP (ref 80–100)
METHOD TYPE: SIGNIFICANT CHANGE UP
MONOCYTES # BLD AUTO: 0.62 K/UL — SIGNIFICANT CHANGE UP (ref 0–0.9)
MONOCYTES NFR BLD AUTO: 9 % — SIGNIFICANT CHANGE UP (ref 2–14)
NEUTROPHILS # BLD AUTO: 3.76 K/UL — SIGNIFICANT CHANGE UP (ref 1.8–7.4)
NEUTROPHILS NFR BLD AUTO: 54.2 % — SIGNIFICANT CHANGE UP (ref 43–77)
NRBC # BLD: 0 /100 WBCS — SIGNIFICANT CHANGE UP (ref 0–0)
ORGANISM # SPEC MICROSCOPIC CNT: SIGNIFICANT CHANGE UP
ORGANISM # SPEC MICROSCOPIC CNT: SIGNIFICANT CHANGE UP
PHOSPHATE SERPL-MCNC: 4 MG/DL — SIGNIFICANT CHANGE UP (ref 2.5–4.5)
PLATELET # BLD AUTO: 318 K/UL — SIGNIFICANT CHANGE UP (ref 150–400)
POTASSIUM SERPL-MCNC: 3.1 MMOL/L — LOW (ref 3.5–5.3)
POTASSIUM SERPL-SCNC: 3.1 MMOL/L — LOW (ref 3.5–5.3)
PROT SERPL-MCNC: 6.8 G/DL — SIGNIFICANT CHANGE UP (ref 6–8.3)
RBC # BLD: 3.06 M/UL — LOW (ref 3.8–5.2)
RBC # FLD: 14.6 % — HIGH (ref 10.3–14.5)
SODIUM SERPL-SCNC: 143 MMOL/L — SIGNIFICANT CHANGE UP (ref 135–145)
SPECIMEN SOURCE: SIGNIFICANT CHANGE UP
WBC # BLD: 6.92 K/UL — SIGNIFICANT CHANGE UP (ref 3.8–10.5)
WBC # FLD AUTO: 6.92 K/UL — SIGNIFICANT CHANGE UP (ref 3.8–10.5)

## 2020-11-30 PROCEDURE — 99232 SBSQ HOSP IP/OBS MODERATE 35: CPT

## 2020-11-30 PROCEDURE — 99233 SBSQ HOSP IP/OBS HIGH 50: CPT

## 2020-11-30 RX ORDER — NYSTATIN CREAM 100000 [USP'U]/G
1 CREAM TOPICAL
Refills: 0 | Status: DISCONTINUED | OUTPATIENT
Start: 2020-11-30 | End: 2020-12-01

## 2020-11-30 RX ORDER — MULTIVIT-MIN/FERROUS GLUCONATE 9 MG/15 ML
1 LIQUID (ML) ORAL DAILY
Refills: 0 | Status: CANCELLED | OUTPATIENT
Start: 2020-11-30 | End: 2020-12-01

## 2020-11-30 RX ORDER — POTASSIUM CHLORIDE 20 MEQ
40 PACKET (EA) ORAL ONCE
Refills: 0 | Status: COMPLETED | OUTPATIENT
Start: 2020-11-30 | End: 2020-11-30

## 2020-11-30 RX ORDER — MUPIROCIN 20 MG/G
1 OINTMENT TOPICAL
Refills: 0 | Status: DISCONTINUED | OUTPATIENT
Start: 2020-11-30 | End: 2020-12-01

## 2020-11-30 RX ADMIN — Medication 1500 MILLIGRAM(S): at 00:49

## 2020-11-30 RX ADMIN — PIPERACILLIN AND TAZOBACTAM 25 GRAM(S): 4; .5 INJECTION, POWDER, LYOPHILIZED, FOR SOLUTION INTRAVENOUS at 21:28

## 2020-11-30 RX ADMIN — Medication 40 MILLIEQUIVALENT(S): at 18:51

## 2020-11-30 RX ADMIN — Medication 1500 MILLIGRAM(S): at 20:47

## 2020-11-30 RX ADMIN — Medication 20 MILLIGRAM(S): at 06:18

## 2020-11-30 RX ADMIN — PIPERACILLIN AND TAZOBACTAM 25 GRAM(S): 4; .5 INJECTION, POWDER, LYOPHILIZED, FOR SOLUTION INTRAVENOUS at 14:01

## 2020-11-30 RX ADMIN — OXYCODONE AND ACETAMINOPHEN 1 TABLET(S): 5; 325 TABLET ORAL at 21:26

## 2020-11-30 RX ADMIN — Medication 125 MILLIGRAM(S): at 00:50

## 2020-11-30 RX ADMIN — Medication 20 MILLIGRAM(S): at 17:44

## 2020-11-30 RX ADMIN — Medication 1500 MILLIGRAM(S): at 06:21

## 2020-11-30 RX ADMIN — OXYCODONE AND ACETAMINOPHEN 1 TABLET(S): 5; 325 TABLET ORAL at 20:46

## 2020-11-30 RX ADMIN — Medication 1 TABLET(S): at 17:44

## 2020-11-30 RX ADMIN — ENOXAPARIN SODIUM 40 MILLIGRAM(S): 100 INJECTION SUBCUTANEOUS at 12:42

## 2020-11-30 RX ADMIN — Medication 40 MILLIEQUIVALENT(S): at 06:18

## 2020-11-30 RX ADMIN — LIDOCAINE 1 PATCH: 4 CREAM TOPICAL at 12:44

## 2020-11-30 RX ADMIN — PIPERACILLIN AND TAZOBACTAM 25 GRAM(S): 4; .5 INJECTION, POWDER, LYOPHILIZED, FOR SOLUTION INTRAVENOUS at 06:18

## 2020-11-30 RX ADMIN — Medication 125 MILLIGRAM(S): at 06:21

## 2020-11-30 RX ADMIN — LIDOCAINE 1 PATCH: 4 CREAM TOPICAL at 20:50

## 2020-11-30 RX ADMIN — Medication 1500 MILLIGRAM(S): at 14:01

## 2020-11-30 RX ADMIN — Medication 400 MILLIGRAM(S): at 12:43

## 2020-11-30 RX ADMIN — PRAMIPEXOLE DIHYDROCHLORIDE 1.5 MILLIGRAM(S): 0.12 TABLET ORAL at 20:46

## 2020-11-30 RX ADMIN — Medication 125 MILLIGRAM(S): at 12:45

## 2020-11-30 RX ADMIN — MUPIROCIN 1 APPLICATION(S): 20 OINTMENT TOPICAL at 17:44

## 2020-11-30 RX ADMIN — OXYCODONE AND ACETAMINOPHEN 2 TABLET(S): 5; 325 TABLET ORAL at 01:41

## 2020-11-30 NOTE — SWALLOW BEDSIDE ASSESSMENT ADULT - SWALLOW EVAL: DIAGNOSIS
1. The patient demonstrated functional oral management of puree, nectar thick, and thin liquid textures marked by adequate bolus collection, transfer, and posterior transport. 2. The patient demonstrated a mild oral dysphagia for mechanical soft solids marked by prolonged oral manipulation likely 2/2 absent lower dentition resulting in delayed bolus collection, transfer, and posterior transport. 3. The patient demonstrated a mild pharyngeal dysphagia for puree, mechanical soft solid, and nectar thick liquids marked by a delayed pharyngeal swallow trigger with reduced hyolaryngeal elevation upon digital palpation w/o evidence of airway penetration. 4. The patient demonstrated a severe pharyngeal dysphagia for thin liquids marked by a delayed pharyngeal swallow trigger with reduced hyolaryngeal elevation upon digital palpation resulting in multiple swallows suggestive of pharyngeal stasis and/or airway penetration and throat clearing further suggestive of airway penetration.

## 2020-11-30 NOTE — PROGRESS NOTE ADULT - SUBJECTIVE AND OBJECTIVE BOX
Date/Time Patient Seen:  		  Referring MD:   Data Reviewed	       Patient is a 70y old  Female who presents with a chief complaint of abd pain, vomiting (29 Nov 2020 19:14)      Subjective/HPI     PAST MEDICAL & SURGICAL HISTORY:  Obesity (BMI 30-39.9)    Traumatic arthropathy involving lower leg  right    History of Osteoarthritis    Hyperlipidemia    Restless Leg Syndrome    H/O neck surgery    H/O arthroscopy of right knee  2010    Bladder Disorder  Bladder lift 2000    History of Colonoscopy    Ex lap in 2009 for abscess in the baldder    History of Hysterectomy and bladder lift in 2000          Medication list         MEDICATIONS  (STANDING):  enoxaparin Injectable 40 milliGRAM(s) SubCutaneous daily  hydroxychloroquine 400 milliGRAM(s) Oral daily  lactobacillus acidophilus 1 Tablet(s) Oral two times a day with meals  lidocaine   Patch 1 Patch Transdermal daily  piperacillin/tazobactam IVPB.. 3.375 Gram(s) IV Intermittent every 8 hours  potassium chloride   Powder 40 milliEquivalent(s) Oral two times a day  pramipexole 1.5 milliGRAM(s) Oral <User Schedule>  sulfaSALAzine 1500 milliGRAM(s) Oral three times a day  torsemide 20 milliGRAM(s) Oral two times a day  vancomycin    Solution 125 milliGRAM(s) Oral every 6 hours    MEDICATIONS  (PRN):  acetaminophen   Tablet .. 650 milliGRAM(s) Oral every 6 hours PRN Mild Pain (1 - 3)  ALBUTerol    90 MICROgram(s) HFA Inhaler 2 Puff(s) Inhalation every 4 hours PRN Shortness of Breath and/or Wheezing  cyclobenzaprine 10 milliGRAM(s) Oral three times a day PRN Muscle Spasm  oxycodone    5 mG/acetaminophen 325 mG 1 Tablet(s) Oral every 6 hours PRN Moderate Pain (4 - 6)  oxycodone    5 mG/acetaminophen 325 mG 2 Tablet(s) Oral every 6 hours PRN Severe Pain (7 - 10)         Vitals log        ICU Vital Signs Last 24 Hrs  T(C): 36.6 (29 Nov 2020 21:19), Max: 36.6 (29 Nov 2020 21:19)  T(F): 97.9 (29 Nov 2020 21:19), Max: 97.9 (29 Nov 2020 21:19)  HR: 83 (29 Nov 2020 21:19) (76 - 83)  BP: 112/64 (29 Nov 2020 21:19) (97/55 - 112/64)  BP(mean): --  ABP: --  ABP(mean): --  RR: 17 (29 Nov 2020 21:19) (17 - 17)  SpO2: 97% (29 Nov 2020 21:19) (95% - 97%)           Input and Output:  I&O's Detail      Lab Data                        9.0    7.90  )-----------( 296      ( 29 Nov 2020 08:16 )             28.8     11-29    142  |  106  |  15  ----------------------------<  80  3.1<L>   |  27  |  1.10    Ca    8.6      29 Nov 2020 08:16  Phos  4.4     11-29  Mg     2.0     11-29    TPro  7.3  /  Alb  2.5<L>  /  TBili  0.3  /  DBili  x   /  AST  33  /  ALT  28  /  AlkPhos  75  11-29            Review of Systems	      Objective     Physical Examination    heart s1s2  lung dec BS  abd soft  on RA  in c spine collar   verbal  alert      Pertinent Lab findings & Imaging      Katerine:  NO   Adequate UO     I&O's Detail           Discussed with:     Cultures:	        Radiology

## 2020-11-30 NOTE — SWALLOW BEDSIDE ASSESSMENT ADULT - ASR SWALLOW RECOMMEND DIAG
Consider an MBSS to objectively assess swallow function to determine safest and least restrictive PO diet./VFSS/MBS

## 2020-11-30 NOTE — SWALLOW BEDSIDE ASSESSMENT ADULT - SWALLOW EVAL: RECOMMENDED FEEDING/EATING TECHNIQUES
allow for swallow between intakes/small sips/bites/alternate food with liquid/hard swallow w/ each bite or sip/maintain upright posture during/after eating for 30 mins/check mouth frequently for oral residue/pocketing/crush medication (when feasible)/no straws/position upright (90 degrees)/oral hygiene

## 2020-11-30 NOTE — PROGRESS NOTE ADULT - ASSESSMENT
Please advise nausea/vomiting  dysphagia    no longer uses peg  peg was placed 3 weeks ago, would continue to keep in place and remove as outpatient in 3 weeks to allow gastrocutaneous tract to mature  cont PPI  diet as tolerated  no gi objection to dc home  outpatient peg removal    Advanced care planning was discussed with patient and family.  Advanced care planning forms were reviewed and discussed.  Risks, benefits and alternatives of gastroenterologic procedures were discussed in detail and all questions were answered.    30 minutes spent.

## 2020-11-30 NOTE — DIETITIAN INITIAL EVALUATION ADULT. - SIGNS/SYMPTOMS
as evidenced by mild temporal wasting/orbital fat loss; 16.5% wt loss (most from improved lymphedema as evidenced by need for dysphagia 2 mech soft diet with NT liquids

## 2020-11-30 NOTE — PROGRESS NOTE ADULT - ASSESSMENT
71 y/o F PMHx lymphedema, HLD, RA, recent cervical spine surgery at [facility] presented to ED with complaint of nausea and vomiting x1 day being admitted with PNA, UTI.    #aspiration pna  -CT chest showing R middle and lower lobe groundglass opacities  -suspect early vs. chronic aspiration pneumonitis - no evidence of uncontrolled infectious process  -on zosyn for presumed urinary source and gram negative or anaerobic PNA - transition to po abx once otherwise medically appropriate for dc  -ID, Pulm consulted - recommendations noted and appreciated  -COVID negative  -UCx w/ GNR's but no symptoms suggests asymptomatic bacteriuria  -BCx ngtd    #anemia  -h/h trending down  -check fobt  -check anemia serologic w/u  -monitor h/h q daily    #UTI  -as above    #Abdominal pain/vomiting/dehydration  -abdominal pain improved on admission - no abd pain or nausea since the time of admission. rec supportive care.   -recent PEG at [facility], however patient tolerating PO intake  -dysphagia diet with nectar thick liquids, speech and swallow consulted  -GI following, no intervention recommended at this time  -suggest hydration via peg along w/ supplements via peg - advised taht she not d/c this peg tube which will benefit adherence to medications and improve access for hydration - pt appears to not be receptive to my counseling and was dismissive of my recommendations - I discussed my recommendations with the pt's daugther as well    #RA  -Continue home meds    #Recent cervical spine surgery  -Continue pain control PRN    #Lymphedema  -Continue torsemide but reduce the dose as clinically the patient is dry and has persistent hypokalemia despite aggressive po supplementation  -Patient states she follows with wound care    #DVT ppx  -Lovenox for DVT ppx

## 2020-11-30 NOTE — CHART NOTE - TREATMENT: THE FOLLOWING DIET HAS BEEN RECOMMENDED
Diet, Dysphagia 2 Mechanical Soft-Nectar Consistency Fluid (11-28-20 @ 10:24) [Active]    Will honor pt's food preferences  provide pt with vanilla yogurt TID  Rx Ensure pudding BID and MVI daily

## 2020-11-30 NOTE — DIETITIAN INITIAL EVALUATION ADULT. - OTHER INFO
69 y/o female adm with pneumonia, UTI. PMH obesity, OA, HLD, lymphedema, HLD, RA, PEG (placed bc of problems swallowing but not used). Pt ate breakfast this am. Consumed very soft foods on tray secondary to leaving lower dentures at home. SLP eval is pending. Pt aware of the importance of consuming NT liquids and not thin liquids. Pt states that her appetite hasn't changed, however her wt has decreased. Pt's reason for wt loss is improved lymphedema at this time. Pt is very anxious to go home.

## 2020-11-30 NOTE — DIETITIAN INITIAL EVALUATION ADULT. - ORAL INTAKE PTA/DIET HISTORY
pt states that she was eating a dysphagia 2 mech soft diet with nectar thick liquids. At times she wasn't compliant all the time with consuming NT liquids.

## 2020-11-30 NOTE — SWALLOW BEDSIDE ASSESSMENT ADULT - ASR SWALLOW ASPIRATION MONITOR
change of breathing pattern/throat clearing/gurgly voice/pneumonia/upper respiratory infection/position upright (90Y)/cough/oral hygiene/fever

## 2020-11-30 NOTE — SWALLOW BEDSIDE ASSESSMENT ADULT - COMMENTS
Consult received and chart reviewed. The patient was seen at bedside this afternoon for an initial assessment of swallow function, at which time she was alert and cooperative. The patient is denying any swallowing difficulties at this time, though did admit that she was placed on a puree/chopped diet with nectar thick liquids during her last hospital admission following cervical spine surgery. She is admitting to not fully complying with the nectar thick liquid recommendation. Patient currently with cervical neck collar in place.    Per charting, the patient is a "69 y/o F PMHx lymphedema, HLD, RA, recent cervical spine surgery at [facility] presented to ED with complaint of nausea and vomiting x1 day. Patient with recent cervical spine surgery past month with prolonged hospital course complicated by C. diff infection (completed treatment course) and dysphagia (s/p PEG tube placement). Patient returned home on 11/24/20, states she was doing well until yesterday when she began experiencing abdominal pain, followed by nausea and then had episode of vomiting. Patient states vomitus was "brownish" in color.  Patient states they placed PEG because she was having difficulty swallowing, but by the time she came home, she was tolerating oral intake. As per daughter, patient was sent home on soft diet with nectar thick liquids."    CT of the chest revealed, "Mild groundglass patchy opacities in the right middle lobe and right lower lobe inferiorly raising concern for infection. Clinical correlation is necessary. Moderate COPD. Gastrostomy tube. Hiatal hernia. Borderline size right external iliac lymph node which is mildly enlarged compared with 2015 and nonspecific."    Discussed results and recommendations from this evaluation with the patient, RN, and call out to MD.

## 2020-11-30 NOTE — PHARMACOTHERAPY INTERVENTION NOTE - COMMENTS
Patient currently ordered for Zosyn, MRSA PCR positive. Spoke with Dr. Torre, discussed positive MRSA PCR. Dr. Torre is primarily treating for UTI with lower suspicion for PNA. Since patient is improving without MRSA coverage, MD will not add MRSA coverage at this time. [General Appearance - Alert] : alert [General Appearance - In No Acute Distress] : in no acute distress [General Appearance - Well Nourished] : well nourished [General Appearance - Well Developed] : well developed [General Appearance - Well-Appearing] : healthy appearing [Oriented To Time, Place, And Person] : oriented to person, place, and time [Impaired Insight] : insight and judgment were intact [Affect] : the affect was normal [Mood] : the mood was normal [Memory Remote] : remote memory was not impaired [Memory Recent] : recent memory was not impaired [Person] : oriented to person [Place] : oriented to place [Time] : oriented to time [Short Term Intact] : short term memory intact [Remote Intact] : remote memory intact [Concentration Intact] : normal concentrating ability [Registration Intact] : recent registration memory intact [Visual Intact] : visual attention was ~T not ~L decreased [Fluency] : fluency intact [Comprehension] : comprehension intact [Current Events] : adequate knowledge of current events [Past History] : adequate knowledge of personal past history [Vocabulary] : adequate range of vocabulary [Cranial Nerves Oculomotor (III)] : extraocular motion intact [Cranial Nerves Trigeminal (V)] : facial sensation intact symmetrically [Cranial Nerves Facial (VII)] : face symmetrical [Cranial Nerves Accessory (XI - Cranial And Spinal)] : head turning and shoulder shrug symmetric [Cranial Nerves Hypoglossal (XII)] : there was no tongue deviation with protrusion [Motor Strength] : muscle strength was normal in all four extremities [Involuntary Movements] : no involuntary movements were seen [No Muscle Atrophy] : normal bulk in all four extremities [Motor Handedness Right-Handed] : the patient is right hand dominant [Motor Strength Upper Extremities Bilaterally] : strength was normal in both upper extremities [Motor Strength Lower Extremities Bilaterally] : strength was normal in both lower extremities [Sensation Tactile Decrease] : light touch was intact [Balance] : balance was intact [Limited Balance] : balance was intact [Tremor] : no tremor present [Dysdiadochokinesia Bilaterally] : not present [Coordination - Dysmetria Impaired Finger-to-Nose Bilateral] : not present [Outer Ear] : the ears and nose were normal in appearance [Exaggerated Use Of Accessory Muscles For Inspiration] : no accessory muscle use [Edema] : there was no peripheral edema [Abnormal Walk] : normal gait [Nail Clubbing] : no clubbing  or cyanosis of the fingernails [Musculoskeletal - Swelling] : no joint swelling seen [Motor Tone] : muscle strength and tone were normal [Skin Color & Pigmentation] : normal skin color and pigmentation [] : no rash

## 2020-11-30 NOTE — PROGRESS NOTE ADULT - PROBLEM SELECTOR PLAN 1
pt is anxious and upset this am - c/o meds schedule -   71 y/o F PMHx lymphedema, HLD, RA, recent cervical spine surgery at [facility] presented to ED with complaint of nausea and vomiting x1 day being admitted with PNA, UTI.  recent c spine surgery complicated by PEG placement - and C Diff -   poss PNA - CT chest reviewed - caution with ABX - recent C Diff Infection De - Escalation and ABX stewardship ...  curr on Zosyn and Vanco PO - CRP and Procalcitonin noted - doubt Legionella - nasal MRSA screen POSITIVE - ID eval reviewed -   keep HOB elev  Anti emetics  monitor for Dyspepsia  SLP eval pending -   CT chest also shows Emphysema - pt is an ex smoker - will add Proventil PRN for sob and or wheezing  pt is on Plaquenil for RA  pt is on Torsemide for Lymphedema hx  appears weak and deconditioned - needs assist with ADL - PT assessment - fall prec  RLS - Mirapex regimen  DVT p.

## 2020-11-30 NOTE — DIETITIAN INITIAL EVALUATION ADULT. - ADD RECOMMEND
Will honor pt's food preferences; provide pt with vanilla yogurt TID; rx ordering Ensure pudding BID; SLP amanda is pending.

## 2020-11-30 NOTE — SWALLOW BEDSIDE ASSESSMENT ADULT - PHARYNGEAL PHASE
Delayed pharyngeal swallow/Decreased laryngeal elevation Multiple swallows/Delayed pharyngeal swallow/Decreased laryngeal elevation/Throat clear post oral intake

## 2020-11-30 NOTE — PROGRESS NOTE ADULT - SUBJECTIVE AND OBJECTIVE BOX
[facility]  Niles Salomon PA-C  [address] [place] [address]  [phone]      INTERVAL HPI/OVERNIGHT EVENTS:    patient s/e  feels well  would like to go home    MEDICATIONS  (STANDING):  enoxaparin Injectable 40 milliGRAM(s) SubCutaneous daily  hydroxychloroquine 400 milliGRAM(s) Oral daily  lactobacillus acidophilus 1 Tablet(s) Oral two times a day with meals  lidocaine   Patch 1 Patch Transdermal daily  mupirocin 2% Ointment 1 Application(s) Both Nostrils two times a day  piperacillin/tazobactam IVPB.. 3.375 Gram(s) IV Intermittent every 8 hours  potassium chloride   Powder 40 milliEquivalent(s) Oral two times a day  pramipexole 1.5 milliGRAM(s) Oral <User Schedule>  sulfaSALAzine 1500 milliGRAM(s) Oral three times a day  torsemide 20 milliGRAM(s) Oral two times a day  vancomycin    Solution 125 milliGRAM(s) Oral every 6 hours    MEDICATIONS  (PRN):  acetaminophen   Tablet .. 650 milliGRAM(s) Oral every 6 hours PRN Mild Pain (1 - 3)  ALBUTerol    90 MICROgram(s) HFA Inhaler 2 Puff(s) Inhalation every 4 hours PRN Shortness of Breath and/or Wheezing  cyclobenzaprine 10 milliGRAM(s) Oral three times a day PRN Muscle Spasm  oxycodone    5 mG/acetaminophen 325 mG 1 Tablet(s) Oral every 6 hours PRN Moderate Pain (4 - 6)  oxycodone    5 mG/acetaminophen 325 mG 2 Tablet(s) Oral every 6 hours PRN Severe Pain (7 - 10)      Allergies    No Known Allergies    Intolerances        ROS:   General:  No wt loss, fevers, chills, night sweats, fatigue,   Eyes:  Good vision, no reported pain  ENT:  No sore throat, pain, runny nose, dysphagia  CV:  No pain, palpitations, hypo/hypertension  Resp:  No dyspnea, cough, tachypnea, wheezing  GI:  No pain, No nausea, No vomiting, No diarrhea, No constipation, No weight loss, No fever, No pruritis, No rectal bleeding, No tarry stools, No dysphagia,  :  No pain, bleeding, incontinence, nocturia  Muscle:  No pain, weakness  Neuro:  No weakness, tingling, memory problems  Psych:  No fatigue, insomnia, mood problems, depression  Endocrine:  No polyuria, polydipsia, cold/heat intolerance  Heme:  No petechiae, ecchymosis, easy bruisability  Skin:  No rash, tattoos, scars, edema      PHYSICAL EXAM:   Vital Signs:  Vital Signs Last 24 Hrs  T(C): 36.7 (2020 09:48), Max: 36.7 (2020 09:48)  T(F): 98 (2020 09:48), Max: 98 (2020 09:48)  HR: 87 (2020 09:48) (73 - 87)  BP: 119/68 (2020 09:48) (97/55 - 119/68)  BP(mean): --  RR: 16 (2020 09:48) (16 - 17)  SpO2: 98% (2020 09:48) (95% - 98%)  Daily     Daily Weight in k (2020 05:55)    nad  c collar in place  frail  non toxic  soft, nt +peg  no edema        LABS:                        8.7    6.92  )-----------( 318      ( 2020 08:04 )             27.6     1130    143  |  108  |  11  ----------------------------<  82  3.1<L>   |  29  |  1.00    Ca    8.4<L>      2020 08:04  Phos  4.0       Mg     2.0         TPro  6.8  /  Alb  2.4<L>  /  TBili  0.2  /  DBili  <.10  /  AST  21  /  ALT  25  /  AlkPhos  69            RADIOLOGY & ADDITIONAL TESTS:

## 2020-11-30 NOTE — PROGRESS NOTE ADULT - SUBJECTIVE AND OBJECTIVE BOX
[facility]  INFECTIOUS DISEASES   [address] [place] [address]  Tel: [phone]     Fax: [phone]  =======================================================    MRN-[id number]  VERONICA RAMEFF     Follow up; UTI and possible pneumonia     No more vomiting, no abdominal pain. Eating food and not using PEG.   No fever.     PAST MEDICAL & SURGICAL HISTORY:  Obesity (BMI 30-39.9)  Traumatic arthropathy involving lower leg  right  History of Osteoarthritis  Hyperlipidemia  Restless Leg Syndrome  H/O neck surgery  H/O arthroscopy of right knee  2010  Bladder Disorder  Bladder lift 2000  History of Colonoscopy  Ex lap in 2009 for abscess in the baldder  History of Hysterectomy and bladder lift in 2000    Social Hx: No smoking, ETOH or drugs     FAMILY HISTORY:  No pertinent family history in first degree relatives    Allergies  No Known Allergies    Antibiotics:  Ceftriaxone and azithromycin     REVIEW OF SYSTEMS:  CONSTITUTIONAL:  No Fever or chills  HEENT:  No diplopia or blurred vision.  No sore throat or runny nose.  CARDIOVASCULAR:  No chest pain or SOB.  RESPIRATORY:  No cough, shortness of breath, PND or orthopnea.  GASTROINTESTINAL: + nausea, vomiting, no diarrhea. + abdominal pain   GENITOURINARY:  No dysuria, frequency or urgency. No Blood in urine  MUSCULOSKELETAL:  no joint aches, no muscle pain  SKIN:  No change in skin, hair or nails.  NEUROLOGIC:  No paresthesias, fasciculations, seizures or weakness.  PSYCHIATRIC:  No disorder of thought or mood.  ENDOCRINE:  No heat or cold intolerance, polyuria or polydipsia.  HEMATOLOGICAL:  No easy bruising or bleeding.     Physical Exam:  Vital Signs Last 24 Hrs  T(C): 36.7 (30 Nov 2020 09:48), Max: 36.7 (30 Nov 2020 09:48)  T(F): 98 (30 Nov 2020 09:48), Max: 98 (30 Nov 2020 09:48)  HR: 87 (30 Nov 2020 09:48) (73 - 87)  BP: 119/68 (30 Nov 2020 09:48) (97/55 - 119/68)  BP(mean): --  RR: 16 (30 Nov 2020 09:48) (16 - 17)  SpO2: 98% (30 Nov 2020 09:48) (95% - 98%)  GEN: NAD  HEENT: normocephalic and atraumatic. EOMI. PERRL.    NECK: Supple.  No lymphadenopathy   LUNGS: Clear to auscultation.  HEART: Regular rate and rhythm   ABDOMEN: Soft, moderate tenderness on epigastric area, PEG in place, looks fine. Positive bowel sounds.    : No CVA tenderness  EXTREMITIES: Without edema.  NEUROLOGIC: grossly intact.  PSYCHIATRIC: Appropriate affect .  SKIN: No ulceration or induration present.    Labs:                        8.7    6.92  )-----------( 318      ( 30 Nov 2020 08:04 )             27.6      11-30    143  |  108  |  11  ----------------------------<  82  3.1<L>   |  29  |  1.00    Ca    8.4<L>      30 Nov 2020 08:04  Phos  4.0     11-30  Mg     2.0     11-30    TPro  6.8  /  Alb  2.4<L>  /  TBili  0.2  /  DBili  <.10  /  AST  21  /  ALT  25  /  AlkPhos  69  11-30    Culture - Urine (collected 11-28-20 @ 11:28)  Source: .Urine Catheterized  Final Report (11-30-20 @ 08:56):    >100,000 CFU/ml Escherichia coli  Organism: Escherichia coli (11-30-20 @ 08:56)  Organism: Escherichia coli (11-30-20 @ 08:56)    Sensitivities:      -  Amikacin: S <=16      -  Amoxicillin/Clavulanic Acid: S <=8/4      -  Ampicillin: R >16 These ampicillin results predict results for amoxicillin      -  Ampicillin/Sulbactam: I 16/8 Enterobacter, Citrobacter, and Serratia may develop resistance during prolonged therapy (3-4 days)      -  Aztreonam: S <=4      -  Cefazolin: S <=2 (MIC_CL_COM_ENTERIC_CEFAZU) For uncomplicated UTI with K. pneumoniae, E. coli, or P. mirablis: IRIS <=16 is sensitive and IRIS >=32 is resistant. This also predicts results for oral agents cefaclor, cefdinir, cefpodoxime, cefprozil, cefuroxime axetil, cephalexin and locarbef for uncomplicated UTI. Note that some isolates may be susceptible to these agents while testing resistant to cefazolin.      -  Cefepime: S <=2      -  Cefoxitin: S <=8      -  Ceftriaxone: S <=1 Enterobacter, Citrobacter, and Serratia may develop resistance during prolonged therapy      -  Ciprofloxacin: S <=0.25      -  Ertapenem: S <=0.5      -  Gentamicin: S <=2      -  Imipenem: S <=1      -  Levofloxacin: S <=0.5      -  Meropenem: S <=1      -  Nitrofurantoin: S <=32 Should not be used to treat pyelonephritis      -  Piperacillin/Tazobactam: S <=8      -  Tigecycline: S <=2      -  Tobramycin: S <=2      -  Trimethoprim/Sulfamethoxazole: S <=0.5/9.5      Method Type: IRIS    Culture - Blood (collected 11-28-20 @ 11:27)  Source: .Blood Blood-Peripheral    Culture - Blood (collected 11-28-20 @ 11:27)  Source: .Blood Blood-Peripheral    WBC Count: 6.92 K/uL (11-30-20 @ 08:04)  WBC Count: 7.90 K/uL (11-29-20 @ 08:16)  WBC Count: 12.10 K/uL (11-28-20 @ 06:53)    Creatinine, Serum: 1.00 mg/dL (11-30-20 @ 08:04)  Creatinine, Serum: 1.10 mg/dL (11-29-20 @ 08:16)  Creatinine, Serum: 1.30 mg/dL (11-28-20 @ 06:53)    C-Reactive Protein, Serum: 12.26 mg/dL (11-28-20 @ 16:53)    Procalcitonin, Serum: 0.16 ng/mL (11-28-20 @ 14:44)     COVID-19 IgG Antibody Index: <0.10 Index (11-28-20 @ 16:39)  COVID-19 IgG Antibody Interpretation: Negative (11-28-20 @ 16:39)  COVID-19 PCR: NotDetec (11-28-20 @ 06:53)    All imaging and other data have been reviewed.  < from: CT Chest No Cont (11.28.20 @ 08:36) >  IMPRESSION:  Mild groundglass patchy opacities in the right middle lobe and right lower lobe inferiorly raising concern for infection. Clinical correlation is necessary.  Moderate COPD. Gastrostomy tube. Hiatal hernia. Borderline size right external iliac lymph node which is mildly enlarged compared with 2015 and nonspecific.    Assessment and Plan:   71 y/o woman with PMH of RA, lymphedema, recent cervical spine surgery, laminectomy at [facility] was admitted with nausea and vomiting for one day.   After surgery as per her about 3 weeks ago, she had a prolonged hospital course complicated by C. diff infection and dysphagia (s/p PEG tube placement). Patient returned home on 11/24/20, after rehab stay. Since one day PTA she started getting abdominal pain with nausea and vomiting.  GI symptoms could be related to sepsis syndrome form UTI or pneumonia especially aspiration due to problem with swallowing and episodes of vomiting.   Since had C diff recently she could get recurrence so will start po vanco to prevent it.     UTI, Possible pneumonia (Aspiration vs HCAP), PEG tube, recent C diff, recent laminectomy   - Blood culture x 2 NGTD  - UA with high WBC and nitrate, UC growing ecoli, pansensitive   - Chest CT with RML and RLL GGO could be aspiration or pneumonia since recently was in the hospital    - Nasal PCR and urinary Legionella ag are pending  - Procalcitonin 0.16 HCAP less likely    - Leukocytosis 12k-->7k  - Continue Zosyn 3.375gm q8h to cover aspiration as well as UTI  - Can switch to oral Augmentin 875mg q12 when ready for discharge to complete total 7days from start of antibiotics.   - Can stop po vancomycin     Will follow.    Marito Torre MD  Division of Infectious Diseases   Cell [phone] between 8am and 6pm   After 6pm and weekends please call ID service at [phone].

## 2020-11-30 NOTE — PROGRESS NOTE ADULT - SUBJECTIVE AND OBJECTIVE BOX
Name: VERONICA WHALEN  MRN: [id number]  LOCATION: [place] [address]    ----  Patient is a 70y old  Female who presents with a chief complaint of abd pain, vomiting (30 Nov 2020 14:15)      FROM ADMISSION H+P:   HPI:  71 y/o F PMHx lymphedema, HLD, RA, [facility] ED with complaint of nausea and vomiting x1 day. Patient with recent cervical spine surgery past month with prolonged hospital course complicated by C. diff infection (completed treatment course) and dysphagia (s/p PEG tube placement). Patient returned home on 11/24/20, states she was doing well until yesterday when she began experiencing abdominal pain, followed by nausea and then had episode of vomiting. Patient states vomitus was "brownish" in color.  Patient states they placed PEG because she was having difficulty swallowing, but by the time she came home, she was tolerating oral intake. As per daughter, patient was sent home on soft diet with nectar thick liquids. Patient states at home she has been using a walker to ambulate, but felt weak the past day. Patient denied any CP, SOB, palpitations, lightheadedness, dizziness, dysuria, cough, sputum production.  Of note, daughter states they have been trying to set up an appointment to have PEG tube removed as outpatient, but would prefer if patient could have procedure done while at hospital.    ----  INTERVAL HPI/OVERNIGHT EVENTS: Pt seen and evaluated at the bedside. No acute overnight events occurred. The patient admits po intolerance without her bottom dentures in place. She also feels tired. Denies coughing. Denies urinary symptoms. She admits that she doesn't really want to discuss any more of my medical concerns and wants to be discharged home. She wants the peg tube to come despite the fact that she's using it every day to supplement potassium because she can't tolerate the po potassium. She also states that she's not tolerating the po thickened liquids because they aren't satisfying her thirst so she instead chooses to drink regular liquids wtihout any thickening ("this might be the reason I'm here" - counseling provided, education provided, emotional support provided). She disregarded my other concerns (hypokalemia, clinical dehydration, depressed mood, lack of adherence to medical recommendations) and informed me that she would prefer to not discuss these concerns any further and she's considering leaving the hospital AMA.     ----  PAST MEDICAL & SURGICAL HISTORY:  Obesity (BMI 30-39.9)    Traumatic arthropathy involving lower leg  right    History of Osteoarthritis    Hyperlipidemia    Restless Leg Syndrome    H/O neck surgery    H/O arthroscopy of right knee  2010    Bladder Disorder  Bladder lift 2000    History of Colonoscopy    Ex lap in 2009 for abscess in the baldder    History of Hysterectomy and bladder lift in 2000        FAMILY HISTORY:  No pertinent family history in first degree relatives        Allergies    No Known Allergies    Intolerances        ----  ANTIMICROBIALS:  hydroxychloroquine 400 milliGRAM(s) Oral daily  piperacillin/tazobactam IVPB.. 3.375 Gram(s) IV Intermittent every 8 hours    CARDIOVASCULAR:  torsemide 20 milliGRAM(s) Oral two times a day    GASTROINTESTINAL:  sulfaSALAzine 1500 milliGRAM(s) Oral three times a day    PULMONARY:  ALBUTerol    90 MICROgram(s) HFA Inhaler 2 Puff(s) Inhalation every 4 hours PRN      ----  REVIEW OF SYSTEMS:  comprehensive ROS as per HPI     ----  PHYSICAL EXAM:  GENERAL: patient appears pale, chronically ill, weak, debilitated, sitting up in chair at bedside  ENMT: oropharynx clear without erythema, dry mucous membranes, dry lips  LUNGS: reduced air entry b/l, no bibasilar rales, no accessory muscle use or tachypnea  HEART: soft S1/S2, regular rate and rhythm, no murmurs noted, has chronic appearing firm 2+ pitting lymphedema in b/l LE  GASTROINTESTINAL: abdomen is soft, nontender, nondistended, normoactive bowel sounds, no palpable masses  MUSCULOSKELETAL: no clubbing or cyanosis, no obvious deformity  NEUROLOGIC: awake, alert, readily interactive, good muscle tone in 4 extremities    T(C): 36.7 (11-30-20 @ 17:46), Max: 36.7 (11-30-20 @ 09:48)  HR: 88 (11-30-20 @ 14:58) (73 - 88)  BP: 114/70 (11-30-20 @ 17:46) (102/60 - 119/68)  RR: 17 (11-30-20 @ 14:58) (16 - 17)  SpO2: 96% (11-30-20 @ 14:58) (96% - 98%)  Wt(kg): --    ----  INTAKE & OUTPUT:  I&O's Summary    29 Nov 2020 07:01  -  30 Nov 2020 07:00  --------------------------------------------------------  IN: 0 mL / OUT: 300 mL / NET: -300 mL        LABS:                        8.7    6.92  )-----------( 318      ( 30 Nov 2020 08:04 )             27.6     11-30    143  |  108  |  11  ----------------------------<  82  3.1<L>   |  29  |  1.00    Ca    8.4<L>      30 Nov 2020 08:04  Phos  4.0     11-30  Mg     2.0     11-30    TPro  6.8  /  Alb  2.4<L>  /  TBili  0.2  /  DBili  <.10  /  AST  21  /  ALT  25  /  AlkPhos  69  11-30        CAPILLARY BLOOD GLUCOSE            Culture - Urine (collected 11-28-20 @ 11:28)  Source: .Urine Catheterized  Final Report (11-30-20 @ 08:56):    >100,000 CFU/ml Escherichia coli  Organism: Escherichia coli (11-30-20 @ 08:56)  Organism: Escherichia coli (11-30-20 @ 08:56)      -  Amikacin: S <=16      -  Amoxicillin/Clavulanic Acid: S <=8/4      -  Ampicillin: R >16 These ampicillin results predict results for amoxicillin      -  Ampicillin/Sulbactam: I 16/8 Enterobacter, Citrobacter, and Serratia may develop resistance during prolonged therapy (3-4 days)      -  Aztreonam: S <=4      -  Cefazolin: S <=2 (MIC_CL_COM_ENTERIC_CEFAZU) For uncomplicated UTI with K. pneumoniae, E. coli, or P. mirablis: IRIS <=16 is sensitive and IRIS >=32 is resistant. This also predicts results for oral agents cefaclor, cefdinir, cefpodoxime, cefprozil, cefuroxime axetil, cephalexin and locarbef for uncomplicated UTI. Note that some isolates may be susceptible to these agents while testing resistant to cefazolin.      -  Cefepime: S <=2      -  Cefoxitin: S <=8      -  Ceftriaxone: S <=1 Enterobacter, Citrobacter, and Serratia may develop resistance during prolonged therapy      -  Ciprofloxacin: S <=0.25      -  Ertapenem: S <=0.5      -  Gentamicin: S <=2      -  Imipenem: S <=1      -  Levofloxacin: S <=0.5      -  Meropenem: S <=1      -  Nitrofurantoin: S <=32 Should not be used to treat pyelonephritis      -  Piperacillin/Tazobactam: S <=8      -  Tigecycline: S <=2      -  Tobramycin: S <=2      -  Trimethoprim/Sulfamethoxazole: S <=0.5/9.5      Method Type: IRIS    Culture - Blood (collected 11-28-20 @ 11:27)  Source: .Blood Blood-Peripheral  Preliminary Report (11-29-20 @ 12:01):    No growth to date.    Culture - Blood (collected 11-28-20 @ 11:27)  Source: .Blood Blood-Peripheral  Preliminary Report (11-29-20 @ 12:01):    No growth to date.     Statement Selected

## 2020-12-01 ENCOUNTER — TRANSCRIPTION ENCOUNTER (OUTPATIENT)
Age: 70
End: 2020-12-01

## 2020-12-01 VITALS
RESPIRATION RATE: 20 BRPM | SYSTOLIC BLOOD PRESSURE: 127 MMHG | TEMPERATURE: 98 F | DIASTOLIC BLOOD PRESSURE: 74 MMHG | HEART RATE: 85 BPM | OXYGEN SATURATION: 95 %

## 2020-12-01 LAB
ANION GAP SERPL CALC-SCNC: 9 MMOL/L — SIGNIFICANT CHANGE UP (ref 5–17)
BUN SERPL-MCNC: 9 MG/DL — SIGNIFICANT CHANGE UP (ref 7–23)
CALCIUM SERPL-MCNC: 9.1 MG/DL — SIGNIFICANT CHANGE UP (ref 8.5–10.1)
CHLORIDE SERPL-SCNC: 104 MMOL/L — SIGNIFICANT CHANGE UP (ref 96–108)
CO2 SERPL-SCNC: 29 MMOL/L — SIGNIFICANT CHANGE UP (ref 22–31)
CREAT SERPL-MCNC: 1.1 MG/DL — SIGNIFICANT CHANGE UP (ref 0.5–1.3)
FERRITIN SERPL-MCNC: 128 NG/ML — SIGNIFICANT CHANGE UP (ref 15–150)
FOLATE SERPL-MCNC: 7.5 NG/ML — SIGNIFICANT CHANGE UP
GLUCOSE SERPL-MCNC: 97 MG/DL — SIGNIFICANT CHANGE UP (ref 70–99)
HCT VFR BLD CALC: 33.3 % — LOW (ref 34.5–45)
HGB BLD-MCNC: 10.6 G/DL — LOW (ref 11.5–15.5)
IRON SATN MFR SERPL: 12 % — LOW (ref 14–50)
IRON SATN MFR SERPL: 32 UG/DL — SIGNIFICANT CHANGE UP (ref 30–160)
MAGNESIUM SERPL-MCNC: 1.8 MG/DL — SIGNIFICANT CHANGE UP (ref 1.6–2.6)
MCHC RBC-ENTMCNC: 28 PG — SIGNIFICANT CHANGE UP (ref 27–34)
MCHC RBC-ENTMCNC: 31.8 GM/DL — LOW (ref 32–36)
MCV RBC AUTO: 87.9 FL — SIGNIFICANT CHANGE UP (ref 80–100)
NRBC # BLD: 0 /100 WBCS — SIGNIFICANT CHANGE UP (ref 0–0)
OB PNL STL: NEGATIVE — SIGNIFICANT CHANGE UP
PHOSPHATE SERPL-MCNC: 3.9 MG/DL — SIGNIFICANT CHANGE UP (ref 2.5–4.5)
PLATELET # BLD AUTO: 357 K/UL — SIGNIFICANT CHANGE UP (ref 150–400)
POTASSIUM SERPL-MCNC: 3.4 MMOL/L — LOW (ref 3.5–5.3)
POTASSIUM SERPL-SCNC: 3.4 MMOL/L — LOW (ref 3.5–5.3)
RBC # BLD: 3.79 M/UL — LOW (ref 3.8–5.2)
RBC # FLD: 14.6 % — HIGH (ref 10.3–14.5)
SODIUM SERPL-SCNC: 142 MMOL/L — SIGNIFICANT CHANGE UP (ref 135–145)
TIBC SERPL-MCNC: 273 UG/DL — SIGNIFICANT CHANGE UP (ref 220–430)
UIBC SERPL-MCNC: 241 UG/DL — SIGNIFICANT CHANGE UP (ref 110–370)
VIT B12 SERPL-MCNC: 476 PG/ML — SIGNIFICANT CHANGE UP (ref 232–1245)
WBC # BLD: 10.06 K/UL — SIGNIFICANT CHANGE UP (ref 3.8–10.5)
WBC # FLD AUTO: 10.06 K/UL — SIGNIFICANT CHANGE UP (ref 3.8–10.5)

## 2020-12-01 PROCEDURE — 99239 HOSP IP/OBS DSCHRG MGMT >30: CPT

## 2020-12-01 PROCEDURE — 74230 X-RAY XM SWLNG FUNCJ C+: CPT | Mod: 26

## 2020-12-01 PROCEDURE — 99232 SBSQ HOSP IP/OBS MODERATE 35: CPT

## 2020-12-01 RX ORDER — LIDOCAINE 4 G/100G
1 CREAM TOPICAL
Qty: 10 | Refills: 0
Start: 2020-12-01 | End: 2020-12-10

## 2020-12-01 RX ORDER — NYSTATIN CREAM 100000 [USP'U]/G
1 CREAM TOPICAL
Qty: 1 | Refills: 0
Start: 2020-12-01 | End: 2020-12-05

## 2020-12-01 RX ORDER — MULTIVIT-MIN/FERROUS GLUCONATE 9 MG/15 ML
1 LIQUID (ML) ORAL
Qty: 0 | Refills: 0 | DISCHARGE
Start: 2020-12-01

## 2020-12-01 RX ORDER — ACETAMINOPHEN 500 MG
2 TABLET ORAL
Qty: 0 | Refills: 0 | DISCHARGE
Start: 2020-12-01

## 2020-12-01 RX ADMIN — Medication 1 TABLET(S): at 12:04

## 2020-12-01 RX ADMIN — PIPERACILLIN AND TAZOBACTAM 25 GRAM(S): 4; .5 INJECTION, POWDER, LYOPHILIZED, FOR SOLUTION INTRAVENOUS at 06:07

## 2020-12-01 RX ADMIN — LIDOCAINE 1 PATCH: 4 CREAM TOPICAL at 12:38

## 2020-12-01 RX ADMIN — ENOXAPARIN SODIUM 40 MILLIGRAM(S): 100 INJECTION SUBCUTANEOUS at 12:32

## 2020-12-01 RX ADMIN — Medication 20 MILLIGRAM(S): at 06:06

## 2020-12-01 RX ADMIN — NYSTATIN CREAM 1 APPLICATION(S): 100000 CREAM TOPICAL at 06:07

## 2020-12-01 RX ADMIN — Medication 400 MILLIGRAM(S): at 12:32

## 2020-12-01 RX ADMIN — Medication 1500 MILLIGRAM(S): at 06:06

## 2020-12-01 RX ADMIN — LIDOCAINE 1 PATCH: 4 CREAM TOPICAL at 00:26

## 2020-12-01 RX ADMIN — MUPIROCIN 1 APPLICATION(S): 20 OINTMENT TOPICAL at 06:07

## 2020-12-01 NOTE — DISCHARGE NOTE PROVIDER - NSDCCPCAREPLAN_GEN_ALL_CORE_FT
PRINCIPAL DISCHARGE DIAGNOSIS  Diagnosis: Pneumonia  Assessment and Plan of Treatment: Concern for aspiration. Recommend to continue your diet as directed by speech language pathology department. Recommend strict aspiration precautions as we discussed.      SECONDARY DISCHARGE DIAGNOSES  Diagnosis: Dysphagia  Assessment and Plan of Treatment: Recommend to utilize the peg tube as needed. Can follow up with [facility] if needed.

## 2020-12-01 NOTE — SWALLOW VFSS/MBS ASSESSMENT ADULT - RECOMMENDED FEEDING/EATING TECHNIQUES
alternate food with liquid/maintain upright posture during/after eating for 30 mins/no straws/position upright (90 degrees)/small sips/bites/allow for swallow between intakes/crush medication (when feasible)/oral hygiene/provide rest periods between swallows

## 2020-12-01 NOTE — PROGRESS NOTE ADULT - SUBJECTIVE AND OBJECTIVE BOX
[facility]  INFECTIOUS DISEASES   [address] [place] [address]  Tel: [phone]     Fax: [phone]  =======================================================    MRN-[id number]  VERONICA GORUDYEFF     Follow up; UTI and possible pneumonia     No vomiting, no abdominal pain. Eating food and not using PEG.   No fever.     PAST MEDICAL & SURGICAL HISTORY:  Obesity (BMI 30-39.9)  Traumatic arthropathy involving lower leg  right  History of Osteoarthritis  Hyperlipidemia  Restless Leg Syndrome  H/O neck surgery  H/O arthroscopy of right knee  2010  Bladder Disorder  Bladder lift 2000  History of Colonoscopy  Ex lap in 2009 for abscess in the baldder  History of Hysterectomy and bladder lift in 2000    Social Hx: No smoking, ETOH or drugs     FAMILY HISTORY:  No pertinent family history in first degree relatives    Allergies  No Known Allergies    Antibiotics:  Ceftriaxone and azithromycin in ED  Zosyn     REVIEW OF SYSTEMS:  CONSTITUTIONAL:  No Fever or chills  HEENT:  No diplopia or blurred vision.  No sore throat or runny nose.  CARDIOVASCULAR:  No chest pain or SOB.  RESPIRATORY:  No cough, shortness of breath, PND or orthopnea.  GASTROINTESTINAL: no nausea, vomiting, no diarrhea. no abdominal pain   GENITOURINARY:  No dysuria, frequency or urgency. No Blood in urine  MUSCULOSKELETAL:  no joint aches, no muscle pain  SKIN:  No change in skin, hair or nails.  NEUROLOGIC:  No paresthesias, fasciculations, seizures or weakness.  PSYCHIATRIC:  No disorder of thought or mood.  ENDOCRINE:  No heat or cold intolerance, polyuria or polydipsia.  HEMATOLOGICAL:  No easy bruising or bleeding.     Physical Exam:  Vital Signs Last 24 Hrs  T(C): 36.8 (01 Dec 2020 12:31), Max: 36.8 (01 Dec 2020 12:31)  T(F): 98.2 (01 Dec 2020 12:31), Max: 98.2 (01 Dec 2020 12:31)  HR: 85 (01 Dec 2020 12:31) (77 - 102)  BP: 127/74 (01 Dec 2020 12:31) (113/64 - 131/66)  BP(mean): --  RR: 20 (01 Dec 2020 12:31) (17 - 20)  SpO2: 95% (01 Dec 2020 12:31) (93% - 95%)  GEN: NAD  HEENT: normocephalic and atraumatic. EOMI. PERRL.    NECK: Supple.  No lymphadenopathy   LUNGS: Clear to auscultation.  HEART: Regular rate and rhythm   ABDOMEN: Soft, moderate tenderness on epigastric area, PEG in place, looks fine. Positive bowel sounds.    : No CVA tenderness  EXTREMITIES: Without edema.  NEUROLOGIC: grossly intact.  PSYCHIATRIC: Appropriate affect .  SKIN: No ulceration or induration present.    Labs:                        10.6   10.06 )-----------( 357      ( 01 Dec 2020 12:14 )             33.3      12-01    142  |  104  |  9   ----------------------------<  97  3.4<L>   |  29  |  1.10    Ca    9.1      01 Dec 2020 12:14  Phos  3.9     12-01  Mg     1.8     12-01    TPro  6.8  /  Alb  2.4<L>  /  TBili  0.2  /  DBili  <.10  /  AST  21  /  ALT  25  /  AlkPhos  69  11-30    Culture - Urine (collected 11-28-20 @ 11:28)  Source: .Urine Catheterized  Final Report (11-30-20 @ 08:56):    >100,000 CFU/ml Escherichia coli  Organism: Escherichia coli (11-30-20 @ 08:56)  Organism: Escherichia coli (11-30-20 @ 08:56)    Sensitivities:      -  Amikacin: S <=16      -  Amoxicillin/Clavulanic Acid: S <=8/4      -  Ampicillin: R >16 These ampicillin results predict results for amoxicillin      -  Ampicillin/Sulbactam: I 16/8 Enterobacter, Citrobacter, and Serratia may develop resistance during prolonged therapy (3-4 days)      -  Aztreonam: S <=4      -  Cefazolin: S <=2 (MIC_CL_COM_ENTERIC_CEFAZU) For uncomplicated UTI with K. pneumoniae, E. coli, or P. mirablis: IRIS <=16 is sensitive and IRIS >=32 is resistant. This also predicts results for oral agents cefaclor, cefdinir, cefpodoxime, cefprozil, cefuroxime axetil, cephalexin and locarbef for uncomplicated UTI. Note that some isolates may be susceptible to these agents while testing resistant to cefazolin.      -  Cefepime: S <=2      -  Cefoxitin: S <=8      -  Ceftriaxone: S <=1 Enterobacter, Citrobacter, and Serratia may develop resistance during prolonged therapy      -  Ciprofloxacin: S <=0.25      -  Ertapenem: S <=0.5      -  Gentamicin: S <=2      -  Imipenem: S <=1      -  Levofloxacin: S <=0.5      -  Meropenem: S <=1      -  Nitrofurantoin: S <=32 Should not be used to treat pyelonephritis      -  Piperacillin/Tazobactam: S <=8      -  Tigecycline: S <=2      -  Tobramycin: S <=2      -  Trimethoprim/Sulfamethoxazole: S <=0.5/9.5      Method Type: IRIS    Culture - Blood (collected 11-28-20 @ 11:27)  Source: .Blood Blood-Peripheral    Culture - Blood (collected 11-28-20 @ 11:27)  Source: .Blood Blood-Peripheral    WBC Count: 10.06 K/uL (12-01-20 @ 12:14)  WBC Count: 6.92 K/uL (11-30-20 @ 08:04)  WBC Count: 7.90 K/uL (11-29-20 @ 08:16)  WBC Count: 12.10 K/uL (11-28-20 @ 06:53)    Creatinine, Serum: 1.10 mg/dL (12-01-20 @ 12:14)  Creatinine, Serum: 1.00 mg/dL (11-30-20 @ 08:04)  Creatinine, Serum: 1.10 mg/dL (11-29-20 @ 08:16)  Creatinine, Serum: 1.30 mg/dL (11-28-20 @ 06:53)    C-Reactive Protein, Serum: 12.26 mg/dL (11-28-20 @ 16:53)    Procalcitonin, Serum: 0.16 ng/mL (11-28-20 @ 14:44)     COVID-19 IgG Antibody Index: <0.10 Index (11-28-20 @ 16:39)  COVID-19 IgG Antibody Interpretation: Negative (11-28-20 @ 16:39)  COVID-19 PCR: NotDetec (11-28-20 @ 06:53)    All imaging and other data have been reviewed.  < from: CT Chest No Cont (11.28.20 @ 08:36) >  IMPRESSION:  Mild groundglass patchy opacities in the right middle lobe and right lower lobe inferiorly raising concern for infection. Clinical correlation is necessary.  Moderate COPD. Gastrostomy tube. Hiatal hernia. Borderline size right external iliac lymph node which is mildly enlarged compared with 2015 and nonspecific.    Assessment and Plan:   69 y/o woman with PMH of RA, lymphedema, recent cervical spine surgery, laminectomy at [facility] was admitted with nausea and vomiting for one day.   After surgery as per her about 3 weeks ago, she had a prolonged hospital course complicated by C. diff infection and dysphagia (s/p PEG tube placement). Patient returned home on 11/24/20, after rehab stay. Since one day PTA she started getting abdominal pain with nausea and vomiting.  GI symptoms could be related to sepsis syndrome form UTI or pneumonia especially aspiration due to problem with swallowing and episodes of vomiting.   Since had C diff recently she could get recurrence so will start po vanco to prevent it.     UTI, Possible pneumonia (Aspiration vs HCAP), PEG tube, recent C diff, recent laminectomy   - Blood culture x 2 NGTD  - UA with high WBC and nitrate, UC growing ecoli, pansensitive   - Chest CT with RML and RLL GGO could be aspiration or pneumonia since recently was in the hospital    - Nasal PCR and urinary Legionella ag are pending  - Procalcitonin 0.16 HCAP less likely    - Leukocytosis better.   - Can switch to oral Augmentin 875mg q12 when ready for discharge to complete total 7days from start of antibiotics.   - Can stop po vancomycin   - PEG removal will be in about 3 weeks.     Will sign off please call with any question.     Marito Torre MD  Division of Infectious Diseases   Cell [phone] between 8am and 6pm   After 6pm and weekends please call ID service at [phone].

## 2020-12-01 NOTE — SWALLOW VFSS/MBS ASSESSMENT ADULT - DIAGNOSTIC IMPRESSIONS
1. The patient demonstrated functional oral management of puree, honey thick, nectar thick, and thin liquid textures marked by adequate oral acceptance with adequate bolus collection, transfer, and AP transit time.  2. 1. The patient demonstrated functional oral management of puree, honey thick, nectar thick, and thin liquid textures marked by adequate oral acceptance with adequate bolus collection, transfer, and AP transit time.  2. The patient demonstrated a mild oral dysphagia for mechanical soft solids marked by adequate oral acceptance with prolonged oral manipulation and increased mastication time likely 2/2 absent lower dentition resulting in delayed bolus collection, transfer, and AP transit time.  3. The patient demonstrated a mild pharyngeal dysphagia for puree, mechanical soft solid, honey thick, and nectar thick liquids marked by a timely pharyngeal swallow trigger with reduced base of tongue retraction, reduced epiglottic deflection, reduced hyolaryngeal elevation, and reduced pharyngeal contractility resulting in trace stasis along the base of tongue, mild-moderate stasis in the vallecula, trace stasis along the posterior pharyngeal wall, and mild stasis in the pyriforms which reduced with a spontaneous re-swallow. No laryngeal penetration/aspiration observed.  4. The patient demonstrated a mild-moderate pharyngeal dysphagia for thin liquids marked by a timely pharyngeal swallow trigger with reduced base of tongue retraction, reduced epiglottic deflection, reduced hyolaryngeal elevation, and reduced pharyngeal contractility resulting in trace stasis along the base of tongue, mild stasis in the vallecula, and trace stasis along the posterior pharyngeal wall and in the pyriforms which reduced with a subsequent swallow. Incomplete closure of the laryngeal vestibule was observed on consecutive cup sips of thin liquids resulting in laryngeal penetration without full retrieval. Patient exhibited throat clear response which did not fully eject barium from the upper airway. Laryngeal penetration was eliminated when the patient self-fed small, single cup sips of thin liquids.

## 2020-12-01 NOTE — DISCHARGE NOTE NURSING/CASE MANAGEMENT/SOCIAL WORK - PATIENT PORTAL LINK FT
You can access the FollowMyHealth Patient Portal offered by [facility] by registering at the following website: [email or web address]. By joining [facility] FollowMyHealth portal, you will also be able to view your health information using other applications (apps) compatible with our system.

## 2020-12-01 NOTE — DISCHARGE NOTE PROVIDER - NSDCMRMEDTOKEN_GEN_ALL_CORE_FT
acetaminophen 325 mg oral tablet: 2 tab(s) orally every 6 hours, As needed, Mild Pain (1 - 3)  Acidophilus oral tablet: 1 tab(s) orally once a day  amoxicillin-clavulanate 875 mg-125 mg oral tablet: 1 tab(s) orally every 12 hours   biotin 5000 mcg oral tablet, disintegratin tab(s) orally once a day  cyclobenzaprine 10 mg oral tablet: 1 tab(s) orally 3 times a day, As Needed  hydroxychloroquine 200 mg oral tablet: 2 tab(s) orally once a day    *As per daughter, medication held during her stay at [facility]- Dr. Hollingsworth aware  lidocaine 5% topical film: Apply topically to affected area once a day   Multiple Vitamins with Minerals oral tablet: 1 tab(s) orally once a day  nystatin 100,000 units/g topical powder: 1 application topically 2 times a day to affected area  oxyCODONE 10 mg oral tablet: 1-2 tab(s) orally every 4 hours, As Needed  potassium chloride 20 mEq/15 mL oral liquid: 15 milliliter(s) orally 2 times a day  pramipexole 1.5 mg oral tablet, extended release: 1 tab(s) orally once a day (in the evening)  sulfaSALAzine 500 mg oral tablet: 3 tab(s) orally 3 times a day    * as per daughter, patient has not taken this for the past few days- Dr. Hollingsworth aware  torsemide 20 mg oral tablet: 1 tab(s) orally 2 times a day

## 2020-12-01 NOTE — PROGRESS NOTE ADULT - REASON FOR ADMISSION
abd pain, vomiting

## 2020-12-01 NOTE — PROGRESS NOTE ADULT - NUTRITIONAL ASSESSMENT
This patient has been assessed with a concern for Malnutrition and has been determined to have a diagnosis/diagnoses of Moderate protein-calorie malnutrition.    This patient is being managed with:   Diet Dysphagia 2 Mechanical Soft-Nectar Consistency Fluid-  Supplement Feeding Modality:  Oral  Ensure Pudding Cans or Servings Per Day:  1       Frequency:  Two Times a day  Entered: Nov 30 2020 11:32AM    Diet Dysphagia 2 Mechanical Soft-Nectar Consistency Fluid-  Entered: Nov 28 2020 10:24AM    The following pending diet order is being considered for treatment of Moderate protein-calorie malnutrition:null
This patient has been assessed with a concern for Malnutrition and has been determined to have a diagnosis/diagnoses of Moderate protein-calorie malnutrition.    This patient is being managed with:   Diet Dysphagia 2 Mechanical Soft-Nectar Consistency Fluid-  Supplement Feeding Modality:  Oral  Ensure Pudding Cans or Servings Per Day:  1       Frequency:  Two Times a day  Entered: Nov 30 2020 11:32AM    
This patient has been assessed with a concern for Malnutrition and has been determined to have a diagnosis/diagnoses of Moderate protein-calorie malnutrition.    This patient is being managed with:   Diet Dysphagia 2 Mechanical Soft-Nectar Consistency Fluid-  Supplement Feeding Modality:  Oral  Ensure Pudding Cans or Servings Per Day:  1       Frequency:  Two Times a day  Entered: Nov 30 2020 11:32AM    Diet Dysphagia 2 Mechanical Soft-Nectar Consistency Fluid-  Entered: Nov 28 2020 10:24AM    The following pending diet order is being considered for treatment of Moderate protein-calorie malnutrition:null
This patient has been assessed with a concern for Malnutrition and has been determined to have a diagnosis/diagnoses of Moderate protein-calorie malnutrition.    This patient is being managed with:   Diet Dysphagia 2 Mechanical Soft-Nectar Consistency Fluid-  Supplement Feeding Modality:  Oral  Ensure Pudding Cans or Servings Per Day:  1       Frequency:  Two Times a day  Entered: Nov 30 2020 11:32AM    
This patient has been assessed with a concern for Malnutrition and has been determined to have a diagnosis/diagnoses of Moderate protein-calorie malnutrition.    This patient is being managed with:   Diet Dysphagia 2 Mechanical Soft-Nectar Consistency Fluid-  Supplement Feeding Modality:  Oral  Ensure Pudding Cans or Servings Per Day:  1       Frequency:  Two Times a day  Entered: Nov 30 2020 11:32AM

## 2020-12-01 NOTE — PROGRESS NOTE ADULT - SUBJECTIVE AND OBJECTIVE BOX
Date/Time Patient Seen:  		  Referring MD:   Data Reviewed	       Patient is a 70y old  Female who presents with a chief complaint of abd pain, vomiting (30 Nov 2020 18:41)      Subjective/HPI     PAST MEDICAL & SURGICAL HISTORY:  Obesity (BMI 30-39.9)    Traumatic arthropathy involving lower leg  right    History of Osteoarthritis    Hyperlipidemia    Restless Leg Syndrome    H/O neck surgery    H/O arthroscopy of right knee  2010    Bladder Disorder  Bladder lift 2000    History of Colonoscopy    Ex lap in 2009 for abscess in the baldder    History of Hysterectomy and bladder lift in 2000          Medication list         MEDICATIONS  (STANDING):  enoxaparin Injectable 40 milliGRAM(s) SubCutaneous daily  hydroxychloroquine 400 milliGRAM(s) Oral daily  lactobacillus acidophilus 1 Tablet(s) Oral two times a day with meals  lidocaine   Patch 1 Patch Transdermal daily  mupirocin 2% Ointment 1 Application(s) Both Nostrils two times a day  nystatin Powder 1 Application(s) Topical two times a day  piperacillin/tazobactam IVPB.. 3.375 Gram(s) IV Intermittent every 8 hours  pramipexole 1.5 milliGRAM(s) Oral <User Schedule>  sulfaSALAzine 1500 milliGRAM(s) Oral three times a day  torsemide 20 milliGRAM(s) Oral daily    MEDICATIONS  (PRN):  acetaminophen   Tablet .. 650 milliGRAM(s) Oral every 6 hours PRN Mild Pain (1 - 3)  ALBUTerol    90 MICROgram(s) HFA Inhaler 2 Puff(s) Inhalation every 4 hours PRN Shortness of Breath and/or Wheezing  cyclobenzaprine 10 milliGRAM(s) Oral three times a day PRN Muscle Spasm  oxycodone    5 mG/acetaminophen 325 mG 1 Tablet(s) Oral every 6 hours PRN Moderate Pain (4 - 6)  oxycodone    5 mG/acetaminophen 325 mG 2 Tablet(s) Oral every 6 hours PRN Severe Pain (7 - 10)         Vitals log        ICU Vital Signs Last 24 Hrs  T(C): 36.7 (01 Dec 2020 05:07), Max: 36.7 (30 Nov 2020 09:48)  T(F): 98 (01 Dec 2020 05:07), Max: 98.1 (30 Nov 2020 17:46)  HR: 77 (01 Dec 2020 05:07) (77 - 88)  BP: 131/66 (01 Dec 2020 05:07) (105/65 - 131/66)  BP(mean): --  ABP: --  ABP(mean): --  RR: 17 (01 Dec 2020 05:07) (16 - 17)  SpO2: 93% (01 Dec 2020 05:07) (93% - 98%)           Input and Output:  I&O's Detail    29 Nov 2020 07:01  -  30 Nov 2020 07:00  --------------------------------------------------------  IN:  Total IN: 0 mL    OUT:    Voided (mL): 300 mL  Total OUT: 300 mL    Total NET: -300 mL      30 Nov 2020 07:01  -  01 Dec 2020 06:20  --------------------------------------------------------  IN:    IV PiggyBack: 25 mL    IV PiggyBack: 100 mL    Oral Fluid: 950 mL  Total IN: 1075 mL    OUT:    Voided (mL): 1500 mL  Total OUT: 1500 mL    Total NET: -425 mL          Lab Data                        8.7    6.92  )-----------( 318      ( 30 Nov 2020 08:04 )             27.6     11-30    143  |  108  |  11  ----------------------------<  82  3.1<L>   |  29  |  1.00    Ca    8.4<L>      30 Nov 2020 08:04  Phos  4.0     11-30  Mg     2.0     11-30    TPro  6.8  /  Alb  2.4<L>  /  TBili  0.2  /  DBili  <.10  /  AST  21  /  ALT  25  /  AlkPhos  69  11-30            Review of Systems	      Objective     Physical Examination    heart s1s2  lung dc BS  abd soft  head nc      Pertinent Lab findings & Imaging      Katerine:  NO   Adequate UO     I&O's Detail    29 Nov 2020 07:01  -  30 Nov 2020 07:00  --------------------------------------------------------  IN:  Total IN: 0 mL    OUT:    Voided (mL): 300 mL  Total OUT: 300 mL    Total NET: -300 mL      30 Nov 2020 07:01  -  01 Dec 2020 06:20  --------------------------------------------------------  IN:    IV PiggyBack: 25 mL    IV PiggyBack: 100 mL    Oral Fluid: 950 mL  Total IN: 1075 mL    OUT:    Voided (mL): 1500 mL  Total OUT: 1500 mL    Total NET: -425 mL               Discussed with:     Cultures:	        Radiology

## 2020-12-01 NOTE — DISCHARGE NOTE PROVIDER - CARE PROVIDER_API CALL
ANURADHA BALTAZAR  Internal Medicine  [address] [place] [address]  Phone: [phone]  Fax: [phone]  Follow Up Time:

## 2020-12-01 NOTE — SWALLOW VFSS/MBS ASSESSMENT ADULT - RECOMMENDED CONSISTENCY
1. Mechanical soft solids with thin liquids, as tolerated  2. Small bites and SMALL, SINGLE cup sips  3. Alternate mechanical soft solids with thin liquids to assist in pharyngeal clearance  4. Upright positioning during all PO intake  5. Maintain aspiration precautions  6. Maintain strict oral care

## 2020-12-01 NOTE — DISCHARGE NOTE PROVIDER - HOSPITAL COURSE
FROM ADMISSION H+P:   HPI:  71 y/o F PMHx lymphedema, HLD, RA, recent cervical spine surgery at [facility] presented to ED with complaint of nausea and vomiting x1 day. Patient with recent cervical spine surgery past month with prolonged hospital course complicated by C. diff infection (completed treatment course) and dysphagia (s/p PEG tube placement). Patient returned home on 11/24/20, states she was doing well until yesterday when she began experiencing abdominal pain, followed by nausea and then had episode of vomiting. Patient states vomitus was "brownish" in color.  Patient states they placed PEG because she was having difficulty swallowing, but by the time she came home, she was tolerating oral intake. As per daughter, patient was sent home on soft diet with nectar thick liquids. Patient states at home she has been using a walker to ambulate, but felt weak the past day. Patient denied any CP, SOB, palpitations, lightheadedness, dizziness, dysuria, cough, sputum production.  Of note, daughter states they have been trying to set up an appointment to have PEG tube removed as outpatient, but would prefer if patient could have procedure done while at hospital.    ---  HOSPITAL COURSE:   Patient was admitted w/ aspiration pna and asymptomatic bacteriuria. She has recent history of clostridioides difficile infection so there was concern about utilizing broad spectrum abx. ID, pulm, GI were all consulted. Started on zosyn, tolerated the regimen well. She was started on supplemental O2 but she was weaned to room air without events. The positive urine culture is suspected asymptomatic bacteriuria.     ---  CONSULTANTS:   ID (Nicho)  GI ()  Pulm (Cb)    ---  TIME SPENT:  I, the attending physician, was physically present for the key portions of the evaluation and management (E/M) service provided. The total amount of time spent reviewing the hospital notes, laboratory values, imaging findings, assessing/counseling the patient, discussing with consultant physicians, social work, nursing staff was 51 minutes    ---  T(C): 36.8 (12-01-20 @ 12:31), Max: 36.8 (12-01-20 @ 12:31)  HR: 85 (12-01-20 @ 12:31) (77 - 102)  BP: 127/74 (12-01-20 @ 12:31) (105/65 - 131/66)  RR: 20 (12-01-20 @ 12:31) (17 - 20)  SpO2: 95% (12-01-20 @ 12:31) (93% - 96%)    PHYSICAL EXAM:  GENERAL: patient appears pale, chronically ill, weak, debilitated, sitting up in chair at bedside  ENMT: oropharynx clear without erythema, dry mucous membranes  LUNGS: reduced air entry b/l, no bibasilar rales, no accessory muscle use or tachypnea  HEART: soft S1/S2, regular rate and rhythm, no murmurs noted, has chronic appearing firm 2+ pitting lymphedema in b/l LE  GASTROINTESTINAL: abdomen is soft, nontender, nondistended, normoactive bowel sounds, no palpable masses, peg in place  MUSCULOSKELETAL: no clubbing or cyanosis, no obvious deformity  NEUROLOGIC: awake, alert, readily interactive, good muscle tone in 4 extremities

## 2020-12-01 NOTE — PROGRESS NOTE ADULT - PROBLEM SELECTOR PLAN 1
69 y/o F PMHx lymphedema, HLD, RA, recent cervical spine surgery at [facility] presented to ED with complaint of nausea and vomiting x1 day being admitted with PNA, UTI.  recent c spine surgery complicated by PEG placement - and C Diff -   poss PNA - CT chest reviewed - caution with ABX - recent C Diff Infection De - Escalation and ABX stewardship ...  curr on Zosyn and - CRP and Procalcitonin noted - doubt Legionella - nasal MRSA screen POSITIVE - ID eval reviewed -   keep HOB elev  Anti emetics  SLP eval noted - Dysphagia Diet - rec -   CT chest also shows Emphysema - pt is an ex smoker - will add Proventil PRN for sob and or wheezing  pt is on Plaquenil for RA  pt is on Torsemide for Lymphedema hx  appears weak and deconditioned - needs assist with ADL - PT assessment - fall prec  RLS - Mirapex regimen  DVT p.

## 2020-12-01 NOTE — SWALLOW VFSS/MBS ASSESSMENT ADULT - ADDITIONAL RECOMMENDATIONS
This department to continue to follow during this admission, as schedule permits. Continued swallowing therapy is recommended upon discharge from [facility]. Outpatient SLP services can be scheduled through the [facility] at [phone].

## 2020-12-01 NOTE — SWALLOW VFSS/MBS ASSESSMENT ADULT - NS SWALLOW VFSS REC ASPIR MON
upper respiratory infection/change of breathing pattern/position upright (90Y)/cough/gurgly voice/fever/pneumonia/throat clearing/oral hygiene

## 2020-12-01 NOTE — SWALLOW VFSS/MBS ASSESSMENT ADULT - COMMENTS
The patient was received in the radiology suite this AM for a Modified Barium Swallow Study, at which time she was alert and cooperative. Patient currently with cervical neck collar in place at baseline from recent cervical spinal surgery. The patient was initially seen by this department for a bedside swallow evaluation on 11/30/20 (please see full report for details), at which time a dysphagia 2 with nectar thick liquid diet and a Modified Barium Swallow Study were recommended.    Per charting, the patient is a "71 y/o F PMHx lymphedema, HLD, RA, recent cervical spine surgery at [facility] presented to ED with complaint of nausea and vomiting x1 day. Patient with recent cervical spine surgery past month with prolonged hospital course complicated by C. diff infection (completed treatment course) and dysphagia (s/p PEG tube placement). Patient returned home on 11/24/20, states she was doing well until yesterday when she began experiencing abdominal pain, followed by nausea and then had episode of vomiting. Patient states vomitus was "brownish" in color.  Patient states they placed PEG because she was having difficulty swallowing, but by the time she came home, she was tolerating oral intake. As per daughter, patient was sent home on soft diet with nectar thick liquids."    WBC is WFL. Most recent CT of the chest revealed, "Mild groundglass patchy opacities in the right middle lobe and right lower lobe inferiorly raising concern for infection. Clinical correlation is necessary. Moderate COPD. Gastrostomy tube. Hiatal hernia. Borderline size right external iliac lymph node which is mildly enlarged compared with 2015 and nonspecific."    Discussed results and recommendations from this evaluation with the patient and call out to MD.

## 2020-12-02 ENCOUNTER — NON-APPOINTMENT (OUTPATIENT)
Age: 70
End: 2020-12-02

## 2020-12-02 RX ORDER — POTASSIUM CHLORIDE 20 MEQ
20 TABLET, EXT RELEASE, PARTICLES/CRYSTALS ORAL
Refills: 0 | Status: DISCONTINUED | COMMUNITY
End: 2020-12-02

## 2020-12-02 RX ORDER — ACETAMINOPHEN 325 MG/1
325 TABLET ORAL EVERY 6 HOURS
Refills: 0 | Status: ACTIVE | COMMUNITY
Start: 2020-12-02

## 2020-12-02 RX ORDER — SULFASALAZINE 500 MG/1
500 TABLET ORAL 3 TIMES DAILY
Refills: 0 | Status: ACTIVE | COMMUNITY

## 2020-12-02 RX ORDER — LIDOCAINE 5% 700 MG/1
5 PATCH TOPICAL
Qty: 3 | Refills: 3 | Status: ACTIVE | COMMUNITY
Start: 2020-12-02

## 2020-12-22 NOTE — INPATIENT CERTIFICATION FOR MEDICARE PATIENTS - IN ORDER TO MEET MEDICARE REQUIREMENTS.
SUBJECTIVE:   The patient Lubna Martin is a 22 year old female.  The patient is here for general evaluation.  Here for lab to check on thyroid. Hx of hypothyroid labs in the past  2) due for pap  3) complains of itchy dry skin patch on L side of neck    Review of Systems   Constitutional: Negative.    HENT: Negative.    Respiratory: Negative for cough and shortness of breath.    Cardiovascular: Negative.    Gastrointestinal: Negative.    Genitourinary: Negative.  Negative for dysuria, menstrual problem and vaginal bleeding.        Periods are normal. Requests pap smear today   Skin:        Itchy dry patch of skin on L side of neck   Neurological: Negative.    Psychiatric/Behavioral: Negative.      Health Maintenance Summary     Varicella Vaccine (1 of 2 - 2-dose childhood series)  Overdue since 6/23/1999    HPV Vaccine (1 - 2-dose series)  Overdue since 6/23/2009    DTaP/Tdap/Td Vaccine (1 - Tdap)  Overdue since 6/23/2017    Cervical Cancer Screening (Every 3 Years)  Order placed this encounter    Depression Screening (Yearly)  Next due on 12/22/2021    Influenza Vaccine   Completed    Hepatitis B Vaccine   Aged Out    Meningococcal Vaccine   Aged Out    Pneumococcal Vaccine 0-64   Aged Out          CURRENT MEDS:  Current Outpatient Medications   Medication Sig Dispense Refill   • mometasone (ELOCON) 0.1 % cream Apply topically daily. 15 g 0   • ibuprofen (MOTRIN) 600 MG tablet Take 600 mg by mouth every 6 hours as needed for Pain.     • ofloxacin (FLOXIN) 0.3 % otic solution Place 10 drops into both ears 4 times daily. 5 mL 0   • norgestimate-ethinyl estradiol, triphasic, (Ortho Tri-Cyclen, 28,) 0.18/0.215/0.25 MG-35 MCG tablet Take 1 tablet by mouth daily. 84 tablet 3     No current facility-administered medications for this visit.        HISTORIES:    Past Medical History:   Diagnosis Date   • Scoliosis       Past Surgical History:   Procedure Laterality Date   • No past surgeries     • [place] tooth extraction         ALLERGIES:  No Known Allergies   Social History     Tobacco Use   • Smoking status: Never Smoker   • Smokeless tobacco: Never Used   Substance Use Topics   • Alcohol use: No     Frequency: Never   • Drug use: No     Family History   Problem Relation Age of Onset   • Cancer Sister              OBJECTIVE:  Visit Vitals  /70 (BP Location: RUE - Right upper extremity, Patient Position: Sitting, Cuff Size: Regular)   Pulse 96   Temp 98.1 °F (36.7 °C) (Tympanic)   Resp 16   Ht 5' 5\" (1.651 m)   Wt 56.6 kg (124 lb 12.8 oz)   LMP 11/21/2020 (Approximate)   SpO2 98%   BMI 20.77 kg/m²       Physical Exam   Constitutional: She is oriented to person, place, and time. She appears well-developed and well-nourished.   HENT:   Head: Normocephalic.   Bilateral canals with wax- no symptoms of pain or reduced hearing   Eyes: Conjunctivae are normal.   Neck: Normal range of motion. Neck supple. No thyromegaly present.   Cardiovascular: Normal rate, regular rhythm and normal heart sounds.   Pulmonary/Chest: Effort normal and breath sounds normal.   Abdominal: Soft. Bowel sounds are normal.   Genitourinary:    Vagina and uterus normal.      No vaginal discharge.     Musculoskeletal: Normal range of motion.         General: No edema.   Neurological: She is alert and oriented to person, place, and time.   Skin: Skin is warm and dry.   Has a small dry skin patch with pin point papules on L side of neck   Psychiatric: She has a normal mood and affect. Her behavior is normal.   Nursing note and vitals reviewed.      ASSESSMENT AND PLAN:  1. Acquired hypothyroidism    2. Screening for cervical cancer    3. Dermatitis due to unknown cause      Orders Placed This Encounter   • Thyroid Stimulating Hormone   • Free T4   • Free T3   • Pap Test   • mometasone (ELOCON) 0.1 % cream       Will report results as available.  Labs done today to check on thyroid      12/22/2020    Jovana Hernandez, TEENA         In order to meet Medicare requirements, the clinical documentation must support the information cited in the admission order.  Please be sure to provide detailed and clear documentation about the following in the admitting note/history and physical:

## 2020-12-28 PROCEDURE — 96374 THER/PROPH/DIAG INJ IV PUSH: CPT

## 2020-12-28 PROCEDURE — 87640 STAPH A DNA AMP PROBE: CPT

## 2020-12-28 PROCEDURE — 82150 ASSAY OF AMYLASE: CPT

## 2020-12-28 PROCEDURE — 86901 BLOOD TYPING SEROLOGIC RH(D): CPT

## 2020-12-28 PROCEDURE — 86140 C-REACTIVE PROTEIN: CPT

## 2020-12-28 PROCEDURE — 87641 MR-STAPH DNA AMP PROBE: CPT

## 2020-12-28 PROCEDURE — 80076 HEPATIC FUNCTION PANEL: CPT

## 2020-12-28 PROCEDURE — 86769 SARS-COV-2 COVID-19 ANTIBODY: CPT

## 2020-12-28 PROCEDURE — 83690 ASSAY OF LIPASE: CPT

## 2020-12-28 PROCEDURE — 87040 BLOOD CULTURE FOR BACTERIA: CPT

## 2020-12-28 PROCEDURE — 36415 COLL VENOUS BLD VENIPUNCTURE: CPT

## 2020-12-28 PROCEDURE — 71045 X-RAY EXAM CHEST 1 VIEW: CPT

## 2020-12-28 PROCEDURE — 74176 CT ABD & PELVIS W/O CONTRAST: CPT

## 2020-12-28 PROCEDURE — 86850 RBC ANTIBODY SCREEN: CPT

## 2020-12-28 PROCEDURE — 82803 BLOOD GASES ANY COMBINATION: CPT

## 2020-12-28 PROCEDURE — 82746 ASSAY OF FOLIC ACID SERUM: CPT

## 2020-12-28 PROCEDURE — 85025 COMPLETE CBC W/AUTO DIFF WBC: CPT

## 2020-12-28 PROCEDURE — 97116 GAIT TRAINING THERAPY: CPT

## 2020-12-28 PROCEDURE — 82272 OCCULT BLD FECES 1-3 TESTS: CPT

## 2020-12-28 PROCEDURE — 74230 X-RAY XM SWLNG FUNCJ C+: CPT

## 2020-12-28 PROCEDURE — 85027 COMPLETE CBC AUTOMATED: CPT

## 2020-12-28 PROCEDURE — 87086 URINE CULTURE/COLONY COUNT: CPT

## 2020-12-28 PROCEDURE — 83605 ASSAY OF LACTIC ACID: CPT

## 2020-12-28 PROCEDURE — 83735 ASSAY OF MAGNESIUM: CPT

## 2020-12-28 PROCEDURE — 97530 THERAPEUTIC ACTIVITIES: CPT

## 2020-12-28 PROCEDURE — 97162 PT EVAL MOD COMPLEX 30 MIN: CPT

## 2020-12-28 PROCEDURE — 83550 IRON BINDING TEST: CPT

## 2020-12-28 PROCEDURE — 99053 MED SERV 10PM-8AM 24 HR FAC: CPT

## 2020-12-28 PROCEDURE — 93005 ELECTROCARDIOGRAM TRACING: CPT

## 2020-12-28 PROCEDURE — 96375 TX/PRO/DX INJ NEW DRUG ADDON: CPT

## 2020-12-28 PROCEDURE — 85610 PROTHROMBIN TIME: CPT

## 2020-12-28 PROCEDURE — 87186 SC STD MICRODIL/AGAR DIL: CPT

## 2020-12-28 PROCEDURE — 86900 BLOOD TYPING SEROLOGIC ABO: CPT

## 2020-12-28 PROCEDURE — 80048 BASIC METABOLIC PNL TOTAL CA: CPT

## 2020-12-28 PROCEDURE — 80053 COMPREHEN METABOLIC PANEL: CPT

## 2020-12-28 PROCEDURE — 71250 CT THORAX DX C-: CPT

## 2020-12-28 PROCEDURE — 92611 MOTION FLUOROSCOPY/SWALLOW: CPT

## 2020-12-28 PROCEDURE — U0003: CPT

## 2020-12-28 PROCEDURE — 99285 EMERGENCY DEPT VISIT HI MDM: CPT

## 2020-12-28 PROCEDURE — 83540 ASSAY OF IRON: CPT

## 2020-12-28 PROCEDURE — 84100 ASSAY OF PHOSPHORUS: CPT

## 2020-12-28 PROCEDURE — 84145 PROCALCITONIN (PCT): CPT

## 2020-12-28 PROCEDURE — 82728 ASSAY OF FERRITIN: CPT

## 2020-12-28 PROCEDURE — 81001 URINALYSIS AUTO W/SCOPE: CPT

## 2020-12-28 PROCEDURE — 82607 VITAMIN B-12: CPT

## 2020-12-28 PROCEDURE — 92610 EVALUATE SWALLOWING FUNCTION: CPT

## 2020-12-28 PROCEDURE — 85730 THROMBOPLASTIN TIME PARTIAL: CPT

## 2021-01-27 ENCOUNTER — INPATIENT (INPATIENT)
Facility: HOSPITAL | Age: 71
LOS: 11 days | Discharge: NURSING, REHAB OR LONG-TERM CARE | DRG: 177 | End: 2021-02-08
Attending: HOSPITALIST | Admitting: FAMILY MEDICINE
Payer: MEDICARE

## 2021-01-27 VITALS
RESPIRATION RATE: 16 BRPM | SYSTOLIC BLOOD PRESSURE: 125 MMHG | HEART RATE: 73 BPM | OXYGEN SATURATION: 99 % | HEIGHT: 67 IN | TEMPERATURE: 101 F | WEIGHT: 188.94 LBS | DIASTOLIC BLOOD PRESSURE: 67 MMHG

## 2021-01-27 DIAGNOSIS — Z98.890 OTHER SPECIFIED POSTPROCEDURAL STATES: Chronic | ICD-10-CM

## 2021-01-27 LAB
APTT BLD: 28.6 SEC — SIGNIFICANT CHANGE UP (ref 27.5–35.5)
BASOPHILS # BLD AUTO: 0 K/UL — SIGNIFICANT CHANGE UP (ref 0–0.2)
BASOPHILS NFR BLD AUTO: 0 % — SIGNIFICANT CHANGE UP (ref 0–2)
EOSINOPHIL # BLD AUTO: 0 K/UL — SIGNIFICANT CHANGE UP (ref 0–0.5)
EOSINOPHIL NFR BLD AUTO: 0 % — SIGNIFICANT CHANGE UP (ref 0–6)
HCT VFR BLD CALC: 28 % — LOW (ref 34.5–45)
HGB BLD-MCNC: 8.7 G/DL — LOW (ref 11.5–15.5)
IMM GRANULOCYTES NFR BLD AUTO: 0.6 % — SIGNIFICANT CHANGE UP (ref 0–1.5)
INR BLD: 1.54 RATIO — HIGH (ref 0.88–1.16)
LACTATE SERPL-SCNC: 1.5 MMOL/L — SIGNIFICANT CHANGE UP (ref 0.7–2)
LYMPHOCYTES # BLD AUTO: 1.08 K/UL — SIGNIFICANT CHANGE UP (ref 1–3.3)
LYMPHOCYTES # BLD AUTO: 13.2 % — SIGNIFICANT CHANGE UP (ref 13–44)
MCHC RBC-ENTMCNC: 26 PG — LOW (ref 27–34)
MCHC RBC-ENTMCNC: 31.1 GM/DL — LOW (ref 32–36)
MCV RBC AUTO: 83.8 FL — SIGNIFICANT CHANGE UP (ref 80–100)
MONOCYTES # BLD AUTO: 0.66 K/UL — SIGNIFICANT CHANGE UP (ref 0–0.9)
MONOCYTES NFR BLD AUTO: 8 % — SIGNIFICANT CHANGE UP (ref 2–14)
NEUTROPHILS # BLD AUTO: 6.41 K/UL — SIGNIFICANT CHANGE UP (ref 1.8–7.4)
NEUTROPHILS NFR BLD AUTO: 78.2 % — HIGH (ref 43–77)
NRBC # BLD: 0 /100 WBCS — SIGNIFICANT CHANGE UP (ref 0–0)
PLATELET # BLD AUTO: 247 K/UL — SIGNIFICANT CHANGE UP (ref 150–400)
PROTHROM AB SERPL-ACNC: 17.6 SEC — HIGH (ref 10.6–13.6)
RBC # BLD: 3.34 M/UL — LOW (ref 3.8–5.2)
RBC # FLD: 16.6 % — HIGH (ref 10.3–14.5)
WBC # BLD: 8.2 K/UL — SIGNIFICANT CHANGE UP (ref 3.8–10.5)
WBC # FLD AUTO: 8.2 K/UL — SIGNIFICANT CHANGE UP (ref 3.8–10.5)

## 2021-01-27 PROCEDURE — 71045 X-RAY EXAM CHEST 1 VIEW: CPT | Mod: 26

## 2021-01-27 PROCEDURE — 93010 ELECTROCARDIOGRAM REPORT: CPT

## 2021-01-27 RX ORDER — ACETAMINOPHEN 500 MG
650 TABLET ORAL ONCE
Refills: 0 | Status: COMPLETED | OUTPATIENT
Start: 2021-01-27 | End: 2021-01-27

## 2021-01-27 RX ORDER — GABAPENTIN 400 MG/1
2 CAPSULE ORAL
Qty: 0 | Refills: 0 | DISCHARGE

## 2021-01-27 RX ORDER — OXYCODONE HYDROCHLORIDE 5 MG/1
1 TABLET ORAL
Qty: 0 | Refills: 0 | DISCHARGE

## 2021-01-27 RX ORDER — HYDROXYCHLOROQUINE SULFATE 200 MG
2 TABLET ORAL
Qty: 0 | Refills: 0 | DISCHARGE

## 2021-01-27 RX ORDER — SODIUM CHLORIDE 9 MG/ML
1000 INJECTION INTRAMUSCULAR; INTRAVENOUS; SUBCUTANEOUS ONCE
Refills: 0 | Status: COMPLETED | OUTPATIENT
Start: 2021-01-27 | End: 2021-01-27

## 2021-01-27 RX ORDER — PRAMIPEXOLE DIHYDROCHLORIDE 0.12 MG/1
1 TABLET ORAL
Qty: 0 | Refills: 0 | DISCHARGE

## 2021-01-27 RX ADMIN — SODIUM CHLORIDE 1000 MILLILITER(S): 9 INJECTION INTRAMUSCULAR; INTRAVENOUS; SUBCUTANEOUS at 22:55

## 2021-01-27 RX ADMIN — Medication 650 MILLIGRAM(S): at 23:23

## 2021-01-27 RX ADMIN — SODIUM CHLORIDE 1000 MILLILITER(S): 9 INJECTION INTRAMUSCULAR; INTRAVENOUS; SUBCUTANEOUS at 22:45

## 2021-01-27 NOTE — ED ADULT TRIAGE NOTE - CHIEF COMPLAINT QUOTE
Patient fell today hitting her head from a chair had been falling 4 times within the week; having trouble breathing; Temp in .9 o2 sat 99 in room air; as stated she had hardware in her neck and spine

## 2021-01-27 NOTE — ED ADULT NURSE NOTE - OBJECTIVE STATEMENT
71 year old female presents to the ED with multiple, frequent falls. Patient awake and alert to verbal stimuli. Patient sleeping in between care. Patient able to answer questions appropriately but not able to provide a full history. As per patient's daughter, patient had multiple falls over the last few days and fall again today. Patient denies fall. Patient with hardware in neck/back from prior surgery, scar noted. Patient denies pain. Patient complains of mild weakness. Patient denies recent illness or other complaints. Patient found febrile in ED, 100.9 oral temp. Patient denies recent fevers. Patient denies other injury during fall, denies hitting her head or loss of consciousness. Patient denies sick contacts or known COVID exposure. Bilateral lower extremities noted to be edematous, no weaping.

## 2021-01-27 NOTE — ED PROVIDER NOTE - OBJECTIVE STATEMENT
72yo female who presents with multiple falls and feeling weak today. pt c/o diffuse pain and feeling weak, no vomiting, no diarrhea, no fever, chills, no cough or sob, denies sick contacts at home

## 2021-01-27 NOTE — ED ADULT NURSE REASSESSMENT NOTE - NS ED NURSE REASSESS COMMENT FT1
Patient nodding off during initial RN assessment and while trying to collect labs. Patient attempting to take oral tylenol but falling asleep with water and medicine cup in hand. When RN tried to take medicine away from patient in fear patient may choke while taking, patient grabbed RN's hand and brought it to her mouth. RN able to remove hand from patient. ED MD aware. Rectal tylenol ordered. Patient refusing rectal temp and rectal tylenol. Harmit RN ANM aware. Report given to Ross GERMAN at hand off. Will attempt to administer oral tyenol again and monitor temperature/vital signs.

## 2021-01-28 DIAGNOSIS — U07.1 COVID-19: ICD-10-CM

## 2021-01-28 LAB
4/8 RATIO: 3.9 RATIO — SIGNIFICANT CHANGE UP (ref 0.86–4.14)
ABS CD8: 161 /UL — SIGNIFICANT CHANGE UP (ref 90–775)
ALBUMIN SERPL ELPH-MCNC: 2.1 G/DL — LOW (ref 3.3–5)
ALBUMIN SERPL ELPH-MCNC: 2.5 G/DL — LOW (ref 3.3–5)
ALP SERPL-CCNC: 130 U/L — HIGH (ref 40–120)
ALP SERPL-CCNC: 130 U/L — HIGH (ref 40–120)
ALT FLD-CCNC: 19 U/L — SIGNIFICANT CHANGE UP (ref 12–78)
ALT FLD-CCNC: 22 U/L — SIGNIFICANT CHANGE UP (ref 12–78)
AMPHET UR-MCNC: NEGATIVE — SIGNIFICANT CHANGE UP
ANION GAP SERPL CALC-SCNC: 11 MMOL/L — SIGNIFICANT CHANGE UP (ref 5–17)
APPEARANCE UR: CLEAR — SIGNIFICANT CHANGE UP
AST SERPL-CCNC: 69 U/L — HIGH (ref 15–37)
AST SERPL-CCNC: 70 U/L — HIGH (ref 15–37)
BACTERIA # UR AUTO: ABNORMAL
BARBITURATES UR SCN-MCNC: NEGATIVE — SIGNIFICANT CHANGE UP
BENZODIAZ UR-MCNC: NEGATIVE — SIGNIFICANT CHANGE UP
BILIRUB DIRECT SERPL-MCNC: 0.2 MG/DL — SIGNIFICANT CHANGE UP (ref 0.05–0.2)
BILIRUB INDIRECT FLD-MCNC: 0.2 MG/DL — SIGNIFICANT CHANGE UP (ref 0.2–1)
BILIRUB SERPL-MCNC: 0.4 MG/DL — SIGNIFICANT CHANGE UP (ref 0.2–1.2)
BILIRUB SERPL-MCNC: 0.4 MG/DL — SIGNIFICANT CHANGE UP (ref 0.2–1.2)
BILIRUB UR-MCNC: NEGATIVE — SIGNIFICANT CHANGE UP
BUN SERPL-MCNC: 6 MG/DL — LOW (ref 7–23)
CALCIUM SERPL-MCNC: 7.9 MG/DL — LOW (ref 8.5–10.1)
CD3 BLASTS SPEC-ACNC: 799 /UL — SIGNIFICANT CHANGE UP (ref 396–2024)
CD3 BLASTS SPEC-ACNC: 80 % — SIGNIFICANT CHANGE UP (ref 58–84)
CD4 %: 63 % — HIGH (ref 30–56)
CD8 %: 16 % — SIGNIFICANT CHANGE UP (ref 11–43)
CHLORIDE SERPL-SCNC: 103 MMOL/L — SIGNIFICANT CHANGE UP (ref 96–108)
CK SERPL-CCNC: 556 U/L — HIGH (ref 26–192)
CO2 SERPL-SCNC: 25 MMOL/L — SIGNIFICANT CHANGE UP (ref 22–31)
COCAINE METAB.OTHER UR-MCNC: NEGATIVE — SIGNIFICANT CHANGE UP
COLOR SPEC: YELLOW — SIGNIFICANT CHANGE UP
COMMENT - URINE: SIGNIFICANT CHANGE UP
CREAT SERPL-MCNC: 0.71 MG/DL — SIGNIFICANT CHANGE UP (ref 0.5–1.3)
CREAT SERPL-MCNC: 0.8 MG/DL — SIGNIFICANT CHANGE UP (ref 0.5–1.3)
CRP SERPL-MCNC: 12.7 MG/DL — HIGH (ref 0–0.4)
CRP SERPL-MCNC: 15.5 MG/DL — HIGH (ref 0–0.4)
D DIMER BLD IA.RAPID-MCNC: 371 NG/ML DDU — HIGH
DIFF PNL FLD: NEGATIVE — SIGNIFICANT CHANGE UP
EPI CELLS # UR: SIGNIFICANT CHANGE UP
FERRITIN SERPL-MCNC: 161 NG/ML — HIGH (ref 15–150)
FERRITIN SERPL-MCNC: 168 NG/ML — HIGH (ref 15–150)
FERRITIN SERPL-MCNC: 173 NG/ML — HIGH (ref 15–150)
FOLATE SERPL-MCNC: 8.7 NG/ML — SIGNIFICANT CHANGE UP
GLUCOSE SERPL-MCNC: 93 MG/DL — SIGNIFICANT CHANGE UP (ref 70–99)
GLUCOSE UR QL: NEGATIVE — SIGNIFICANT CHANGE UP
HCT VFR BLD CALC: 27.5 % — LOW (ref 34.5–45)
HGB BLD-MCNC: 8.9 G/DL — LOW (ref 11.5–15.5)
IRON SATN MFR SERPL: 10 UG/DL — LOW (ref 30–160)
IRON SATN MFR SERPL: 12 UG/DL — LOW (ref 30–160)
IRON SATN MFR SERPL: 6 % — LOW (ref 14–50)
IRON SATN MFR SERPL: 8 % — LOW (ref 14–50)
KETONES UR-MCNC: NEGATIVE — SIGNIFICANT CHANGE UP
LACTATE SERPL-SCNC: 0.7 MMOL/L — SIGNIFICANT CHANGE UP (ref 0.7–2)
LDH SERPL L TO P-CCNC: 286 U/L — HIGH (ref 50–242)
LEUKOCYTE ESTERASE UR-ACNC: NEGATIVE — SIGNIFICANT CHANGE UP
MAGNESIUM SERPL-MCNC: 1.9 MG/DL — SIGNIFICANT CHANGE UP (ref 1.6–2.6)
METHADONE UR-MCNC: NEGATIVE — SIGNIFICANT CHANGE UP
NITRITE UR-MCNC: NEGATIVE — SIGNIFICANT CHANGE UP
OPIATES UR-MCNC: POSITIVE — SIGNIFICANT CHANGE UP
PCP SPEC-MCNC: SIGNIFICANT CHANGE UP
PCP UR-MCNC: NEGATIVE — SIGNIFICANT CHANGE UP
PH UR: 5 — SIGNIFICANT CHANGE UP (ref 5–8)
PHOSPHATE SERPL-MCNC: 3.1 MG/DL — SIGNIFICANT CHANGE UP (ref 2.5–4.5)
POTASSIUM SERPL-MCNC: 2.7 MMOL/L — CRITICAL LOW (ref 3.5–5.3)
POTASSIUM SERPL-SCNC: 2.7 MMOL/L — CRITICAL LOW (ref 3.5–5.3)
PROCALCITONIN SERPL-MCNC: 0.44 NG/ML — HIGH (ref 0–0.04)
PROT SERPL-MCNC: 5.8 G/DL — LOW (ref 6–8.3)
PROT SERPL-MCNC: 6.5 G/DL — SIGNIFICANT CHANGE UP (ref 6–8.3)
PROT UR-MCNC: 30 MG/DL
RBC # BLD: 3.3 M/UL — LOW (ref 3.8–5.2)
RBC CASTS # UR COMP ASSIST: SIGNIFICANT CHANGE UP /HPF (ref 0–4)
RETICS #: 12.9 K/UL — LOW (ref 25–125)
RETICS/RBC NFR: 0.4 % — LOW (ref 0.5–2.5)
SARS-COV-2 RNA SPEC QL NAA+PROBE: DETECTED
SODIUM SERPL-SCNC: 139 MMOL/L — SIGNIFICANT CHANGE UP (ref 135–145)
SP GR SPEC: 1.01 — SIGNIFICANT CHANGE UP (ref 1.01–1.02)
T-CELL CD4 SUBSET PNL BLD: 630 /UL — SIGNIFICANT CHANGE UP (ref 325–1251)
THC UR QL: NEGATIVE — SIGNIFICANT CHANGE UP
TIBC SERPL-MCNC: 144 UG/DL — LOW (ref 220–430)
TIBC SERPL-MCNC: 154 UG/DL — LOW (ref 220–430)
UIBC SERPL-MCNC: 132 UG/DL — SIGNIFICANT CHANGE UP (ref 110–370)
UIBC SERPL-MCNC: 144 UG/DL — SIGNIFICANT CHANGE UP (ref 110–370)
UROBILINOGEN FLD QL: NEGATIVE — SIGNIFICANT CHANGE UP
VIT B12 SERPL-MCNC: 872 PG/ML — SIGNIFICANT CHANGE UP (ref 232–1245)
WBC UR QL: SIGNIFICANT CHANGE UP

## 2021-01-28 PROCEDURE — 72125 CT NECK SPINE W/O DYE: CPT | Mod: 26,ME

## 2021-01-28 PROCEDURE — 74177 CT ABD & PELVIS W/CONTRAST: CPT | Mod: 26,ME

## 2021-01-28 PROCEDURE — G1004: CPT

## 2021-01-28 PROCEDURE — 76857 US EXAM PELVIC LIMITED: CPT | Mod: 26

## 2021-01-28 PROCEDURE — 70450 CT HEAD/BRAIN W/O DYE: CPT | Mod: 26,ME

## 2021-01-28 PROCEDURE — 71250 CT THORAX DX C-: CPT | Mod: 26,ME

## 2021-01-28 PROCEDURE — 99222 1ST HOSP IP/OBS MODERATE 55: CPT | Mod: CS

## 2021-01-28 RX ORDER — POTASSIUM CHLORIDE 20 MEQ
10 PACKET (EA) ORAL
Refills: 0 | Status: COMPLETED | OUTPATIENT
Start: 2021-01-28 | End: 2021-01-28

## 2021-01-28 RX ORDER — TRAMADOL HYDROCHLORIDE 50 MG/1
25 TABLET ORAL
Refills: 0 | Status: DISCONTINUED | OUTPATIENT
Start: 2021-01-28 | End: 2021-01-31

## 2021-01-28 RX ORDER — DEXAMETHASONE 0.5 MG/5ML
6 ELIXIR ORAL DAILY
Refills: 0 | Status: DISCONTINUED | OUTPATIENT
Start: 2021-01-28 | End: 2021-02-01

## 2021-01-28 RX ORDER — ALBUTEROL 90 UG/1
2 AEROSOL, METERED ORAL EVERY 4 HOURS
Refills: 0 | Status: DISCONTINUED | OUTPATIENT
Start: 2021-01-28 | End: 2021-02-08

## 2021-01-28 RX ORDER — POTASSIUM CHLORIDE 20 MEQ
10 PACKET (EA) ORAL ONCE
Refills: 0 | Status: COMPLETED | OUTPATIENT
Start: 2021-01-28 | End: 2021-01-28

## 2021-01-28 RX ORDER — LANOLIN ALCOHOL/MO/W.PET/CERES
3 CREAM (GRAM) TOPICAL AT BEDTIME
Refills: 0 | Status: DISCONTINUED | OUTPATIENT
Start: 2021-01-28 | End: 2021-02-08

## 2021-01-28 RX ORDER — PRAMIPEXOLE DIHYDROCHLORIDE 0.12 MG/1
1.5 TABLET ORAL EVERY 24 HOURS
Refills: 0 | Status: DISCONTINUED | OUTPATIENT
Start: 2021-01-28 | End: 2021-02-08

## 2021-01-28 RX ORDER — LIDOCAINE 4 G/100G
1 CREAM TOPICAL ONCE
Refills: 0 | Status: COMPLETED | OUTPATIENT
Start: 2021-01-28 | End: 2021-01-28

## 2021-01-28 RX ORDER — SODIUM CHLORIDE 9 MG/ML
1000 INJECTION INTRAMUSCULAR; INTRAVENOUS; SUBCUTANEOUS ONCE
Refills: 0 | Status: COMPLETED | OUTPATIENT
Start: 2021-01-28 | End: 2021-01-28

## 2021-01-28 RX ORDER — PRAMIPEXOLE DIHYDROCHLORIDE 0.12 MG/1
1 TABLET ORAL
Qty: 0 | Refills: 0 | DISCHARGE

## 2021-01-28 RX ORDER — OXYCODONE HYDROCHLORIDE 5 MG/1
5 TABLET ORAL
Refills: 0 | Status: DISCONTINUED | OUTPATIENT
Start: 2021-01-28 | End: 2021-02-04

## 2021-01-28 RX ORDER — INFLUENZA VIRUS VACCINE 15; 15; 15; 15 UG/.5ML; UG/.5ML; UG/.5ML; UG/.5ML
0.5 SUSPENSION INTRAMUSCULAR ONCE
Refills: 0 | Status: DISCONTINUED | OUTPATIENT
Start: 2021-01-28 | End: 2021-02-08

## 2021-01-28 RX ORDER — POTASSIUM CHLORIDE 20 MEQ
10 PACKET (EA) ORAL ONCE
Refills: 0 | Status: DISCONTINUED | OUTPATIENT
Start: 2021-01-28 | End: 2021-01-28

## 2021-01-28 RX ORDER — SULFASALAZINE 500 MG
500 TABLET ORAL THREE TIMES A DAY
Refills: 0 | Status: DISCONTINUED | OUTPATIENT
Start: 2021-01-28 | End: 2021-02-08

## 2021-01-28 RX ORDER — PRAMIPEXOLE DIHYDROCHLORIDE 0.12 MG/1
0.5 TABLET ORAL THREE TIMES A DAY
Refills: 0 | Status: DISCONTINUED | OUTPATIENT
Start: 2021-01-28 | End: 2021-01-28

## 2021-01-28 RX ORDER — VANCOMYCIN HCL 1 G
1000 VIAL (EA) INTRAVENOUS ONCE
Refills: 0 | Status: COMPLETED | OUTPATIENT
Start: 2021-01-28 | End: 2021-01-28

## 2021-01-28 RX ORDER — ENOXAPARIN SODIUM 100 MG/ML
40 INJECTION SUBCUTANEOUS DAILY
Refills: 0 | Status: DISCONTINUED | OUTPATIENT
Start: 2021-01-28 | End: 2021-02-08

## 2021-01-28 RX ORDER — REMDESIVIR 5 MG/ML
100 INJECTION INTRAVENOUS EVERY 24 HOURS
Refills: 0 | Status: COMPLETED | OUTPATIENT
Start: 2021-01-29 | End: 2021-02-01

## 2021-01-28 RX ORDER — MULTIVIT-MIN/FERROUS GLUCONATE 9 MG/15 ML
1 LIQUID (ML) ORAL DAILY
Refills: 0 | Status: DISCONTINUED | OUTPATIENT
Start: 2021-01-28 | End: 2021-02-08

## 2021-01-28 RX ORDER — LIDOCAINE 4 G/100G
1 CREAM TOPICAL EVERY 24 HOURS
Refills: 0 | Status: DISCONTINUED | OUTPATIENT
Start: 2021-01-28 | End: 2021-02-08

## 2021-01-28 RX ORDER — POTASSIUM CHLORIDE 20 MEQ
20 PACKET (EA) ORAL
Refills: 0 | Status: DISCONTINUED | OUTPATIENT
Start: 2021-01-28 | End: 2021-01-28

## 2021-01-28 RX ORDER — LACTOBACILLUS ACIDOPHILUS 100MM CELL
1 CAPSULE ORAL DAILY
Refills: 0 | Status: DISCONTINUED | OUTPATIENT
Start: 2021-01-28 | End: 2021-02-01

## 2021-01-28 RX ORDER — PRAMIPEXOLE DIHYDROCHLORIDE 0.12 MG/1
1.5 TABLET ORAL DAILY
Refills: 0 | Status: DISCONTINUED | OUTPATIENT
Start: 2021-01-28 | End: 2021-01-28

## 2021-01-28 RX ORDER — REMDESIVIR 5 MG/ML
200 INJECTION INTRAVENOUS EVERY 24 HOURS
Refills: 0 | Status: COMPLETED | OUTPATIENT
Start: 2021-01-28 | End: 2021-01-28

## 2021-01-28 RX ORDER — ONDANSETRON 8 MG/1
4 TABLET, FILM COATED ORAL EVERY 6 HOURS
Refills: 0 | Status: DISCONTINUED | OUTPATIENT
Start: 2021-01-28 | End: 2021-02-08

## 2021-01-28 RX ORDER — REMDESIVIR 5 MG/ML
INJECTION INTRAVENOUS
Refills: 0 | Status: COMPLETED | OUTPATIENT
Start: 2021-01-28 | End: 2021-02-01

## 2021-01-28 RX ORDER — POTASSIUM CHLORIDE 20 MEQ
20 PACKET (EA) ORAL
Refills: 0 | Status: DISCONTINUED | OUTPATIENT
Start: 2021-01-28 | End: 2021-01-29

## 2021-01-28 RX ORDER — ACETAMINOPHEN 500 MG
650 TABLET ORAL EVERY 6 HOURS
Refills: 0 | Status: DISCONTINUED | OUTPATIENT
Start: 2021-01-28 | End: 2021-02-08

## 2021-01-28 RX ORDER — PIPERACILLIN AND TAZOBACTAM 4; .5 G/20ML; G/20ML
3.38 INJECTION, POWDER, LYOPHILIZED, FOR SOLUTION INTRAVENOUS ONCE
Refills: 0 | Status: COMPLETED | OUTPATIENT
Start: 2021-01-28 | End: 2021-01-28

## 2021-01-28 RX ADMIN — OXYCODONE HYDROCHLORIDE 5 MILLIGRAM(S): 5 TABLET ORAL at 07:53

## 2021-01-28 RX ADMIN — LIDOCAINE 1 PATCH: 4 CREAM TOPICAL at 06:32

## 2021-01-28 RX ADMIN — Medication 6 MILLIGRAM(S): at 11:37

## 2021-01-28 RX ADMIN — REMDESIVIR 500 MILLIGRAM(S): 5 INJECTION INTRAVENOUS at 10:25

## 2021-01-28 RX ADMIN — Medication 100 MILLIEQUIVALENT(S): at 06:33

## 2021-01-28 RX ADMIN — OXYCODONE HYDROCHLORIDE 5 MILLIGRAM(S): 5 TABLET ORAL at 22:17

## 2021-01-28 RX ADMIN — Medication 250 MILLIGRAM(S): at 07:50

## 2021-01-28 RX ADMIN — Medication 500 MILLIGRAM(S): at 22:17

## 2021-01-28 RX ADMIN — TRAMADOL HYDROCHLORIDE 25 MILLIGRAM(S): 50 TABLET ORAL at 10:07

## 2021-01-28 RX ADMIN — Medication 5 MILLIGRAM(S): at 17:18

## 2021-01-28 RX ADMIN — Medication 500 MILLIGRAM(S): at 17:18

## 2021-01-28 RX ADMIN — PIPERACILLIN AND TAZOBACTAM 200 GRAM(S): 4; .5 INJECTION, POWDER, LYOPHILIZED, FOR SOLUTION INTRAVENOUS at 06:33

## 2021-01-28 RX ADMIN — Medication 1 TABLET(S): at 11:39

## 2021-01-28 RX ADMIN — Medication 100 MILLIEQUIVALENT(S): at 05:08

## 2021-01-28 RX ADMIN — Medication 20 MILLIEQUIVALENT(S): at 17:18

## 2021-01-28 RX ADMIN — Medication 100 MILLIEQUIVALENT(S): at 10:26

## 2021-01-28 RX ADMIN — ENOXAPARIN SODIUM 40 MILLIGRAM(S): 100 INJECTION SUBCUTANEOUS at 11:38

## 2021-01-28 RX ADMIN — LIDOCAINE 1 PATCH: 4 CREAM TOPICAL at 17:19

## 2021-01-28 RX ADMIN — Medication 20 MILLIEQUIVALENT(S): at 22:17

## 2021-01-28 RX ADMIN — SODIUM CHLORIDE 1000 MILLILITER(S): 9 INJECTION INTRAMUSCULAR; INTRAVENOUS; SUBCUTANEOUS at 06:33

## 2021-01-28 RX ADMIN — Medication 20 MILLIEQUIVALENT(S): at 11:48

## 2021-01-28 RX ADMIN — Medication 100 MILLIEQUIVALENT(S): at 11:36

## 2021-01-28 RX ADMIN — Medication 100 MILLIEQUIVALENT(S): at 02:52

## 2021-01-28 RX ADMIN — Medication 1 TABLET(S): at 11:38

## 2021-01-28 RX ADMIN — PRAMIPEXOLE DIHYDROCHLORIDE 1.5 MILLIGRAM(S): 0.12 TABLET ORAL at 17:22

## 2021-01-28 RX ADMIN — LIDOCAINE 1 PATCH: 4 CREAM TOPICAL at 21:48

## 2021-01-28 RX ADMIN — Medication 100 MILLIEQUIVALENT(S): at 12:31

## 2021-01-28 RX ADMIN — Medication 10 MILLIEQUIVALENT(S): at 06:32

## 2021-01-28 NOTE — CONSULT NOTE ADULT - SUBJECTIVE AND OBJECTIVE BOX
[facility]  INFECTIOUS DISEASES   =======================================================    [id number]  VERONICA WHALEN     CC: Patient is a 71y old  Female who presents with a chief complaint of sob (28 Jan 2021 16:20)    HPI:  70yo woman with PMH of OA, HDL, and bladder disorder was admitted with multiple falls and feeling weakness. She didn't have SOB, cough, vomiting, no diarrhea, no fever, chills, No sick contacts.  She tested positive for COVID so was admitted for treatment. She has been started on remdesivir and Dexamethason.     PAST MEDICAL & SURGICAL HISTORY:  Obesity (BMI 30-39.9)  Traumatic arthropathy involving lower leg  right  History of Osteoarthritis  Hyperlipidemia  Restless Leg Syndrome  H/O neck surgery  H/O arthroscopy of right knee  2010  Bladder Disorder  Bladder lift 2000  History of Colonoscopy  Ex lap in 2009 for abscess in the baldder  History of Hysterectomy and bladder lift in 2000    Social Hx: no smoking, ETOH or drugs     FAMILY HISTORY:  No pertinent family history in first degree relatives    Allergies  No Known Allergies    Antibiotics:  dexAMETHasone  Injectable 6 milliGRAM(s) IV Push daily  remdesivir  IVPB   IV Intermittent      REVIEW OF SYSTEMS:  CONSTITUTIONAL:  No Fever or chills  HEENT:  No diplopia or blurred vision.  No sore throat or runny nose.  CARDIOVASCULAR:  No chest pain or SOB.  RESPIRATORY:  no cough, mild shortness of breath  GASTROINTESTINAL:  No nausea, vomiting or diarrhea.  GENITOURINARY:  No dysuria, frequency or urgency. No Blood in urine  MUSCULOSKELETAL:  no joint aches, no muscle pain  SKIN:  No change in skin, hair or nails.  NEUROLOGIC:  No paresthesias, fasciculations, seizures or weakness.  PSYCHIATRIC:  No disorder of thought or mood.  ENDOCRINE:  No heat or cold intolerance, polyuria or polydipsia.  HEMATOLOGICAL:  No easy bruising or bleeding.     Physical Exam:  Vital Signs Last 24 Hrs  T(C): 36.6 (28 Jan 2021 16:51), Max: 38.3 (27 Jan 2021 21:50)  T(F): 97.8 (28 Jan 2021 16:51), Max: 100.9 (27 Jan 2021 21:50)  HR: 60 (28 Jan 2021 16:51) (60 - 76)  BP: 106/60 (28 Jan 2021 16:51) (96/59 - 131/58)  RR: 18 (28 Jan 2021 16:51) (16 - 18)  SpO2: 96% (28 Jan 2021 16:51) (94% - 99%)  Height (cm): 170.2 (01-27 @ 21:50)  Weight (kg): 85.7 (01-27 @ 21:50)  BMI (kg/m2): 29.6 (01-27 @ 21:50)  BSA (m2): 1.97 (01-27 @ 21:50)  GEN: NAD  HEENT: normocephalic and atraumatic. EOMI. PERRL.    NECK: Supple.  No lymphadenopathy   LUNGS: Clear to auscultation.  HEART: Regular rate and rhythm   ABDOMEN: Soft, nontender, and nondistended.  Positive bowel sounds.    EXTREMITIES: + edema b/l   NEUROLOGIC: grossly intact.  PSYCHIATRIC: Appropriate affect .  SKIN: No rash    Labs:  01-28    139  |  106  |  6<L>  ----------------------------<  86  2.9<LL>   |  27  |  0.71    Ca    7.3<L>      28 Jan 2021 08:03  Phos  3.1     01-28  Mg     1.9     01-28    TPro  5.8<L>  /  Alb  2.1<L>  /  TBili  0.4  /  DBili  .20  /  AST  70<H>  /  ALT  19  /  AlkPhos  130<H>  01-28                        8.9    x     )-----------( x        ( 28 Jan 2021 16:50 )             27.5     PT/INR - ( 27 Jan 2021 23:16 )   PT: 17.6 sec;   INR: 1.54 ratio    PTT - ( 27 Jan 2021 23:16 )  PTT:28.6 sec    LIVER FUNCTIONS - ( 28 Jan 2021 08:03 )  Alb: 2.1 g/dL / Pro: 5.8 g/dL / ALK PHOS: 130 U/L / ALT: 19 U/L / AST: 70 U/L / GGT: x           CARDIAC MARKERS ( 28 Jan 2021 08:03 )  x     / x     / 556 U/L / x     / x        All imaging and other data have been reviewed.  < from: CT Chest No Cont (01.28.21 @ 00:10) >  IMPRESSION:  1.  No evidence of acute traumatic injury in the chest.  2.  Patchy groundglass opacities in the anterior segments of both upper lobes are new from 11/20/2020.  These may reflect nonspecific foci of infection, inflammation, edema or hemorrhage.  2.  Dense consolidation in the right middle lobe with air bronchograms and bronchiectasis is an region of lung that previously showed patchy groundglass nodular opacities on 11/20/2020.  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  3.  There is persistent groundglass opacity in the basilar segments of both lower lobes, right greater than left, which appears stable to mildly progressed from 11/20/2020.  This may reflect areas of infection/inflammation, edema, atelectasis or interstitial lung disease.  4.  Splenomegaly, which appears new from 11/20/2020.  The spleen measures 13.9 cm, increased in size from 11.8 cm on 11/20/2020.  5.  Follow-up chest CT is recommended in 1-3 months to reassess the findings and exclude continued disease progression.    Assessment and Plan:   70yo woman with PMH of OA, HDL, and bladder disorder was admitted with multiple falls and feeling weakness. She didn't have SOB, cough, vomiting, no diarrhea, no fever, chills, No sick contacts.  She tested positive for COVID so was admitted for treatment.   Treatment usually is different case by case, data suggest that these might work:   Remdisivir:  5 day course,  eGFR > 30 mL/min , ALT < 5X ULN  Steroid: For hypoxic patients on supplemental O2 of intubated. dexamethasone 6mg PO or IV Q-day x 10 days (equivalent to solumedrol 32mg IV or Prednisone 40mg)  Anticoagulation:  with prophylactic dosing, full dose to be considered in patients with increased risk for thromboembolic complications. Bleeding can happen but acceptable in high risk patients due to hypercoagulable state.  LMWH is good, for high risk patients consider discharge on oral anticoagulation with rivaroxaban (Xarelto) 10mg PO QD or Eliquis (apixaban) 2.5-5mg PO BID  x 4 weeks.  Currently data and recommendations for COVID-19 treatment are rapidly changing, so this treatment plan is based on my clinical judgement with available information.     COVID 19   - BMP, CBC w diff, NLR. Procalcitonin, Ferritin, CRP, LDH and D dimer for the start and periodically can be repeated.   - CXR with bilateral opacities more consistent with viral pneumonia   - Start Remdesivir 200mg stat and 100mg daily for 4 more days (total 5days) or until when ready for discharge whichever comes first.   - Start Dexamethasone 6mg po daily for 10days  - Follow Creat and LFTs daily while on Remdesivir.    - Watch O2 sat closely and taper as tolerated.   - No antibiotics at this time. I don't believe there is bacterial pneumonia at this time.   - Prophylactic anticoagulation due to hypercoagulable state    Thank you for courtesy of this consult.     Will follow.     Dr. Marito oTrre   Infectious Diseases   Please call [phone] between 8am and 6pm, call [phone] after 6pm or weekends.

## 2021-01-28 NOTE — SWALLOW BEDSIDE ASSESSMENT ADULT - COMMENTS
Pt received upright in bed, awake and cooperative, on supplemental O2 via 4L NC. Pt reported generalized pain, RN made aware. Pt was agreeable to assessment. Pt followed simple commands independently. Pt's vocal quality/intensity and speech production was WFL.    Pt known to this service from a previous admission. MBS completed 12/1/20, at which time pt was recommended mechanical soft solids with thin liquids via single/small sips.    CT head 1/28: "No acute intracranial hemorrhage, mass effect, or CT evidence of a large acute or recent subacute transcortical infarct."  CT chest 1/27: "1. No evidence of acute traumatic injury in the chest. 2. Patchy groundglass opacities in the anterior segments of both upper lobes are new from 11/20/2020. These may reflect nonspecific foci of infection, inflammation, edema or hemorrhage. 2. Dense consolidation in the right middle lobe with air bronchograms and bronchiectasis is an region of lung that previously showed patchy groundglass nodular opacities on 11/20/2020. 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 3. There is persistent groundglass opacity in the basilar segments of both lower lobes, right greater than left, which appears stable to mildly progressed from 11/20/2020. This may reflect areas of infection/inflammation, edema, atelectasis or interstitial lung disease. 4. Splenomegaly, which appears new from 11/20/2020. The spleen measures 13.9 cm, increased in size from 11.8 cm on 11/20/2020."  Pt's WBC is WFL, no fever.

## 2021-01-28 NOTE — H&P ADULT - HISTORY OF PRESENT ILLNESS
· HPI Objective Statement: 70yo female who presents with multiple falls and feeling weak today. pt c/o diffuse pain and feeling weak, no vomiting, no diarrhea, no fever, chills, no cough or sob, denies sick contacts at home  In ER patient was found to have COVID PNA with possible colitis.  patient is being admitted for further care and management

## 2021-01-28 NOTE — SWALLOW BEDSIDE ASSESSMENT ADULT - SWALLOW EVAL: DIAGNOSIS
Pt p/w 1. Mild to moderate oral dysphagia when given regular solids marked by adequate retrieval and containment, prolonged mastication 2/2 absent bottom dentition with subsequent delayed manipulation and transfer, and adequate clearance post swallow. 2. Functional oral phase when given thin liquids, nectar thick liquids, and puree marked by adequate retrieval and containment, timely manipulation and transfer, and adequate clearance post swallow. 3. Severe pharyngeal dysphagia when given thin liquids marked by suspected delayed swallow onset, +laryngeal elevation to palpation, and immediate cough response post swallow. 4. Mild pharyngeal dysphagia when given nectar thick liquids, puree, and regular solids marked by suspected delayed swallow onset, +laryngeal elevation to palpation, and no overt s/sx of aspiration. 5. Recommend mechanical soft solids with nectar thick liquids +aspiration precautions. Feed only when fully awake/alert and while on NC or room air. Continue to monitor

## 2021-01-28 NOTE — CONSULT NOTE ADULT - SUBJECTIVE AND OBJECTIVE BOX
Hematology/Oncology consult     Patient is seen and examined  notes/labs reviewed  pt family is at bedside and d/w family.  consult dictated,  Job #    contact #  son/daughter----  phone #    IMPRESSION:      RECOMMENDATION:              ,thanks for courtsey of this consult,will follow this pt with you for hem/onc issue while pt is in hospital. Hematology/Oncology consult     Patient is seen and examined  notes/labs reviewed  pt family is at bedside and d/w family.  consult dictated,  Job # [id number]    contact #  daughter----Mortimer, Kelly  phone # [phone]--called, no answer    PAST MEDICAL & SURGICAL HISTORY:  Obesity (BMI 30-39.9)    Traumatic arthropathy involving lower leg  right    History of Osteoarthritis    Hyperlipidemia    Restless Leg Syndrome    H/O neck surgery    H/O arthroscopy of right knee  2010    Bladder Disorder  Bladder lift 2000    History of Colonoscopy    Ex lap in 2009 for abscess in the baldder    History of Hysterectomy and bladder lift in 2000      IMPRESSION:  71 y/o F PMHx lymphedema, HLD, RA, s/p cervical spine surgery at [facility] around 10/2020--- with prolonged hospital course complicated by C. diff infection (completed treatment course) and dysphagia (s/p PEG tube placement). Patient returned home on 11/24/20. Patient was admitted at [facility] from 11/28/2020---21/1/2020, was admitted with nausea/ vomiting/and abdominal pain, and was discharged 12/1/2020. She has been using a walker to ambulate, admitted with multiple falls and weakness, had ct scan this admission, hematology was consulted for anemia.    RECOMMENDATION:  Anemia---will initiate work up to exclude treatable etiology  monitor h/h--transfuse prbc as needed for symptomatic anemia or if hb is under 7  gi evaluation, follow up  gi/dvtp    Dr Reid ,thanks for courtesy of this consult, will follow this pt with you for hem/onc issue while pt is in hospital.

## 2021-01-28 NOTE — H&P ADULT - NSHPLABSRESULTS_GEN_ALL_CORE
139  |  106  |  6<L>  ----------------------------<  86  2.9<LL>   |  27  |  0.71    Ca    7.3<L>      2021 08:03  Phos  3.1       Mg     1.9         TPro  5.8<L>  /  Alb  2.1<L>  /  TBili  0.4  /  DBili  .20  /  AST  70<H>  /  ALT  19  /  AlkPhos  130<H>                              8.9    x     )-----------( x        ( 2021 16:50 )             27.5       CARDIAC MARKERS ( 2021 08:03 )  x     / x     / 556 U/L / x     / x            LIVER FUNCTIONS - ( 2021 08:03 )  Alb: 2.1 g/dL / Pro: 5.8 g/dL / ALK PHOS: 130 U/L / ALT: 19 U/L / AST: 70 U/L / GGT: x             PT/INR - ( 2021 23:16 )   PT: 17.6 sec;   INR: 1.54 ratio         PTT - ( 2021 23:16 )  PTT:28.6 sec    Urinalysis Basic - ( 2021 20:10 )    Color: Yellow / Appearance: Clear / S.010 / pH: x  Gluc: x / Ketone: Negative  / Bili: Negative / Urobili: Negative   Blood: x / Protein: 30 mg/dL / Nitrite: Negative   Leuk Esterase: Negative / RBC: 0-2 /HPF / WBC 0-2   Sq Epi: x / Non Sq Epi: Few / Bacteria: Few

## 2021-01-28 NOTE — ED ADULT NURSE REASSESSMENT NOTE - NS ED NURSE REASSESS COMMENT FT1
Pt more alert and communicative, c/o low back pain 8/10 scale. D/w with Dr. Puente, lidocaine patch applied per order

## 2021-01-28 NOTE — SWALLOW BEDSIDE ASSESSMENT ADULT - SWALLOW EVAL: PROGNOSIS
DIAGNOSIS CONTINUED: mental/respiratory status closely; if there is a decline in status, please hold PO and notify SLP. Position pt upright 90 degrees, small bite/sip, pace meal slowly. Left message with Dr. Ma.

## 2021-01-28 NOTE — H&P ADULT - NSHPREVIEWOFSYSTEMS_GEN_ALL_CORE
· Neurological [+]: DIFFICULTY WALKING / IMBALANCE  · ROS STATEMENT: all other ROS negative except as per HPI

## 2021-01-28 NOTE — H&P ADULT - NSHPPHYSICALEXAM_GEN_ALL_CORE
· CONSTITUTIONAL: Well appearing, awake, alert, oriented to person, place, time/situation and in no apparent distress.  · CARDIAC: - - -  · CARDIAC RHYTHM: regular  · CARDIAC RATE: normal  · CARDIAC SOUNDS: S1-S2, MURMUR  · CARDIAC MURMUR: SYSTOLIC  · RESPIRATORY: Breath sounds clear and equal bilaterally.  · GASTROINTESTINAL: - - -  · Abdominal Exam: soft  · Abdominal Tenderness Location: diffusely tender no rebound or guarding  · MUSCULOSKELETAL: - - -  · MUSC PEDAL EDEMA: BILATERAL  · Bilateral Pedal Edema: to knees, chronic  · NEUROLOGICAL: Alert and oriented, no focal deficits, no motor or sensory deficits.  · SKIN: Skin normal color for race, warm, dry and intact. No evidence of rash.  · PSYCHIATRIC: Alert and oriented to person, place, time/situation. normal mood and affect. no apparent risk to self or others.

## 2021-01-28 NOTE — CONSULT NOTE ADULT - ASSESSMENT
covid  colitis  dysphagia    doubt clinical colitis  no diarrhea  no need for iv antibiotics for CT findings re: colon  her peg tube was removed inbetween her admissions  cont diet as tolerated  supportive care  will follow

## 2021-01-28 NOTE — SWALLOW BEDSIDE ASSESSMENT ADULT - SLP PERTINENT HISTORY OF CURRENT PROBLEM
Per charting, "72yo female who presents with multiple falls and feeling weak today. pt c/o diffuse pain and feeling weak, no vomiting, no diarrhea, no fever, chills, no cough or sob, denies sick contacts at home" Pt found to be COVID+.

## 2021-01-28 NOTE — CONSULT NOTE ADULT - SUBJECTIVE AND OBJECTIVE BOX
[facility]  Niles Salomon PA-C  [address] [place] [address]  [phone]      Chief Complaint:  Patient is a 71y old  Female who presents with a chief complaint of sob (28 Jan 2021 15:53)      HPI:· HPI Objective Statement: 70yo female who presents with multiple falls and feeling weak today. pt c/o diffuse pain and feeling weak, no vomiting, no diarrhea, no fever, chills, no cough or sob, denies sick contacts at home      Allergies:  No Known Allergies      Medications:  acetaminophen   Tablet .. 650 milliGRAM(s) Oral every 6 hours PRN  ALBUTerol    90 MICROgram(s) HFA Inhaler 2 Puff(s) Inhalation every 4 hours PRN  aluminum hydroxide/magnesium hydroxide/simethicone Suspension 30 milliLiter(s) Oral every 4 hours PRN  benzonatate 100 milliGRAM(s) Oral three times a day PRN  dexAMETHasone  Injectable 6 milliGRAM(s) IV Push daily  enoxaparin Injectable 40 milliGRAM(s) SubCutaneous daily  lactobacillus acidophilus 1 Tablet(s) Oral daily  lidocaine   Patch 1 Patch Transdermal every 24 hours  melatonin 3 milliGRAM(s) Oral at bedtime PRN  multivitamin/minerals 1 Tablet(s) Oral daily  ondansetron Injectable 4 milliGRAM(s) IV Push every 6 hours PRN  oxyCODONE    IR 5 milliGRAM(s) Oral four times a day PRN  potassium chloride   Powder 20 milliEquivalent(s) Oral four times a day  pramipexole 1.5 milliGRAM(s) Oral every 24 hours  remdesivir  IVPB   IV Intermittent   sulfaSALAzine 500 milliGRAM(s) Oral three times a day  torsemide 5 milliGRAM(s) Oral two times a day  traMADol 25 milliGRAM(s) Oral four times a day PRN      PMHX/PSHX:  Obesity (BMI 30-39.9)    Traumatic arthropathy involving lower leg    History of Osteoarthritis    Hyperlipidemia    Restless Leg Syndrome    H/O neck surgery    H/O arthroscopy of right knee    Bladder Disorder    History of Colonoscopy    Ex lap in 2009 for abscess in the baldder    History of Hysterectomy and bladder lift in 2000        Family history:  No pertinent family history in first degree relatives        Social History:     ROS:     General:  No wt loss, fevers, chills, night sweats, fatigue,   Eyes:  Good vision, no reported pain  ENT:  No sore throat, pain, runny nose, dysphagia  CV:  No pain, palpitations, hypo/hypertension  Resp:  No dyspnea, cough, tachypnea, wheezing  GI:  No pain, No nausea, No vomiting, No diarrhea, No constipation, No weight loss, No fever, No pruritis, No rectal bleeding, No tarry stools, No dysphagia,  :  No pain, bleeding, incontinence, nocturia  Muscle:  No pain, weakness  Neuro:  No weakness, tingling, memory problems  Psych:  No fatigue, insomnia, mood problems, depression  Endocrine:  No polyuria, polydipsia, cold/heat intolerance  Heme:  No petechiae, ecchymosis, easy bruisability  Skin:  No rash, tattoos, scars, edema      PHYSICAL EXAM:   Vital Signs:  Vital Signs Last 24 Hrs  T(C): 36.4 (28 Jan 2021 12:04), Max: 38.3 (27 Jan 2021 21:50)  T(F): 97.6 (28 Jan 2021 12:04), Max: 100.9 (27 Jan 2021 21:50)  HR: 74 (28 Jan 2021 12:04) (72 - 76)  BP: 96/59 (28 Jan 2021 12:04) (96/59 - 131/58)  BP(mean): --  RR: 18 (28 Jan 2021 12:04) (16 - 18)  SpO2: 96% (28 Jan 2021 12:04) (94% - 99%)  Daily Height in cm: 170.18 (27 Jan 2021 21:50)    Daily     GENERAL:  Appears stated age, well-groomed, well-nourished, no distress  HEENT:  NC/AT,  conjunctivae clear and pink, no thyromegaly, nodules, adenopathy, no JVD, sclera -anicteric  CHEST:  Full & symmetric excursion, no increased effort, breath sounds clear  HEART:  Regular rhythm, S1, S2, no murmur/rub/S3/S4, no abdominal bruit, no edema  ABDOMEN:  Soft, non-tender, non-distended, normoactive bowel sounds,  no masses ,no hepato-splenomegaly, no signs of chronic liver disease  EXTEREMITIES:  no cyanosis,clubbing or edema  SKIN:  No rash/erythema/ecchymoses/petechiae/wounds/abscess/warm/dry  NEURO:  Alert, oriented, no asterixis, no tremor, no encephalopathy    LABS:                        8.7    8.20  )-----------( 247      ( 27 Jan 2021 23:16 )             28.0     01-28    139  |  106  |  6<L>  ----------------------------<  86  2.9<LL>   |  27  |  0.71    Ca    7.3<L>      28 Jan 2021 08:03  Phos  3.1     01-28  Mg     1.9     01-28    TPro  5.8<L>  /  Alb  2.1<L>  /  TBili  0.4  /  DBili  .20  /  AST  70<H>  /  ALT  19  /  AlkPhos  130<H>  01-28    LIVER FUNCTIONS - ( 28 Jan 2021 08:03 )  Alb: 2.1 g/dL / Pro: 5.8 g/dL / ALK PHOS: 130 U/L / ALT: 19 U/L / AST: 70 U/L / GGT: x           PT/INR - ( 27 Jan 2021 23:16 )   PT: 17.6 sec;   INR: 1.54 ratio         PTT - ( 27 Jan 2021 23:16 )  PTT:28.6 sec        Imaging:

## 2021-01-28 NOTE — SWALLOW BEDSIDE ASSESSMENT ADULT - SWALLOW EVAL: RECOMMENDED FEEDING/EATING TECHNIQUES
allow for swallow between intakes/alternate food with liquid/check mouth frequently for oral residue/pocketing/crush medication (when feasible)/hard swallow w/ each bite or sip/maintain upright posture during/after eating for 30 mins/no straws/oral hygiene/position upright (90 degrees)/small sips/bites

## 2021-01-29 DIAGNOSIS — Z29.9 ENCOUNTER FOR PROPHYLACTIC MEASURES, UNSPECIFIED: ICD-10-CM

## 2021-01-29 DIAGNOSIS — K52.9 NONINFECTIVE GASTROENTERITIS AND COLITIS, UNSPECIFIED: ICD-10-CM

## 2021-01-29 DIAGNOSIS — E87.6 HYPOKALEMIA: ICD-10-CM

## 2021-01-29 DIAGNOSIS — J18.9 PNEUMONIA, UNSPECIFIED ORGANISM: ICD-10-CM

## 2021-01-29 DIAGNOSIS — U07.1 COVID-19: ICD-10-CM

## 2021-01-29 DIAGNOSIS — E78.5 HYPERLIPIDEMIA, UNSPECIFIED: ICD-10-CM

## 2021-01-29 LAB
ALBUMIN SERPL ELPH-MCNC: 2 G/DL — LOW (ref 3.3–5)
ALBUMIN SERPL ELPH-MCNC: 2.1 G/DL — LOW (ref 3.3–5)
ALBUMIN SERPL ELPH-MCNC: 2.1 G/DL — LOW (ref 3.3–5)
ALP SERPL-CCNC: 159 U/L — HIGH (ref 40–120)
ALP SERPL-CCNC: 163 U/L — HIGH (ref 40–120)
ALP SERPL-CCNC: 171 U/L — HIGH (ref 40–120)
ALT FLD-CCNC: 20 U/L — SIGNIFICANT CHANGE UP (ref 12–78)
ALT FLD-CCNC: 20 U/L — SIGNIFICANT CHANGE UP (ref 12–78)
ALT FLD-CCNC: 22 U/L — SIGNIFICANT CHANGE UP (ref 12–78)
ANION GAP SERPL CALC-SCNC: 6 MMOL/L — SIGNIFICANT CHANGE UP (ref 5–17)
ANION GAP SERPL CALC-SCNC: 7 MMOL/L — SIGNIFICANT CHANGE UP (ref 5–17)
AST SERPL-CCNC: 53 U/L — HIGH (ref 15–37)
AST SERPL-CCNC: 59 U/L — HIGH (ref 15–37)
AST SERPL-CCNC: 60 U/L — HIGH (ref 15–37)
BILIRUB DIRECT SERPL-MCNC: <.1 MG/DL — SIGNIFICANT CHANGE UP (ref 0.05–0.2)
BILIRUB INDIRECT FLD-MCNC: >0.1 MG/DL — LOW (ref 0.2–1)
BILIRUB SERPL-MCNC: 0.2 MG/DL — SIGNIFICANT CHANGE UP (ref 0.2–1.2)
BUN SERPL-MCNC: 10 MG/DL — SIGNIFICANT CHANGE UP (ref 7–23)
BUN SERPL-MCNC: 9 MG/DL — SIGNIFICANT CHANGE UP (ref 7–23)
CALCIUM SERPL-MCNC: 7.7 MG/DL — LOW (ref 8.5–10.1)
CALCIUM SERPL-MCNC: 7.7 MG/DL — LOW (ref 8.5–10.1)
CHLORIDE SERPL-SCNC: 109 MMOL/L — HIGH (ref 96–108)
CHLORIDE SERPL-SCNC: 109 MMOL/L — HIGH (ref 96–108)
CO2 SERPL-SCNC: 26 MMOL/L — SIGNIFICANT CHANGE UP (ref 22–31)
CO2 SERPL-SCNC: 26 MMOL/L — SIGNIFICANT CHANGE UP (ref 22–31)
CREAT SERPL-MCNC: 0.84 MG/DL — SIGNIFICANT CHANGE UP (ref 0.5–1.3)
CREAT SERPL-MCNC: 0.93 MG/DL — SIGNIFICANT CHANGE UP (ref 0.5–1.3)
CULTURE RESULTS: SIGNIFICANT CHANGE UP
D DIMER BLD IA.RAPID-MCNC: 386 NG/ML DDU — HIGH
GLUCOSE SERPL-MCNC: 160 MG/DL — HIGH (ref 70–99)
GLUCOSE SERPL-MCNC: 186 MG/DL — HIGH (ref 70–99)
HCT VFR BLD CALC: 27.5 % — LOW (ref 34.5–45)
HGB BLD-MCNC: 8.6 G/DL — LOW (ref 11.5–15.5)
MCHC RBC-ENTMCNC: 26.6 PG — LOW (ref 27–34)
MCHC RBC-ENTMCNC: 31.3 GM/DL — LOW (ref 32–36)
MCV RBC AUTO: 85.1 FL — SIGNIFICANT CHANGE UP (ref 80–100)
NRBC # BLD: 0 /100 WBCS — SIGNIFICANT CHANGE UP (ref 0–0)
OB PNL STL: NEGATIVE — SIGNIFICANT CHANGE UP
PLATELET # BLD AUTO: 209 K/UL — SIGNIFICANT CHANGE UP (ref 150–400)
POTASSIUM SERPL-MCNC: 4.2 MMOL/L — SIGNIFICANT CHANGE UP (ref 3.5–5.3)
POTASSIUM SERPL-MCNC: 4.5 MMOL/L — SIGNIFICANT CHANGE UP (ref 3.5–5.3)
POTASSIUM SERPL-SCNC: 4.2 MMOL/L — SIGNIFICANT CHANGE UP (ref 3.5–5.3)
POTASSIUM SERPL-SCNC: 4.5 MMOL/L — SIGNIFICANT CHANGE UP (ref 3.5–5.3)
PROT SERPL-MCNC: 5.8 G/DL — LOW (ref 6–8.3)
PROT SERPL-MCNC: 5.8 G/DL — LOW (ref 6–8.3)
PROT SERPL-MCNC: 5.9 G/DL — LOW (ref 6–8.3)
RBC # BLD: 3.23 M/UL — LOW (ref 3.8–5.2)
RBC # FLD: 16.7 % — HIGH (ref 10.3–14.5)
SODIUM SERPL-SCNC: 141 MMOL/L — SIGNIFICANT CHANGE UP (ref 135–145)
SODIUM SERPL-SCNC: 142 MMOL/L — SIGNIFICANT CHANGE UP (ref 135–145)
SPECIMEN SOURCE: SIGNIFICANT CHANGE UP
WBC # BLD: 6.69 K/UL — SIGNIFICANT CHANGE UP (ref 3.8–10.5)
WBC # FLD AUTO: 6.69 K/UL — SIGNIFICANT CHANGE UP (ref 3.8–10.5)

## 2021-01-29 PROCEDURE — 93970 EXTREMITY STUDY: CPT | Mod: 26

## 2021-01-29 PROCEDURE — 99232 SBSQ HOSP IP/OBS MODERATE 35: CPT | Mod: CS

## 2021-01-29 RX ORDER — IRON SUCROSE 20 MG/ML
100 INJECTION, SOLUTION INTRAVENOUS EVERY 24 HOURS
Refills: 0 | Status: COMPLETED | OUTPATIENT
Start: 2021-01-29 | End: 2021-01-31

## 2021-01-29 RX ORDER — FAMOTIDINE 10 MG/ML
20 INJECTION INTRAVENOUS
Refills: 0 | Status: DISCONTINUED | OUTPATIENT
Start: 2021-01-29 | End: 2021-02-08

## 2021-01-29 RX ORDER — POTASSIUM CHLORIDE 20 MEQ
15 PACKET (EA) ORAL
Qty: 0 | Refills: 0 | DISCHARGE

## 2021-01-29 RX ORDER — CHOLECALCIFEROL (VITAMIN D3) 125 MCG
2000 CAPSULE ORAL DAILY
Refills: 0 | Status: DISCONTINUED | OUTPATIENT
Start: 2021-01-29 | End: 2021-02-08

## 2021-01-29 RX ORDER — BUDESONIDE AND FORMOTEROL FUMARATE DIHYDRATE 160; 4.5 UG/1; UG/1
2 AEROSOL RESPIRATORY (INHALATION)
Refills: 0 | Status: DISCONTINUED | OUTPATIENT
Start: 2021-01-29 | End: 2021-02-08

## 2021-01-29 RX ORDER — ASCORBIC ACID 60 MG
500 TABLET,CHEWABLE ORAL DAILY
Refills: 0 | Status: DISCONTINUED | OUTPATIENT
Start: 2021-01-29 | End: 2021-02-08

## 2021-01-29 RX ORDER — CYCLOBENZAPRINE HYDROCHLORIDE 10 MG/1
5 TABLET, FILM COATED ORAL THREE TIMES A DAY
Refills: 0 | Status: DISCONTINUED | OUTPATIENT
Start: 2021-01-29 | End: 2021-02-08

## 2021-01-29 RX ORDER — LACTOBACILLUS ACIDOPHILUS 100MM CELL
1 CAPSULE ORAL
Qty: 0 | Refills: 0 | DISCHARGE

## 2021-01-29 RX ADMIN — ENOXAPARIN SODIUM 40 MILLIGRAM(S): 100 INJECTION SUBCUTANEOUS at 13:07

## 2021-01-29 RX ADMIN — Medication 500 MILLIGRAM(S): at 13:06

## 2021-01-29 RX ADMIN — Medication 500 MILLIGRAM(S): at 13:07

## 2021-01-29 RX ADMIN — Medication 2000 UNIT(S): at 13:08

## 2021-01-29 RX ADMIN — OXYCODONE HYDROCHLORIDE 5 MILLIGRAM(S): 5 TABLET ORAL at 04:39

## 2021-01-29 RX ADMIN — Medication 6 MILLIGRAM(S): at 04:36

## 2021-01-29 RX ADMIN — OXYCODONE HYDROCHLORIDE 5 MILLIGRAM(S): 5 TABLET ORAL at 13:04

## 2021-01-29 RX ADMIN — Medication 500 MILLIGRAM(S): at 20:52

## 2021-01-29 RX ADMIN — TRAMADOL HYDROCHLORIDE 25 MILLIGRAM(S): 50 TABLET ORAL at 22:31

## 2021-01-29 RX ADMIN — PRAMIPEXOLE DIHYDROCHLORIDE 1.5 MILLIGRAM(S): 0.12 TABLET ORAL at 17:43

## 2021-01-29 RX ADMIN — TRAMADOL HYDROCHLORIDE 25 MILLIGRAM(S): 50 TABLET ORAL at 05:32

## 2021-01-29 RX ADMIN — LIDOCAINE 1 PATCH: 4 CREAM TOPICAL at 04:45

## 2021-01-29 RX ADMIN — LIDOCAINE 1 PATCH: 4 CREAM TOPICAL at 17:42

## 2021-01-29 RX ADMIN — REMDESIVIR 500 MILLIGRAM(S): 5 INJECTION INTRAVENOUS at 11:00

## 2021-01-29 RX ADMIN — OXYCODONE HYDROCHLORIDE 5 MILLIGRAM(S): 5 TABLET ORAL at 20:52

## 2021-01-29 RX ADMIN — Medication 20 MILLIEQUIVALENT(S): at 04:37

## 2021-01-29 RX ADMIN — Medication 1 TABLET(S): at 13:08

## 2021-01-29 RX ADMIN — FAMOTIDINE 20 MILLIGRAM(S): 10 INJECTION INTRAVENOUS at 17:46

## 2021-01-29 RX ADMIN — LIDOCAINE 1 PATCH: 4 CREAM TOPICAL at 20:56

## 2021-01-29 RX ADMIN — Medication 1 TABLET(S): at 13:09

## 2021-01-29 RX ADMIN — Medication 500 MILLIGRAM(S): at 04:37

## 2021-01-29 RX ADMIN — Medication 5 MILLIGRAM(S): at 04:37

## 2021-01-29 RX ADMIN — Medication 5 MILLIGRAM(S): at 17:44

## 2021-01-29 RX ADMIN — IRON SUCROSE 100 MILLIGRAM(S): 20 INJECTION, SOLUTION INTRAVENOUS at 15:00

## 2021-01-29 NOTE — PROGRESS NOTE ADULT - SUBJECTIVE AND OBJECTIVE BOX
Patient is a 71y old  Female who presents with a chief complaint of sob (29 Jan 2021 11:13)       Pt is seen and examined  pt is awake and lying in bed/out of bed to chair  pt seems comfortable and denies any complaints at this time    HPI:  · HPI Objective Statement: 70yo female who presents with multiple falls and feeling weak today. pt c/o diffuse pain and feeling weak, no vomiting, no diarrhea, no fever, chills, no cough or sob, denies sick contacts at home  In ER patient was found to have COVID PNA with possible colitis.  patient is being admitted for further care and management (28 Jan 2021 10:42)         ROS:  Negative except for:    MEDICATIONS  (STANDING):  ascorbic acid 500 milliGRAM(s) Oral daily  budesonide 160 MICROgram(s)/formoterol 4.5 MICROgram(s) Inhaler 2 Puff(s) Inhalation two times a day  cholecalciferol 2000 Unit(s) Oral daily  dexAMETHasone  Injectable 6 milliGRAM(s) IV Push daily  enoxaparin Injectable 40 milliGRAM(s) SubCutaneous daily  famotidine    Tablet 20 milliGRAM(s) Oral two times a day  influenza   Vaccine 0.5 milliLiter(s) IntraMuscular once  lactobacillus acidophilus 1 Tablet(s) Oral daily  lidocaine   Patch 1 Patch Transdermal every 24 hours  multivitamin/minerals 1 Tablet(s) Oral daily  potassium chloride   Powder 20 milliEquivalent(s) Oral four times a day  pramipexole 1.5 milliGRAM(s) Oral every 24 hours  remdesivir  IVPB 100 milliGRAM(s) IV Intermittent every 24 hours  remdesivir  IVPB   IV Intermittent   sulfaSALAzine 500 milliGRAM(s) Oral three times a day  torsemide 5 milliGRAM(s) Oral two times a day    MEDICATIONS  (PRN):  acetaminophen   Tablet .. 650 milliGRAM(s) Oral every 6 hours PRN Temp greater or equal to 38C (100.4F), Mild Pain (1 - 3)  ALBUTerol    90 MICROgram(s) HFA Inhaler 2 Puff(s) Inhalation every 4 hours PRN Shortness of Breath and/or Wheezing  aluminum hydroxide/magnesium hydroxide/simethicone Suspension 30 milliLiter(s) Oral every 4 hours PRN Dyspepsia  benzonatate 100 milliGRAM(s) Oral three times a day PRN Cough  melatonin 3 milliGRAM(s) Oral at bedtime PRN Insomnia  ondansetron Injectable 4 milliGRAM(s) IV Push every 6 hours PRN Nausea and/or Vomiting  oxyCODONE    IR 5 milliGRAM(s) Oral four times a day PRN Severe Pain (7 - 10)  traMADol 25 milliGRAM(s) Oral four times a day PRN Moderate Pain (4 - 6)      Allergies    No Known Allergies    Intolerances        Vital Signs Last 24 Hrs  T(C): 36.3 (29 Jan 2021 12:16), Max: 36.8 (28 Jan 2021 21:39)  T(F): 97.3 (29 Jan 2021 12:16), Max: 98.2 (28 Jan 2021 21:39)  HR: 64 (29 Jan 2021 12:16) (60 - 71)  BP: 116/63 (29 Jan 2021 12:16) (96/57 - 120/64)  BP(mean): --  RR: 18 (29 Jan 2021 12:16) (18 - 20)  SpO2: 95% (29 Jan 2021 12:16) (92% - 99%)    PHYSICAL EXAM  General: adult in NAD  HEENT: clear oropharynx, anicteric sclera, pink conjunctiva  Neck: supple  CV: normal S1/S2 with no murmur rubs or gallops  Lungs: positive air movement b/l ant lungs,clear to auscultation, no wheezes, no rales  Abdomen: soft non-tender non-distended, no hepatosplenomegaly  Ext: no clubbing cyanosis or edema  Skin: no rashes and no petechiae  Neuro: alert and oriented X 4, no focal deficits  LABS:                          8.6    6.69  )-----------( 209      ( 29 Jan 2021 09:07 )             27.5         Mean Cell Volume : 85.1 fl  Mean Cell Hemoglobin : 26.6 pg  Mean Cell Hemoglobin Concentration : 31.3 gm/dL  Auto Neutrophil # : x  Auto Lymphocyte # : x  Auto Monocyte # : x  Auto Eosinophil # : x  Auto Basophil # : x  Auto Neutrophil % : x  Auto Lymphocyte % : x  Auto Monocyte % : x  Auto Eosinophil % : x  Auto Basophil % : x    Serial CBC's  01-29 @ 09:07  Hct-27.5 / Hgb-8.6 / Plat-209 / RBC-3.23 / WBC-6.69          Serial CBC's  01-28 @ 16:50  Hct-27.5 / Hgb-8.9 / Plat--- / RBC-3.30 / WBC---          Serial CBC's  01-27 @ 23:16  Hct-28.0 / Hgb-8.7 / Plat-247 / RBC-3.34 / WBC-8.20            01-29    142  |  109<H>  |  10  ----------------------------<  186<H>  4.5   |  26  |  0.93    Ca    7.7<L>      29 Jan 2021 10:44  Phos  3.1     01-28  Mg     1.9     01-28    TPro  5.8<L>  /  Alb  2.1<L>  /  TBili  0.2  /  DBili  x   /  AST  53<H>  /  ALT  20  /  AlkPhos  159<H>  01-29      PT/INR - ( 27 Jan 2021 23:16 )   PT: 17.6 sec;   INR: 1.54 ratio         PTT - ( 27 Jan 2021 23:16 )  PTT:28.6 sec    Iron - Total Binding Capacity.: 154 ug/dL (01-28-21 @ 22:55)  Ferritin, Serum: 168 ng/mL (01-28-21 @ 22:45)  Vitamin B12, Serum: 872 pg/mL (01-28-21 @ 22:45)  Folate, Serum: 8.7 ng/mL (01-28-21 @ 22:45)  Reticulocyte Percent: 0.4 % (01-28-21 @ 16:50)  Ferritin, Serum: 161 ng/mL (01-28-21 @ 11:50)  Iron - Total Binding Capacity.: 144 ug/dL (01-28-21 @ 11:47)  Ferritin, Serum: 173 ng/mL (01-28-21 @ 06:22)                BLOOD SMEAR INTERPRETATION:       RADIOLOGY & ADDITIONAL STUDIES:     Patient is a 71y old  Female who presents with a chief complaint of sob (29 Jan 2021 11:13)       Pt is seen and examined  pt is awake and lying in bed  pt seems comfortable and denies any complaints at this time    HPI:  · HPI Objective Statement: 70yo female who presents with multiple falls and feeling weak today. pt c/o diffuse pain and feeling weak, no vomiting, no diarrhea, no fever, chills, no cough or sob, denies sick contacts at home  In ER patient was found to have COVID PNA with possible colitis.  patient is being admitted for further care and management (28 Jan 2021 10:42)         ROS:  as per hpi    MEDICATIONS  (STANDING):  ascorbic acid 500 milliGRAM(s) Oral daily  budesonide 160 MICROgram(s)/formoterol 4.5 MICROgram(s) Inhaler 2 Puff(s) Inhalation two times a day  cholecalciferol 2000 Unit(s) Oral daily  dexAMETHasone  Injectable 6 milliGRAM(s) IV Push daily  enoxaparin Injectable 40 milliGRAM(s) SubCutaneous daily  famotidine    Tablet 20 milliGRAM(s) Oral two times a day  influenza   Vaccine 0.5 milliLiter(s) IntraMuscular once  lactobacillus acidophilus 1 Tablet(s) Oral daily  lidocaine   Patch 1 Patch Transdermal every 24 hours  multivitamin/minerals 1 Tablet(s) Oral daily  potassium chloride   Powder 20 milliEquivalent(s) Oral four times a day  pramipexole 1.5 milliGRAM(s) Oral every 24 hours  remdesivir  IVPB 100 milliGRAM(s) IV Intermittent every 24 hours  remdesivir  IVPB   IV Intermittent   sulfaSALAzine 500 milliGRAM(s) Oral three times a day  torsemide 5 milliGRAM(s) Oral two times a day    MEDICATIONS  (PRN):  acetaminophen   Tablet .. 650 milliGRAM(s) Oral every 6 hours PRN Temp greater or equal to 38C (100.4F), Mild Pain (1 - 3)  ALBUTerol    90 MICROgram(s) HFA Inhaler 2 Puff(s) Inhalation every 4 hours PRN Shortness of Breath and/or Wheezing  aluminum hydroxide/magnesium hydroxide/simethicone Suspension 30 milliLiter(s) Oral every 4 hours PRN Dyspepsia  benzonatate 100 milliGRAM(s) Oral three times a day PRN Cough  melatonin 3 milliGRAM(s) Oral at bedtime PRN Insomnia  ondansetron Injectable 4 milliGRAM(s) IV Push every 6 hours PRN Nausea and/or Vomiting  oxyCODONE    IR 5 milliGRAM(s) Oral four times a day PRN Severe Pain (7 - 10)  traMADol 25 milliGRAM(s) Oral four times a day PRN Moderate Pain (4 - 6)      Allergies    No Known Allergies    Intolerances        Vital Signs Last 24 Hrs  T(C): 36.3 (29 Jan 2021 12:16), Max: 36.8 (28 Jan 2021 21:39)  T(F): 97.3 (29 Jan 2021 12:16), Max: 98.2 (28 Jan 2021 21:39)  HR: 64 (29 Jan 2021 12:16) (60 - 71)  BP: 116/63 (29 Jan 2021 12:16) (96/57 - 120/64)  BP(mean): --  RR: 18 (29 Jan 2021 12:16) (18 - 20)  SpO2: 95% (29 Jan 2021 12:16) (92% - 99%)    PHYSICAL EXAM  General: adult in NAD  HEENT: clear oropharynx, anicteric sclera, pink conjunctiva  Neck: supple  CV: normal S1/S2 with no murmur rubs or gallops  Lungs: positive air movement b/l ant lungs,clear to auscultation, no wheezes, no rales  Abdomen: soft non-tender non-distended, no hepatosplenomegaly  Ext: no clubbing cyanosis or edema  Skin: no rashes and no petechiae  Neuro: alert and oriented X 4, no focal deficits  LABS:                          8.6    6.69  )-----------( 209      ( 29 Jan 2021 09:07 )             27.5         Mean Cell Volume : 85.1 fl  Mean Cell Hemoglobin : 26.6 pg  Mean Cell Hemoglobin Concentration : 31.3 gm/dL  Auto Neutrophil # : x  Auto Lymphocyte # : x  Auto Monocyte # : x  Auto Eosinophil # : x  Auto Basophil # : x  Auto Neutrophil % : x  Auto Lymphocyte % : x  Auto Monocyte % : x  Auto Eosinophil % : x  Auto Basophil % : x    Serial CBC's  01-29 @ 09:07  Hct-27.5 / Hgb-8.6 / Plat-209 / RBC-3.23 / WBC-6.69          Serial CBC's  01-28 @ 16:50  Hct-27.5 / Hgb-8.9 / Plat--- / RBC-3.30 / WBC---          Serial CBC's  01-27 @ 23:16  Hct-28.0 / Hgb-8.7 / Plat-247 / RBC-3.34 / WBC-8.20            01-29    142  |  109<H>  |  10  ----------------------------<  186<H>  4.5   |  26  |  0.93    Ca    7.7<L>      29 Jan 2021 10:44  Phos  3.1     01-28  Mg     1.9     01-28    TPro  5.8<L>  /  Alb  2.1<L>  /  TBili  0.2  /  DBili  x   /  AST  53<H>  /  ALT  20  /  AlkPhos  159<H>  01-29      PT/INR - ( 27 Jan 2021 23:16 )   PT: 17.6 sec;   INR: 1.54 ratio         PTT - ( 27 Jan 2021 23:16 )  PTT:28.6 sec    Iron - Total Binding Capacity.: 154 ug/dL (01-28-21 @ 22:55)  Ferritin, Serum: 168 ng/mL (01-28-21 @ 22:45)  Vitamin B12, Serum: 872 pg/mL (01-28-21 @ 22:45)  Folate, Serum: 8.7 ng/mL (01-28-21 @ 22:45)  Reticulocyte Percent: 0.4 % (01-28-21 @ 16:50)  Ferritin, Serum: 161 ng/mL (01-28-21 @ 11:50)  Iron - Total Binding Capacity.: 144 ug/dL (01-28-21 @ 11:47)  Ferritin, Serum: 173 ng/mL (01-28-21 @ 06:22)  Iron with Total Binding Capacity (01.28.21 @ 22:55)    Iron - Total Binding Capacity.: 154 ug/dL    % Saturation, Iron: 6 %    Iron Total, Serum: 10 ug/dL    Unsaturated Iron Binding Capacity: 144 ug/dL    Reticulocyte Count (01.28.21 @ 16:50)    RBC Count: 3.30 M/uL    Reticulocyte Percent: 0.4 %    Absolute Reticulocytes: 12.9 K/uL    Lactate Dehydrogenase, Serum (01.28.21 @ 11:50)    Lactate Dehydrogenase, Serum: 286 U/L                  BLOOD SMEAR INTERPRETATION: Normal WBC series and morphology, no apparent blast cells or immature wbc, no schistocytes or fragmented rbc, platelets appears adequate, no clumping of platelets or giant platelets.        RADIOLOGY & ADDITIONAL STUDIES:

## 2021-01-29 NOTE — PROGRESS NOTE ADULT - ASSESSMENT
contact #  daughter----Mortimer, Kelly  phone # [phone]    IMPRESSION:  71 y/o F PMHx lymphedema, HLD, RA, s/p cervical spine surgery at [facility] around 10/2020--- with prolonged hospital course complicated by C. diff infection (completed treatment course) and dysphagia (s/p PEG tube placement). Patient returned home on 11/24/20. Patient was admitted at [facility] from 11/28/2020---21/1/2020, was admitted with nausea/ vomiting/and abdominal pain, and was discharged 12/1/2020. She has been using a walker to ambulate, admitted with multiple falls and weakness, had ct scan this admission, hematology was consulted for anemia on 1/28 and was seen.  case d/w daughter, lives with  per , 2 daughters    RECOMMENDATION:  Anemia---likely multifactorial  hb is at 8.6 today, was at 8.7 yesterday  iron profile--low iron/transferrin saturation, ferritin is an acute phase reactant, as looking for a rapid response will give iv iron and monitor h/h  follow anemia work up  monitor h/h--transfuse prbc as needed for symptomatic anemia or if hb is under 7  ?colitis on ct--gi is following  anemia/iron def possible/colitis---gi work up as per gi/pt/family to exclude gi pathology/malignancy  covid positive, id is following, is on remdesivir and decadron  gi/dvtp--lovenox s/c

## 2021-01-29 NOTE — DIETITIAN INITIAL EVALUATION ADULT. - ORAL INTAKE PTA/DIET HISTORY
Pt with fair intake. Seen by SLP today for follow up, hx dysphagia noted(had PEG x 6 weeks in past). Requests puree veggies, chopped foods, agreeable to snacks. Pt with wt changes over last few months, possibly secondary to fluid shifts, hx lymphedema/edema in legs.

## 2021-01-29 NOTE — CONSULT NOTE ADULT - SUBJECTIVE AND OBJECTIVE BOX
PULMONARY/CRITICAL CARE        Patient is a 71y old  Female who presents with a chief complaint of sob (2021 17:48)    BRIEF HOSPITAL COURSE: ***72yo woman with PMH of OA, HDL, and bladder disorder was admitted with multiple falls and feeling weakness. She didn't have SOB, cough, vomiting, no diarrhea, no fever, chills, No sick contacts.  She tested positive for COVID so was admitted for treatment. She has been started on remdesivir and Dexamethasone.       Events last 24 hours: ***    PAST MEDICAL & SURGICAL HISTORY:  Obesity (BMI 30-39.9)    Traumatic arthropathy involving lower leg  right    History of Osteoarthritis    Hyperlipidemia    Restless Leg Syndrome    H/O neck surgery    H/O arthroscopy of right knee      Bladder Disorder  Bladder lift     History of Colonoscopy    Ex lap in  for abscess in the baldder    History of Hysterectomy and bladder lift in       Allergies    No Known Allergies    Intolerances      FAMILY HISTORY: Smoked 1ppd for 25 yrs until 2017 no etoh  No pertinent family history in first degree relatives        Review of Systems:  CONSTITUTIONAL: has fatigue  EYES: No eye pain, visual disturbances, or discharge  ENMT:  No difficulty hearing, tinnitus, vertigo; No sinus or throat pain  NECK: No pain or stiffness  RESPIRATORY: has  cough,  no wheezing, chills or hemoptysis; mild shortness of breath  CARDIOVASCULAR: No chest pain, palpitations, dizziness, ; has chronic  leg swelling  GASTROINTESTINAL: No abdominal or epigastric pain. No nausea, vomiting, or hematemesis; No diarrhea or constipation. No melena or hematochezia.  GENITOURINARY: No dysuria, frequency, hematuria, or incontinence  NEUROLOGICAL: No headaches, memory loss, loss of strength, numbness, or tremors  SKIN: No itching, burning, rashes, or lesions   MUSCULOSKELETAL: No joint pain or swelling; No muscle, back, or extremity pain  PSYCHIATRIC: No depression, anxiety, mood swings, or difficulty sleeping      Medications:  remdesivir  IVPB 100 milliGRAM(s) IV Intermittent every 24 hours  remdesivir  IVPB   IV Intermittent     torsemide 5 milliGRAM(s) Oral two times a day    ALBUTerol    90 MICROgram(s) HFA Inhaler 2 Puff(s) Inhalation every 4 hours PRN  benzonatate 100 milliGRAM(s) Oral three times a day PRN  budesonide 160 MICROgram(s)/formoterol 4.5 MICROgram(s) Inhaler 2 Puff(s) Inhalation two times a day    acetaminophen   Tablet .. 650 milliGRAM(s) Oral every 6 hours PRN  melatonin 3 milliGRAM(s) Oral at bedtime PRN  ondansetron Injectable 4 milliGRAM(s) IV Push every 6 hours PRN  oxyCODONE    IR 5 milliGRAM(s) Oral four times a day PRN  pramipexole 1.5 milliGRAM(s) Oral every 24 hours  traMADol 25 milliGRAM(s) Oral four times a day PRN      enoxaparin Injectable 40 milliGRAM(s) SubCutaneous daily    aluminum hydroxide/magnesium hydroxide/simethicone Suspension 30 milliLiter(s) Oral every 4 hours PRN  famotidine    Tablet 20 milliGRAM(s) Oral two times a day  sulfaSALAzine 500 milliGRAM(s) Oral three times a day      dexAMETHasone  Injectable 6 milliGRAM(s) IV Push daily    ascorbic acid 500 milliGRAM(s) Oral daily  cholecalciferol 2000 Unit(s) Oral daily  multivitamin/minerals 1 Tablet(s) Oral daily  potassium chloride   Powder 20 milliEquivalent(s) Oral four times a day    influenza   Vaccine 0.5 milliLiter(s) IntraMuscular once    lidocaine   Patch 1 Patch Transdermal every 24 hours    lactobacillus acidophilus 1 Tablet(s) Oral daily          ICU Vital Signs Last 24 Hrs  T(C): 36.3 (2021 04:59), Max: 36.8 (2021 21:39)  T(F): 97.4 (2021 04:59), Max: 98.2 (2021 21:39)  HR: 71 (2021 04:59) (60 - 76)  BP: 120/64 (2021 04:59) (96/57 - 120/64)  BP(mean): --  ABP: --  ABP(mean): --  RR: 18 (2021 04:59) (18 - 20)  SpO2: 96% (2021 04:59) (92% - 99%)    Vital Signs Last 24 Hrs  T(C): 36.3 (2021 04:59), Max: 36.8 (2021 21:39)  T(F): 97.4 (2021 04:59), Max: 98.2 (2021 21:39)  HR: 71 (2021 04:59) (60 - 76)  BP: 120/64 (2021 04:59) (96/57 - 120/64)  BP(mean): --  RR: 18 (2021 04:59) (18 - 20)  SpO2: 96% (2021 04:59) (92% - 99%)        I&O's Detail    2021 07:01  -  2021 07:00  --------------------------------------------------------  IN:    IV PiggyBack: 350 mL  Total IN: 350 mL    OUT:    Voided (mL): 650 mL  Total OUT: 650 mL    Total NET: -300 mL            LABS:                        8.9    x     )-----------( x        ( 2021 16:50 )             27.5         139  |  106  |  6<L>  ----------------------------<  86  2.9<LL>   |  27  |  0.71    Ca    7.3<L>      2021 08:03  Phos  3.1       Mg     1.9         TPro  5.8<L>  /  Alb  2.1<L>  /  TBili  0.4  /  DBili  .20  /  AST  70<H>  /  ALT  19  /  AlkPhos  130<H>        CARDIAC MARKERS ( 2021 08:03 )  x     / x     / 556 U/L / x     / x          CAPILLARY BLOOD GLUCOSE        PT/INR - ( 2021 23:16 )   PT: 17.6 sec;   INR: 1.54 ratio         PTT - ( 2021 23:16 )  PTT:28.6 sec  Urinalysis Basic - ( 2021 20:10 )    Color: Yellow / Appearance: Clear / S.010 / pH: x  Gluc: x / Ketone: Negative  / Bili: Negative / Urobili: Negative   Blood: x / Protein: 30 mg/dL / Nitrite: Negative   Leuk Esterase: Negative / RBC: 0-2 /HPF / WBC 0-2   Sq Epi: x / Non Sq Epi: Few / Bacteria: Few      CULTURES:  Culture Results:   No growth to date. ( @ 05:12)  Culture Results:   No growth to date. ( @ 05:12)      Physical Examination:    General: No acute distress.  elderly female    HEENT: Pupils equal, reactive to light.  Symmetric.    PULM: bibas crackles good excurion on wheeze     CVS: Regular rate and rhythm, no murmurs, rubs, or gallops    ABD: Soft, nondistended, nontender, normoactive bowel sounds, no masses    EXT: brawny leg  edema, nontender    SKIN: Warm and well perfused, no rashes noted.    NEURO: Alert, nteractive, nonfocal    RADIOLOGY: ***    d< from: CT Chest No Cont (21 @ 00:10) >  EXAM:  CT CHEST                            PROCEDURE DATE:  2021          INTERPRETATION:  CLINICAL INFORMATION: Chest trauma.  Status post fall.    COMPARISON: 2020.    PROCEDURE:  CT of the Chest was performed without intravenous contrast.  Sagittal and coronal reformats were performed.    FINDINGS:    LUNGS AND AIRWAYS: There is moderate respiratory motion artifact the central airways are grossly clear within limits of motion degradation.  There is mild to moderate centrilobular emphysema.  There are nonspecific groundglass opacities in the anterior segments of both upper lobes measuring up to approximately 1.9 x 1.9 cm on the right (3:226) and 1.4 x 1.1 cm on the left (3:217), which are new since 2020.  There is dense consolidation in the right middle lobe with air bronchograms and bronchiectasis, in a region of lung that previously showed patchy groundglass nodular opacities on 2020.  Again seen is patchy groundglass opacity in the basilar segments of the right greater than left lower lobes which appears stable to mildly progressed from 2020.  PLEURA: No pleural effusion.  MEDIASTINUM AND MARYBEL: No lymphadenopathy.  VESSELS: Mild atherosclerotic calcification thoracic aorta and arch vessels.  HEART:Cardiomegaly.  Calcifications involving sinuses of Valsalva and aortic valve leaflets.  Moderate atherosclerotic calcification the coronary arteries. No pericardial effusion.  CHEST WALL AND LOWER NECK: Within normal limits.  VISUALIZED UPPER ABDOMEN: Enlarged spleen measures up to 13.9 cm, increased in size from approximately 11.8 cm on 2020.  BONES: The patient is status post C2-T7 posterior spinal fusion with shamar screw construct.  Within the thoracic spine there are pedicle screws at T1 bilaterally, T2 bilaterally, T3 on the right, T4 on the left, T5 bilaterally, T6 bilaterally and T7 bilaterally.  There is also a screw coursing through the base of the T3 spinous process and right T3 lamina.  Multiple pedicle screws are slightly lateral in position and course into the costovertebral vertebral junctions.  There is no evidence of hardware fracture or abnormal lucency around the pedicle screws.  There are old/healing fractures of the left third, sixth, seventh and eighth ribs with external callus formation.    IMPRESSION:  1.  No evidence of acute traumatic injury in the chest.    2.  Patchy groundglass opacities in the anterior segments of both upper lobes are new from 2020.  These may reflect nonspecific foci of infection, inflammation, edema or hemorrhage.    2.  Dense consolidation in the right middle lobe with air bronchograms and bronchiectasis is an region of lung that previously showed patchy groundglass nodular opacities on 2020.  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    3.  There is persistent groundglass opacity in the basilar segments of both lower lobes, right greater than left, which appears stable to mildly progressed from 2020.  This may reflect areas of infection/inflammation, edema, atelectasis or interstitial lung disease.    4.  Splenomegaly, which appears new from 2020.  The spleen measures 13.9 cm, increased in size from 11.8 cm on 2020.    5.  Follow-up chest CT is recommended in 1-3 months to reassess the findings and exclude continued disease progression.    < end of copied text >

## 2021-01-29 NOTE — PROGRESS NOTE ADULT - SUBJECTIVE AND OBJECTIVE BOX
[facility]  INFECTIOUS DISEASES   =======================================================    MRN-[id number]  VERONICA WHALEN     Follow up; COVID 19    On o2 2L with NC, comfortable, no SOB or cough.   No fever.     PAST MEDICAL & SURGICAL HISTORY:  Obesity (BMI 30-39.9)  Traumatic arthropathy involving lower leg  right  History of Osteoarthritis  Hyperlipidemia  Restless Leg Syndrome  H/O neck surgery  H/O arthroscopy of right knee  2010  Bladder Disorder  Bladder lift 2000  History of Colonoscopy  Ex lap in 2009 for abscess in the baldder  History of Hysterectomy and bladder lift in 2000    Social Hx: no smoking, ETOH or drugs     FAMILY HISTORY:  No pertinent family history in first degree relatives    Allergies  No Known Allergies    Antibiotics:  dexAMETHasone  Injectable 6 milliGRAM(s) IV Push daily  remdesivir  IVPB   IV Intermittent      REVIEW OF SYSTEMS:  CONSTITUTIONAL:  No Fever or chills  HEENT:  No diplopia or blurred vision.  No sore throat or runny nose.  CARDIOVASCULAR:  No chest pain or SOB.  RESPIRATORY:  no cough, mild shortness of breath  GASTROINTESTINAL:  No nausea, vomiting or diarrhea.  GENITOURINARY:  No dysuria, frequency or urgency. No Blood in urine  MUSCULOSKELETAL:  no joint aches, no muscle pain  SKIN:  No change in skin, hair or nails.  NEUROLOGIC:  No paresthesias, fasciculations, seizures or weakness.  PSYCHIATRIC:  No disorder of thought or mood.  ENDOCRINE:  No heat or cold intolerance, polyuria or polydipsia.  HEMATOLOGICAL:  No easy bruising or bleeding.     Physical Exam:  Vital Signs Last 24 Hrs  T(C): 36.3 (29 Jan 2021 12:16), Max: 36.8 (28 Jan 2021 21:39)  T(F): 97.3 (29 Jan 2021 12:16), Max: 98.2 (28 Jan 2021 21:39)  HR: 64 (29 Jan 2021 12:16) (60 - 71)  BP: 116/63 (29 Jan 2021 12:16) (96/57 - 120/64)  BP(mean): --  RR: 18 (29 Jan 2021 12:16) (18 - 20)  SpO2: 95% (29 Jan 2021 12:16) (92% - 99%)  GEN: NAD  HEENT: normocephalic and atraumatic. EOMI. PERRL.    NECK: Supple.  No lymphadenopathy   LUNGS: Clear to auscultation.  HEART: Regular rate and rhythm   ABDOMEN: Soft, nontender, and nondistended.  Positive bowel sounds.    EXTREMITIES: + edema b/l   NEUROLOGIC: grossly intact.  PSYCHIATRIC: Appropriate affect .  SKIN: No rash    Labs:                        8.6    6.69  )-----------( 209      ( 29 Jan 2021 09:07 )             27.5     01-29    142  |  109<H>  |  10  ----------------------------<  186<H>  4.5   |  26  |  0.93    Ca    7.7<L>      29 Jan 2021 10:44  Phos  3.1     01-28  Mg     1.9     01-28    TPro  5.8<L>  /  Alb  2.1<L>  /  TBili  0.2  /  DBili  x   /  AST  53<H>  /  ALT  20  /  AlkPhos  159<H>  01-29    Culture - Blood (collected 01-28-21 @ 05:12)  Source: .Blood Blood-Peripheral    Culture - Blood (collected 01-28-21 @ 05:12)  Source: .Blood Blood-Peripheral    WBC Count: 6.69 K/uL (01-29-21 @ 09:07)  WBC Count: 8.20 K/uL (01-27-21 @ 23:16)    Creatinine, Serum: 0.93 mg/dL (01-29-21 @ 10:44)  Creatinine, Serum: 0.84 mg/dL (01-29-21 @ 09:07)  Creatinine, Serum: 0.71 mg/dL (01-28-21 @ 08:03)  Creatinine, Serum: 0.70 mg/dL (01-28-21 @ 08:03)  Creatinine, Serum: 0.80 mg/dL (01-27-21 @ 23:16)    C-Reactive Protein, Serum: 12.70 mg/dL (01-28-21 @ 11:50)  C-Reactive Protein, Serum: 15.50 mg/dL (01-28-21 @ 04:44)    Ferritin, Serum: 168 ng/mL (01-28-21 @ 22:45)  Ferritin, Serum: 161 ng/mL (01-28-21 @ 11:50)  Ferritin, Serum: 173 ng/mL (01-28-21 @ 06:22)    Procalcitonin, Serum: 0.44 ng/mL (01-27-21 @ 23:16)    COVID-19 PCR: Detected (01-27-21 @ 23:16)    All imaging and other data have been reviewed.  < from: CT Chest No Cont (01.28.21 @ 00:10) >  IMPRESSION:  1.  No evidence of acute traumatic injury in the chest.  2.  Patchy groundglass opacities in the anterior segments of both upper lobes are new from 11/20/2020.  These may reflect nonspecific foci of infection, inflammation, edema or hemorrhage.  2.  Dense consolidation in the right middle lobe with air bronchograms and bronchiectasis is an region of lung that previously showed patchy groundglass nodular opacities on 11/20/2020.  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  3.  There is persistent groundglass opacity in the basilar segments of both lower lobes, right greater than left, which appears stable to mildly progressed from 11/20/2020.  This may reflect areas of infection/inflammation, edema, atelectasis or interstitial lung disease.  4.  Splenomegaly, which appears new from 11/20/2020.  The spleen measures 13.9 cm, increased in size from 11.8 cm on 11/20/2020.  5.  Follow-up chest CT is recommended in 1-3 months to reassess the findings and exclude continued disease progression.    Assessment and Plan:   70yo woman with PMH of OA, HDL, and bladder disorder was admitted with multiple falls and feeling weakness. She didn't have SOB, cough, vomiting, no diarrhea, no fever, chills, No sick contacts.  She tested positive for COVID so was admitted for treatment.   Treatment usually is different case by case, data suggest that these might work:   Remdisivir:  5 day course,  eGFR > 30 mL/min , ALT < 5X ULN  Steroid: For hypoxic patients on supplemental O2 of intubated. dexamethasone 6mg PO or IV Q-day x 10 days (equivalent to solumedrol 32mg IV or Prednisone 40mg)  Anticoagulation:  with prophylactic dosing, full dose to be considered in patients with increased risk for thromboembolic complications. Bleeding can happen but acceptable in high risk patients due to hypercoagulable state.  LMWH is good, for high risk patients consider discharge on oral anticoagulation with rivaroxaban (Xarelto) 10mg PO QD or Eliquis (apixaban) 2.5-5mg PO BID  x 4 weeks.  Currently data and recommendations for COVID-19 treatment are rapidly changing, so this treatment plan is based on my clinical judgement with available information.     COVID 19   - BMP, CBC w diff, NLR. Procalcitonin, Ferritin, CRP, LDH and D dimer for the start and periodically can be repeated.   - CXR with bilateral opacities more consistent with viral pneumonia   - Continue Remdesivir 200mg stat and 100mg daily for 4 more days (total 5days) or until when ready for discharge whichever comes first.   - Continue Dexamethasone 6mg po daily for 10days  - Follow Creat and LFTs daily while on Remdesivir.    - Watch O2 sat closely and taper as tolerated.   - No antibiotics at this time. I don't believe there is bacterial pneumonia at this time.   - Prophylactic anticoagulation due to hypercoagulable state    Dr. Manjarrez is covering this weekend.     Will follow.     Dr. Marito Torre   Infectious Diseases   Please call [phone] between 8am and 6pm, call [phone] after 6pm or weekends.

## 2021-01-29 NOTE — DIETITIAN INITIAL EVALUATION ADULT. - OTHER INFO
Pt admit with COVID infection, hx HLD, dysphagia, no pressure injuries noted. Pt states 5'7", current wt 188#. BMI 29. Pt states difficulty chewing due to needs new dentures, food preferences obtained. BM 1/26. SLP recommends Dys 2 diet, currently on Dys 3.

## 2021-01-29 NOTE — PROGRESS NOTE ADULT - ASSESSMENT
covid  colitis  anemia  elev lfts  dysphagia    doubt clinical colitis  no diarrhea, no overt gib  no need for iv antibiotics for CT findings re: colon  her peg tube was removed in between her admissions  cont pepcid daily  cont probiotic  diet as tolerated  trend lfts/cbc daily  supportive care

## 2021-01-29 NOTE — PROGRESS NOTE ADULT - SUBJECTIVE AND OBJECTIVE BOX
INTERVAL HPI/OVERNIGHT EVENTS:  chart reviewed  on ra  no diarrhea no gib per nursing        MEDICATIONS  (STANDING):  ascorbic acid 500 milliGRAM(s) Oral daily  budesonide 160 MICROgram(s)/formoterol 4.5 MICROgram(s) Inhaler 2 Puff(s) Inhalation two times a day  cholecalciferol 2000 Unit(s) Oral daily  dexAMETHasone  Injectable 6 milliGRAM(s) IV Push daily  enoxaparin Injectable 40 milliGRAM(s) SubCutaneous daily  famotidine    Tablet 20 milliGRAM(s) Oral two times a day  influenza   Vaccine 0.5 milliLiter(s) IntraMuscular once  lactobacillus acidophilus 1 Tablet(s) Oral daily  lidocaine   Patch 1 Patch Transdermal every 24 hours  multivitamin/minerals 1 Tablet(s) Oral daily  potassium chloride   Powder 20 milliEquivalent(s) Oral four times a day  pramipexole 1.5 milliGRAM(s) Oral every 24 hours  remdesivir  IVPB 100 milliGRAM(s) IV Intermittent every 24 hours  remdesivir  IVPB   IV Intermittent   sulfaSALAzine 500 milliGRAM(s) Oral three times a day  torsemide 5 milliGRAM(s) Oral two times a day    MEDICATIONS  (PRN):  acetaminophen   Tablet .. 650 milliGRAM(s) Oral every 6 hours PRN Temp greater or equal to 38C (100.4F), Mild Pain (1 - 3)  ALBUTerol    90 MICROgram(s) HFA Inhaler 2 Puff(s) Inhalation every 4 hours PRN Shortness of Breath and/or Wheezing  aluminum hydroxide/magnesium hydroxide/simethicone Suspension 30 milliLiter(s) Oral every 4 hours PRN Dyspepsia  benzonatate 100 milliGRAM(s) Oral three times a day PRN Cough  melatonin 3 milliGRAM(s) Oral at bedtime PRN Insomnia  ondansetron Injectable 4 milliGRAM(s) IV Push every 6 hours PRN Nausea and/or Vomiting  oxyCODONE    IR 5 milliGRAM(s) Oral four times a day PRN Severe Pain (7 - 10)  traMADol 25 milliGRAM(s) Oral four times a day PRN Moderate Pain (4 - 6)      Allergies    No Known Allergies    Intolerances        Review of Systems:    tele f/u      Vital Signs Last 24 Hrs  T(C): 36.3 (2021 04:59), Max: 36.8 (2021 21:39)  T(F): 97.4 (2021 04:59), Max: 98.2 (2021 21:39)  HR: 71 (2021 04:59) (60 - 74)  BP: 120/64 (2021 04:59) (96/57 - 120/64)  BP(mean): --  RR: 18 (2021 04:59) (18 - 20)  SpO2: 96% (2021 04:59) (92% - 99%)    PHYSICAL EXAM:    tele f/u      LABS:                        8.6    6.69  )-----------( 209      ( 2021 09:07 )             27.5         141  |  109<H>  |  9   ----------------------------<  160<H>  4.2   |  26  |  0.84    Ca    7.7<L>      2021 09:07  Phos  3.1       Mg     1.9         TPro  5.8<L>  /  Alb  2.1<L>  /  TBili  0.2  /  DBili  <.10  /  AST  59<H>  /  ALT  20  /  AlkPhos  163<H>      PT/INR - ( 2021 23:16 )   PT: 17.6 sec;   INR: 1.54 ratio         PTT - ( 2021 23:16 )  PTT:28.6 sec  Urinalysis Basic - ( 2021 20:10 )    Color: Yellow / Appearance: Clear / S.010 / pH: x  Gluc: x / Ketone: Negative  / Bili: Negative / Urobili: Negative   Blood: x / Protein: 30 mg/dL / Nitrite: Negative   Leuk Esterase: Negative / RBC: 0-2 /HPF / WBC 0-2   Sq Epi: x / Non Sq Epi: Few / Bacteria: Few        RADIOLOGY & ADDITIONAL TESTS:

## 2021-01-29 NOTE — PROGRESS NOTE ADULT - SUBJECTIVE AND OBJECTIVE BOX
Neurology follow up note    VERONICA [id number]      Interval History:    Patient feels ok no new complaints.    MEDICATIONS    acetaminophen   Tablet .. 650 milliGRAM(s) Oral every 6 hours PRN  ALBUTerol    90 MICROgram(s) HFA Inhaler 2 Puff(s) Inhalation every 4 hours PRN  aluminum hydroxide/magnesium hydroxide/simethicone Suspension 30 milliLiter(s) Oral every 4 hours PRN  ascorbic acid 500 milliGRAM(s) Oral daily  benzonatate 100 milliGRAM(s) Oral three times a day PRN  budesonide 160 MICROgram(s)/formoterol 4.5 MICROgram(s) Inhaler 2 Puff(s) Inhalation two times a day  cholecalciferol 2000 Unit(s) Oral daily  dexAMETHasone  Injectable 6 milliGRAM(s) IV Push daily  enoxaparin Injectable 40 milliGRAM(s) SubCutaneous daily  famotidine    Tablet 20 milliGRAM(s) Oral two times a day  influenza   Vaccine 0.5 milliLiter(s) IntraMuscular once  lactobacillus acidophilus 1 Tablet(s) Oral daily  lidocaine   Patch 1 Patch Transdermal every 24 hours  melatonin 3 milliGRAM(s) Oral at bedtime PRN  multivitamin/minerals 1 Tablet(s) Oral daily  ondansetron Injectable 4 milliGRAM(s) IV Push every 6 hours PRN  oxyCODONE    IR 5 milliGRAM(s) Oral four times a day PRN  potassium chloride   Powder 20 milliEquivalent(s) Oral four times a day  pramipexole 1.5 milliGRAM(s) Oral every 24 hours  remdesivir  IVPB 100 milliGRAM(s) IV Intermittent every 24 hours  remdesivir  IVPB   IV Intermittent   sulfaSALAzine 500 milliGRAM(s) Oral three times a day  torsemide 5 milliGRAM(s) Oral two times a day  traMADol 25 milliGRAM(s) Oral four times a day PRN      Allergies    No Known Allergies    Intolerances            Vital Signs Last 24 Hrs  T(C): 36.3 (2021 04:59), Max: 36.8 (2021 21:39)  T(F): 97.4 (2021 04:59), Max: 98.2 (2021 21:39)  HR: 71 (2021 04:59) (60 - 74)  BP: 120/64 (2021 04:59) (96/57 - 120/64)  BP(mean): --  RR: 18 (2021 04:59) (18 - 20)  SpO2: 96% (2021 04:59) (92% - 99%)        REVIEW OF SYSTEMS:  Constitutional:  The patient denies fever, chills, or night sweats.  Head:  No headache.  Eyes:  No double vision or blurry vision.  Ears:  No ringing in the ears.  Neck:  Positive history of neck pain, but had cervical surgery done there and arthritis.  Respiratory:  No shortness of breath at present.  Cardiovascular:  No chest pain.  Abdomen:  No nausea, vomiting, or abdominal pain.  Extremities/Neurological:  Occasional musculoskeletal pain in her legs and joints.  Genitourinary:  No burning upon urination.    PHYSICAL EXAMINATION:   HEENT:  Head:  Normocephalic, atraumatic.  Eyes:  No scleral icterus.  Ears:  Hearing bilaterally intact.  NECK:  Supple.  RESPIRATORY:  Decreased breath sounds bilaterally.  CARDIOVASCULAR:  S1 and S2 heard.  ABDOMEN:  Soft and nontender.  EXTREMITIES:  No clubbing or cyanosis were noted.  GENERAL:  Positive surgical scar was noted in the cervical region in the bilateral knees.      NEUROLOGIC:  The patient is awake and alert.  Extraocular movement were intact.  The patient has decreased range of motion of her neck secondary to surgical intervention.  Speech was fluent.  Smile was symmetric.  Motor:  The patient has decreased range of motion of bilateral shoulders.  Able to elevate roughly about 60 degrees off the bed.  Overall strength was 3+/5.  Bilateral lower extremities, the patient is able to move side-to-side.  Had significant lymphedema in the bilateral lower extremities.  Sensory was decreased to light touch in the bilateral lower extremities, but has significant lymphedema.  The patient had impaired proprioception in the bilateral lower extremities.          LABS:  CBC Full  -  ( 2021 09:07 )  WBC Count : 6.69 K/uL  RBC Count : 3.23 M/uL  Hemoglobin : 8.6 g/dL  Hematocrit : 27.5 %  Platelet Count - Automated : 209 K/uL  Mean Cell Volume : 85.1 fl  Mean Cell Hemoglobin : 26.6 pg  Mean Cell Hemoglobin Concentration : 31.3 gm/dL  Auto Neutrophil # : x  Auto Lymphocyte # : x  Auto Monocyte # : x  Auto Eosinophil # : x  Auto Basophil # : x  Auto Neutrophil % : x  Auto Lymphocyte % : x  Auto Monocyte % : x  Auto Eosinophil % : x  Auto Basophil % : x    Urinalysis Basic - ( 2021 20:10 )    Color: Yellow / Appearance: Clear / S.010 / pH: x  Gluc: x / Ketone: Negative  / Bili: Negative / Urobili: Negative   Blood: x / Protein: 30 mg/dL / Nitrite: Negative   Leuk Esterase: Negative / RBC: 0-2 /HPF / WBC 0-2   Sq Epi: x / Non Sq Epi: Few / Bacteria: Few          141  |  109<H>  |  9   ----------------------------<  160<H>  4.2   |  26  |  0.84    Ca    7.7<L>      2021 09:07  Phos  3.1       Mg     1.9         TPro  5.8<L>  /  Alb  2.1<L>  /  TBili  0.2  /  DBili  <.10  /  AST  59<H>  /  ALT  20  /  AlkPhos  163<H>      Hemoglobin A1C:     LIVER FUNCTIONS - ( 2021 09:07 )  Alb: 2.1 g/dL / Pro: 5.8 g/dL / ALK PHOS: 163 U/L / ALT: 20 U/L / AST: 59 U/L / GGT: x           Vitamin B12 Vitamin B12, Serum: 872 pg/mL ( @ 22:45)    PT/INR - ( 2021 23:16 )   PT: 17.6 sec;   INR: 1.54 ratio         PTT - ( 2021 23:16 )  PTT:28.6 sec      RADIOLOGY    ANALYSIS AND PLAN:  A 71-year-old with a history of frequent falls, restless legs syndrome.  For episodes of frequent falls, syncopal events, the patient has had multiple workups in the past for this.  Questionable this could be secondary to any type of underlying orthostatic changes.  The patient does have a history of poor oral intake.  The patient also is mostly sedentary.  Also, the patient does have lymphedema in the bilateral lower extremities.  The patient was to be evaluated for possible underlying narcolepsy with sleep studies.  Telemetry evaluation.  Monitor orthostatics as needed.  For restless leg syndrome, continue the patient on her Mirapex.  For history of possible peripheral neuropathy, supportive therapy.  Fall precautions.  For generalized paresis secondary to underlying infectious type process COVID.  For cervical radiculopathy, continue the patient on her muscle relaxants as needed.  spoke to staff was able to walk to bathroom today   The daughter's name is Edelmira.  Telephone number is [phone] 2021.  She does understand my reasoning and thought process.  Greater than 45 minutes of time was spent with the patient, plan of care, reviewing data, speaking to the family and  50% of the visit was spent counseling and/or coordinating care with multidisciplinary healthcare team

## 2021-01-29 NOTE — PROGRESS NOTE ADULT - SUBJECTIVE AND OBJECTIVE BOX
Patient is a 71y old  Female who presents with a chief complaint of sob (2021 14:14)      INTERVAL /OVERNIGHT EVENTS: c/o pain legs    MEDICATIONS  (STANDING):  ascorbic acid 500 milliGRAM(s) Oral daily  budesonide 160 MICROgram(s)/formoterol 4.5 MICROgram(s) Inhaler 2 Puff(s) Inhalation two times a day  cholecalciferol 2000 Unit(s) Oral daily  dexAMETHasone  Injectable 6 milliGRAM(s) IV Push daily  enoxaparin Injectable 40 milliGRAM(s) SubCutaneous daily  famotidine    Tablet 20 milliGRAM(s) Oral two times a day  influenza   Vaccine 0.5 milliLiter(s) IntraMuscular once  iron sucrose Injectable 100 milliGRAM(s) IV Push every 24 hours  lactobacillus acidophilus 1 Tablet(s) Oral daily  lidocaine   Patch 1 Patch Transdermal every 24 hours  multivitamin/minerals 1 Tablet(s) Oral daily  pramipexole 1.5 milliGRAM(s) Oral every 24 hours  remdesivir  IVPB 100 milliGRAM(s) IV Intermittent every 24 hours  remdesivir  IVPB   IV Intermittent   sulfaSALAzine 500 milliGRAM(s) Oral three times a day  torsemide 5 milliGRAM(s) Oral two times a day    MEDICATIONS  (PRN):  acetaminophen   Tablet .. 650 milliGRAM(s) Oral every 6 hours PRN Temp greater or equal to 38C (100.4F), Mild Pain (1 - 3)  ALBUTerol    90 MICROgram(s) HFA Inhaler 2 Puff(s) Inhalation every 4 hours PRN Shortness of Breath and/or Wheezing  aluminum hydroxide/magnesium hydroxide/simethicone Suspension 30 milliLiter(s) Oral every 4 hours PRN Dyspepsia  benzonatate 100 milliGRAM(s) Oral three times a day PRN Cough  melatonin 3 milliGRAM(s) Oral at bedtime PRN Insomnia  ondansetron Injectable 4 milliGRAM(s) IV Push every 6 hours PRN Nausea and/or Vomiting  oxyCODONE    IR 5 milliGRAM(s) Oral four times a day PRN Severe Pain (7 - 10)  traMADol 25 milliGRAM(s) Oral four times a day PRN Moderate Pain (4 - 6)      Allergies    No Known Allergies    Intolerances        REVIEW OF SYSTEMS:  CONSTITUTIONAL: No fever, weight loss, or fatigue  EYES: No eye pain, visual disturbances, or discharge  ENMT:  No difficulty hearing, tinnitus, vertigo; No sinus or throat pain  NECK: No pain or stiffness  RESPIRATORY: No cough, wheezing, chills or hemoptysis; No shortness of breath  CARDIOVASCULAR: No chest pain, palpitations, dizziness, or leg swelling  GASTROINTESTINAL: No abdominal or epigastric pain. No nausea, vomiting, or hematemesis; No diarrhea or constipation. No melena or hematochezia.  GENITOURINARY: No dysuria, frequency, hematuria, or incontinence  NEUROLOGICAL: No headaches, memory loss, loss of strength, numbness, or tremors  SKIN: No itching, burning, rashes, or lesions   LYMPH NODES: No enlarged glands  ENDOCRINE: No heat or cold intolerance; No hair loss; No polydipsia or polyuria  MUSCULOSKELETAL: No joint pain or swelling; No muscle, back, or extremity pain  PSYCHIATRIC: No depression, anxiety, mood swings, or difficulty sleeping  HEME/LYMPH: No easy bruising, or bleeding gums  ALLERGY AND IMMUNOLOGIC: No hives or eczema    Vital Signs Last 24 Hrs  T(C): 37.1 (2021 20:18), Max: 37.1 (2021 20:18)  T(F): 98.7 (2021 20:18), Max: 98.7 (2021 20:18)  HR: 93 (2021 20:18) (64 - 93)  BP: 112/66 (2021 20:18) (96/57 - 120/64)  BP(mean): --  RR: 18 (2021 20:18) (18 - 20)  SpO2: 95% (2021 20:18) (92% - 96%)    PHYSICAL EXAM:  GENERAL: NAD, well-groomed, well-developed  HEAD:  Atraumatic, Normocephalic  EYES: EOMI, PERRLA, conjunctiva and sclera clear  ENMT: No tonsillar erythema, exudates, or enlargement; Moist mucous membranes, Good dentition, No lesions  NECK: Supple, No JVD, Normal thyroid  NERVOUS SYSTEM:  Alert & Oriented X3, Good concentration; Motor Strength 5/5 B/L upper and lower extremities; DTRs 2+ intact and symmetric  CHEST/LUNG: Clear to auscultation bilaterally; No rales, rhonchi, wheezing, or rubs  HEART: Regular rate and rhythm; No murmurs, rubs, or gallops  ABDOMEN: Soft, Nontender, Nondistended; Bowel sounds present  EXTREMITIES:  2+ Peripheral Pulses, No clubbing, cyanosis, or edema  LYMPH: No lymphadenopathy noted  SKIN: No rashes or lesions    LABS:                        8.6    6.69  )-----------( 209      ( 2021 09:07 )             27.5     2021 10:44    142    |  109    |  10     ----------------------------<  186    4.5     |  26     |  0.93     Ca    7.7        2021 10:44    TPro  5.8    /  Alb  2.1    /  TBili  0.2    /  DBili  x      /  AST  53     /  ALT  20     /  AlkPhos  159    2021 10:44    PT/INR - ( 2021 23:16 )   PT: 17.6 sec;   INR: 1.54 ratio         PTT - ( 2021 23:16 )  PTT:28.6 sec  Urinalysis Basic - ( 2021 20:10 )    Color: Yellow / Appearance: Clear / S.010 / pH: x  Gluc: x / Ketone: Negative  / Bili: Negative / Urobili: Negative   Blood: x / Protein: 30 mg/dL / Nitrite: Negative   Leuk Esterase: Negative / RBC: 0-2 /HPF / WBC 0-2   Sq Epi: x / Non Sq Epi: Few / Bacteria: Few      CAPILLARY BLOOD GLUCOSE          RADIOLOGY & ADDITIONAL TESTS:    Notes Reviewed:  [x ] YES  [ ] NO    Care Discussed with Consultants/Other Providers [x ] YES  [ ] NO
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
## 2021-01-30 LAB
ALBUMIN SERPL ELPH-MCNC: 2.4 G/DL — LOW (ref 3.3–5)
ALP SERPL-CCNC: 146 U/L — HIGH (ref 40–120)
ALT FLD-CCNC: 18 U/L — SIGNIFICANT CHANGE UP (ref 12–78)
AST SERPL-CCNC: 52 U/L — HIGH (ref 15–37)
BILIRUB DIRECT SERPL-MCNC: 0.1 MG/DL — SIGNIFICANT CHANGE UP (ref 0.05–0.2)
BILIRUB INDIRECT FLD-MCNC: 0.2 MG/DL — SIGNIFICANT CHANGE UP (ref 0.2–1)
BILIRUB SERPL-MCNC: 0.3 MG/DL — SIGNIFICANT CHANGE UP (ref 0.2–1.2)
CREAT SERPL-MCNC: 0.72 MG/DL — SIGNIFICANT CHANGE UP (ref 0.5–1.3)
FERRITIN SERPL-MCNC: 229 NG/ML — HIGH (ref 15–150)
HCT VFR BLD CALC: 28.4 % — LOW (ref 34.5–45)
HGB BLD-MCNC: 9 G/DL — LOW (ref 11.5–15.5)
MCHC RBC-ENTMCNC: 26.1 PG — LOW (ref 27–34)
MCHC RBC-ENTMCNC: 31.7 GM/DL — LOW (ref 32–36)
MCV RBC AUTO: 82.3 FL — SIGNIFICANT CHANGE UP (ref 80–100)
NRBC # BLD: 0 /100 WBCS — SIGNIFICANT CHANGE UP (ref 0–0)
PLATELET # BLD AUTO: 262 K/UL — SIGNIFICANT CHANGE UP (ref 150–400)
PROT SERPL-MCNC: 6.5 G/DL — SIGNIFICANT CHANGE UP (ref 6–8.3)
RBC # BLD: 3.45 M/UL — LOW (ref 3.8–5.2)
RBC # FLD: 17 % — HIGH (ref 10.3–14.5)
WBC # BLD: 8.55 K/UL — SIGNIFICANT CHANGE UP (ref 3.8–10.5)
WBC # FLD AUTO: 8.55 K/UL — SIGNIFICANT CHANGE UP (ref 3.8–10.5)

## 2021-01-30 PROCEDURE — 71045 X-RAY EXAM CHEST 1 VIEW: CPT | Mod: 26

## 2021-01-30 PROCEDURE — 99223 1ST HOSP IP/OBS HIGH 75: CPT

## 2021-01-30 RX ORDER — FERROUS SULFATE 325(65) MG
325 TABLET ORAL DAILY
Refills: 0 | Status: DISCONTINUED | OUTPATIENT
Start: 2021-01-30 | End: 2021-02-08

## 2021-01-30 RX ADMIN — TRAMADOL HYDROCHLORIDE 25 MILLIGRAM(S): 50 TABLET ORAL at 20:35

## 2021-01-30 RX ADMIN — Medication 325 MILLIGRAM(S): at 10:31

## 2021-01-30 RX ADMIN — Medication 2000 UNIT(S): at 10:31

## 2021-01-30 RX ADMIN — REMDESIVIR 500 MILLIGRAM(S): 5 INJECTION INTRAVENOUS at 10:31

## 2021-01-30 RX ADMIN — LIDOCAINE 1 PATCH: 4 CREAM TOPICAL at 20:20

## 2021-01-30 RX ADMIN — Medication 5 MILLIGRAM(S): at 17:55

## 2021-01-30 RX ADMIN — Medication 5 MILLIGRAM(S): at 04:20

## 2021-01-30 RX ADMIN — BUDESONIDE AND FORMOTEROL FUMARATE DIHYDRATE 2 PUFF(S): 160; 4.5 AEROSOL RESPIRATORY (INHALATION) at 20:35

## 2021-01-30 RX ADMIN — Medication 6 MILLIGRAM(S): at 04:20

## 2021-01-30 RX ADMIN — LIDOCAINE 1 PATCH: 4 CREAM TOPICAL at 17:55

## 2021-01-30 RX ADMIN — Medication 500 MILLIGRAM(S): at 15:10

## 2021-01-30 RX ADMIN — BUDESONIDE AND FORMOTEROL FUMARATE DIHYDRATE 2 PUFF(S): 160; 4.5 AEROSOL RESPIRATORY (INHALATION) at 04:19

## 2021-01-30 RX ADMIN — Medication 500 MILLIGRAM(S): at 10:31

## 2021-01-30 RX ADMIN — Medication 500 MILLIGRAM(S): at 04:22

## 2021-01-30 RX ADMIN — LIDOCAINE 1 PATCH: 4 CREAM TOPICAL at 04:17

## 2021-01-30 RX ADMIN — Medication 1 TABLET(S): at 11:07

## 2021-01-30 RX ADMIN — FAMOTIDINE 20 MILLIGRAM(S): 10 INJECTION INTRAVENOUS at 04:20

## 2021-01-30 RX ADMIN — FAMOTIDINE 20 MILLIGRAM(S): 10 INJECTION INTRAVENOUS at 17:56

## 2021-01-30 RX ADMIN — IRON SUCROSE 100 MILLIGRAM(S): 20 INJECTION, SOLUTION INTRAVENOUS at 15:10

## 2021-01-30 RX ADMIN — ENOXAPARIN SODIUM 40 MILLIGRAM(S): 100 INJECTION SUBCUTANEOUS at 10:31

## 2021-01-30 RX ADMIN — Medication 1 TABLET(S): at 10:31

## 2021-01-30 RX ADMIN — TRAMADOL HYDROCHLORIDE 25 MILLIGRAM(S): 50 TABLET ORAL at 05:24

## 2021-01-30 RX ADMIN — Medication 500 MILLIGRAM(S): at 20:35

## 2021-01-30 RX ADMIN — PRAMIPEXOLE DIHYDROCHLORIDE 1.5 MILLIGRAM(S): 0.12 TABLET ORAL at 17:55

## 2021-01-30 NOTE — PHYSICAL THERAPY INITIAL EVALUATION ADULT - RANGE OF MOTION EXAMINATION, REHAB EVAL
kyphotic stance/bilateral upper extremity ROM was WFL (within functional limits)/bilateral lower extremity ROM was WFL (within functional limits)/deficits as listed below

## 2021-01-30 NOTE — PROGRESS NOTE ADULT - ASSESSMENT
contact #  daughter----Mortimer, Kelly  phone # [phone]    IMPRESSION:  69 y/o F PMHx lymphedema, HLD, RA, s/p cervical spine surgery at [facility] around 10/2020--- with prolonged hospital course complicated by C. diff infection (completed treatment course) and dysphagia (s/p PEG tube placement). Patient returned home on 11/24/20. Patient was admitted at [facility] from 11/28/2020---21/1/2020, was admitted with nausea/ vomiting/and abdominal pain, and was discharged 12/1/2020. She has been using a walker to ambulate, admitted with multiple falls and weakness, had ct scan this admission, hematology was consulted for anemia on 1/28 and was seen.  case d/w daughter, lives with  per , 2 daughters    RECOMMENDATION:  Anemia---likely multifactorial  hb is at 9 today, was at 8.6 yesterday  iron profile--low iron/transferrin saturation, ferritin is an acute phase reactant, as looking for a rapid response, patient is on iv iron, day 2 (2/3) today  monitor h/h--transfuse prbc as needed for symptomatic anemia  nl ldh, low retic--no evidence of hemolysis  nl b12 level  follow anemia work up  monitor h/h--transfuse prbc as needed for symptomatic anemia or if hb is under 7  ?colitis on ct--gi is following  anemia/iron def possible/colitis---gi work up as per gi/pt/family to exclude gi pathology/malignancy  covid positive, id is following, is on remdesivir and decadron  gi/dvtp--lovenox s/c

## 2021-01-30 NOTE — PROGRESS NOTE ADULT - SUBJECTIVE AND OBJECTIVE BOX
Neurology Follow up note    VERONICA [id number]    HPI:  · HPI Objective Statement: 70yo female who presents with multiple falls and feeling weak today. pt c/o diffuse pain and feeling weak, no vomiting, no diarrhea, no fever, chills, no cough or sob, denies sick contacts at home  In ER patient was found to have COVID PNA with possible colitis.  patient is being admitted for further care and management (2021 10:42)      Interval History -no new events    Patient is seen, chart was reviewed and case was discussed with the treatment team.  Pt is not in any distress.   Lying on bed comfortably.   .    Vital Signs Last 24 Hrs  T(C): 36.3 (2021 12:14), Max: 37.1 (2021 20:18)  T(F): 97.4 (2021 12:14), Max: 98.7 (2021 20:18)  HR: 63 (2021 12:14) (63 - 93)  BP: 105/60 (2021 12:14) (105/60 - 116/66)  BP(mean): --  RR: 19 (2021 12:14) (18 - 19)  SpO2: 96% (2021 12:14) (92% - 96%)        REVIEW OF SYSTEMS:    Constitutional: No fever,   Eyes: No eye pain, visual disturbances, or discharge  ENT:  No difficulty hearing, tinnitus, vertigo;  Neck: No pain or stiffness  Respiratory: No , wheezing, chills or hemoptysis  Cardiovascular: No chest pain, palpitations, shortness of breath,   Gastrointestinal: No abdominal or epigastric pain. No nausea, vomiting   Genitourinary: No dysuria, , hematuria or incontinence  Neurological: No headaches,   Psychiatric: No , anxiety, mood swings or difficulty sleeping  Musculoskeletal: No joint pain or swelling;   Skin: No itching, burning, rashes or lesions   Lymph Nodes: No enlarged glands  Endocrine: No heat or cold intolerance  Allergy and Immunologic: No hives or eczema    On Neurological Examination:    Mental Status - Pt is alert, awake, oriented X3.. Follows commands well and able to answer questions appropriately  .Mood and affect  normal    Speech -  Normal    Cranial Nerves - Pupils 3 mm equal and reactive to light, extraocular eye movements intact. Pt has no visual field deficit.  Pt has no  facial asymmetry. Facial sensation is intact.Tongue - is in midline.    Muscle tone - is normal all over. Moves all extremities equally. No asymmetry is seen.      Motor Exam - 4/5 all over,   No drift. No shaking or tremors.    Sensory Exam - Pt withdraws all extremities equally on stimulation. No asymmetry seen. No complaints of tingling, numbness.        coordination:    Finger to nose: normal        Deep tendon Reflexes - 2 plus all over.       Neck Supple -  Yes.     MEDICATIONS    acetaminophen   Tablet .. 650 milliGRAM(s) Oral every 6 hours PRN  ALBUTerol    90 MICROgram(s) HFA Inhaler 2 Puff(s) Inhalation every 4 hours PRN  aluminum hydroxide/magnesium hydroxide/simethicone Suspension 30 milliLiter(s) Oral every 4 hours PRN  ascorbic acid 500 milliGRAM(s) Oral daily  benzonatate 100 milliGRAM(s) Oral three times a day PRN  budesonide 160 MICROgram(s)/formoterol 4.5 MICROgram(s) Inhaler 2 Puff(s) Inhalation two times a day  cholecalciferol 2000 Unit(s) Oral daily  cyclobenzaprine 5 milliGRAM(s) Oral three times a day PRN  dexAMETHasone  Injectable 6 milliGRAM(s) IV Push daily  enoxaparin Injectable 40 milliGRAM(s) SubCutaneous daily  famotidine    Tablet 20 milliGRAM(s) Oral two times a day  ferrous    sulfate 325 milliGRAM(s) Oral daily  influenza   Vaccine 0.5 milliLiter(s) IntraMuscular once  iron sucrose Injectable 100 milliGRAM(s) IV Push every 24 hours  lactobacillus acidophilus 1 Tablet(s) Oral daily  lidocaine   Patch 1 Patch Transdermal every 24 hours  melatonin 3 milliGRAM(s) Oral at bedtime PRN  multivitamin/minerals 1 Tablet(s) Oral daily  ondansetron Injectable 4 milliGRAM(s) IV Push every 6 hours PRN  oxyCODONE    IR 5 milliGRAM(s) Oral four times a day PRN  pramipexole 1.5 milliGRAM(s) Oral every 24 hours  remdesivir  IVPB 100 milliGRAM(s) IV Intermittent every 24 hours  remdesivir  IVPB   IV Intermittent   sulfaSALAzine 500 milliGRAM(s) Oral three times a day  torsemide 5 milliGRAM(s) Oral two times a day  traMADol 25 milliGRAM(s) Oral four times a day PRN      Allergies    No Known Allergies    Intolerances        LABS:  CBC Full  -  ( 2021 09:55 )  WBC Count : 8.55 K/uL  RBC Count : 3.45 M/uL  Hemoglobin : 9.0 g/dL  Hematocrit : 28.4 %  Platelet Count - Automated : 262 K/uL  Mean Cell Volume : 82.3 fl  Mean Cell Hemoglobin : 26.1 pg  Mean Cell Hemoglobin Concentration : 31.7 gm/dL  Auto Neutrophil # : x   : x    Urinalysis Basic - ( 2021 20:10 )    Color: Yellow / Appearance: Clear / S.010 / pH: x  Gluc: x / Ketone: Negative  / Bili: Negative / Urobili: Negative   Blood: x / Protein: 30 mg/dL / Nitrite: Negative   Leuk Esterase: Negative / RBC: 0-2 /HPF / WBC 0-2   Sq Epi: x / Non Sq Epi: Few / Bacteria: Few      -    x   |  x   |  x   ----------------------------<  x   x    |  x   |  0.72    Ca    7.7<L>      2021 10:44    TPro  6.5  /  Alb  2.4<L>  /  TBili  0.3  /  DBili  .10  /  AST  52<H>  /  ALT  18  /  AlkPhos  146<H>      Hemoglobin A1C:     Vitamin B12     RADIOLOGY    ASSESSMENT AND PLAN:      seen for fall  orthostatic hypotension  RLS  LBP  COVID -19    CONTINUE MIRAPEX  TRAMDOL PRN FOR PAIN  Physical therapy evaluation.  OOB to chair/ambulation with assistance only.  Pain is accessed and addressed.  Would continue to follow.

## 2021-01-30 NOTE — CONSULT NOTE ADULT - ASSESSMENT
Assessment/Plan: This is a71 y/o F with HLD, syncope, frequent falls, OA, s/p B/l knee replacement and right hip replacement, and back surgery presented to the ED after multiple falls and weakness, found to have COVID Pna and pancolitis.    Syncnope, Falls  - Patient has known syncope and multiple falls  - Her B/L knee pain is likely contributory to her frequent falls  - For her syncope, per Dr. Akers's note, who she had seen in 9/2020, it was though that it was from dehydration and CVA.  However, CVA w/u was negative  - She was recently in [place] where she was discharged with a 2-week cardiac monitor.  However, she denies having been informed of the result.  She is unable to recall name of Cardiologist in [place].  Se is refusing to go back   - She had an outpatient CUS 2/2020) in our office which showed no hemodynamic significant stenosis  - She had a TTE in 1/2020 showing normal LVF, EF 60% with bicuspid AV, mild AS.  Can repeat routinely  - Recommend tele monitoring for now  - She will need an ILR to be arranges as outpatient  - Fall precaution  - Orthostatic VS, please  - Encourage PO fluid intake    Covid Pna  - Tolerating RA but with moist cough  - Supplemental O2 as needed  - Supportive care  - Initiate incentive spirometer  - Follow PUlm and ID recs    DVT ppx  - Per Primary    Further cardiac w/u pending clinical course  Will continue to follow    Jeanie Cardona DNP, ANP-C  Cardiology  Spectra #[phone]     Assessment/Plan: This is a71 y/o F with HLD, syncope, frequent falls, OA, s/p B/l knee replacement and right hip replacement, and back surgery presented to the ED after multiple falls and weakness, found to have COVID Pna and pancolitis.    Syncnope, Falls  - Patient has known syncope and multiple falls  - Her B/L knee pain is likely contributory to her frequent falls  - For her syncope, per Dr. Akers's note, who she had seen in 9/2020, it was though that it was from dehydration and CVA.  However, CVA w/u was negative  - She was recently in [place] where she was discharged with a 2-week cardiac monitor.  However, she denies having been informed of the result.  She is unable to recall name of Cardiologist in [place].  She is refusing to go back   - She had an outpatient CUS 2/2020) in our office which showed no hemodynamic significant stenosis  - She had a TTE in 1/2020 showing normal LVF, EF 60% with bicuspid AV, mild AS.  Can repeat routinely  - Recommend tele monitoring for now  - She will need an ILR to be arranged as outpatient  - Fall precaution  - Orthostatic VS, please  - Encourage PO fluid intake    Covid Pna  - Tolerating RA but with moist cough  - Supplemental O2 as needed  - Supportive care  - Initiate incentive spirometer  - Follow PUlm and ID recs    DVT ppx  - Per Primary    Further cardiac w/u pending clinical course  Will continue to follow    Jeanie Cardona DNP, ANP-C  Cardiology  Spectra #[phone]

## 2021-01-30 NOTE — CONSULT NOTE ADULT - SUBJECTIVE AND OBJECTIVE BOX
[facility] - Quang Akers, Itzel Herrera, Ruben, Kiran, Debra Vicente  Office Number: [phone]    Initial Consult Note: This is a71 y/o F with HLD, syncope, frequent falls, OA, s/p B/l kneee repair and hip replacement presented to the ED after multiple falls and weakness, found to have COVID Pna and pancolitis.    CHIEF COMPLAINT: Patient is a 71y old  Female who presents with a chief complaint of sob (30 Jan 2021 12:50)      HPI:  · HPI Objective Statement: 72yo female who presents with multiple falls and feeling weak today. pt c/o diffuse pain and feeling weak, no vomiting, no diarrhea, no fever, chills, no cough or sob, denies sick contacts at home  In ER patient was found to have COVID PNA with possible colitis.  patient is being admitted for further care and management (28 Jan 2021 10:42)    PAST MEDICAL & SURGICAL HISTORY:  Obesity (BMI 30-39.9)    Traumatic arthropathy involving lower leg  right    History of Osteoarthritis    Hyperlipidemia    Restless Leg Syndrome    H/O neck surgery    H/O arthroscopy of right knee  2010    Bladder Disorder  Bladder lift 2000    History of Colonoscopy    Ex lap in 2009 for abscess in the baldder    History of Hysterectomy and bladder lift in 2000      SOCIAL HISTORY:  No tobacco, ethanol, or drug abuse.  FAMILY HISTORY:  No pertinent family history in first degree relatives      No family history of acute MI or sudden cardiac death.  MEDICATIONS  (STANDING):  ascorbic acid 500 milliGRAM(s) Oral daily  budesonide 160 MICROgram(s)/formoterol 4.5 MICROgram(s) Inhaler 2 Puff(s) Inhalation two times a day  cholecalciferol 2000 Unit(s) Oral daily  dexAMETHasone  Injectable 6 milliGRAM(s) IV Push daily  enoxaparin Injectable 40 milliGRAM(s) SubCutaneous daily  famotidine    Tablet 20 milliGRAM(s) Oral two times a day  ferrous    sulfate 325 milliGRAM(s) Oral daily  influenza   Vaccine 0.5 milliLiter(s) IntraMuscular once  iron sucrose Injectable 100 milliGRAM(s) IV Push every 24 hours  lactobacillus acidophilus 1 Tablet(s) Oral daily  lidocaine   Patch 1 Patch Transdermal every 24 hours  multivitamin/minerals 1 Tablet(s) Oral daily  pramipexole 1.5 milliGRAM(s) Oral every 24 hours  remdesivir  IVPB 100 milliGRAM(s) IV Intermittent every 24 hours  remdesivir  IVPB   IV Intermittent   sulfaSALAzine 500 milliGRAM(s) Oral three times a day  torsemide 5 milliGRAM(s) Oral two times a day    MEDICATIONS  (PRN):  acetaminophen   Tablet .. 650 milliGRAM(s) Oral every 6 hours PRN Temp greater or equal to 38C (100.4F), Mild Pain (1 - 3)  ALBUTerol    90 MICROgram(s) HFA Inhaler 2 Puff(s) Inhalation every 4 hours PRN Shortness of Breath and/or Wheezing  aluminum hydroxide/magnesium hydroxide/simethicone Suspension 30 milliLiter(s) Oral every 4 hours PRN Dyspepsia  benzonatate 100 milliGRAM(s) Oral three times a day PRN Cough  cyclobenzaprine 5 milliGRAM(s) Oral three times a day PRN Muscle Spasm  melatonin 3 milliGRAM(s) Oral at bedtime PRN Insomnia  ondansetron Injectable 4 milliGRAM(s) IV Push every 6 hours PRN Nausea and/or Vomiting  oxyCODONE    IR 5 milliGRAM(s) Oral four times a day PRN Severe Pain (7 - 10)  traMADol 25 milliGRAM(s) Oral four times a day PRN Moderate Pain (4 - 6)    Allergies    No Known Allergies    Intolerances      REVIEW OF SYSTEMS:  CONSTITUTIONAL: No weakness, fevers or chills  EYES/ENT: No visual changes;  No vertigo or throat pain   NECK: No pain or stiffness  RESPIRATORY: No cough, wheezing, hemoptysis; No shortness of breath  CARDIOVASCULAR: No chest pain or palpitations  GASTROINTESTINAL: No abdominal pain. No nausea, vomiting, or hematemesis; No diarrhea or constipation. No melena or hematochezia.  GENITOURINARY: No dysuria, frequency or hematuria  NEUROLOGICAL: No numbness or weakness  SKIN: No itching or rash  All other review of systems is negative unless indicated above  VITAL SIGNS:   Vital Signs Last 24 Hrs  T(C): 36.3 (30 Jan 2021 12:14), Max: 37.1 (29 Jan 2021 20:18)  T(F): 97.4 (30 Jan 2021 12:14), Max: 98.7 (29 Jan 2021 20:18)  HR: 63 (30 Jan 2021 12:14) (63 - 93)  BP: 105/60 (30 Jan 2021 12:14) (105/60 - 116/66)  BP(mean): --  RR: 19 (30 Jan 2021 12:14) (18 - 19)  SpO2: 96% (30 Jan 2021 12:14) (92% - 96%)  I&O's Summary    29 Jan 2021 07:01  -  30 Jan 2021 07:00  --------------------------------------------------------  IN: 0 mL / OUT: 700 mL / NET: -700 mL      On Exam:  Constitutional: NAD, alert and oriented x 3  Lungs:  Non-labored, breath sounds are clear bilaterally, No wheezing, rales or rhonchi  Cardiovascular: RRR.  S1 and S2 positive.  No murmurs, rubs, gallops or clicks  Gastrointestinal: Bowel Sounds present, soft, nontender.   Lymph: No peripheral edema. No cervical lymphadenopathy.  Neurological: Alert, no focal deficits  Skin: No rashes or ulcers   Psych:  Mood & affect appropriate.    LABS: All Labs Reviewed:                        9.0    8.55  )-----------( 262      ( 30 Jan 2021 09:55 )             28.4                         8.6    6.69  )-----------( 209      ( 29 Jan 2021 09:07 )             27.5                         8.9    x     )-----------( x        ( 28 Jan 2021 16:50 )             27.5     30 Jan 2021 09:55    x      |  x      |  x      ----------------------------<  x      x       |  x      |  0.72   29 Jan 2021 10:44    142    |  109    |  10     ----------------------------<  186    4.5     |  26     |  0.93   29 Jan 2021 09:07    141    |  109    |  9      ----------------------------<  160    4.2     |  26     |  0.84     Ca    7.7        29 Jan 2021 10:44  Ca    7.7        29 Jan 2021 09:07  Ca    7.3        28 Jan 2021 08:03  Phos  3.1       28 Jan 2021 08:03  Mg     1.9       28 Jan 2021 08:03    TPro  6.5    /  Alb  2.4    /  TBili  0.3    /  DBili  .10    /  AST  52     /  ALT  18     /  AlkPhos  146    30 Jan 2021 09:55  TPro  5.8    /  Alb  2.1    /  TBili  0.2    /  DBili  x      /  AST  53     /  ALT  20     /  AlkPhos  159    29 Jan 2021 10:44  TPro  5.8    /  Alb  2.1    /  TBili  0.2    /  DBili  <.10   /  AST  59     /  ALT  20     /  AlkPhos  163    29 Jan 2021 09:07    Blood Culture: Organism --  Gram Stain Blood -- Gram Stain --  Specimen Source .Urine Clean Catch (Midstream)  Culture-Blood --    Organism --  Gram Stain Blood -- Gram Stain --  Specimen Source .Blood Blood-Peripheral  Culture-Blood --    RADIOLOGY:    EKG:    Assessment/Plan:  71y Female    Ipava Dulce DNP, ANP-C  Cardiology  Spectra #[phone]       [facility] - Quang Akers, Itzel Herrera, Ruben, Kiran, Debra Vicente  Office Number: [phone]    Initial Consult Note: This is a71 y/o F with HLD, syncope, frequent falls, OA, s/p B/l kneee replacement and right hip replacement presented to the ED after multiple falls and weakness, found to have COVID Pna and pancolitis.    CHIEF COMPLAINT: Patient is a 71y old  Female who presents with a chief complaint of sob (30 Jan 2021 12:50)    HPI: Objective Statement: 72yo female who presents with multiple falls and feeling weak today. pt c/o diffuse pain and feeling weak, no vomiting, no diarrhea, no fever, chills, no cough or sob, denies sick contacts at home  In ER patient was found to have COVID PNA with possible colitis.  patient is being admitted for further care and management (28 Jan 2021 10:42)    PAST MEDICAL & SURGICAL HISTORY:  Obesity (BMI 30-39.9)    Traumatic arthropathy involving lower leg  right    History of Osteoarthritis    Hyperlipidemia    Restless Leg Syndrome    H/O neck surgery    H/O arthroscopy of right knee  2010    Bladder Disorder  Bladder lift 2000    History of Colonoscopy    Ex lap in 2009 for abscess in the baldder    History of Hysterectomy and bladder lift in 2000    SOCIAL HISTORY:  No tobacco, ethanol, or drug abuse.  FAMILY HISTORY:  No pertinent family history in first degree relatives    No family history of acute MI or sudden cardiac death.  MEDICATIONS  (STANDING):  ascorbic acid 500 milliGRAM(s) Oral daily  budesonide 160 MICROgram(s)/formoterol 4.5 MICROgram(s) Inhaler 2 Puff(s) Inhalation two times a day  cholecalciferol 2000 Unit(s) Oral daily  dexAMETHasone  Injectable 6 milliGRAM(s) IV Push daily  enoxaparin Injectable 40 milliGRAM(s) SubCutaneous daily  famotidine    Tablet 20 milliGRAM(s) Oral two times a day  ferrous    sulfate 325 milliGRAM(s) Oral daily  influenza   Vaccine 0.5 milliLiter(s) IntraMuscular once  iron sucrose Injectable 100 milliGRAM(s) IV Push every 24 hours  lactobacillus acidophilus 1 Tablet(s) Oral daily  lidocaine   Patch 1 Patch Transdermal every 24 hours  multivitamin/minerals 1 Tablet(s) Oral daily  pramipexole 1.5 milliGRAM(s) Oral every 24 hours  remdesivir  IVPB 100 milliGRAM(s) IV Intermittent every 24 hours  remdesivir  IVPB   IV Intermittent   sulfaSALAzine 500 milliGRAM(s) Oral three times a day  torsemide 5 milliGRAM(s) Oral two times a day    MEDICATIONS  (PRN):  acetaminophen   Tablet .. 650 milliGRAM(s) Oral every 6 hours PRN Temp greater or equal to 38C (100.4F), Mild Pain (1 - 3)  ALBUTerol    90 MICROgram(s) HFA Inhaler 2 Puff(s) Inhalation every 4 hours PRN Shortness of Breath and/or Wheezing  aluminum hydroxide/magnesium hydroxide/simethicone Suspension 30 milliLiter(s) Oral every 4 hours PRN Dyspepsia  benzonatate 100 milliGRAM(s) Oral three times a day PRN Cough  cyclobenzaprine 5 milliGRAM(s) Oral three times a day PRN Muscle Spasm  melatonin 3 milliGRAM(s) Oral at bedtime PRN Insomnia  ondansetron Injectable 4 milliGRAM(s) IV Push every 6 hours PRN Nausea and/or Vomiting  oxyCODONE    IR 5 milliGRAM(s) Oral four times a day PRN Severe Pain (7 - 10)  traMADol 25 milliGRAM(s) Oral four times a day PRN Moderate Pain (4 - 6)    Allergies    No Known Allergies    Intolerances    REVIEW OF SYSTEMS:  CONSTITUTIONAL: No weakness, fevers or chills  EYES/ENT: No visual changes;  No vertigo or throat pain   NECK: No pain or stiffness  RESPIRATORY: No cough, wheezing, hemoptysis; No shortness of breath  CARDIOVASCULAR: No chest pain or palpitations  GASTROINTESTINAL: No abdominal pain. No nausea, vomiting, or hematemesis; No diarrhea or constipation. No melena or hematochezia.  GENITOURINARY: No dysuria, frequency or hematuria  NEUROLOGICAL: No numbness or weakness]  MSK: B/L knee pain  SKIN: No itching or rash  All other review of systems is negative unless indicated above  VITAL SIGNS:   Vital Signs Last 24 Hrs  T(C): 36.3 (30 Jan 2021 12:14), Max: 37.1 (29 Jan 2021 20:18)  T(F): 97.4 (30 Jan 2021 12:14), Max: 98.7 (29 Jan 2021 20:18)  HR: 63 (30 Jan 2021 12:14) (63 - 93)  BP: 105/60 (30 Jan 2021 12:14) (105/60 - 116/66)  BP(mean): --  RR: 19 (30 Jan 2021 12:14) (18 - 19)  SpO2: 96% (30 Jan 2021 12:14) (92% - 96%)  I&O's Summary    29 Jan 2021 07:01  -  30 Jan 2021 07:00  --------------------------------------------------------  IN: 0 mL / OUT: 700 mL / NET: -700 mL    On Exam:  Constitutional: NAD, alert and oriented x 3, obese  Lungs:  Non-labored, breath sounds are clear bilaterally, No wheezing, rales. + scattered rhonchi  Cardiovascular: RRR.  S1 and S2 positive.  + murmurs, no rubs, gallops or clicks  Gastrointestinal: Bowel Sounds present, soft, nontender.   Lymph: No peripheral edema. No cervical lymphadenopathy.  Neurological: Alert, no focal deficits  Skin: No rashes or ulcers. + chronic changes + lymphedema BLE  Psych:  Mood & affect appropriate.    LABS: All Labs Reviewed:                        9.0    8.55  )-----------( 262      ( 30 Jan 2021 09:55 )             28.4                         8.6    6.69  )-----------( 209      ( 29 Jan 2021 09:07 )             27.5                         8.9    x     )-----------( x        ( 28 Jan 2021 16:50 )             27.5     30 Jan 2021 09:55    x      |  x      |  x      ----------------------------<  x      x       |  x      |  0.72   29 Jan 2021 10:44    142    |  109    |  10     ----------------------------<  186    4.5     |  26     |  0.93   29 Jan 2021 09:07    141    |  109    |  9      ----------------------------<  160    4.2     |  26     |  0.84     Ca    7.7        29 Jan 2021 10:44  Ca    7.7        29 Jan 2021 09:07  Ca    7.3        28 Jan 2021 08:03  Phos  3.1       28 Jan 2021 08:03  Mg     1.9       28 Jan 2021 08:03    TPro  6.5    /  Alb  2.4    /  TBili  0.3    /  DBili  .10    /  AST  52     /  ALT  18     /  AlkPhos  146    30 Jan 2021 09:55  TPro  5.8    /  Alb  2.1    /  TBili  0.2    /  DBili  x      /  AST  53     /  ALT  20     /  AlkPhos  159    29 Jan 2021 10:44  TPro  5.8    /  Alb  2.1    /  TBili  0.2    /  DBili  <.10   /  AST  59     /  ALT  20     /  AlkPhos  163    29 Jan 2021 09:07    Blood Culture: Organism --  Gram Stain Blood -- Gram Stain --  Specimen Source .Urine Clean Catch (Midstream)  Culture-Blood --    Organism --  Gram Stain Blood -- Gram Stain --  Specimen Source .Blood Blood-Peripheral  Culture-Blood --    RADIOLOGY:    EXAM:  CT CHEST                          PROCEDURE DATE:  01/28/2021      INTERPRETATION:  CLINICAL INFORMATION: Chest trauma.  Status post fall.    COMPARISON: 11/20/2020.    PROCEDURE:  CT of the Chest was performed without intravenous contrast.  Sagittal and coronal reformats were performed.    FINDINGS:    LUNGS AND AIRWAYS: There is moderate respiratory motion artifact the central airways are grossly clear within limits of motion degradation.  There is mild to moderate centrilobular emphysema.  There are nonspecific groundglass opacities in the anterior segments of both upper lobes measuring up to approximately 1.9 x 1.9 cm on the right (3:226) and 1.4 x 1.1 cm on the left (3:217), which are new since 11/28/2020.  There is dense consolidation in the right middle lobe with air bronchograms and bronchiectasis, in a region of lung that previously showed patchy groundglass nodular opacities on 11/20/2020.  Again seen is patchy groundglass opacity in the basilar segments of the right greater than left lower lobes which appears stable to mildly progressed from 11/20/2020.  PLEURA: No pleural effusion.  MEDIASTINUM AND MARYBEL: No lymphadenopathy.  VESSELS: Mild atherosclerotic calcification thoracic aorta and arch vessels.  HEART:Cardiomegaly.  Calcifications involving sinuses of Valsalva and aortic valve leaflets.  Moderate atherosclerotic calcification the coronary arteries. No pericardial effusion.  CHEST WALL AND LOWER NECK: Within normal limits.  VISUALIZED UPPER ABDOMEN: Enlarged spleen measures up to 13.9 cm, increased in size from approximately 11.8 cm on 11/28/2020.  BONES: The patient is status post C2-T7 posterior spinal fusion with shmaar screw construct.  Within the thoracic spine there are pedicle screws at T1 bilaterally, T2 bilaterally, T3 on the right, T4 on the left, T5 bilaterally, T6 bilaterally and T7 bilaterally.  There is also a screw coursing through the base of the T3 spinous process and right T3 lamina.  Multiple pedicle screws are slightly lateral in position and course into the costovertebral vertebral junctions.  There is no evidence of hardware fracture or abnormal lucency around the pedicle screws.  There are old/healing fractures of the left third, sixth, seventh and eighth ribs with external callus formation.    IMPRESSION:  1.  No evidence of acute traumatic injury in the chest.    2.  Patchy groundglass opacities in the anterior segments of both upper lobes are new from 11/20/2020.  These may reflect nonspecific foci of infection, inflammation, edema or hemorrhage.    2.  Dense consolidation in the right middle lobe with air bronchograms and bronchiectasis is an region of lung that previously showed patchy groundglass nodular opacities on 11/20/2020.  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    3.  There is persistent groundglass opacity in the basilar segments of both lower lobes, right greater than left, which appears stable to mildly progressed from 11/20/2020.  This may reflect areas of infection/inflammation, edema, atelectasis or interstitial lung disease.    4.  Splenomegaly, which appears new from 11/20/2020.  The spleen measures 13.9 cm, increased in size from 11.8 cm on 11/20/2020.    5.  Follow-up chest CT is recommended in 1-3 months to reassess the findings and exclude continued disease progression.    NEREIDA FRASER MD; Attending Radiologist  This document has been electronically signed. Jan 28 2021  2:22AM    EKG:    Ventricular Rate 87 BPM    Atrial Rate 87 BPM    P-R Interval 148 ms    QRS Duration 98 ms    Q-T Interval 388 ms    QTC Calculation(Bazett) 466 ms    P Axis 53 degrees    R Axis -3 degrees    T Axis 10 degrees    Diagnosis Line Normal sinus rhythm  Poor R wave progression  Confirmed by CLARK PETERSON ([id number]) on 1/28/2021 10:53:29 AM       [facility] - Quang Akers, Itzel Herrera, Ruben, Kiran, Debra Vicente  Office Number: [phone]    Initial Consult Note: This is a71 y/o F with HLD, syncope, frequent falls, OA, s/p B/l kneee replacement and right hip replacement presented to the ED after multiple falls and weakness, found to have COVID Pna and pancolitis.    CHIEF COMPLAINT: Patient is a 71y old  Female who presents with a chief complaint of sob (30 Jan 2021 12:50)    HPI: Objective Statement: 72yo female who presents with multiple falls and feeling weak today. pt c/o diffuse pain and feeling weak, no vomiting, no diarrhea, no fever, chills, no cough or sob, denies sick contacts at home  In ER patient was found to have COVID PNA with possible colitis.  patient is being admitted for further care and management (28 Jan 2021 10:42)    PAST MEDICAL & SURGICAL HISTORY:  Obesity (BMI 30-39.9)    Traumatic arthropathy involving lower leg  right    History of Osteoarthritis    Hyperlipidemia    Restless Leg Syndrome    H/O neck surgery    H/O arthroscopy of right knee  2010    Bladder Disorder  Bladder lift 2000    History of Colonoscopy    Ex lap in 2009 for abscess in the baldder    History of Hysterectomy and bladder lift in 2000    SOCIAL HISTORY:  No tobacco, ethanol, or drug abuse.  FAMILY HISTORY:  No pertinent family history in first degree relatives    No family history of acute MI or sudden cardiac death.  MEDICATIONS  (STANDING):  ascorbic acid 500 milliGRAM(s) Oral daily  budesonide 160 MICROgram(s)/formoterol 4.5 MICROgram(s) Inhaler 2 Puff(s) Inhalation two times a day  cholecalciferol 2000 Unit(s) Oral daily  dexAMETHasone  Injectable 6 milliGRAM(s) IV Push daily  enoxaparin Injectable 40 milliGRAM(s) SubCutaneous daily  famotidine    Tablet 20 milliGRAM(s) Oral two times a day  ferrous    sulfate 325 milliGRAM(s) Oral daily  influenza   Vaccine 0.5 milliLiter(s) IntraMuscular once  iron sucrose Injectable 100 milliGRAM(s) IV Push every 24 hours  lactobacillus acidophilus 1 Tablet(s) Oral daily  lidocaine   Patch 1 Patch Transdermal every 24 hours  multivitamin/minerals 1 Tablet(s) Oral daily  pramipexole 1.5 milliGRAM(s) Oral every 24 hours  remdesivir  IVPB 100 milliGRAM(s) IV Intermittent every 24 hours  remdesivir  IVPB   IV Intermittent   sulfaSALAzine 500 milliGRAM(s) Oral three times a day  torsemide 5 milliGRAM(s) Oral two times a day    MEDICATIONS  (PRN):  acetaminophen   Tablet .. 650 milliGRAM(s) Oral every 6 hours PRN Temp greater or equal to 38C (100.4F), Mild Pain (1 - 3)  ALBUTerol    90 MICROgram(s) HFA Inhaler 2 Puff(s) Inhalation every 4 hours PRN Shortness of Breath and/or Wheezing  aluminum hydroxide/magnesium hydroxide/simethicone Suspension 30 milliLiter(s) Oral every 4 hours PRN Dyspepsia  benzonatate 100 milliGRAM(s) Oral three times a day PRN Cough  cyclobenzaprine 5 milliGRAM(s) Oral three times a day PRN Muscle Spasm  melatonin 3 milliGRAM(s) Oral at bedtime PRN Insomnia  ondansetron Injectable 4 milliGRAM(s) IV Push every 6 hours PRN Nausea and/or Vomiting  oxyCODONE    IR 5 milliGRAM(s) Oral four times a day PRN Severe Pain (7 - 10)  traMADol 25 milliGRAM(s) Oral four times a day PRN Moderate Pain (4 - 6)    Allergies    No Known Allergies    Intolerances    REVIEW OF SYSTEMS:  CONSTITUTIONAL: No weakness, fevers or chills  EYES/ENT: No visual changes;  No vertigo or throat pain   NECK: No pain or stiffness  RESPIRATORY: No cough, wheezing, hemoptysis; No shortness of breath  CARDIOVASCULAR: No chest pain or palpitations  GASTROINTESTINAL: No abdominal pain. No nausea, vomiting, or hematemesis; No diarrhea or constipation. No melena or hematochezia.  GENITOURINARY: No dysuria, frequency or hematuria  NEUROLOGICAL: No numbness or weakness]  MSK: B/L knee pain  SKIN: No itching or rash  All other review of systems is negative unless indicated above  VITAL SIGNS:   Vital Signs Last 24 Hrs  T(C): 36.3 (30 Jan 2021 12:14), Max: 37.1 (29 Jan 2021 20:18)  T(F): 97.4 (30 Jan 2021 12:14), Max: 98.7 (29 Jan 2021 20:18)  HR: 63 (30 Jan 2021 12:14) (63 - 93)  BP: 105/60 (30 Jan 2021 12:14) (105/60 - 116/66)  BP(mean): --  RR: 19 (30 Jan 2021 12:14) (18 - 19)  SpO2: 96% (30 Jan 2021 12:14) (92% - 96%)  I&O's Summary    29 Jan 2021 07:01  -  30 Jan 2021 07:00  --------------------------------------------------------  IN: 0 mL / OUT: 700 mL / NET: -700 mL    On Exam:  Constitutional: NAD, alert and oriented x 3, obese  HEENT dry MM anicteric  Lungs:  Non-labored, breath sounds are clear bilaterally, No wheezing, rales. + scattered rhonchi  Cardiovascular: RRR.  S1 and S2 positive.  + murmurs, no rubs, gallops or clicks  Gastrointestinal: Bowel Sounds present, soft, nontender.   Lymph: No peripheral edema. No cervical lymphadenopathy.  Neurological: Alert, no focal deficits  Skin: No rashes or ulcers. + chronic changes + lymphedema BLE  Psych:  Mood & affect appropriate.    LABS: All Labs Reviewed:                        9.0    8.55  )-----------( 262      ( 30 Jan 2021 09:55 )             28.4                         8.6    6.69  )-----------( 209      ( 29 Jan 2021 09:07 )             27.5                         8.9    x     )-----------( x        ( 28 Jan 2021 16:50 )             27.5     30 Jan 2021 09:55    x      |  x      |  x      ----------------------------<  x      x       |  x      |  0.72   29 Jan 2021 10:44    142    |  109    |  10     ----------------------------<  186    4.5     |  26     |  0.93   29 Jan 2021 09:07    141    |  109    |  9      ----------------------------<  160    4.2     |  26     |  0.84     Ca    7.7        29 Jan 2021 10:44  Ca    7.7        29 Jan 2021 09:07  Ca    7.3        28 Jan 2021 08:03  Phos  3.1       28 Jan 2021 08:03  Mg     1.9       28 Jan 2021 08:03    TPro  6.5    /  Alb  2.4    /  TBili  0.3    /  DBili  .10    /  AST  52     /  ALT  18     /  AlkPhos  146    30 Jan 2021 09:55  TPro  5.8    /  Alb  2.1    /  TBili  0.2    /  DBili  x      /  AST  53     /  ALT  20     /  AlkPhos  159    29 Jan 2021 10:44  TPro  5.8    /  Alb  2.1    /  TBili  0.2    /  DBili  <.10   /  AST  59     /  ALT  20     /  AlkPhos  163    29 Jan 2021 09:07    Blood Culture: Organism --  Gram Stain Blood -- Gram Stain --  Specimen Source .Urine Clean Catch (Midstream)  Culture-Blood --    Organism --  Gram Stain Blood -- Gram Stain --  Specimen Source .Blood Blood-Peripheral  Culture-Blood --    RADIOLOGY:    EXAM:  CT CHEST                          PROCEDURE DATE:  01/28/2021      INTERPRETATION:  CLINICAL INFORMATION: Chest trauma.  Status post fall.    COMPARISON: 11/20/2020.    PROCEDURE:  CT of the Chest was performed without intravenous contrast.  Sagittal and coronal reformats were performed.    FINDINGS:    LUNGS AND AIRWAYS: There is moderate respiratory motion artifact the central airways are grossly clear within limits of motion degradation.  There is mild to moderate centrilobular emphysema.  There are nonspecific groundglass opacities in the anterior segments of both upper lobes measuring up to approximately 1.9 x 1.9 cm on the right (3:226) and 1.4 x 1.1 cm on the left (3:217), which are new since 11/28/2020.  There is dense consolidation in the right middle lobe with air bronchograms and bronchiectasis, in a region of lung that previously showed patchy groundglass nodular opacities on 11/20/2020.  Again seen is patchy groundglass opacity in the basilar segments of the right greater than left lower lobes which appears stable to mildly progressed from 11/20/2020.  PLEURA: No pleural effusion.  MEDIASTINUM AND MARYBEL: No lymphadenopathy.  VESSELS: Mild atherosclerotic calcification thoracic aorta and arch vessels.  HEART:Cardiomegaly.  Calcifications involving sinuses of Valsalva and aortic valve leaflets.  Moderate atherosclerotic calcification the coronary arteries. No pericardial effusion.  CHEST WALL AND LOWER NECK: Within normal limits.  VISUALIZED UPPER ABDOMEN: Enlarged spleen measures up to 13.9 cm, increased in size from approximately 11.8 cm on 11/28/2020.  BONES: The patient is status post C2-T7 posterior spinal fusion with shamar screw construct.  Within the thoracic spine there are pedicle screws at T1 bilaterally, T2 bilaterally, T3 on the right, T4 on the left, T5 bilaterally, T6 bilaterally and T7 bilaterally.  There is also a screw coursing through the base of the T3 spinous process and right T3 lamina.  Multiple pedicle screws are slightly lateral in position and course into the costovertebral vertebral junctions.  There is no evidence of hardware fracture or abnormal lucency around the pedicle screws.  There are old/healing fractures of the left third, sixth, seventh and eighth ribs with external callus formation.    IMPRESSION:  1.  No evidence of acute traumatic injury in the chest.    2.  Patchy groundglass opacities in the anterior segments of both upper lobes are new from 11/20/2020.  These may reflect nonspecific foci of infection, inflammation, edema or hemorrhage.    2.  Dense consolidation in the right middle lobe with air bronchograms and bronchiectasis is an region of lung that previously showed patchy groundglass nodular opacities on 11/20/2020.  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    3.  There is persistent groundglass opacity in the basilar segments of both lower lobes, right greater than left, which appears stable to mildly progressed from 11/20/2020.  This may reflect areas of infection/inflammation, edema, atelectasis or interstitial lung disease.    4.  Splenomegaly, which appears new from 11/20/2020.  The spleen measures 13.9 cm, increased in size from 11.8 cm on 11/20/2020.    5.  Follow-up chest CT is recommended in 1-3 months to reassess the findings and exclude continued disease progression.    NEREIDA FRASER MD; Attending Radiologist  This document has been electronically signed. Jan 28 2021  2:22AM    EKG:    Ventricular Rate 87 BPM    Atrial Rate 87 BPM    P-R Interval 148 ms    QRS Duration 98 ms    Q-T Interval 388 ms    QTC Calculation(Bazett) 466 ms    P Axis 53 degrees    R Axis -3 degrees    T Axis 10 degrees    Diagnosis Line Normal sinus rhythm  Poor R wave progression  Confirmed by CLARK PETERSON ([id number]) on 1/28/2021 10:53:29 AM

## 2021-01-30 NOTE — PROGRESS NOTE ADULT - SUBJECTIVE AND OBJECTIVE BOX
Patient is a 71y old  Female who presents with a chief complaint of sob (30 Jan 2021 12:56)       Pt is seen and examined  pt is awake and lying in bed/out of bed to chair  pt seems comfortable and denies any complaints at this time    HPI:  · HPI Objective Statement: 72yo female who presents with multiple falls and feeling weak today. pt c/o diffuse pain and feeling weak, no vomiting, no diarrhea, no fever, chills, no cough or sob, denies sick contacts at home  In ER patient was found to have COVID PNA with possible colitis.  patient is being admitted for further care and management (28 Jan 2021 10:42)         ROS:  Negative except for:    MEDICATIONS  (STANDING):  ascorbic acid 500 milliGRAM(s) Oral daily  budesonide 160 MICROgram(s)/formoterol 4.5 MICROgram(s) Inhaler 2 Puff(s) Inhalation two times a day  cholecalciferol 2000 Unit(s) Oral daily  dexAMETHasone  Injectable 6 milliGRAM(s) IV Push daily  enoxaparin Injectable 40 milliGRAM(s) SubCutaneous daily  famotidine    Tablet 20 milliGRAM(s) Oral two times a day  ferrous    sulfate 325 milliGRAM(s) Oral daily  influenza   Vaccine 0.5 milliLiter(s) IntraMuscular once  iron sucrose Injectable 100 milliGRAM(s) IV Push every 24 hours  lactobacillus acidophilus 1 Tablet(s) Oral daily  lidocaine   Patch 1 Patch Transdermal every 24 hours  multivitamin/minerals 1 Tablet(s) Oral daily  pramipexole 1.5 milliGRAM(s) Oral every 24 hours  remdesivir  IVPB 100 milliGRAM(s) IV Intermittent every 24 hours  remdesivir  IVPB   IV Intermittent   sulfaSALAzine 500 milliGRAM(s) Oral three times a day  torsemide 5 milliGRAM(s) Oral two times a day    MEDICATIONS  (PRN):  acetaminophen   Tablet .. 650 milliGRAM(s) Oral every 6 hours PRN Temp greater or equal to 38C (100.4F), Mild Pain (1 - 3)  ALBUTerol    90 MICROgram(s) HFA Inhaler 2 Puff(s) Inhalation every 4 hours PRN Shortness of Breath and/or Wheezing  aluminum hydroxide/magnesium hydroxide/simethicone Suspension 30 milliLiter(s) Oral every 4 hours PRN Dyspepsia  benzonatate 100 milliGRAM(s) Oral three times a day PRN Cough  cyclobenzaprine 5 milliGRAM(s) Oral three times a day PRN Muscle Spasm  melatonin 3 milliGRAM(s) Oral at bedtime PRN Insomnia  ondansetron Injectable 4 milliGRAM(s) IV Push every 6 hours PRN Nausea and/or Vomiting  oxyCODONE    IR 5 milliGRAM(s) Oral four times a day PRN Severe Pain (7 - 10)  traMADol 25 milliGRAM(s) Oral four times a day PRN Moderate Pain (4 - 6)      Allergies    No Known Allergies    Intolerances        Vital Signs Last 24 Hrs  T(C): 36.3 (30 Jan 2021 12:14), Max: 37.1 (29 Jan 2021 20:18)  T(F): 97.4 (30 Jan 2021 12:14), Max: 98.7 (29 Jan 2021 20:18)  HR: 63 (30 Jan 2021 12:14) (63 - 93)  BP: 105/60 (30 Jan 2021 12:14) (105/60 - 116/66)  BP(mean): --  RR: 19 (30 Jan 2021 12:14) (18 - 19)  SpO2: 96% (30 Jan 2021 12:14) (92% - 96%)    PHYSICAL EXAM  General: adult in NAD  HEENT: clear oropharynx, anicteric sclera, pink conjunctiva  Neck: supple  CV: normal S1/S2 with no murmur rubs or gallops  Lungs: positive air movement b/l ant lungs,clear to auscultation, no wheezes, no rales  Abdomen: soft non-tender non-distended, no hepatosplenomegaly  Ext: no clubbing cyanosis or edema  Skin: no rashes and no petechiae  Neuro: alert and oriented X 4, no focal deficits  LABS:                          9.0    8.55  )-----------( 262      ( 30 Jan 2021 09:55 )             28.4         Mean Cell Volume : 82.3 fl  Mean Cell Hemoglobin : 26.1 pg  Mean Cell Hemoglobin Concentration : 31.7 gm/dL  Auto Neutrophil # : x  Auto Lymphocyte # : x  Auto Monocyte # : x  Auto Eosinophil # : x  Auto Basophil # : x  Auto Neutrophil % : x  Auto Lymphocyte % : x  Auto Monocyte % : x  Auto Eosinophil % : x  Auto Basophil % : x    Serial CBC's  01-30 @ 09:55  Hct-28.4 / Hgb-9.0 / Plat-262 / RBC-3.45 / WBC-8.55          Serial CBC's  01-29 @ 09:07  Hct-27.5 / Hgb-8.6 / Plat-209 / RBC-3.23 / WBC-6.69          Serial CBC's  01-28 @ 16:50  Hct-27.5 / Hgb-8.9 / Plat--- / RBC-3.30 / WBC---          Serial CBC's  01-27 @ 23:16  Hct-28.0 / Hgb-8.7 / Plat-247 / RBC-3.34 / WBC-8.20            01-30    x   |  x   |  x   ----------------------------<  x   x    |  x   |  0.72    Ca    7.7<L>      29 Jan 2021 10:44    TPro  6.5  /  Alb  2.4<L>  /  TBili  0.3  /  DBili  .10  /  AST  52<H>  /  ALT  18  /  AlkPhos  146<H>  01-30          Iron - Total Binding Capacity.: 154 ug/dL (01-28-21 @ 22:55)  Ferritin, Serum: 168 ng/mL (01-28-21 @ 22:45)  Vitamin B12, Serum: 872 pg/mL (01-28-21 @ 22:45)  Folate, Serum: 8.7 ng/mL (01-28-21 @ 22:45)  Reticulocyte Percent: 0.4 % (01-28-21 @ 16:50)  Ferritin, Serum: 161 ng/mL (01-28-21 @ 11:50)  Iron - Total Binding Capacity.: 144 ug/dL (01-28-21 @ 11:47)  Ferritin, Serum: 173 ng/mL (01-28-21 @ 06:22)                BLOOD SMEAR INTERPRETATION:       RADIOLOGY & ADDITIONAL STUDIES:     Patient is a 71y old  Female who presents with a chief complaint of sob (30 Jan 2021 12:56)       Pt is seen and examined  pt is awake and lying in bed  pt seems comfortable and denies any complaints at this time    HPI:  · HPI Objective Statement: 70yo female who presents with multiple falls and feeling weak today. pt c/o diffuse pain and feeling weak, no vomiting, no diarrhea, no fever, chills, no cough or sob, denies sick contacts at home  In ER patient was found to have COVID PNA with possible colitis.  patient is being admitted for further care and management (28 Jan 2021 10:42)         ROS:  as per hpi    MEDICATIONS  (STANDING):  ascorbic acid 500 milliGRAM(s) Oral daily  budesonide 160 MICROgram(s)/formoterol 4.5 MICROgram(s) Inhaler 2 Puff(s) Inhalation two times a day  cholecalciferol 2000 Unit(s) Oral daily  dexAMETHasone  Injectable 6 milliGRAM(s) IV Push daily  enoxaparin Injectable 40 milliGRAM(s) SubCutaneous daily  famotidine    Tablet 20 milliGRAM(s) Oral two times a day  ferrous    sulfate 325 milliGRAM(s) Oral daily  influenza   Vaccine 0.5 milliLiter(s) IntraMuscular once  iron sucrose Injectable 100 milliGRAM(s) IV Push every 24 hours  lactobacillus acidophilus 1 Tablet(s) Oral daily  lidocaine   Patch 1 Patch Transdermal every 24 hours  multivitamin/minerals 1 Tablet(s) Oral daily  pramipexole 1.5 milliGRAM(s) Oral every 24 hours  remdesivir  IVPB 100 milliGRAM(s) IV Intermittent every 24 hours  remdesivir  IVPB   IV Intermittent   sulfaSALAzine 500 milliGRAM(s) Oral three times a day  torsemide 5 milliGRAM(s) Oral two times a day    MEDICATIONS  (PRN):  acetaminophen   Tablet .. 650 milliGRAM(s) Oral every 6 hours PRN Temp greater or equal to 38C (100.4F), Mild Pain (1 - 3)  ALBUTerol    90 MICROgram(s) HFA Inhaler 2 Puff(s) Inhalation every 4 hours PRN Shortness of Breath and/or Wheezing  aluminum hydroxide/magnesium hydroxide/simethicone Suspension 30 milliLiter(s) Oral every 4 hours PRN Dyspepsia  benzonatate 100 milliGRAM(s) Oral three times a day PRN Cough  cyclobenzaprine 5 milliGRAM(s) Oral three times a day PRN Muscle Spasm  melatonin 3 milliGRAM(s) Oral at bedtime PRN Insomnia  ondansetron Injectable 4 milliGRAM(s) IV Push every 6 hours PRN Nausea and/or Vomiting  oxyCODONE    IR 5 milliGRAM(s) Oral four times a day PRN Severe Pain (7 - 10)  traMADol 25 milliGRAM(s) Oral four times a day PRN Moderate Pain (4 - 6)      Allergies    No Known Allergies    Intolerances        Vital Signs Last 24 Hrs  T(C): 36.3 (30 Jan 2021 12:14), Max: 37.1 (29 Jan 2021 20:18)  T(F): 97.4 (30 Jan 2021 12:14), Max: 98.7 (29 Jan 2021 20:18)  HR: 63 (30 Jan 2021 12:14) (63 - 93)  BP: 105/60 (30 Jan 2021 12:14) (105/60 - 116/66)  BP(mean): --  RR: 19 (30 Jan 2021 12:14) (18 - 19)  SpO2: 96% (30 Jan 2021 12:14) (92% - 96%)    PHYSICAL EXAM  General: adult in NAD  HEENT: clear oropharynx, anicteric sclera, pink conjunctiva  Neck: supple  CV: normal S1/S2 with no murmur rubs or gallops  Lungs: positive air movement b/l ant lungs,clear to auscultation, no wheezes, no rales  Abdomen: soft non-tender non-distended, no hepatosplenomegaly  Ext: no clubbing cyanosis or edema  Skin: no rashes and no petechiae  Neuro: alert and oriented X 4, no focal deficits  LABS:                          9.0    8.55  )-----------( 262      ( 30 Jan 2021 09:55 )             28.4         Mean Cell Volume : 82.3 fl  Mean Cell Hemoglobin : 26.1 pg  Mean Cell Hemoglobin Concentration : 31.7 gm/dL  Auto Neutrophil # : x  Auto Lymphocyte # : x  Auto Monocyte # : x  Auto Eosinophil # : x  Auto Basophil # : x  Auto Neutrophil % : x  Auto Lymphocyte % : x  Auto Monocyte % : x  Auto Eosinophil % : x  Auto Basophil % : x    Serial CBC's  01-30 @ 09:55  Hct-28.4 / Hgb-9.0 / Plat-262 / RBC-3.45 / WBC-8.55          Serial CBC's  01-29 @ 09:07  Hct-27.5 / Hgb-8.6 / Plat-209 / RBC-3.23 / WBC-6.69          Serial CBC's  01-28 @ 16:50  Hct-27.5 / Hgb-8.9 / Plat--- / RBC-3.30 / WBC---          Serial CBC's  01-27 @ 23:16  Hct-28.0 / Hgb-8.7 / Plat-247 / RBC-3.34 / WBC-8.20            01-30    x   |  x   |  x   ----------------------------<  x   x    |  x   |  0.72    Ca    7.7<L>      29 Jan 2021 10:44    TPro  6.5  /  Alb  2.4<L>  /  TBili  0.3  /  DBili  .10  /  AST  52<H>  /  ALT  18  /  AlkPhos  146<H>  01-30          Iron - Total Binding Capacity.: 154 ug/dL (01-28-21 @ 22:55)  Ferritin, Serum: 168 ng/mL (01-28-21 @ 22:45)  Vitamin B12, Serum: 872 pg/mL (01-28-21 @ 22:45)  Folate, Serum: 8.7 ng/mL (01-28-21 @ 22:45)  Reticulocyte Percent: 0.4 % (01-28-21 @ 16:50)  Ferritin, Serum: 161 ng/mL (01-28-21 @ 11:50)  Iron - Total Binding Capacity.: 144 ug/dL (01-28-21 @ 11:47)  Ferritin, Serum: 173 ng/mL (01-28-21 @ 06:22)

## 2021-01-30 NOTE — PROGRESS NOTE ADULT - SUBJECTIVE AND OBJECTIVE BOX
Patient is a 71y old  Female who presents with a chief complaint of sob (2021 14:19)      INTERVAL /OVERNIGHT EVENTS: no further leg pain    MEDICATIONS  (STANDING):  ascorbic acid 500 milliGRAM(s) Oral daily  budesonide 160 MICROgram(s)/formoterol 4.5 MICROgram(s) Inhaler 2 Puff(s) Inhalation two times a day  cholecalciferol 2000 Unit(s) Oral daily  dexAMETHasone  Injectable 6 milliGRAM(s) IV Push daily  enoxaparin Injectable 40 milliGRAM(s) SubCutaneous daily  famotidine    Tablet 20 milliGRAM(s) Oral two times a day  ferrous    sulfate 325 milliGRAM(s) Oral daily  influenza   Vaccine 0.5 milliLiter(s) IntraMuscular once  iron sucrose Injectable 100 milliGRAM(s) IV Push every 24 hours  lactobacillus acidophilus 1 Tablet(s) Oral daily  lidocaine   Patch 1 Patch Transdermal every 24 hours  multivitamin/minerals 1 Tablet(s) Oral daily  pramipexole 1.5 milliGRAM(s) Oral every 24 hours  remdesivir  IVPB 100 milliGRAM(s) IV Intermittent every 24 hours  remdesivir  IVPB   IV Intermittent   sulfaSALAzine 500 milliGRAM(s) Oral three times a day  torsemide 5 milliGRAM(s) Oral two times a day    MEDICATIONS  (PRN):  acetaminophen   Tablet .. 650 milliGRAM(s) Oral every 6 hours PRN Temp greater or equal to 38C (100.4F), Mild Pain (1 - 3)  ALBUTerol    90 MICROgram(s) HFA Inhaler 2 Puff(s) Inhalation every 4 hours PRN Shortness of Breath and/or Wheezing  aluminum hydroxide/magnesium hydroxide/simethicone Suspension 30 milliLiter(s) Oral every 4 hours PRN Dyspepsia  benzonatate 100 milliGRAM(s) Oral three times a day PRN Cough  cyclobenzaprine 5 milliGRAM(s) Oral three times a day PRN Muscle Spasm  melatonin 3 milliGRAM(s) Oral at bedtime PRN Insomnia  ondansetron Injectable 4 milliGRAM(s) IV Push every 6 hours PRN Nausea and/or Vomiting  oxyCODONE    IR 5 milliGRAM(s) Oral four times a day PRN Severe Pain (7 - 10)  traMADol 25 milliGRAM(s) Oral four times a day PRN Moderate Pain (4 - 6)      Allergies    No Known Allergies    Intolerances        REVIEW OF SYSTEMS:  CONSTITUTIONAL: No fever, weight loss, or fatigue  EYES: No eye pain, visual disturbances, or discharge  ENMT:  No difficulty hearing, tinnitus, vertigo; No sinus or throat pain  NECK: No pain or stiffness  RESPIRATORY: No cough, wheezing, chills or hemoptysis; No shortness of breath  CARDIOVASCULAR: No chest pain, palpitations, dizziness, or leg swelling  GASTROINTESTINAL: No abdominal or epigastric pain. No nausea, vomiting, or hematemesis; No diarrhea or constipation. No melena or hematochezia.  GENITOURINARY: No dysuria, frequency, hematuria, or incontinence  NEUROLOGICAL: No headaches, memory loss, loss of strength, numbness, or tremors  SKIN: No itching, burning, rashes, or lesions   LYMPH NODES: No enlarged glands  ENDOCRINE: No heat or cold intolerance; No hair loss; No polydipsia or polyuria  MUSCULOSKELETAL: No joint pain or swelling; No muscle, back, or extremity pain  PSYCHIATRIC: No depression, anxiety, mood swings, or difficulty sleeping  HEME/LYMPH: No easy bruising, or bleeding gums  ALLERGY AND IMMUNOLOGIC: No hives or eczema    Vital Signs Last 24 Hrs  T(C): 36.3 (2021 12:14), Max: 37.1 (2021 20:18)  T(F): 97.4 (2021 12:14), Max: 98.7 (2021 20:18)  HR: 63 (2021 12:14) (63 - 93)  BP: 105/60 (2021 12:14) (105/60 - 116/66)  BP(mean): --  RR: 19 (2021 12:14) (18 - 19)  SpO2: 92% (2021 15:15) (92% - 96%)    PHYSICAL EXAM:  GENERAL: NAD, well-groomed, well-developed  HEAD:  Atraumatic, Normocephalic  EYES: EOMI, PERRLA, conjunctiva and sclera clear  ENMT: No tonsillar erythema, exudates, or enlargement; Moist mucous membranes, Good dentition, No lesions  NECK: Supple, No JVD, Normal thyroid  NERVOUS SYSTEM:  Alert & Oriented X3, Good concentration; Motor Strength 5/5 B/L upper and lower extremities; DTRs 2+ intact and symmetric  CHEST/LUNG: Clear to auscultation bilaterally; No rales, rhonchi, wheezing, or rubs  HEART: Regular rate and rhythm; No murmurs, rubs, or gallops  ABDOMEN: Soft, Nontender, Nondistended; Bowel sounds present  EXTREMITIES:  2+ Peripheral Pulses, No clubbing, cyanosis, or edema  LYMPH: No lymphadenopathy noted  SKIN: No rashes or lesions    LABS:                        9.0    8.55  )-----------( 262      ( 2021 09:55 )             28.4     2021 09:55    x      |  x      |  x      ----------------------------<  x      x       |  x      |  0.72     Ca    7.7        2021 10:44    TPro  6.5    /  Alb  2.4    /  TBili  0.3    /  DBili  .10    /  AST  52     /  ALT  18     /  AlkPhos  146    2021 09:55      Urinalysis Basic - ( 2021 20:10 )    Color: Yellow / Appearance: Clear / S.010 / pH: x  Gluc: x / Ketone: Negative  / Bili: Negative / Urobili: Negative   Blood: x / Protein: 30 mg/dL / Nitrite: Negative   Leuk Esterase: Negative / RBC: 0-2 /HPF / WBC 0-2   Sq Epi: x / Non Sq Epi: Few / Bacteria: Few      CAPILLARY BLOOD GLUCOSE      POCT Blood Glucose.: 108 mg/dL (2021 17:03)      RADIOLOGY & ADDITIONAL TESTS:    Notes Reviewed:  [x ] YES  [ ] NO    Care Discussed with Consultants/Other Providers [x ] YES  [ ] NO

## 2021-01-30 NOTE — PROGRESS NOTE ADULT - SUBJECTIVE AND OBJECTIVE BOX
PULMONARY/CRITICAL CARE    DOS 21  Feels less sob. No fever.     Patient is a 71y old  Female who presents with a chief complaint of sob (2021 17:48)    BRIEF HOSPITAL COURSE: ***72yo woman with PMH of OA, HDL, and bladder disorder was admitted with multiple falls and feeling weakness. She didn't have SOB, cough, vomiting, no diarrhea, no fever, chills, No sick contacts.  She tested positive for COVID so was admitted for treatment. She has been started on remdesivir and Dexamethasone.       Events last 24 hours: ***    PAST MEDICAL & SURGICAL HISTORY:  Obesity (BMI 30-39.9)    Traumatic arthropathy involving lower leg  right    History of Osteoarthritis    Hyperlipidemia    Restless Leg Syndrome    H/O neck surgery    H/O arthroscopy of right knee      Bladder Disorder  Bladder lift     History of Colonoscopy    Ex lap in  for abscess in the baldder    History of Hysterectomy and bladder lift in       Allergies    No Known Allergies    Intolerances      FAMILY HISTORY: Smoked 1ppd for 25 yrs until 2017 no etoh  No pertinent family history in first degree relatives          Medications:  remdesivir  IVPB 100 milliGRAM(s) IV Intermittent every 24 hours  remdesivir  IVPB   IV Intermittent     torsemide 5 milliGRAM(s) Oral two times a day    ALBUTerol    90 MICROgram(s) HFA Inhaler 2 Puff(s) Inhalation every 4 hours PRN  benzonatate 100 milliGRAM(s) Oral three times a day PRN  budesonide 160 MICROgram(s)/formoterol 4.5 MICROgram(s) Inhaler 2 Puff(s) Inhalation two times a day    acetaminophen   Tablet .. 650 milliGRAM(s) Oral every 6 hours PRN  melatonin 3 milliGRAM(s) Oral at bedtime PRN  ondansetron Injectable 4 milliGRAM(s) IV Push every 6 hours PRN  oxyCODONE    IR 5 milliGRAM(s) Oral four times a day PRN  pramipexole 1.5 milliGRAM(s) Oral every 24 hours  traMADol 25 milliGRAM(s) Oral four times a day PRN      enoxaparin Injectable 40 milliGRAM(s) SubCutaneous daily    aluminum hydroxide/magnesium hydroxide/simethicone Suspension 30 milliLiter(s) Oral every 4 hours PRN  famotidine    Tablet 20 milliGRAM(s) Oral two times a day  sulfaSALAzine 500 milliGRAM(s) Oral three times a day      dexAMETHasone  Injectable 6 milliGRAM(s) IV Push daily    ascorbic acid 500 milliGRAM(s) Oral daily  cholecalciferol 2000 Unit(s) Oral daily  multivitamin/minerals 1 Tablet(s) Oral daily  potassium chloride   Powder 20 milliEquivalent(s) Oral four times a day    influenza   Vaccine 0.5 milliLiter(s) IntraMuscular once    lidocaine   Patch 1 Patch Transdermal every 24 hours    lactobacillus acidophilus 1 Tablet(s) Oral daily          ICU Vital Signs Last 24 Hrs  T(C): 36.3 (2021 04:59), Max: 36.8 (2021 21:39)  T(F): 97.4 (2021 04:59), Max: 98.2 (2021 21:39)  HR: 71 (2021 04:59) (60 - 76)  BP: 120/64 (2021 04:59) (96/57 - 120/64)  BP(mean): --  ABP: --  ABP(mean): --  RR: 18 (2021 04:59) (18 - 20)  SpO2: 96% (2021 04:59) (92% - 99%)    Vital Signs Last 24 Hrs  T(C): 36.3 (2021 04:59), Max: 36.8 (2021 21:39)  T(F): 97.4 (2021 04:59), Max: 98.2 (2021 21:39)  HR: 71 (2021 04:59) (60 - 76)  BP: 120/64 (2021 04:59) (96/57 - 120/64)  BP(mean): --  RR: 18 (2021 04:59) (18 - 20)  SpO2: 96% (2021 04:59) (92% - 99%)        I&O's Detail    2021 07:01  -  2021 07:00  --------------------------------------------------------  IN:    IV PiggyBack: 350 mL  Total IN: 350 mL    OUT:    Voided (mL): 650 mL  Total OUT: 650 mL    Total NET: -300 mL            LABS:                        8.9    x     )-----------( x        ( 2021 16:50 )             27.5         139  |  106  |  6<L>  ----------------------------<  86  2.9<LL>   |  27  |  0.71    Ca    7.3<L>      2021 08:03  Phos  3.1       Mg     1.9         TPro  5.8<L>  /  Alb  2.1<L>  /  TBili  0.4  /  DBili  .20  /  AST  70<H>  /  ALT  19  /  AlkPhos  130<H>        CARDIAC MARKERS ( 2021 08:03 )  x     / x     / 556 U/L / x     / x          CAPILLARY BLOOD GLUCOSE        PT/INR - ( 2021 23:16 )   PT: 17.6 sec;   INR: 1.54 ratio         PTT - ( 2021 23:16 )  PTT:28.6 sec  Urinalysis Basic - ( 2021 20:10 )    Color: Yellow / Appearance: Clear / S.010 / pH: x  Gluc: x / Ketone: Negative  / Bili: Negative / Urobili: Negative   Blood: x / Protein: 30 mg/dL / Nitrite: Negative   Leuk Esterase: Negative / RBC: 0-2 /HPF / WBC 0-2   Sq Epi: x / Non Sq Epi: Few / Bacteria: Few      CULTURES:  Culture Results:   No growth to date. ( @ 05:12)  Culture Results:   No growth to date. ( @ 05:12)      Physical Examination:    General: No acute distress.  elderly female    HEENT: Pupils equal, reactive to light.  Symmetric.    PULM: bibas crackles good excurion on wheeze     CVS: Regular rate and rhythm, no murmurs, rubs, or gallops    ABD: Soft, nondistended, nontender, normoactive bowel sounds, no masses    EXT: brawny leg  edema, nontender    SKIN: Warm and well perfused, no rashes noted.    NEURO: Alert, nteractive, nonfocal    RADIOLOGY: ***    d< from: CT Chest No Cont (21 @ 00:10) >  EXAM:  CT CHEST                            PROCEDURE DATE:  2021          INTERPRETATION:  CLINICAL INFORMATION: Chest trauma.  Status post fall.    COMPARISON: 2020.    PROCEDURE:  CT of the Chest was performed without intravenous contrast.  Sagittal and coronal reformats were performed.    FINDINGS:    LUNGS AND AIRWAYS: There is moderate respiratory motion artifact the central airways are grossly clear within limits of motion degradation.  There is mild to moderate centrilobular emphysema.  There are nonspecific groundglass opacities in the anterior segments of both upper lobes measuring up to approximately 1.9 x 1.9 cm on the right (3:226) and 1.4 x 1.1 cm on the left (3:217), which are new since 2020.  There is dense consolidation in the right middle lobe with air bronchograms and bronchiectasis, in a region of lung that previously showed patchy groundglass nodular opacities on 2020.  Again seen is patchy groundglass opacity in the basilar segments of the right greater than left lower lobes which appears stable to mildly progressed from 2020.  PLEURA: No pleural effusion.  MEDIASTINUM AND MARYBEL: No lymphadenopathy.  VESSELS: Mild atherosclerotic calcification thoracic aorta and arch vessels.  HEART:Cardiomegaly.  Calcifications involving sinuses of Valsalva and aortic valve leaflets.  Moderate atherosclerotic calcification the coronary arteries. No pericardial effusion.  CHEST WALL AND LOWER NECK: Within normal limits.  VISUALIZED UPPER ABDOMEN: Enlarged spleen measures up to 13.9 cm, increased in size from approximately 11.8 cm on 2020.  BONES: The patient is status post C2-T7 posterior spinal fusion with shamar screw construct.  Within the thoracic spine there are pedicle screws at T1 bilaterally, T2 bilaterally, T3 on the right, T4 on the left, T5 bilaterally, T6 bilaterally and T7 bilaterally.  There is also a screw coursing through the base of the T3 spinous process and right T3 lamina.  Multiple pedicle screws are slightly lateral in position and course into the costovertebral vertebral junctions.  There is no evidence of hardware fracture or abnormal lucency around the pedicle screws.  There are old/healing fractures of the left third, sixth, seventh and eighth ribs with external callus formation.    IMPRESSION:  1.  No evidence of acute traumatic injury in the chest.    2.  Patchy groundglass opacities in the anterior segments of both upper lobes are new from 2020.  These may reflect nonspecific foci of infection, inflammation, edema or hemorrhage.    2.  Dense consolidation in the right middle lobe with air bronchograms and bronchiectasis is an region of lung that previously showed patchy groundglass nodular opacities on 2020.  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    3.  There is persistent groundglass opacity in the basilar segments of both lower lobes, right greater than left, which appears stable to mildly progressed from 2020.  This may reflect areas of infection/inflammation, edema, atelectasis or interstitial lung disease.    4.  Splenomegaly, which appears new from 2020.  The spleen measures 13.9 cm, increased in size from 11.8 cm on 2020.    5.  Follow-up chest CT is recommended in 1-3 months to reassess the findings and exclude continued disease progression.    < end of copied text >  CXR bilat infil

## 2021-01-30 NOTE — CONSULT NOTE ADULT - ATTENDING COMMENTS
I saw and examined the patient personally. Spoke with above provider regarding this case. I reviewed the above findings completely.  I agree with the above history, physical, and plan which I have edited where appropriate.     pt with recurrent syncope. there appears to be no prodrome with her latest syncopal episode. given infection could be hemodynamic mediated. WOuld check orthostatics. I would monitor on tele. at this point if braswell is negative would suggest ILR once Covid infection resolves. encourage po intake.

## 2021-01-30 NOTE — PROGRESS NOTE ADULT - ASSESSMENT
Pt. with Covid pneumonia. Clinically stable.   Oxygenating ok.   Would continue remdesivir and steroids.  FU cxr  OOB  Proning

## 2021-01-30 NOTE — PHYSICAL THERAPY INITIAL EVALUATION ADULT - PERTINENT HX OF CURRENT PROBLEM, REHAB EVAL
· HPI Objective Statement: 70yo female who presents with multiple falls and feeling weak today. pt c/o diffuse pain and feeling weak, no vomiting, no diarrhea, no fever, chills, no cough or sob, denies sick contacts at home

## 2021-01-30 NOTE — PHYSICAL THERAPY INITIAL EVALUATION ADULT - ADDITIONAL COMMENTS
Pt states she lives with ex  in [facility] with 3STE and HR, uses rollator for ambulation and was fully independent functionally prior to C/S sx.

## 2021-01-31 LAB
ALBUMIN SERPL ELPH-MCNC: 2.4 G/DL — LOW (ref 3.3–5)
ALP SERPL-CCNC: 148 U/L — HIGH (ref 40–120)
ALT FLD-CCNC: 23 U/L — SIGNIFICANT CHANGE UP (ref 12–78)
ANION GAP SERPL CALC-SCNC: 10 MMOL/L — SIGNIFICANT CHANGE UP (ref 5–17)
AST SERPL-CCNC: 48 U/L — HIGH (ref 15–37)
BILIRUB DIRECT SERPL-MCNC: 0.1 MG/DL — SIGNIFICANT CHANGE UP (ref 0.05–0.2)
BILIRUB INDIRECT FLD-MCNC: 0.2 MG/DL — SIGNIFICANT CHANGE UP (ref 0.2–1)
BILIRUB SERPL-MCNC: 0.3 MG/DL — SIGNIFICANT CHANGE UP (ref 0.2–1.2)
BUN SERPL-MCNC: 14 MG/DL — SIGNIFICANT CHANGE UP (ref 7–23)
CALCIUM SERPL-MCNC: 7.3 MG/DL — LOW (ref 8.5–10.1)
CHLORIDE SERPL-SCNC: 104 MMOL/L — SIGNIFICANT CHANGE UP (ref 96–108)
CO2 SERPL-SCNC: 27 MMOL/L — SIGNIFICANT CHANGE UP (ref 22–31)
CREAT SERPL-MCNC: 0.83 MG/DL — SIGNIFICANT CHANGE UP (ref 0.5–1.3)
CREAT SERPL-MCNC: 0.85 MG/DL — SIGNIFICANT CHANGE UP (ref 0.5–1.3)
GLUCOSE SERPL-MCNC: 141 MG/DL — HIGH (ref 70–99)
HCT VFR BLD CALC: 29.9 % — LOW (ref 34.5–45)
HGB BLD-MCNC: 9.4 G/DL — LOW (ref 11.5–15.5)
MCHC RBC-ENTMCNC: 26.3 PG — LOW (ref 27–34)
MCHC RBC-ENTMCNC: 31.4 GM/DL — LOW (ref 32–36)
MCV RBC AUTO: 83.8 FL — SIGNIFICANT CHANGE UP (ref 80–100)
NRBC # BLD: 0 /100 WBCS — SIGNIFICANT CHANGE UP (ref 0–0)
PLATELET # BLD AUTO: 305 K/UL — SIGNIFICANT CHANGE UP (ref 150–400)
POTASSIUM SERPL-MCNC: 2.8 MMOL/L — CRITICAL LOW (ref 3.5–5.3)
POTASSIUM SERPL-SCNC: 2.8 MMOL/L — CRITICAL LOW (ref 3.5–5.3)
PROT SERPL-MCNC: 6.6 G/DL — SIGNIFICANT CHANGE UP (ref 6–8.3)
RBC # BLD: 3.57 M/UL — LOW (ref 3.8–5.2)
RBC # FLD: 17.2 % — HIGH (ref 10.3–14.5)
SODIUM SERPL-SCNC: 141 MMOL/L — SIGNIFICANT CHANGE UP (ref 135–145)
WBC # BLD: 9.07 K/UL — SIGNIFICANT CHANGE UP (ref 3.8–10.5)
WBC # FLD AUTO: 9.07 K/UL — SIGNIFICANT CHANGE UP (ref 3.8–10.5)

## 2021-01-31 PROCEDURE — 99232 SBSQ HOSP IP/OBS MODERATE 35: CPT | Mod: CS

## 2021-01-31 PROCEDURE — 93306 TTE W/DOPPLER COMPLETE: CPT | Mod: 26

## 2021-01-31 RX ORDER — POTASSIUM CHLORIDE 20 MEQ
20 PACKET (EA) ORAL
Refills: 0 | Status: DISCONTINUED | OUTPATIENT
Start: 2021-01-31 | End: 2021-01-31

## 2021-01-31 RX ORDER — LOPERAMIDE HCL 2 MG
2 TABLET ORAL THREE TIMES A DAY
Refills: 0 | Status: COMPLETED | OUTPATIENT
Start: 2021-01-31 | End: 2021-01-31

## 2021-01-31 RX ORDER — POTASSIUM CHLORIDE 20 MEQ
10 PACKET (EA) ORAL
Refills: 0 | Status: COMPLETED | OUTPATIENT
Start: 2021-01-31 | End: 2021-01-31

## 2021-01-31 RX ORDER — CHOLESTYRAMINE 4 G/9G
4 POWDER, FOR SUSPENSION ORAL EVERY 24 HOURS
Refills: 0 | Status: DISCONTINUED | OUTPATIENT
Start: 2021-01-31 | End: 2021-02-08

## 2021-01-31 RX ORDER — HYDROCORTISONE 1 %
1 OINTMENT (GRAM) TOPICAL ONCE
Refills: 0 | Status: COMPLETED | OUTPATIENT
Start: 2021-01-31 | End: 2021-01-31

## 2021-01-31 RX ORDER — POTASSIUM CHLORIDE 20 MEQ
40 PACKET (EA) ORAL EVERY 4 HOURS
Refills: 0 | Status: COMPLETED | OUTPATIENT
Start: 2021-01-31 | End: 2021-01-31

## 2021-01-31 RX ADMIN — Medication 1 SUPPOSITORY(S): at 05:00

## 2021-01-31 RX ADMIN — Medication 100 MILLIEQUIVALENT(S): at 23:28

## 2021-01-31 RX ADMIN — TRAMADOL HYDROCHLORIDE 25 MILLIGRAM(S): 50 TABLET ORAL at 04:59

## 2021-01-31 RX ADMIN — Medication 500 MILLIGRAM(S): at 13:37

## 2021-01-31 RX ADMIN — Medication 6 MILLIGRAM(S): at 05:00

## 2021-01-31 RX ADMIN — FAMOTIDINE 20 MILLIGRAM(S): 10 INJECTION INTRAVENOUS at 16:48

## 2021-01-31 RX ADMIN — Medication 40 MILLIEQUIVALENT(S): at 20:36

## 2021-01-31 RX ADMIN — FAMOTIDINE 20 MILLIGRAM(S): 10 INJECTION INTRAVENOUS at 04:59

## 2021-01-31 RX ADMIN — CHOLESTYRAMINE 4 GRAM(S): 4 POWDER, FOR SUSPENSION ORAL at 15:52

## 2021-01-31 RX ADMIN — REMDESIVIR 500 MILLIGRAM(S): 5 INJECTION INTRAVENOUS at 10:17

## 2021-01-31 RX ADMIN — Medication 100 MILLIEQUIVALENT(S): at 15:52

## 2021-01-31 RX ADMIN — Medication 500 MILLIGRAM(S): at 04:59

## 2021-01-31 RX ADMIN — Medication 100 MILLIEQUIVALENT(S): at 20:36

## 2021-01-31 RX ADMIN — IRON SUCROSE 100 MILLIGRAM(S): 20 INJECTION, SOLUTION INTRAVENOUS at 13:38

## 2021-01-31 RX ADMIN — Medication 2 MILLIGRAM(S): at 20:36

## 2021-01-31 RX ADMIN — Medication 325 MILLIGRAM(S): at 10:16

## 2021-01-31 RX ADMIN — Medication 500 MILLIGRAM(S): at 10:16

## 2021-01-31 RX ADMIN — BUDESONIDE AND FORMOTEROL FUMARATE DIHYDRATE 2 PUFF(S): 160; 4.5 AEROSOL RESPIRATORY (INHALATION) at 20:48

## 2021-01-31 RX ADMIN — PRAMIPEXOLE DIHYDROCHLORIDE 1.5 MILLIGRAM(S): 0.12 TABLET ORAL at 16:49

## 2021-01-31 RX ADMIN — Medication 2 MILLIGRAM(S): at 04:59

## 2021-01-31 RX ADMIN — Medication 5 MILLIGRAM(S): at 16:48

## 2021-01-31 RX ADMIN — Medication 2000 UNIT(S): at 10:18

## 2021-01-31 RX ADMIN — LIDOCAINE 1 PATCH: 4 CREAM TOPICAL at 16:47

## 2021-01-31 RX ADMIN — Medication 5 MILLIGRAM(S): at 04:59

## 2021-01-31 RX ADMIN — TRAMADOL HYDROCHLORIDE 25 MILLIGRAM(S): 50 TABLET ORAL at 10:22

## 2021-01-31 RX ADMIN — Medication 40 MILLIEQUIVALENT(S): at 16:48

## 2021-01-31 RX ADMIN — Medication 500 MILLIGRAM(S): at 20:36

## 2021-01-31 RX ADMIN — LIDOCAINE 1 PATCH: 4 CREAM TOPICAL at 20:04

## 2021-01-31 RX ADMIN — Medication 1 TABLET(S): at 10:16

## 2021-01-31 RX ADMIN — Medication 2 MILLIGRAM(S): at 13:37

## 2021-01-31 RX ADMIN — OXYCODONE HYDROCHLORIDE 5 MILLIGRAM(S): 5 TABLET ORAL at 23:28

## 2021-01-31 RX ADMIN — ENOXAPARIN SODIUM 40 MILLIGRAM(S): 100 INJECTION SUBCUTANEOUS at 10:18

## 2021-01-31 RX ADMIN — LIDOCAINE 1 PATCH: 4 CREAM TOPICAL at 05:00

## 2021-01-31 RX ADMIN — BUDESONIDE AND FORMOTEROL FUMARATE DIHYDRATE 2 PUFF(S): 160; 4.5 AEROSOL RESPIRATORY (INHALATION) at 05:01

## 2021-01-31 RX ADMIN — Medication 1 TABLET(S): at 10:19

## 2021-01-31 RX ADMIN — TRAMADOL HYDROCHLORIDE 25 MILLIGRAM(S): 50 TABLET ORAL at 16:47

## 2021-01-31 NOTE — PROGRESS NOTE ADULT - SUBJECTIVE AND OBJECTIVE BOX
[facility] -- Quang Akers, Itzel Herrera, Kiran Peterson Savella, Goodger  Office # [phone]    Follow Up:  Multiple Falls, Syncope    Subjective/Observations: Awake and alert, eating breakfast.  Admits to be feeling better and pain is more controlled.  NC in use but denies any respiratory discomfort.  Denies dizziness or lightheadedness.  Denies any cardiac discomfort.  No overnight events    REVIEW OF SYSTEMS: All other review of systems is negative unless indicated above  PAST MEDICAL & SURGICAL HISTORY:  Obesity (BMI 30-39.9)    Traumatic arthropathy involving lower leg  right    History of Osteoarthritis    Hyperlipidemia    Restless Leg Syndrome    H/O neck surgery    H/O arthroscopy of right knee  2010    Bladder Disorder  Bladder lift 2000    History of Colonoscopy    Ex lap in 2009 for abscess in the baldder    History of Hysterectomy and bladder lift in 2000    MEDICATIONS  (STANDING):  ascorbic acid 500 milliGRAM(s) Oral daily  budesonide 160 MICROgram(s)/formoterol 4.5 MICROgram(s) Inhaler 2 Puff(s) Inhalation two times a day  cholecalciferol 2000 Unit(s) Oral daily  dexAMETHasone  Injectable 6 milliGRAM(s) IV Push daily  enoxaparin Injectable 40 milliGRAM(s) SubCutaneous daily  famotidine    Tablet 20 milliGRAM(s) Oral two times a day  ferrous    sulfate 325 milliGRAM(s) Oral daily  influenza   Vaccine 0.5 milliLiter(s) IntraMuscular once  iron sucrose Injectable 100 milliGRAM(s) IV Push every 24 hours  lactobacillus acidophilus 1 Tablet(s) Oral daily  lidocaine   Patch 1 Patch Transdermal every 24 hours  loperamide 2 milliGRAM(s) Oral three times a day  multivitamin/minerals 1 Tablet(s) Oral daily  pramipexole 1.5 milliGRAM(s) Oral every 24 hours  remdesivir  IVPB 100 milliGRAM(s) IV Intermittent every 24 hours  remdesivir  IVPB   IV Intermittent   sulfaSALAzine 500 milliGRAM(s) Oral three times a day  torsemide 5 milliGRAM(s) Oral two times a day    MEDICATIONS  (PRN):  acetaminophen   Tablet .. 650 milliGRAM(s) Oral every 6 hours PRN Temp greater or equal to 38C (100.4F), Mild Pain (1 - 3)  ALBUTerol    90 MICROgram(s) HFA Inhaler 2 Puff(s) Inhalation every 4 hours PRN Shortness of Breath and/or Wheezing  aluminum hydroxide/magnesium hydroxide/simethicone Suspension 30 milliLiter(s) Oral every 4 hours PRN Dyspepsia  benzonatate 100 milliGRAM(s) Oral three times a day PRN Cough  cyclobenzaprine 5 milliGRAM(s) Oral three times a day PRN Muscle Spasm  melatonin 3 milliGRAM(s) Oral at bedtime PRN Insomnia  ondansetron Injectable 4 milliGRAM(s) IV Push every 6 hours PRN Nausea and/or Vomiting  oxyCODONE    IR 5 milliGRAM(s) Oral four times a day PRN Severe Pain (7 - 10)  traMADol 25 milliGRAM(s) Oral four times a day PRN Moderate Pain (4 - 6)    Allergies    No Known Allergies    Intolerances    Vital Signs Last 24 Hrs  T(C): 36.5 (31 Jan 2021 04:45), Max: 36.6 (30 Jan 2021 20:32)  T(F): 97.7 (31 Jan 2021 04:45), Max: 97.8 (30 Jan 2021 20:32)  HR: 79 (31 Jan 2021 04:45) (63 - 79)  BP: 119/67 (31 Jan 2021 04:45) (105/60 - 134/63)  BP(mean): --  RR: 18 (31 Jan 2021 04:45) (18 - 19)  SpO2: 92% (31 Jan 2021 06:49) (90% - 98%)  I&O's Summary      PHYSICAL EXAM:  TELE: NSR  Constitutional: NAD, awake and alert, obese  HEENT: Moist Mucous Membranes, Anicteric  Pulmonary: Non-labored, breath sounds are clear but diminished bilaterally, No wheezing, rales or rhonchi  Cardiovascular: Regular, S1 and S2, + murmurs, rubs, gallops or clicks  Gastrointestinal: Bowel Sounds present, soft, nontender.   Lymph: No peripheral edema. BLE lympedema  Skin: No visible rashes or ulcers.  + chronic skin changes on BLE  Psych:  Mood & affect appropriate  LABS: All Labs Reviewed:                        9.4    9.07  )-----------( 305      ( 31 Jan 2021 08:06 )             29.9                         9.0    8.55  )-----------( 262      ( 30 Jan 2021 09:55 )             28.4                         8.6    6.69  )-----------( 209      ( 29 Jan 2021 09:07 )             27.5     31 Jan 2021 08:06    x      |  x      |  x      ----------------------------<  x      x       |  x      |  0.83   30 Jan 2021 09:55    x      |  x      |  x      ----------------------------<  x      x       |  x      |  0.72   29 Jan 2021 10:44    142    |  109    |  10     ----------------------------<  186    4.5     |  26     |  0.93     Ca    7.7        29 Jan 2021 10:44  Ca    7.7        29 Jan 2021 09:07    TPro  6.6    /  Alb  2.4    /  TBili  0.3    /  DBili  .10    /  AST  48     /  ALT  23     /  AlkPhos  148    31 Jan 2021 08:06  TPro  6.5    /  Alb  2.4    /  TBili  0.3    /  DBili  .10    /  AST  52     /  ALT  18     /  AlkPhos  146    30 Jan 2021 09:55  TPro  5.8    /  Alb  2.1    /  TBili  0.2    /  DBili  x      /  AST  53     /  ALT  20     /  AlkPhos  159    29 Jan 2021 10:44    RADIOLOGY:    EXAM:  CT CHEST                          PROCEDURE DATE:  01/28/2021      INTERPRETATION:  CLINICAL INFORMATION: Chest trauma.  Status post fall.    COMPARISON: 11/20/2020.    PROCEDURE:  CT of the Chest was performed without intravenous contrast.  Sagittal and coronal reformats were performed.    FINDINGS:    LUNGS AND AIRWAYS: There is moderate respiratory motion artifact the central airways are grossly clear within limits of motion degradation.  There is mild to moderate centrilobular emphysema.  There are nonspecific groundglass opacities in the anterior segments of both upper lobes measuring up to approximately 1.9 x 1.9 cm on the right (3:226) and 1.4 x 1.1 cm on the left (3:217), which are new since 11/28/2020.  There is dense consolidation in the right middle lobe with air bronchograms and bronchiectasis, in a region of lung that previously showed patchy groundglass nodular opacities on 11/20/2020.  Again seen is patchy groundglass opacity in the basilar segments of the right greater than left lower lobes which appears stable to mildly progressed from 11/20/2020.  PLEURA: No pleural effusion.  MEDIASTINUM AND MARYBEL: No lymphadenopathy.  VESSELS: Mild atherosclerotic calcification thoracic aorta and arch vessels.  HEART:Cardiomegaly.  Calcifications involving sinuses of Valsalva and aortic valve leaflets.  Moderate atherosclerotic calcification the coronary arteries. No pericardial effusion.  CHEST WALL AND LOWER NECK: Within normal limits.  VISUALIZED UPPER ABDOMEN: Enlarged spleen measures up to 13.9 cm, increased in size from approximately 11.8 cm on 11/28/2020.  BONES: The patient is status post C2-T7 posterior spinal fusion with shamar screw construct.  Within the thoracic spine there are pedicle screws at T1 bilaterally, T2 bilaterally, T3 on the right, T4 on the left, T5 bilaterally, T6 bilaterally and T7 bilaterally.  There is also a screw coursing through the base of the T3 spinous process and right T3 lamina.  Multiple pedicle screws are slightly lateral in position and course into the costovertebral vertebral junctions.  There is no evidence of hardware fracture or abnormal lucency around the pedicle screws.  There are old/healing fractures of the left third, sixth, seventh and eighth ribs with external callus formation.    IMPRESSION:  1.  No evidence of acute traumatic injury in the chest.    2.  Patchy groundglass opacities in the anterior segments of both upper lobes are new from 11/20/2020.  These may reflect nonspecific foci of infection, inflammation, edema or hemorrhage.    2.  Dense consolidation in the right middle lobe with air bronchograms and bronchiectasis is an region of lung that previously showed patchy groundglass nodular opacities on 11/20/2020.  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    3.  There is persistent groundglass opacity in the basilar segments of both lower lobes, right greater than left, which appears stable to mildly progressed from 11/20/2020.  This may reflect areas of infection/inflammation, edema, atelectasis or interstitial lung disease.    4.  Splenomegaly, which appears new from 11/20/2020.  The spleen measures 13.9 cm, increased in size from 11.8 cm on 11/20/2020.    5.  Follow-up chest CT is recommended in 1-3 months to reassess the findings and exclude continued disease progression.    NEREIDA FRASER MD; Attending Radiologist  This document has been electronically signed. Jan 28 2021  2:22AM    EKG:    Ventricular Rate 87 BPM    Atrial Rate 87 BPM    P-R Interval 148 ms    QRS Duration 98 ms    Q-T Interval 388 ms    QTC Calculation(Bazett) 466 ms    P Axis 53 degrees    R Axis -3 degrees    T Axis 10 degrees    Diagnosis Line Normal sinus rhythm  Poor R wave progression  Confirmed by CLARK PETERSON ([id number]) on 1/28/2021 10:53:29 AM

## 2021-01-31 NOTE — PROGRESS NOTE ADULT - ASSESSMENT
Pt. with Covid pneumonia. Clinically stable.   Oxygenating ok.   Would continue remdesivir and steroids.  Can dc pt if stable when finished with Remdesivir.   OOB  Proning

## 2021-01-31 NOTE — PROGRESS NOTE ADULT - ASSESSMENT
contact #  daughter----Mortimer, Kelly  phone # [phone]    IMPRESSION:  71 y/o F PMHx lymphedema, HLD, RA, s/p cervical spine surgery at [facility] around 10/2020--- with prolonged hospital course complicated by C. diff infection (completed treatment course) and dysphagia (s/p PEG tube placement). Patient returned home on 11/24/20. Patient was admitted at [facility] from 11/28/2020---21/1/2020, was admitted with nausea/ vomiting/and abdominal pain, and was discharged 12/1/2020. She has been using a walker to ambulate, admitted with multiple falls and weakness, had ct scan this admission, hematology was consulted for anemia on 1/28 and was seen.  case d/w daughter, lives with  per , 2 daughters    RECOMMENDATION:  Anemia---likely multifactorial  hb is at 9.4 today, was at 9 yesterday  iron profile--low iron/transferrin saturation, ferritin is an acute phase reactant, as looking for a rapid response, patient is on iv iron, day 3 (3/3) today  monitor h/h--transfuse prbc as needed for symptomatic anemia  nl ldh, low retic--no evidence of hemolysis  nl b12 level  follow anemia work up  monitor h/h--transfuse prbc as needed for symptomatic anemia or if hb is under 7  ?colitis on ct--gi is following  anemia/iron def possible/colitis---gi work up as per gi/pt/family to exclude gi pathology/malignancy  covid positive, id is following, is on remdesivir and decadron  gi/dvtp--lovenox s/c

## 2021-01-31 NOTE — PROGRESS NOTE ADULT - SUBJECTIVE AND OBJECTIVE BOX
Patient is a 71y old  Female who presents with a chief complaint of sob (31 Jan 2021 10:08)       Pt is seen and examined  pt is awake and lying in bed/out of bed to chair  pt seems comfortable and denies any complaints at this time    HPI:  · HPI Objective Statement: 70yo female who presents with multiple falls and feeling weak today. pt c/o diffuse pain and feeling weak, no vomiting, no diarrhea, no fever, chills, no cough or sob, denies sick contacts at home  In ER patient was found to have COVID PNA with possible colitis.  patient is being admitted for further care and management (28 Jan 2021 10:42)         ROS:  Negative except for:    MEDICATIONS  (STANDING):  ascorbic acid 500 milliGRAM(s) Oral daily  budesonide 160 MICROgram(s)/formoterol 4.5 MICROgram(s) Inhaler 2 Puff(s) Inhalation two times a day  cholecalciferol 2000 Unit(s) Oral daily  dexAMETHasone  Injectable 6 milliGRAM(s) IV Push daily  enoxaparin Injectable 40 milliGRAM(s) SubCutaneous daily  famotidine    Tablet 20 milliGRAM(s) Oral two times a day  ferrous    sulfate 325 milliGRAM(s) Oral daily  influenza   Vaccine 0.5 milliLiter(s) IntraMuscular once  iron sucrose Injectable 100 milliGRAM(s) IV Push every 24 hours  lactobacillus acidophilus 1 Tablet(s) Oral daily  lidocaine   Patch 1 Patch Transdermal every 24 hours  loperamide 2 milliGRAM(s) Oral three times a day  multivitamin/minerals 1 Tablet(s) Oral daily  pramipexole 1.5 milliGRAM(s) Oral every 24 hours  remdesivir  IVPB 100 milliGRAM(s) IV Intermittent every 24 hours  remdesivir  IVPB   IV Intermittent   sulfaSALAzine 500 milliGRAM(s) Oral three times a day  torsemide 5 milliGRAM(s) Oral two times a day    MEDICATIONS  (PRN):  acetaminophen   Tablet .. 650 milliGRAM(s) Oral every 6 hours PRN Temp greater or equal to 38C (100.4F), Mild Pain (1 - 3)  ALBUTerol    90 MICROgram(s) HFA Inhaler 2 Puff(s) Inhalation every 4 hours PRN Shortness of Breath and/or Wheezing  aluminum hydroxide/magnesium hydroxide/simethicone Suspension 30 milliLiter(s) Oral every 4 hours PRN Dyspepsia  benzonatate 100 milliGRAM(s) Oral three times a day PRN Cough  cyclobenzaprine 5 milliGRAM(s) Oral three times a day PRN Muscle Spasm  melatonin 3 milliGRAM(s) Oral at bedtime PRN Insomnia  ondansetron Injectable 4 milliGRAM(s) IV Push every 6 hours PRN Nausea and/or Vomiting  oxyCODONE    IR 5 milliGRAM(s) Oral four times a day PRN Severe Pain (7 - 10)  traMADol 25 milliGRAM(s) Oral four times a day PRN Moderate Pain (4 - 6)      Allergies    No Known Allergies    Intolerances        Vital Signs Last 24 Hrs  T(C): 36.5 (31 Jan 2021 11:30), Max: 36.6 (30 Jan 2021 20:32)  T(F): 97.7 (31 Jan 2021 11:30), Max: 97.8 (30 Jan 2021 20:32)  HR: 78 (31 Jan 2021 11:30) (63 - 79)  BP: 114/61 (31 Jan 2021 11:30) (105/60 - 134/63)  BP(mean): --  RR: 18 (31 Jan 2021 11:30) (18 - 19)  SpO2: 93% (31 Jan 2021 11:30) (90% - 98%)    PHYSICAL EXAM  General: adult in NAD  HEENT: clear oropharynx, anicteric sclera, pink conjunctiva  Neck: supple  CV: normal S1/S2 with no murmur rubs or gallops  Lungs: positive air movement b/l ant lungs,clear to auscultation, no wheezes, no rales  Abdomen: soft non-tender non-distended, no hepatosplenomegaly  Ext: no clubbing cyanosis or edema  Skin: no rashes and no petechiae  Neuro: alert and oriented X 4, no focal deficits  LABS:                          9.4    9.07  )-----------( 305      ( 31 Jan 2021 08:06 )             29.9         Mean Cell Volume : 83.8 fl  Mean Cell Hemoglobin : 26.3 pg  Mean Cell Hemoglobin Concentration : 31.4 gm/dL  Auto Neutrophil # : x  Auto Lymphocyte # : x  Auto Monocyte # : x  Auto Eosinophil # : x  Auto Basophil # : x  Auto Neutrophil % : x  Auto Lymphocyte % : x  Auto Monocyte % : x  Auto Eosinophil % : x  Auto Basophil % : x    Serial CBC's  01-31 @ 08:06  Hct-29.9 / Hgb-9.4 / Plat-305 / RBC-3.57 / WBC-9.07          Serial CBC's  01-30 @ 09:55  Hct-28.4 / Hgb-9.0 / Plat-262 / RBC-3.45 / WBC-8.55          Serial CBC's  01-29 @ 09:07  Hct-27.5 / Hgb-8.6 / Plat-209 / RBC-3.23 / WBC-6.69          Serial CBC's  01-28 @ 16:50  Hct-27.5 / Hgb-8.9 / Plat--- / RBC-3.30 / WBC---          Serial CBC's  01-27 @ 23:16  Hct-28.0 / Hgb-8.7 / Plat-247 / RBC-3.34 / WBC-8.20            01-31    x   |  x   |  x   ----------------------------<  x   x    |  x   |  0.83      TPro  6.6  /  Alb  2.4<L>  /  TBili  0.3  /  DBili  .10  /  AST  48<H>  /  ALT  23  /  AlkPhos  148<H>  01-31          Ferritin, Serum: 229 ng/mL (01-30-21 @ 15:07)  Iron - Total Binding Capacity.: 154 ug/dL (01-28-21 @ 22:55)  Ferritin, Serum: 168 ng/mL (01-28-21 @ 22:45)  Vitamin B12, Serum: 872 pg/mL (01-28-21 @ 22:45)  Folate, Serum: 8.7 ng/mL (01-28-21 @ 22:45)  Reticulocyte Percent: 0.4 % (01-28-21 @ 16:50)  Ferritin, Serum: 161 ng/mL (01-28-21 @ 11:50)  Iron - Total Binding Capacity.: 144 ug/dL (01-28-21 @ 11:47)  Ferritin, Serum: 173 ng/mL (01-28-21 @ 06:22)                BLOOD SMEAR INTERPRETATION:       RADIOLOGY & ADDITIONAL STUDIES:     Patient is a 71y old  Female who presents with a chief complaint of sob (31 Jan 2021 10:08)       Pt is seen and examined  pt is awake and lying in bed  pt seems comfortable and denies any complaints at this time    HPI:  · HPI Objective Statement: 72yo female who presents with multiple falls and feeling weak today. pt c/o diffuse pain and feeling weak, no vomiting, no diarrhea, no fever, chills, no cough or sob, denies sick contacts at home  In ER patient was found to have COVID PNA with possible colitis.  patient is being admitted for further care and management (28 Jan 2021 10:42)         ROS:  as per hpi    MEDICATIONS  (STANDING):  ascorbic acid 500 milliGRAM(s) Oral daily  budesonide 160 MICROgram(s)/formoterol 4.5 MICROgram(s) Inhaler 2 Puff(s) Inhalation two times a day  cholecalciferol 2000 Unit(s) Oral daily  dexAMETHasone  Injectable 6 milliGRAM(s) IV Push daily  enoxaparin Injectable 40 milliGRAM(s) SubCutaneous daily  famotidine    Tablet 20 milliGRAM(s) Oral two times a day  ferrous    sulfate 325 milliGRAM(s) Oral daily  influenza   Vaccine 0.5 milliLiter(s) IntraMuscular once  iron sucrose Injectable 100 milliGRAM(s) IV Push every 24 hours  lactobacillus acidophilus 1 Tablet(s) Oral daily  lidocaine   Patch 1 Patch Transdermal every 24 hours  loperamide 2 milliGRAM(s) Oral three times a day  multivitamin/minerals 1 Tablet(s) Oral daily  pramipexole 1.5 milliGRAM(s) Oral every 24 hours  remdesivir  IVPB 100 milliGRAM(s) IV Intermittent every 24 hours  remdesivir  IVPB   IV Intermittent   sulfaSALAzine 500 milliGRAM(s) Oral three times a day  torsemide 5 milliGRAM(s) Oral two times a day    MEDICATIONS  (PRN):  acetaminophen   Tablet .. 650 milliGRAM(s) Oral every 6 hours PRN Temp greater or equal to 38C (100.4F), Mild Pain (1 - 3)  ALBUTerol    90 MICROgram(s) HFA Inhaler 2 Puff(s) Inhalation every 4 hours PRN Shortness of Breath and/or Wheezing  aluminum hydroxide/magnesium hydroxide/simethicone Suspension 30 milliLiter(s) Oral every 4 hours PRN Dyspepsia  benzonatate 100 milliGRAM(s) Oral three times a day PRN Cough  cyclobenzaprine 5 milliGRAM(s) Oral three times a day PRN Muscle Spasm  melatonin 3 milliGRAM(s) Oral at bedtime PRN Insomnia  ondansetron Injectable 4 milliGRAM(s) IV Push every 6 hours PRN Nausea and/or Vomiting  oxyCODONE    IR 5 milliGRAM(s) Oral four times a day PRN Severe Pain (7 - 10)  traMADol 25 milliGRAM(s) Oral four times a day PRN Moderate Pain (4 - 6)      Allergies    No Known Allergies    Intolerances        Vital Signs Last 24 Hrs  T(C): 36.5 (31 Jan 2021 11:30), Max: 36.6 (30 Jan 2021 20:32)  T(F): 97.7 (31 Jan 2021 11:30), Max: 97.8 (30 Jan 2021 20:32)  HR: 78 (31 Jan 2021 11:30) (63 - 79)  BP: 114/61 (31 Jan 2021 11:30) (105/60 - 134/63)  BP(mean): --  RR: 18 (31 Jan 2021 11:30) (18 - 19)  SpO2: 93% (31 Jan 2021 11:30) (90% - 98%)    PHYSICAL EXAM  General: adult in NAD  HEENT: clear oropharynx, anicteric sclera, pink conjunctiva  Neck: supple  CV: normal S1/S2 with no murmur rubs or gallops  Lungs: positive air movement b/l ant lungs,clear to auscultation, no wheezes, no rales  Abdomen: soft non-tender non-distended, no hepatosplenomegaly  Ext: no clubbing cyanosis or edema  Skin: no rashes and no petechiae  Neuro: alert and oriented X 4, no focal deficits  LABS:                          9.4    9.07  )-----------( 305      ( 31 Jan 2021 08:06 )             29.9         Mean Cell Volume : 83.8 fl  Mean Cell Hemoglobin : 26.3 pg  Mean Cell Hemoglobin Concentration : 31.4 gm/dL  Auto Neutrophil # : x  Auto Lymphocyte # : x  Auto Monocyte # : x  Auto Eosinophil # : x  Auto Basophil # : x  Auto Neutrophil % : x  Auto Lymphocyte % : x  Auto Monocyte % : x  Auto Eosinophil % : x  Auto Basophil % : x    Serial CBC's  01-31 @ 08:06  Hct-29.9 / Hgb-9.4 / Plat-305 / RBC-3.57 / WBC-9.07          Serial CBC's  01-30 @ 09:55  Hct-28.4 / Hgb-9.0 / Plat-262 / RBC-3.45 / WBC-8.55          Serial CBC's  01-29 @ 09:07  Hct-27.5 / Hgb-8.6 / Plat-209 / RBC-3.23 / WBC-6.69          Serial CBC's  01-28 @ 16:50  Hct-27.5 / Hgb-8.9 / Plat--- / RBC-3.30 / WBC---          Serial CBC's  01-27 @ 23:16  Hct-28.0 / Hgb-8.7 / Plat-247 / RBC-3.34 / WBC-8.20            01-31    x   |  x   |  x   ----------------------------<  x   x    |  x   |  0.83      TPro  6.6  /  Alb  2.4<L>  /  TBili  0.3  /  DBili  .10  /  AST  48<H>  /  ALT  23  /  AlkPhos  148<H>  01-31          Ferritin, Serum: 229 ng/mL (01-30-21 @ 15:07)  Iron - Total Binding Capacity.: 154 ug/dL (01-28-21 @ 22:55)  Ferritin, Serum: 168 ng/mL (01-28-21 @ 22:45)  Vitamin B12, Serum: 872 pg/mL (01-28-21 @ 22:45)  Folate, Serum: 8.7 ng/mL (01-28-21 @ 22:45)  Reticulocyte Percent: 0.4 % (01-28-21 @ 16:50)  Ferritin, Serum: 161 ng/mL (01-28-21 @ 11:50)  Iron - Total Binding Capacity.: 144 ug/dL (01-28-21 @ 11:47)  Ferritin, Serum: 173 ng/mL (01-28-21 @ 06:22)

## 2021-01-31 NOTE — PROGRESS NOTE ADULT - SUBJECTIVE AND OBJECTIVE BOX
Patient is a 71y old  Female who presents with a chief complaint of sob (31 Jan 2021 13:43)      INTERVAL /OVERNIGHT EVENTS: feels better    MEDICATIONS  (STANDING):  ascorbic acid 500 milliGRAM(s) Oral daily  budesonide 160 MICROgram(s)/formoterol 4.5 MICROgram(s) Inhaler 2 Puff(s) Inhalation two times a day  cholecalciferol 2000 Unit(s) Oral daily  cholestyramine Powder (Sugar-Free) 4 Gram(s) Oral every 24 hours  dexAMETHasone  Injectable 6 milliGRAM(s) IV Push daily  enoxaparin Injectable 40 milliGRAM(s) SubCutaneous daily  famotidine    Tablet 20 milliGRAM(s) Oral two times a day  ferrous    sulfate 325 milliGRAM(s) Oral daily  influenza   Vaccine 0.5 milliLiter(s) IntraMuscular once  lactobacillus acidophilus 1 Tablet(s) Oral daily  lidocaine   Patch 1 Patch Transdermal every 24 hours  loperamide 2 milliGRAM(s) Oral three times a day  multivitamin/minerals 1 Tablet(s) Oral daily  potassium chloride    Tablet ER 40 milliEquivalent(s) Oral every 4 hours  potassium chloride  10 mEq/100 mL IVPB 10 milliEquivalent(s) IV Intermittent every 1 hour  pramipexole 1.5 milliGRAM(s) Oral every 24 hours  remdesivir  IVPB 100 milliGRAM(s) IV Intermittent every 24 hours  remdesivir  IVPB   IV Intermittent   sulfaSALAzine 500 milliGRAM(s) Oral three times a day  torsemide 5 milliGRAM(s) Oral two times a day    MEDICATIONS  (PRN):  acetaminophen   Tablet .. 650 milliGRAM(s) Oral every 6 hours PRN Temp greater or equal to 38C (100.4F), Mild Pain (1 - 3)  ALBUTerol    90 MICROgram(s) HFA Inhaler 2 Puff(s) Inhalation every 4 hours PRN Shortness of Breath and/or Wheezing  aluminum hydroxide/magnesium hydroxide/simethicone Suspension 30 milliLiter(s) Oral every 4 hours PRN Dyspepsia  benzonatate 100 milliGRAM(s) Oral three times a day PRN Cough  cyclobenzaprine 5 milliGRAM(s) Oral three times a day PRN Muscle Spasm  melatonin 3 milliGRAM(s) Oral at bedtime PRN Insomnia  ondansetron Injectable 4 milliGRAM(s) IV Push every 6 hours PRN Nausea and/or Vomiting  oxyCODONE    IR 5 milliGRAM(s) Oral four times a day PRN Severe Pain (7 - 10)  traMADol 25 milliGRAM(s) Oral four times a day PRN Moderate Pain (4 - 6)      Allergies    No Known Allergies    Intolerances        REVIEW OF SYSTEMS:  CONSTITUTIONAL: No fever, weight loss, or fatigue  EYES: No eye pain, visual disturbances, or discharge  ENMT:  No difficulty hearing, tinnitus, vertigo; No sinus or throat pain  NECK: No pain or stiffness  RESPIRATORY: No cough, wheezing, chills or hemoptysis; No shortness of breath  CARDIOVASCULAR: No chest pain, palpitations, dizziness, or leg swelling  GASTROINTESTINAL: No abdominal or epigastric pain. No nausea, vomiting, or hematemesis; No diarrhea or constipation. No melena or hematochezia.  GENITOURINARY: No dysuria, frequency, hematuria, or incontinence  NEUROLOGICAL: No headaches, memory loss, loss of strength, numbness, or tremors  SKIN: No itching, burning, rashes, or lesions   LYMPH NODES: No enlarged glands  ENDOCRINE: No heat or cold intolerance; No hair loss; No polydipsia or polyuria  MUSCULOSKELETAL: No joint pain or swelling; No muscle, back, or extremity pain  PSYCHIATRIC: No depression, anxiety, mood swings, or difficulty sleeping  HEME/LYMPH: No easy bruising, or bleeding gums  ALLERGY AND IMMUNOLOGIC: No hives or eczema    Vital Signs Last 24 Hrs  T(C): 36.5 (31 Jan 2021 11:30), Max: 36.6 (30 Jan 2021 20:32)  T(F): 97.7 (31 Jan 2021 11:30), Max: 97.8 (30 Jan 2021 20:32)  HR: 78 (31 Jan 2021 11:30) (76 - 79)  BP: 114/61 (31 Jan 2021 11:30) (114/61 - 134/63)  BP(mean): --  RR: 18 (31 Jan 2021 11:30) (18 - 19)  SpO2: 93% (31 Jan 2021 11:30) (90% - 98%)    PHYSICAL EXAM:  GENERAL: NAD, well-groomed, well-developed  HEAD:  Atraumatic, Normocephalic  EYES: EOMI, PERRLA, conjunctiva and sclera clear  ENMT: No tonsillar erythema, exudates, or enlargement; Moist mucous membranes, Good dentition, No lesions  NECK: Supple, No JVD, Normal thyroid  NERVOUS SYSTEM:  Alert & Oriented X3, Good concentration; Motor Strength 5/5 B/L upper and lower extremities; DTRs 2+ intact and symmetric  CHEST/LUNG: Clear to auscultation bilaterally; No rales, rhonchi, wheezing, or rubs  HEART: Regular rate and rhythm; No murmurs, rubs, or gallops  ABDOMEN: Soft, Nontender, Nondistended; Bowel sounds present  EXTREMITIES:  2+ Peripheral Pulses, No clubbing, cyanosis, or edema  LYMPH: No lymphadenopathy noted  SKIN: No rashes or lesions    LABS:                        9.4    9.07  )-----------( 305      ( 31 Jan 2021 08:06 )             29.9     31 Jan 2021 13:13    141    |  104    |  14     ----------------------------<  141    2.8     |  27     |  0.85     Ca    7.3        31 Jan 2021 13:13    TPro  6.6    /  Alb  2.4    /  TBili  0.3    /  DBili  .10    /  AST  48     /  ALT  23     /  AlkPhos  148    31 Jan 2021 08:06        CAPILLARY BLOOD GLUCOSE          RADIOLOGY & ADDITIONAL TESTS:    Notes Reviewed:  [x ] YES  [ ] NO    Care Discussed with Consultants/Other Providers [x ] YES  [ ] NO

## 2021-01-31 NOTE — PROGRESS NOTE ADULT - SUBJECTIVE AND OBJECTIVE BOX
INTERVAL HPI/OVERNIGHT EVENTS:  no new gi events         MEDICATIONS  (STANDING):  ascorbic acid 500 milliGRAM(s) Oral daily  budesonide 160 MICROgram(s)/formoterol 4.5 MICROgram(s) Inhaler 2 Puff(s) Inhalation two times a day  cholecalciferol 2000 Unit(s) Oral daily  dexAMETHasone  Injectable 6 milliGRAM(s) IV Push daily  enoxaparin Injectable 40 milliGRAM(s) SubCutaneous daily  famotidine    Tablet 20 milliGRAM(s) Oral two times a day  influenza   Vaccine 0.5 milliLiter(s) IntraMuscular once  lactobacillus acidophilus 1 Tablet(s) Oral daily  lidocaine   Patch 1 Patch Transdermal every 24 hours  multivitamin/minerals 1 Tablet(s) Oral daily  potassium chloride   Powder 20 milliEquivalent(s) Oral four times a day  pramipexole 1.5 milliGRAM(s) Oral every 24 hours  remdesivir  IVPB 100 milliGRAM(s) IV Intermittent every 24 hours  remdesivir  IVPB   IV Intermittent   sulfaSALAzine 500 milliGRAM(s) Oral three times a day  torsemide 5 milliGRAM(s) Oral two times a day    MEDICATIONS  (PRN):  acetaminophen   Tablet .. 650 milliGRAM(s) Oral every 6 hours PRN Temp greater or equal to 38C (100.4F), Mild Pain (1 - 3)  ALBUTerol    90 MICROgram(s) HFA Inhaler 2 Puff(s) Inhalation every 4 hours PRN Shortness of Breath and/or Wheezing  aluminum hydroxide/magnesium hydroxide/simethicone Suspension 30 milliLiter(s) Oral every 4 hours PRN Dyspepsia  benzonatate 100 milliGRAM(s) Oral three times a day PRN Cough  melatonin 3 milliGRAM(s) Oral at bedtime PRN Insomnia  ondansetron Injectable 4 milliGRAM(s) IV Push every 6 hours PRN Nausea and/or Vomiting  oxyCODONE    IR 5 milliGRAM(s) Oral four times a day PRN Severe Pain (7 - 10)  traMADol 25 milliGRAM(s) Oral four times a day PRN Moderate Pain (4 - 6)      Allergies    No Known Allergies    Intolerances        Review of Systems:    tele f/u      Vital Signs Last 24 Hrs  T(C): 36.3 (2021 04:59), Max: 36.8 (2021 21:39)  T(F): 97.4 (2021 04:59), Max: 98.2 (2021 21:39)  HR: 71 (2021 04:59) (60 - 74)  BP: 120/64 (2021 04:59) (96/57 - 120/64)  BP(mean): --  RR: 18 (2021 04:59) (18 - 20)  SpO2: 96% (2021 04:59) (92% - 99%)    PHYSICAL EXAM:    tele f/u      LABS:                        8.6    6.69  )-----------( 209      ( 2021 09:07 )             27.5         141  |  109<H>  |  9   ----------------------------<  160<H>  4.2   |  26  |  0.84    Ca    7.7<L>      2021 09:07  Phos  3.1       Mg     1.9         TPro  5.8<L>  /  Alb  2.1<L>  /  TBili  0.2  /  DBili  <.10  /  AST  59<H>  /  ALT  20  /  AlkPhos  163<H>      PT/INR - ( 2021 23:16 )   PT: 17.6 sec;   INR: 1.54 ratio         PTT - ( 2021 23:16 )  PTT:28.6 sec  Urinalysis Basic - ( 2021 20:10 )    Color: Yellow / Appearance: Clear / S.010 / pH: x  Gluc: x / Ketone: Negative  / Bili: Negative / Urobili: Negative   Blood: x / Protein: 30 mg/dL / Nitrite: Negative   Leuk Esterase: Negative / RBC: 0-2 /HPF / WBC 0-2   Sq Epi: x / Non Sq Epi: Few / Bacteria: Few        RADIOLOGY & ADDITIONAL TESTS:

## 2021-01-31 NOTE — PROGRESS NOTE ADULT - ASSESSMENT
This is a71 y/o F with HLD, syncope, frequent falls, OA, s/p B/l knee replacement and right hip replacement, and back surgery presented to the ED after multiple falls and weakness, found to have COVID Pna and pancolitis.    Syncope, Falls  - Patient has known syncope and multiple falls.  Follows with Dr. Akers  - Her B/L knee pain is likely contributory to her frequent falls.  Recommend pain contole  - For her syncope, per Dr. Akers's note, who she had seen in 9/2020, was thought to be from dehydration and CVA.  However, CVA w/u was negative  - She was recently in [place] where she was discharged with a 2-week cardiac monitor.  However, she denies having been informed of the result.  She is unable to recall name of Cardiologist in [place] and is refusing to go back to him  - She had an outpatient CUS 2/2020) in our office which showed no hemodynamic significant stenosis  - She had a TTE in 1/2020 showing normal LVF, EF 60% with bicuspid AV, mild AS.  Can repeat routinely  - NSR on tele with no arrhythmias noted  - She will need an ILR to be arranged as outpatient  - Fall precaution  - Obtain orthostaics if able  - Continue to encourage PO fluid intake    Covid Pna  - Tolerating RA  - Supplemental O2 as needed  - Supportive care  - Initiate incentive spirometer  - Follow PUlm and ID recs    DVT ppx  - Per Primary    Further cardiac w/u pending clinical course  Will continue to follow    Jeanie Cardona DNP, NP-C  Cardiology  Spectra #[phone]

## 2021-01-31 NOTE — PROGRESS NOTE ADULT - SUBJECTIVE AND OBJECTIVE BOX
Neurology Follow up note    VERONICA [id number]    HPI:  · HPI Objective Statement: 72yo female who presents with multiple falls and feeling weak today. pt c/o diffuse pain and feeling weak, no vomiting, no diarrhea, no fever, chills, no cough or sob, denies sick contacts at home  In ER patient was found to have COVID PNA with possible colitis.  patient is being admitted for further care and management (28 Jan 2021 10:42)      Interval History - c/o leg pain    Patient is seen, chart was reviewed and case was discussed with the treatment team.  Pt is not in any distress.   Lying on bed comfortably.   .    Vital Signs Last 24 Hrs  T(C): 36.5 (31 Jan 2021 11:30), Max: 36.6 (30 Jan 2021 20:32)  T(F): 97.7 (31 Jan 2021 11:30), Max: 97.8 (30 Jan 2021 20:32)  HR: 78 (31 Jan 2021 11:30) (76 - 79)  BP: 114/61 (31 Jan 2021 11:30) (114/61 - 134/63)  BP(mean): --  RR: 18 (31 Jan 2021 11:30) (18 - 19)  SpO2: 93% (31 Jan 2021 11:30) (90% - 98%)        REVIEW OF SYSTEMS:    Constitutional: No fever,   Eyes: No eye pain, visual disturbances, or discharge  ENT:  No difficulty hearing, tinnitus, vertigo;  Neck: No pain or stiffness  Respiratory: No , wheezing, chills or hemoptysis  Cardiovascular: No chest pain, palpitations, shortness of breath,   Gastrointestinal: No abdominal or epigastric pain. No nausea, vomiting   Genitourinary: No dysuria, , hematuria or incontinence  Neurological: No headaches,   Psychiatric: No , anxiety, mood swings or difficulty sleeping  Musculoskeletal: No joint pain or swelling;   Skin: No itching, burning, rashes or lesions   Lymph Nodes: No enlarged glands  Endocrine: No heat or cold intolerance  Allergy and Immunologic: No hives or eczema    On Neurological Examination:    Mental Status - Pt is alert, awake, oriented X3.. Follows commands well and able to answer questions appropriately  .Mood and affect  normal    Speech -  Normal    Cranial Nerves - Pupils 3 mm equal and reactive to light, extraocular eye movements intact. Pt has no visual field deficit.  Pt has no  facial asymmetry. Facial sensation is intact.Tongue - is in midline.    Muscle tone - is normal all over. Moves all extremities equally. No asymmetry is seen.      Motor Exam - 4/5 all over,   No drift. No shaking or tremors.    Sensory Exam - Pt withdraws all extremities equally on stimulation. No asymmetry seen. No complaints of tingling, numbness.        coordination:    Finger to nose: normal        Deep tendon Reflexes - 2 plus all over.       Neck Supple -  Yes.     MEDICATIONS  (STANDING):  ascorbic acid 500 milliGRAM(s) Oral daily  budesonide 160 MICROgram(s)/formoterol 4.5 MICROgram(s) Inhaler 2 Puff(s) Inhalation two times a day  cholecalciferol 2000 Unit(s) Oral daily  dexAMETHasone  Injectable 6 milliGRAM(s) IV Push daily  enoxaparin Injectable 40 milliGRAM(s) SubCutaneous daily  famotidine    Tablet 20 milliGRAM(s) Oral two times a day  ferrous    sulfate 325 milliGRAM(s) Oral daily  influenza   Vaccine 0.5 milliLiter(s) IntraMuscular once  lactobacillus acidophilus 1 Tablet(s) Oral daily  lidocaine   Patch 1 Patch Transdermal every 24 hours  loperamide 2 milliGRAM(s) Oral three times a day  multivitamin/minerals 1 Tablet(s) Oral daily  pramipexole 1.5 milliGRAM(s) Oral every 24 hours  remdesivir  IVPB 100 milliGRAM(s) IV Intermittent every 24 hours  remdesivir  IVPB   IV Intermittent   sulfaSALAzine 500 milliGRAM(s) Oral three times a day  torsemide 5 milliGRAM(s) Oral two times a day    MEDICATIONS  (PRN):  acetaminophen   Tablet .. 650 milliGRAM(s) Oral every 6 hours PRN Temp greater or equal to 38C (100.4F), Mild Pain (1 - 3)  ALBUTerol    90 MICROgram(s) HFA Inhaler 2 Puff(s) Inhalation every 4 hours PRN Shortness of Breath and/or Wheezing  aluminum hydroxide/magnesium hydroxide/simethicone Suspension 30 milliLiter(s) Oral every 4 hours PRN Dyspepsia  benzonatate 100 milliGRAM(s) Oral three times a day PRN Cough  cyclobenzaprine 5 milliGRAM(s) Oral three times a day PRN Muscle Spasm  melatonin 3 milliGRAM(s) Oral at bedtime PRN Insomnia  ondansetron Injectable 4 milliGRAM(s) IV Push every 6 hours PRN Nausea and/or Vomiting  oxyCODONE    IR 5 milliGRAM(s) Oral four times a day PRN Severe Pain (7 - 10)  traMADol 25 milliGRAM(s) Oral four times a day PRN Moderate Pain (4 - 6)        Allergies    No Known Allergies    Intolerances        MEDICATIONS  (STANDING):  ascorbic acid 500 milliGRAM(s) Oral daily  budesonide 160 MICROgram(s)/formoterol 4.5 MICROgram(s) Inhaler 2 Puff(s) Inhalation two times a day  cholecalciferol 2000 Unit(s) Oral daily  dexAMETHasone  Injectable 6 milliGRAM(s) IV Push daily  enoxaparin Injectable 40 milliGRAM(s) SubCutaneous daily  famotidine    Tablet 20 milliGRAM(s) Oral two times a day  ferrous    sulfate 325 milliGRAM(s) Oral daily  influenza   Vaccine 0.5 milliLiter(s) IntraMuscular once  lactobacillus acidophilus 1 Tablet(s) Oral daily  lidocaine   Patch 1 Patch Transdermal every 24 hours  loperamide 2 milliGRAM(s) Oral three times a day  multivitamin/minerals 1 Tablet(s) Oral daily  pramipexole 1.5 milliGRAM(s) Oral every 24 hours  remdesivir  IVPB 100 milliGRAM(s) IV Intermittent every 24 hours  remdesivir  IVPB   IV Intermittent   sulfaSALAzine 500 milliGRAM(s) Oral three times a day  torsemide 5 milliGRAM(s) Oral two times a day    MEDICATIONS  (PRN):  acetaminophen   Tablet .. 650 milliGRAM(s) Oral every 6 hours PRN Temp greater or equal to 38C (100.4F), Mild Pain (1 - 3)  ALBUTerol    90 MICROgram(s) HFA Inhaler 2 Puff(s) Inhalation every 4 hours PRN Shortness of Breath and/or Wheezing  aluminum hydroxide/magnesium hydroxide/simethicone Suspension 30 milliLiter(s) Oral every 4 hours PRN Dyspepsia  benzonatate 100 milliGRAM(s) Oral three times a day PRN Cough  cyclobenzaprine 5 milliGRAM(s) Oral three times a day PRN Muscle Spasm  melatonin 3 milliGRAM(s) Oral at bedtime PRN Insomnia  ondansetron Injectable 4 milliGRAM(s) IV Push every 6 hours PRN Nausea and/or Vomiting  oxyCODONE    IR 5 milliGRAM(s) Oral four times a day PRN Severe Pain (7 - 10)  traMADol 25 milliGRAM(s) Oral four times a day PRN Moderate Pain (4 - 6)        01-31    x   |  x   |  x   ----------------------------<  x   x    |  x   |  0.83      TPro  6.6  /  Alb  2.4<L>  /  TBili  0.3  /  DBili  .10  /  AST  48<H>  /  ALT  23  /  AlkPhos  148<H>  01-31                        9.4    9.07  )-----------( 305      ( 31 Jan 2021 08:06 )             29.9       Hemoglobin A1C:     Vitamin B12     RADIOLOGY    ASSESSMENT AND PLAN:      seen for fall  orthostatic hypotension  RLS  LBP  COVID -19    CONTINUE MIRAPEX  TRAMDOL PRN FOR PAIN  Physical therapy evaluation  OOB to chair/ambulation with assistance only.  Pain is accessed and addressed.  Would continue to follow.

## 2021-01-31 NOTE — PROGRESS NOTE ADULT - SUBJECTIVE AND OBJECTIVE BOX
PULMONARY/CRITICAL CARE    DOS 21  No complaints. Was OOB.   Feels less sob. No fever.     Patient is a 71y old  Female who presents with a chief complaint of sob (2021 17:48)    BRIEF HOSPITAL COURSE: ***72yo woman with PMH of OA, HDL, and bladder disorder was admitted with multiple falls and feeling weakness. She didn't have SOB, cough, vomiting, no diarrhea, no fever, chills, No sick contacts.  She tested positive for COVID so was admitted for treatment. She has been started on remdesivir and Dexamethasone.       Events last 24 hours: ***    PAST MEDICAL & SURGICAL HISTORY:  Obesity (BMI 30-39.9)    Traumatic arthropathy involving lower leg  right    History of Osteoarthritis    Hyperlipidemia    Restless Leg Syndrome    H/O neck surgery    H/O arthroscopy of right knee      Bladder Disorder  Bladder lift     History of Colonoscopy    Ex lap in  for abscess in the baldder    History of Hysterectomy and bladder lift in       Allergies    No Known Allergies    Intolerances      FAMILY HISTORY: Smoked 1ppd for 25 yrs until 2017 no etoh  No pertinent family history in first degree relatives          Medications:  remdesivir  IVPB 100 milliGRAM(s) IV Intermittent every 24 hours  remdesivir  IVPB   IV Intermittent     torsemide 5 milliGRAM(s) Oral two times a day    ALBUTerol    90 MICROgram(s) HFA Inhaler 2 Puff(s) Inhalation every 4 hours PRN  benzonatate 100 milliGRAM(s) Oral three times a day PRN  budesonide 160 MICROgram(s)/formoterol 4.5 MICROgram(s) Inhaler 2 Puff(s) Inhalation two times a day    acetaminophen   Tablet .. 650 milliGRAM(s) Oral every 6 hours PRN  melatonin 3 milliGRAM(s) Oral at bedtime PRN  ondansetron Injectable 4 milliGRAM(s) IV Push every 6 hours PRN  oxyCODONE    IR 5 milliGRAM(s) Oral four times a day PRN  pramipexole 1.5 milliGRAM(s) Oral every 24 hours  traMADol 25 milliGRAM(s) Oral four times a day PRN      enoxaparin Injectable 40 milliGRAM(s) SubCutaneous daily    aluminum hydroxide/magnesium hydroxide/simethicone Suspension 30 milliLiter(s) Oral every 4 hours PRN  famotidine    Tablet 20 milliGRAM(s) Oral two times a day  sulfaSALAzine 500 milliGRAM(s) Oral three times a day      dexAMETHasone  Injectable 6 milliGRAM(s) IV Push daily    ascorbic acid 500 milliGRAM(s) Oral daily  cholecalciferol 2000 Unit(s) Oral daily  multivitamin/minerals 1 Tablet(s) Oral daily  potassium chloride   Powder 20 milliEquivalent(s) Oral four times a day    influenza   Vaccine 0.5 milliLiter(s) IntraMuscular once    lidocaine   Patch 1 Patch Transdermal every 24 hours    lactobacillus acidophilus 1 Tablet(s) Oral daily          ICU Vital Signs Last 24 Hrs  T(C): 36.3 (2021 04:59), Max: 36.8 (2021 21:39)  T(F): 97.4 (2021 04:59), Max: 98.2 (2021 21:39)  HR: 71 (2021 04:59) (60 - 76)  BP: 120/64 (2021 04:59) (96/57 - 120/64)  BP(mean): --  ABP: --  ABP(mean): --  RR: 18 (2021 04:59) (18 - 20)  SpO2: 96% (2021 04:59) (92% - 99%)    Vital Signs Last 24 Hrs  T(C): 36.3 (2021 04:59), Max: 36.8 (2021 21:39)  T(F): 97.4 (2021 04:59), Max: 98.2 (2021 21:39)  HR: 71 (2021 04:59) (60 - 76)  BP: 120/64 (2021 04:59) (96/57 - 120/64)  BP(mean): --  RR: 18 (2021 04:59) (18 - 20)  SpO2: 96% (2021 04:59) (92% - 99%)        I&O's Detail    2021 07:01  -  2021 07:00  --------------------------------------------------------  IN:    IV PiggyBack: 350 mL  Total IN: 350 mL    OUT:    Voided (mL): 650 mL  Total OUT: 650 mL    Total NET: -300 mL            LABS:                        8.9    x     )-----------( x        ( 2021 16:50 )             27.5         139  |  106  |  6<L>  ----------------------------<  86  2.9<LL>   |  27  |  0.71    Ca    7.3<L>      2021 08:03  Phos  3.1       Mg     1.9         TPro  5.8<L>  /  Alb  2.1<L>  /  TBili  0.4  /  DBili  .20  /  AST  70<H>  /  ALT  19  /  AlkPhos  130<H>        CARDIAC MARKERS ( 2021 08:03 )  x     / x     / 556 U/L / x     / x          CAPILLARY BLOOD GLUCOSE        PT/INR - ( 2021 23:16 )   PT: 17.6 sec;   INR: 1.54 ratio         PTT - ( 2021 23:16 )  PTT:28.6 sec  Urinalysis Basic - ( 2021 20:10 )    Color: Yellow / Appearance: Clear / S.010 / pH: x  Gluc: x / Ketone: Negative  / Bili: Negative / Urobili: Negative   Blood: x / Protein: 30 mg/dL / Nitrite: Negative   Leuk Esterase: Negative / RBC: 0-2 /HPF / WBC 0-2   Sq Epi: x / Non Sq Epi: Few / Bacteria: Few      CULTURES:  Culture Results:   No growth to date. ( @ 05:12)  Culture Results:   No growth to date. ( @ 05:12)      Physical Examination:    General: No acute distress.  elderly female    HEENT: Pupils equal, reactive to light.  Symmetric.    PULM: bibas crackles good excurion on wheeze     CVS: Regular rate and rhythm, no murmurs, rubs, or gallops    ABD: Soft, nondistended, nontender, normoactive bowel sounds, no masses    EXT: brawny leg  edema, nontender    SKIN: Warm and well perfused, no rashes noted.    NEURO: Alert, nteractive, nonfocal    RADIOLOGY: ***    d< from: CT Chest No Cont (21 @ 00:10) >  EXAM:  CT CHEST                            PROCEDURE DATE:  2021          INTERPRETATION:  CLINICAL INFORMATION: Chest trauma.  Status post fall.    COMPARISON: 2020.    PROCEDURE:  CT of the Chest was performed without intravenous contrast.  Sagittal and coronal reformats were performed.    FINDINGS:    LUNGS AND AIRWAYS: There is moderate respiratory motion artifact the central airways are grossly clear within limits of motion degradation.  There is mild to moderate centrilobular emphysema.  There are nonspecific groundglass opacities in the anterior segments of both upper lobes measuring up to approximately 1.9 x 1.9 cm on the right (3:226) and 1.4 x 1.1 cm on the left (3:217), which are new since 2020.  There is dense consolidation in the right middle lobe with air bronchograms and bronchiectasis, in a region of lung that previously showed patchy groundglass nodular opacities on 2020.  Again seen is patchy groundglass opacity in the basilar segments of the right greater than left lower lobes which appears stable to mildly progressed from 2020.  PLEURA: No pleural effusion.  MEDIASTINUM AND MARYBEL: No lymphadenopathy.  VESSELS: Mild atherosclerotic calcification thoracic aorta and arch vessels.  HEART:Cardiomegaly.  Calcifications involving sinuses of Valsalva and aortic valve leaflets.  Moderate atherosclerotic calcification the coronary arteries. No pericardial effusion.  CHEST WALL AND LOWER NECK: Within normal limits.  VISUALIZED UPPER ABDOMEN: Enlarged spleen measures up to 13.9 cm, increased in size from approximately 11.8 cm on 2020.  BONES: The patient is status post C2-T7 posterior spinal fusion with shamar screw construct.  Within the thoracic spine there are pedicle screws at T1 bilaterally, T2 bilaterally, T3 on the right, T4 on the left, T5 bilaterally, T6 bilaterally and T7 bilaterally.  There is also a screw coursing through the base of the T3 spinous process and right T3 lamina.  Multiple pedicle screws are slightly lateral in position and course into the costovertebral vertebral junctions.  There is no evidence of hardware fracture or abnormal lucency around the pedicle screws.  There are old/healing fractures of the left third, sixth, seventh and eighth ribs with external callus formation.    IMPRESSION:  1.  No evidence of acute traumatic injury in the chest.    2.  Patchy groundglass opacities in the anterior segments of both upper lobes are new from 2020.  These may reflect nonspecific foci of infection, inflammation, edema or hemorrhage.    2.  Dense consolidation in the right middle lobe with air bronchograms and bronchiectasis is an region of lung that previously showed patchy groundglass nodular opacities on 2020.  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    3.  There is persistent groundglass opacity in the basilar segments of both lower lobes, right greater than left, which appears stable to mildly progressed from 2020.  This may reflect areas of infection/inflammation, edema, atelectasis or interstitial lung disease.    4.  Splenomegaly, which appears new from 2020.  The spleen measures 13.9 cm, increased in size from 11.8 cm on 2020.    5.  Follow-up chest CT is recommended in 1-3 months to reassess the findings and exclude continued disease progression.    < end of copied text >  < from: Xray Chest 1 View- PORTABLE-Routine (Xray Chest 1 View- PORTABLE-Routine in AM.) (21 @ 07:57) >  EXAM:  XR CHEST PORTABLE ROUTINE 1V                            PROCEDURE DATE:  2021          INTERPRETATION:  CLINICAL HISTORY: 71-year-old with Covid pneumonia..    TECHNIQUE: A single AP radiograph of the chest was reviewed..    COMPARISON: Chest radiograph from 2021.    FINDINGS/  IMPRESSION:    Tubes/lines: Multiple radiopaque densities project over the left chest. Cervicothoracic spine fixation hardware.    Lungs: Bibasilar atelectasis. Small airspace opacity left midlung consistent with pneumonia..    Pleura: No pneumothorax or pleural effusions.    Cardiomediastinal silhouette: Within normal limits.            SARWAT BURDEN MD; Attending Radiologist  This document has been electronically signed. 2021  3:43PM    < end of copied text >  CXR bilat infil

## 2021-02-01 LAB
4/8 RATIO: 5.25 RATIO — HIGH (ref 0.86–4.14)
ABS CD8: 177 /UL — SIGNIFICANT CHANGE UP (ref 90–775)
ALBUMIN SERPL ELPH-MCNC: 2.3 G/DL — LOW (ref 3.3–5)
ALP SERPL-CCNC: 119 U/L — SIGNIFICANT CHANGE UP (ref 40–120)
ALT FLD-CCNC: 16 U/L — SIGNIFICANT CHANGE UP (ref 12–78)
ANION GAP SERPL CALC-SCNC: 9 MMOL/L — SIGNIFICANT CHANGE UP (ref 5–17)
AST SERPL-CCNC: 30 U/L — SIGNIFICANT CHANGE UP (ref 15–37)
BASOPHILS # BLD AUTO: 0 K/UL — SIGNIFICANT CHANGE UP (ref 0–0.2)
BASOPHILS NFR BLD AUTO: 0 % — SIGNIFICANT CHANGE UP (ref 0–2)
BILIRUB DIRECT SERPL-MCNC: <.1 MG/DL — SIGNIFICANT CHANGE UP (ref 0.05–0.2)
BILIRUB INDIRECT FLD-MCNC: >0.2 MG/DL — SIGNIFICANT CHANGE UP (ref 0.2–1)
BILIRUB SERPL-MCNC: 0.3 MG/DL — SIGNIFICANT CHANGE UP (ref 0.2–1.2)
BUN SERPL-MCNC: 11 MG/DL — SIGNIFICANT CHANGE UP (ref 7–23)
CALCIUM SERPL-MCNC: 7.4 MG/DL — LOW (ref 8.5–10.1)
CD3 BLASTS SPEC-ACNC: 1118 /UL — SIGNIFICANT CHANGE UP (ref 396–2024)
CD3 BLASTS SPEC-ACNC: 80 % — SIGNIFICANT CHANGE UP (ref 58–84)
CD4 %: 67 % — HIGH (ref 30–56)
CD8 %: 13 % — SIGNIFICANT CHANGE UP (ref 11–43)
CHLORIDE SERPL-SCNC: 109 MMOL/L — HIGH (ref 96–108)
CO2 SERPL-SCNC: 26 MMOL/L — SIGNIFICANT CHANGE UP (ref 22–31)
CREAT SERPL-MCNC: 0.8 MG/DL — SIGNIFICANT CHANGE UP (ref 0.5–1.3)
EOSINOPHIL # BLD AUTO: 0.01 K/UL — SIGNIFICANT CHANGE UP (ref 0–0.5)
EOSINOPHIL NFR BLD AUTO: 0.1 % — SIGNIFICANT CHANGE UP (ref 0–6)
GLUCOSE SERPL-MCNC: 100 MG/DL — HIGH (ref 70–99)
HCT VFR BLD CALC: 28.6 % — LOW (ref 34.5–45)
HGB BLD-MCNC: 9.1 G/DL — LOW (ref 11.5–15.5)
IMM GRANULOCYTES NFR BLD AUTO: 0.6 % — SIGNIFICANT CHANGE UP (ref 0–1.5)
LYMPHOCYTES # BLD AUTO: 1.09 K/UL — SIGNIFICANT CHANGE UP (ref 1–3.3)
LYMPHOCYTES # BLD AUTO: 13.4 % — SIGNIFICANT CHANGE UP (ref 13–44)
MCHC RBC-ENTMCNC: 26.5 PG — LOW (ref 27–34)
MCHC RBC-ENTMCNC: 31.8 GM/DL — LOW (ref 32–36)
MCV RBC AUTO: 83.4 FL — SIGNIFICANT CHANGE UP (ref 80–100)
MONOCYTES # BLD AUTO: 0.37 K/UL — SIGNIFICANT CHANGE UP (ref 0–0.9)
MONOCYTES NFR BLD AUTO: 4.5 % — SIGNIFICANT CHANGE UP (ref 2–14)
NEUTROPHILS # BLD AUTO: 6.63 K/UL — SIGNIFICANT CHANGE UP (ref 1.8–7.4)
NEUTROPHILS NFR BLD AUTO: 81.4 % — HIGH (ref 43–77)
NRBC # BLD: 0 /100 WBCS — SIGNIFICANT CHANGE UP (ref 0–0)
OB PNL STL: NEGATIVE — SIGNIFICANT CHANGE UP
PLATELET # BLD AUTO: 307 K/UL — SIGNIFICANT CHANGE UP (ref 150–400)
POTASSIUM SERPL-MCNC: 3.5 MMOL/L — SIGNIFICANT CHANGE UP (ref 3.5–5.3)
POTASSIUM SERPL-SCNC: 3.5 MMOL/L — SIGNIFICANT CHANGE UP (ref 3.5–5.3)
PROT SERPL-MCNC: 6 G/DL — SIGNIFICANT CHANGE UP (ref 6–8.3)
RBC # BLD: 3.43 M/UL — LOW (ref 3.8–5.2)
RBC # FLD: 17.4 % — HIGH (ref 10.3–14.5)
SODIUM SERPL-SCNC: 144 MMOL/L — SIGNIFICANT CHANGE UP (ref 135–145)
T-CELL CD4 SUBSET PNL BLD: 931 /UL — SIGNIFICANT CHANGE UP (ref 325–1251)
WBC # BLD: 8.15 K/UL — SIGNIFICANT CHANGE UP (ref 3.8–10.5)
WBC # FLD AUTO: 8.15 K/UL — SIGNIFICANT CHANGE UP (ref 3.8–10.5)

## 2021-02-01 PROCEDURE — 99232 SBSQ HOSP IP/OBS MODERATE 35: CPT | Mod: CS

## 2021-02-01 PROCEDURE — 72170 X-RAY EXAM OF PELVIS: CPT | Mod: 26

## 2021-02-01 PROCEDURE — 99232 SBSQ HOSP IP/OBS MODERATE 35: CPT

## 2021-02-01 RX ORDER — POTASSIUM CHLORIDE 20 MEQ
10 PACKET (EA) ORAL DAILY
Refills: 0 | Status: DISCONTINUED | OUTPATIENT
Start: 2021-02-01 | End: 2021-02-02

## 2021-02-01 RX ORDER — DEXAMETHASONE 0.5 MG/5ML
6 ELIXIR ORAL DAILY
Refills: 0 | Status: DISCONTINUED | OUTPATIENT
Start: 2021-02-01 | End: 2021-02-08

## 2021-02-01 RX ORDER — LACTOBACILLUS ACIDOPHILUS 100MM CELL
1 CAPSULE ORAL THREE TIMES A DAY
Refills: 0 | Status: DISCONTINUED | OUTPATIENT
Start: 2021-02-01 | End: 2021-02-08

## 2021-02-01 RX ORDER — LOPERAMIDE HCL 2 MG
2 TABLET ORAL THREE TIMES A DAY
Refills: 0 | Status: DISCONTINUED | OUTPATIENT
Start: 2021-02-01 | End: 2021-02-01

## 2021-02-01 RX ORDER — VANCOMYCIN HCL 1 G
125 VIAL (EA) INTRAVENOUS EVERY 6 HOURS
Refills: 0 | Status: DISCONTINUED | OUTPATIENT
Start: 2021-02-01 | End: 2021-02-03

## 2021-02-01 RX ADMIN — LIDOCAINE 1 PATCH: 4 CREAM TOPICAL at 21:10

## 2021-02-01 RX ADMIN — FAMOTIDINE 20 MILLIGRAM(S): 10 INJECTION INTRAVENOUS at 06:11

## 2021-02-01 RX ADMIN — BUDESONIDE AND FORMOTEROL FUMARATE DIHYDRATE 2 PUFF(S): 160; 4.5 AEROSOL RESPIRATORY (INHALATION) at 17:39

## 2021-02-01 RX ADMIN — Medication 500 MILLIGRAM(S): at 21:09

## 2021-02-01 RX ADMIN — LIDOCAINE 1 PATCH: 4 CREAM TOPICAL at 06:13

## 2021-02-01 RX ADMIN — Medication 500 MILLIGRAM(S): at 14:11

## 2021-02-01 RX ADMIN — BUDESONIDE AND FORMOTEROL FUMARATE DIHYDRATE 2 PUFF(S): 160; 4.5 AEROSOL RESPIRATORY (INHALATION) at 06:12

## 2021-02-01 RX ADMIN — CHOLESTYRAMINE 4 GRAM(S): 4 POWDER, FOR SUSPENSION ORAL at 15:15

## 2021-02-01 RX ADMIN — ENOXAPARIN SODIUM 40 MILLIGRAM(S): 100 INJECTION SUBCUTANEOUS at 11:46

## 2021-02-01 RX ADMIN — Medication 5 MILLIGRAM(S): at 06:10

## 2021-02-01 RX ADMIN — Medication 6 MILLIGRAM(S): at 06:10

## 2021-02-01 RX ADMIN — OXYCODONE HYDROCHLORIDE 5 MILLIGRAM(S): 5 TABLET ORAL at 11:45

## 2021-02-01 RX ADMIN — FAMOTIDINE 20 MILLIGRAM(S): 10 INJECTION INTRAVENOUS at 17:10

## 2021-02-01 RX ADMIN — Medication 2000 UNIT(S): at 11:46

## 2021-02-01 RX ADMIN — Medication 1 TABLET(S): at 11:46

## 2021-02-01 RX ADMIN — Medication 1 TABLET(S): at 21:09

## 2021-02-01 RX ADMIN — Medication 5 MILLIGRAM(S): at 17:10

## 2021-02-01 RX ADMIN — Medication 10 MILLIEQUIVALENT(S): at 14:11

## 2021-02-01 RX ADMIN — PRAMIPEXOLE DIHYDROCHLORIDE 1.5 MILLIGRAM(S): 0.12 TABLET ORAL at 17:10

## 2021-02-01 RX ADMIN — Medication 1 TABLET(S): at 11:47

## 2021-02-01 RX ADMIN — Medication 500 MILLIGRAM(S): at 06:11

## 2021-02-01 RX ADMIN — OXYCODONE HYDROCHLORIDE 5 MILLIGRAM(S): 5 TABLET ORAL at 22:34

## 2021-02-01 RX ADMIN — LIDOCAINE 1 PATCH: 4 CREAM TOPICAL at 17:11

## 2021-02-01 RX ADMIN — Medication 325 MILLIGRAM(S): at 11:46

## 2021-02-01 RX ADMIN — Medication 2 MILLIGRAM(S): at 14:11

## 2021-02-01 RX ADMIN — REMDESIVIR 500 MILLIGRAM(S): 5 INJECTION INTRAVENOUS at 11:47

## 2021-02-01 RX ADMIN — Medication 500 MILLIGRAM(S): at 11:46

## 2021-02-01 NOTE — PROGRESS NOTE ADULT - ASSESSMENT
70 y/o F with HLD, syncope, frequent falls, OA, s/p B/l knee replacement and right hip replacement, and back surgery presented to the ED after multiple falls and weakness, found to have COVID Pna and pancolitis.    Syncope, Falls  - Patient has known syncope and multiple falls. Her B/L knee pain is likely contributory to her frequent falls.  Recommend pain control  - For her syncope, per Dr. Akers's note, who she had seen in 9/2020, was thought to be from dehydration and CVA.  However, CVA w/u was negative  - She was recently in [place] where she was discharged with a 2-week cardiac monitor.  However, she denies having been informed of the result.  She is unable to recall name of Cardiologist in [place] and is refusing to go back to him  - She had an outpatient CUS 2/2020) in our office which showed no hemodynamic significant stenosis  - She had a TTE in 1/2020 showing normal LVF, EF 60% with bicuspid AV, mild AS.    - Repeat TTE inpatient showed normal LV & RV size and function EF 55-60%, trace MR and TR, mild AS  - NSR on tele with 4 beats NSVT  - She will need an ILR to be arranged as outpatient  - Fall precaution  - Obtain orthostatics if able  - Continue to encourage PO fluid intake    Covid Pna  - Tolerating RA  - Supplemental O2 as needed  - Supportive care  - Initiate incentive spirometer  - Follow Pulm and ID recs    DVT ppx  - Per Primary    - Monitor and replete lytes, keep K>4, Mg>2.  - All other medical needs as per primary team.  - Other cardiovascular workup will depend on clinical course.  - Will continue to follow.    Levi Silverman, MS FNP, AGAP  Nurse Practitioner- Cardiology   Spectra #[phone]

## 2021-02-01 NOTE — OCCUPATIONAL THERAPY INITIAL EVALUATION ADULT - GENERAL OBSERVATIONS, REHAB EVAL
Patient found supine in bed with +IV lock and +telemonitor reporting that she had a loose bm and needs to be cleaned.

## 2021-02-01 NOTE — PROGRESS NOTE ADULT - SUBJECTIVE AND OBJECTIVE BOX
INTERVAL HPI/OVERNIGHT EVENTS:  chart reviewed, on ra  had loose non bloody stool x4 overnight  sp imodium yesterday    MEDICATIONS  (STANDING):  ascorbic acid 500 milliGRAM(s) Oral daily  budesonide 160 MICROgram(s)/formoterol 4.5 MICROgram(s) Inhaler 2 Puff(s) Inhalation two times a day  cholecalciferol 2000 Unit(s) Oral daily  cholestyramine Powder (Sugar-Free) 4 Gram(s) Oral every 24 hours  dexAMETHasone  Injectable 6 milliGRAM(s) IV Push daily  enoxaparin Injectable 40 milliGRAM(s) SubCutaneous daily  famotidine    Tablet 20 milliGRAM(s) Oral two times a day  ferrous    sulfate 325 milliGRAM(s) Oral daily  influenza   Vaccine 0.5 milliLiter(s) IntraMuscular once  lactobacillus acidophilus 1 Tablet(s) Oral daily  lidocaine   Patch 1 Patch Transdermal every 24 hours  multivitamin/minerals 1 Tablet(s) Oral daily  pramipexole 1.5 milliGRAM(s) Oral every 24 hours  sulfaSALAzine 500 milliGRAM(s) Oral three times a day  torsemide 5 milliGRAM(s) Oral two times a day    MEDICATIONS  (PRN):  acetaminophen   Tablet .. 650 milliGRAM(s) Oral every 6 hours PRN Temp greater or equal to 38C (100.4F), Mild Pain (1 - 3)  ALBUTerol    90 MICROgram(s) HFA Inhaler 2 Puff(s) Inhalation every 4 hours PRN Shortness of Breath and/or Wheezing  aluminum hydroxide/magnesium hydroxide/simethicone Suspension 30 milliLiter(s) Oral every 4 hours PRN Dyspepsia  benzonatate 100 milliGRAM(s) Oral three times a day PRN Cough  cyclobenzaprine 5 milliGRAM(s) Oral three times a day PRN Muscle Spasm  melatonin 3 milliGRAM(s) Oral at bedtime PRN Insomnia  ondansetron Injectable 4 milliGRAM(s) IV Push every 6 hours PRN Nausea and/or Vomiting  oxyCODONE    IR 5 milliGRAM(s) Oral four times a day PRN Severe Pain (7 - 10)  traMADol 25 milliGRAM(s) Oral four times a day PRN Moderate Pain (4 - 6)      Allergies    No Known Allergies    Intolerances        Review of Systems:  tele f/u    Vital Signs Last 24 Hrs  T(C): 36.3 (01 Feb 2021 05:24), Max: 36.5 (31 Jan 2021 20:27)  T(F): 97.4 (01 Feb 2021 05:24), Max: 97.7 (31 Jan 2021 20:27)  HR: 75 (01 Feb 2021 05:24) (74 - 75)  BP: 134/71 (01 Feb 2021 05:24) (122/66 - 134/71)  BP(mean): --  RR: 17 (01 Feb 2021 05:24) (17 - 18)  SpO2: 92% (01 Feb 2021 05:24) (91% - 92%)    PHYSICAL EXAM:    tele f/u  LABS:                        9.1    8.15  )-----------( 307      ( 01 Feb 2021 09:59 )             28.6     02-01    144  |  109<H>  |  11  ----------------------------<  100<H>  3.5   |  26  |  0.80    Ca    7.4<L>      01 Feb 2021 09:59    TPro  6.0  /  Alb  2.3<L>  /  TBili  0.3  /  DBili  <.10  /  AST  30  /  ALT  16  /  AlkPhos  119  02-01          RADIOLOGY & ADDITIONAL TESTS:

## 2021-02-01 NOTE — PROGRESS NOTE ADULT - SUBJECTIVE AND OBJECTIVE BOX
Patient is a 71y old  Female who presents with a chief complaint of sob (01 Feb 2021 13:44)      INTERVAL /OVERNIGHT EVENTS: now with more diarhea    MEDICATIONS  (STANDING):  ascorbic acid 500 milliGRAM(s) Oral daily  budesonide 160 MICROgram(s)/formoterol 4.5 MICROgram(s) Inhaler 2 Puff(s) Inhalation two times a day  cholecalciferol 2000 Unit(s) Oral daily  cholestyramine Powder (Sugar-Free) 4 Gram(s) Oral every 24 hours  dexAMETHasone     Tablet 6 milliGRAM(s) Oral daily  enoxaparin Injectable 40 milliGRAM(s) SubCutaneous daily  famotidine    Tablet 20 milliGRAM(s) Oral two times a day  ferrous    sulfate 325 milliGRAM(s) Oral daily  influenza   Vaccine 0.5 milliLiter(s) IntraMuscular once  lactobacillus acidophilus 1 Tablet(s) Oral three times a day  lidocaine   Patch 1 Patch Transdermal every 24 hours  multivitamin/minerals 1 Tablet(s) Oral daily  potassium chloride    Tablet ER 10 milliEquivalent(s) Oral daily  pramipexole 1.5 milliGRAM(s) Oral every 24 hours  sulfaSALAzine 500 milliGRAM(s) Oral three times a day  torsemide 5 milliGRAM(s) Oral two times a day  vancomycin    Solution 125 milliGRAM(s) Oral every 6 hours    MEDICATIONS  (PRN):  acetaminophen   Tablet .. 650 milliGRAM(s) Oral every 6 hours PRN Temp greater or equal to 38C (100.4F), Mild Pain (1 - 3)  ALBUTerol    90 MICROgram(s) HFA Inhaler 2 Puff(s) Inhalation every 4 hours PRN Shortness of Breath and/or Wheezing  aluminum hydroxide/magnesium hydroxide/simethicone Suspension 30 milliLiter(s) Oral every 4 hours PRN Dyspepsia  benzonatate 100 milliGRAM(s) Oral three times a day PRN Cough  cyclobenzaprine 5 milliGRAM(s) Oral three times a day PRN Muscle Spasm  melatonin 3 milliGRAM(s) Oral at bedtime PRN Insomnia  ondansetron Injectable 4 milliGRAM(s) IV Push every 6 hours PRN Nausea and/or Vomiting  oxyCODONE    IR 5 milliGRAM(s) Oral four times a day PRN Severe Pain (7 - 10)  traMADol 25 milliGRAM(s) Oral four times a day PRN Moderate Pain (4 - 6)      Allergies    No Known Allergies    Intolerances        REVIEW OF SYSTEMS:  CONSTITUTIONAL: No fever, weight loss, or fatigue  EYES: No eye pain, visual disturbances, or discharge  ENMT:  No difficulty hearing, tinnitus, vertigo; No sinus or throat pain  NECK: No pain or stiffness  RESPIRATORY: No cough, wheezing, chills or hemoptysis; No shortness of breath  CARDIOVASCULAR: No chest pain, palpitations, dizziness, or leg swelling  GASTROINTESTINAL: + diarrhea   GENITOURINARY: No dysuria, frequency, hematuria, or incontinence  NEUROLOGICAL: No headaches, memory loss, loss of strength, numbness, or tremors  SKIN: No itching, burning, rashes, or lesions   LYMPH NODES: No enlarged glands  ENDOCRINE: No heat or cold intolerance; No hair loss; No polydipsia or polyuria  MUSCULOSKELETAL: No joint pain or swelling; No muscle, back, or extremity pain  PSYCHIATRIC: No depression, anxiety, mood swings, or difficulty sleeping  HEME/LYMPH: No easy bruising, or bleeding gums  ALLERGY AND IMMUNOLOGIC: No hives or eczema    Vital Signs Last 24 Hrs  T(C): 36.6 (01 Feb 2021 11:59), Max: 36.6 (01 Feb 2021 11:59)  T(F): 97.8 (01 Feb 2021 11:59), Max: 97.8 (01 Feb 2021 11:59)  HR: 107 (01 Feb 2021 11:59) (75 - 107)  BP: 107/71 (01 Feb 2021 11:59) (107/71 - 134/71)  BP(mean): --  RR: 18 (01 Feb 2021 11:59) (17 - 18)  SpO2: 96% (01 Feb 2021 11:59) (92% - 96%)    PHYSICAL EXAM:  GENERAL: NAD, well-groomed, well-developed  HEAD:  Atraumatic, Normocephalic  EYES: EOMI, PERRLA, conjunctiva and sclera clear  ENMT: No tonsillar erythema, exudates, or enlargement; Moist mucous membranes, Good dentition, No lesions  NECK: Supple, No JVD, Normal thyroid  NERVOUS SYSTEM:  Alert & Oriented X3, Good concentration; Motor Strength 5/5 B/L upper and lower extremities; DTRs 2+ intact and symmetric  CHEST/LUNG: Clear to auscultation bilaterally; No rales, rhonchi, wheezing, or rubs  HEART: Regular rate and rhythm; No murmurs, rubs, or gallops  ABDOMEN: Soft, Nontender, Nondistended; Bowel sounds present  EXTREMITIES:  2+ Peripheral Pulses, No clubbing, cyanosis, or edema  LYMPH: No lymphadenopathy noted  SKIN: No rashes or lesions    LABS:                        9.1    8.15  )-----------( 307      ( 01 Feb 2021 09:59 )             28.6     01 Feb 2021 09:59    144    |  109    |  11     ----------------------------<  100    3.5     |  26     |  0.80     Ca    7.4        01 Feb 2021 09:59    TPro  6.0    /  Alb  2.3    /  TBili  0.3    /  DBili  <.10   /  AST  30     /  ALT  16     /  AlkPhos  119    01 Feb 2021 09:59        CAPILLARY BLOOD GLUCOSE          RADIOLOGY & ADDITIONAL TESTS:    Notes Reviewed:  [x ] YES  [ ] NO    Care Discussed with Consultants/Other Providers [x ] YES  [ ] NO

## 2021-02-01 NOTE — PROGRESS NOTE ADULT - SUBJECTIVE AND OBJECTIVE BOX
[facility] -- Quang Akers, Sharon, Itzel, Kiran Miranda, Jules Richardson: Office # [phone]    Follow Up:  Multiple Falls, Syncope    Subjective/Observations: Patient seen and examined. Patient awake, alert, resting in bed. No complaints of chest pain, dyspnea, palpitations or dizziness. Tolerating room air.     REVIEW OF SYSTEMS: All review of systems is negative for eye, ENT, GI, , allergic, dermatologic, musculoskeletal and neurologic except as described above    PAST MEDICAL & SURGICAL HISTORY:  Obesity (BMI 30-39.9)    Traumatic arthropathy involving lower leg  right    History of Osteoarthritis    Hyperlipidemia    Restless Leg Syndrome    H/O neck surgery    H/O arthroscopy of right knee  2010    Bladder Disorder  Bladder lift 2000    History of Colonoscopy    Ex lap in 2009 for abscess in the baldder    History of Hysterectomy and bladder lift in 2000    MEDICATIONS  (STANDING):  ascorbic acid 500 milliGRAM(s) Oral daily  budesonide 160 MICROgram(s)/formoterol 4.5 MICROgram(s) Inhaler 2 Puff(s) Inhalation two times a day  cholecalciferol 2000 Unit(s) Oral daily  cholestyramine Powder (Sugar-Free) 4 Gram(s) Oral every 24 hours  dexAMETHasone  Injectable 6 milliGRAM(s) IV Push daily  enoxaparin Injectable 40 milliGRAM(s) SubCutaneous daily  famotidine    Tablet 20 milliGRAM(s) Oral two times a day  ferrous    sulfate 325 milliGRAM(s) Oral daily  influenza   Vaccine 0.5 milliLiter(s) IntraMuscular once  lactobacillus acidophilus 1 Tablet(s) Oral daily  lidocaine   Patch 1 Patch Transdermal every 24 hours  multivitamin/minerals 1 Tablet(s) Oral daily  pramipexole 1.5 milliGRAM(s) Oral every 24 hours  remdesivir  IVPB 100 milliGRAM(s) IV Intermittent every 24 hours  remdesivir  IVPB   IV Intermittent   sulfaSALAzine 500 milliGRAM(s) Oral three times a day  torsemide 5 milliGRAM(s) Oral two times a day    MEDICATIONS  (PRN):  acetaminophen   Tablet .. 650 milliGRAM(s) Oral every 6 hours PRN Temp greater or equal to 38C (100.4F), Mild Pain (1 - 3)  ALBUTerol    90 MICROgram(s) HFA Inhaler 2 Puff(s) Inhalation every 4 hours PRN Shortness of Breath and/or Wheezing  aluminum hydroxide/magnesium hydroxide/simethicone Suspension 30 milliLiter(s) Oral every 4 hours PRN Dyspepsia  benzonatate 100 milliGRAM(s) Oral three times a day PRN Cough  cyclobenzaprine 5 milliGRAM(s) Oral three times a day PRN Muscle Spasm  melatonin 3 milliGRAM(s) Oral at bedtime PRN Insomnia  ondansetron Injectable 4 milliGRAM(s) IV Push every 6 hours PRN Nausea and/or Vomiting  oxyCODONE    IR 5 milliGRAM(s) Oral four times a day PRN Severe Pain (7 - 10)  traMADol 25 milliGRAM(s) Oral four times a day PRN Moderate Pain (4 - 6)    Allergies  No Known Allergies    Vital Signs Last 24 Hrs  T(C): 36.3 (01 Feb 2021 05:24), Max: 36.5 (31 Jan 2021 20:27)  T(F): 97.4 (01 Feb 2021 05:24), Max: 97.7 (31 Jan 2021 20:27)  HR: 75 (01 Feb 2021 05:24) (74 - 75)  BP: 134/71 (01 Feb 2021 05:24) (122/66 - 134/71)  BP(mean): --  RR: 17 (01 Feb 2021 05:24) (17 - 18)  SpO2: 92% (01 Feb 2021 05:24) (91% - 92%)  I&O's Summary    31 Jan 2021 07:01  -  01 Feb 2021 07:00  --------------------------------------------------------  IN: 100 mL / OUT: 0 mL / NET: 100 mL    TELE: SR 60-80s 4 beats NSVT  PHYSICAL EXAM:  Appearance: NAD, no distress, alert, Obese   HEENT: Moist Mucous Membranes, Anicteric  Cardiovascular: Regular rate and rhythm, Normal S1 S2, No JVD, + murmurs, No rubs, gallops or clicks  Respiratory: Non-labored, Clear to auscultation, No rales, No rhonchi, No wheezing.   Gastrointestinal:  Soft, Non-tender, + BS  Neurologic: Non-focal  Skin: Warm and dry, No visible rashes or ulcers, No ecchymosis, No cyanosis  Musculoskeletal: No clubbing, No cyanosis, No joint swelling/tenderness  Psychiatry: Mood & affect appropriate  Lymph: No peripheral edema.     LABS: All Labs Reviewed:                        9.1    8.15  )-----------( 307      ( 01 Feb 2021 09:59 )             28.6                         9.4    9.07  )-----------( 305      ( 31 Jan 2021 08:06 )             29.9                         9.0    8.55  )-----------( 262      ( 30 Jan 2021 09:55 )             28.4     01 Feb 2021 09:59    144    |  109    |  11     ----------------------------<  100    3.5     |  26     |  0.80   31 Jan 2021 13:13    141    |  104    |  14     ----------------------------<  141    2.8     |  27     |  0.85   31 Jan 2021 08:06    x      |  x      |  x      ----------------------------<  x      x       |  x      |  0.83     Ca    7.4        01 Feb 2021 09:59  Ca    7.3        31 Jan 2021 13:13    TPro  6.0    /  Alb  2.3    /  TBili  0.3    /  DBili  <.10   /  AST  30     /  ALT  16     /  AlkPhos  119    01 Feb 2021 09:59  TPro  6.6    /  Alb  2.4    /  TBili  0.3    /  DBili  .10    /  AST  48     /  ALT  23     /  AlkPhos  148    31 Jan 2021 08:06  TPro  6.5    /  Alb  2.4    /  TBili  0.3    /  DBili  .10    /  AST  52     /  ALT  18     /  AlkPhos  146    30 Jan 2021 09:55  Creatine Kinase, Serum: 556 U/L (01-28-21 @ 08:03)  D-Dimer Assay, Quantitative: 386 ng/mL DDU (01-29-21 @ 10:44)  D-Dimer Assay, Quantitative: 371 ng/mL DDU (01-28-21 @ 08:03)    12 Lead ECG:   Ventricular Rate 87 BPM  Atrial Rate 87 BPM  P-R Interval 148 ms  QRS Duration 98 ms  Q-T Interval 388 ms  QTC Calculation(Bazett) 466 ms  P Axis 53 degrees  R Axis -3 degrees  T Axis 10 degrees  Diagnosis Line Normal sinus rhythm  Poor R wave progression  Confirmed by CLARK MIRANDA ([id number]) on 1/28/2021 10:53:29 AM (01-27-21 @ 22:34)    < from: TTE Echo Complete w/o Contrast w/ Doppler (01.31.21 @ 17:17) >  Dimensions:  LA 3.0       Normal Values: 2.0 - 4.0 cm  Ao 3.3        Normal Values: 2.0 - 3.8 cm  SEPTUM 1.2       Normal Values: 0.6 - 1.2 cm  PWT 1.2       Normal Values: 0.6 - 1.1 cm  LVIDd 4.6         Normal Values: 3.0 - 5.6 cm  LVIDs 3.2         Normal Values: 1.8 - 4.0 cm    OBSERVATIONS:  Technically difficult study  Mitral Valve: Mitral annular calcification with thickened leaflets, trace physiologic MR.  Aortic Valve/Aorta: Aortic valve is not well-visualized. Appears calcified with a peak transaortic valve gradient is 18.5 mmHg with a mean transaortic valve gradient 10.9 mmHg.  Tricuspid Valve: normal with trace TR.  Pulmonic Valve: Not well-visualized  Left Atrium: normal  Right Atrium: Not well-visualized  Left Ventricle: normal LV size and systolic function, estimated LVEF of 55-60%.  Right Ventricle: Grossly normal size and systolic function.  Pericardium/Pleura: normal, no significant pericardial effusion.    Conclusion:  Technically difficult study  Normal left ventricular internal dimensions and systolic function, estimated LVEF of 55-60%.  Grossly normal RV size and systolic function.  The aortic valve is not well-visualized, appears calcified with likely mild aortic stenosis  Trace physiologic MR and TR.  No significant pericardial effusion.    < end of copied text >    < from: CT Abdomen and Pelvis w/ IV Cont (01.28.21 @ 03:34) >  IMPRESSION:  Pancolitis.  Right middle lobe consolidations with additional patchy groundglass opacities, tree-in-bud, and septal thickening in the visualized bilateral lower lobes, concerning for infection.  < end of copied text >

## 2021-02-01 NOTE — PROGRESS NOTE ADULT - SUBJECTIVE AND OBJECTIVE BOX
PULMONARY/CRITICAL CARE    DOS 21  Denies sob.   No complaints. Was OOB.    No fever.     Patient is a 71y old  Female who presents with a chief complaint of sob (2021 17:48)    BRIEF HOSPITAL COURSE: ***72yo woman with PMH of OA, HDL, and bladder disorder was admitted with multiple falls and feeling weakness. She didn't have SOB, cough, vomiting, no diarrhea, no fever, chills, No sick contacts.  She tested positive for COVID so was admitted for treatment. She has been started on remdesivir and Dexamethasone.       Events last 24 hours: ***    PAST MEDICAL & SURGICAL HISTORY:  Obesity (BMI 30-39.9)    Traumatic arthropathy involving lower leg  right    History of Osteoarthritis    Hyperlipidemia    Restless Leg Syndrome    H/O neck surgery    H/O arthroscopy of right knee      Bladder Disorder  Bladder lift     History of Colonoscopy    Ex lap in  for abscess in the baldder    History of Hysterectomy and bladder lift in       Allergies    No Known Allergies    Intolerances      FAMILY HISTORY: Smoked 1ppd for 25 yrs until 2017 no etoh  No pertinent family history in first degree relatives          Medications:  remdesivir  IVPB 100 milliGRAM(s) IV Intermittent every 24 hours  remdesivir  IVPB   IV Intermittent     torsemide 5 milliGRAM(s) Oral two times a day    ALBUTerol    90 MICROgram(s) HFA Inhaler 2 Puff(s) Inhalation every 4 hours PRN  benzonatate 100 milliGRAM(s) Oral three times a day PRN  budesonide 160 MICROgram(s)/formoterol 4.5 MICROgram(s) Inhaler 2 Puff(s) Inhalation two times a day    acetaminophen   Tablet .. 650 milliGRAM(s) Oral every 6 hours PRN  melatonin 3 milliGRAM(s) Oral at bedtime PRN  ondansetron Injectable 4 milliGRAM(s) IV Push every 6 hours PRN  oxyCODONE    IR 5 milliGRAM(s) Oral four times a day PRN  pramipexole 1.5 milliGRAM(s) Oral every 24 hours  traMADol 25 milliGRAM(s) Oral four times a day PRN      enoxaparin Injectable 40 milliGRAM(s) SubCutaneous daily    aluminum hydroxide/magnesium hydroxide/simethicone Suspension 30 milliLiter(s) Oral every 4 hours PRN  famotidine    Tablet 20 milliGRAM(s) Oral two times a day  sulfaSALAzine 500 milliGRAM(s) Oral three times a day      dexAMETHasone  Injectable 6 milliGRAM(s) IV Push daily    ascorbic acid 500 milliGRAM(s) Oral daily  cholecalciferol 2000 Unit(s) Oral daily  multivitamin/minerals 1 Tablet(s) Oral daily  potassium chloride   Powder 20 milliEquivalent(s) Oral four times a day    influenza   Vaccine 0.5 milliLiter(s) IntraMuscular once    lidocaine   Patch 1 Patch Transdermal every 24 hours    lactobacillus acidophilus 1 Tablet(s) Oral daily          ICU Vital Signs Last 24 Hrs  T(C): 36.3 (2021 04:59), Max: 36.8 (2021 21:39)  T(F): 97.4 (2021 04:59), Max: 98.2 (2021 21:39)  HR: 71 (2021 04:59) (60 - 76)  BP: 120/64 (2021 04:59) (96/57 - 120/64)  BP(mean): --  ABP: --  ABP(mean): --  RR: 18 (2021 04:59) (18 - 20)  SpO2: 96% (2021 04:59) (92% - 99%)    Vital Signs Last 24 Hrs  T(C): 36.3 (2021 04:59), Max: 36.8 (2021 21:39)  T(F): 97.4 (2021 04:59), Max: 98.2 (2021 21:39)  HR: 71 (2021 04:59) (60 - 76)  BP: 120/64 (2021 04:59) (96/57 - 120/64)  BP(mean): --  RR: 18 (2021 04:59) (18 - 20)  SpO2: 96% (2021 04:59) (92% - 99%)        I&O's Detail    2021 07:01  -  2021 07:00  --------------------------------------------------------  IN:    IV PiggyBack: 350 mL  Total IN: 350 mL    OUT:    Voided (mL): 650 mL  Total OUT: 650 mL    Total NET: -300 mL            LABS:                        8.9    x     )-----------( x        ( 2021 16:50 )             27.5         139  |  106  |  6<L>  ----------------------------<  86  2.9<LL>   |  27  |  0.71    Ca    7.3<L>      2021 08:03  Phos  3.1       Mg     1.9         TPro  5.8<L>  /  Alb  2.1<L>  /  TBili  0.4  /  DBili  .20  /  AST  70<H>  /  ALT  19  /  AlkPhos  130<H>        CARDIAC MARKERS ( 2021 08:03 )  x     / x     / 556 U/L / x     / x          CAPILLARY BLOOD GLUCOSE        PT/INR - ( 2021 23:16 )   PT: 17.6 sec;   INR: 1.54 ratio         PTT - ( 2021 23:16 )  PTT:28.6 sec  Urinalysis Basic - ( 2021 20:10 )    Color: Yellow / Appearance: Clear / S.010 / pH: x  Gluc: x / Ketone: Negative  / Bili: Negative / Urobili: Negative   Blood: x / Protein: 30 mg/dL / Nitrite: Negative   Leuk Esterase: Negative / RBC: 0-2 /HPF / WBC 0-2   Sq Epi: x / Non Sq Epi: Few / Bacteria: Few      CULTURES:  Culture Results:   No growth to date. ( @ 05:12)  Culture Results:   No growth to date. ( @ 05:12)      Physical Examination:    General: No acute distress.  elderly female    HEENT: Pupils equal, reactive to light.  Symmetric.    PULM: bibas crackles good excurion on wheeze     CVS: Regular rate and rhythm, no murmurs, rubs, or gallops    ABD: Soft, nondistended, nontender, normoactive bowel sounds, no masses    EXT: brawny leg  edema, nontender    SKIN: Warm and well perfused, no rashes noted.    NEURO: Alert, nteractive, nonfocal    RADIOLOGY: ***    d< from: CT Chest No Cont (21 @ 00:10) >  EXAM:  CT CHEST                            PROCEDURE DATE:  2021          INTERPRETATION:  CLINICAL INFORMATION: Chest trauma.  Status post fall.    COMPARISON: 2020.    PROCEDURE:  CT of the Chest was performed without intravenous contrast.  Sagittal and coronal reformats were performed.    FINDINGS:    LUNGS AND AIRWAYS: There is moderate respiratory motion artifact the central airways are grossly clear within limits of motion degradation.  There is mild to moderate centrilobular emphysema.  There are nonspecific groundglass opacities in the anterior segments of both upper lobes measuring up to approximately 1.9 x 1.9 cm on the right (3:226) and 1.4 x 1.1 cm on the left (3:217), which are new since 2020.  There is dense consolidation in the right middle lobe with air bronchograms and bronchiectasis, in a region of lung that previously showed patchy groundglass nodular opacities on 2020.  Again seen is patchy groundglass opacity in the basilar segments of the right greater than left lower lobes which appears stable to mildly progressed from 2020.  PLEURA: No pleural effusion.  MEDIASTINUM AND MARYBEL: No lymphadenopathy.  VESSELS: Mild atherosclerotic calcification thoracic aorta and arch vessels.  HEART:Cardiomegaly.  Calcifications involving sinuses of Valsalva and aortic valve leaflets.  Moderate atherosclerotic calcification the coronary arteries. No pericardial effusion.  CHEST WALL AND LOWER NECK: Within normal limits.  VISUALIZED UPPER ABDOMEN: Enlarged spleen measures up to 13.9 cm, increased in size from approximately 11.8 cm on 2020.  BONES: The patient is status post C2-T7 posterior spinal fusion with shamar screw construct.  Within the thoracic spine there are pedicle screws at T1 bilaterally, T2 bilaterally, T3 on the right, T4 on the left, T5 bilaterally, T6 bilaterally and T7 bilaterally.  There is also a screw coursing through the base of the T3 spinous process and right T3 lamina.  Multiple pedicle screws are slightly lateral in position and course into the costovertebral vertebral junctions.  There is no evidence of hardware fracture or abnormal lucency around the pedicle screws.  There are old/healing fractures of the left third, sixth, seventh and eighth ribs with external callus formation.    IMPRESSION:  1.  No evidence of acute traumatic injury in the chest.    2.  Patchy groundglass opacities in the anterior segments of both upper lobes are new from 2020.  These may reflect nonspecific foci of infection, inflammation, edema or hemorrhage.    2.  Dense consolidation in the right middle lobe with air bronchograms and bronchiectasis is an region of lung that previously showed patchy groundglass nodular opacities on 2020.  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    3.  There is persistent groundglass opacity in the basilar segments of both lower lobes, right greater than left, which appears stable to mildly progressed from 2020.  This may reflect areas of infection/inflammation, edema, atelectasis or interstitial lung disease.    4.  Splenomegaly, which appears new from 2020.  The spleen measures 13.9 cm, increased in size from 11.8 cm on 2020.    5.  Follow-up chest CT is recommended in 1-3 months to reassess the findings and exclude continued disease progression.    < end of copied text >  < from: Xray Chest 1 View- PORTABLE-Routine (Xray Chest 1 View- PORTABLE-Routine in AM.) (21 @ 07:57) >  EXAM:  XR CHEST PORTABLE ROUTINE 1V                            PROCEDURE DATE:  2021          INTERPRETATION:  CLINICAL HISTORY: 71-year-old with Covid pneumonia..    TECHNIQUE: A single AP radiograph of the chest was reviewed..    COMPARISON: Chest radiograph from 2021.    FINDINGS/  IMPRESSION:    Tubes/lines: Multiple radiopaque densities project over the left chest. Cervicothoracic spine fixation hardware.    Lungs: Bibasilar atelectasis. Small airspace opacity left midlung consistent with pneumonia..    Pleura: No pneumothorax or pleural effusions.    Cardiomediastinal silhouette: Within normal limits.            SARWAT BURDEN MD; Attending Radiologist  This document has been electronically signed. 2021  3:43PM    < end of copied text >  CXR bilat infil

## 2021-02-01 NOTE — OCCUPATIONAL THERAPY INITIAL EVALUATION ADULT - ADDITIONAL COMMENTS
Pt lives in a house with 3 steps and handrail to enter. Pt has a bathtub. Pt owns a rollator and shower chair. As per EMR dtr is concerned because patient has been falling frequently at home. Pt performed sit to stand with min/CG assist and ambulated alongside EOB using RW with contact guard.

## 2021-02-01 NOTE — PROGRESS NOTE ADULT - ASSESSMENT
Pt. with Covid pneumonia. Clinically stable.   Oxygenating ok.   Would complete remdesivir and steroids.  Can dc pt if stable when finished with Remdesivir.   OOB  Proning

## 2021-02-01 NOTE — OCCUPATIONAL THERAPY INITIAL EVALUATION ADULT - PERTINENT HX OF CURRENT PROBLEM, REHAB EVAL
As per H & P; 70 y/o female who presents with multiple falls and feeling weak today. Pt admitted on 1/28/2021 with +COVID and possible colitis. Pt was at [facility] hospital 11/28/20-12/01/2020 with pneumonia and UTI. Pt s/p C2-T7 PSF with shamar screw construct in fall 2020 at [facility].

## 2021-02-01 NOTE — PROGRESS NOTE ADULT - SUBJECTIVE AND OBJECTIVE BOX
[facility]  INFECTIOUS DISEASES   =======================================================    [id number]  VERONICA WHALEN     Follow up; COVID 19    On o2 2L with NC, comfortable, no SOB or cough.   No fever.     PAST MEDICAL & SURGICAL HISTORY:  Obesity (BMI 30-39.9)  Traumatic arthropathy involving lower leg  right  History of Osteoarthritis  Hyperlipidemia  Restless Leg Syndrome  H/O neck surgery  H/O arthroscopy of right knee  2010  Bladder Disorder  Bladder lift 2000  History of Colonoscopy  Ex lap in 2009 for abscess in the baldder  History of Hysterectomy and bladder lift in 2000    Social Hx: no smoking, ETOH or drugs     FAMILY HISTORY:  No pertinent family history in first degree relatives    Allergies  No Known Allergies    Antibiotics:  dexAMETHasone  Injectable 6 milliGRAM(s) IV Push daily  remdesivir  IVPB   IV Intermittent      REVIEW OF SYSTEMS:  CONSTITUTIONAL:  No Fever or chills  HEENT:  No diplopia or blurred vision.  No sore throat or runny nose.  CARDIOVASCULAR:  No chest pain or SOB.  RESPIRATORY:  no cough, mild shortness of breath  GASTROINTESTINAL:  No nausea, vomiting or diarrhea.  GENITOURINARY:  No dysuria, frequency or urgency. No Blood in urine  MUSCULOSKELETAL:  no joint aches, no muscle pain  SKIN:  No change in skin, hair or nails.  NEUROLOGIC:  No paresthesias, fasciculations, seizures or weakness.  PSYCHIATRIC:  No disorder of thought or mood.  ENDOCRINE:  No heat or cold intolerance, polyuria or polydipsia.  HEMATOLOGICAL:  No easy bruising or bleeding.     Physical Exam:  Vital Signs Last 24 Hrs  T(C): 36.6 (01 Feb 2021 11:59), Max: 36.6 (01 Feb 2021 11:59)  T(F): 97.8 (01 Feb 2021 11:59), Max: 97.8 (01 Feb 2021 11:59)  HR: 107 (01 Feb 2021 11:59) (74 - 107)  BP: 107/71 (01 Feb 2021 11:59) (107/71 - 134/71)  BP(mean): --  RR: 18 (01 Feb 2021 11:59) (17 - 18)  SpO2: 96% (01 Feb 2021 11:59) (91% - 96%)  GEN: NAD  HEENT: normocephalic and atraumatic. EOMI. PERRL.    NECK: Supple.  No lymphadenopathy   LUNGS: Clear to auscultation.  HEART: Regular rate and rhythm   ABDOMEN: Soft, nontender, and nondistended.  Positive bowel sounds.    EXTREMITIES: + edema b/l   NEUROLOGIC: grossly intact.  PSYCHIATRIC: Appropriate affect .  SKIN: No rash    Labs:                        9.1    8.15  )-----------( 307      ( 01 Feb 2021 09:59 )             28.6     02-01    144  |  109<H>  |  11  ----------------------------<  100<H>  3.5   |  26  |  0.80    Ca    7.4<L>      01 Feb 2021 09:59    TPro  6.0  /  Alb  2.3<L>  /  TBili  0.3  /  DBili  <.10  /  AST  30  /  ALT  16  /  AlkPhos  119  02-01    Culture - Urine (collected 01-29-21 @ 00:41)  Source: .Urine Clean Catch (Midstream)  Final Report (01-29-21 @ 20:29):    <10,000 CFU/mL Normal Urogenital Elizabeth    Culture - Blood (collected 01-28-21 @ 05:12)  Source: .Blood Blood-Peripheral    Culture - Blood (collected 01-28-21 @ 05:12)  Source: .Blood Blood-Peripheral    WBC Count: 8.15 K/uL (02-01-21 @ 09:59)  WBC Count: 9.07 K/uL (01-31-21 @ 08:06)  WBC Count: 8.55 K/uL (01-30-21 @ 09:55)  WBC Count: 6.69 K/uL (01-29-21 @ 09:07)  WBC Count: 8.20 K/uL (01-27-21 @ 23:16)    Creatinine, Serum: 0.80 mg/dL (02-01-21 @ 09:59)  Creatinine, Serum: 0.85 mg/dL (01-31-21 @ 13:13)  Creatinine, Serum: 0.83 mg/dL (01-31-21 @ 08:06)  Creatinine, Serum: 0.72 mg/dL (01-30-21 @ 09:55)  Creatinine, Serum: 0.93 mg/dL (01-29-21 @ 10:44)  Creatinine, Serum: 0.84 mg/dL (01-29-21 @ 09:07)  Creatinine, Serum: 0.71 mg/dL (01-28-21 @ 08:03)  Creatinine, Serum: 0.70 mg/dL (01-28-21 @ 08:03)  Creatinine, Serum: 0.80 mg/dL (01-27-21 @ 23:16)    C-Reactive Protein, Serum: 12.70 mg/dL (01-28-21 @ 11:50)  C-Reactive Protein, Serum: 15.50 mg/dL (01-28-21 @ 04:44)    Ferritin, Serum: 229 ng/mL (01-30-21 @ 15:07)  Ferritin, Serum: 168 ng/mL (01-28-21 @ 22:45)  Ferritin, Serum: 161 ng/mL (01-28-21 @ 11:50)  Ferritin, Serum: 173 ng/mL (01-28-21 @ 06:22)    Procalcitonin, Serum: 0.44 ng/mL (01-27-21 @ 23:16)    COVID-19 PCR: Detected (01-27-21 @ 23:16)    All imaging and other data have been reviewed.  < from: CT Chest No Cont (01.28.21 @ 00:10) >  IMPRESSION:  1.  No evidence of acute traumatic injury in the chest.  2.  Patchy groundglass opacities in the anterior segments of both upper lobes are new from 11/20/2020.  These may reflect nonspecific foci of infection, inflammation, edema or hemorrhage.  2.  Dense consolidation in the right middle lobe with air bronchograms and bronchiectasis is an region of lung that previously showed patchy groundglass nodular opacities on 11/20/2020.  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  3.  There is persistent groundglass opacity in the basilar segments of both lower lobes, right greater than left, which appears stable to mildly progressed from 11/20/2020.  This may reflect areas of infection/inflammation, edema, atelectasis or interstitial lung disease.  4.  Splenomegaly, which appears new from 11/20/2020.  The spleen measures 13.9 cm, increased in size from 11.8 cm on 11/20/2020.  5.  Follow-up chest CT is recommended in 1-3 months to reassess the findings and exclude continued disease progression.    Assessment and Plan:   70yo woman with PMH of OA, HDL, and bladder disorder was admitted with multiple falls and feeling weakness. She didn't have SOB, cough, vomiting, no diarrhea, no fever, chills, No sick contacts.  She tested positive for COVID so was admitted for treatment.   Treatment usually is different case by case, data suggest that these might work:   Remdisivir:  5 day course,  eGFR > 30 mL/min , ALT < 5X ULN  Steroid: For hypoxic patients on supplemental O2 of intubated. dexamethasone 6mg PO or IV Q-day x 10 days (equivalent to solumedrol 32mg IV or Prednisone 40mg)  Anticoagulation:  with prophylactic dosing, full dose to be considered in patients with increased risk for thromboembolic complications. Bleeding can happen but acceptable in high risk patients due to hypercoagulable state.  LMWH is good, for high risk patients consider discharge on oral anticoagulation with rivaroxaban (Xarelto) 10mg PO QD or Eliquis (apixaban) 2.5-5mg PO BID  x 4 weeks.  Currently data and recommendations for COVID-19 treatment are rapidly changing, so this treatment plan is based on my clinical judgement with available information.     COVID 19   - BMP, CBC w diff, NLR. Procalcitonin, Ferritin, CRP, LDH and D dimer for the start and periodically can be repeated.   - CXR with bilateral opacities more consistent with viral pneumonia   - Completed Remdesivir for total 5days  - Continue Dexamethasone 6mg po daily for 10days  - Watch O2 sat closely and taper as tolerated.   - No antibiotics at this time.  - Prophylactic anticoagulation due to hypercoagulable state    Will sign off please call with any question or change in her status.     Dr. Marito Torre   Infectious Diseases   Please call [phone] between 8am and 6pm, call [phone] after 6pm or weekends.

## 2021-02-01 NOTE — PROGRESS NOTE ADULT - ASSESSMENT
covid  colitis  anemia  dysphagia       covid  colitis  anemia  dysphagia      no need for abx for ct findings re: colon  now w loose stools  start imodium prn  cont bacid, questran  her peg tube was removed in between her admissions  diet as tolerated  cont pepcid daily  monitor gi fxn

## 2021-02-02 DIAGNOSIS — R19.7 DIARRHEA, UNSPECIFIED: ICD-10-CM

## 2021-02-02 LAB
ALBUMIN SERPL ELPH-MCNC: 2.5 G/DL — LOW (ref 3.3–5)
ALP SERPL-CCNC: 126 U/L — HIGH (ref 40–120)
ALT FLD-CCNC: 20 U/L — SIGNIFICANT CHANGE UP (ref 12–78)
ANION GAP SERPL CALC-SCNC: 8 MMOL/L — SIGNIFICANT CHANGE UP (ref 5–17)
AST SERPL-CCNC: 24 U/L — SIGNIFICANT CHANGE UP (ref 15–37)
BILIRUB SERPL-MCNC: 0.3 MG/DL — SIGNIFICANT CHANGE UP (ref 0.2–1.2)
BUN SERPL-MCNC: 10 MG/DL — SIGNIFICANT CHANGE UP (ref 7–23)
CALCIUM SERPL-MCNC: 7.7 MG/DL — LOW (ref 8.5–10.1)
CHLORIDE SERPL-SCNC: 107 MMOL/L — SIGNIFICANT CHANGE UP (ref 96–108)
CO2 SERPL-SCNC: 27 MMOL/L — SIGNIFICANT CHANGE UP (ref 22–31)
CREAT SERPL-MCNC: 0.89 MG/DL — SIGNIFICANT CHANGE UP (ref 0.5–1.3)
CULTURE RESULTS: SIGNIFICANT CHANGE UP
CULTURE RESULTS: SIGNIFICANT CHANGE UP
GLUCOSE SERPL-MCNC: 162 MG/DL — HIGH (ref 70–99)
POTASSIUM SERPL-MCNC: 3.8 MMOL/L — SIGNIFICANT CHANGE UP (ref 3.5–5.3)
POTASSIUM SERPL-SCNC: 3.8 MMOL/L — SIGNIFICANT CHANGE UP (ref 3.5–5.3)
PROT SERPL-MCNC: 6.8 G/DL — SIGNIFICANT CHANGE UP (ref 6–8.3)
SODIUM SERPL-SCNC: 142 MMOL/L — SIGNIFICANT CHANGE UP (ref 135–145)
SPECIMEN SOURCE: SIGNIFICANT CHANGE UP
SPECIMEN SOURCE: SIGNIFICANT CHANGE UP

## 2021-02-02 PROCEDURE — 99232 SBSQ HOSP IP/OBS MODERATE 35: CPT | Mod: CS

## 2021-02-02 PROCEDURE — 73522 X-RAY EXAM HIPS BI 3-4 VIEWS: CPT | Mod: 26

## 2021-02-02 PROCEDURE — 99232 SBSQ HOSP IP/OBS MODERATE 35: CPT

## 2021-02-02 PROCEDURE — 74230 X-RAY XM SWLNG FUNCJ C+: CPT | Mod: 26

## 2021-02-02 RX ORDER — POTASSIUM CHLORIDE 20 MEQ
10 PACKET (EA) ORAL
Refills: 0 | Status: DISCONTINUED | OUTPATIENT
Start: 2021-02-02 | End: 2021-02-08

## 2021-02-02 RX ADMIN — Medication 1 TABLET(S): at 12:25

## 2021-02-02 RX ADMIN — Medication 125 MILLIGRAM(S): at 01:20

## 2021-02-02 RX ADMIN — Medication 1 TABLET(S): at 12:26

## 2021-02-02 RX ADMIN — Medication 125 MILLIGRAM(S): at 12:33

## 2021-02-02 RX ADMIN — Medication 5 MILLIGRAM(S): at 04:53

## 2021-02-02 RX ADMIN — OXYCODONE HYDROCHLORIDE 5 MILLIGRAM(S): 5 TABLET ORAL at 22:26

## 2021-02-02 RX ADMIN — Medication 650 MILLIGRAM(S): at 12:26

## 2021-02-02 RX ADMIN — Medication 125 MILLIGRAM(S): at 18:07

## 2021-02-02 RX ADMIN — ENOXAPARIN SODIUM 40 MILLIGRAM(S): 100 INJECTION SUBCUTANEOUS at 12:25

## 2021-02-02 RX ADMIN — BUDESONIDE AND FORMOTEROL FUMARATE DIHYDRATE 2 PUFF(S): 160; 4.5 AEROSOL RESPIRATORY (INHALATION) at 04:54

## 2021-02-02 RX ADMIN — Medication 500 MILLIGRAM(S): at 22:23

## 2021-02-02 RX ADMIN — Medication 10 MILLIEQUIVALENT(S): at 12:25

## 2021-02-02 RX ADMIN — PRAMIPEXOLE DIHYDROCHLORIDE 1.5 MILLIGRAM(S): 0.12 TABLET ORAL at 18:06

## 2021-02-02 RX ADMIN — FAMOTIDINE 20 MILLIGRAM(S): 10 INJECTION INTRAVENOUS at 04:53

## 2021-02-02 RX ADMIN — FAMOTIDINE 20 MILLIGRAM(S): 10 INJECTION INTRAVENOUS at 18:07

## 2021-02-02 RX ADMIN — Medication 6 MILLIGRAM(S): at 04:53

## 2021-02-02 RX ADMIN — Medication 1 TABLET(S): at 22:23

## 2021-02-02 RX ADMIN — Medication 1 TABLET(S): at 04:53

## 2021-02-02 RX ADMIN — Medication 500 MILLIGRAM(S): at 12:26

## 2021-02-02 RX ADMIN — Medication 500 MILLIGRAM(S): at 12:25

## 2021-02-02 RX ADMIN — Medication 5 MILLIGRAM(S): at 18:05

## 2021-02-02 RX ADMIN — LIDOCAINE 1 PATCH: 4 CREAM TOPICAL at 18:08

## 2021-02-02 RX ADMIN — Medication 125 MILLIGRAM(S): at 04:53

## 2021-02-02 RX ADMIN — Medication 10 MILLIEQUIVALENT(S): at 18:07

## 2021-02-02 RX ADMIN — Medication 500 MILLIGRAM(S): at 04:53

## 2021-02-02 RX ADMIN — Medication 2000 UNIT(S): at 12:27

## 2021-02-02 RX ADMIN — Medication 325 MILLIGRAM(S): at 12:27

## 2021-02-02 RX ADMIN — LIDOCAINE 1 PATCH: 4 CREAM TOPICAL at 14:08

## 2021-02-02 RX ADMIN — LIDOCAINE 1 PATCH: 4 CREAM TOPICAL at 04:54

## 2021-02-02 RX ADMIN — CHOLESTYRAMINE 4 GRAM(S): 4 POWDER, FOR SUSPENSION ORAL at 14:06

## 2021-02-02 RX ADMIN — BUDESONIDE AND FORMOTEROL FUMARATE DIHYDRATE 2 PUFF(S): 160; 4.5 AEROSOL RESPIRATORY (INHALATION) at 22:26

## 2021-02-02 NOTE — PROGRESS NOTE ADULT - ASSESSMENT
contact #  daughter----Mortimer, Kelly  phone # [phone]    IMPRESSION:  69 y/o F PMHx lymphedema, HLD, RA, s/p cervical spine surgery at [facility] around 10/2020--- with prolonged hospital course complicated by C. diff infection (completed treatment course) and dysphagia (s/p PEG tube placement). Patient returned home on 11/24/20. Patient was admitted at [facility] from 11/28/2020---21/1/2020, was admitted with nausea/ vomiting/and abdominal pain, and was discharged 12/1/2020. She has been using a walker to ambulate, admitted with multiple falls and weakness, had ct scan this admission, hematology was consulted for anemia on 1/28 and was seen.  case d/w daughter, lives with  per , 2 daughters    RECOMMENDATION:  Anemia---likely multifactorial  hb is at 9.1 on 2/1  iron profile--low iron/transferrin saturation, ferritin is an acute phase reactant, as looking for a rapid response, patient is s/p iv iron for 3 days, po fe now  monitor h/h--transfuse prbc as needed for symptomatic anemia  nl ldh, low retic--no evidence of hemolysis  nl b12 level  monitor h/h--transfuse prbc as needed for symptomatic anemia or if hb is under 7  ?colitis on ct--gi is following  anemia/iron def possible/colitis---gi work up as per gi/pt/family to exclude gi pathology/malignancy  covid positive, id is following, is on remdesivir and decadron  gi/dvtp--lovenox s/c

## 2021-02-02 NOTE — PROGRESS NOTE ADULT - SUBJECTIVE AND OBJECTIVE BOX
[facility]  INFECTIOUS DISEASES   =======================================================    [id number]  VERONICA WHALEN     Follow up; COVID 19    Not on o2 this morning. comfortable, no SOB or cough.   No fever. Appetite is good, no GI symptoms.     PAST MEDICAL & SURGICAL HISTORY:  Obesity (BMI 30-39.9)  Traumatic arthropathy involving lower leg  right  History of Osteoarthritis  Hyperlipidemia  Restless Leg Syndrome  H/O neck surgery  H/O arthroscopy of right knee  2010  Bladder Disorder  Bladder lift 2000  History of Colonoscopy  Ex lap in 2009 for abscess in the baldder  History of Hysterectomy and bladder lift in 2000    Social Hx: no smoking, ETOH or drugs     FAMILY HISTORY:  No pertinent family history in first degree relatives    Allergies  No Known Allergies    Antibiotics:  dexAMETHasone  Injectable 6 milliGRAM(s) IV Push daily  remdesivir  IVPB   IV Intermittent      REVIEW OF SYSTEMS:  CONSTITUTIONAL:  No Fever or chills  HEENT:  No diplopia or blurred vision.  No sore throat or runny nose.  CARDIOVASCULAR:  No chest pain or SOB.  RESPIRATORY:  no cough, mild shortness of breath  GASTROINTESTINAL:  No nausea, vomiting or diarrhea.  GENITOURINARY:  No dysuria, frequency or urgency. No Blood in urine  MUSCULOSKELETAL:  no joint aches, no muscle pain  SKIN:  No change in skin, hair or nails.  NEUROLOGIC:  No paresthesias, fasciculations, seizures or weakness.  PSYCHIATRIC:  No disorder of thought or mood.  ENDOCRINE:  No heat or cold intolerance, polyuria or polydipsia.  HEMATOLOGICAL:  No easy bruising or bleeding.     Physical Exam:  Vital Signs Last 24 Hrs  T(C): 36.5 (02 Feb 2021 12:02), Max: 36.5 (02 Feb 2021 12:02)  T(F): 97.7 (02 Feb 2021 12:02), Max: 97.7 (02 Feb 2021 12:02)  HR: 97 (02 Feb 2021 12:02) (72 - 108)  BP: 110/60 (02 Feb 2021 12:02) (110/60 - 127/65)  BP(mean): --  RR: 19 (02 Feb 2021 12:02) (18 - 19)  SpO2: 99% (02 Feb 2021 12:02) (92% - 99%)  GEN: NAD  HEENT: normocephalic and atraumatic. EOMI. PERRL.    NECK: Supple.  No lymphadenopathy   LUNGS: Clear to auscultation.  HEART: Regular rate and rhythm   ABDOMEN: Soft, nontender, and nondistended.  Positive bowel sounds.    EXTREMITIES: + edema b/l   NEUROLOGIC: grossly intact.  PSYCHIATRIC: Appropriate affect .  SKIN: No rash    Labs:                        9.1    8.15  )-----------( 307      ( 01 Feb 2021 09:59 )             28.6     02-02    142  |  107  |  10  ----------------------------<  162<H>  3.8   |  27  |  0.89    Ca    7.7<L>      02 Feb 2021 12:02    TPro  6.8  /  Alb  2.5<L>  /  TBili  0.3  /  DBili  .10  /  AST  24  /  ALT  20  /  AlkPhos  126<H>  02-02    Culture - Urine (collected 01-29-21 @ 00:41)  Source: .Urine Clean Catch (Midstream)  Final Report (01-29-21 @ 20:29):    <10,000 CFU/mL Normal Urogenital Elizabeth    Culture - Blood (collected 01-28-21 @ 05:12)  Source: .Blood Blood-Peripheral  Final Report (02-02-21 @ 06:01):    No Growth Final    Culture - Blood (collected 01-28-21 @ 05:12)  Source: .Blood Blood-Peripheral  Final Report (02-02-21 @ 06:01):    No Growth Final    WBC Count: 8.15 K/uL (02-01-21 @ 09:59)  WBC Count: 9.07 K/uL (01-31-21 @ 08:06)  WBC Count: 8.55 K/uL (01-30-21 @ 09:55)  WBC Count: 6.69 K/uL (01-29-21 @ 09:07)    Creatinine, Serum: 0.89 mg/dL (02-02-21 @ 12:02)  Creatinine, Serum: 0.80 mg/dL (02-01-21 @ 09:59)  Creatinine, Serum: 0.85 mg/dL (01-31-21 @ 13:13)  Creatinine, Serum: 0.83 mg/dL (01-31-21 @ 08:06)  Creatinine, Serum: 0.72 mg/dL (01-30-21 @ 09:55)  Creatinine, Serum: 0.93 mg/dL (01-29-21 @ 10:44)  Creatinine, Serum: 0.84 mg/dL (01-29-21 @ 09:07)    C-Reactive Protein, Serum: 12.70 mg/dL (01-28-21 @ 11:50)  C-Reactive Protein, Serum: 15.50 mg/dL (01-28-21 @ 04:44)    Ferritin, Serum: 229 ng/mL (01-30-21 @ 15:07)  Ferritin, Serum: 168 ng/mL (01-28-21 @ 22:45)  Ferritin, Serum: 161 ng/mL (01-28-21 @ 11:50)  Ferritin, Serum: 173 ng/mL (01-28-21 @ 06:22)    Procalcitonin, Serum: 0.44 ng/mL (01-27-21 @ 23:16)     COVID-19 PCR: Detected (01-27-21 @ 23:16)    All imaging and other data have been reviewed.  < from: CT Chest No Cont (01.28.21 @ 00:10) >  IMPRESSION:  1.  No evidence of acute traumatic injury in the chest.  2.  Patchy groundglass opacities in the anterior segments of both upper lobes are new from 11/20/2020.  These may reflect nonspecific foci of infection, inflammation, edema or hemorrhage.  2.  Dense consolidation in the right middle lobe with air bronchograms and bronchiectasis is an region of lung that previously showed patchy groundglass nodular opacities on 11/20/2020.  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  3.  There is persistent groundglass opacity in the basilar segments of both lower lobes, right greater than left, which appears stable to mildly progressed from 11/20/2020.  This may reflect areas of infection/inflammation, edema, atelectasis or interstitial lung disease.  4.  Splenomegaly, which appears new from 11/20/2020.  The spleen measures 13.9 cm, increased in size from 11.8 cm on 11/20/2020.  5.  Follow-up chest CT is recommended in 1-3 months to reassess the findings and exclude continued disease progression.    Assessment and Plan:   72yo woman with PMH of OA, HDL, and bladder disorder was admitted with multiple falls and feeling weakness. She didn't have SOB, cough, vomiting, no diarrhea, no fever, chills, No sick contacts.  She tested positive for COVID so was admitted for treatment.   Treatment usually is different case by case, data suggest that these might work:   Remdisivir:  5 day course,  eGFR > 30 mL/min , ALT < 5X ULN  Steroid: For hypoxic patients on supplemental O2 of intubated. dexamethasone 6mg PO or IV Q-day x 10 days (equivalent to solumedrol 32mg IV or Prednisone 40mg)  Anticoagulation:  with prophylactic dosing, full dose to be considered in patients with increased risk for thromboembolic complications. Bleeding can happen but acceptable in high risk patients due to hypercoagulable state.  LMWH is good, for high risk patients consider discharge on oral anticoagulation with rivaroxaban (Xarelto) 10mg PO QD or Eliquis (apixaban) 2.5-5mg PO BID  x 4 weeks.  Currently data and recommendations for COVID-19 treatment are rapidly changing, so this treatment plan is based on my clinical judgement with available information.     COVID 19   - BMP, CBC w diff, NLR. Procalcitonin, Ferritin, CRP, LDH and D dimer for the start and periodically can be repeated.   - CXR with bilateral opacities more consistent with viral pneumonia   - Completed Remdesivir for total 5days  - Continue Dexamethasone 6mg po daily for 10days  - Watch O2 sat closely and taper as tolerated.   - No antibiotics at this time.  - Prophylactic anticoagulation due to hypercoagulable state    Will sign off please call with any question or change in her status.     Dr. Marito Torre   Infectious Diseases   Please call [phone] between 8am and 6pm, call [phone] after 6pm or weekends.

## 2021-02-02 NOTE — PROGRESS NOTE ADULT - ASSESSMENT
70 y/o F with HLD, syncope, frequent falls, OA, s/p B/l knee replacement and right hip replacement, and back surgery presented to the ED after multiple falls and weakness, found to have COVID Pna and pancolitis.    Syncope, Falls  - Patient has known syncope and multiple falls. Her B/L knee pain is likely contributory to her frequent falls.  Recommend pain control  - For her syncope, per Dr. Akers's note, who she had seen in 9/2020, was thought to be from dehydration and CVA.  However, CVA w/u was negative  - She was recently in [place] where she was discharged with a 2-week cardiac monitor.  However, she denies having been informed of the result.  She is unable to recall name of Cardiologist in [place] and is refusing to go back to him  - She had an outpatient CUS 2/2020) in our office which showed no hemodynamic significant stenosis  - She had a TTE in 1/2020 showing normal LVF, EF 60% with bicuspid AV, mild AS.    - Repeat TTE inpatient showed normal LV & RV size and function EF 55-60%, trace MR and TR, mild AS  - NSR on tele. Can d/c tele if not requiring O2 monitoring   - She will need an ILR to be arranged as outpatient  - Fall precaution  - Obtain orthostatics if able  - Continue to encourage PO fluid intake    Covid Pna  - Tolerating RA  - Supplemental O2 as needed  - Supportive care  - Initiate incentive spirometer  - Follow Pulm and ID recs    DVT ppx  - Per Primary    - Monitor and replete lytes, keep K>4, Mg>2.  - All other medical needs as per primary team.  - Other cardiovascular workup will depend on clinical course.  - Will continue to follow.    Levi Silverman, MS FNP, [facility]  Nurse Practitioner- Cardiology   Spectra #[phone]

## 2021-02-02 NOTE — PROGRESS NOTE ADULT - SUBJECTIVE AND OBJECTIVE BOX
INTERVAL HPI/OVERNIGHT EVENTS:  chart reviewed  interim events noted  only had smear of stool overnight, cdiff specimen ordered but now cancelled as pt is now having formed stool, remains on ppx vanco  did receive imodium x1 yesterday    MEDICATIONS  (STANDING):  ascorbic acid 500 milliGRAM(s) Oral daily  budesonide 160 MICROgram(s)/formoterol 4.5 MICROgram(s) Inhaler 2 Puff(s) Inhalation two times a day  cholecalciferol 2000 Unit(s) Oral daily  cholestyramine Powder (Sugar-Free) 4 Gram(s) Oral every 24 hours  dexAMETHasone     Tablet 6 milliGRAM(s) Oral daily  enoxaparin Injectable 40 milliGRAM(s) SubCutaneous daily  famotidine    Tablet 20 milliGRAM(s) Oral two times a day  ferrous    sulfate 325 milliGRAM(s) Oral daily  influenza   Vaccine 0.5 milliLiter(s) IntraMuscular once  lactobacillus acidophilus 1 Tablet(s) Oral three times a day  lidocaine   Patch 1 Patch Transdermal every 24 hours  multivitamin/minerals 1 Tablet(s) Oral daily  potassium chloride    Tablet ER 10 milliEquivalent(s) Oral daily  pramipexole 1.5 milliGRAM(s) Oral every 24 hours  sulfaSALAzine 500 milliGRAM(s) Oral three times a day  torsemide 5 milliGRAM(s) Oral two times a day  vancomycin    Solution 125 milliGRAM(s) Oral every 6 hours    MEDICATIONS  (PRN):  acetaminophen   Tablet .. 650 milliGRAM(s) Oral every 6 hours PRN Temp greater or equal to 38C (100.4F), Mild Pain (1 - 3)  ALBUTerol    90 MICROgram(s) HFA Inhaler 2 Puff(s) Inhalation every 4 hours PRN Shortness of Breath and/or Wheezing  aluminum hydroxide/magnesium hydroxide/simethicone Suspension 30 milliLiter(s) Oral every 4 hours PRN Dyspepsia  benzonatate 100 milliGRAM(s) Oral three times a day PRN Cough  cyclobenzaprine 5 milliGRAM(s) Oral three times a day PRN Muscle Spasm  melatonin 3 milliGRAM(s) Oral at bedtime PRN Insomnia  ondansetron Injectable 4 milliGRAM(s) IV Push every 6 hours PRN Nausea and/or Vomiting  oxyCODONE    IR 5 milliGRAM(s) Oral four times a day PRN Severe Pain (7 - 10)  traMADol 25 milliGRAM(s) Oral four times a day PRN Moderate Pain (4 - 6)      Allergies    No Known Allergies    Intolerances        Review of Systems:    tele f/u      Vital Signs Last 24 Hrs  T(C): 36.3 (02 Feb 2021 04:50), Max: 36.6 (01 Feb 2021 11:59)  T(F): 97.3 (02 Feb 2021 04:50), Max: 97.8 (01 Feb 2021 11:59)  HR: 72 (02 Feb 2021 04:50) (72 - 107)  BP: 127/65 (02 Feb 2021 04:50) (107/71 - 127/65)  BP(mean): --  RR: 18 (02 Feb 2021 04:50) (18 - 18)  SpO2: 93% (02 Feb 2021 04:50) (92% - 96%)    PHYSICAL EXAM:  tele f/u      LABS:                        9.1    8.15  )-----------( 307      ( 01 Feb 2021 09:59 )             28.6     02-01    144  |  109<H>  |  11  ----------------------------<  100<H>  3.5   |  26  |  0.80    Ca    7.4<L>      01 Feb 2021 09:59    TPro  6.0  /  Alb  2.3<L>  /  TBili  0.3  /  DBili  <.10  /  AST  30  /  ALT  16  /  AlkPhos  119  02-01          RADIOLOGY & ADDITIONAL TESTS:

## 2021-02-02 NOTE — PROGRESS NOTE ADULT - SUBJECTIVE AND OBJECTIVE BOX
Patient is a 71y old  Female who presents with a chief complaint of sob (02 Feb 2021 13:57)      INTERVAL /OVERNIGHT EVENTS: diarhea better    MEDICATIONS  (STANDING):  ascorbic acid 500 milliGRAM(s) Oral daily  budesonide 160 MICROgram(s)/formoterol 4.5 MICROgram(s) Inhaler 2 Puff(s) Inhalation two times a day  cholecalciferol 2000 Unit(s) Oral daily  cholestyramine Powder (Sugar-Free) 4 Gram(s) Oral every 24 hours  dexAMETHasone     Tablet 6 milliGRAM(s) Oral daily  enoxaparin Injectable 40 milliGRAM(s) SubCutaneous daily  famotidine    Tablet 20 milliGRAM(s) Oral two times a day  ferrous    sulfate 325 milliGRAM(s) Oral daily  influenza   Vaccine 0.5 milliLiter(s) IntraMuscular once  lactobacillus acidophilus 1 Tablet(s) Oral three times a day  lidocaine   Patch 1 Patch Transdermal every 24 hours  multivitamin/minerals 1 Tablet(s) Oral daily  potassium chloride    Tablet ER 10 milliEquivalent(s) Oral two times a day  pramipexole 1.5 milliGRAM(s) Oral every 24 hours  sulfaSALAzine 500 milliGRAM(s) Oral three times a day  torsemide 5 milliGRAM(s) Oral two times a day  vancomycin    Solution 125 milliGRAM(s) Oral every 6 hours    MEDICATIONS  (PRN):  acetaminophen   Tablet .. 650 milliGRAM(s) Oral every 6 hours PRN Temp greater or equal to 38C (100.4F), Mild Pain (1 - 3)  ALBUTerol    90 MICROgram(s) HFA Inhaler 2 Puff(s) Inhalation every 4 hours PRN Shortness of Breath and/or Wheezing  aluminum hydroxide/magnesium hydroxide/simethicone Suspension 30 milliLiter(s) Oral every 4 hours PRN Dyspepsia  benzonatate 100 milliGRAM(s) Oral three times a day PRN Cough  cyclobenzaprine 5 milliGRAM(s) Oral three times a day PRN Muscle Spasm  melatonin 3 milliGRAM(s) Oral at bedtime PRN Insomnia  ondansetron Injectable 4 milliGRAM(s) IV Push every 6 hours PRN Nausea and/or Vomiting  oxyCODONE    IR 5 milliGRAM(s) Oral four times a day PRN Severe Pain (7 - 10)  traMADol 25 milliGRAM(s) Oral four times a day PRN Moderate Pain (4 - 6)      Allergies    No Known Allergies    Intolerances        REVIEW OF SYSTEMS:  CONSTITUTIONAL: No fever, weight loss, or fatigue  EYES: No eye pain, visual disturbances, or discharge  ENMT:  No difficulty hearing, tinnitus, vertigo; No sinus or throat pain  NECK: No pain or stiffness  RESPIRATORY: No cough, wheezing, chills or hemoptysis; No shortness of breath  CARDIOVASCULAR: No chest pain, palpitations, dizziness, or leg swelling  GASTROINTESTINAL: No abdominal or epigastric pain. No nausea, vomiting, or hematemesis; No diarrhea or constipation. No melena or hematochezia.  GENITOURINARY: No dysuria, frequency, hematuria, or incontinence  NEUROLOGICAL: No headaches, memory loss, loss of strength, numbness, or tremors  SKIN: No itching, burning, rashes, or lesions   LYMPH NODES: No enlarged glands  ENDOCRINE: No heat or cold intolerance; No hair loss; No polydipsia or polyuria  MUSCULOSKELETAL: No joint pain or swelling; No muscle, back, or extremity pain  PSYCHIATRIC: No depression, anxiety, mood swings, or difficulty sleeping  HEME/LYMPH: No easy bruising, or bleeding gums  ALLERGY AND IMMUNOLOGIC: No hives or eczema    Vital Signs Last 24 Hrs  T(C): 36.5 (02 Feb 2021 12:02), Max: 36.5 (02 Feb 2021 12:02)  T(F): 97.7 (02 Feb 2021 12:02), Max: 97.7 (02 Feb 2021 12:02)  HR: 97 (02 Feb 2021 12:02) (72 - 108)  BP: 110/60 (02 Feb 2021 12:02) (110/60 - 127/65)  BP(mean): --  RR: 19 (02 Feb 2021 12:02) (18 - 19)  SpO2: 99% (02 Feb 2021 12:02) (92% - 99%)    PHYSICAL EXAM:  GENERAL: NAD, well-groomed, well-developed  HEAD:  Atraumatic, Normocephalic  EYES: EOMI, PERRLA, conjunctiva and sclera clear  ENMT: No tonsillar erythema, exudates, or enlargement; Moist mucous membranes, Good dentition, No lesions  NECK: Supple, No JVD, Normal thyroid  NERVOUS SYSTEM:  Alert & Oriented X3, Good concentration; Motor Strength 5/5 B/L upper and lower extremities; DTRs 2+ intact and symmetric  CHEST/LUNG: Clear to auscultation bilaterally; No rales, rhonchi, wheezing, or rubs  HEART: Regular rate and rhythm; No murmurs, rubs, or gallops  ABDOMEN: Soft, Nontender, Nondistended; Bowel sounds present  EXTREMITIES:  2+ Peripheral Pulses, No clubbing, cyanosis, or edema  LYMPH: No lymphadenopathy noted  SKIN: No rashes or lesions    LABS:    02 Feb 2021 12:02    142    |  107    |  10     ----------------------------<  162    3.8     |  27     |  0.89     Ca    7.7        02 Feb 2021 12:02    TPro  6.8    /  Alb  2.5    /  TBili  0.3    /  DBili  .10    /  AST  24     /  ALT  20     /  AlkPhos  126    02 Feb 2021 12:02        CAPILLARY BLOOD GLUCOSE          RADIOLOGY & ADDITIONAL TESTS:    Notes Reviewed:  [x ] YES  [ ] NO    Care Discussed with Consultants/Other Providers [x ] YES  [ ] NO

## 2021-02-02 NOTE — PROGRESS NOTE ADULT - ASSESSMENT
Pt. with Covid pneumonia. Clinically stable.   Oxygenating ok.   Would steroids.  Can dc pt if stable to rehab.  OOB

## 2021-02-02 NOTE — PROGRESS NOTE ADULT - SUBJECTIVE AND OBJECTIVE BOX
Neurology follow up note    VERONICA [id number]      Interval History:    Patient feels ok no new complaints sitting in chair     MEDICATIONS    acetaminophen   Tablet .. 650 milliGRAM(s) Oral every 6 hours PRN  ALBUTerol    90 MICROgram(s) HFA Inhaler 2 Puff(s) Inhalation every 4 hours PRN  aluminum hydroxide/magnesium hydroxide/simethicone Suspension 30 milliLiter(s) Oral every 4 hours PRN  ascorbic acid 500 milliGRAM(s) Oral daily  benzonatate 100 milliGRAM(s) Oral three times a day PRN  budesonide 160 MICROgram(s)/formoterol 4.5 MICROgram(s) Inhaler 2 Puff(s) Inhalation two times a day  cholecalciferol 2000 Unit(s) Oral daily  cholestyramine Powder (Sugar-Free) 4 Gram(s) Oral every 24 hours  cyclobenzaprine 5 milliGRAM(s) Oral three times a day PRN  dexAMETHasone     Tablet 6 milliGRAM(s) Oral daily  enoxaparin Injectable 40 milliGRAM(s) SubCutaneous daily  famotidine    Tablet 20 milliGRAM(s) Oral two times a day  ferrous    sulfate 325 milliGRAM(s) Oral daily  influenza   Vaccine 0.5 milliLiter(s) IntraMuscular once  lactobacillus acidophilus 1 Tablet(s) Oral three times a day  lidocaine   Patch 1 Patch Transdermal every 24 hours  melatonin 3 milliGRAM(s) Oral at bedtime PRN  multivitamin/minerals 1 Tablet(s) Oral daily  ondansetron Injectable 4 milliGRAM(s) IV Push every 6 hours PRN  oxyCODONE    IR 5 milliGRAM(s) Oral four times a day PRN  potassium chloride    Tablet ER 10 milliEquivalent(s) Oral daily  pramipexole 1.5 milliGRAM(s) Oral every 24 hours  sulfaSALAzine 500 milliGRAM(s) Oral three times a day  torsemide 5 milliGRAM(s) Oral two times a day  traMADol 25 milliGRAM(s) Oral four times a day PRN  vancomycin    Solution 125 milliGRAM(s) Oral every 6 hours      Allergies    No Known Allergies    Intolerances            Vital Signs Last 24 Hrs  T(C): 36.5 (02 Feb 2021 12:02), Max: 36.5 (02 Feb 2021 12:02)  T(F): 97.7 (02 Feb 2021 12:02), Max: 97.7 (02 Feb 2021 12:02)  HR: 97 (02 Feb 2021 12:02) (72 - 97)  BP: 110/60 (02 Feb 2021 12:02) (110/60 - 127/65)  BP(mean): --  RR: 19 (02 Feb 2021 12:02) (18 - 19)  SpO2: 99% (02 Feb 2021 12:02) (92% - 99%)      REVIEW OF SYSTEMS:  Constitutional:  The patient denies fever, chills, or night sweats.  Head:  No headache.  Eyes:  No double vision or blurry vision.  Ears:  No ringing in the ears.  Neck:  Positive history of neck pain, but had cervical surgery done there and arthritis.  Respiratory:  No shortness of breath at present.  Cardiovascular:  No chest pain.  Abdomen:  No nausea, vomiting, or abdominal pain.  Extremities/Neurological:  Occasional musculoskeletal pain in her legs and joints.  Genitourinary:  No burning upon urination.    PHYSICAL EXAMINATION:   HEENT:  Head:  Normocephalic, atraumatic.  Eyes:  No scleral icterus.  Ears:  Hearing bilaterally intact.  NECK:  Supple.  RESPIRATORY:  Decreased breath sounds bilaterally.  CARDIOVASCULAR:  S1 and S2 heard.  ABDOMEN:  Soft and nontender.  EXTREMITIES:  No clubbing or cyanosis were noted.  GENERAL:  Positive surgical scar was noted in the cervical region in the bilateral knees.      NEUROLOGIC:  The patient is awake and alert.  Extraocular movement were intact.  The patient has decreased range of motion of her neck secondary to surgical intervention.  Speech was fluent.  Smile was symmetric.  Motor:  The patient has decreased range of motion of bilateral shoulders.  Able to elevate roughly about 60 degrees off the bed.  Overall strength was 3+/5.  Bilateral lower extremities, the patient is able to move side-to-side.  Had significant lymphedema in the bilateral lower extremities.  Sensory was decreased to light touch in the bilateral lower extremities, but has significant lymphedema.  The patient had impaired proprioception in the bilateral lower extremities.               LABS:  CBC Full  -  ( 01 Feb 2021 09:59 )  WBC Count : 8.15 K/uL  RBC Count : 3.43 M/uL  Hemoglobin : 9.1 g/dL  Hematocrit : 28.6 %  Platelet Count - Automated : 307 K/uL  Mean Cell Volume : 83.4 fl  Mean Cell Hemoglobin : 26.5 pg  Mean Cell Hemoglobin Concentration : 31.8 gm/dL  Auto Neutrophil # : 6.63 K/uL  Auto Lymphocyte # : 1.09 K/uL  Auto Monocyte # : 0.37 K/uL  Auto Eosinophil # : 0.01 K/uL  Auto Basophil # : 0.00 K/uL  Auto Neutrophil % : 81.4 %  Auto Lymphocyte % : 13.4 %  Auto Monocyte % : 4.5 %  Auto Eosinophil % : 0.1 %  Auto Basophil % : 0.0 %      02-02    142  |  107  |  10  ----------------------------<  162<H>  3.8   |  27  |  0.89    Ca    7.7<L>      02 Feb 2021 12:02    TPro  6.8  /  Alb  2.5<L>  /  TBili  0.3  /  DBili  .10  /  AST  24  /  ALT  20  /  AlkPhos  126<H>  02-02    Hemoglobin A1C:     LIVER FUNCTIONS - ( 02 Feb 2021 12:02 )  Alb: 2.5 g/dL / Pro: 6.8 g/dL / ALK PHOS: 126 U/L / ALT: 20 U/L / AST: 24 U/L / GGT: x           Vitamin B12         RADIOLOGY  ANALYSIS AND PLAN:  A 71-year-old with a history of frequent falls, restless legs syndrome.  For episodes of frequent falls, syncopal events, the patient has had multiple workups in the past for this.  Questionable this could be secondary to any type of underlying orthostatic changes.  The patient does have a history of poor oral intake.  The patient also is mostly sedentary.  Also, the patient does have lymphedema in the bilateral lower extremities.  The patient was to be evaluated for possible underlying narcolepsy with sleep studies.  Telemetry evaluation.  Monitor orthostatics as needed.  For restless leg syndrome, continue the patient on her Mirapex.  For history of possible peripheral neuropathy, supportive therapy.  Fall precautions.  For generalized paresis secondary to underlying infectious type process COVID.  For cervical radiculopathy, continue the patient on her muscle relaxants as needed.  overall more interactive   will attempt EEG   monitor for diarrhea as per daughter   The daughter's name is Edelmira.  Telephone number is [phone] 2/2/2021.  She does understand my reasoning and thought process.  Greater than 45 minutes of time was spent with the patient, plan of care, reviewing data, speaking to the family and  50% of the visit was spent counseling and/or coordinating care with multidisciplinary healthcare team

## 2021-02-02 NOTE — SWALLOW VFSS/MBS ASSESSMENT ADULT - RECOMMENDED CONSISTENCY
1. Mechanical soft solids (dysphagia 2) with nectar thick liquids via TEASPOON administration.  2. Pt is at a heightened aspiration risk if consuming large bolus volume via cup or straw presentation.  3. Aspiration precautions.  4. Safe swallowing guidelines: position pt upright 90 degrees, feed only when fully awake/alert and when on NC or room air, all PO via teaspoon, complete full mastication of solids, double swallow per bolus and alternate bite/sip to assist with pharyngeal clearance, pace meal slowly, and remain upright 30 to 45 minutes post meal.  5. Continue to monitor respiratory status closely; if there is a decline in status, please hold PO and notify SLP.  6. Ongoing oral care.  7. Swallowing therapy s/p d/c to maximize oropharyngeal swallow mechanism. IMPRESSIONS CONTINUED: pharyngeal residue.  5. Mild pharyngeal dysphagia when given puree and regular solids marked by timely pharyngeal swallow trigger, reduced BOT retraction, mildly reduced hyolaryngeal elevation/excursion, and complete epiglottic retroflexion. There was no penetration and/or aspiration pre/during/post swallow. There was trace BOT, mild to moderate valleculae, and mild pyriform residue post swallow. Pt demonstrated absent sensation to pharyngeal residue, but completed cued additional dry swallow, which resulted in partial clearance of pharyngeal residue. Alternating bite/sip also assisted in partial clearance of pharyngeal residue.    RECOMMENDATIONS:  1. Mechanical soft solids (dysphagia 2) with nectar thick liquids via TEASPOON administration.  2. Pt is at a heightened aspiration risk if consuming large bolus volume via cup or straw presentation.  3. Aspiration precautions.  4. Safe swallowing guidelines: position pt upright 90 degrees, feed only when fully awake/alert and when on NC or room air, all PO via teaspoon, complete full mastication of solids, double swallow per bolus and alternate bite/sip to assist with pharyngeal clearance, pace meal slowly, and remain upright 30 to 45 minutes post meal.  5. Continue to monitor respiratory status closely; if there is a decline in status, please hold PO and notify SLP.  6. Ongoing oral care.  7. Swallowing therapy s/p d/c to maximize oropharyngeal swallow mechanism.

## 2021-02-02 NOTE — PROGRESS NOTE ADULT - SUBJECTIVE AND OBJECTIVE BOX
PULMONARY/CRITICAL CARE    DOS 21  Denies sob.   Feels well.  Had loose stools  No complaints. Was OOB.    No fever.     Patient is a 71y old  Female who presents with a chief complaint of sob (2021 17:48)    BRIEF HOSPITAL COURSE: ***72yo woman with PMH of OA, HDL, and bladder disorder was admitted with multiple falls and feeling weakness. She didn't have SOB, cough, vomiting, no diarrhea, no fever, chills, No sick contacts.  She tested positive for COVID so was admitted for treatment. She has been started on remdesivir and Dexamethasone.       Events last 24 hours: ***    PAST MEDICAL & SURGICAL HISTORY:  Obesity (BMI 30-39.9)    Traumatic arthropathy involving lower leg  right    History of Osteoarthritis    Hyperlipidemia    Restless Leg Syndrome    H/O neck surgery    H/O arthroscopy of right knee      Bladder Disorder  Bladder lift     History of Colonoscopy    Ex lap in  for abscess in the baldder    History of Hysterectomy and bladder lift in       Allergies    No Known Allergies    Intolerances      FAMILY HISTORY: Smoked 1ppd for 25 yrs until 2017 no etoh  No pertinent family history in first degree relatives          Medications:  remdesivir  IVPB 100 milliGRAM(s) IV Intermittent every 24 hours  remdesivir  IVPB   IV Intermittent     torsemide 5 milliGRAM(s) Oral two times a day    ALBUTerol    90 MICROgram(s) HFA Inhaler 2 Puff(s) Inhalation every 4 hours PRN  benzonatate 100 milliGRAM(s) Oral three times a day PRN  budesonide 160 MICROgram(s)/formoterol 4.5 MICROgram(s) Inhaler 2 Puff(s) Inhalation two times a day    acetaminophen   Tablet .. 650 milliGRAM(s) Oral every 6 hours PRN  melatonin 3 milliGRAM(s) Oral at bedtime PRN  ondansetron Injectable 4 milliGRAM(s) IV Push every 6 hours PRN  oxyCODONE    IR 5 milliGRAM(s) Oral four times a day PRN  pramipexole 1.5 milliGRAM(s) Oral every 24 hours  traMADol 25 milliGRAM(s) Oral four times a day PRN      enoxaparin Injectable 40 milliGRAM(s) SubCutaneous daily    aluminum hydroxide/magnesium hydroxide/simethicone Suspension 30 milliLiter(s) Oral every 4 hours PRN  famotidine    Tablet 20 milliGRAM(s) Oral two times a day  sulfaSALAzine 500 milliGRAM(s) Oral three times a day      dexAMETHasone  Injectable 6 milliGRAM(s) IV Push daily    ascorbic acid 500 milliGRAM(s) Oral daily  cholecalciferol 2000 Unit(s) Oral daily  multivitamin/minerals 1 Tablet(s) Oral daily  potassium chloride   Powder 20 milliEquivalent(s) Oral four times a day    influenza   Vaccine 0.5 milliLiter(s) IntraMuscular once    lidocaine   Patch 1 Patch Transdermal every 24 hours    lactobacillus acidophilus 1 Tablet(s) Oral daily          ICU Vital Signs Last 24 Hrs  T(C): 36.3 (2021 04:59), Max: 36.8 (2021 21:39)  T(F): 97.4 (2021 04:59), Max: 98.2 (2021 21:39)  HR: 71 (2021 04:59) (60 - 76)  BP: 120/64 (2021 04:59) (96/57 - 120/64)  BP(mean): --  ABP: --  ABP(mean): --  RR: 18 (2021 04:59) (18 - 20)  SpO2: 96% (2021 04:59) (92% - 99%)    Vital Signs Last 24 Hrs  T(C): 36.3 (2021 04:59), Max: 36.8 (2021 21:39)  T(F): 97.4 (2021 04:59), Max: 98.2 (2021 21:39)  HR: 71 (2021 04:59) (60 - 76)  BP: 120/64 (2021 04:59) (96/57 - 120/64)  BP(mean): --  RR: 18 (2021 04:59) (18 - 20)  SpO2: 96% (2021 04:59) (92% - 99%)        I&O's Detail    2021 07:01  -  2021 07:00  --------------------------------------------------------  IN:    IV PiggyBack: 350 mL  Total IN: 350 mL    OUT:    Voided (mL): 650 mL  Total OUT: 650 mL    Total NET: -300 mL            LABS:                        8.9    x     )-----------( x        ( 2021 16:50 )             27.5         139  |  106  |  6<L>  ----------------------------<  86  2.9<LL>   |  27  |  0.71    Ca    7.3<L>      2021 08:03  Phos  3.1       Mg     1.9         TPro  5.8<L>  /  Alb  2.1<L>  /  TBili  0.4  /  DBili  .20  /  AST  70<H>  /  ALT  19  /  AlkPhos  130<H>        CARDIAC MARKERS ( 2021 08:03 )  x     / x     / 556 U/L / x     / x          CAPILLARY BLOOD GLUCOSE        PT/INR - ( 2021 23:16 )   PT: 17.6 sec;   INR: 1.54 ratio         PTT - ( 2021 23:16 )  PTT:28.6 sec  Urinalysis Basic - ( 2021 20:10 )    Color: Yellow / Appearance: Clear / S.010 / pH: x  Gluc: x / Ketone: Negative  / Bili: Negative / Urobili: Negative   Blood: x / Protein: 30 mg/dL / Nitrite: Negative   Leuk Esterase: Negative / RBC: 0-2 /HPF / WBC 0-2   Sq Epi: x / Non Sq Epi: Few / Bacteria: Few      CULTURES:  Culture Results:   No growth to date. ( @ 05:12)  Culture Results:   No growth to date. ( @ 05:12)      Physical Examination:    General: No acute distress.  elderly female    HEENT: Pupils equal, reactive to light.  Symmetric.    PULM: bibas crackles good excurion on wheeze     CVS: Regular rate and rhythm, no murmurs, rubs, or gallops    ABD: Soft, nondistended, nontender, normoactive bowel sounds, no masses    EXT: brawny leg  edema, nontender    SKIN: Warm and well perfused, no rashes noted.    NEURO: Alert, nteractive, nonfocal    RADIOLOGY: ***    d< from: CT Chest No Cont (21 @ 00:10) >  EXAM:  CT CHEST                            PROCEDURE DATE:  2021          INTERPRETATION:  CLINICAL INFORMATION: Chest trauma.  Status post fall.    COMPARISON: 2020.    PROCEDURE:  CT of the Chest was performed without intravenous contrast.  Sagittal and coronal reformats were performed.    FINDINGS:    LUNGS AND AIRWAYS: There is moderate respiratory motion artifact the central airways are grossly clear within limits of motion degradation.  There is mild to moderate centrilobular emphysema.  There are nonspecific groundglass opacities in the anterior segments of both upper lobes measuring up to approximately 1.9 x 1.9 cm on the right (3:226) and 1.4 x 1.1 cm on the left (3:217), which are new since 2020.  There is dense consolidation in the right middle lobe with air bronchograms and bronchiectasis, in a region of lung that previously showed patchy groundglass nodular opacities on 2020.  Again seen is patchy groundglass opacity in the basilar segments of the right greater than left lower lobes which appears stable to mildly progressed from 2020.  PLEURA: No pleural effusion.  MEDIASTINUM AND MARYBEL: No lymphadenopathy.  VESSELS: Mild atherosclerotic calcification thoracic aorta and arch vessels.  HEART:Cardiomegaly.  Calcifications involving sinuses of Valsalva and aortic valve leaflets.  Moderate atherosclerotic calcification the coronary arteries. No pericardial effusion.  CHEST WALL AND LOWER NECK: Within normal limits.  VISUALIZED UPPER ABDOMEN: Enlarged spleen measures up to 13.9 cm, increased in size from approximately 11.8 cm on 2020.  BONES: The patient is status post C2-T7 posterior spinal fusion with shamar screw construct.  Within the thoracic spine there are pedicle screws at T1 bilaterally, T2 bilaterally, T3 on the right, T4 on the left, T5 bilaterally, T6 bilaterally and T7 bilaterally.  There is also a screw coursing through the base of the T3 spinous process and right T3 lamina.  Multiple pedicle screws are slightly lateral in position and course into the costovertebral vertebral junctions.  There is no evidence of hardware fracture or abnormal lucency around the pedicle screws.  There are old/healing fractures of the left third, sixth, seventh and eighth ribs with external callus formation.    IMPRESSION:  1.  No evidence of acute traumatic injury in the chest.    2.  Patchy groundglass opacities in the anterior segments of both upper lobes are new from 2020.  These may reflect nonspecific foci of infection, inflammation, edema or hemorrhage.    2.  Dense consolidation in the right middle lobe with air bronchograms and bronchiectasis is an region of lung that previously showed patchy groundglass nodular opacities on 2020.  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    3.  There is persistent groundglass opacity in the basilar segments of both lower lobes, right greater than left, which appears stable to mildly progressed from 2020.  This may reflect areas of infection/inflammation, edema, atelectasis or interstitial lung disease.    4.  Splenomegaly, which appears new from 2020.  The spleen measures 13.9 cm, increased in size from 11.8 cm on 2020.    5.  Follow-up chest CT is recommended in 1-3 months to reassess the findings and exclude continued disease progression.    < end of copied text >  < from: Xray Chest 1 View- PORTABLE-Routine (Xray Chest 1 View- PORTABLE-Routine in AM.) (21 @ 07:57) >  EXAM:  XR CHEST PORTABLE ROUTINE 1V                            PROCEDURE DATE:  2021          INTERPRETATION:  CLINICAL HISTORY: 71-year-old with Covid pneumonia..    TECHNIQUE: A single AP radiograph of the chest was reviewed..    COMPARISON: Chest radiograph from 2021.    FINDINGS/  IMPRESSION:    Tubes/lines: Multiple radiopaque densities project over the left chest. Cervicothoracic spine fixation hardware.    Lungs: Bibasilar atelectasis. Small airspace opacity left midlung consistent with pneumonia..    Pleura: No pneumothorax or pleural effusions.    Cardiomediastinal silhouette: Within normal limits.            SARWAT BURDEN MD; Attending Radiologist  This document has been electronically signed. 2021  3:43PM    < end of copied text >  CXR bilat infil

## 2021-02-02 NOTE — SWALLOW VFSS/MBS ASSESSMENT ADULT - COMMENTS
Pt last seen on 1/29, at which time pt was recommended mechanical soft solids with nectar thick liquids. MD placed order for MBS to further evlauate oropharyngeal swallow mechanism.

## 2021-02-02 NOTE — SWALLOW VFSS/MBS ASSESSMENT ADULT - ORAL PHASE
Delayed oral transit time/Reduced anterior - posterior transport Incomplete tongue to palate contact/Uncontrolled bolus / spillover in hypopharynx within functional limits

## 2021-02-02 NOTE — SWALLOW VFSS/MBS ASSESSMENT ADULT - SLP GENERAL OBSERVATIONS
Pt received sitting upright in MBS chair, +lateral view. Pt reported generalized pain, but wanted to participate in study and reported she was able to tolerate. Pt was awake and cooperative. Pt was safely transferred back to bed post study and reported decline in overall pain.

## 2021-02-02 NOTE — SWALLOW VFSS/MBS ASSESSMENT ADULT - DIAGNOSTIC IMPRESSIONS
1. Mild to moderate oral dysphagia when given regular solids marked by adequate retrieval and containment, prolonged mastication 2/2 absent bottom dentition, adequate bolus cohesion, reduced posterior transfer with delayed oral transit time, and adequate clearance post swallow.  2. Mild oral dysphagia when given puree, honey thick liquids, nectar thick liquids, and thin liquids marked by adequate retrieval and containment, reduced bolus cohesion across all liquids resulting in premature to the hypopharynx, timely oral transit, and adequate clearance post swallow.  3. Moderate pharyngeal dysphagia when given thin liquids marked by delayed pharyngeal swallow trigger at the level of the pyriforms, reduced BOT retraction, reduced hyolaryngeal elevation/excursion, and incomplete epiglottic retroflexion. There was trace silent penetration during the swallow w/o retrieval. Pt's cued cough response was ineffective in expelling barium from airway, resulting in trace residue in laryngeal vestibule post swallow. Pt was in significant pain and unable to complete compensatory strategies 2/2 significant cervical hardware. There was trace BOT and mild vallecular and pyriform residue post swallow. Pt demonstrated absent sensation to pharyngeal residue, but completed cued additional dry swallow, which resulted in partial clearance of pharyngeal residue.  4. Mild to moderate pharyngeal dysphagia when given nectar thick liquids marked by delayed pharyngeal swallow trigger at the level of the valleculae, reduced BOT retraction, reduced hyolaryngeal elevation/excursion, and incomplete epiglottic retroflexion. There was trace silent penetration during the swallow w/o retrieval upon larger bolus volumes. This was eliminated with bolus modification to teaspoon volume. There was trace BOT and mild vallecular and pyriform residue post swallow. Pt demonstrated absent sensation to pharyngeal residue, but completed cued additional dry swallow, which resulted in partial clearance of

## 2021-02-02 NOTE — PROGRESS NOTE ADULT - SUBJECTIVE AND OBJECTIVE BOX
[facility] -- Quang Akers, Itzel Herrera, Kiran Peterson, Jules Richardson: Office # [phone]    Follow Up:  Multiple Falls, Syncope    Subjective/Observations:  Patient seen and examined. Patient awake, alert, resting in bed. No complaints of chest pain, dyspnea, palpitations or dizziness. Tolerating room air.     REVIEW OF SYSTEMS: All review of systems is negative for eye, ENT, GI, , allergic, dermatologic, musculoskeletal and neurologic except as described above    PAST MEDICAL & SURGICAL HISTORY:  Obesity (BMI 30-39.9)    Traumatic arthropathy involving lower leg  right    History of Osteoarthritis    Hyperlipidemia    Restless Leg Syndrome    H/O neck surgery    H/O arthroscopy of right knee  2010    Bladder Disorder  Bladder lift 2000    History of Colonoscopy    Ex lap in 2009 for abscess in the baldder    History of Hysterectomy and bladder lift in 2000    MEDICATIONS  (STANDING):  ascorbic acid 500 milliGRAM(s) Oral daily  budesonide 160 MICROgram(s)/formoterol 4.5 MICROgram(s) Inhaler 2 Puff(s) Inhalation two times a day  cholecalciferol 2000 Unit(s) Oral daily  cholestyramine Powder (Sugar-Free) 4 Gram(s) Oral every 24 hours  dexAMETHasone     Tablet 6 milliGRAM(s) Oral daily  enoxaparin Injectable 40 milliGRAM(s) SubCutaneous daily  famotidine    Tablet 20 milliGRAM(s) Oral two times a day  ferrous    sulfate 325 milliGRAM(s) Oral daily  influenza   Vaccine 0.5 milliLiter(s) IntraMuscular once  lactobacillus acidophilus 1 Tablet(s) Oral three times a day  lidocaine   Patch 1 Patch Transdermal every 24 hours  multivitamin/minerals 1 Tablet(s) Oral daily  potassium chloride    Tablet ER 10 milliEquivalent(s) Oral daily  pramipexole 1.5 milliGRAM(s) Oral every 24 hours  sulfaSALAzine 500 milliGRAM(s) Oral three times a day  torsemide 5 milliGRAM(s) Oral two times a day  vancomycin    Solution 125 milliGRAM(s) Oral every 6 hours    MEDICATIONS  (PRN):  acetaminophen   Tablet .. 650 milliGRAM(s) Oral every 6 hours PRN Temp greater or equal to 38C (100.4F), Mild Pain (1 - 3)  ALBUTerol    90 MICROgram(s) HFA Inhaler 2 Puff(s) Inhalation every 4 hours PRN Shortness of Breath and/or Wheezing  aluminum hydroxide/magnesium hydroxide/simethicone Suspension 30 milliLiter(s) Oral every 4 hours PRN Dyspepsia  benzonatate 100 milliGRAM(s) Oral three times a day PRN Cough  cyclobenzaprine 5 milliGRAM(s) Oral three times a day PRN Muscle Spasm  melatonin 3 milliGRAM(s) Oral at bedtime PRN Insomnia  ondansetron Injectable 4 milliGRAM(s) IV Push every 6 hours PRN Nausea and/or Vomiting  oxyCODONE    IR 5 milliGRAM(s) Oral four times a day PRN Severe Pain (7 - 10)  traMADol 25 milliGRAM(s) Oral four times a day PRN Moderate Pain (4 - 6)    Allergies  No Known Allergies    Vital Signs Last 24 Hrs  T(C): 36.5 (02 Feb 2021 12:02), Max: 36.5 (02 Feb 2021 12:02)  T(F): 97.7 (02 Feb 2021 12:02), Max: 97.7 (02 Feb 2021 12:02)  HR: 97 (02 Feb 2021 12:02) (72 - 97)  BP: 110/60 (02 Feb 2021 12:02) (110/60 - 127/65)  BP(mean): --  RR: 19 (02 Feb 2021 12:02) (18 - 19)  SpO2: 99% (02 Feb 2021 12:02) (92% - 99%)  I&O's Summary    01 Feb 2021 07:01  -  02 Feb 2021 07:00  --------------------------------------------------------  IN: 0 mL / OUT: 700 mL / NET: -700 mL    TELE:  70-80s   PHYSICAL EXAM:  Appearance: NAD, no distress, alert, Well developed   HEENT: Moist Mucous Membranes, Anicteric  Cardiovascular: Regular rate and rhythm, Normal S1 S2, No JVD, No murmurs, No rubs, gallops or clicks  Respiratory: Non-labored, Clear to auscultation, No rales, No rhonchi, No wheezing.   Gastrointestinal:  Soft, Non-tender, + BS  Neurologic: Non-focal  Skin: Warm and dry, No visible rashes or ulcers, No ecchymosis, No cyanosis  Musculoskeletal: No clubbing, No cyanosis, No joint swelling/tenderness  Psychiatry: Mood & affect appropriate  Lymph: No peripheral edema, chronic venous stasis skin changes.     LABS: All Labs Reviewed:                        9.1    8.15  )-----------( 307      ( 01 Feb 2021 09:59 )             28.6                         9.4    9.07  )-----------( 305      ( 31 Jan 2021 08:06 )             29.9     01 Feb 2021 09:59    144    |  109    |  11     ----------------------------<  100    3.5     |  26     |  0.80   31 Jan 2021 13:13    141    |  104    |  14     ----------------------------<  141    2.8     |  27     |  0.85   31 Jan 2021 08:06    x      |  x      |  x      ----------------------------<  x      x       |  x      |  0.83     Ca    7.4        01 Feb 2021 09:59  Ca    7.3        31 Jan 2021 13:13    TPro  6.0    /  Alb  2.3    /  TBili  0.3    /  DBili  <.10   /  AST  30     /  ALT  16     /  AlkPhos  119    01 Feb 2021 09:59  TPro  6.6    /  Alb  2.4    /  TBili  0.3    /  DBili  .10    /  AST  48     /  ALT  23     /  AlkPhos  148    31 Jan 2021 08:06  Creatine Kinase, Serum: 556 U/L (01-28-21 @ 08:03)  D-Dimer Assay, Quantitative: 386 ng/mL DDU (01-29-21 @ 10:44)  D-Dimer Assay, Quantitative: 371 ng/mL DDU (01-28-21 @ 08:03)      12 Lead ECG:   Ventricular Rate 87 BPM  Atrial Rate 87 BPM  P-R Interval 148 ms  QRS Duration 98 ms  Q-T Interval 388 ms  QTC Calculation(Bazett) 466 ms  P Axis 53 degrees  R Axis -3 degrees  T Axis 10 degrees  Diagnosis Line Normal sinus rhythm  Poor R wave progression  Confirmed by CLARK PETERSON ([id number]) on 1/28/2021 10:53:29 AM (01-27-21 @ 22:34)    < from: TTE Echo Complete w/o Contrast w/ Doppler (01.31.21 @ 17:17) >  Dimensions:  LA 3.0       Normal Values: 2.0 - 4.0 cm  Ao 3.3        Normal Values: 2.0 - 3.8 cm  SEPTUM 1.2       Normal Values: 0.6 - 1.2 cm  PWT 1.2       Normal Values: 0.6 - 1.1 cm  LVIDd 4.6         Normal Values: 3.0 - 5.6 cm  LVIDs 3.2         Normal Values: 1.8 - 4.0 cm    OBSERVATIONS:  Technically difficult study  Mitral Valve: Mitral annular calcification with thickened leaflets, trace physiologic MR.  Aortic Valve/Aorta: Aortic valve is not well-visualized. Appears calcified with a peak transaortic valve gradient is 18.5 mmHg with a mean transaortic valve gradient 10.9 mmHg.  Tricuspid Valve: normal with trace TR.  Pulmonic Valve: Not well-visualized  Left Atrium: normal  Right Atrium: Not well-visualized  Left Ventricle: normal LV size and systolic function, estimated LVEF of 55-60%.  Right Ventricle: Grossly normal size and systolic function.  Pericardium/Pleura: normal, no significant pericardial effusion.    Conclusion:  Technically difficult study  Normal left ventricular internal dimensions and systolic function, estimated LVEF of 55-60%.  Grossly normal RV size and systolic function.  The aortic valve is not well-visualized, appears calcified with likely mild aortic stenosis  Trace physiologic MR and TR.  No significant pericardial effusion.    < end of copied text >    < from: CT Abdomen and Pelvis w/ IV Cont (01.28.21 @ 03:34) >  IMPRESSION:  Pancolitis.  Right middle lobe consolidations with additional patchy groundglass opacities, tree-in-bud, and septal thickening in the visualized bilateral lower lobes, concerning for infection.  < end of copied text >

## 2021-02-02 NOTE — PROGRESS NOTE ADULT - ASSESSMENT
covid  colitis  anemia  dysphagia      no need for abx for ct findings re: colon  loose stools yesterday, now resolved and passing formed stool  cdiff specimen thus cancelled  ?need for ppx vanco  f/u am labs  cont bacid, questran  cont pepcid daily  diet as tolerated  her peg tube was removed in between her admissions  monitor gi fxn

## 2021-02-02 NOTE — SWALLOW VFSS/MBS ASSESSMENT ADULT - RECOMMENDED FEEDING/EATING TECHNIQUES
AL LIQUID VIA TEASPOON/allow for swallow between intakes/alternate food with liquid/check mouth frequently for oral residue/pocketing/crush medication (when feasible)/hard swallow w/ each bite or sip/maintain upright posture during/after eating for 30 mins/no straws/oral hygiene/position upright (90 degrees)/provide rest periods between swallows/small sips/bites

## 2021-02-02 NOTE — PROGRESS NOTE ADULT - SUBJECTIVE AND OBJECTIVE BOX
Patient is a 71y old  Female who presents with a chief complaint of sob (02 Feb 2021 13:57)       Pt is seen and examined  pt is awake and lying in bed/out of bed to chair  pt seems comfortable and denies any complaints at this time    HPI:  · HPI Objective Statement: 72yo female who presents with multiple falls and feeling weak today. pt c/o diffuse pain and feeling weak, no vomiting, no diarrhea, no fever, chills, no cough or sob, denies sick contacts at home  In ER patient was found to have COVID PNA with possible colitis.  patient is being admitted for further care and management (28 Jan 2021 10:42)         ROS:  Negative except for:    MEDICATIONS  (STANDING):  ascorbic acid 500 milliGRAM(s) Oral daily  budesonide 160 MICROgram(s)/formoterol 4.5 MICROgram(s) Inhaler 2 Puff(s) Inhalation two times a day  cholecalciferol 2000 Unit(s) Oral daily  cholestyramine Powder (Sugar-Free) 4 Gram(s) Oral every 24 hours  dexAMETHasone     Tablet 6 milliGRAM(s) Oral daily  enoxaparin Injectable 40 milliGRAM(s) SubCutaneous daily  famotidine    Tablet 20 milliGRAM(s) Oral two times a day  ferrous    sulfate 325 milliGRAM(s) Oral daily  influenza   Vaccine 0.5 milliLiter(s) IntraMuscular once  lactobacillus acidophilus 1 Tablet(s) Oral three times a day  lidocaine   Patch 1 Patch Transdermal every 24 hours  multivitamin/minerals 1 Tablet(s) Oral daily  potassium chloride    Tablet ER 10 milliEquivalent(s) Oral two times a day  pramipexole 1.5 milliGRAM(s) Oral every 24 hours  sulfaSALAzine 500 milliGRAM(s) Oral three times a day  torsemide 5 milliGRAM(s) Oral two times a day  vancomycin    Solution 125 milliGRAM(s) Oral every 6 hours    MEDICATIONS  (PRN):  acetaminophen   Tablet .. 650 milliGRAM(s) Oral every 6 hours PRN Temp greater or equal to 38C (100.4F), Mild Pain (1 - 3)  ALBUTerol    90 MICROgram(s) HFA Inhaler 2 Puff(s) Inhalation every 4 hours PRN Shortness of Breath and/or Wheezing  aluminum hydroxide/magnesium hydroxide/simethicone Suspension 30 milliLiter(s) Oral every 4 hours PRN Dyspepsia  benzonatate 100 milliGRAM(s) Oral three times a day PRN Cough  cyclobenzaprine 5 milliGRAM(s) Oral three times a day PRN Muscle Spasm  melatonin 3 milliGRAM(s) Oral at bedtime PRN Insomnia  ondansetron Injectable 4 milliGRAM(s) IV Push every 6 hours PRN Nausea and/or Vomiting  oxyCODONE    IR 5 milliGRAM(s) Oral four times a day PRN Severe Pain (7 - 10)  traMADol 25 milliGRAM(s) Oral four times a day PRN Moderate Pain (4 - 6)      Allergies    No Known Allergies    Intolerances        Vital Signs Last 24 Hrs  T(C): 36.5 (02 Feb 2021 12:02), Max: 36.5 (02 Feb 2021 12:02)  T(F): 97.7 (02 Feb 2021 12:02), Max: 97.7 (02 Feb 2021 12:02)  HR: 97 (02 Feb 2021 12:02) (72 - 108)  BP: 110/60 (02 Feb 2021 12:02) (110/60 - 127/65)  BP(mean): --  RR: 19 (02 Feb 2021 12:02) (18 - 19)  SpO2: 99% (02 Feb 2021 12:02) (92% - 99%)    PHYSICAL EXAM  General: adult in NAD  HEENT: clear oropharynx, anicteric sclera, pink conjunctiva  Neck: supple  CV: normal S1/S2 with no murmur rubs or gallops  Lungs: positive air movement b/l ant lungs,clear to auscultation, no wheezes, no rales  Abdomen: soft non-tender non-distended, no hepatosplenomegaly  Ext: no clubbing cyanosis or edema  Skin: no rashes and no petechiae  Neuro: alert and oriented X 4, no focal deficits  LABS:                          9.1    8.15  )-----------( 307      ( 01 Feb 2021 09:59 )             28.6         Mean Cell Volume : 83.4 fl  Mean Cell Hemoglobin : 26.5 pg  Mean Cell Hemoglobin Concentration : 31.8 gm/dL  Auto Neutrophil # : 6.63 K/uL  Auto Lymphocyte # : 1.09 K/uL  Auto Monocyte # : 0.37 K/uL  Auto Eosinophil # : 0.01 K/uL  Auto Basophil # : 0.00 K/uL  Auto Neutrophil % : 81.4 %  Auto Lymphocyte % : 13.4 %  Auto Monocyte % : 4.5 %  Auto Eosinophil % : 0.1 %  Auto Basophil % : 0.0 %    Serial CBC's  02-01 @ 09:59  Hct-28.6 / Hgb-9.1 / Plat-307 / RBC-3.43 / WBC-8.15          Serial CBC's  01-31 @ 08:06  Hct-29.9 / Hgb-9.4 / Plat-305 / RBC-3.57 / WBC-9.07          Serial CBC's  01-30 @ 09:55  Hct-28.4 / Hgb-9.0 / Plat-262 / RBC-3.45 / WBC-8.55            02-02    142  |  107  |  10  ----------------------------<  162<H>  3.8   |  27  |  0.89    Ca    7.7<L>      02 Feb 2021 12:02    TPro  6.8  /  Alb  2.5<L>  /  TBili  0.3  /  DBili  .10  /  AST  24  /  ALT  20  /  AlkPhos  126<H>  02-02          Ferritin, Serum: 229 ng/mL (01-30-21 @ 15:07)  Iron - Total Binding Capacity.: 154 ug/dL (01-28-21 @ 22:55)  Ferritin, Serum: 168 ng/mL (01-28-21 @ 22:45)  Vitamin B12, Serum: 872 pg/mL (01-28-21 @ 22:45)  Folate, Serum: 8.7 ng/mL (01-28-21 @ 22:45)  Reticulocyte Percent: 0.4 % (01-28-21 @ 16:50)  Ferritin, Serum: 161 ng/mL (01-28-21 @ 11:50)  Iron - Total Binding Capacity.: 144 ug/dL (01-28-21 @ 11:47)  Ferritin, Serum: 173 ng/mL (01-28-21 @ 06:22)                BLOOD SMEAR INTERPRETATION:       RADIOLOGY & ADDITIONAL STUDIES:     Patient is a 71y old  Female who presents with a chief complaint of sob (02 Feb 2021 13:57)       Pt is seen and examined  pt is awake and lying in bed  pt seems comfortable and denies any complaints at this time    HPI:  · HPI Objective Statement: 72yo female who presents with multiple falls and feeling weak today. pt c/o diffuse pain and feeling weak, no vomiting, no diarrhea, no fever, chills, no cough or sob, denies sick contacts at home  In ER patient was found to have COVID PNA with possible colitis.  patient is being admitted for further care and management (28 Jan 2021 10:42)         ROS:  as per hpi    MEDICATIONS  (STANDING):  ascorbic acid 500 milliGRAM(s) Oral daily  budesonide 160 MICROgram(s)/formoterol 4.5 MICROgram(s) Inhaler 2 Puff(s) Inhalation two times a day  cholecalciferol 2000 Unit(s) Oral daily  cholestyramine Powder (Sugar-Free) 4 Gram(s) Oral every 24 hours  dexAMETHasone     Tablet 6 milliGRAM(s) Oral daily  enoxaparin Injectable 40 milliGRAM(s) SubCutaneous daily  famotidine    Tablet 20 milliGRAM(s) Oral two times a day  ferrous    sulfate 325 milliGRAM(s) Oral daily  influenza   Vaccine 0.5 milliLiter(s) IntraMuscular once  lactobacillus acidophilus 1 Tablet(s) Oral three times a day  lidocaine   Patch 1 Patch Transdermal every 24 hours  multivitamin/minerals 1 Tablet(s) Oral daily  potassium chloride    Tablet ER 10 milliEquivalent(s) Oral two times a day  pramipexole 1.5 milliGRAM(s) Oral every 24 hours  sulfaSALAzine 500 milliGRAM(s) Oral three times a day  torsemide 5 milliGRAM(s) Oral two times a day  vancomycin    Solution 125 milliGRAM(s) Oral every 6 hours    MEDICATIONS  (PRN):  acetaminophen   Tablet .. 650 milliGRAM(s) Oral every 6 hours PRN Temp greater or equal to 38C (100.4F), Mild Pain (1 - 3)  ALBUTerol    90 MICROgram(s) HFA Inhaler 2 Puff(s) Inhalation every 4 hours PRN Shortness of Breath and/or Wheezing  aluminum hydroxide/magnesium hydroxide/simethicone Suspension 30 milliLiter(s) Oral every 4 hours PRN Dyspepsia  benzonatate 100 milliGRAM(s) Oral three times a day PRN Cough  cyclobenzaprine 5 milliGRAM(s) Oral three times a day PRN Muscle Spasm  melatonin 3 milliGRAM(s) Oral at bedtime PRN Insomnia  ondansetron Injectable 4 milliGRAM(s) IV Push every 6 hours PRN Nausea and/or Vomiting  oxyCODONE    IR 5 milliGRAM(s) Oral four times a day PRN Severe Pain (7 - 10)  traMADol 25 milliGRAM(s) Oral four times a day PRN Moderate Pain (4 - 6)      Allergies    No Known Allergies    Intolerances        Vital Signs Last 24 Hrs  T(C): 36.5 (02 Feb 2021 12:02), Max: 36.5 (02 Feb 2021 12:02)  T(F): 97.7 (02 Feb 2021 12:02), Max: 97.7 (02 Feb 2021 12:02)  HR: 97 (02 Feb 2021 12:02) (72 - 108)  BP: 110/60 (02 Feb 2021 12:02) (110/60 - 127/65)  BP(mean): --  RR: 19 (02 Feb 2021 12:02) (18 - 19)  SpO2: 99% (02 Feb 2021 12:02) (92% - 99%)    PHYSICAL EXAM  General: adult in NAD  HEENT: clear oropharynx, anicteric sclera, pink conjunctiva  Neck: supple  CV: normal S1/S2 with no murmur rubs or gallops  Lungs: positive air movement b/l ant lungs,clear to auscultation, no wheezes, no rales  Abdomen: soft non-tender non-distended, no hepatosplenomegaly  Ext: no clubbing cyanosis or edema  Skin: no rashes and no petechiae  Neuro: alert and oriented X 4, no focal deficits  LABS:                          9.1    8.15  )-----------( 307      ( 01 Feb 2021 09:59 )             28.6         Mean Cell Volume : 83.4 fl  Mean Cell Hemoglobin : 26.5 pg  Mean Cell Hemoglobin Concentration : 31.8 gm/dL  Auto Neutrophil # : 6.63 K/uL  Auto Lymphocyte # : 1.09 K/uL  Auto Monocyte # : 0.37 K/uL  Auto Eosinophil # : 0.01 K/uL  Auto Basophil # : 0.00 K/uL  Auto Neutrophil % : 81.4 %  Auto Lymphocyte % : 13.4 %  Auto Monocyte % : 4.5 %  Auto Eosinophil % : 0.1 %  Auto Basophil % : 0.0 %    Serial CBC's  02-01 @ 09:59  Hct-28.6 / Hgb-9.1 / Plat-307 / RBC-3.43 / WBC-8.15          Serial CBC's  01-31 @ 08:06  Hct-29.9 / Hgb-9.4 / Plat-305 / RBC-3.57 / WBC-9.07          Serial CBC's  01-30 @ 09:55  Hct-28.4 / Hgb-9.0 / Plat-262 / RBC-3.45 / WBC-8.55            02-02    142  |  107  |  10  ----------------------------<  162<H>  3.8   |  27  |  0.89    Ca    7.7<L>      02 Feb 2021 12:02    TPro  6.8  /  Alb  2.5<L>  /  TBili  0.3  /  DBili  .10  /  AST  24  /  ALT  20  /  AlkPhos  126<H>  02-02          Ferritin, Serum: 229 ng/mL (01-30-21 @ 15:07)  Iron - Total Binding Capacity.: 154 ug/dL (01-28-21 @ 22:55)  Ferritin, Serum: 168 ng/mL (01-28-21 @ 22:45)  Vitamin B12, Serum: 872 pg/mL (01-28-21 @ 22:45)  Folate, Serum: 8.7 ng/mL (01-28-21 @ 22:45)  Reticulocyte Percent: 0.4 % (01-28-21 @ 16:50)  Ferritin, Serum: 161 ng/mL (01-28-21 @ 11:50)  Iron - Total Binding Capacity.: 144 ug/dL (01-28-21 @ 11:47)  Ferritin, Serum: 173 ng/mL (01-28-21 @ 06:22)

## 2021-02-03 DIAGNOSIS — F43.21 ADJUSTMENT DISORDER WITH DEPRESSED MOOD: ICD-10-CM

## 2021-02-03 LAB
4/8 RATIO: 2.59 RATIO — SIGNIFICANT CHANGE UP (ref 0.86–4.14)
ABS CD8: 177 /UL — SIGNIFICANT CHANGE UP (ref 90–775)
ALBUMIN SERPL ELPH-MCNC: 2.7 G/DL — LOW (ref 3.3–5)
ALP SERPL-CCNC: 123 U/L — HIGH (ref 40–120)
ALT FLD-CCNC: 18 U/L — SIGNIFICANT CHANGE UP (ref 12–78)
AST SERPL-CCNC: 20 U/L — SIGNIFICANT CHANGE UP (ref 15–37)
BILIRUB DIRECT SERPL-MCNC: 0.1 MG/DL — SIGNIFICANT CHANGE UP (ref 0.05–0.2)
BILIRUB INDIRECT FLD-MCNC: 0.2 MG/DL — SIGNIFICANT CHANGE UP (ref 0.2–1)
BILIRUB SERPL-MCNC: 0.3 MG/DL — SIGNIFICANT CHANGE UP (ref 0.2–1.2)
CD3 BLASTS SPEC-ACNC: 646 /UL — SIGNIFICANT CHANGE UP (ref 396–2024)
CD3 BLASTS SPEC-ACNC: 74 % — SIGNIFICANT CHANGE UP (ref 58–84)
CD4 %: 53 % — SIGNIFICANT CHANGE UP (ref 30–56)
CD8 %: 20 % — SIGNIFICANT CHANGE UP (ref 11–43)
CREAT SERPL-MCNC: 0.94 MG/DL — SIGNIFICANT CHANGE UP (ref 0.5–1.3)
HCT VFR BLD CALC: 32.5 % — LOW (ref 34.5–45)
HGB BLD-MCNC: 10.4 G/DL — LOW (ref 11.5–15.5)
MCHC RBC-ENTMCNC: 27.1 PG — SIGNIFICANT CHANGE UP (ref 27–34)
MCHC RBC-ENTMCNC: 32 GM/DL — SIGNIFICANT CHANGE UP (ref 32–36)
MCV RBC AUTO: 84.6 FL — SIGNIFICANT CHANGE UP (ref 80–100)
NRBC # BLD: 0 /100 WBCS — SIGNIFICANT CHANGE UP (ref 0–0)
PLATELET # BLD AUTO: 331 K/UL — SIGNIFICANT CHANGE UP (ref 150–400)
PROT SERPL-MCNC: 7 G/DL — SIGNIFICANT CHANGE UP (ref 6–8.3)
RBC # BLD: 3.84 M/UL — SIGNIFICANT CHANGE UP (ref 3.8–5.2)
RBC # FLD: 18.2 % — HIGH (ref 10.3–14.5)
SARS-COV-2 RNA SPEC QL NAA+PROBE: DETECTED
T-CELL CD4 SUBSET PNL BLD: 459 /UL — SIGNIFICANT CHANGE UP (ref 325–1251)
WBC # BLD: 10.48 K/UL — SIGNIFICANT CHANGE UP (ref 3.8–10.5)
WBC # FLD AUTO: 10.48 K/UL — SIGNIFICANT CHANGE UP (ref 3.8–10.5)

## 2021-02-03 PROCEDURE — 99232 SBSQ HOSP IP/OBS MODERATE 35: CPT

## 2021-02-03 PROCEDURE — 99232 SBSQ HOSP IP/OBS MODERATE 35: CPT | Mod: CS

## 2021-02-03 RX ADMIN — OXYCODONE HYDROCHLORIDE 5 MILLIGRAM(S): 5 TABLET ORAL at 12:26

## 2021-02-03 RX ADMIN — Medication 5 MILLIGRAM(S): at 06:13

## 2021-02-03 RX ADMIN — LIDOCAINE 1 PATCH: 4 CREAM TOPICAL at 19:52

## 2021-02-03 RX ADMIN — Medication 10 MILLIEQUIVALENT(S): at 17:08

## 2021-02-03 RX ADMIN — LIDOCAINE 1 PATCH: 4 CREAM TOPICAL at 06:52

## 2021-02-03 RX ADMIN — Medication 1 TABLET(S): at 12:26

## 2021-02-03 RX ADMIN — ENOXAPARIN SODIUM 40 MILLIGRAM(S): 100 INJECTION SUBCUTANEOUS at 12:26

## 2021-02-03 RX ADMIN — Medication 125 MILLIGRAM(S): at 12:27

## 2021-02-03 RX ADMIN — Medication 10 MILLIEQUIVALENT(S): at 06:13

## 2021-02-03 RX ADMIN — Medication 325 MILLIGRAM(S): at 12:26

## 2021-02-03 RX ADMIN — CHOLESTYRAMINE 4 GRAM(S): 4 POWDER, FOR SUSPENSION ORAL at 14:01

## 2021-02-03 RX ADMIN — Medication 6 MILLIGRAM(S): at 06:13

## 2021-02-03 RX ADMIN — Medication 1 TABLET(S): at 21:47

## 2021-02-03 RX ADMIN — FAMOTIDINE 20 MILLIGRAM(S): 10 INJECTION INTRAVENOUS at 17:10

## 2021-02-03 RX ADMIN — Medication 125 MILLIGRAM(S): at 06:18

## 2021-02-03 RX ADMIN — Medication 500 MILLIGRAM(S): at 06:13

## 2021-02-03 RX ADMIN — PRAMIPEXOLE DIHYDROCHLORIDE 1.5 MILLIGRAM(S): 0.12 TABLET ORAL at 17:08

## 2021-02-03 RX ADMIN — Medication 500 MILLIGRAM(S): at 21:47

## 2021-02-03 RX ADMIN — FAMOTIDINE 20 MILLIGRAM(S): 10 INJECTION INTRAVENOUS at 06:13

## 2021-02-03 RX ADMIN — Medication 500 MILLIGRAM(S): at 12:26

## 2021-02-03 RX ADMIN — Medication 5 MILLIGRAM(S): at 17:07

## 2021-02-03 RX ADMIN — BUDESONIDE AND FORMOTEROL FUMARATE DIHYDRATE 2 PUFF(S): 160; 4.5 AEROSOL RESPIRATORY (INHALATION) at 07:00

## 2021-02-03 RX ADMIN — Medication 1 TABLET(S): at 06:13

## 2021-02-03 RX ADMIN — LIDOCAINE 1 PATCH: 4 CREAM TOPICAL at 17:09

## 2021-02-03 RX ADMIN — Medication 2000 UNIT(S): at 12:26

## 2021-02-03 RX ADMIN — BUDESONIDE AND FORMOTEROL FUMARATE DIHYDRATE 2 PUFF(S): 160; 4.5 AEROSOL RESPIRATORY (INHALATION) at 17:08

## 2021-02-03 NOTE — PROGRESS NOTE ADULT - PROBLEM/PLAN-3
DISPLAY PLAN FREE TEXT
home

## 2021-02-03 NOTE — PROGRESS NOTE ADULT - SUBJECTIVE AND OBJECTIVE BOX
INTERVAL HPI/OVERNIGHT EVENTS:  chart reviewed  soft pasty stool    MEDICATIONS  (STANDING):  ascorbic acid 500 milliGRAM(s) Oral daily  budesonide 160 MICROgram(s)/formoterol 4.5 MICROgram(s) Inhaler 2 Puff(s) Inhalation two times a day  cholecalciferol 2000 Unit(s) Oral daily  cholestyramine Powder (Sugar-Free) 4 Gram(s) Oral every 24 hours  dexAMETHasone     Tablet 6 milliGRAM(s) Oral daily  enoxaparin Injectable 40 milliGRAM(s) SubCutaneous daily  famotidine    Tablet 20 milliGRAM(s) Oral two times a day  ferrous    sulfate 325 milliGRAM(s) Oral daily  influenza   Vaccine 0.5 milliLiter(s) IntraMuscular once  lactobacillus acidophilus 1 Tablet(s) Oral three times a day  lidocaine   Patch 1 Patch Transdermal every 24 hours  multivitamin/minerals 1 Tablet(s) Oral daily  potassium chloride    Tablet ER 10 milliEquivalent(s) Oral daily  pramipexole 1.5 milliGRAM(s) Oral every 24 hours  sulfaSALAzine 500 milliGRAM(s) Oral three times a day  torsemide 5 milliGRAM(s) Oral two times a day  vancomycin    Solution 125 milliGRAM(s) Oral every 6 hours    MEDICATIONS  (PRN):  acetaminophen   Tablet .. 650 milliGRAM(s) Oral every 6 hours PRN Temp greater or equal to 38C (100.4F), Mild Pain (1 - 3)  ALBUTerol    90 MICROgram(s) HFA Inhaler 2 Puff(s) Inhalation every 4 hours PRN Shortness of Breath and/or Wheezing  aluminum hydroxide/magnesium hydroxide/simethicone Suspension 30 milliLiter(s) Oral every 4 hours PRN Dyspepsia  benzonatate 100 milliGRAM(s) Oral three times a day PRN Cough  cyclobenzaprine 5 milliGRAM(s) Oral three times a day PRN Muscle Spasm  melatonin 3 milliGRAM(s) Oral at bedtime PRN Insomnia  ondansetron Injectable 4 milliGRAM(s) IV Push every 6 hours PRN Nausea and/or Vomiting  oxyCODONE    IR 5 milliGRAM(s) Oral four times a day PRN Severe Pain (7 - 10)  traMADol 25 milliGRAM(s) Oral four times a day PRN Moderate Pain (4 - 6)      Allergies    No Known Allergies    Intolerances        Review of Systems:    tele f/u      Vital Signs Last 24 Hrs  T(C): 36.3 (02 Feb 2021 04:50), Max: 36.6 (01 Feb 2021 11:59)  T(F): 97.3 (02 Feb 2021 04:50), Max: 97.8 (01 Feb 2021 11:59)  HR: 72 (02 Feb 2021 04:50) (72 - 107)  BP: 127/65 (02 Feb 2021 04:50) (107/71 - 127/65)  BP(mean): --  RR: 18 (02 Feb 2021 04:50) (18 - 18)  SpO2: 93% (02 Feb 2021 04:50) (92% - 96%)    PHYSICAL EXAM:  tele f/u      LABS:                        9.1    8.15  )-----------( 307      ( 01 Feb 2021 09:59 )             28.6     02-01    144  |  109<H>  |  11  ----------------------------<  100<H>  3.5   |  26  |  0.80    Ca    7.4<L>      01 Feb 2021 09:59    TPro  6.0  /  Alb  2.3<L>  /  TBili  0.3  /  DBili  <.10  /  AST  30  /  ALT  16  /  AlkPhos  119  02-01          RADIOLOGY & ADDITIONAL TESTS:

## 2021-02-03 NOTE — PROGRESS NOTE ADULT - PROBLEM SELECTOR PLAN 1
remdesvir X 5 days  steroids X 10 days  supportive treatment with o2, ID eval with Dr. CAMPOS
remdesvir X 5 days  steroids X 10 days  supportive treatment with o2
remdesvir X 5 days  steroids X 10 days  supportive treatment
remdesvir X 5 days  steroids X 10 days  supportive treatment with o2
remdesvir X 5 days  steroids X 10 days  supportive treatment with o2
remdesvir X 5 days  steroids X 10 days  supportive treatment with o2, ID eval

## 2021-02-03 NOTE — BEHAVIORAL HEALTH ASSESSMENT NOTE - NSBHCHARTREVIEWINVESTIGATE_PSY_A_CORE FT
< from: 12 Lead ECG (01.27.21 @ 22:34) >    Ventricular Rate 87 BPM    Atrial Rate 87 BPM    P-R Interval 148 ms    QRS Duration 98 ms    Q-T Interval 388 ms    QTC Calculation(Bazett) 466 ms    P Axis 53 degrees    R Axis -3 degrees    T Axis 10 degrees    Diagnosis Line Normal sinus rhythm  Poor R wave progression  Confirmed by CLARK PETERSON ([id number]) on 1/28/2021 10:53:29 AM    < end of copied text >

## 2021-02-03 NOTE — PROGRESS NOTE ADULT - PROBLEM SELECTOR PLAN 3
TABITHA patel with Dr. BRADLEY  doubt bacterial
GI amanda with Dr. BRADLEY appreciated  doubt bacterial
TABITHA patel with Dr. BRADLEY appreciated  doubt bacterial  h/o cdad
GI amanda with Dr. BRADLEY appreciated  doubt bacterial
TABITHA patel with Dr. BRADLEY appreciated  doubt bacterial  h/o cdad
TABITHA patel with Dr. BRADLEY appreciated  doubt bacterial  h/o cdad

## 2021-02-03 NOTE — PROGRESS NOTE ADULT - SUBJECTIVE AND OBJECTIVE BOX
Neurology follow up note    VERONICA [id number]      Interval History:    Patient feels ok no new complaints.    MEDICATIONS    acetaminophen   Tablet .. 650 milliGRAM(s) Oral every 6 hours PRN  ALBUTerol    90 MICROgram(s) HFA Inhaler 2 Puff(s) Inhalation every 4 hours PRN  aluminum hydroxide/magnesium hydroxide/simethicone Suspension 30 milliLiter(s) Oral every 4 hours PRN  ascorbic acid 500 milliGRAM(s) Oral daily  benzonatate 100 milliGRAM(s) Oral three times a day PRN  budesonide 160 MICROgram(s)/formoterol 4.5 MICROgram(s) Inhaler 2 Puff(s) Inhalation two times a day  cholecalciferol 2000 Unit(s) Oral daily  cholestyramine Powder (Sugar-Free) 4 Gram(s) Oral every 24 hours  cyclobenzaprine 5 milliGRAM(s) Oral three times a day PRN  dexAMETHasone     Tablet 6 milliGRAM(s) Oral daily  enoxaparin Injectable 40 milliGRAM(s) SubCutaneous daily  famotidine    Tablet 20 milliGRAM(s) Oral two times a day  ferrous    sulfate 325 milliGRAM(s) Oral daily  influenza   Vaccine 0.5 milliLiter(s) IntraMuscular once  lactobacillus acidophilus 1 Tablet(s) Oral three times a day  lidocaine   Patch 1 Patch Transdermal every 24 hours  melatonin 3 milliGRAM(s) Oral at bedtime PRN  multivitamin/minerals 1 Tablet(s) Oral daily  ondansetron Injectable 4 milliGRAM(s) IV Push every 6 hours PRN  oxyCODONE    IR 5 milliGRAM(s) Oral four times a day PRN  potassium chloride    Tablet ER 10 milliEquivalent(s) Oral two times a day  pramipexole 1.5 milliGRAM(s) Oral every 24 hours  sulfaSALAzine 500 milliGRAM(s) Oral three times a day  torsemide 5 milliGRAM(s) Oral two times a day  traMADol 25 milliGRAM(s) Oral four times a day PRN  vancomycin    Solution 125 milliGRAM(s) Oral every 6 hours      Allergies    No Known Allergies    Intolerances            Vital Signs Last 24 Hrs  T(C): 36.4 (03 Feb 2021 05:00), Max: 36.5 (02 Feb 2021 12:02)  T(F): 97.6 (03 Feb 2021 05:00), Max: 97.7 (02 Feb 2021 12:02)  HR: 76 (03 Feb 2021 05:00) (76 - 108)  BP: 126/64 (03 Feb 2021 05:00) (110/60 - 126/64)  BP(mean): --  RR: 17 (03 Feb 2021 05:00) (17 - 19)  SpO2: 95% (03 Feb 2021 05:00) (95% - 99%)    REVIEW OF SYSTEMS:  Constitutional:  The patient denies fever, chills, or night sweats.  Head:  No headache.  Eyes:  No double vision or blurry vision.  Ears:  No ringing in the ears.  Neck:  Positive history of neck pain, but had cervical surgery done there and arthritis.  Respiratory:  No shortness of breath at present.  Cardiovascular:  No chest pain.  Abdomen:  No nausea, vomiting, or abdominal pain.  Extremities/Neurological:  Occasional musculoskeletal pain in her legs and joints.  Genitourinary:  No burning upon urination.    PHYSICAL EXAMINATION:   HEENT:  Head:  Normocephalic, atraumatic.  Eyes:  No scleral icterus.  Ears:  Hearing bilaterally intact.  NECK:  Supple.  RESPIRATORY:  Decreased breath sounds bilaterally.  CARDIOVASCULAR:  S1 and S2 heard.  ABDOMEN:  Soft and nontender.  EXTREMITIES:  No clubbing or cyanosis were noted.  GENERAL:  Positive surgical scar was noted in the cervical region in the bilateral knees.      NEUROLOGIC:  The patient is awake and alert.  Extraocular movement were intact.  The patient has decreased range of motion of her neck secondary to surgical intervention.  Speech was fluent.  Smile was symmetric.  Motor:  The patient has decreased range of motion of bilateral shoulders.  Able to elevate roughly about 60 degrees off the bed.  Overall strength was 3+/5.  Bilateral lower extremities, the patient is able to move side-to-side.  Had significant lymphedema in the bilateral lower extremities.  Sensory was decreased to light touch in the bilateral lower extremities, but has significant lymphedema.  The patient had impaired proprioception in the bilateral lower extremities.                    LABS:      02-02    142  |  107  |  10  ----------------------------<  162<H>  3.8   |  27  |  0.89    Ca    7.7<L>      02 Feb 2021 12:02    TPro  6.8  /  Alb  2.5<L>  /  TBili  0.3  /  DBili  .10  /  AST  24  /  ALT  20  /  AlkPhos  126<H>  02-02    Hemoglobin A1C:     LIVER FUNCTIONS - ( 02 Feb 2021 12:02 )  Alb: 2.5 g/dL / Pro: 6.8 g/dL / ALK PHOS: 126 U/L / ALT: 20 U/L / AST: 24 U/L / GGT: x           Vitamin B12         RADIOLOGY    ANALYSIS AND PLAN:  A 71-year-old with a history of frequent falls, restless legs syndrome.  For episodes of frequent falls, syncopal events, the patient has had multiple workups in the past for this.  Questionable this could be secondary to any type of underlying orthostatic changes.  The patient does have a history of poor oral intake.  The patient also is mostly sedentary.  Also, the patient does have lymphedema in the bilateral lower extremities.  The patient was to be evaluated for possible underlying narcolepsy with sleep studies.  Telemetry evaluation.  Monitor orthostatics as needed.  For restless leg syndrome, continue the patient on her Mirapex.  For history of possible peripheral neuropathy, supportive therapy.  Fall precautions.  For generalized paresis secondary to underlying infectious type process COVID.  For cervical radiculopathy, continue the patient on her muscle relaxants as needed.  overall more interactive   monitor for diarrhea as per daughter   The daughter's name is Edelmira.  Telephone number is [phone] 2/3/2021.  She does understand my reasoning and thought process.  Spoke to patient in regards to possible EEG she states does not want any more tests spoke to daughter will low  tyr dose keppra if daughter  agrees which at present she does not   Greater than 45 minutes of time was spent with the patient, plan of care, reviewing data, speaking to the family and  50% of the visit was spent counseling and/or coordinating care with multidisciplinary healthcare team   Neurology follow up note    VERONICA [id number]      Interval History:    Patient feels ok no new complaints.    MEDICATIONS    acetaminophen   Tablet .. 650 milliGRAM(s) Oral every 6 hours PRN  ALBUTerol    90 MICROgram(s) HFA Inhaler 2 Puff(s) Inhalation every 4 hours PRN  aluminum hydroxide/magnesium hydroxide/simethicone Suspension 30 milliLiter(s) Oral every 4 hours PRN  ascorbic acid 500 milliGRAM(s) Oral daily  benzonatate 100 milliGRAM(s) Oral three times a day PRN  budesonide 160 MICROgram(s)/formoterol 4.5 MICROgram(s) Inhaler 2 Puff(s) Inhalation two times a day  cholecalciferol 2000 Unit(s) Oral daily  cholestyramine Powder (Sugar-Free) 4 Gram(s) Oral every 24 hours  cyclobenzaprine 5 milliGRAM(s) Oral three times a day PRN  dexAMETHasone     Tablet 6 milliGRAM(s) Oral daily  enoxaparin Injectable 40 milliGRAM(s) SubCutaneous daily  famotidine    Tablet 20 milliGRAM(s) Oral two times a day  ferrous    sulfate 325 milliGRAM(s) Oral daily  influenza   Vaccine 0.5 milliLiter(s) IntraMuscular once  lactobacillus acidophilus 1 Tablet(s) Oral three times a day  lidocaine   Patch 1 Patch Transdermal every 24 hours  melatonin 3 milliGRAM(s) Oral at bedtime PRN  multivitamin/minerals 1 Tablet(s) Oral daily  ondansetron Injectable 4 milliGRAM(s) IV Push every 6 hours PRN  oxyCODONE    IR 5 milliGRAM(s) Oral four times a day PRN  potassium chloride    Tablet ER 10 milliEquivalent(s) Oral two times a day  pramipexole 1.5 milliGRAM(s) Oral every 24 hours  sulfaSALAzine 500 milliGRAM(s) Oral three times a day  torsemide 5 milliGRAM(s) Oral two times a day  traMADol 25 milliGRAM(s) Oral four times a day PRN  vancomycin    Solution 125 milliGRAM(s) Oral every 6 hours      Allergies    No Known Allergies    Intolerances            Vital Signs Last 24 Hrs  T(C): 36.4 (03 Feb 2021 05:00), Max: 36.5 (02 Feb 2021 12:02)  T(F): 97.6 (03 Feb 2021 05:00), Max: 97.7 (02 Feb 2021 12:02)  HR: 76 (03 Feb 2021 05:00) (76 - 108)  BP: 126/64 (03 Feb 2021 05:00) (110/60 - 126/64)  BP(mean): --  RR: 17 (03 Feb 2021 05:00) (17 - 19)  SpO2: 95% (03 Feb 2021 05:00) (95% - 99%)    REVIEW OF SYSTEMS:  Constitutional:  The patient denies fever, chills, or night sweats.  Head:  No headache.  Eyes:  No double vision or blurry vision.  Ears:  No ringing in the ears.  Neck:  Positive history of neck pain, but had cervical surgery done there and arthritis.  Respiratory:  No shortness of breath at present.  Cardiovascular:  No chest pain.  Abdomen:  No nausea, vomiting, or abdominal pain.  Extremities/Neurological:  Occasional musculoskeletal pain in her legs and joints.  Genitourinary:  No burning upon urination.    PHYSICAL EXAMINATION:   HEENT:  Head:  Normocephalic, atraumatic.  Eyes:  No scleral icterus.  Ears:  Hearing bilaterally intact.  NECK:  Supple.  RESPIRATORY:  Decreased breath sounds bilaterally.  CARDIOVASCULAR:  S1 and S2 heard.  ABDOMEN:  Soft and nontender.  EXTREMITIES:  No clubbing or cyanosis were noted.  GENERAL:  Positive surgical scar was noted in the cervical region in the bilateral knees.      NEUROLOGIC:  The patient is awake and alert.  Extraocular movement were intact.  The patient has decreased range of motion of her neck secondary to surgical intervention.  Speech was fluent.  Smile was symmetric.  Motor:  The patient has decreased range of motion of bilateral shoulders.  Able to elevate roughly about 60 degrees off the bed.  Overall strength was 3+/5.  Bilateral lower extremities, the patient is able to move side-to-side.  Had significant lymphedema in the bilateral lower extremities.  Sensory was decreased to light touch in the bilateral lower extremities, but has significant lymphedema.  The patient had impaired proprioception in the bilateral lower extremities.                    LABS:      02-02    142  |  107  |  10  ----------------------------<  162<H>  3.8   |  27  |  0.89    Ca    7.7<L>      02 Feb 2021 12:02    TPro  6.8  /  Alb  2.5<L>  /  TBili  0.3  /  DBili  .10  /  AST  24  /  ALT  20  /  AlkPhos  126<H>  02-02    Hemoglobin A1C:     LIVER FUNCTIONS - ( 02 Feb 2021 12:02 )  Alb: 2.5 g/dL / Pro: 6.8 g/dL / ALK PHOS: 126 U/L / ALT: 20 U/L / AST: 24 U/L / GGT: x           Vitamin B12         RADIOLOGY    ANALYSIS AND PLAN:  A 71-year-old with a history of frequent falls, restless legs syndrome.  For episodes of frequent falls, syncopal events, the patient has had multiple workups in the past for this.  Questionable this could be secondary to any type of underlying orthostatic changes.  The patient does have a history of poor oral intake.  The patient also is mostly sedentary.  Also, the patient does have lymphedema in the bilateral lower extremities.  The patient was to be evaluated for possible underlying narcolepsy with sleep studies.  Telemetry evaluation.  Monitor orthostatics as needed.  For restless leg syndrome, continue the patient on her Mirapex.  For history of possible peripheral neuropathy, supportive therapy.  Fall precautions.  For generalized paresis secondary to underlying infectious type process COVID.  For cervical radiculopathy, continue the patient on her muscle relaxants as needed.  overall more interactive   monitor for diarrhea as per daughter   The daughter's name is Edelmira.  Telephone number is [phone] 2/3/2021.  She does understand my reasoning and thought process.  Spoke to patient in regards to possible EEG she states does not want any more tests spoke to daughter will low  tyr dose keppra if daughter  agrees which at present she does not so will cancel EEG for now   Greater than 45 minutes of time was spent with the patient, plan of care, reviewing data, speaking to the family and  50% of the visit was spent counseling and/or coordinating care with multidisciplinary healthcare team

## 2021-02-03 NOTE — PROGRESS NOTE ADULT - PROBLEM SELECTOR PLAN 4
lovenox for DVT prophylaxis

## 2021-02-03 NOTE — PROGRESS NOTE ADULT - SUBJECTIVE AND OBJECTIVE BOX
PULMONARY/CRITICAL CARE    DOS 2/3/21  Unchanged  Denies sob.   Feels well.    No complaints. Was OOB.    No fever.     Patient is a 71y old  Female who presents with a chief complaint of sob (2021 17:48)    BRIEF HOSPITAL COURSE: ***72yo woman with PMH of OA, HDL, and bladder disorder was admitted with multiple falls and feeling weakness. She didn't have SOB, cough, vomiting, no diarrhea, no fever, chills, No sick contacts.  She tested positive for COVID so was admitted for treatment. She has been started on remdesivir and Dexamethasone.       Events last 24 hours: ***    PAST MEDICAL & SURGICAL HISTORY:  Obesity (BMI 30-39.9)    Traumatic arthropathy involving lower leg  right    History of Osteoarthritis    Hyperlipidemia    Restless Leg Syndrome    H/O neck surgery    H/O arthroscopy of right knee      Bladder Disorder  Bladder lift     History of Colonoscopy    Ex lap in  for abscess in the baldder    History of Hysterectomy and bladder lift in       Allergies    No Known Allergies    Intolerances      FAMILY HISTORY: Smoked 1ppd for 25 yrs until 2017 no etoh  No pertinent family history in first degree relatives          Medications:  remdesivir  IVPB 100 milliGRAM(s) IV Intermittent every 24 hours  remdesivir  IVPB   IV Intermittent     torsemide 5 milliGRAM(s) Oral two times a day    ALBUTerol    90 MICROgram(s) HFA Inhaler 2 Puff(s) Inhalation every 4 hours PRN  benzonatate 100 milliGRAM(s) Oral three times a day PRN  budesonide 160 MICROgram(s)/formoterol 4.5 MICROgram(s) Inhaler 2 Puff(s) Inhalation two times a day    acetaminophen   Tablet .. 650 milliGRAM(s) Oral every 6 hours PRN  melatonin 3 milliGRAM(s) Oral at bedtime PRN  ondansetron Injectable 4 milliGRAM(s) IV Push every 6 hours PRN  oxyCODONE    IR 5 milliGRAM(s) Oral four times a day PRN  pramipexole 1.5 milliGRAM(s) Oral every 24 hours  traMADol 25 milliGRAM(s) Oral four times a day PRN      enoxaparin Injectable 40 milliGRAM(s) SubCutaneous daily    aluminum hydroxide/magnesium hydroxide/simethicone Suspension 30 milliLiter(s) Oral every 4 hours PRN  famotidine    Tablet 20 milliGRAM(s) Oral two times a day  sulfaSALAzine 500 milliGRAM(s) Oral three times a day      dexAMETHasone  Injectable 6 milliGRAM(s) IV Push daily    ascorbic acid 500 milliGRAM(s) Oral daily  cholecalciferol 2000 Unit(s) Oral daily  multivitamin/minerals 1 Tablet(s) Oral daily  potassium chloride   Powder 20 milliEquivalent(s) Oral four times a day    influenza   Vaccine 0.5 milliLiter(s) IntraMuscular once    lidocaine   Patch 1 Patch Transdermal every 24 hours    lactobacillus acidophilus 1 Tablet(s) Oral daily          ICU Vital Signs Last 24 Hrs  T(C): 36.3 (2021 04:59), Max: 36.8 (2021 21:39)  T(F): 97.4 (2021 04:59), Max: 98.2 (2021 21:39)  HR: 71 (2021 04:59) (60 - 76)  BP: 120/64 (2021 04:59) (96/57 - 120/64)  BP(mean): --  ABP: --  ABP(mean): --  RR: 18 (2021 04:59) (18 - 20)  SpO2: 96% (2021 04:59) (92% - 99%)    Vital Signs Last 24 Hrs  T(C): 36.3 (2021 04:59), Max: 36.8 (2021 21:39)  T(F): 97.4 (2021 04:59), Max: 98.2 (2021 21:39)  HR: 71 (2021 04:59) (60 - 76)  BP: 120/64 (2021 04:59) (96/57 - 120/64)  BP(mean): --  RR: 18 (2021 04:59) (18 - 20)  SpO2: 96% (2021 04:59) (92% - 99%)        I&O's Detail    2021 07:01  -  2021 07:00  --------------------------------------------------------  IN:    IV PiggyBack: 350 mL  Total IN: 350 mL    OUT:    Voided (mL): 650 mL  Total OUT: 650 mL    Total NET: -300 mL            LABS:                        8.9    x     )-----------( x        ( 2021 16:50 )             27.5         139  |  106  |  6<L>  ----------------------------<  86  2.9<LL>   |  27  |  0.71    Ca    7.3<L>      2021 08:03  Phos  3.1       Mg     1.9         TPro  5.8<L>  /  Alb  2.1<L>  /  TBili  0.4  /  DBili  .20  /  AST  70<H>  /  ALT  19  /  AlkPhos  130<H>        CARDIAC MARKERS ( 2021 08:03 )  x     / x     / 556 U/L / x     / x          CAPILLARY BLOOD GLUCOSE        PT/INR - ( 2021 23:16 )   PT: 17.6 sec;   INR: 1.54 ratio         PTT - ( 2021 23:16 )  PTT:28.6 sec  Urinalysis Basic - ( 2021 20:10 )    Color: Yellow / Appearance: Clear / S.010 / pH: x  Gluc: x / Ketone: Negative  / Bili: Negative / Urobili: Negative   Blood: x / Protein: 30 mg/dL / Nitrite: Negative   Leuk Esterase: Negative / RBC: 0-2 /HPF / WBC 0-2   Sq Epi: x / Non Sq Epi: Few / Bacteria: Few      CULTURES:  Culture Results:   No growth to date. ( @ 05:12)  Culture Results:   No growth to date. ( @ 05:12)      Physical Examination:    General: No acute distress.  elderly female    HEENT: Pupils equal, reactive to light.  Symmetric.    PULM: bibas crackles good excurion on wheeze     CVS: Regular rate and rhythm, no murmurs, rubs, or gallops    ABD: Soft, nondistended, nontender, normoactive bowel sounds, no masses    EXT: brawny leg  edema, nontender    SKIN: Warm and well perfused, no rashes noted.    NEURO: Alert, nteractive, nonfocal    RADIOLOGY: ***    d< from: CT Chest No Cont (21 @ 00:10) >  EXAM:  CT CHEST                            PROCEDURE DATE:  2021          INTERPRETATION:  CLINICAL INFORMATION: Chest trauma.  Status post fall.    COMPARISON: 2020.    PROCEDURE:  CT of the Chest was performed without intravenous contrast.  Sagittal and coronal reformats were performed.    FINDINGS:    LUNGS AND AIRWAYS: There is moderate respiratory motion artifact the central airways are grossly clear within limits of motion degradation.  There is mild to moderate centrilobular emphysema.  There are nonspecific groundglass opacities in the anterior segments of both upper lobes measuring up to approximately 1.9 x 1.9 cm on the right (3:226) and 1.4 x 1.1 cm on the left (3:217), which are new since 2020.  There is dense consolidation in the right middle lobe with air bronchograms and bronchiectasis, in a region of lung that previously showed patchy groundglass nodular opacities on 2020.  Again seen is patchy groundglass opacity in the basilar segments of the right greater than left lower lobes which appears stable to mildly progressed from 2020.  PLEURA: No pleural effusion.  MEDIASTINUM AND MARYBEL: No lymphadenopathy.  VESSELS: Mild atherosclerotic calcification thoracic aorta and arch vessels.  HEART:Cardiomegaly.  Calcifications involving sinuses of Valsalva and aortic valve leaflets.  Moderate atherosclerotic calcification the coronary arteries. No pericardial effusion.  CHEST WALL AND LOWER NECK: Within normal limits.  VISUALIZED UPPER ABDOMEN: Enlarged spleen measures up to 13.9 cm, increased in size from approximately 11.8 cm on 2020.  BONES: The patient is status post C2-T7 posterior spinal fusion with shamar screw construct.  Within the thoracic spine there are pedicle screws at T1 bilaterally, T2 bilaterally, T3 on the right, T4 on the left, T5 bilaterally, T6 bilaterally and T7 bilaterally.  There is also a screw coursing through the base of the T3 spinous process and right T3 lamina.  Multiple pedicle screws are slightly lateral in position and course into the costovertebral vertebral junctions.  There is no evidence of hardware fracture or abnormal lucency around the pedicle screws.  There are old/healing fractures of the left third, sixth, seventh and eighth ribs with external callus formation.    IMPRESSION:  1.  No evidence of acute traumatic injury in the chest.    2.  Patchy groundglass opacities in the anterior segments of both upper lobes are new from 2020.  These may reflect nonspecific foci of infection, inflammation, edema or hemorrhage.    2.  Dense consolidation in the right middle lobe with air bronchograms and bronchiectasis is an region of lung that previously showed patchy groundglass nodular opacities on 2020.  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    3.  There is persistent groundglass opacity in the basilar segments of both lower lobes, right greater than left, which appears stable to mildly progressed from 2020.  This may reflect areas of infection/inflammation, edema, atelectasis or interstitial lung disease.    4.  Splenomegaly, which appears new from 2020.  The spleen measures 13.9 cm, increased in size from 11.8 cm on 2020.    5.  Follow-up chest CT is recommended in 1-3 months to reassess the findings and exclude continued disease progression.    < end of copied text >  < from: Xray Chest 1 View- PORTABLE-Routine (Xray Chest 1 View- PORTABLE-Routine in AM.) (21 @ 07:57) >  EXAM:  XR CHEST PORTABLE ROUTINE 1V                            PROCEDURE DATE:  2021          INTERPRETATION:  CLINICAL HISTORY: 71-year-old with Covid pneumonia..    TECHNIQUE: A single AP radiograph of the chest was reviewed..    COMPARISON: Chest radiograph from 2021.    FINDINGS/  IMPRESSION:    Tubes/lines: Multiple radiopaque densities project over the left chest. Cervicothoracic spine fixation hardware.    Lungs: Bibasilar atelectasis. Small airspace opacity left midlung consistent with pneumonia..    Pleura: No pneumothorax or pleural effusions.    Cardiomediastinal silhouette: Within normal limits.            SARWAT BURDEN MD; Attending Radiologist  This document has been electronically signed. 2021  3:43PM    < end of copied text >  CXR bilat infil

## 2021-02-03 NOTE — PROGRESS NOTE ADULT - SUBJECTIVE AND OBJECTIVE BOX
Patient is a 71y old  Female who presents with a chief complaint of sob (03 Feb 2021 15:41)      INTERVAL /OVERNIGHT EVENTS: feels better, dtr requesting another physician to assume care    MEDICATIONS  (STANDING):  ascorbic acid 500 milliGRAM(s) Oral daily  budesonide 160 MICROgram(s)/formoterol 4.5 MICROgram(s) Inhaler 2 Puff(s) Inhalation two times a day  cholecalciferol 2000 Unit(s) Oral daily  cholestyramine Powder (Sugar-Free) 4 Gram(s) Oral every 24 hours  dexAMETHasone     Tablet 6 milliGRAM(s) Oral daily  enoxaparin Injectable 40 milliGRAM(s) SubCutaneous daily  famotidine    Tablet 20 milliGRAM(s) Oral two times a day  ferrous    sulfate 325 milliGRAM(s) Oral daily  influenza   Vaccine 0.5 milliLiter(s) IntraMuscular once  lactobacillus acidophilus 1 Tablet(s) Oral three times a day  lidocaine   Patch 1 Patch Transdermal every 24 hours  multivitamin/minerals 1 Tablet(s) Oral daily  potassium chloride    Tablet ER 10 milliEquivalent(s) Oral two times a day  pramipexole 1.5 milliGRAM(s) Oral every 24 hours  sulfaSALAzine 500 milliGRAM(s) Oral three times a day  torsemide 5 milliGRAM(s) Oral two times a day    MEDICATIONS  (PRN):  acetaminophen   Tablet .. 650 milliGRAM(s) Oral every 6 hours PRN Temp greater or equal to 38C (100.4F), Mild Pain (1 - 3)  ALBUTerol    90 MICROgram(s) HFA Inhaler 2 Puff(s) Inhalation every 4 hours PRN Shortness of Breath and/or Wheezing  aluminum hydroxide/magnesium hydroxide/simethicone Suspension 30 milliLiter(s) Oral every 4 hours PRN Dyspepsia  benzonatate 100 milliGRAM(s) Oral three times a day PRN Cough  cyclobenzaprine 5 milliGRAM(s) Oral three times a day PRN Muscle Spasm  melatonin 3 milliGRAM(s) Oral at bedtime PRN Insomnia  ondansetron Injectable 4 milliGRAM(s) IV Push every 6 hours PRN Nausea and/or Vomiting  oxyCODONE    IR 5 milliGRAM(s) Oral four times a day PRN Severe Pain (7 - 10)  traMADol 25 milliGRAM(s) Oral four times a day PRN Moderate Pain (4 - 6)      Allergies    No Known Allergies    Intolerances        REVIEW OF SYSTEMS:  CONSTITUTIONAL: No fever, weight loss, or fatigue  EYES: No eye pain, visual disturbances, or discharge  ENMT:  No difficulty hearing, tinnitus, vertigo; No sinus or throat pain  NECK: No pain or stiffness  RESPIRATORY: No cough, wheezing, chills or hemoptysis; No shortness of breath  CARDIOVASCULAR: No chest pain, palpitations, dizziness, or leg swelling  GASTROINTESTINAL: No abdominal or epigastric pain. No nausea, vomiting, or hematemesis; No diarrhea or constipation. No melena or hematochezia.  GENITOURINARY: No dysuria, frequency, hematuria, or incontinence  NEUROLOGICAL: No headaches, memory loss, loss of strength, numbness, or tremors  SKIN: No itching, burning, rashes, or lesions   LYMPH NODES: No enlarged glands  ENDOCRINE: No heat or cold intolerance; No hair loss; No polydipsia or polyuria  MUSCULOSKELETAL: No joint pain or swelling; No muscle, back, or extremity pain  PSYCHIATRIC: No depression, anxiety, mood swings, or difficulty sleeping  HEME/LYMPH: No easy bruising, or bleeding gums  ALLERGY AND IMMUNOLOGIC: No hives or eczema    Vital Signs Last 24 Hrs  T(C): 36.7 (03 Feb 2021 12:28), Max: 36.7 (03 Feb 2021 12:28)  T(F): 98 (03 Feb 2021 12:28), Max: 98 (03 Feb 2021 12:28)  HR: 70 (03 Feb 2021 12:28) (70 - 90)  BP: 106/62 (03 Feb 2021 12:28) (106/62 - 126/64)  BP(mean): --  RR: 18 (03 Feb 2021 12:28) (17 - 19)  SpO2: 97% (03 Feb 2021 12:28) (95% - 98%)    PHYSICAL EXAM:  GENERAL: NAD, well-groomed, well-developed  HEAD:  Atraumatic, Normocephalic  EYES: EOMI, PERRLA, conjunctiva and sclera clear  ENMT: No tonsillar erythema, exudates, or enlargement; Moist mucous membranes, Good dentition, No lesions  NECK: Supple, No JVD, Normal thyroid  NERVOUS SYSTEM:  Alert & Oriented X3, Good concentration; Motor Strength 5/5 B/L upper and lower extremities; DTRs 2+ intact and symmetric  CHEST/LUNG: Clear to auscultation bilaterally; No rales, rhonchi, wheezing, or rubs  HEART: Regular rate and rhythm; No murmurs, rubs, or gallops  ABDOMEN: Soft, Nontender, Nondistended; Bowel sounds present  EXTREMITIES:  2+ Peripheral Pulses, No clubbing, cyanosis, or edema  LYMPH: No lymphadenopathy noted  SKIN: No rashes or lesions    LABS:                        10.4   10.48 )-----------( 331      ( 03 Feb 2021 11:04 )             32.5     03 Feb 2021 11:04    x      |  x      |  x      ----------------------------<  x      x       |  x      |  0.94     Ca    7.7        02 Feb 2021 12:02    TPro  7.0    /  Alb  2.7    /  TBili  0.3    /  DBili  .10    /  AST  20     /  ALT  18     /  AlkPhos  123    03 Feb 2021 11:04        CAPILLARY BLOOD GLUCOSE          RADIOLOGY & ADDITIONAL TESTS:    Notes Reviewed:  x[ ] YES  [ ] NO    Care Discussed with Consultants/Other Providers [x ] YES  [ ] NO

## 2021-02-03 NOTE — BEHAVIORAL HEALTH ASSESSMENT NOTE - SUMMARY
70y/o DWF, domiciled, no prior psychiatric hospitalizations or significant treatment, who presents with multiple falls and feeling weak. Patient complained of diffuse pain and feeling weak, no vomiting, no diarrhea, no fever, chills, no cough or sob, denies sick contacts at home. Patient was diagnosed with COVID. The issue of capacity was raised. Patient presents as lucid, with no complaints of symptoms of psychosis, sheila or depression.    IMP: Adjustment disorder

## 2021-02-03 NOTE — PROGRESS NOTE ADULT - PROBLEM SELECTOR PLAN 2
supplement potassium  serial BMP  improved / resolved
suplement potassium  serial BMP  improved / resolved
suplement potassium  serial BMP  improved / resolved
suplement potassium  serial BMP  improved
suplement potassium  serial BMP  improved / resolved
supplement potassium  serial BMP  improved / resolved

## 2021-02-03 NOTE — BEHAVIORAL HEALTH ASSESSMENT NOTE - RISK ASSESSMENT
Patient has no prior psychiatric history, is future-oriented, and is able to contract for safety Low Acute Suicide Risk

## 2021-02-03 NOTE — BEHAVIORAL HEALTH ASSESSMENT NOTE - NSBHCHARTREVIEWVS_PSY_A_CORE FT
Vital Signs Last 24 Hrs  T(C): 36.7 (03 Feb 2021 12:28), Max: 36.7 (03 Feb 2021 12:28)  T(F): 98 (03 Feb 2021 12:28), Max: 98 (03 Feb 2021 12:28)  HR: 70 (03 Feb 2021 12:28) (70 - 90)  BP: 106/62 (03 Feb 2021 12:28) (106/62 - 126/64)  BP(mean): --  RR: 18 (03 Feb 2021 12:28) (17 - 19)  SpO2: 97% (03 Feb 2021 12:28) (95% - 98%)

## 2021-02-03 NOTE — PROGRESS NOTE ADULT - ASSESSMENT
contact #  daughter----Mortimer, Kelly  phone # [phone]    IMPRESSION:  71 y/o F PMHx lymphedema, HLD, RA, s/p cervical spine surgery at [facility] around 10/2020--- with prolonged hospital course complicated by C. diff infection (completed treatment course) and dysphagia (s/p PEG tube placement). Patient returned home on 11/24/20. Patient was admitted at [facility] from 11/28/2020---21/1/2020, was admitted with nausea/ vomiting/and abdominal pain, and was discharged 12/1/2020. She has been using a walker to ambulate, admitted with multiple falls and weakness, had ct scan this admission, hematology was consulted for anemia on 1/28 and was seen.  case d/w daughter, lives with  per , 2 daughters    RECOMMENDATION:  Anemia---likely multifactorial  hb is at 10.4 today, was at 9.1 on 2/1  iron profile--low iron/transferrin saturation, ferritin is an acute phase reactant, as looking for a rapid response, patient is s/p iv iron for 3 days, po fe now  monitor h/h--transfuse prbc as needed for symptomatic anemia  nl ldh, low retic--no evidence of hemolysis  nl b12 level  monitor h/h--transfuse prbc as needed for symptomatic anemia or if hb is under 7  ?colitis on ct--gi is following  anemia/iron def possible/colitis---gi work up as per gi/pt/family to exclude gi pathology/malignancy  covid positive, id is following,management/plan per id/critical care  gi/dvtp--lovenox s/c

## 2021-02-03 NOTE — BEHAVIORAL HEALTH ASSESSMENT NOTE - NSBHCHARTREVIEWLAB_PSY_A_CORE FT
10.4   10.48 )-----------( 331      ( 03 Feb 2021 11:04 )             32.5   02-03    x   |  x   |  x   ----------------------------<  x   x    |  x   |  0.94    Ca    7.7<L>      02 Feb 2021 12:02    TPro  7.0  /  Alb  2.7<L>  /  TBili  0.3  /  DBili  .10  /  AST  20  /  ALT  18  /  AlkPhos  123<H>  02-03

## 2021-02-03 NOTE — PROGRESS NOTE ADULT - SUBJECTIVE AND OBJECTIVE BOX
[facility] -- Quang Akers, Itzel Herrera, Kiran Peterson Savella, Goodger  Office # [phone]    Follow Up:  Multiple Falls, Syncope    Subjective/Observations: Sitting on the chair, comfortable on RA.  Denies knee pain.  Denies any respiratory or cardiac discomsfort    REVIEW OF SYSTEMS: All other review of systems is negative unless indicated above  PAST MEDICAL & SURGICAL HISTORY:  Obesity (BMI 30-39.9)    Traumatic arthropathy involving lower leg  right    History of Osteoarthritis    Hyperlipidemia    Restless Leg Syndrome    H/O neck surgery    H/O arthroscopy of right knee  2010    Bladder Disorder  Bladder lift 2000    History of Colonoscopy    Ex lap in 2009 for abscess in the baldder    History of Hysterectomy and bladder lift in 2000      MEDICATIONS  (STANDING):  ascorbic acid 500 milliGRAM(s) Oral daily  budesonide 160 MICROgram(s)/formoterol 4.5 MICROgram(s) Inhaler 2 Puff(s) Inhalation two times a day  cholecalciferol 2000 Unit(s) Oral daily  cholestyramine Powder (Sugar-Free) 4 Gram(s) Oral every 24 hours  dexAMETHasone     Tablet 6 milliGRAM(s) Oral daily  enoxaparin Injectable 40 milliGRAM(s) SubCutaneous daily  famotidine    Tablet 20 milliGRAM(s) Oral two times a day  ferrous    sulfate 325 milliGRAM(s) Oral daily  influenza   Vaccine 0.5 milliLiter(s) IntraMuscular once  lactobacillus acidophilus 1 Tablet(s) Oral three times a day  lidocaine   Patch 1 Patch Transdermal every 24 hours  multivitamin/minerals 1 Tablet(s) Oral daily  potassium chloride    Tablet ER 10 milliEquivalent(s) Oral two times a day  pramipexole 1.5 milliGRAM(s) Oral every 24 hours  sulfaSALAzine 500 milliGRAM(s) Oral three times a day  torsemide 5 milliGRAM(s) Oral two times a day  vancomycin    Solution 125 milliGRAM(s) Oral every 6 hours    MEDICATIONS  (PRN):  acetaminophen   Tablet .. 650 milliGRAM(s) Oral every 6 hours PRN Temp greater or equal to 38C (100.4F), Mild Pain (1 - 3)  ALBUTerol    90 MICROgram(s) HFA Inhaler 2 Puff(s) Inhalation every 4 hours PRN Shortness of Breath and/or Wheezing  aluminum hydroxide/magnesium hydroxide/simethicone Suspension 30 milliLiter(s) Oral every 4 hours PRN Dyspepsia  benzonatate 100 milliGRAM(s) Oral three times a day PRN Cough  cyclobenzaprine 5 milliGRAM(s) Oral three times a day PRN Muscle Spasm  melatonin 3 milliGRAM(s) Oral at bedtime PRN Insomnia  ondansetron Injectable 4 milliGRAM(s) IV Push every 6 hours PRN Nausea and/or Vomiting  oxyCODONE    IR 5 milliGRAM(s) Oral four times a day PRN Severe Pain (7 - 10)  traMADol 25 milliGRAM(s) Oral four times a day PRN Moderate Pain (4 - 6)    Allergies    No Known Allergies    Intolerances    Vital Signs Last 24 Hrs  T(C): 36.7 (03 Feb 2021 12:28), Max: 36.7 (03 Feb 2021 12:28)  T(F): 98 (03 Feb 2021 12:28), Max: 98 (03 Feb 2021 12:28)  HR: 70 (03 Feb 2021 12:28) (70 - 90)  BP: 106/62 (03 Feb 2021 12:28) (106/62 - 126/64)  BP(mean): --  RR: 18 (03 Feb 2021 12:28) (17 - 19)  SpO2: 97% (03 Feb 2021 12:28) (95% - 98%)  I&O's Summary    02 Feb 2021 07:01  -  03 Feb 2021 07:00  --------------------------------------------------------  IN: 0 mL / OUT: 650 mL / NET: -650 mL      PHYSICAL EXAM:  TELE: NSR  Constitutional: NAD, awake and alert, well-developed  HEENT: Moist Mucous Membranes, Anicteric  Pulmonary: Non-labored, breath sounds are clear but diminished bilaterally, No wheezing, rales or rhonchi  Cardiovascular: Regular, S1 and S2, + murmurs, no rubs, gallops or clicks  Gastrointestinal: Bowel Sounds present, soft, nontender.   Lymph: No peripheral edema. No lymphadenopathy.  Skin: No visible rashes or ulcers.  Psych:  Mood & affect appropriate  LABS: All Labs Reviewed:                        10.4   10.48 )-----------( 331      ( 03 Feb 2021 11:04 )             32.5                         9.1    8.15  )-----------( 307      ( 01 Feb 2021 09:59 )             28.6     03 Feb 2021 11:04    x      |  x      |  x      ----------------------------<  x      x       |  x      |  0.94   02 Feb 2021 12:02    142    |  107    |  10     ----------------------------<  162    3.8     |  27     |  0.89   01 Feb 2021 09:59    144    |  109    |  11     ----------------------------<  100    3.5     |  26     |  0.80     Ca    7.7        02 Feb 2021 12:02  Ca    7.4        01 Feb 2021 09:59    TPro  7.0    /  Alb  2.7    /  TBili  0.3    /  DBili  .10    /  AST  20     /  ALT  18     /  AlkPhos  123    03 Feb 2021 11:04  TPro  6.8    /  Alb  2.5    /  TBili  0.3    /  DBili  .10    /  AST  24     /  ALT  20     /  AlkPhos  126    02 Feb 2021 12:02  TPro  6.0    /  Alb  2.3    /  TBili  0.3    /  DBili  <.10   /  AST  30     /  ALT  16     /  AlkPhos  119    01 Feb 2021 09:59     EXAM:  ECHO TTE WO CON COMP W DOPP      PROCEDURE DATE:  01/31/2021      INTERPRETATION:  INDICATION: Heart failure    Blood Pressure unavailable    Height 170 cm     Weight 85.7 kg       BSA 1.9 sq m    Dimensions:  LA 3.0       Normal Values: 2.0 - 4.0 cm  Ao 3.3        Normal Values: 2.0 - 3.8 cm  SEPTUM 1.2       Normal Values: 0.6 - 1.2 cm  PWT 1.2       Normal Values: 0.6 - 1.1 cm  LVIDd 4.6         Normal Values: 3.0 - 5.6 cm  LVIDs 3.2         Normal Values: 1.8 - 4.0 cm      OBSERVATIONS:  Technically difficult study  Mitral Valve: Mitral annular calcification with thickened leaflets, trace physiologic MR.  Aortic Valve/Aorta: Aortic valve is not well-visualized. Appears calcified with a peak transaortic valve gradient is 18.5 mmHg with a mean transaortic valve gradient 10.9 mmHg.  Tricuspid Valve: normal with trace TR.  Pulmonic Valve: Not well-visualized  Left Atrium: normal  Right Atrium: Not well-visualized  Left Ventricle: normal LV size and systolic function, estimated LVEF of 55-60%.  Right Ventricle: Grossly normal size and systolic function.  Pericardium/Pleura: normal, no significant pericardial effusion.      Conclusion:  Technically difficult study  Normal left ventricular internal dimensions and systolic function, estimated LVEF of 55-60%.  Grossly normal RV size and systolic function.  The aortic valve is not well-visualized, appears calcified with likely mild aortic stenosis  Trace physiologic MR and TR.  No significant pericardial effusion.      ALEN FERRO MD; Attending Cardiologist  This document has been electronically signed. Jan 31 2021  5:15PM  EXAM:  CT CHEST                          PROCEDURE DATE:  01/28/2021      INTERPRETATION:  CLINICAL INFORMATION: Chest trauma.  Status post fall.    COMPARISON: 11/20/2020.    PROCEDURE:  CT of the Chest was performed without intravenous contrast.  Sagittal and coronal reformats were performed.    FINDINGS:    LUNGS AND AIRWAYS: There is moderate respiratory motion artifact the central airways are grossly clear within limits of motion degradation.  There is mild to moderate centrilobular emphysema.  There are nonspecific groundglass opacities in the anterior segments of both upper lobes measuring up to approximately 1.9 x 1.9 cm on the right (3:226) and 1.4 x 1.1 cm on the left (3:217), which are new since 11/28/2020.  There is dense consolidation in the right middle lobe with air bronchograms and bronchiectasis, in a region of lung that previously showed patchy groundglass nodular opacities on 11/20/2020.  Again seen is patchy groundglass opacity in the basilar segments of the right greater than left lower lobes which appears stable to mildly progressed from 11/20/2020.  PLEURA: No pleural effusion.  MEDIASTINUM AND MARYBEL: No lymphadenopathy.  VESSELS: Mild atherosclerotic calcification thoracic aorta and arch vessels.  HEART:Cardiomegaly.  Calcifications involving sinuses of Valsalva and aortic valve leaflets.  Moderate atherosclerotic calcification the coronary arteries. No pericardial effusion.  CHEST WALL AND LOWER NECK: Within normal limits.  VISUALIZED UPPER ABDOMEN: Enlarged spleen measures up to 13.9 cm, increased in size from approximately 11.8 cm on 11/28/2020.  BONES: The patient is status post C2-T7 posterior spinal fusion with shamar screw construct.  Within the thoracic spine there are pedicle screws at T1 bilaterally, T2 bilaterally, T3 on the right, T4 on the left, T5 bilaterally, T6 bilaterally and T7 bilaterally.  There is also a screw coursing through the base of the T3 spinous process and right T3 lamina.  Multiple pedicle screws are slightly lateral in position and course into the costovertebral vertebral junctions.  There is no evidence of hardware fracture or abnormal lucency around the pedicle screws.  There are old/healing fractures of the left third, sixth, seventh and eighth ribs with external callus formation.    IMPRESSION:  1.  No evidence of acute traumatic injury in the chest.    2.  Patchy groundglass opacities in the anterior segments of both upper lobes are new from 11/20/2020.  These may reflect nonspecific foci of infection, inflammation, edema or hemorrhage.    2.  Dense consolidation in the right middle lobe with air bronchograms and bronchiectasis is an region of lung that previously showed patchy groundglass nodular opacities on 11/20/2020.  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    3.  There is persistent groundglass opacity in the basilar segments of both lower lobes, right greater than left, which appears stable to mildly progressed from 11/20/2020.  This may reflect areas of infection/inflammation, edema, atelectasis or interstitial lung disease.    4.  Splenomegaly, which appears new from 11/20/2020.  The spleen measures 13.9 cm, increased in size from 11.8 cm on 11/20/2020.    5.  Follow-up chest CT is recommended in 1-3 months to reassess the findings and exclude continued disease progression.    NEREIDA FRASER MD; Attending Radiologist  This document has been electronically signed. Jan 28 2021  2:22AM    Ventricular Rate 87 BPM    Atrial Rate 87 BPM    P-R Interval 148 ms    QRS Duration 98 ms    Q-T Interval 388 ms    QTC Calculation(Bazett) 466 ms    P Axis 53 degrees    R Axis -3 degrees    T Axis 10 degrees    Diagnosis Line Normal sinus rhythm  Poor R wave progression  Confirmed by CLARK PETERSON ([id number]) on 1/28/2021 10:53:29 AM

## 2021-02-03 NOTE — BEHAVIORAL HEALTH ASSESSMENT NOTE - HPI (INCLUDE ILLNESS QUALITY, SEVERITY, DURATION, TIMING, CONTEXT, MODIFYING FACTORS, ASSOCIATED SIGNS AND SYMPTOMS)
Patient seen, evaluated and chart reviewed. Patient is a 70y/o DWF, domiciled, no prior psychiatic hospitalizations or significant treatment, who presents with multiple falls and feeling weak. Patient complained of diffuse pain and feeling weak, no vomiting, no diarrhea, no fever, chills, no cough or sob, denies sick contacts at home. Patient was diagnosed with COVID. The issue of capacity was raised. Patient presents as lucid, with no complaints of symptoms of psychosis, sheila or depression.

## 2021-02-03 NOTE — PROGRESS NOTE ADULT - ASSESSMENT
Pt. with Covid pneumonia. Clinically stable.   Oxygenating ok.   Would steroids.  Can dc pt if stable to rehab when covid test turns neg.   OOB

## 2021-02-03 NOTE — PROGRESS NOTE ADULT - PROBLEM SELECTOR PLAN 5
fall precautions  PT for ambulation  may need SUSANNA

## 2021-02-03 NOTE — PROGRESS NOTE ADULT - SUBJECTIVE AND OBJECTIVE BOX
Patient is a 71y old  Female who presents with a chief complaint of sob (03 Feb 2021 15:31)       Pt is seen and examined  pt is awake and lying in bed/out of bed to chair  pt seems comfortable and denies any complaints at this time    HPI:  · HPI Objective Statement: 72yo female who presents with multiple falls and feeling weak today. pt c/o diffuse pain and feeling weak, no vomiting, no diarrhea, no fever, chills, no cough or sob, denies sick contacts at home  In ER patient was found to have COVID PNA with possible colitis.  patient is being admitted for further care and management (28 Jan 2021 10:42)         ROS:  Negative except for:    MEDICATIONS  (STANDING):  ascorbic acid 500 milliGRAM(s) Oral daily  budesonide 160 MICROgram(s)/formoterol 4.5 MICROgram(s) Inhaler 2 Puff(s) Inhalation two times a day  cholecalciferol 2000 Unit(s) Oral daily  cholestyramine Powder (Sugar-Free) 4 Gram(s) Oral every 24 hours  dexAMETHasone     Tablet 6 milliGRAM(s) Oral daily  enoxaparin Injectable 40 milliGRAM(s) SubCutaneous daily  famotidine    Tablet 20 milliGRAM(s) Oral two times a day  ferrous    sulfate 325 milliGRAM(s) Oral daily  influenza   Vaccine 0.5 milliLiter(s) IntraMuscular once  lactobacillus acidophilus 1 Tablet(s) Oral three times a day  lidocaine   Patch 1 Patch Transdermal every 24 hours  multivitamin/minerals 1 Tablet(s) Oral daily  potassium chloride    Tablet ER 10 milliEquivalent(s) Oral two times a day  pramipexole 1.5 milliGRAM(s) Oral every 24 hours  sulfaSALAzine 500 milliGRAM(s) Oral three times a day  torsemide 5 milliGRAM(s) Oral two times a day    MEDICATIONS  (PRN):  acetaminophen   Tablet .. 650 milliGRAM(s) Oral every 6 hours PRN Temp greater or equal to 38C (100.4F), Mild Pain (1 - 3)  ALBUTerol    90 MICROgram(s) HFA Inhaler 2 Puff(s) Inhalation every 4 hours PRN Shortness of Breath and/or Wheezing  aluminum hydroxide/magnesium hydroxide/simethicone Suspension 30 milliLiter(s) Oral every 4 hours PRN Dyspepsia  benzonatate 100 milliGRAM(s) Oral three times a day PRN Cough  cyclobenzaprine 5 milliGRAM(s) Oral three times a day PRN Muscle Spasm  melatonin 3 milliGRAM(s) Oral at bedtime PRN Insomnia  ondansetron Injectable 4 milliGRAM(s) IV Push every 6 hours PRN Nausea and/or Vomiting  oxyCODONE    IR 5 milliGRAM(s) Oral four times a day PRN Severe Pain (7 - 10)  traMADol 25 milliGRAM(s) Oral four times a day PRN Moderate Pain (4 - 6)      Allergies    No Known Allergies    Intolerances        Vital Signs Last 24 Hrs  T(C): 36.7 (03 Feb 2021 12:28), Max: 36.7 (03 Feb 2021 12:28)  T(F): 98 (03 Feb 2021 12:28), Max: 98 (03 Feb 2021 12:28)  HR: 70 (03 Feb 2021 12:28) (70 - 90)  BP: 106/62 (03 Feb 2021 12:28) (106/62 - 126/64)  BP(mean): --  RR: 18 (03 Feb 2021 12:28) (17 - 19)  SpO2: 97% (03 Feb 2021 12:28) (95% - 98%)    PHYSICAL EXAM  General: adult in NAD  HEENT: clear oropharynx, anicteric sclera, pink conjunctiva  Neck: supple  CV: normal S1/S2 with no murmur rubs or gallops  Lungs: positive air movement b/l ant lungs,clear to auscultation, no wheezes, no rales  Abdomen: soft non-tender non-distended, no hepatosplenomegaly  Ext: no clubbing cyanosis or edema  Skin: no rashes and no petechiae  Neuro: alert and oriented X 4, no focal deficits  LABS:                          10.4   10.48 )-----------( 331      ( 03 Feb 2021 11:04 )             32.5         Mean Cell Volume : 84.6 fl  Mean Cell Hemoglobin : 27.1 pg  Mean Cell Hemoglobin Concentration : 32.0 gm/dL  Auto Neutrophil # : x  Auto Lymphocyte # : x  Auto Monocyte # : x  Auto Eosinophil # : x  Auto Basophil # : x  Auto Neutrophil % : x  Auto Lymphocyte % : x  Auto Monocyte % : x  Auto Eosinophil % : x  Auto Basophil % : x    Serial CBC's  02-03 @ 11:04  Hct-32.5 / Hgb-10.4 / Plat-331 / RBC-3.84 / WBC-10.48          Serial CBC's  02-01 @ 09:59  Hct-28.6 / Hgb-9.1 / Plat-307 / RBC-3.43 / WBC-8.15          Serial CBC's  01-31 @ 08:06  Hct-29.9 / Hgb-9.4 / Plat-305 / RBC-3.57 / WBC-9.07            02-03    x   |  x   |  x   ----------------------------<  x   x    |  x   |  0.94    Ca    7.7<L>      02 Feb 2021 12:02    TPro  7.0  /  Alb  2.7<L>  /  TBili  0.3  /  DBili  .10  /  AST  20  /  ALT  18  /  AlkPhos  123<H>  02-03          Ferritin, Serum: 229 ng/mL (01-30-21 @ 15:07)  Iron - Total Binding Capacity.: 154 ug/dL (01-28-21 @ 22:55)  Ferritin, Serum: 168 ng/mL (01-28-21 @ 22:45)  Vitamin B12, Serum: 872 pg/mL (01-28-21 @ 22:45)  Folate, Serum: 8.7 ng/mL (01-28-21 @ 22:45)  Reticulocyte Percent: 0.4 % (01-28-21 @ 16:50)  Ferritin, Serum: 161 ng/mL (01-28-21 @ 11:50)  Iron - Total Binding Capacity.: 144 ug/dL (01-28-21 @ 11:47)  Ferritin, Serum: 173 ng/mL (01-28-21 @ 06:22)                BLOOD SMEAR INTERPRETATION:       RADIOLOGY & ADDITIONAL STUDIES:     Patient is a 71y old  Female who presents with a chief complaint of sob (03 Feb 2021 15:31)       Pt is seen and examined  pt is awake and lying in bed  pt seems comfortable and denies any complaints at this time    HPI:  · HPI Objective Statement: 70yo female who presents with multiple falls and feeling weak today. pt c/o diffuse pain and feeling weak, no vomiting, no diarrhea, no fever, chills, no cough or sob, denies sick contacts at home  In ER patient was found to have COVID PNA with possible colitis.  patient is being admitted for further care and management (28 Jan 2021 10:42)         ROS:  as per hpi  MEDICATIONS  (STANDING):  ascorbic acid 500 milliGRAM(s) Oral daily  budesonide 160 MICROgram(s)/formoterol 4.5 MICROgram(s) Inhaler 2 Puff(s) Inhalation two times a day  cholecalciferol 2000 Unit(s) Oral daily  cholestyramine Powder (Sugar-Free) 4 Gram(s) Oral every 24 hours  dexAMETHasone     Tablet 6 milliGRAM(s) Oral daily  enoxaparin Injectable 40 milliGRAM(s) SubCutaneous daily  famotidine    Tablet 20 milliGRAM(s) Oral two times a day  ferrous    sulfate 325 milliGRAM(s) Oral daily  influenza   Vaccine 0.5 milliLiter(s) IntraMuscular once  lactobacillus acidophilus 1 Tablet(s) Oral three times a day  lidocaine   Patch 1 Patch Transdermal every 24 hours  multivitamin/minerals 1 Tablet(s) Oral daily  potassium chloride    Tablet ER 10 milliEquivalent(s) Oral two times a day  pramipexole 1.5 milliGRAM(s) Oral every 24 hours  sulfaSALAzine 500 milliGRAM(s) Oral three times a day  torsemide 5 milliGRAM(s) Oral two times a day    MEDICATIONS  (PRN):  acetaminophen   Tablet .. 650 milliGRAM(s) Oral every 6 hours PRN Temp greater or equal to 38C (100.4F), Mild Pain (1 - 3)  ALBUTerol    90 MICROgram(s) HFA Inhaler 2 Puff(s) Inhalation every 4 hours PRN Shortness of Breath and/or Wheezing  aluminum hydroxide/magnesium hydroxide/simethicone Suspension 30 milliLiter(s) Oral every 4 hours PRN Dyspepsia  benzonatate 100 milliGRAM(s) Oral three times a day PRN Cough  cyclobenzaprine 5 milliGRAM(s) Oral three times a day PRN Muscle Spasm  melatonin 3 milliGRAM(s) Oral at bedtime PRN Insomnia  ondansetron Injectable 4 milliGRAM(s) IV Push every 6 hours PRN Nausea and/or Vomiting  oxyCODONE    IR 5 milliGRAM(s) Oral four times a day PRN Severe Pain (7 - 10)  traMADol 25 milliGRAM(s) Oral four times a day PRN Moderate Pain (4 - 6)      Allergies    No Known Allergies    Intolerances        Vital Signs Last 24 Hrs  T(C): 36.7 (03 Feb 2021 12:28), Max: 36.7 (03 Feb 2021 12:28)  T(F): 98 (03 Feb 2021 12:28), Max: 98 (03 Feb 2021 12:28)  HR: 70 (03 Feb 2021 12:28) (70 - 90)  BP: 106/62 (03 Feb 2021 12:28) (106/62 - 126/64)  BP(mean): --  RR: 18 (03 Feb 2021 12:28) (17 - 19)  SpO2: 97% (03 Feb 2021 12:28) (95% - 98%)    PHYSICAL EXAM  General: adult in NAD  HEENT: clear oropharynx, anicteric sclera, pink conjunctiva  Neck: supple  CV: normal S1/S2 with no murmur rubs or gallops  Lungs: positive air movement b/l ant lungs,clear to auscultation, no wheezes, no rales  Abdomen: soft non-tender non-distended, no hepatosplenomegaly  Ext: no clubbing cyanosis or edema  Skin: no rashes and no petechiae  Neuro: alert and oriented X 4, no focal deficits  LABS:                          10.4   10.48 )-----------( 331      ( 03 Feb 2021 11:04 )             32.5         Mean Cell Volume : 84.6 fl  Mean Cell Hemoglobin : 27.1 pg  Mean Cell Hemoglobin Concentration : 32.0 gm/dL  Auto Neutrophil # : x  Auto Lymphocyte # : x  Auto Monocyte # : x  Auto Eosinophil # : x  Auto Basophil # : x  Auto Neutrophil % : x  Auto Lymphocyte % : x  Auto Monocyte % : x  Auto Eosinophil % : x  Auto Basophil % : x    Serial CBC's  02-03 @ 11:04  Hct-32.5 / Hgb-10.4 / Plat-331 / RBC-3.84 / WBC-10.48          Serial CBC's  02-01 @ 09:59  Hct-28.6 / Hgb-9.1 / Plat-307 / RBC-3.43 / WBC-8.15          Serial CBC's  01-31 @ 08:06  Hct-29.9 / Hgb-9.4 / Plat-305 / RBC-3.57 / WBC-9.07            02-03    x   |  x   |  x   ----------------------------<  x   x    |  x   |  0.94    Ca    7.7<L>      02 Feb 2021 12:02    TPro  7.0  /  Alb  2.7<L>  /  TBili  0.3  /  DBili  .10  /  AST  20  /  ALT  18  /  AlkPhos  123<H>  02-03          Ferritin, Serum: 229 ng/mL (01-30-21 @ 15:07)  Iron - Total Binding Capacity.: 154 ug/dL (01-28-21 @ 22:55)  Ferritin, Serum: 168 ng/mL (01-28-21 @ 22:45)  Vitamin B12, Serum: 872 pg/mL (01-28-21 @ 22:45)  Folate, Serum: 8.7 ng/mL (01-28-21 @ 22:45)  Reticulocyte Percent: 0.4 % (01-28-21 @ 16:50)  Ferritin, Serum: 161 ng/mL (01-28-21 @ 11:50)  Iron - Total Binding Capacity.: 144 ug/dL (01-28-21 @ 11:47)  Ferritin, Serum: 173 ng/mL (01-28-21 @ 06:22)

## 2021-02-03 NOTE — PROGRESS NOTE ADULT - ASSESSMENT
72 y/o F with HLD, syncope, frequent falls, OA, s/p B/l knee replacement and right hip replacement, and back surgery presented to the ED after multiple falls and weakness, found to have COVID Pna and pancolitis.    Syncope, Falls  - Patient has known syncope and multiple falls. Her B/L knee pain is likely contributory to her frequent falls.  Recommend pain control  - For her syncope, per Dr. Akers's note, who she had seen in 9/2020, was thought to be from dehydration and CVA.  However, CVA w/u was negative  - She was recently in [place] where she was discharged with a 2-week cardiac monitor but unaware of result.  She is unable to recall name of Cardiologist in [place] and is refusing to go back to him  - She had an outpatient CUS 2/2020) in our office which showed no hemodynamic significant stenosis  - She had a TTE in 1/2020 showing normal LVF, EF 60% with bicuspid AV, mild AS.    - Repeat TTE inpatient showed normal LV & RV size and function EF 55-60%, trace MR and TR, mild AS  - NSR on tele. Can d/c tele   - She will need an ILR to be arranged as outpatient  - Fall precaution  - Negative orthostatics  - Continue to encourage PO fluid intake    Covid Pna  - Tolerating RA  - Supportive care  - Encourage incentive spirometer  - Follow Pulm and ID recs    DVT ppx  - Per Primary    - Monitor and replete lytes, keep K>4, Mg>2.    - All other medical needs as per primary team.  - Other cardiovascular workup will depend on clinical course.  - Will continue to follow.    Jeanie Cardona DNP, NP-C  Cardiology   Spectra #[phone]        70 y/o F with HLD, syncope, frequent falls, OA, s/p B/l knee replacement and right hip replacement, and back surgery presented to the ED after multiple falls and weakness, found to have COVID Pna and pancolitis.    Syncope, Falls  - Patient has known syncope and multiple falls. Her B/L knee pain is likely contributory to her frequent falls.  Recommend pain control  - For her syncope, per Dr. Akers's note, who she had seen in 9/2020, was thought to be from dehydration and CVA.  However, CVA w/u was negative  - She was recently in [place] where she was discharged with a 2-week cardiac monitor but unaware of result.  She is unable to recall name of Cardiologist in [place] and is refusing to go back to him  - She had an outpatient CUS 2/2020) in our office which showed no hemodynamic significant stenosis  - She had a TTE in 1/2020 showing normal LVF, EF 60% with bicuspid AV, mild AS.    - Repeat TTE inpatient showed normal LV & RV size and function EF 55-60%, trace MR and TR, mild AS  - NSR on tele.   - She will need an ILR to be arranged as outpatient  - Fall precaution  - Negative orthostatics  - Continue to encourage PO fluid intake    Covid Pna  - Tolerating RA  - Supportive care  - Encourage incentive spirometer  - Follow Pulm and ID recs    DVT ppx  - Per Primary    - Monitor and replete lytes, keep K>4, Mg>2.    - All other medical needs as per primary team.  - Other cardiovascular workup will depend on clinical course.  - Will continue to follow.    Jeanie Cardona DNP, NP-C  Cardiology   Spectra #[phone]

## 2021-02-03 NOTE — PROGRESS NOTE ADULT - ASSESSMENT
covid  colitis  anemia  dysphagia      no need for abx for ct findings re: colon  loose stools yesterday, now resolved and passing formed stool  cdiff specimen thus cancelled  dc po vanco  cont bacid, questran  cont pepcid daily  diet as tolerated  her peg tube was removed in between her admissions  monitor gi fxn  d/w daughter

## 2021-02-04 LAB
ALBUMIN SERPL ELPH-MCNC: 2.6 G/DL — LOW (ref 3.3–5)
ALP SERPL-CCNC: 112 U/L — SIGNIFICANT CHANGE UP (ref 40–120)
ALT FLD-CCNC: 15 U/L — SIGNIFICANT CHANGE UP (ref 12–78)
AST SERPL-CCNC: 14 U/L — LOW (ref 15–37)
BILIRUB DIRECT SERPL-MCNC: <.1 MG/DL — SIGNIFICANT CHANGE UP (ref 0.05–0.2)
BILIRUB INDIRECT FLD-MCNC: >0.2 MG/DL — SIGNIFICANT CHANGE UP (ref 0.2–1)
BILIRUB SERPL-MCNC: 0.3 MG/DL — SIGNIFICANT CHANGE UP (ref 0.2–1.2)
CREAT SERPL-MCNC: 0.95 MG/DL — SIGNIFICANT CHANGE UP (ref 0.5–1.3)
PROT SERPL-MCNC: 6.5 G/DL — SIGNIFICANT CHANGE UP (ref 6–8.3)

## 2021-02-04 PROCEDURE — 99233 SBSQ HOSP IP/OBS HIGH 50: CPT | Mod: CS

## 2021-02-04 PROCEDURE — 99232 SBSQ HOSP IP/OBS MODERATE 35: CPT

## 2021-02-04 RX ADMIN — CHOLESTYRAMINE 4 GRAM(S): 4 POWDER, FOR SUSPENSION ORAL at 13:53

## 2021-02-04 RX ADMIN — Medication 500 MILLIGRAM(S): at 12:15

## 2021-02-04 RX ADMIN — Medication 10 MILLIEQUIVALENT(S): at 05:28

## 2021-02-04 RX ADMIN — BUDESONIDE AND FORMOTEROL FUMARATE DIHYDRATE 2 PUFF(S): 160; 4.5 AEROSOL RESPIRATORY (INHALATION) at 18:14

## 2021-02-04 RX ADMIN — PRAMIPEXOLE DIHYDROCHLORIDE 1.5 MILLIGRAM(S): 0.12 TABLET ORAL at 18:12

## 2021-02-04 RX ADMIN — Medication 5 MILLIGRAM(S): at 18:12

## 2021-02-04 RX ADMIN — LIDOCAINE 1 PATCH: 4 CREAM TOPICAL at 20:48

## 2021-02-04 RX ADMIN — FAMOTIDINE 20 MILLIGRAM(S): 10 INJECTION INTRAVENOUS at 05:28

## 2021-02-04 RX ADMIN — Medication 6 MILLIGRAM(S): at 05:28

## 2021-02-04 RX ADMIN — Medication 1 TABLET(S): at 12:15

## 2021-02-04 RX ADMIN — Medication 500 MILLIGRAM(S): at 20:55

## 2021-02-04 RX ADMIN — Medication 1 TABLET(S): at 20:55

## 2021-02-04 RX ADMIN — Medication 5 MILLIGRAM(S): at 05:28

## 2021-02-04 RX ADMIN — Medication 325 MILLIGRAM(S): at 12:15

## 2021-02-04 RX ADMIN — OXYCODONE HYDROCHLORIDE 5 MILLIGRAM(S): 5 TABLET ORAL at 14:49

## 2021-02-04 RX ADMIN — FAMOTIDINE 20 MILLIGRAM(S): 10 INJECTION INTRAVENOUS at 18:12

## 2021-02-04 RX ADMIN — Medication 1 TABLET(S): at 05:28

## 2021-02-04 RX ADMIN — Medication 2000 UNIT(S): at 12:14

## 2021-02-04 RX ADMIN — Medication 10 MILLIEQUIVALENT(S): at 18:12

## 2021-02-04 RX ADMIN — Medication 500 MILLIGRAM(S): at 05:28

## 2021-02-04 RX ADMIN — Medication 500 MILLIGRAM(S): at 12:14

## 2021-02-04 RX ADMIN — BUDESONIDE AND FORMOTEROL FUMARATE DIHYDRATE 2 PUFF(S): 160; 4.5 AEROSOL RESPIRATORY (INHALATION) at 05:31

## 2021-02-04 RX ADMIN — LIDOCAINE 1 PATCH: 4 CREAM TOPICAL at 18:13

## 2021-02-04 RX ADMIN — OXYCODONE HYDROCHLORIDE 5 MILLIGRAM(S): 5 TABLET ORAL at 20:54

## 2021-02-04 RX ADMIN — ENOXAPARIN SODIUM 40 MILLIGRAM(S): 100 INJECTION SUBCUTANEOUS at 12:15

## 2021-02-04 RX ADMIN — LIDOCAINE 1 PATCH: 4 CREAM TOPICAL at 05:27

## 2021-02-04 NOTE — PROGRESS NOTE ADULT - SUBJECTIVE AND OBJECTIVE BOX
PULMONARY/CRITICAL CARE    DOS 21  Unchanged  Denies sob.   Feels well.    No complaints. Was OOB.    No fever.     Patient is a 71y old  Female who presents with a chief complaint of sob (2021 17:48)    BRIEF HOSPITAL COURSE: ***72yo woman with PMH of OA, HDL, and bladder disorder was admitted with multiple falls and feeling weakness. She didn't have SOB, cough, vomiting, no diarrhea, no fever, chills, No sick contacts.  She tested positive for COVID so was admitted for treatment. She has been started on remdesivir and Dexamethasone.       Events last 24 hours: ***    PAST MEDICAL & SURGICAL HISTORY:  Obesity (BMI 30-39.9)    Traumatic arthropathy involving lower leg  right    History of Osteoarthritis    Hyperlipidemia    Restless Leg Syndrome    H/O neck surgery    H/O arthroscopy of right knee      Bladder Disorder  Bladder lift     History of Colonoscopy    Ex lap in  for abscess in the baldder    History of Hysterectomy and bladder lift in       Allergies    No Known Allergies    Intolerances      FAMILY HISTORY: Smoked 1ppd for 25 yrs until 2017 no etoh  No pertinent family history in first degree relatives          Medications:  remdesivir  IVPB 100 milliGRAM(s) IV Intermittent every 24 hours  remdesivir  IVPB   IV Intermittent     torsemide 5 milliGRAM(s) Oral two times a day    ALBUTerol    90 MICROgram(s) HFA Inhaler 2 Puff(s) Inhalation every 4 hours PRN  benzonatate 100 milliGRAM(s) Oral three times a day PRN  budesonide 160 MICROgram(s)/formoterol 4.5 MICROgram(s) Inhaler 2 Puff(s) Inhalation two times a day    acetaminophen   Tablet .. 650 milliGRAM(s) Oral every 6 hours PRN  melatonin 3 milliGRAM(s) Oral at bedtime PRN  ondansetron Injectable 4 milliGRAM(s) IV Push every 6 hours PRN  oxyCODONE    IR 5 milliGRAM(s) Oral four times a day PRN  pramipexole 1.5 milliGRAM(s) Oral every 24 hours  traMADol 25 milliGRAM(s) Oral four times a day PRN      enoxaparin Injectable 40 milliGRAM(s) SubCutaneous daily    aluminum hydroxide/magnesium hydroxide/simethicone Suspension 30 milliLiter(s) Oral every 4 hours PRN  famotidine    Tablet 20 milliGRAM(s) Oral two times a day  sulfaSALAzine 500 milliGRAM(s) Oral three times a day      dexAMETHasone  Injectable 6 milliGRAM(s) IV Push daily    ascorbic acid 500 milliGRAM(s) Oral daily  cholecalciferol 2000 Unit(s) Oral daily  multivitamin/minerals 1 Tablet(s) Oral daily  potassium chloride   Powder 20 milliEquivalent(s) Oral four times a day    influenza   Vaccine 0.5 milliLiter(s) IntraMuscular once    lidocaine   Patch 1 Patch Transdermal every 24 hours    lactobacillus acidophilus 1 Tablet(s) Oral daily          ICU Vital Signs Last 24 Hrs  T(C): 36.3 (2021 04:59), Max: 36.8 (2021 21:39)  T(F): 97.4 (2021 04:59), Max: 98.2 (2021 21:39)  HR: 71 (2021 04:59) (60 - 76)  BP: 120/64 (2021 04:59) (96/57 - 120/64)  BP(mean): --  ABP: --  ABP(mean): --  RR: 18 (2021 04:59) (18 - 20)  SpO2: 96% (2021 04:59) (92% - 99%)    Vital Signs Last 24 Hrs  T(C): 36.3 (2021 04:59), Max: 36.8 (2021 21:39)  T(F): 97.4 (2021 04:59), Max: 98.2 (2021 21:39)  HR: 71 (2021 04:59) (60 - 76)  BP: 120/64 (2021 04:59) (96/57 - 120/64)  BP(mean): --  RR: 18 (2021 04:59) (18 - 20)  SpO2: 96% (2021 04:59) (92% - 99%)        I&O's Detail    2021 07:01  -  2021 07:00  --------------------------------------------------------  IN:    IV PiggyBack: 350 mL  Total IN: 350 mL    OUT:    Voided (mL): 650 mL  Total OUT: 650 mL    Total NET: -300 mL            LABS:                        8.9    x     )-----------( x        ( 2021 16:50 )             27.5         139  |  106  |  6<L>  ----------------------------<  86  2.9<LL>   |  27  |  0.71    Ca    7.3<L>      2021 08:03  Phos  3.1       Mg     1.9         TPro  5.8<L>  /  Alb  2.1<L>  /  TBili  0.4  /  DBili  .20  /  AST  70<H>  /  ALT  19  /  AlkPhos  130<H>        CARDIAC MARKERS ( 2021 08:03 )  x     / x     / 556 U/L / x     / x          CAPILLARY BLOOD GLUCOSE        PT/INR - ( 2021 23:16 )   PT: 17.6 sec;   INR: 1.54 ratio         PTT - ( 2021 23:16 )  PTT:28.6 sec  Urinalysis Basic - ( 2021 20:10 )    Color: Yellow / Appearance: Clear / S.010 / pH: x  Gluc: x / Ketone: Negative  / Bili: Negative / Urobili: Negative   Blood: x / Protein: 30 mg/dL / Nitrite: Negative   Leuk Esterase: Negative / RBC: 0-2 /HPF / WBC 0-2   Sq Epi: x / Non Sq Epi: Few / Bacteria: Few      CULTURES:  Culture Results:   No growth to date. ( @ 05:12)  Culture Results:   No growth to date. ( @ 05:12)      Physical Examination:    General: No acute distress.  elderly female    HEENT: Pupils equal, reactive to light.  Symmetric.    PULM: bibas crackles good excurion on wheeze     CVS: Regular rate and rhythm, no murmurs, rubs, or gallops    ABD: Soft, nondistended, nontender, normoactive bowel sounds, no masses    EXT: brawny leg  edema, nontender    SKIN: Warm and well perfused, no rashes noted.    NEURO: Alert, nteractive, nonfocal    RADIOLOGY: ***    d< from: CT Chest No Cont (21 @ 00:10) >  EXAM:  CT CHEST                            PROCEDURE DATE:  2021          INTERPRETATION:  CLINICAL INFORMATION: Chest trauma.  Status post fall.    COMPARISON: 2020.    PROCEDURE:  CT of the Chest was performed without intravenous contrast.  Sagittal and coronal reformats were performed.    FINDINGS:    LUNGS AND AIRWAYS: There is moderate respiratory motion artifact the central airways are grossly clear within limits of motion degradation.  There is mild to moderate centrilobular emphysema.  There are nonspecific groundglass opacities in the anterior segments of both upper lobes measuring up to approximately 1.9 x 1.9 cm on the right (3:226) and 1.4 x 1.1 cm on the left (3:217), which are new since 2020.  There is dense consolidation in the right middle lobe with air bronchograms and bronchiectasis, in a region of lung that previously showed patchy groundglass nodular opacities on 2020.  Again seen is patchy groundglass opacity in the basilar segments of the right greater than left lower lobes which appears stable to mildly progressed from 2020.  PLEURA: No pleural effusion.  MEDIASTINUM AND MARYBEL: No lymphadenopathy.  VESSELS: Mild atherosclerotic calcification thoracic aorta and arch vessels.  HEART:Cardiomegaly.  Calcifications involving sinuses of Valsalva and aortic valve leaflets.  Moderate atherosclerotic calcification the coronary arteries. No pericardial effusion.  CHEST WALL AND LOWER NECK: Within normal limits.  VISUALIZED UPPER ABDOMEN: Enlarged spleen measures up to 13.9 cm, increased in size from approximately 11.8 cm on 2020.  BONES: The patient is status post C2-T7 posterior spinal fusion with shamar screw construct.  Within the thoracic spine there are pedicle screws at T1 bilaterally, T2 bilaterally, T3 on the right, T4 on the left, T5 bilaterally, T6 bilaterally and T7 bilaterally.  There is also a screw coursing through the base of the T3 spinous process and right T3 lamina.  Multiple pedicle screws are slightly lateral in position and course into the costovertebral vertebral junctions.  There is no evidence of hardware fracture or abnormal lucency around the pedicle screws.  There are old/healing fractures of the left third, sixth, seventh and eighth ribs with external callus formation.    IMPRESSION:  1.  No evidence of acute traumatic injury in the chest.    2.  Patchy groundglass opacities in the anterior segments of both upper lobes are new from 2020.  These may reflect nonspecific foci of infection, inflammation, edema or hemorrhage.    2.  Dense consolidation in the right middle lobe with air bronchograms and bronchiectasis is an region of lung that previously showed patchy groundglass nodular opacities on 2020.  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    3.  There is persistent groundglass opacity in the basilar segments of both lower lobes, right greater than left, which appears stable to mildly progressed from 2020.  This may reflect areas of infection/inflammation, edema, atelectasis or interstitial lung disease.    4.  Splenomegaly, which appears new from 2020.  The spleen measures 13.9 cm, increased in size from 11.8 cm on 2020.    5.  Follow-up chest CT is recommended in 1-3 months to reassess the findings and exclude continued disease progression.    < end of copied text >  < from: Xray Chest 1 View- PORTABLE-Routine (Xray Chest 1 View- PORTABLE-Routine in AM.) (21 @ 07:57) >  EXAM:  XR CHEST PORTABLE ROUTINE 1V                            PROCEDURE DATE:  2021          INTERPRETATION:  CLINICAL HISTORY: 71-year-old with Covid pneumonia..    TECHNIQUE: A single AP radiograph of the chest was reviewed..    COMPARISON: Chest radiograph from 2021.    FINDINGS/  IMPRESSION:    Tubes/lines: Multiple radiopaque densities project over the left chest. Cervicothoracic spine fixation hardware.    Lungs: Bibasilar atelectasis. Small airspace opacity left midlung consistent with pneumonia..    Pleura: No pneumothorax or pleural effusions.    Cardiomediastinal silhouette: Within normal limits.            SARWAT BURDEN MD; Attending Radiologist  This document has been electronically signed. 2021  3:43PM    < end of copied text >  CXR bilat infil

## 2021-02-04 NOTE — PROGRESS NOTE ADULT - SUBJECTIVE AND OBJECTIVE BOX
[facility] -- Quang Akers, Itzel Herrera, Kiran Peterson [place], [place]  Office # [phone]    Follow Up:  Multiple Falls, Syncope    Subjective/Observations: Sitting on the chair, remains comfortable on RA.  Denies knee pain.  Denies any respiratory or cardiac discomfort     REVIEW OF SYSTEMS: All other review of systems is negative unless indicated above  PAST MEDICAL & SURGICAL HISTORY:  Obesity (BMI 30-39.9)    Traumatic arthropathy involving lower leg  right    History of Osteoarthritis    Hyperlipidemia    Restless Leg Syndrome    H/O neck surgery    H/O arthroscopy of right knee  2010    Bladder Disorder  Bladder lift 2000    History of Colonoscopy    Ex lap in 2009 for abscess in the baldder    History of Hysterectomy and bladder lift in 2000    MEDICATIONS  (STANDING):  ascorbic acid 500 milliGRAM(s) Oral daily  budesonide 160 MICROgram(s)/formoterol 4.5 MICROgram(s) Inhaler 2 Puff(s) Inhalation two times a day  cholecalciferol 2000 Unit(s) Oral daily  cholestyramine Powder (Sugar-Free) 4 Gram(s) Oral every 24 hours  dexAMETHasone     Tablet 6 milliGRAM(s) Oral daily  enoxaparin Injectable 40 milliGRAM(s) SubCutaneous daily  famotidine    Tablet 20 milliGRAM(s) Oral two times a day  ferrous    sulfate 325 milliGRAM(s) Oral daily  influenza   Vaccine 0.5 milliLiter(s) IntraMuscular once  lactobacillus acidophilus 1 Tablet(s) Oral three times a day  lidocaine   Patch 1 Patch Transdermal every 24 hours  multivitamin/minerals 1 Tablet(s) Oral daily  potassium chloride    Tablet ER 10 milliEquivalent(s) Oral two times a day  pramipexole 1.5 milliGRAM(s) Oral every 24 hours  sulfaSALAzine 500 milliGRAM(s) Oral three times a day  torsemide 5 milliGRAM(s) Oral two times a day    MEDICATIONS  (PRN):  acetaminophen   Tablet .. 650 milliGRAM(s) Oral every 6 hours PRN Temp greater or equal to 38C (100.4F), Mild Pain (1 - 3)  ALBUTerol    90 MICROgram(s) HFA Inhaler 2 Puff(s) Inhalation every 4 hours PRN Shortness of Breath and/or Wheezing  aluminum hydroxide/magnesium hydroxide/simethicone Suspension 30 milliLiter(s) Oral every 4 hours PRN Dyspepsia  benzonatate 100 milliGRAM(s) Oral three times a day PRN Cough  cyclobenzaprine 5 milliGRAM(s) Oral three times a day PRN Muscle Spasm  melatonin 3 milliGRAM(s) Oral at bedtime PRN Insomnia  ondansetron Injectable 4 milliGRAM(s) IV Push every 6 hours PRN Nausea and/or Vomiting  oxyCODONE    IR 5 milliGRAM(s) Oral four times a day PRN Severe Pain (7 - 10)  traMADol 25 milliGRAM(s) Oral four times a day PRN Moderate Pain (4 - 6)    Allergies    No Known Allergies    Intolerances    Vital Signs Last 24 Hrs  T(C): 36.6 (04 Feb 2021 04:47), Max: 36.7 (03 Feb 2021 12:28)  T(F): 97.8 (04 Feb 2021 04:47), Max: 98 (03 Feb 2021 12:28)  HR: 77 (04 Feb 2021 04:47) (70 - 77)  BP: 125/67 (04 Feb 2021 04:47) (106/62 - 125/68)  BP(mean): --  RR: 17 (04 Feb 2021 04:47) (17 - 18)  SpO2: 93% (04 Feb 2021 04:47) (93% - 97%)  I&O's Summary    03 Feb 2021 07:01  -  04 Feb 2021 07:00  --------------------------------------------------------  IN: 0 mL / OUT: 600 mL / NET: -600 mL    PHYSICAL EXAM:  TELE: Not on tele  Constitutional: NAD, awake and alert, obese  HEENT: Moist Mucous Membranes, Anicteric  Pulmonary: Non-labored, breath sounds are clear but diminished bilaterally, No wheezing, rales or rhonchi  Cardiovascular: Regular, S1 and S2, + murmurs, no rubs, gallops or clicks  Gastrointestinal: Bowel Sounds present, soft, nontender.   Lymph: No peripheral edema. No lymphadenopathy.  Skin: No visible rashes or ulcers.  Psych:  Mood & affect appropriate  LABS: All Labs Reviewed:                        10.4   10.48 )-----------( 331      ( 03 Feb 2021 11:04 )             32.5     03 Feb 2021 11:04    x      |  x      |  x      ----------------------------<  x      x       |  x      |  0.94   02 Feb 2021 12:02    142    |  107    |  10     ----------------------------<  162    3.8     |  27     |  0.89     Ca    7.7        02 Feb 2021 12:02    TPro  7.0    /  Alb  2.7    /  TBili  0.3    /  DBili  .10    /  AST  20     /  ALT  18     /  AlkPhos  123    03 Feb 2021 11:04  TPro  6.8    /  Alb  2.5    /  TBili  0.3    /  DBili  .10    /  AST  24     /  ALT  20     /  AlkPhos  126    02 Feb 2021 12:02     EXAM:  ECHO TTE WO CON COMP W DOPP      PROCEDURE DATE:  01/31/2021      INTERPRETATION:  INDICATION: Heart failure    Blood Pressure unavailable    Height 170 cm     Weight 85.7 kg       BSA 1.9 sq m    Dimensions:  LA 3.0       Normal Values: 2.0 - 4.0 cm  Ao 3.3        Normal Values: 2.0 - 3.8 cm  SEPTUM 1.2       Normal Values: 0.6 - 1.2 cm  PWT 1.2       Normal Values: 0.6 - 1.1 cm  LVIDd 4.6         Normal Values: 3.0 - 5.6 cm  LVIDs 3.2         Normal Values: 1.8 - 4.0 cm      OBSERVATIONS:  Technically difficult study  Mitral Valve: Mitral annular calcification with thickened leaflets, trace physiologic MR.  Aortic Valve/Aorta: Aortic valve is not well-visualized. Appears calcified with a peak transaortic valve gradient is 18.5 mmHg with a mean transaortic valve gradient 10.9 mmHg.  Tricuspid Valve: normal with trace TR.  Pulmonic Valve: Not well-visualized  Left Atrium: normal  Right Atrium: Not well-visualized  Left Ventricle: normal LV size and systolic function, estimated LVEF of 55-60%.  Right Ventricle: Grossly normal size and systolic function.  Pericardium/Pleura: normal, no significant pericardial effusion.      Conclusion:  Technically difficult study  Normal left ventricular internal dimensions and systolic function, estimated LVEF of 55-60%.  Grossly normal RV size and systolic function.  The aortic valve is not well-visualized, appears calcified with likely mild aortic stenosis  Trace physiologic MR and TR.  No significant pericardial effusion.      ALEN FERRO MD; Attending Cardiologist  This document has been electronically signed. Jan 31 2021  5:15PM  EXAM:  CT CHEST                          PROCEDURE DATE:  01/28/2021      INTERPRETATION:  CLINICAL INFORMATION: Chest trauma.  Status post fall.    COMPARISON: 11/20/2020.    PROCEDURE:  CT of the Chest was performed without intravenous contrast.  Sagittal and coronal reformats were performed.    FINDINGS:    LUNGS AND AIRWAYS: There is moderate respiratory motion artifact the central airways are grossly clear within limits of motion degradation.  There is mild to moderate centrilobular emphysema.  There are nonspecific groundglass opacities in the anterior segments of both upper lobes measuring up to approximately 1.9 x 1.9 cm on the right (3:226) and 1.4 x 1.1 cm on the left (3:217), which are new since 11/28/2020.  There is dense consolidation in the right middle lobe with air bronchograms and bronchiectasis, in a region of lung that previously showed patchy groundglass nodular opacities on 11/20/2020.  Again seen is patchy groundglass opacity in the basilar segments of the right greater than left lower lobes which appears stable to mildly progressed from 11/20/2020.  PLEURA: No pleural effusion.  MEDIASTINUM AND MARYBEL: No lymphadenopathy.  VESSELS: Mild atherosclerotic calcification thoracic aorta and arch vessels.  HEART:Cardiomegaly.  Calcifications involving sinuses of Valsalva and aortic valve leaflets.  Moderate atherosclerotic calcification the coronary arteries. No pericardial effusion.  CHEST WALL AND LOWER NECK: Within normal limits.  VISUALIZED UPPER ABDOMEN: Enlarged spleen measures up to 13.9 cm, increased in size from approximately 11.8 cm on 11/28/2020.  BONES: The patient is status post C2-T7 posterior spinal fusion with shamar screw construct.  Within the thoracic spine there are pedicle screws at T1 bilaterally, T2 bilaterally, T3 on the right, T4 on the left, T5 bilaterally, T6 bilaterally and T7 bilaterally.  There is also a screw coursing through the base of the T3 spinous process and right T3 lamina.  Multiple pedicle screws are slightly lateral in position and course into the costovertebral vertebral junctions.  There is no evidence of hardware fracture or abnormal lucency around the pedicle screws.  There are old/healing fractures of the left third, sixth, seventh and eighth ribs with external callus formation.    IMPRESSION:  1.  No evidence of acute traumatic injury in the chest.    2.  Patchy groundglass opacities in the anterior segments of both upper lobes are new from 11/20/2020.  These may reflect nonspecific foci of infection, inflammation, edema or hemorrhage.    2.  Dense consolidation in the right middle lobe with air bronchograms and bronchiectasis is an region of lung that previously showed patchy groundglass nodular opacities on 11/20/2020.  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    3.  There is persistent groundglass opacity in the basilar segments of both lower lobes, right greater than left, which appears stable to mildly progressed from 11/20/2020.  This may reflect areas of infection/inflammation, edema, atelectasis or interstitial lung disease.    4.  Splenomegaly, which appears new from 11/20/2020.  The spleen measures 13.9 cm, increased in size from 11.8 cm on 11/20/2020.    5.  Follow-up chest CT is recommended in 1-3 months to reassess the findings and exclude continued disease progression.    NEREIDA FRASER MD; Attending Radiologist  This document has been electronically signed. Jan 28 2021  2:22AM    Ventricular Rate 87 BPM    Atrial Rate 87 BPM    P-R Interval 148 ms    QRS Duration 98 ms    Q-T Interval 388 ms    QTC Calculation(Bazett) 466 ms    P Axis 53 degrees    R Axis -3 degrees    T Axis 10 degrees    Diagnosis Line Normal sinus rhythm  Poor R wave progression  Confirmed by CLARK PETERSON ([id number]) on 1/28/2021 10:53:29 AM

## 2021-02-04 NOTE — PROGRESS NOTE ADULT - SUBJECTIVE AND OBJECTIVE BOX
Neurology follow up note    VERONICA [id number]      Interval History:    Patient feels ok no new complaints.    MEDICATIONS    acetaminophen   Tablet .. 650 milliGRAM(s) Oral every 6 hours PRN  ALBUTerol    90 MICROgram(s) HFA Inhaler 2 Puff(s) Inhalation every 4 hours PRN  aluminum hydroxide/magnesium hydroxide/simethicone Suspension 30 milliLiter(s) Oral every 4 hours PRN  ascorbic acid 500 milliGRAM(s) Oral daily  benzonatate 100 milliGRAM(s) Oral three times a day PRN  budesonide 160 MICROgram(s)/formoterol 4.5 MICROgram(s) Inhaler 2 Puff(s) Inhalation two times a day  cholecalciferol 2000 Unit(s) Oral daily  cholestyramine Powder (Sugar-Free) 4 Gram(s) Oral every 24 hours  cyclobenzaprine 5 milliGRAM(s) Oral three times a day PRN  dexAMETHasone     Tablet 6 milliGRAM(s) Oral daily  enoxaparin Injectable 40 milliGRAM(s) SubCutaneous daily  famotidine    Tablet 20 milliGRAM(s) Oral two times a day  ferrous    sulfate 325 milliGRAM(s) Oral daily  influenza   Vaccine 0.5 milliLiter(s) IntraMuscular once  lactobacillus acidophilus 1 Tablet(s) Oral three times a day  lidocaine   Patch 1 Patch Transdermal every 24 hours  melatonin 3 milliGRAM(s) Oral at bedtime PRN  multivitamin/minerals 1 Tablet(s) Oral daily  ondansetron Injectable 4 milliGRAM(s) IV Push every 6 hours PRN  oxyCODONE    IR 5 milliGRAM(s) Oral four times a day PRN  potassium chloride    Tablet ER 10 milliEquivalent(s) Oral two times a day  pramipexole 1.5 milliGRAM(s) Oral every 24 hours  sulfaSALAzine 500 milliGRAM(s) Oral three times a day  torsemide 5 milliGRAM(s) Oral two times a day  traMADol 25 milliGRAM(s) Oral four times a day PRN      Allergies    No Known Allergies    Intolerances            Vital Signs Last 24 Hrs  T(C): 36.6 (04 Feb 2021 12:05), Max: 36.7 (03 Feb 2021 20:30)  T(F): 97.8 (04 Feb 2021 12:05), Max: 98 (03 Feb 2021 20:30)  HR: 107 (04 Feb 2021 12:05) (74 - 107)  BP: 102/59 (04 Feb 2021 12:05) (102/59 - 125/68)  BP(mean): --  RR: 18 (04 Feb 2021 12:05) (17 - 18)  SpO2: 97% (04 Feb 2021 12:05) (93% - 97%)      REVIEW OF SYSTEMS:  Constitutional:  The patient denies fever, chills, or night sweats.  Head:  No headache.  Eyes:  No double vision or blurry vision.  Ears:  No ringing in the ears.  Neck:  Positive history of neck pain, but had cervical surgery done there and arthritis.  Respiratory:  No shortness of breath at present.  Cardiovascular:  No chest pain.  Abdomen:  No nausea, vomiting, or abdominal pain.  Extremities/Neurological:  Occasional musculoskeletal pain in her legs and joints.  Genitourinary:  No burning upon urination.    PHYSICAL EXAMINATION:   HEENT:  Head:  Normocephalic, atraumatic.  Eyes:  No scleral icterus.  Ears:  Hearing bilaterally intact.  NECK:  Supple.  RESPIRATORY:  Decreased breath sounds bilaterally.  CARDIOVASCULAR:  S1 and S2 heard.  ABDOMEN:  Soft and nontender.  EXTREMITIES:  No clubbing or cyanosis were noted.  GENERAL:  Positive surgical scar was noted in the cervical region in the bilateral knees.      NEUROLOGIC:  The patient is awake and alert.  Extraocular movement were intact.  The patient has decreased range of motion of her neck secondary to surgical intervention.  Speech was fluent.  Smile was symmetric.  Motor:  The patient has decreased range of motion of bilateral shoulders.  Able to elevate roughly about 60 degrees off the bed.  Overall strength was 3+/5.  Bilateral lower extremities, the patient is able to move side-to-side.  Had significant lymphedema in the bilateral lower extremities.  Sensory was decreased to light touch in the bilateral lower extremities, but has significant lymphedema.  The patient had impaired proprioception in the bilateral lower extremities.               LABS:  CBC Full  -  ( 03 Feb 2021 11:04 )  WBC Count : 10.48 K/uL  RBC Count : 3.84 M/uL  Hemoglobin : 10.4 g/dL  Hematocrit : 32.5 %  Platelet Count - Automated : 331 K/uL  Mean Cell Volume : 84.6 fl  Mean Cell Hemoglobin : 27.1 pg  Mean Cell Hemoglobin Concentration : 32.0 gm/dL  Auto Neutrophil # : x  Auto Lymphocyte # : x  Auto Monocyte # : x  Auto Eosinophil # : x  Auto Basophil # : x  Auto Neutrophil % : x  Auto Lymphocyte % : x  Auto Monocyte % : x  Auto Eosinophil % : x  Auto Basophil % : x      02-04    x   |  x   |  x   ----------------------------<  x   x    |  x   |  0.95      TPro  6.5  /  Alb  2.6<L>  /  TBili  0.3  /  DBili  <.10  /  AST  14<L>  /  ALT  15  /  AlkPhos  112  02-04    Hemoglobin A1C:     LIVER FUNCTIONS - ( 04 Feb 2021 11:28 )  Alb: 2.6 g/dL / Pro: 6.5 g/dL / ALK PHOS: 112 U/L / ALT: 15 U/L / AST: 14 U/L / GGT: x           Vitamin B12         RADIOLOGY      ANALYSIS AND PLAN:  A 71-year-old with a history of frequent falls, restless legs syndrome.  For episodes of frequent falls, syncopal events, the patient has had multiple workups in the past for this.  Questionable this could be secondary to any type of underlying orthostatic changes.  The patient does have a history of poor oral intake.  The patient also is mostly sedentary.  Also, the patient does have lymphedema in the bilateral lower extremities.  The patient was to be evaluated for possible underlying narcolepsy with sleep studies.  Telemetry evaluation.  Monitor orthostatics as needed.  For restless leg syndrome, continue the patient on her Mirapex.  For history of possible peripheral neuropathy, supportive therapy.  Fall precautions.  For generalized paresis secondary to underlying infectious type process COVID.  For cervical radiculopathy, continue the patient on her muscle relaxants as needed.  overall more interactive   monitor for diarrhea as per daughter   The daughter's name is Edelmira.  Telephone number is [phone] 2/3/2021.  She does understand my reasoning and thought process called today went to voicemail   EEG was negative for any epileptiform discharges  discharges   Greater than 45 minutes of time was spent with the patient, plan of care, reviewing data, speaking to the family and  50% of the visit was spent counseling and/or coordinating care with multidisciplinary healthcare team

## 2021-02-04 NOTE — PROGRESS NOTE ADULT - SUBJECTIVE AND OBJECTIVE BOX
INTERVAL HPI/OVERNIGHT EVENTS:  chart reviewed, on ra  2bms per nursing, unsure re: consistency  hien diet    MEDICATIONS  (STANDING):  ascorbic acid 500 milliGRAM(s) Oral daily  budesonide 160 MICROgram(s)/formoterol 4.5 MICROgram(s) Inhaler 2 Puff(s) Inhalation two times a day  cholecalciferol 2000 Unit(s) Oral daily  cholestyramine Powder (Sugar-Free) 4 Gram(s) Oral every 24 hours  dexAMETHasone     Tablet 6 milliGRAM(s) Oral daily  enoxaparin Injectable 40 milliGRAM(s) SubCutaneous daily  famotidine    Tablet 20 milliGRAM(s) Oral two times a day  ferrous    sulfate 325 milliGRAM(s) Oral daily  influenza   Vaccine 0.5 milliLiter(s) IntraMuscular once  lactobacillus acidophilus 1 Tablet(s) Oral three times a day  lidocaine   Patch 1 Patch Transdermal every 24 hours  multivitamin/minerals 1 Tablet(s) Oral daily  potassium chloride    Tablet ER 10 milliEquivalent(s) Oral two times a day  pramipexole 1.5 milliGRAM(s) Oral every 24 hours  sulfaSALAzine 500 milliGRAM(s) Oral three times a day  torsemide 5 milliGRAM(s) Oral two times a day    MEDICATIONS  (PRN):  acetaminophen   Tablet .. 650 milliGRAM(s) Oral every 6 hours PRN Temp greater or equal to 38C (100.4F), Mild Pain (1 - 3)  ALBUTerol    90 MICROgram(s) HFA Inhaler 2 Puff(s) Inhalation every 4 hours PRN Shortness of Breath and/or Wheezing  aluminum hydroxide/magnesium hydroxide/simethicone Suspension 30 milliLiter(s) Oral every 4 hours PRN Dyspepsia  benzonatate 100 milliGRAM(s) Oral three times a day PRN Cough  cyclobenzaprine 5 milliGRAM(s) Oral three times a day PRN Muscle Spasm  melatonin 3 milliGRAM(s) Oral at bedtime PRN Insomnia  ondansetron Injectable 4 milliGRAM(s) IV Push every 6 hours PRN Nausea and/or Vomiting  oxyCODONE    IR 5 milliGRAM(s) Oral four times a day PRN Severe Pain (7 - 10)  traMADol 25 milliGRAM(s) Oral four times a day PRN Moderate Pain (4 - 6)      Allergies    No Known Allergies    Intolerances        Review of Systems:    tele f/u      Vital Signs Last 24 Hrs  T(C): 36.6 (04 Feb 2021 04:47), Max: 36.7 (03 Feb 2021 12:28)  T(F): 97.8 (04 Feb 2021 04:47), Max: 98 (03 Feb 2021 12:28)  HR: 77 (04 Feb 2021 04:47) (70 - 77)  BP: 125/67 (04 Feb 2021 04:47) (106/62 - 125/68)  BP(mean): --  RR: 17 (04 Feb 2021 04:47) (17 - 18)  SpO2: 93% (04 Feb 2021 04:47) (93% - 97%)    PHYSICAL EXAM:  tele f/u    LABS:                        10.4   10.48 )-----------( 331      ( 03 Feb 2021 11:04 )             32.5     02-03    x   |  x   |  x   ----------------------------<  x   x    |  x   |  0.94    Ca    7.7<L>      02 Feb 2021 12:02    TPro  7.0  /  Alb  2.7<L>  /  TBili  0.3  /  DBili  .10  /  AST  20  /  ALT  18  /  AlkPhos  123<H>  02-03          RADIOLOGY & ADDITIONAL TESTS:

## 2021-02-04 NOTE — PROGRESS NOTE ADULT - ASSESSMENT
covid  colitis  anemia  dysphagia      f/u am labs  no need for abx for ct findings re: colon  cont bacid, questran  cont pepcid daily  diet as tolerated  her peg tube was removed in between her admissions  monitor gi fxn  d/w daughter prior

## 2021-02-04 NOTE — PROGRESS NOTE ADULT - SUBJECTIVE AND OBJECTIVE BOX
Patient is a 71y old  Female who presents with a chief complaint of sob (04 Feb 2021 14:36)       Pt is seen and examined  pt is awake and lying in bed/out of bed to chair  pt seems comfortable and denies any complaints at this time    HPI:  · HPI Objective Statement: 70yo female who presents with multiple falls and feeling weak today. pt c/o diffuse pain and feeling weak, no vomiting, no diarrhea, no fever, chills, no cough or sob, denies sick contacts at home  In ER patient was found to have COVID PNA with possible colitis.  patient is being admitted for further care and management (28 Jan 2021 10:42)         ROS:  Negative except for:    MEDICATIONS  (STANDING):  ascorbic acid 500 milliGRAM(s) Oral daily  budesonide 160 MICROgram(s)/formoterol 4.5 MICROgram(s) Inhaler 2 Puff(s) Inhalation two times a day  cholecalciferol 2000 Unit(s) Oral daily  cholestyramine Powder (Sugar-Free) 4 Gram(s) Oral every 24 hours  dexAMETHasone     Tablet 6 milliGRAM(s) Oral daily  enoxaparin Injectable 40 milliGRAM(s) SubCutaneous daily  famotidine    Tablet 20 milliGRAM(s) Oral two times a day  ferrous    sulfate 325 milliGRAM(s) Oral daily  influenza   Vaccine 0.5 milliLiter(s) IntraMuscular once  lactobacillus acidophilus 1 Tablet(s) Oral three times a day  lidocaine   Patch 1 Patch Transdermal every 24 hours  multivitamin/minerals 1 Tablet(s) Oral daily  potassium chloride    Tablet ER 10 milliEquivalent(s) Oral two times a day  pramipexole 1.5 milliGRAM(s) Oral every 24 hours  sulfaSALAzine 500 milliGRAM(s) Oral three times a day  torsemide 5 milliGRAM(s) Oral two times a day    MEDICATIONS  (PRN):  acetaminophen   Tablet .. 650 milliGRAM(s) Oral every 6 hours PRN Temp greater or equal to 38C (100.4F), Mild Pain (1 - 3)  ALBUTerol    90 MICROgram(s) HFA Inhaler 2 Puff(s) Inhalation every 4 hours PRN Shortness of Breath and/or Wheezing  aluminum hydroxide/magnesium hydroxide/simethicone Suspension 30 milliLiter(s) Oral every 4 hours PRN Dyspepsia  benzonatate 100 milliGRAM(s) Oral three times a day PRN Cough  cyclobenzaprine 5 milliGRAM(s) Oral three times a day PRN Muscle Spasm  melatonin 3 milliGRAM(s) Oral at bedtime PRN Insomnia  ondansetron Injectable 4 milliGRAM(s) IV Push every 6 hours PRN Nausea and/or Vomiting  oxyCODONE    IR 5 milliGRAM(s) Oral four times a day PRN Severe Pain (7 - 10)  traMADol 25 milliGRAM(s) Oral four times a day PRN Moderate Pain (4 - 6)      Allergies    No Known Allergies    Intolerances        Vital Signs Last 24 Hrs  T(C): 36.6 (04 Feb 2021 12:05), Max: 36.7 (03 Feb 2021 20:30)  T(F): 97.8 (04 Feb 2021 12:05), Max: 98 (03 Feb 2021 20:30)  HR: 107 (04 Feb 2021 12:05) (74 - 107)  BP: 102/59 (04 Feb 2021 12:05) (102/59 - 125/68)  BP(mean): --  RR: 18 (04 Feb 2021 12:05) (17 - 18)  SpO2: 97% (04 Feb 2021 12:05) (93% - 97%)    PHYSICAL EXAM  General: adult in NAD  HEENT: clear oropharynx, anicteric sclera, pink conjunctiva  Neck: supple  CV: normal S1/S2 with no murmur rubs or gallops  Lungs: positive air movement b/l ant lungs,clear to auscultation, no wheezes, no rales  Abdomen: soft non-tender non-distended, no hepatosplenomegaly  Ext: no clubbing cyanosis or edema  Skin: no rashes and no petechiae  Neuro: alert and oriented X 4, no focal deficits  LABS:                          10.4   10.48 )-----------( 331      ( 03 Feb 2021 11:04 )             32.5         Mean Cell Volume : 84.6 fl  Mean Cell Hemoglobin : 27.1 pg  Mean Cell Hemoglobin Concentration : 32.0 gm/dL  Auto Neutrophil # : x  Auto Lymphocyte # : x  Auto Monocyte # : x  Auto Eosinophil # : x  Auto Basophil # : x  Auto Neutrophil % : x  Auto Lymphocyte % : x  Auto Monocyte % : x  Auto Eosinophil % : x  Auto Basophil % : x    Serial CBC's  02-03 @ 11:04  Hct-32.5 / Hgb-10.4 / Plat-331 / RBC-3.84 / WBC-10.48          Serial CBC's  02-01 @ 09:59  Hct-28.6 / Hgb-9.1 / Plat-307 / RBC-3.43 / WBC-8.15            02-04    x   |  x   |  x   ----------------------------<  x   x    |  x   |  0.95      TPro  6.5  /  Alb  2.6<L>  /  TBili  0.3  /  DBili  <.10  /  AST  14<L>  /  ALT  15  /  AlkPhos  112  02-04          Ferritin, Serum: 229 ng/mL (01-30-21 @ 15:07)  Iron - Total Binding Capacity.: 154 ug/dL (01-28-21 @ 22:55)  Ferritin, Serum: 168 ng/mL (01-28-21 @ 22:45)  Vitamin B12, Serum: 872 pg/mL (01-28-21 @ 22:45)  Folate, Serum: 8.7 ng/mL (01-28-21 @ 22:45)  Reticulocyte Percent: 0.4 % (01-28-21 @ 16:50)  Ferritin, Serum: 161 ng/mL (01-28-21 @ 11:50)  Iron - Total Binding Capacity.: 144 ug/dL (01-28-21 @ 11:47)  Ferritin, Serum: 173 ng/mL (01-28-21 @ 06:22)                BLOOD SMEAR INTERPRETATION:       RADIOLOGY & ADDITIONAL STUDIES:     Patient is a 71y old  Female who presents with a chief complaint of sob (04 Feb 2021 14:36)       Pt is seen and examined  pt is awake and lying in bed  pt seems comfortable and denies any complaints at this time    HPI:  · HPI Objective Statement: 72yo female who presents with multiple falls and feeling weak today. pt c/o diffuse pain and feeling weak, no vomiting, no diarrhea, no fever, chills, no cough or sob, denies sick contacts at home  In ER patient was found to have COVID PNA with possible colitis.  patient is being admitted for further care and management (28 Jan 2021 10:42)         ROS:  as per hpi    MEDICATIONS  (STANDING):  ascorbic acid 500 milliGRAM(s) Oral daily  budesonide 160 MICROgram(s)/formoterol 4.5 MICROgram(s) Inhaler 2 Puff(s) Inhalation two times a day  cholecalciferol 2000 Unit(s) Oral daily  cholestyramine Powder (Sugar-Free) 4 Gram(s) Oral every 24 hours  dexAMETHasone     Tablet 6 milliGRAM(s) Oral daily  enoxaparin Injectable 40 milliGRAM(s) SubCutaneous daily  famotidine    Tablet 20 milliGRAM(s) Oral two times a day  ferrous    sulfate 325 milliGRAM(s) Oral daily  influenza   Vaccine 0.5 milliLiter(s) IntraMuscular once  lactobacillus acidophilus 1 Tablet(s) Oral three times a day  lidocaine   Patch 1 Patch Transdermal every 24 hours  multivitamin/minerals 1 Tablet(s) Oral daily  potassium chloride    Tablet ER 10 milliEquivalent(s) Oral two times a day  pramipexole 1.5 milliGRAM(s) Oral every 24 hours  sulfaSALAzine 500 milliGRAM(s) Oral three times a day  torsemide 5 milliGRAM(s) Oral two times a day    MEDICATIONS  (PRN):  acetaminophen   Tablet .. 650 milliGRAM(s) Oral every 6 hours PRN Temp greater or equal to 38C (100.4F), Mild Pain (1 - 3)  ALBUTerol    90 MICROgram(s) HFA Inhaler 2 Puff(s) Inhalation every 4 hours PRN Shortness of Breath and/or Wheezing  aluminum hydroxide/magnesium hydroxide/simethicone Suspension 30 milliLiter(s) Oral every 4 hours PRN Dyspepsia  benzonatate 100 milliGRAM(s) Oral three times a day PRN Cough  cyclobenzaprine 5 milliGRAM(s) Oral three times a day PRN Muscle Spasm  melatonin 3 milliGRAM(s) Oral at bedtime PRN Insomnia  ondansetron Injectable 4 milliGRAM(s) IV Push every 6 hours PRN Nausea and/or Vomiting  oxyCODONE    IR 5 milliGRAM(s) Oral four times a day PRN Severe Pain (7 - 10)  traMADol 25 milliGRAM(s) Oral four times a day PRN Moderate Pain (4 - 6)      Allergies    No Known Allergies    Intolerances        Vital Signs Last 24 Hrs  T(C): 36.6 (04 Feb 2021 12:05), Max: 36.7 (03 Feb 2021 20:30)  T(F): 97.8 (04 Feb 2021 12:05), Max: 98 (03 Feb 2021 20:30)  HR: 107 (04 Feb 2021 12:05) (74 - 107)  BP: 102/59 (04 Feb 2021 12:05) (102/59 - 125/68)  BP(mean): --  RR: 18 (04 Feb 2021 12:05) (17 - 18)  SpO2: 97% (04 Feb 2021 12:05) (93% - 97%)    PHYSICAL EXAM  General: adult in NAD  HEENT: clear oropharynx, anicteric sclera, pink conjunctiva  Neck: supple  CV: normal S1/S2 with no murmur rubs or gallops  Lungs: positive air movement b/l ant lungs,clear to auscultation, no wheezes, no rales  Abdomen: soft non-tender non-distended, no hepatosplenomegaly  Ext: no clubbing cyanosis or edema  Skin: no rashes and no petechiae  Neuro: alert and oriented X 4, no focal deficits  LABS:                          10.4   10.48 )-----------( 331      ( 03 Feb 2021 11:04 )             32.5         Mean Cell Volume : 84.6 fl  Mean Cell Hemoglobin : 27.1 pg  Mean Cell Hemoglobin Concentration : 32.0 gm/dL  Auto Neutrophil # : x  Auto Lymphocyte # : x  Auto Monocyte # : x  Auto Eosinophil # : x  Auto Basophil # : x  Auto Neutrophil % : x  Auto Lymphocyte % : x  Auto Monocyte % : x  Auto Eosinophil % : x  Auto Basophil % : x    Serial CBC's  02-03 @ 11:04  Hct-32.5 / Hgb-10.4 / Plat-331 / RBC-3.84 / WBC-10.48          Serial CBC's  02-01 @ 09:59  Hct-28.6 / Hgb-9.1 / Plat-307 / RBC-3.43 / WBC-8.15            02-04    x   |  x   |  x   ----------------------------<  x   x    |  x   |  0.95      TPro  6.5  /  Alb  2.6<L>  /  TBili  0.3  /  DBili  <.10  /  AST  14<L>  /  ALT  15  /  AlkPhos  112  02-04          Ferritin, Serum: 229 ng/mL (01-30-21 @ 15:07)  Iron - Total Binding Capacity.: 154 ug/dL (01-28-21 @ 22:55)  Ferritin, Serum: 168 ng/mL (01-28-21 @ 22:45)  Vitamin B12, Serum: 872 pg/mL (01-28-21 @ 22:45)  Folate, Serum: 8.7 ng/mL (01-28-21 @ 22:45)  Reticulocyte Percent: 0.4 % (01-28-21 @ 16:50)  Ferritin, Serum: 161 ng/mL (01-28-21 @ 11:50)  Iron - Total Binding Capacity.: 144 ug/dL (01-28-21 @ 11:47)  Ferritin, Serum: 173 ng/mL (01-28-21 @ 06:22)

## 2021-02-04 NOTE — PROGRESS NOTE ADULT - ASSESSMENT
contact #  daughter----Mortimer, Kelly  phone # [phone]    IMPRESSION:  71 y/o F PMHx lymphedema, HLD, RA, s/p cervical spine surgery at [facility] around 10/2020--- with prolonged hospital course complicated by C. diff infection (completed treatment course) and dysphagia (s/p PEG tube placement). Patient returned home on 11/24/20. Patient was admitted at [facility] from 11/28/2020---12/1/2020, was admitted with nausea/ vomiting/and abdominal pain, and was discharged 12/1/2020. She has been using a walker to ambulate, admitted with multiple falls and weakness, had ct scan this admission, hematology was consulted for anemia on 1/28 and was seen.  case d/w daughter, lives with  per , 2 daughters    RECOMMENDATION:  Anemia---likely multifactorial  hb is at 10.4 on 2/3, hb was at 9.1 on 2/1, monitor serial h/h, cbc  iron profile--low iron/transferrin saturation, ferritin is an acute phase reactant, as looking for a rapid response, patient is s/p iv iron for 3 days, po fe now  monitor h/h--transfuse prbc as needed for symptomatic anemia  nl ldh, low retic--no evidence of hemolysis  nl b12 level  smear was reviewed, unremarkable  monitor h/h--transfuse prbc as needed for symptomatic anemia or if hb is under 7  ?colitis on ct--gi is following  anemia/iron def possible/colitis---gi work up as per gi/pt/family to exclude gi pathology/malignancy  covid positive, id is following,management/plan per id/critical care  gi/dvtp--lovenox s/c  earlier was d/w daughter, i called her again today at  [phone],phone kept ringing, no answer

## 2021-02-04 NOTE — PROGRESS NOTE ADULT - SUBJECTIVE AND OBJECTIVE BOX
Patient is a 71y old  Female who presents with a chief complaint of sob     INTERVAL /OVERNIGHT EVENTS: feels unchanged denies any chest pain, no sob, no diarrhea    MEDICATIONS  (STANDING):    ascorbic acid 500 milliGRAM(s) Oral daily  budesonide 160 MICROgram(s)/formoterol 4.5 MICROgram(s) Inhaler 2 Puff(s) Inhalation two times a day  cholecalciferol 2000 Unit(s) Oral daily  cholestyramine Powder (Sugar-Free) 4 Gram(s) Oral every 24 hours  dexAMETHasone     Tablet 6 milliGRAM(s) Oral daily  enoxaparin Injectable 40 milliGRAM(s) SubCutaneous daily  famotidine    Tablet 20 milliGRAM(s) Oral two times a day  ferrous    sulfate 325 milliGRAM(s) Oral daily  influenza   Vaccine 0.5 milliLiter(s) IntraMuscular once  lactobacillus acidophilus 1 Tablet(s) Oral three times a day  lidocaine   Patch 1 Patch Transdermal every 24 hours  multivitamin/minerals 1 Tablet(s) Oral daily  potassium chloride    Tablet ER 10 milliEquivalent(s) Oral two times a day  pramipexole 1.5 milliGRAM(s) Oral every 24 hours  sulfaSALAzine 500 milliGRAM(s) Oral three times a day  torsemide 5 milliGRAM(s) Oral two times a day      MEDICATIONS  (PRN):  acetaminophen   Tablet .. 650 milliGRAM(s) Oral every 6 hours PRN Temp greater or equal to 38C (100.4F), Mild Pain (1 - 3)  ALBUTerol    90 MICROgram(s) HFA Inhaler 2 Puff(s) Inhalation every 4 hours PRN Shortness of Breath and/or Wheezing  aluminum hydroxide/magnesium hydroxide/simethicone Suspension 30 milliLiter(s) Oral every 4 hours PRN Dyspepsia  benzonatate 100 milliGRAM(s) Oral three times a day PRN Cough  cyclobenzaprine 5 milliGRAM(s) Oral three times a day PRN Muscle Spasm  melatonin 3 milliGRAM(s) Oral at bedtime PRN Insomnia  ondansetron Injectable 4 milliGRAM(s) IV Push every 6 hours PRN Nausea and/or Vomiting  oxyCODONE    IR 5 milliGRAM(s) Oral four times a day PRN Severe Pain (7 - 10)  traMADol 25 milliGRAM(s) Oral four times a day PRN Moderate Pain (4 - 6)        Allergies    No Known Allergies    Intolerances        REVIEW OF SYSTEMS:  CONSTITUTIONAL: No fever, feels tired  EYES: No eye pain, visual disturbances, or discharge  ENMT:  No difficulty hearing,   RESPIRATORY: No cough, wheezing, chills or hemoptysis; No shortness of breath  CARDIOVASCULAR: No chest pain, palpitations, dizziness, (+) leg swelling  GASTROINTESTINAL: No abdominal or epigastric pain. No nausea, vomiting, or hematemesis  GENITOURINARY: No dysuria, frequency, hematuria  NEUROLOGICAL: No headaches  SKIN: No itching, burning, rashes, or lesions   LYMPH NODES: No enlarged glands  ENDOCRINE: No heat or cold intolerance; No hair loss; No polydipsia or polyuria  MUSCULOSKELETAL: No joint pain  PSYCHIATRIC: (+)difficulty sleeping  HEME/LYMPH: No easy bruising, or bleeding gums        Vital Signs Last 24 Hrs  T(C): 36.6 (04 Feb 2021 20:34), Max: 36.6 (04 Feb 2021 04:47)  T(F): 97.8 (04 Feb 2021 20:34), Max: 97.8 (04 Feb 2021 04:47)  HR: 80 (04 Feb 2021 20:34) (77 - 107)  BP: 104/60 (04 Feb 2021 20:34) (102/59 - 125/67)  BP(mean): --  RR: 18 (04 Feb 2021 20:34) (17 - 18)  SpO2: 94% (04 Feb 2021 20:34) (93% - 97%))    PHYSICAL EXAM:  GENERAL: NAD  HEAD:  Atraumatic, Normocephalic  EYES: EOMI, PERRLA, conjunctiva and sclera clear  ENMT:  Moist mucous membranes, Good dentition, No lesions  NECK: Supple,  Normal thyroid  NERVOUS SYSTEM:  Alert & Oriented X3, no focal signs  CHEST/LUNG: Clear to auscultation bilaterally; No rales, rhonchi, wheezing, or rubs  HEART: Regular rate and rhythm; No murmurs,   ABDOMEN: Soft, Nontender, Bowel sounds present  EXTREMITIES: (+) peripheral edema  LYMPH: No lymphadenopathy noted  SKIN: NLE dry skin    LABS:                                   10.4   10.48 )-----------( 331      ( 03 Feb 2021 11:04 )             32.5     02-04    x   |  x   |  x   ----------------------------<  x   x    |  x   |  0.95      TPro  6.5  /  Alb  2.6<L>  /  TBili  0.3  /  DBili  <.10  /  AST  14<L>  /  ALT  15  /  AlkPhos  112  02-04    03 Feb 2021 11:04    x      |  x      |  x      ----------------------------<  x      x       |  x      |  0.94     Ca    7.7        02 Feb 2021 12:02    TPro  7.0    /  Alb  2.7    /  TBili  0.3    /  DBili  .10    /  AST  20     /  ALT  18     /  AlkPhos  123    03 Feb 2021 11:04        CAPILLARY BLOOD GLUCOSE

## 2021-02-04 NOTE — PROGRESS NOTE ADULT - ASSESSMENT
72 y/o F with HLD, syncope, frequent falls, OA, s/p B/l knee replacement and right hip replacement, and back surgery presented to the ED after multiple falls and weakness, found to have COVID Pna and pancolitis.    Syncope, Falls  - Has known syncope and multiple falls.  Her B/L knee pain is likely contributory to her frequent falls.   - For her syncope, per Dr. Akers's note (9/2020), was thought to be from dehydration and CVA.  However, CVA w/u was negative  - Recently in [place] where she was discharged with a 2-week cardiac monitor but unaware of result.  She is unable to recall name of Cardiologist in [place] and is refusing to go back to him  - Outpatient CUS 2/2020) in our office which showed no hemodynamic significant stenosis  - TTE in 1/2020 showing normal LVF, EF 60% with bicuspid AV, mild AS.    - Repeat TTE inpatient showed normal LV & RV size and function EF 55-60%, trace MR and TR, mild AS  - No evidence of any arrhythmia while on tele  - She will need an ILR to be arranged as outpatient  - Fall precaution  - Negative orthostatics  - Continue to encourage PO fluid intake    Covid Pna  - Tolerating RA  - Supportive care  - Continue to encourage incentive spirometer  - Follow Pulm and ID recs    DVT ppx  - Per Primary  - Continue PT    - Monitor and replete lytes, keep K>4, Mg>2.    - All other medical needs as per primary team.  - Other cardiovascular workup will depend on clinical course.  - Will continue to follow.    Jeanie Cardona DNP, NP-C  Cardiology   Spectra #[phone]

## 2021-02-04 NOTE — PROGRESS NOTE ADULT - ASSESSMENT
Pt. with Covid pneumonia. Clinically stable.   Oxygenating ok.   Would taper steroids.  Can dc pt if stable to rehab when covid test turns neg.   OOB

## 2021-02-05 LAB
ALBUMIN SERPL ELPH-MCNC: 2.7 G/DL — LOW (ref 3.3–5)
ALP SERPL-CCNC: 101 U/L — SIGNIFICANT CHANGE UP (ref 40–120)
ALT FLD-CCNC: 12 U/L — SIGNIFICANT CHANGE UP (ref 12–78)
AST SERPL-CCNC: 14 U/L — LOW (ref 15–37)
BASOPHILS # BLD AUTO: 0.01 K/UL — SIGNIFICANT CHANGE UP (ref 0–0.2)
BASOPHILS NFR BLD AUTO: 0.1 % — SIGNIFICANT CHANGE UP (ref 0–2)
BILIRUB DIRECT SERPL-MCNC: <.1 MG/DL — SIGNIFICANT CHANGE UP (ref 0.05–0.2)
BILIRUB INDIRECT FLD-MCNC: >0.1 MG/DL — LOW (ref 0.2–1)
BILIRUB SERPL-MCNC: 0.2 MG/DL — SIGNIFICANT CHANGE UP (ref 0.2–1.2)
CREAT SERPL-MCNC: 0.87 MG/DL — SIGNIFICANT CHANGE UP (ref 0.5–1.3)
EOSINOPHIL # BLD AUTO: 0.11 K/UL — SIGNIFICANT CHANGE UP (ref 0–0.5)
EOSINOPHIL NFR BLD AUTO: 1.1 % — SIGNIFICANT CHANGE UP (ref 0–6)
HCT VFR BLD CALC: 31.3 % — LOW (ref 34.5–45)
HGB BLD-MCNC: 9.7 G/DL — LOW (ref 11.5–15.5)
IMM GRANULOCYTES NFR BLD AUTO: 0.9 % — SIGNIFICANT CHANGE UP (ref 0–1.5)
LYMPHOCYTES # BLD AUTO: 1.37 K/UL — SIGNIFICANT CHANGE UP (ref 1–3.3)
LYMPHOCYTES # BLD AUTO: 14 % — SIGNIFICANT CHANGE UP (ref 13–44)
MCHC RBC-ENTMCNC: 26.7 PG — LOW (ref 27–34)
MCHC RBC-ENTMCNC: 31 GM/DL — LOW (ref 32–36)
MCV RBC AUTO: 86.2 FL — SIGNIFICANT CHANGE UP (ref 80–100)
MONOCYTES # BLD AUTO: 0.5 K/UL — SIGNIFICANT CHANGE UP (ref 0–0.9)
MONOCYTES NFR BLD AUTO: 5.1 % — SIGNIFICANT CHANGE UP (ref 2–14)
NEUTROPHILS # BLD AUTO: 7.73 K/UL — HIGH (ref 1.8–7.4)
NEUTROPHILS NFR BLD AUTO: 78.8 % — HIGH (ref 43–77)
NRBC # BLD: 0 /100 WBCS — SIGNIFICANT CHANGE UP (ref 0–0)
PLATELET # BLD AUTO: 361 K/UL — SIGNIFICANT CHANGE UP (ref 150–400)
PROT SERPL-MCNC: 6.5 G/DL — SIGNIFICANT CHANGE UP (ref 6–8.3)
RBC # BLD: 3.63 M/UL — LOW (ref 3.8–5.2)
RBC # FLD: 18.7 % — HIGH (ref 10.3–14.5)
SARS-COV-2 RNA SPEC QL NAA+PROBE: SIGNIFICANT CHANGE UP
VIT D25+D1,25 OH+D1,25 PNL SERPL-MCNC: 21 PG/ML — SIGNIFICANT CHANGE UP (ref 19.9–79.3)
WBC # BLD: 9.81 K/UL — SIGNIFICANT CHANGE UP (ref 3.8–10.5)
WBC # FLD AUTO: 9.81 K/UL — SIGNIFICANT CHANGE UP (ref 3.8–10.5)

## 2021-02-05 PROCEDURE — 71045 X-RAY EXAM CHEST 1 VIEW: CPT | Mod: 26

## 2021-02-05 PROCEDURE — 99233 SBSQ HOSP IP/OBS HIGH 50: CPT | Mod: CS

## 2021-02-05 PROCEDURE — 99232 SBSQ HOSP IP/OBS MODERATE 35: CPT

## 2021-02-05 RX ADMIN — Medication 1 TABLET(S): at 11:56

## 2021-02-05 RX ADMIN — Medication 5 MILLIGRAM(S): at 16:10

## 2021-02-05 RX ADMIN — BUDESONIDE AND FORMOTEROL FUMARATE DIHYDRATE 2 PUFF(S): 160; 4.5 AEROSOL RESPIRATORY (INHALATION) at 05:31

## 2021-02-05 RX ADMIN — Medication 500 MILLIGRAM(S): at 05:27

## 2021-02-05 RX ADMIN — Medication 500 MILLIGRAM(S): at 21:33

## 2021-02-05 RX ADMIN — FAMOTIDINE 20 MILLIGRAM(S): 10 INJECTION INTRAVENOUS at 16:07

## 2021-02-05 RX ADMIN — Medication 10 MILLIEQUIVALENT(S): at 05:26

## 2021-02-05 RX ADMIN — PRAMIPEXOLE DIHYDROCHLORIDE 1.5 MILLIGRAM(S): 0.12 TABLET ORAL at 16:07

## 2021-02-05 RX ADMIN — LIDOCAINE 1 PATCH: 4 CREAM TOPICAL at 05:25

## 2021-02-05 RX ADMIN — Medication 1 TABLET(S): at 12:03

## 2021-02-05 RX ADMIN — Medication 1 TABLET(S): at 21:33

## 2021-02-05 RX ADMIN — Medication 6 MILLIGRAM(S): at 05:27

## 2021-02-05 RX ADMIN — Medication 500 MILLIGRAM(S): at 11:56

## 2021-02-05 RX ADMIN — Medication 10 MILLIEQUIVALENT(S): at 16:05

## 2021-02-05 RX ADMIN — Medication 325 MILLIGRAM(S): at 11:55

## 2021-02-05 RX ADMIN — ENOXAPARIN SODIUM 40 MILLIGRAM(S): 100 INJECTION SUBCUTANEOUS at 11:56

## 2021-02-05 RX ADMIN — FAMOTIDINE 20 MILLIGRAM(S): 10 INJECTION INTRAVENOUS at 05:27

## 2021-02-05 RX ADMIN — Medication 1 TABLET(S): at 05:27

## 2021-02-05 RX ADMIN — LIDOCAINE 1 PATCH: 4 CREAM TOPICAL at 15:59

## 2021-02-05 RX ADMIN — Medication 5 MILLIGRAM(S): at 05:26

## 2021-02-05 RX ADMIN — Medication 2000 UNIT(S): at 11:56

## 2021-02-05 RX ADMIN — Medication 500 MILLIGRAM(S): at 12:03

## 2021-02-05 RX ADMIN — CHOLESTYRAMINE 4 GRAM(S): 4 POWDER, FOR SUSPENSION ORAL at 15:59

## 2021-02-05 RX ADMIN — BUDESONIDE AND FORMOTEROL FUMARATE DIHYDRATE 2 PUFF(S): 160; 4.5 AEROSOL RESPIRATORY (INHALATION) at 19:41

## 2021-02-05 NOTE — PROGRESS NOTE ADULT - ASSESSMENT
1- COVID-19.  Plan:   completed remdesvir X 5 days  continue steroids to complete X 10 days  continue supportive treatment with o2  ID consulted   Pulmonary following, ok to discharge once negative  CXR 2/5/21: slight interval radiographic improvement in the left basilar groundglass infiltrate   COVID (-) today, will retest tomorrow          2 -Falls associated with syncopal events  Multifactorial: questionable orthostatic changes patient deconditioned, poor oral intake, poor hydration, some episodes have been while standing and patient has h/o lymphedema and b/l knee replacement. The episodes have been unwitnessed.  Patient denies any fecal or urinary incontinence.    Patient has had extensive work/up in another facilities: Echo 12/31/20 normal and repeat 1/31/21 normal as well, duplex LE 12/31/20 normal, carotid US1/3/21: normal, CT head 12/30/20 negative.  A cardiac monitor was done as OP for 2 weeks as per daughter but we don't have the results  while on telemetry no evidence of arrhythmias  negative orthostatics  EEG: negative for seizures  cardiology following  neurology following  Further workup recommended as OP per cardiology: ILR, stress test    as per daughter patient had an appt for sleep study that had to reschedule because patient was admitted, advice to update appt  Patient afraid of MR due to painful neck, open MRI of head w/ w/o could be an option for her  TILT test as OP as well  fall precautions in place  PT for ambulation,  needs SUSANNA.  No further episodes while admitted       3-Hypokalemia.    -supplement potassium provided  - resolved.   -continue monitoring     4- Colitis.    GI Dr. BRADLEY following  occult blood neg  less likely bacterial, no further diarrhea  wbc wnl  Ct abdomen 1/28/21 circumferential colonic wall thickening/submucosal edema with mild fat stranding, mild vascular engorgement  consistent with pancolitis.   to continue diet as tolerated  continue with pepcid  h/o cdad.       5- Diarrhea.  h/o Cdiff   on questran  probiotics  improving  GI following    6- RLS  -continue with Mirapex    7-Anemia  hgb stable  OB was negative  iron panel abnormal, B12 wnl, LDH wnl, low ret count, perif smear unremarkable   received 3 doses of venofer  continue monitoring h/h  Heme-onc following  F/up as OP with GI for further workup, possible colonoscopy    8- Lymphedema  ace wrap b/l ordered     lovenox for DVT prophylaxis.    Spoke with daughter Edelmira  extensively for almost an hour, @ [phone] and gave her an update about her mother's condition and latest results   1- COVID-19.  Plan:   completed remdesvir X 5 days  continue steroids to complete X 10 days  continue supportive treatment with o2  ID consulted   Pulmonary following, ok to discharge once negative  CXR 2/5/21: slight interval radiographic improvement in the left basilar groundglass infiltrate   COVID (-) today, will retest tomorrow          2 -Falls associated with syncopal events  Multifactorial: questionable orthostatic changes patient deconditioned, poor oral intake, poor hydration, some episodes have been while standing and patient has h/o lymphedema and b/l knee replacement. The episodes have been unwitnessed.  Patient denies any fecal or urinary incontinence.    Patient has had extensive work/up in another facilities: Echo 12/31/20 normal and repeat 1/31/21 normal as well, duplex LE 12/31/20 normal, carotid US1/3/21: normal, CT head 12/30/20 negative.  A cardiac monitor was done as OP for 2 weeks as per daughter but we don't have the results  while on telemetry no evidence of arrhythmias  negative orthostatics  EEG: negative for seizures  cardiology following  neurology following  Further workup recommended as OP per cardiology: ILR, stress test    as per daughter patient had an appt for sleep study that had to reschedule because patient was admitted, advice to update appt  Patient afraid of MR due to painful neck, open MRI of head w/ w/o could be an option for her  TILT test as OP as well  fall precautions in place  PT for ambulation,  needs SUSANNA.  No further episodes while admitted       3-Hypokalemia.    -supplement potassium provided  - resolved.   -continue monitoring     4- Colitis.    GI Dr. BRADLEY following  occult blood neg  less likely bacterial, no further diarrhea  wbc wnl  Ct abdomen 1/28/21 circumferential colonic wall thickening/submucosal edema with mild fat stranding, mild vascular engorgement  consistent with pancolitis.   to continue diet as tolerated  continue with pepcid  h/o cdiff.       5- Diarrhea.  h/o Cdiff   on questran  probiotics  improving  GI following    6- RLS  -continue with Mirapex    7-Anemia  hgb stable  OB was negative  iron panel abnormal, B12 wnl, LDH wnl, low ret count, perif smear unremarkable   received 3 doses of venofer  continue monitoring h/h  Heme-onc following  F/up as OP with GI for further workup, possible colonoscopy    8- Lymphedema  ace wrap b/l ordered     lovenox for DVT prophylaxis.    Spoke with daughter Edelmira  extensively for almost an hour, @ [phone] and gave her an update about her mother's condition and latest results

## 2021-02-05 NOTE — PROGRESS NOTE ADULT - SUBJECTIVE AND OBJECTIVE BOX
PULMONARY/CRITICAL CARE    DOS 21  Unchanged     No fever.     Patient is a 71y old  Female who presents with a chief complaint of sob (2021 17:48)    BRIEF HOSPITAL COURSE: ***70yo woman with PMH of OA, HDL, and bladder disorder was admitted with multiple falls and feeling weakness. She didn't have SOB, cough, vomiting, no diarrhea, no fever, chills, No sick contacts.  She tested positive for COVID so was admitted for treatment. She has been started on remdesivir and Dexamethasone.       Events last 24 hours: ***    PAST MEDICAL & SURGICAL HISTORY:  Obesity (BMI 30-39.9)    Traumatic arthropathy involving lower leg  right    History of Osteoarthritis    Hyperlipidemia    Restless Leg Syndrome    H/O neck surgery    H/O arthroscopy of right knee      Bladder Disorder  Bladder lift     History of Colonoscopy    Ex lap in  for abscess in the baldder    History of Hysterectomy and bladder lift in       Allergies    No Known Allergies    Intolerances      FAMILY HISTORY: Smoked 1ppd for 25 yrs until 2017 no etoh  No pertinent family history in first degree relatives          Medications:  remdesivir  IVPB 100 milliGRAM(s) IV Intermittent every 24 hours  remdesivir  IVPB   IV Intermittent     torsemide 5 milliGRAM(s) Oral two times a day    ALBUTerol    90 MICROgram(s) HFA Inhaler 2 Puff(s) Inhalation every 4 hours PRN  benzonatate 100 milliGRAM(s) Oral three times a day PRN  budesonide 160 MICROgram(s)/formoterol 4.5 MICROgram(s) Inhaler 2 Puff(s) Inhalation two times a day    acetaminophen   Tablet .. 650 milliGRAM(s) Oral every 6 hours PRN  melatonin 3 milliGRAM(s) Oral at bedtime PRN  ondansetron Injectable 4 milliGRAM(s) IV Push every 6 hours PRN  oxyCODONE    IR 5 milliGRAM(s) Oral four times a day PRN  pramipexole 1.5 milliGRAM(s) Oral every 24 hours  traMADol 25 milliGRAM(s) Oral four times a day PRN      enoxaparin Injectable 40 milliGRAM(s) SubCutaneous daily    aluminum hydroxide/magnesium hydroxide/simethicone Suspension 30 milliLiter(s) Oral every 4 hours PRN  famotidine    Tablet 20 milliGRAM(s) Oral two times a day  sulfaSALAzine 500 milliGRAM(s) Oral three times a day      dexAMETHasone  Injectable 6 milliGRAM(s) IV Push daily    ascorbic acid 500 milliGRAM(s) Oral daily  cholecalciferol 2000 Unit(s) Oral daily  multivitamin/minerals 1 Tablet(s) Oral daily  potassium chloride   Powder 20 milliEquivalent(s) Oral four times a day    influenza   Vaccine 0.5 milliLiter(s) IntraMuscular once    lidocaine   Patch 1 Patch Transdermal every 24 hours    lactobacillus acidophilus 1 Tablet(s) Oral daily          ICU Vital Signs Last 24 Hrs  T(C): 36.3 (2021 04:59), Max: 36.8 (2021 21:39)  T(F): 97.4 (2021 04:59), Max: 98.2 (2021 21:39)  HR: 71 (2021 04:59) (60 - 76)  BP: 120/64 (2021 04:59) (96/57 - 120/64)  BP(mean): --  ABP: --  ABP(mean): --  RR: 18 (2021 04:59) (18 - 20)  SpO2: 96% (2021 04:59) (92% - 99%)    Vital Signs Last 24 Hrs  T(C): 36.3 (2021 04:59), Max: 36.8 (2021 21:39)  T(F): 97.4 (2021 04:59), Max: 98.2 (2021 21:39)  HR: 71 (2021 04:59) (60 - 76)  BP: 120/64 (2021 04:59) (96/57 - 120/64)  BP(mean): --  RR: 18 (2021 04:59) (18 - 20)  SpO2: 96% (2021 04:59) (92% - 99%)        I&O's Detail    2021 07:01  -  2021 07:00  --------------------------------------------------------  IN:    IV PiggyBack: 350 mL  Total IN: 350 mL    OUT:    Voided (mL): 650 mL  Total OUT: 650 mL    Total NET: -300 mL            LABS:                        8.9    x     )-----------( x        ( 2021 16:50 )             27.5         139  |  106  |  6<L>  ----------------------------<  86  2.9<LL>   |  27  |  0.71    Ca    7.3<L>      2021 08:03  Phos  3.1       Mg     1.9         TPro  5.8<L>  /  Alb  2.1<L>  /  TBili  0.4  /  DBili  .20  /  AST  70<H>  /  ALT  19  /  AlkPhos  130<H>        CARDIAC MARKERS ( 2021 08:03 )  x     / x     / 556 U/L / x     / x          CAPILLARY BLOOD GLUCOSE        PT/INR - ( 2021 23:16 )   PT: 17.6 sec;   INR: 1.54 ratio         PTT - ( 2021 23:16 )  PTT:28.6 sec  Urinalysis Basic - ( 2021 20:10 )    Color: Yellow / Appearance: Clear / S.010 / pH: x  Gluc: x / Ketone: Negative  / Bili: Negative / Urobili: Negative   Blood: x / Protein: 30 mg/dL / Nitrite: Negative   Leuk Esterase: Negative / RBC: 0-2 /HPF / WBC 0-2   Sq Epi: x / Non Sq Epi: Few / Bacteria: Few      CULTURES:  Culture Results:   No growth to date. ( @ 05:12)  Culture Results:   No growth to date. ( @ 05:12)        RADIOLOGY: ***    d< from: CT Chest No Cont (21 @ 00:10) >  EXAM:  CT CHEST                            PROCEDURE DATE:  2021          INTERPRETATION:  CLINICAL INFORMATION: Chest trauma.  Status post fall.    COMPARISON: 2020.    PROCEDURE:  CT of the Chest was performed without intravenous contrast.  Sagittal and coronal reformats were performed.    FINDINGS:    LUNGS AND AIRWAYS: There is moderate respiratory motion artifact the central airways are grossly clear within limits of motion degradation.  There is mild to moderate centrilobular emphysema.  There are nonspecific groundglass opacities in the anterior segments of both upper lobes measuring up to approximately 1.9 x 1.9 cm on the right (3:226) and 1.4 x 1.1 cm on the left (3:217), which are new since 2020.  There is dense consolidation in the right middle lobe with air bronchograms and bronchiectasis, in a region of lung that previously showed patchy groundglass nodular opacities on 2020.  Again seen is patchy groundglass opacity in the basilar segments of the right greater than left lower lobes which appears stable to mildly progressed from 2020.  PLEURA: No pleural effusion.  MEDIASTINUM AND MARYBEL: No lymphadenopathy.  VESSELS: Mild atherosclerotic calcification thoracic aorta and arch vessels.  HEART:Cardiomegaly.  Calcifications involving sinuses of Valsalva and aortic valve leaflets.  Moderate atherosclerotic calcification the coronary arteries. No pericardial effusion.  CHEST WALL AND LOWER NECK: Within normal limits.  VISUALIZED UPPER ABDOMEN: Enlarged spleen measures up to 13.9 cm, increased in size from approximately 11.8 cm on 2020.  BONES: The patient is status post C2-T7 posterior spinal fusion with shamar screw construct.  Within the thoracic spine there are pedicle screws at T1 bilaterally, T2 bilaterally, T3 on the right, T4 on the left, T5 bilaterally, T6 bilaterally and T7 bilaterally.  There is also a screw coursing through the base of the T3 spinous process and right T3 lamina.  Multiple pedicle screws are slightly lateral in position and course into the costovertebral vertebral junctions.  There is no evidence of hardware fracture or abnormal lucency around the pedicle screws.  There are old/healing fractures of the left third, sixth, seventh and eighth ribs with external callus formation.    IMPRESSION:  1.  No evidence of acute traumatic injury in the chest.    2.  Patchy groundglass opacities in the anterior segments of both upper lobes are new from 2020.  These may reflect nonspecific foci of infection, inflammation, edema or hemorrhage.    2.  Dense consolidation in the right middle lobe with air bronchograms and bronchiectasis is an region of lung that previously showed patchy groundglass nodular opacities on 2020.  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    3.  There is persistent groundglass opacity in the basilar segments of both lower lobes, right greater than left, which appears stable to mildly progressed from 2020.  This may reflect areas of infection/inflammation, edema, atelectasis or interstitial lung disease.    4.  Splenomegaly, which appears new from 2020.  The spleen measures 13.9 cm, increased in size from 11.8 cm on 2020.    5.  Follow-up chest CT is recommended in 1-3 months to reassess the findings and exclude continued disease progression.    < end of copied text >  < from: Xray Chest 1 View- PORTABLE-Routine (Xray Chest 1 View- PORTABLE-Routine in AM.) (21 @ 07:57) >  EXAM:  XR CHEST PORTABLE ROUTINE 1V                            PROCEDURE DATE:  2021          INTERPRETATION:  CLINICAL HISTORY: 71-year-old with Covid pneumonia..    TECHNIQUE: A single AP radiograph of the chest was reviewed..    COMPARISON: Chest radiograph from 2021.    FINDINGS/  IMPRESSION:    Tubes/lines: Multiple radiopaque densities project over the left chest. Cervicothoracic spine fixation hardware.    Lungs: Bibasilar atelectasis. Small airspace opacity left midlung consistent with pneumonia..    Pleura: No pneumothorax or pleural effusions.    Cardiomediastinal silhouette: Within normal limits.            SARWAT BURDEN MD; Attending Radiologist  This document has been electronically signed. 2021  3:43PM    < end of copied text >  CXR bilat infil

## 2021-02-05 NOTE — PROGRESS NOTE ADULT - SUBJECTIVE AND OBJECTIVE BOX
[facility] -- Quang Akers, Itzel Herrera, Kiran Miranda Savella  Office # [phone]      Follow Up:    Multiple falls, syncope  Subjective/Observations:   No events overnight resting  in bed.  No complaints of chest pain, or palpitations reported. No signs of orthopnea or PND. Dyspnea improving  Tolerating nasal cannula     REVIEW OF SYSTEMS: All other review of systems is negative unless indicated above    PAST MEDICAL & SURGICAL HISTORY:  Obesity (BMI 30-39.9)    Traumatic arthropathy involving lower leg  right    History of Osteoarthritis    Hyperlipidemia    Restless Leg Syndrome    H/O neck surgery    H/O arthroscopy of right knee  2010    Bladder Disorder  Bladder lift 2000    History of Colonoscopy    Ex lap in 2009 for abscess in the baldder    History of Hysterectomy and bladder lift in 2000        MEDICATIONS  (STANDING):  ascorbic acid 500 milliGRAM(s) Oral daily  budesonide 160 MICROgram(s)/formoterol 4.5 MICROgram(s) Inhaler 2 Puff(s) Inhalation two times a day  cholecalciferol 2000 Unit(s) Oral daily  cholestyramine Powder (Sugar-Free) 4 Gram(s) Oral every 24 hours  dexAMETHasone     Tablet 6 milliGRAM(s) Oral daily  enoxaparin Injectable 40 milliGRAM(s) SubCutaneous daily  famotidine    Tablet 20 milliGRAM(s) Oral two times a day  ferrous    sulfate 325 milliGRAM(s) Oral daily  influenza   Vaccine 0.5 milliLiter(s) IntraMuscular once  lactobacillus acidophilus 1 Tablet(s) Oral three times a day  lidocaine   Patch 1 Patch Transdermal every 24 hours  multivitamin/minerals 1 Tablet(s) Oral daily  potassium chloride    Tablet ER 10 milliEquivalent(s) Oral two times a day  pramipexole 1.5 milliGRAM(s) Oral every 24 hours  sulfaSALAzine 500 milliGRAM(s) Oral three times a day  torsemide 5 milliGRAM(s) Oral two times a day    MEDICATIONS  (PRN):  acetaminophen   Tablet .. 650 milliGRAM(s) Oral every 6 hours PRN Temp greater or equal to 38C (100.4F), Mild Pain (1 - 3)  ALBUTerol    90 MICROgram(s) HFA Inhaler 2 Puff(s) Inhalation every 4 hours PRN Shortness of Breath and/or Wheezing  aluminum hydroxide/magnesium hydroxide/simethicone Suspension 30 milliLiter(s) Oral every 4 hours PRN Dyspepsia  benzonatate 100 milliGRAM(s) Oral three times a day PRN Cough  cyclobenzaprine 5 milliGRAM(s) Oral three times a day PRN Muscle Spasm  melatonin 3 milliGRAM(s) Oral at bedtime PRN Insomnia  ondansetron Injectable 4 milliGRAM(s) IV Push every 6 hours PRN Nausea and/or Vomiting      Allergies    No Known Allergies    Intolerances        Vital Signs Last 24 Hrs  T(C): 36.4 (05 Feb 2021 04:50), Max: 36.6 (04 Feb 2021 12:05)  T(F): 97.6 (05 Feb 2021 04:50), Max: 97.8 (04 Feb 2021 12:05)  HR: 64 (05 Feb 2021 04:50) (64 - 107)  BP: 101/59 (05 Feb 2021 04:50) (101/59 - 104/60)  BP(mean): --  RR: 17 (05 Feb 2021 04:50) (17 - 18)  SpO2: 95% (05 Feb 2021 04:50) (94% - 97%)    I&O's Summary    04 Feb 2021 07:01  -  05 Feb 2021 07:00  --------------------------------------------------------  IN: 0 mL / OUT: 450 mL / NET: -450 mL          PHYSICAL EXAM:  TELE: Off tele   Constitutional: NAD, awake and alert, Obese   HEENT: Moist Mucous Membranes, Anicteric  Pulmonary: labored, breath sounds with Bilaterally diminished at bases  No  wheezes, crackles or rhonchi   Cardiovascular: Regular, S1 and S2 nl, + murmur No rubs, gallops or clicks   Gastrointestinal: Bowel Sounds present, soft, nontender.   Lymph: No lymphadenopathy. + peripheral edema.  Skin: No visible rashes or ulcers.  Psych:  Mood & affect appropriate    LABS: All Labs Reviewed:                        9.7    9.81  )-----------( 361      ( 05 Feb 2021 09:26 )             31.3                         10.4   10.48 )-----------( 331      ( 03 Feb 2021 11:04 )             32.5     05 Feb 2021 09:26    x      |  x      |  x      ----------------------------<  x      x       |  x      |  0.87   04 Feb 2021 11:28    x      |  x      |  x      ----------------------------<  x      x       |  x      |  0.95   03 Feb 2021 11:04    x      |  x      |  x      ----------------------------<  x      x       |  x      |  0.94     Ca    7.7        02 Feb 2021 12:02    TPro  6.5    /  Alb  2.7    /  TBili  0.2    /  DBili  <.10   /  AST  14     /  ALT  12     /  AlkPhos  101    05 Feb 2021 09:26  TPro  6.5    /  Alb  2.6    /  TBili  0.3    /  DBili  <.10   /  AST  14     /  ALT  15     /  AlkPhos  112    04 Feb 2021 11:28  TPro  7.0    /  Alb  2.7    /  TBili  0.3    /  DBili  .10    /  AST  20     /  ALT  18     /  AlkPhos  123    03 Feb 2021 11:04

## 2021-02-05 NOTE — PROGRESS NOTE ADULT - SUBJECTIVE AND OBJECTIVE BOX
Neurology follow up note    VERONICA [id number]      Interval History:    Patient feels ok no new complaints.    MEDICATIONS    acetaminophen   Tablet .. 650 milliGRAM(s) Oral every 6 hours PRN  ALBUTerol    90 MICROgram(s) HFA Inhaler 2 Puff(s) Inhalation every 4 hours PRN  aluminum hydroxide/magnesium hydroxide/simethicone Suspension 30 milliLiter(s) Oral every 4 hours PRN  ascorbic acid 500 milliGRAM(s) Oral daily  benzonatate 100 milliGRAM(s) Oral three times a day PRN  budesonide 160 MICROgram(s)/formoterol 4.5 MICROgram(s) Inhaler 2 Puff(s) Inhalation two times a day  cholecalciferol 2000 Unit(s) Oral daily  cholestyramine Powder (Sugar-Free) 4 Gram(s) Oral every 24 hours  cyclobenzaprine 5 milliGRAM(s) Oral three times a day PRN  dexAMETHasone     Tablet 6 milliGRAM(s) Oral daily  enoxaparin Injectable 40 milliGRAM(s) SubCutaneous daily  famotidine    Tablet 20 milliGRAM(s) Oral two times a day  ferrous    sulfate 325 milliGRAM(s) Oral daily  influenza   Vaccine 0.5 milliLiter(s) IntraMuscular once  lactobacillus acidophilus 1 Tablet(s) Oral three times a day  lidocaine   Patch 1 Patch Transdermal every 24 hours  melatonin 3 milliGRAM(s) Oral at bedtime PRN  multivitamin/minerals 1 Tablet(s) Oral daily  ondansetron Injectable 4 milliGRAM(s) IV Push every 6 hours PRN  potassium chloride    Tablet ER 10 milliEquivalent(s) Oral two times a day  pramipexole 1.5 milliGRAM(s) Oral every 24 hours  sulfaSALAzine 500 milliGRAM(s) Oral three times a day  torsemide 5 milliGRAM(s) Oral two times a day      Allergies    No Known Allergies    Intolerances            Vital Signs Last 24 Hrs  T(C): 36.5 (05 Feb 2021 12:11), Max: 36.6 (04 Feb 2021 20:34)  T(F): 97.7 (05 Feb 2021 12:11), Max: 97.8 (04 Feb 2021 20:34)  HR: 85 (05 Feb 2021 12:11) (64 - 85)  BP: 109/66 (05 Feb 2021 12:11) (101/59 - 109/66)  BP(mean): --  RR: 18 (05 Feb 2021 12:11) (17 - 18)  SpO2: 93% (05 Feb 2021 12:11) (93% - 95%)        REVIEW OF SYSTEMS:  Constitutional:  The patient denies fever, chills, or night sweats.  Head:  No headache.  Eyes:  No double vision or blurry vision.  Ears:  No ringing in the ears.  Neck:  Positive history of neck pain, but had cervical surgery done there and arthritis.  Respiratory:  No shortness of breath at present.  Cardiovascular:  No chest pain.  Abdomen:  No nausea, vomiting, or abdominal pain.  Extremities/Neurological:  Occasional musculoskeletal pain in her legs and joints.  Genitourinary:  No burning upon urination.    PHYSICAL EXAMINATION:   HEENT:  Head:  Normocephalic, atraumatic.  Eyes:  No scleral icterus.  Ears:  Hearing bilaterally intact.  NECK:  Supple.  RESPIRATORY:  Decreased breath sounds bilaterally.  CARDIOVASCULAR:  S1 and S2 heard.  ABDOMEN:  Soft and nontender.  EXTREMITIES:  No clubbing or cyanosis were noted.  GENERAL:  Positive surgical scar was noted in the cervical region in the bilateral knees.      NEUROLOGIC:  The patient is awake and alert.  Extraocular movement were intact.  The patient has decreased range of motion of her neck secondary to surgical intervention.  Speech was fluent.  Smile was symmetric.  Motor:  The patient has decreased range of motion of bilateral shoulders.  Able to elevate roughly about 60 degrees off the bed.  Overall strength was 3+/5.  Bilateral lower extremities, the patient is able to move side-to-side.  Had significant lymphedema in the bilateral lower extremities.  Sensory was decreased to light touch in the bilateral lower extremities, but has significant lymphedema.  The patient had impaired proprioception in the bilateral lower extremities.                LABS:  CBC Full  -  ( 05 Feb 2021 09:26 )  WBC Count : 9.81 K/uL  RBC Count : 3.63 M/uL  Hemoglobin : 9.7 g/dL  Hematocrit : 31.3 %  Platelet Count - Automated : 361 K/uL  Mean Cell Volume : 86.2 fl  Mean Cell Hemoglobin : 26.7 pg  Mean Cell Hemoglobin Concentration : 31.0 gm/dL  Auto Neutrophil # : 7.73 K/uL  Auto Lymphocyte # : 1.37 K/uL  Auto Monocyte # : 0.50 K/uL  Auto Eosinophil # : 0.11 K/uL  Auto Basophil # : 0.01 K/uL  Auto Neutrophil % : 78.8 %  Auto Lymphocyte % : 14.0 %  Auto Monocyte % : 5.1 %  Auto Eosinophil % : 1.1 %  Auto Basophil % : 0.1 %      02-05    x   |  x   |  x   ----------------------------<  x   x    |  x   |  0.87      TPro  6.5  /  Alb  2.7<L>  /  TBili  0.2  /  DBili  <.10  /  AST  14<L>  /  ALT  12  /  AlkPhos  101  02-05    Hemoglobin A1C:     LIVER FUNCTIONS - ( 05 Feb 2021 09:26 )  Alb: 2.7 g/dL / Pro: 6.5 g/dL / ALK PHOS: 101 U/L / ALT: 12 U/L / AST: 14 U/L / GGT: x           Vitamin B12         RADIOLOGY      ANALYSIS AND PLAN:  A 71-year-old with a history of frequent falls, restless legs syndrome.  For episodes of frequent falls, syncopal events, the patient has had multiple workups in the past for this.  Questionable this could be secondary to any type of underlying orthostatic changes.  The patient does have a history of poor oral intake.  The patient also is mostly sedentary.  Also, the patient does have lymphedema in the bilateral lower extremities.  The patient was to be evaluated for possible underlying narcolepsy with sleep studies.  Telemetry evaluation.  Monitor orthostatics as needed.  For restless leg syndrome, continue the patient on her Mirapex.  For history of possible peripheral neuropathy, supportive therapy.  Fall precautions.  For generalized paresis secondary to underlying infectious type process COVID.  For cervical radiculopathy, continue the patient on her muscle relaxants as needed.  overall more interactive   as per patient does feel stronger   The daughter's name is Edelmira.  Telephone number is [phone] 2/3/2021.  She does understand my reasoning and thought process called today and yesterday went to voicemail   EEG was negative for any epileptiform discharges  Greater than 45 minutes of time was spent with the patient, plan of care, reviewing data, speaking to the family and  50% of the visit was spent counseling and/or coordinating care with multidisciplinary healthcare team

## 2021-02-05 NOTE — CHART NOTE - NSCHARTNOTEFT_GEN_A_CORE
Assessment: Pt due for nutrition follow-up. As per chart pt is a 71 year old female with a PMH of  HLD, syncope, frequent falls, OA, s/p B/l knee replacement and right hip replacement, and back surgery presented to the ED after multiple falls and weakness, found to have COVID PNA and pancolitis.    Attempted to call pt. Pt seen by SLP on 1/28 recommending Dysphagia 2 mechanical soft with nectar thick liquids, s/p MBS (2/2) recommending to continue with dysphagia 2, nectar liquids. Pt currently with fair appetite and PO intake, noted to be consuming about 50-75% of meals in house. During previous assessment with RD pt was requesting puree vegetables and chopped foods. Pt was also agreeable to receive additional snacks such as yogurts and pudding, will continue to provide for additional kcal and protein. Pt noted to be experiencing diarrhea, noted with 2 semi loose BM overnight (2/4), on Questran.     Factors impacting intake: [ ] none [ ] nausea  [ ] vomiting [ ] diarrhea [ ] constipation  [ ]chewing problems [x ] swallowing issues  [ ] other:     Diet Prescription: Diet, Dysphagia 2 Mechanical Soft-Nectar Consistency Fluid (01-29-21 @ 13:03)    Intake: fair     Current Weight: (2/3) 188.5lbs   Admission Weight: 188.15lbs   % Weight Change- weight appears to have remained stable however pt currently with edema on and Demadex, continue to monitor     Pertinent Medications: MEDICATIONS  (STANDING):  ascorbic acid 500 milliGRAM(s) Oral daily  budesonide 160 MICROgram(s)/formoterol 4.5 MICROgram(s) Inhaler 2 Puff(s) Inhalation two times a day  cholecalciferol 2000 Unit(s) Oral daily  cholestyramine Powder (Sugar-Free) 4 Gram(s) Oral every 24 hours  dexAMETHasone     Tablet 6 milliGRAM(s) Oral daily  enoxaparin Injectable 40 milliGRAM(s) SubCutaneous daily  famotidine    Tablet 20 milliGRAM(s) Oral two times a day  ferrous    sulfate 325 milliGRAM(s) Oral daily  influenza   Vaccine 0.5 milliLiter(s) IntraMuscular once  lactobacillus acidophilus 1 Tablet(s) Oral three times a day  lidocaine   Patch 1 Patch Transdermal every 24 hours  multivitamin/minerals 1 Tablet(s) Oral daily  potassium chloride    Tablet ER 10 milliEquivalent(s) Oral two times a day  pramipexole 1.5 milliGRAM(s) Oral every 24 hours  sulfaSALAzine 500 milliGRAM(s) Oral three times a day  torsemide 5 milliGRAM(s) Oral two times a day    MEDICATIONS  (PRN):  acetaminophen   Tablet .. 650 milliGRAM(s) Oral every 6 hours PRN Temp greater or equal to 38C (100.4F), Mild Pain (1 - 3)  ALBUTerol    90 MICROgram(s) HFA Inhaler 2 Puff(s) Inhalation every 4 hours PRN Shortness of Breath and/or Wheezing  aluminum hydroxide/magnesium hydroxide/simethicone Suspension 30 milliLiter(s) Oral every 4 hours PRN Dyspepsia  benzonatate 100 milliGRAM(s) Oral three times a day PRN Cough  cyclobenzaprine 5 milliGRAM(s) Oral three times a day PRN Muscle Spasm  melatonin 3 milliGRAM(s) Oral at bedtime PRN Insomnia  ondansetron Injectable 4 milliGRAM(s) IV Push every 6 hours PRN Nausea and/or Vomiting    Pertinent Labs: 02-05 Na--    Glu --    K+ --    Cr  0.87 mg/dL BUN --    02-05 Alb 2.7 g/dL<L>     CAPILLARY BLOOD GLUCOSE    Skin: +3 b/l ankle, leg, feet edema   No pressure injuries noted at this time     Estimated Needs:   [x ] no change since previous assessment  [ ] recalculated:     Previous Nutrition Diagnosis:   [x ] Swallowing difficulty     Nutrition Diagnosis is [ x] ongoing-being addressed with texture and consistency altered diet     New Nutrition Diagnosis: [ x] not applicable     Interventions: Continue with current Dysphagia 2 mechanical soft, nectar liquids diet as per SLP rx  Recommend  [ ] Change Diet To:  [ ] Nutrition Supplement  [ ] Nutrition Support  [ x] Other:  1) Continue to provide pt with additional snacks and honor food preferences   2) Encourage adequate PO intake  3) Monitor pt's PO intake, weight, skin, edema, GI distress     Monitoring and Evaluation:   [x] PO intake [ x ] Tolerance to diet prescription [ x ] weights [ x ] labs[ x ] follow up per protocol  [ x] other: RD to remain available
Notified by unit nursing leadership (NM and ANM) that patient and family were requesting for change of physicians.  Reviewed chart, spoke to patient and daughter.   Daughter expressed concern at recurrent hospitalizations for similar symptoms and she is unsatisfied with the current work up and plan of care.    Vital Signs Last 24 Hrs  T(C): 36.4 (03 Feb 2021 05:00), Max: 36.4 (02 Feb 2021 20:26)  T(F): 97.6 (03 Feb 2021 05:00), Max: 97.6 (03 Feb 2021 05:00)  HR: 76 (03 Feb 2021 05:00) (76 - 90)  BP: 126/64 (03 Feb 2021 05:00) (123/66 - 126/64)  RR: 17 (03 Feb 2021 05:00) (17 - 19)  SpO2: 95% (03 Feb 2021 05:00) (95% - 98%)    Discussed with attending of record and neurologist on case.  Discussed with daughter at length, her concerns and questions were addressed. EEG was recommended by neurologist - patient had initially declined but is now willing to have it done- EEG reordered.  Overall prognosis appears guarded.  Patient transferred to the Fulltime hospitalist service.

## 2021-02-05 NOTE — PROGRESS NOTE ADULT - ASSESSMENT
70 y/o F with HLD, syncope, frequent falls, OA, s/p B/l knee replacement and right hip replacement, and back surgery presented to the ED after multiple falls and weakness, found to have COVID Pna and pancolitis.    Syncope, Falls  - Has known syncope and multiple falls.  Her B/L knee pain is likely contributory to her frequent falls.   - For her syncope, per Dr. Akers's note (9/2020), was thought to be from dehydration and CVA.  However, CVA w/u was negative  - Recently in [place] where she was discharged with a 2-week cardiac monitor but unaware of result.  She is unable to recall name of Cardiologist in [place] and is refusing to go back to him  - Outpatient CUS 2/2020) in our office which showed no hemodynamic significant stenosis  - TTE in 1/2020 showing normal LVF, EF 60% with bicuspid AV, mild AS.    - Repeat TTE inpatient showed normal LV & RV size and function EF 55-60%, trace MR and TR, mild AS  - No evidence of any arrhythmia while on tele  - She will need an ILR to be arranged as outpatient  - Fall precaution  - Negative orthostatics  - Continue to encourage PO fluid intake    Covid Pna  - Tolerating RA  - Supportive care  - Continue to encourage incentive spirometer  - Follow Pulm and ID recs    DVT ppx  - Per Primary  - Continue PT    - Monitor and replete lytes, keep K>4, Mg>2.    - All other medical needs as per primary team.  - Other cardiovascular workup will depend on clinical course.  - Will continue to follow.  Valentino Rogel DNP, ANP-c  Cardiology   Spectra #[id number]  [phone]

## 2021-02-05 NOTE — PROGRESS NOTE ADULT - NSHPATTENDINGPLANDISCUSS_GEN_ALL_CORE
nursing
patient and RN and dtr
nursing
nursing
patient and RN and hospitalist director and hospitalist
nursing
nursing
patient and dtr
patient and RN

## 2021-02-05 NOTE — PROGRESS NOTE ADULT - ASSESSMENT
contact #  daughter----Mortimer, Kelly  phone # [phone]    IMPRESSION:  71 y/o F PMHx lymphedema, HLD, RA, s/p cervical spine surgery at [facility] around 10/2020--- with prolonged hospital course complicated by C. diff infection (completed treatment course) and dysphagia (s/p PEG tube placement). Patient returned home on 11/24/20. Patient was admitted at [facility] from 11/28/2020---12/1/2020, was admitted with nausea/ vomiting/and abdominal pain, and was discharged 12/1/2020. She has been using a walker to ambulate, admitted with multiple falls and weakness, had ct scan this admission, hematology was consulted for anemia on 1/28 and was seen.  case d/w daughter, lives with  per , 2 daughters    RECOMMENDATION:  Anemia---likely multifactorial  hb is at 9.7 today--h/h low but stable, monitor serial h/h, cbc  iron profile--low iron/transferrin saturation, ferritin is an acute phase reactant, as looking for a rapid response, patient is s/p iv iron for 3 days, is on po fe  monitor h/h--transfuse prbc as needed for symptomatic anemia  nl ldh, low retic--no evidence of hemolysis  nl b12 level  smear was reviewed, unremarkable  monitor h/h--transfuse prbc as needed for symptomatic anemia or if hb is under 7  ?colitis on ct--gi is following  anemia/iron def possible/colitis---gi work up as per gi/pt/family to exclude gi pathology/malignancy  covid positive, id is following,management/plan per id/critical care  gi/dvtp--lovenox s/c  d/w patient at bedside at length   contact #  daughter----Mortimer, Kelly  phone # [phone]    IMPRESSION:  71 y/o F PMHx lymphedema, HLD, RA, s/p cervical spine surgery at [facility] around 10/2020--- with prolonged hospital course complicated by C. diff infection (completed treatment course) and dysphagia (s/p PEG tube placement). Patient returned home on 11/24/20. Patient was admitted at [facility] from 11/28/2020---12/1/2020, was admitted with nausea/ vomiting/and abdominal pain, and was discharged 12/1/2020. She has been using a walker to ambulate, admitted with multiple falls and weakness, had ct scan this admission, hematology was consulted for anemia on 1/28 and was seen.  case d/w daughter, lives with  per , 2 daughters    RECOMMENDATION:  Anemia---likely multifactorial  hb is at 9.7 today--h/h low but stable, monitor serial h/h, cbc  iron profile--low iron/transferrin saturation, ferritin is an acute phase reactant, as looking for a rapid response, patient is s/p iv iron for 3 days, is on po fe  monitor h/h--transfuse prbc as needed for symptomatic anemia  nl ldh, low retic--no evidence of hemolysis  nl b12 level  smear was reviewed, unremarkable  monitor h/h--transfuse prbc as needed for symptomatic anemia or if hb is under 7  ?colitis on ct--gi is following  ct a/p--1/28/21--mild splenomegaly at 13.2 cm  anemia/iron def possible/colitis---gi work up as per gi/pt/family to exclude gi pathology/malignancy  covid positive, id is following,management/plan per id/critical care  gi/dvtp--lovenox s/c  d/w patient at bedside at length, prior d/w dtr

## 2021-02-05 NOTE — PROGRESS NOTE ADULT - ASSESSMENT
covid  colitis  anemia  dysphagia      no need for abx for ct findings re: colon  ob neg: monitor cbc daily  cont bacid, questran  cont pepcid daily  diet as tolerated; monitor elytes  her peg tube was removed in between her admissions  monitor gi function  d/w daughter prior

## 2021-02-05 NOTE — PROGRESS NOTE ADULT - SUBJECTIVE AND OBJECTIVE BOX
Patient is a 71y old  Female who presents with a chief complaint of sob (05 Feb 2021 13:31)       Pt is seen and examined  pt is awake and lying in bed/out of bed to chair  pt seems comfortable and denies any complaints at this time    HPI:  · HPI Objective Statement: 72yo female who presents with multiple falls and feeling weak today. pt c/o diffuse pain and feeling weak, no vomiting, no diarrhea, no fever, chills, no cough or sob, denies sick contacts at home  In ER patient was found to have COVID PNA with possible colitis.  patient is being admitted for further care and management (28 Jan 2021 10:42)         ROS:  Negative except for:    MEDICATIONS  (STANDING):  ascorbic acid 500 milliGRAM(s) Oral daily  budesonide 160 MICROgram(s)/formoterol 4.5 MICROgram(s) Inhaler 2 Puff(s) Inhalation two times a day  cholecalciferol 2000 Unit(s) Oral daily  cholestyramine Powder (Sugar-Free) 4 Gram(s) Oral every 24 hours  dexAMETHasone     Tablet 6 milliGRAM(s) Oral daily  enoxaparin Injectable 40 milliGRAM(s) SubCutaneous daily  famotidine    Tablet 20 milliGRAM(s) Oral two times a day  ferrous    sulfate 325 milliGRAM(s) Oral daily  influenza   Vaccine 0.5 milliLiter(s) IntraMuscular once  lactobacillus acidophilus 1 Tablet(s) Oral three times a day  lidocaine   Patch 1 Patch Transdermal every 24 hours  multivitamin/minerals 1 Tablet(s) Oral daily  potassium chloride    Tablet ER 10 milliEquivalent(s) Oral two times a day  pramipexole 1.5 milliGRAM(s) Oral every 24 hours  sulfaSALAzine 500 milliGRAM(s) Oral three times a day  torsemide 5 milliGRAM(s) Oral two times a day    MEDICATIONS  (PRN):  acetaminophen   Tablet .. 650 milliGRAM(s) Oral every 6 hours PRN Temp greater or equal to 38C (100.4F), Mild Pain (1 - 3)  ALBUTerol    90 MICROgram(s) HFA Inhaler 2 Puff(s) Inhalation every 4 hours PRN Shortness of Breath and/or Wheezing  aluminum hydroxide/magnesium hydroxide/simethicone Suspension 30 milliLiter(s) Oral every 4 hours PRN Dyspepsia  benzonatate 100 milliGRAM(s) Oral three times a day PRN Cough  cyclobenzaprine 5 milliGRAM(s) Oral three times a day PRN Muscle Spasm  melatonin 3 milliGRAM(s) Oral at bedtime PRN Insomnia  ondansetron Injectable 4 milliGRAM(s) IV Push every 6 hours PRN Nausea and/or Vomiting      Allergies    No Known Allergies    Intolerances        Vital Signs Last 24 Hrs  T(C): 36.5 (05 Feb 2021 12:11), Max: 36.6 (04 Feb 2021 20:34)  T(F): 97.7 (05 Feb 2021 12:11), Max: 97.8 (04 Feb 2021 20:34)  HR: 85 (05 Feb 2021 12:11) (64 - 85)  BP: 109/66 (05 Feb 2021 12:11) (101/59 - 109/66)  BP(mean): --  RR: 18 (05 Feb 2021 12:11) (17 - 18)  SpO2: 93% (05 Feb 2021 12:11) (93% - 95%)    PHYSICAL EXAM  General: adult in NAD  HEENT: clear oropharynx, anicteric sclera, pink conjunctiva  Neck: supple  CV: normal S1/S2 with no murmur rubs or gallops  Lungs: positive air movement b/l ant lungs,clear to auscultation, no wheezes, no rales  Abdomen: soft non-tender non-distended, no hepatosplenomegaly  Ext: no clubbing cyanosis or edema  Skin: no rashes and no petechiae  Neuro: alert and oriented X 4, no focal deficits  LABS:                          9.7    9.81  )-----------( 361      ( 05 Feb 2021 09:26 )             31.3         Mean Cell Volume : 86.2 fl  Mean Cell Hemoglobin : 26.7 pg  Mean Cell Hemoglobin Concentration : 31.0 gm/dL  Auto Neutrophil # : 7.73 K/uL  Auto Lymphocyte # : 1.37 K/uL  Auto Monocyte # : 0.50 K/uL  Auto Eosinophil # : 0.11 K/uL  Auto Basophil # : 0.01 K/uL  Auto Neutrophil % : 78.8 %  Auto Lymphocyte % : 14.0 %  Auto Monocyte % : 5.1 %  Auto Eosinophil % : 1.1 %  Auto Basophil % : 0.1 %    Serial CBC's  02-05 @ 09:26  Hct-31.3 / Hgb-9.7 / Plat-361 / RBC-3.63 / WBC-9.81          Serial CBC's  02-03 @ 11:04  Hct-32.5 / Hgb-10.4 / Plat-331 / RBC-3.84 / WBC-10.48            02-05    x   |  x   |  x   ----------------------------<  x   x    |  x   |  0.87      TPro  6.5  /  Alb  2.7<L>  /  TBili  0.2  /  DBili  <.10  /  AST  14<L>  /  ALT  12  /  AlkPhos  101  02-05          Ferritin, Serum: 229 ng/mL (01-30-21 @ 15:07)  Iron - Total Binding Capacity.: 154 ug/dL (01-28-21 @ 22:55)  Ferritin, Serum: 168 ng/mL (01-28-21 @ 22:45)  Vitamin B12, Serum: 872 pg/mL (01-28-21 @ 22:45)  Folate, Serum: 8.7 ng/mL (01-28-21 @ 22:45)  Reticulocyte Percent: 0.4 % (01-28-21 @ 16:50)  Ferritin, Serum: 161 ng/mL (01-28-21 @ 11:50)  Iron - Total Binding Capacity.: 144 ug/dL (01-28-21 @ 11:47)  Ferritin, Serum: 173 ng/mL (01-28-21 @ 06:22)                BLOOD SMEAR INTERPRETATION:       RADIOLOGY & ADDITIONAL STUDIES:     Patient is a 71y old  Female who presents with a chief complaint of sob (05 Feb 2021 13:31)       Pt is seen and examined  pt is awake and is sitting in chair--out of bed to chair  pt seems comfortable and denies any complaints at this time    HPI:  · HPI Objective Statement: 72yo female who presents with multiple falls and feeling weak today. pt c/o diffuse pain and feeling weak, no vomiting, no diarrhea, no fever, chills, no cough or sob, denies sick contacts at home  In ER patient was found to have COVID PNA with possible colitis.  patient is being admitted for further care and management (28 Jan 2021 10:42)         ROS:  as per hpi    MEDICATIONS  (STANDING):  ascorbic acid 500 milliGRAM(s) Oral daily  budesonide 160 MICROgram(s)/formoterol 4.5 MICROgram(s) Inhaler 2 Puff(s) Inhalation two times a day  cholecalciferol 2000 Unit(s) Oral daily  cholestyramine Powder (Sugar-Free) 4 Gram(s) Oral every 24 hours  dexAMETHasone     Tablet 6 milliGRAM(s) Oral daily  enoxaparin Injectable 40 milliGRAM(s) SubCutaneous daily  famotidine    Tablet 20 milliGRAM(s) Oral two times a day  ferrous    sulfate 325 milliGRAM(s) Oral daily  influenza   Vaccine 0.5 milliLiter(s) IntraMuscular once  lactobacillus acidophilus 1 Tablet(s) Oral three times a day  lidocaine   Patch 1 Patch Transdermal every 24 hours  multivitamin/minerals 1 Tablet(s) Oral daily  potassium chloride    Tablet ER 10 milliEquivalent(s) Oral two times a day  pramipexole 1.5 milliGRAM(s) Oral every 24 hours  sulfaSALAzine 500 milliGRAM(s) Oral three times a day  torsemide 5 milliGRAM(s) Oral two times a day    MEDICATIONS  (PRN):  acetaminophen   Tablet .. 650 milliGRAM(s) Oral every 6 hours PRN Temp greater or equal to 38C (100.4F), Mild Pain (1 - 3)  ALBUTerol    90 MICROgram(s) HFA Inhaler 2 Puff(s) Inhalation every 4 hours PRN Shortness of Breath and/or Wheezing  aluminum hydroxide/magnesium hydroxide/simethicone Suspension 30 milliLiter(s) Oral every 4 hours PRN Dyspepsia  benzonatate 100 milliGRAM(s) Oral three times a day PRN Cough  cyclobenzaprine 5 milliGRAM(s) Oral three times a day PRN Muscle Spasm  melatonin 3 milliGRAM(s) Oral at bedtime PRN Insomnia  ondansetron Injectable 4 milliGRAM(s) IV Push every 6 hours PRN Nausea and/or Vomiting      Allergies    No Known Allergies    Intolerances        Vital Signs Last 24 Hrs  T(C): 36.5 (05 Feb 2021 12:11), Max: 36.6 (04 Feb 2021 20:34)  T(F): 97.7 (05 Feb 2021 12:11), Max: 97.8 (04 Feb 2021 20:34)  HR: 85 (05 Feb 2021 12:11) (64 - 85)  BP: 109/66 (05 Feb 2021 12:11) (101/59 - 109/66)  BP(mean): --  RR: 18 (05 Feb 2021 12:11) (17 - 18)  SpO2: 93% (05 Feb 2021 12:11) (93% - 95%)    PHYSICAL EXAM  General: adult in NAD  HEENT: clear oropharynx, anicteric sclera, pink conjunctiva  Neck: supple  CV: normal S1/S2 with no murmur rubs or gallops  Lungs: positive air movement b/l ant lungs,clear to auscultation, no wheezes, no rales  Abdomen: soft non-tender non-distended, no hepatosplenomegaly  Ext: no clubbing cyanosis or edema  Skin: no rashes and no petechiae  Neuro: alert and oriented X 4, no focal deficits  LABS:                          9.7    9.81  )-----------( 361      ( 05 Feb 2021 09:26 )             31.3         Mean Cell Volume : 86.2 fl  Mean Cell Hemoglobin : 26.7 pg  Mean Cell Hemoglobin Concentration : 31.0 gm/dL  Auto Neutrophil # : 7.73 K/uL  Auto Lymphocyte # : 1.37 K/uL  Auto Monocyte # : 0.50 K/uL  Auto Eosinophil # : 0.11 K/uL  Auto Basophil # : 0.01 K/uL  Auto Neutrophil % : 78.8 %  Auto Lymphocyte % : 14.0 %  Auto Monocyte % : 5.1 %  Auto Eosinophil % : 1.1 %  Auto Basophil % : 0.1 %    Serial CBC's  02-05 @ 09:26  Hct-31.3 / Hgb-9.7 / Plat-361 / RBC-3.63 / WBC-9.81          Serial CBC's  02-03 @ 11:04  Hct-32.5 / Hgb-10.4 / Plat-331 / RBC-3.84 / WBC-10.48            02-05    x   |  x   |  x   ----------------------------<  x   x    |  x   |  0.87      TPro  6.5  /  Alb  2.7<L>  /  TBili  0.2  /  DBili  <.10  /  AST  14<L>  /  ALT  12  /  AlkPhos  101  02-05          Ferritin, Serum: 229 ng/mL (01-30-21 @ 15:07)  Iron - Total Binding Capacity.: 154 ug/dL (01-28-21 @ 22:55)  Ferritin, Serum: 168 ng/mL (01-28-21 @ 22:45)  Vitamin B12, Serum: 872 pg/mL (01-28-21 @ 22:45)  Folate, Serum: 8.7 ng/mL (01-28-21 @ 22:45)  Reticulocyte Percent: 0.4 % (01-28-21 @ 16:50)  Ferritin, Serum: 161 ng/mL (01-28-21 @ 11:50)  Iron - Total Binding Capacity.: 144 ug/dL (01-28-21 @ 11:47)  Ferritin, Serum: 173 ng/mL (01-28-21 @ 06:22)

## 2021-02-05 NOTE — PROGRESS NOTE ADULT - SUBJECTIVE AND OBJECTIVE BOX
Patient is a 71y old  Female who presents with a chief complaint of sob     INTERVAL /OVERNIGHT EVENTS: feels unchanged denies any chest pain, no sob, no diarrhea    MEDICATIONS  (STANDING):    MEDICATIONS  (STANDING):  ascorbic acid 500 milliGRAM(s) Oral daily  budesonide 160 MICROgram(s)/formoterol 4.5 MICROgram(s) Inhaler 2 Puff(s) Inhalation two times a day  cholecalciferol 2000 Unit(s) Oral daily  cholestyramine Powder (Sugar-Free) 4 Gram(s) Oral every 24 hours  dexAMETHasone     Tablet 6 milliGRAM(s) Oral daily  enoxaparin Injectable 40 milliGRAM(s) SubCutaneous daily  famotidine    Tablet 20 milliGRAM(s) Oral two times a day  ferrous    sulfate 325 milliGRAM(s) Oral daily  influenza   Vaccine 0.5 milliLiter(s) IntraMuscular once  lactobacillus acidophilus 1 Tablet(s) Oral three times a day  lidocaine   Patch 1 Patch Transdermal every 24 hours  multivitamin/minerals 1 Tablet(s) Oral daily  potassium chloride    Tablet ER 10 milliEquivalent(s) Oral two times a day  pramipexole 1.5 milliGRAM(s) Oral every 24 hours  sulfaSALAzine 500 milliGRAM(s) Oral three times a day  torsemide 5 milliGRAM(s) Oral two times a day    MEDICATIONS  (PRN):  acetaminophen   Tablet .. 650 milliGRAM(s) Oral every 6 hours PRN Temp greater or equal to 38C (100.4F), Mild Pain (1 - 3)  ALBUTerol    90 MICROgram(s) HFA Inhaler 2 Puff(s) Inhalation every 4 hours PRN Shortness of Breath and/or Wheezing  aluminum hydroxide/magnesium hydroxide/simethicone Suspension 30 milliLiter(s) Oral every 4 hours PRN Dyspepsia  benzonatate 100 milliGRAM(s) Oral three times a day PRN Cough  cyclobenzaprine 5 milliGRAM(s) Oral three times a day PRN Muscle Spasm  melatonin 3 milliGRAM(s) Oral at bedtime PRN Insomnia  ondansetron Injectable 4 milliGRAM(s) IV Push every 6 hours PRN Nausea and/or Vomiting            Allergies    No Known Allergies    Intolerances        REVIEW OF SYSTEMS:  CONSTITUTIONAL: No fever, feels tired, tearful today  EYES: No eye pain, visual disturbances, or discharge  ENMT:  No difficulty hearing,   RESPIRATORY: No cough, wheezing, chills or hemoptysis; No shortness of breath  CARDIOVASCULAR: No chest pain, palpitations, dizziness, (+) leg swelling  GASTROINTESTINAL: No abdominal or epigastric pain. No nausea, vomiting, or hematemesis  GENITOURINARY: No dysuria, frequency, hematuria  NEUROLOGICAL: No headaches  SKIN: No lesions   LYMPH NODES: No enlarged glands  MUSCULOSKELETAL: No joint pain  HEME/LYMPH: No easy bruising, or bleeding gums        Vital Signs Last 24 Hrs  T(C): 36.5 (05 Feb 2021 12:11), Max: 36.6 (04 Feb 2021 20:34)  T(F): 97.7 (05 Feb 2021 12:11), Max: 97.8 (04 Feb 2021 20:34)  HR: 85 (05 Feb 2021 12:11) (64 - 85)  BP: 109/66 (05 Feb 2021 12:11) (101/59 - 109/66)  BP(mean): --  RR: 18 (05 Feb 2021 12:11) (17 - 18)  SpO2: 93% (05 Feb 2021 12:11) (93% - 95%)    PHYSICAL EXAM:  GENERAL: NAD  HEAD:  Atraumatic, Normocephalic  EYES: EOMI, PERRLA, conjunctiva and sclera clear  ENMT:  Moist mucous membranes, Good dentition, No lesions  NECK: Supple,  Normal thyroid  NERVOUS SYSTEM:  Alert & Oriented X3, no focal signs  CHEST/LUNG: Clear to auscultation bilaterally; No rales, rhonchi, wheezing, or rubs  HEART: Regular rate and rhythm; No murmurs,   ABDOMEN: Soft, Nontender, Bowel sounds present  EXTREMITIES: (+) peripheral edema  LYMPH: No lymphadenopathy noted  SKIN: B/l LE dry skin    LABS:                            9.7    9.81  )-----------( 361      ( 05 Feb 2021 09:26 )             31.3                                10.4   10.48 )-----------( 331      ( 03 Feb 2021 11:04 )             32.5     02-05    x   |  x   |  x   ----------------------------<  x   x    |  x   |  0.87      TPro  6.5  /  Alb  2.7<L>  /  TBili  0.2  /  DBili  <.10  /  AST  14<L>  /  ALT  12  /  AlkPhos  101  02-05    02-04    x   |  x   |  x   ----------------------------<  x   x    |  x   |  0.95      TPro  6.5  /  Alb  2.6<L>  /  TBili  0.3  /  DBili  <.10  /  AST  14<L>  /  ALT  15  /  AlkPhos  112  02-04    03 Feb 2021 11:04    x      |  x      |  x      ----------------------------<  x      x       |  x      |  0.94     Ca    7.7        02 Feb 2021 12:02    TPro  7.0    /  Alb  2.7    /  TBili  0.3    /  DBili  .10    /  AST  20     /  ALT  18     /  AlkPhos  123    03 Feb 2021 11:04        CAPILLARY BLOOD GLUCOSE

## 2021-02-06 LAB
4/8 RATIO: 4.83 RATIO — HIGH (ref 0.86–4.14)
ABS CD8: 392 /UL — SIGNIFICANT CHANGE UP (ref 90–775)
ALBUMIN SERPL ELPH-MCNC: 2.7 G/DL — LOW (ref 3.3–5)
ALP SERPL-CCNC: 105 U/L — SIGNIFICANT CHANGE UP (ref 40–120)
ALT FLD-CCNC: 14 U/L — SIGNIFICANT CHANGE UP (ref 12–78)
AST SERPL-CCNC: 14 U/L — LOW (ref 15–37)
BILIRUB DIRECT SERPL-MCNC: 0.2 MG/DL — SIGNIFICANT CHANGE UP (ref 0.05–0.2)
BILIRUB INDIRECT FLD-MCNC: 0.2 MG/DL — SIGNIFICANT CHANGE UP (ref 0.2–1)
BILIRUB SERPL-MCNC: 0.4 MG/DL — SIGNIFICANT CHANGE UP (ref 0.2–1.2)
CD3 BLASTS SPEC-ACNC: 2297 /UL — HIGH (ref 396–2024)
CD3 BLASTS SPEC-ACNC: 87 % — HIGH (ref 58–84)
CD4 %: 72 % — HIGH (ref 30–56)
CD8 %: 15 % — SIGNIFICANT CHANGE UP (ref 11–43)
CREAT SERPL-MCNC: 0.84 MG/DL — SIGNIFICANT CHANGE UP (ref 0.5–1.3)
PROT SERPL-MCNC: 6.4 G/DL — SIGNIFICANT CHANGE UP (ref 6–8.3)
SARS-COV-2 RNA SPEC QL NAA+PROBE: SIGNIFICANT CHANGE UP
T-CELL CD4 SUBSET PNL BLD: 1892 /UL — HIGH (ref 325–1251)

## 2021-02-06 PROCEDURE — 99232 SBSQ HOSP IP/OBS MODERATE 35: CPT

## 2021-02-06 PROCEDURE — 76705 ECHO EXAM OF ABDOMEN: CPT | Mod: 26

## 2021-02-06 PROCEDURE — 99233 SBSQ HOSP IP/OBS HIGH 50: CPT | Mod: CS

## 2021-02-06 RX ADMIN — Medication 325 MILLIGRAM(S): at 12:53

## 2021-02-06 RX ADMIN — Medication 6 MILLIGRAM(S): at 05:06

## 2021-02-06 RX ADMIN — ENOXAPARIN SODIUM 40 MILLIGRAM(S): 100 INJECTION SUBCUTANEOUS at 12:53

## 2021-02-06 RX ADMIN — Medication 10 MILLIEQUIVALENT(S): at 05:06

## 2021-02-06 RX ADMIN — Medication 1 TABLET(S): at 05:06

## 2021-02-06 RX ADMIN — Medication 10 MILLIEQUIVALENT(S): at 17:45

## 2021-02-06 RX ADMIN — BUDESONIDE AND FORMOTEROL FUMARATE DIHYDRATE 2 PUFF(S): 160; 4.5 AEROSOL RESPIRATORY (INHALATION) at 05:07

## 2021-02-06 RX ADMIN — Medication 500 MILLIGRAM(S): at 05:06

## 2021-02-06 RX ADMIN — Medication 500 MILLIGRAM(S): at 21:53

## 2021-02-06 RX ADMIN — LIDOCAINE 1 PATCH: 4 CREAM TOPICAL at 03:10

## 2021-02-06 RX ADMIN — Medication 500 MILLIGRAM(S): at 12:51

## 2021-02-06 RX ADMIN — Medication 5 MILLIGRAM(S): at 17:45

## 2021-02-06 RX ADMIN — Medication 5 MILLIGRAM(S): at 05:06

## 2021-02-06 RX ADMIN — LIDOCAINE 1 PATCH: 4 CREAM TOPICAL at 19:08

## 2021-02-06 RX ADMIN — Medication 1 TABLET(S): at 12:52

## 2021-02-06 RX ADMIN — Medication 500 MILLIGRAM(S): at 12:54

## 2021-02-06 RX ADMIN — Medication 2000 UNIT(S): at 12:52

## 2021-02-06 RX ADMIN — FAMOTIDINE 20 MILLIGRAM(S): 10 INJECTION INTRAVENOUS at 05:06

## 2021-02-06 RX ADMIN — CHOLESTYRAMINE 4 GRAM(S): 4 POWDER, FOR SUSPENSION ORAL at 17:30

## 2021-02-06 RX ADMIN — FAMOTIDINE 20 MILLIGRAM(S): 10 INJECTION INTRAVENOUS at 17:46

## 2021-02-06 RX ADMIN — Medication 1 TABLET(S): at 12:54

## 2021-02-06 RX ADMIN — Medication 1 TABLET(S): at 21:53

## 2021-02-06 RX ADMIN — PRAMIPEXOLE DIHYDROCHLORIDE 1.5 MILLIGRAM(S): 0.12 TABLET ORAL at 17:47

## 2021-02-06 RX ADMIN — BUDESONIDE AND FORMOTEROL FUMARATE DIHYDRATE 2 PUFF(S): 160; 4.5 AEROSOL RESPIRATORY (INHALATION) at 17:48

## 2021-02-06 RX ADMIN — LIDOCAINE 1 PATCH: 4 CREAM TOPICAL at 17:44

## 2021-02-06 NOTE — PROGRESS NOTE ADULT - SUBJECTIVE AND OBJECTIVE BOX
PULMONARY/CRITICAL CARE    DOS 21  Covid swab neg.  Unchanged     No fever.     Patient is a 71y old  Female who presents with a chief complaint of sob (2021 17:48)    BRIEF HOSPITAL COURSE: ***72yo woman with PMH of OA, HDL, and bladder disorder was admitted with multiple falls and feeling weakness. She didn't have SOB, cough, vomiting, no diarrhea, no fever, chills, No sick contacts.  She tested positive for COVID so was admitted for treatment. She has been started on remdesivir and Dexamethasone.       Events last 24 hours: ***    PAST MEDICAL & SURGICAL HISTORY:  Obesity (BMI 30-39.9)    Traumatic arthropathy involving lower leg  right    History of Osteoarthritis    Hyperlipidemia    Restless Leg Syndrome    H/O neck surgery    H/O arthroscopy of right knee      Bladder Disorder  Bladder lift     History of Colonoscopy    Ex lap in  for abscess in the baldder    History of Hysterectomy and bladder lift in       Allergies    No Known Allergies    Intolerances      FAMILY HISTORY: Smoked 1ppd for 25 yrs until 2017 no etoh  No pertinent family history in first degree relatives          Medications:  remdesivir  IVPB 100 milliGRAM(s) IV Intermittent every 24 hours  remdesivir  IVPB   IV Intermittent     torsemide 5 milliGRAM(s) Oral two times a day    ALBUTerol    90 MICROgram(s) HFA Inhaler 2 Puff(s) Inhalation every 4 hours PRN  benzonatate 100 milliGRAM(s) Oral three times a day PRN  budesonide 160 MICROgram(s)/formoterol 4.5 MICROgram(s) Inhaler 2 Puff(s) Inhalation two times a day    acetaminophen   Tablet .. 650 milliGRAM(s) Oral every 6 hours PRN  melatonin 3 milliGRAM(s) Oral at bedtime PRN  ondansetron Injectable 4 milliGRAM(s) IV Push every 6 hours PRN  oxyCODONE    IR 5 milliGRAM(s) Oral four times a day PRN  pramipexole 1.5 milliGRAM(s) Oral every 24 hours  traMADol 25 milliGRAM(s) Oral four times a day PRN      enoxaparin Injectable 40 milliGRAM(s) SubCutaneous daily    aluminum hydroxide/magnesium hydroxide/simethicone Suspension 30 milliLiter(s) Oral every 4 hours PRN  famotidine    Tablet 20 milliGRAM(s) Oral two times a day  sulfaSALAzine 500 milliGRAM(s) Oral three times a day      dexAMETHasone  Injectable 6 milliGRAM(s) IV Push daily    ascorbic acid 500 milliGRAM(s) Oral daily  cholecalciferol 2000 Unit(s) Oral daily  multivitamin/minerals 1 Tablet(s) Oral daily  potassium chloride   Powder 20 milliEquivalent(s) Oral four times a day    influenza   Vaccine 0.5 milliLiter(s) IntraMuscular once    lidocaine   Patch 1 Patch Transdermal every 24 hours    lactobacillus acidophilus 1 Tablet(s) Oral daily          ICU Vital Signs Last 24 Hrs  T(C): 36.3 (2021 04:59), Max: 36.8 (2021 21:39)  T(F): 97.4 (2021 04:59), Max: 98.2 (2021 21:39)  HR: 71 (2021 04:59) (60 - 76)  BP: 120/64 (2021 04:59) (96/57 - 120/64)  BP(mean): --  ABP: --  ABP(mean): --  RR: 18 (2021 04:59) (18 - 20)  SpO2: 96% (2021 04:59) (92% - 99%)    Vital Signs Last 24 Hrs  T(C): 36.3 (2021 04:59), Max: 36.8 (2021 21:39)  T(F): 97.4 (2021 04:59), Max: 98.2 (2021 21:39)  HR: 71 (2021 04:59) (60 - 76)  BP: 120/64 (2021 04:59) (96/57 - 120/64)  BP(mean): --  RR: 18 (2021 04:59) (18 - 20)  SpO2: 96% (2021 04:59) (92% - 99%)        I&O's Detail    2021 07:01  -  2021 07:00  --------------------------------------------------------  IN:    IV PiggyBack: 350 mL  Total IN: 350 mL    OUT:    Voided (mL): 650 mL  Total OUT: 650 mL    Total NET: -300 mL            LABS:                        8.9    x     )-----------( x        ( 2021 16:50 )             27.5         139  |  106  |  6<L>  ----------------------------<  86  2.9<LL>   |  27  |  0.71    Ca    7.3<L>      2021 08:03  Phos  3.1       Mg     1.9         TPro  5.8<L>  /  Alb  2.1<L>  /  TBili  0.4  /  DBili  .20  /  AST  70<H>  /  ALT  19  /  AlkPhos  130<H>        CARDIAC MARKERS ( 2021 08:03 )  x     / x     / 556 U/L / x     / x          CAPILLARY BLOOD GLUCOSE        PT/INR - ( 2021 23:16 )   PT: 17.6 sec;   INR: 1.54 ratio         PTT - ( 2021 23:16 )  PTT:28.6 sec  Urinalysis Basic - ( 2021 20:10 )    Color: Yellow / Appearance: Clear / S.010 / pH: x  Gluc: x / Ketone: Negative  / Bili: Negative / Urobili: Negative   Blood: x / Protein: 30 mg/dL / Nitrite: Negative   Leuk Esterase: Negative / RBC: 0-2 /HPF / WBC 0-2   Sq Epi: x / Non Sq Epi: Few / Bacteria: Few      CULTURES:  Culture Results:   No growth to date. ( @ 05:12)  Culture Results:   No growth to date. ( @ 05:12)        RADIOLOGY: ***    d< from: CT Chest No Cont (21 @ 00:10) >  EXAM:  CT CHEST                            PROCEDURE DATE:  2021          INTERPRETATION:  CLINICAL INFORMATION: Chest trauma.  Status post fall.    COMPARISON: 2020.    PROCEDURE:  CT of the Chest was performed without intravenous contrast.  Sagittal and coronal reformats were performed.    FINDINGS:    LUNGS AND AIRWAYS: There is moderate respiratory motion artifact the central airways are grossly clear within limits of motion degradation.  There is mild to moderate centrilobular emphysema.  There are nonspecific groundglass opacities in the anterior segments of both upper lobes measuring up to approximately 1.9 x 1.9 cm on the right (3:226) and 1.4 x 1.1 cm on the left (3:217), which are new since 2020.  There is dense consolidation in the right middle lobe with air bronchograms and bronchiectasis, in a region of lung that previously showed patchy groundglass nodular opacities on 2020.  Again seen is patchy groundglass opacity in the basilar segments of the right greater than left lower lobes which appears stable to mildly progressed from 2020.  PLEURA: No pleural effusion.  MEDIASTINUM AND MARYBEL: No lymphadenopathy.  VESSELS: Mild atherosclerotic calcification thoracic aorta and arch vessels.  HEART:Cardiomegaly.  Calcifications involving sinuses of Valsalva and aortic valve leaflets.  Moderate atherosclerotic calcification the coronary arteries. No pericardial effusion.  CHEST WALL AND LOWER NECK: Within normal limits.  VISUALIZED UPPER ABDOMEN: Enlarged spleen measures up to 13.9 cm, increased in size from approximately 11.8 cm on 2020.  BONES: The patient is status post C2-T7 posterior spinal fusion with shamar screw construct.  Within the thoracic spine there are pedicle screws at T1 bilaterally, T2 bilaterally, T3 on the right, T4 on the left, T5 bilaterally, T6 bilaterally and T7 bilaterally.  There is also a screw coursing through the base of the T3 spinous process and right T3 lamina.  Multiple pedicle screws are slightly lateral in position and course into the costovertebral vertebral junctions.  There is no evidence of hardware fracture or abnormal lucency around the pedicle screws.  There are old/healing fractures of the left third, sixth, seventh and eighth ribs with external callus formation.    IMPRESSION:  1.  No evidence of acute traumatic injury in the chest.    2.  Patchy groundglass opacities in the anterior segments of both upper lobes are new from 2020.  These may reflect nonspecific foci of infection, inflammation, edema or hemorrhage.    2.  Dense consolidation in the right middle lobe with air bronchograms and bronchiectasis is an region of lung that previously showed patchy groundglass nodular opacities on 2020.  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    3.  There is persistent groundglass opacity in the basilar segments of both lower lobes, right greater than left, which appears stable to mildly progressed from 2020.  This may reflect areas of infection/inflammation, edema, atelectasis or interstitial lung disease.    4.  Splenomegaly, which appears new from 2020.  The spleen measures 13.9 cm, increased in size from 11.8 cm on 2020.    5.  Follow-up chest CT is recommended in 1-3 months to reassess the findings and exclude continued disease progression.    < end of copied text >  < from: Xray Chest 1 View- PORTABLE-Routine (Xray Chest 1 View- PORTABLE-Routine in AM.) (21 @ 07:57) >  EXAM:  XR CHEST PORTABLE ROUTINE 1V                            PROCEDURE DATE:  2021          INTERPRETATION:  CLINICAL HISTORY: 71-year-old with Covid pneumonia..    TECHNIQUE: A single AP radiograph of the chest was reviewed..    COMPARISON: Chest radiograph from 2021.    FINDINGS/  IMPRESSION:    Tubes/lines: Multiple radiopaque densities project over the left chest. Cervicothoracic spine fixation hardware.    Lungs: Bibasilar atelectasis. Small airspace opacity left midlung consistent with pneumonia..    Pleura: No pneumothorax or pleural effusions.    Cardiomediastinal silhouette: Within normal limits.            SARWAT BURDEN MD; Attending Radiologist  This document has been electronically signed. 2021  3:43PM    < end of copied text >  CXR bilat infil

## 2021-02-06 NOTE — PROGRESS NOTE ADULT - SUBJECTIVE AND OBJECTIVE BOX
Neurology follow up note    VERONICA [id number]      Interval History:    Patient feels ok no new complaints overall feels stronger     MEDICATIONS    acetaminophen   Tablet .. 650 milliGRAM(s) Oral every 6 hours PRN  ALBUTerol    90 MICROgram(s) HFA Inhaler 2 Puff(s) Inhalation every 4 hours PRN  aluminum hydroxide/magnesium hydroxide/simethicone Suspension 30 milliLiter(s) Oral every 4 hours PRN  ascorbic acid 500 milliGRAM(s) Oral daily  benzonatate 100 milliGRAM(s) Oral three times a day PRN  budesonide 160 MICROgram(s)/formoterol 4.5 MICROgram(s) Inhaler 2 Puff(s) Inhalation two times a day  cholecalciferol 2000 Unit(s) Oral daily  cholestyramine Powder (Sugar-Free) 4 Gram(s) Oral every 24 hours  cyclobenzaprine 5 milliGRAM(s) Oral three times a day PRN  dexAMETHasone     Tablet 6 milliGRAM(s) Oral daily  enoxaparin Injectable 40 milliGRAM(s) SubCutaneous daily  famotidine    Tablet 20 milliGRAM(s) Oral two times a day  ferrous    sulfate 325 milliGRAM(s) Oral daily  influenza   Vaccine 0.5 milliLiter(s) IntraMuscular once  lactobacillus acidophilus 1 Tablet(s) Oral three times a day  lidocaine   Patch 1 Patch Transdermal every 24 hours  melatonin 3 milliGRAM(s) Oral at bedtime PRN  multivitamin/minerals 1 Tablet(s) Oral daily  ondansetron Injectable 4 milliGRAM(s) IV Push every 6 hours PRN  potassium chloride    Tablet ER 10 milliEquivalent(s) Oral two times a day  pramipexole 1.5 milliGRAM(s) Oral every 24 hours  sulfaSALAzine 500 milliGRAM(s) Oral three times a day  torsemide 5 milliGRAM(s) Oral two times a day      Allergies    No Known Allergies    Intolerances            Vital Signs Last 24 Hrs  T(C): 36.6 (06 Feb 2021 04:54), Max: 36.6 (06 Feb 2021 04:54)  T(F): 97.8 (06 Feb 2021 04:54), Max: 97.8 (06 Feb 2021 04:54)  HR: 85 (06 Feb 2021 04:54) (79 - 85)  BP: 102/59 (06 Feb 2021 04:54) (102/59 - 105/59)  BP(mean): --  RR: 17 (06 Feb 2021 04:54) (17 - 18)  SpO2: 95% (06 Feb 2021 04:54) (95% - 95%)    REVIEW OF SYSTEMS:  Constitutional:  The patient denies fever, chills, or night sweats.  Head:  No headache.  Eyes:  No double vision or blurry vision.  Ears:  No ringing in the ears.  Neck:  Positive history of neck pain, but had cervical surgery done there and arthritis.  Respiratory:  No shortness of breath at present.  Cardiovascular:  No chest pain.  Abdomen:  No nausea, vomiting, or abdominal pain.  Extremities/Neurological:  Occasional musculoskeletal pain in her legs and joints.  Genitourinary:  No burning upon urination.    PHYSICAL EXAMINATION:   HEENT:  Head:  Normocephalic, atraumatic.  Eyes:  No scleral icterus.  Ears:  Hearing bilaterally intact.  NECK:  Supple.  RESPIRATORY:  Decreased breath sounds bilaterally.  CARDIOVASCULAR:  S1 and S2 heard.  ABDOMEN:  Soft and nontender.  EXTREMITIES:  No clubbing or cyanosis were noted.  GENERAL:  Positive surgical scar was noted in the cervical region in the bilateral knees.      NEUROLOGIC:  The patient is awake and alert.  Extraocular movement were intact.  The patient has decreased range of motion of her neck secondary to surgical intervention.  Speech was fluent.  Smile was symmetric.  Motor:  The patient has decreased range of motion of bilateral shoulders.  Able to elevate roughly about 60 degrees off the bed.  Overall strength was 3+/5.  Bilateral lower extremities, the patient is able to move side-to-side.  Had significant lymphedema in the bilateral lower extremities.  Sensory was decreased to light touch in the bilateral lower extremities, but has significant lymphedema.  The patient had impaired proprioception in the bilateral lower extremities.                     LABS:  CBC Full  -  ( 05 Feb 2021 09:26 )  WBC Count : 9.81 K/uL  RBC Count : 3.63 M/uL  Hemoglobin : 9.7 g/dL  Hematocrit : 31.3 %  Platelet Count - Automated : 361 K/uL  Mean Cell Volume : 86.2 fl  Mean Cell Hemoglobin : 26.7 pg  Mean Cell Hemoglobin Concentration : 31.0 gm/dL  Auto Neutrophil # : 7.73 K/uL  Auto Lymphocyte # : 1.37 K/uL  Auto Monocyte # : 0.50 K/uL  Auto Eosinophil # : 0.11 K/uL  Auto Basophil # : 0.01 K/uL  Auto Neutrophil % : 78.8 %  Auto Lymphocyte % : 14.0 %  Auto Monocyte % : 5.1 %  Auto Eosinophil % : 1.1 %  Auto Basophil % : 0.1 %      02-06    x   |  x   |  x   ----------------------------<  x   x    |  x   |  0.84      TPro  6.4  /  Alb  2.7<L>  /  TBili  0.4  /  DBili  .20  /  AST  14<L>  /  ALT  14  /  AlkPhos  105  02-06    Hemoglobin A1C:     LIVER FUNCTIONS - ( 06 Feb 2021 08:13 )  Alb: 2.7 g/dL / Pro: 6.4 g/dL / ALK PHOS: 105 U/L / ALT: 14 U/L / AST: 14 U/L / GGT: x           Vitamin B12         RADIOLOGY        ANALYSIS AND PLAN:  A 71-year-old with a history of frequent falls, restless legs syndrome.  For episodes of frequent falls, syncopal events, the patient has had multiple workups in the past for this.  Questionable this could be secondary to any type of underlying orthostatic changes.  The patient does have a history of poor oral intake.  The patient also is mostly sedentary.  Also, the patient does have lymphedema in the bilateral lower extremities.  The patient was to be evaluated for possible underlying narcolepsy with sleep studies.  Telemetry evaluation.  Monitor orthostatics as needed.  For restless leg syndrome, continue the patient on her Mirapex.  For history of possible peripheral neuropathy, supportive therapy.  Fall precautions.  For generalized paresis secondary to underlying infectious type process COVID.  For cervical radiculopathy, continue the patient on her muscle relaxants as needed.  overall more interactive   as per patient does feel stronger   The daughter's name is Edelmira.  Telephone number is [phone] 2/6/2021  EEG was negative for any epileptiform discharges spoke to daughter she does not want any seizure medications which is reasonable   Greater than 45 minutes of time was spent with the patient, plan of care, reviewing data, speaking to the family and  50% of the visit was spent counseling and/or coordinating care with multidisciplinary healthcare team

## 2021-02-06 NOTE — PROGRESS NOTE ADULT - ASSESSMENT
1- COVID-19.  Plan:   completed remdesvir X 5 days  continue steroids to complete X 10 days  continue supportive treatment with o2  ID consulted   Pulmonary following, ok to discharge once negative  CXR 2/5/21: slight interval radiographic improvement in the left basilar groundglass infiltrate   COVID (-) today, will retest today for placement        2 -Falls associated with syncopal events  Multifactorial: questionable orthostatic changes patient deconditioned, poor oral intake, poor hydration, some episodes have been while standing and patient has h/o lymphedema and b/l knee replacement. The episodes have been unwitnessed.  Patient denies any fecal or urinary incontinence.    Patient has had extensive work/up in another facilities: Echo 12/31/20 normal and repeat 1/31/21 normal as well, duplex LE 12/31/20 normal, carotid US1/3/21: normal, CT head 12/30/20 negative.  A cardiac monitor was done as OP for 2 weeks as per daughter but we don't have the results  while on telemetry no evidence of arrhythmias  negative orthostatics  EEG: negative for seizures  cardiology following  neurology following  Further workup recommended as OP per cardiology: ILR, stress test    as per daughter patient had an appt for sleep study that had to reschedule because patient was admitted, advice to update appt  Patient afraid of MR due to painful neck, open MRI of head w/ w/o could be an option for her, will try today  or tomorrow morning MRI with prior pain medication and ativan  TILT test as OP as well  fall precautions in place  PT for ambulation,  needs SUSANNA.  No further episodes while admitted       3-Hypokalemia.    -supplement potassium provided  - resolved.   -continue monitoring     4- Colitis.    GI Dr. BRADLEY following  occult blood neg  less likely bacterial, no further diarrhea  wbc wnl  Ct abdomen 1/28/21 circumferential colonic wall thickening/submucosal edema with mild fat stranding, mild vascular engorgement  consistent with pancolitis.   to continue diet as tolerated  continue with pepcid  h/o cdiff.       5- Diarrhea.  h/o Cdiff   on questran  probiotics  improving  GI following    6- RLS  -continue with Mirapex    7-Anemia  hgb stable  OB was negative  iron panel abnormal, B12 wnl, LDH wnl, low ret count, perif smear unremarkable   received 3 doses of venofer  continue monitoring h/h  Heme-onc following  F/up as OP with GI for further workup, possible colonoscopy    8- Lymphedema  ace wrap b/l ordered     lovenox for DVT prophylaxis.    Spoke with daughter Edelmira  @ [phone] and gave her an update about her mother's condition and latest results

## 2021-02-06 NOTE — PROGRESS NOTE ADULT - SUBJECTIVE AND OBJECTIVE BOX
[facility] -- Quang Akers Grossman, W[place]man, Kiran Miranda Savella, Goodger: Office # [phone]    Follow Up:  Falls, Syncope     Subjective/Observations: Patient seen and examined. Patient awake, alert, OOB to chair. No complaints of chest pain, dyspnea, palpitations or dizziness. No signs of orthopnea or PND. Tolerating room air.     REVIEW OF SYSTEMS: All review of systems is negative for eye, ENT, GI, , allergic, dermatologic, musculoskeletal and neurologic except as described above    PAST MEDICAL & SURGICAL HISTORY:  Obesity (BMI 30-39.9)    Traumatic arthropathy involving lower leg  right    History of Osteoarthritis    Hyperlipidemia    Restless Leg Syndrome    H/O neck surgery    H/O arthroscopy of right knee  2010    Bladder Disorder  Bladder lift 2000    History of Colonoscopy    Ex lap in 2009 for abscess in the baldder    History of Hysterectomy and bladder lift in 2000    MEDICATIONS  (STANDING):  ascorbic acid 500 milliGRAM(s) Oral daily  budesonide 160 MICROgram(s)/formoterol 4.5 MICROgram(s) Inhaler 2 Puff(s) Inhalation two times a day  cholecalciferol 2000 Unit(s) Oral daily  cholestyramine Powder (Sugar-Free) 4 Gram(s) Oral every 24 hours  dexAMETHasone     Tablet 6 milliGRAM(s) Oral daily  enoxaparin Injectable 40 milliGRAM(s) SubCutaneous daily  famotidine    Tablet 20 milliGRAM(s) Oral two times a day  ferrous    sulfate 325 milliGRAM(s) Oral daily  influenza   Vaccine 0.5 milliLiter(s) IntraMuscular once  lactobacillus acidophilus 1 Tablet(s) Oral three times a day  lidocaine   Patch 1 Patch Transdermal every 24 hours  multivitamin/minerals 1 Tablet(s) Oral daily  potassium chloride    Tablet ER 10 milliEquivalent(s) Oral two times a day  pramipexole 1.5 milliGRAM(s) Oral every 24 hours  sulfaSALAzine 500 milliGRAM(s) Oral three times a day  torsemide 5 milliGRAM(s) Oral two times a day    MEDICATIONS  (PRN):  acetaminophen   Tablet .. 650 milliGRAM(s) Oral every 6 hours PRN Temp greater or equal to 38C (100.4F), Mild Pain (1 - 3)  ALBUTerol    90 MICROgram(s) HFA Inhaler 2 Puff(s) Inhalation every 4 hours PRN Shortness of Breath and/or Wheezing  aluminum hydroxide/magnesium hydroxide/simethicone Suspension 30 milliLiter(s) Oral every 4 hours PRN Dyspepsia  benzonatate 100 milliGRAM(s) Oral three times a day PRN Cough  cyclobenzaprine 5 milliGRAM(s) Oral three times a day PRN Muscle Spasm  melatonin 3 milliGRAM(s) Oral at bedtime PRN Insomnia  ondansetron Injectable 4 milliGRAM(s) IV Push every 6 hours PRN Nausea and/or Vomiting    Allergies  No Known Allergies    Vital Signs Last 24 Hrs  T(C): 36.2 (06 Feb 2021 13:27), Max: 36.6 (06 Feb 2021 04:54)  T(F): 97.2 (06 Feb 2021 13:27), Max: 97.8 (06 Feb 2021 04:54)  HR: 74 (06 Feb 2021 13:27) (74 - 85)  BP: 114/65 (06 Feb 2021 13:27) (102/59 - 114/65)  BP(mean): --  RR: 17 (06 Feb 2021 13:27) (17 - 18)  SpO2: 91% (06 Feb 2021 13:27) (91% - 95%)  I&O's Summary    05 Feb 2021 07:01  -  06 Feb 2021 07:00  --------------------------------------------------------  IN: 0 mL / OUT: 250 mL / NET: -250 mL    TELE: Not on telemetry   PHYSICAL EXAM:  Appearance: NAD, no distress, alert, Obese   HEENT: Moist Mucous Membranes, Anicteric  Cardiovascular: Regular rate and rhythm, Normal S1 S2, No JVD, No murmurs, No rubs, gallops or clicks  Respiratory: Non-labored, Clear to auscultation, No rales, No rhonchi, No wheezing.   Gastrointestinal:  Soft, Non-tender, + BS  Neurologic: Non-focal  Skin: Warm and dry, No visible rashes or ulcers, No ecchymosis, No cyanosis  Musculoskeletal: No clubbing, No cyanosis, No joint swelling/tenderness  Psychiatry: Mood & affect appropriate  Lymph: +1  peripheral edema with chronic venous stasis skin changes.     LABS: All Labs Reviewed:                        9.7    9.81  )-----------( 361      ( 05 Feb 2021 09:26 )             31.3     06 Feb 2021 08:13    x      |  x      |  x      ----------------------------<  x      x       |  x      |  0.84   05 Feb 2021 09:26    x      |  x      |  x      ----------------------------<  x      x       |  x      |  0.87   04 Feb 2021 11:28    x      |  x      |  x      ----------------------------<  x      x       |  x      |  0.95       TPro  6.4    /  Alb  2.7    /  TBili  0.4    /  DBili  .20    /  AST  14     /  ALT  14     /  AlkPhos  105    06 Feb 2021 08:13  TPro  6.5    /  Alb  2.7    /  TBili  0.2    /  DBili  <.10   /  AST  14     /  ALT  12     /  AlkPhos  101    05 Feb 2021 09:26  TPro  6.5    /  Alb  2.6    /  TBili  0.3    /  DBili  <.10   /  AST  14     /  ALT  15     /  AlkPhos  112    04 Feb 2021 11:28    Creatine Kinase, Serum: 556 U/L (01-28-21 @ 08:03)      12 Lead ECG:   Ventricular Rate 87 BPM  Atrial Rate 87 BPM  P-R Interval 148 ms  QRS Duration 98 ms  Q-T Interval 388 ms  QTC Calculation(Bazett) 466 ms  P Axis 53 degrees  R Axis -3 degrees  T Axis 10 degrees  Diagnosis Line Normal sinus rhythm  Poor R wave progression  Confirmed by CLARK MIRANDA ([id number]) on 1/28/2021 10:53:29 AM (01-27-21 @ 22:34)    < from: TTE Echo Complete w/o Contrast w/ Doppler (01.31.21 @ 17:17) >  Dimensions:  LA 3.0       Normal Values: 2.0 - 4.0 cm  Ao 3.3        Normal Values: 2.0 - 3.8 cm  SEPTUM 1.2       Normal Values: 0.6 - 1.2 cm  PWT 1.2       Normal Values: 0.6 - 1.1 cm  LVIDd 4.6         Normal Values: 3.0 - 5.6 cm  LVIDs 3.2         Normal Values: 1.8 - 4.0 cm    OBSERVATIONS:  Technically difficult study  Mitral Valve: Mitral annular calcification with thickened leaflets, trace physiologic MR.  Aortic Valve/Aorta: Aortic valve is not well-visualized. Appears calcified with a peak transaortic valve gradient is 18.5 mmHg with a mean transaortic valve gradient 10.9 mmHg.  Tricuspid Valve: normal with trace TR.  Pulmonic Valve: Not well-visualized  Left Atrium: normal  Right Atrium: Not well-visualized  Left Ventricle: normal LV size and systolic function, estimated LVEF of 55-60%.  Right Ventricle: Grossly normal size and systolic function.  Pericardium/Pleura: normal, no significant pericardial effusion.    Conclusion:  Technically difficult study  Normal left ventricular internal dimensions and systolic function, estimated LVEF of 55-60%.  Grossly normal RV size and systolic function.  The aortic valve is not well-visualized, appears calcified with likely mild aortic stenosis  Trace physiologic MR and TR.  No significant pericardial effusion.    < end of copied text >

## 2021-02-06 NOTE — PROGRESS NOTE ADULT - ASSESSMENT
70 y/o F with HLD, syncope, frequent falls, OA, s/p B/l knee replacement and right hip replacement, and back surgery presented to the ED after multiple falls and weakness, found to have COVID Pna and pancolitis.    Syncope, Falls  - Has known syncope and multiple falls.  Her B/L knee pain is likely contributory to her frequent falls.   - For her syncope, per Dr. Akers's note (9/2020), was thought to be from dehydration and CVA.  However, CVA w/u was negative  - Recently in [place] where she was discharged with a 2-week cardiac monitor but unaware of result.  She is unable to recall name of Cardiologist in [place] and is refusing to go back to him  - Outpatient CUS 2/2020) in our office which showed no hemodynamic significant stenosis  - TTE in 1/2020 showing normal LVF, EF 60% with bicuspid AV, mild AS.    - Repeat TTE inpatient showed normal LV & RV size and function EF 55-60%, trace MR and TR, mild AS  - No evidence of any arrhythmia while on tele  - She will need an ILR to be arranged as outpatient  - Fall precaution  - Negative orthostatics  - Continue to encourage PO fluid intake    Covid Pna  - Tolerating RA  - Supportive care  - Continue to encourage incentive spirometer  - Follow Pulm and ID recs    DVT ppx  - Per Primary  - Continue PT    - Monitor and replete lytes, keep K>4, Mg>2.  - All other medical needs as per primary team.  - Other cardiovascular workup will depend on clinical course.  - Will continue to follow.    Levi Silverman, MS FNP, AGAP  Nurse Practitioner- Cardiology   Spectra #[phone]

## 2021-02-06 NOTE — PROGRESS NOTE ADULT - PROBLEM SELECTOR PROBLEM 3
Hyperlipidemia
Colitis
Hyperlipidemia
Hyperlipidemia
Colitis
Hyperlipidemia
Hyperlipidemia
Colitis

## 2021-02-06 NOTE — PROGRESS NOTE ADULT - ASSESSMENT
contact #  daughter----Mortimer, Kelly  phone # [phone]    IMPRESSION:  69 y/o F PMHx lymphedema, HLD, RA, s/p cervical spine surgery at [facility] around 10/2020--- with prolonged hospital course complicated by C. diff infection (completed treatment course) and dysphagia (s/p PEG tube placement). Patient returned home on 11/24/20. Patient was admitted at [facility] from 11/28/2020---12/1/2020, was admitted with nausea/ vomiting/and abdominal pain, and was discharged 12/1/2020. She has been using a walker to ambulate, admitted with multiple falls and weakness, had ct scan this admission, hematology was consulted for anemia on 1/28 and was seen.  case d/w daughter, lives with  per , 2 daughters    RECOMMENDATION:  Anemia---likely multifactorial  hb is at 9.7 on 2/5--h/h low but stable, monitor serial h/h, cbc  iron profile--low iron/transferrin saturation, ferritin is an acute phase reactant, as looking for a rapid response, patient is s/p iv iron for 3 days, is on po fe  monitor h/h--transfuse prbc as needed for symptomatic anemia  nl ldh, low retic--no evidence of hemolysis  nl b12 level  smear was reviewed, unremarkable  monitor h/h--transfuse prbc as needed for symptomatic anemia or if hb is under 7  ?colitis on ct--gi is following  ct a/p--1/28/21--mild splenomegaly at 13.2 cm  anemia/iron def possible/colitis---gi work up as per gi/pt/family to exclude gi pathology/malignancy  covid positive, id is following,management/plan per id/critical care  gi/dvtp--lovenox s/c  d/w patient at bedside at length, prior d/w dtr

## 2021-02-06 NOTE — PROGRESS NOTE ADULT - ASSESSMENT
Pt. with Covid pneumonia. Clinically stable.   Oxygenating ok.   Would taper steroids.  Can dc pt if stable to rehab .  OOB

## 2021-02-06 NOTE — PROGRESS NOTE ADULT - SUBJECTIVE AND OBJECTIVE BOX
Patient is a 71y old  Female who presents with a chief complaint of sob (06 Feb 2021 08:32)       Pt is seen and examined  pt is awake and lying in bed/out of bed to chair  pt seems comfortable and denies any complaints at this time    HPI:  · HPI Objective Statement: 70yo female who presents with multiple falls and feeling weak today. pt c/o diffuse pain and feeling weak, no vomiting, no diarrhea, no fever, chills, no cough or sob, denies sick contacts at home  In ER patient was found to have COVID PNA with possible colitis.  patient is being admitted for further care and management (28 Jan 2021 10:42)         ROS:  Negative except for:    MEDICATIONS  (STANDING):  ascorbic acid 500 milliGRAM(s) Oral daily  budesonide 160 MICROgram(s)/formoterol 4.5 MICROgram(s) Inhaler 2 Puff(s) Inhalation two times a day  cholecalciferol 2000 Unit(s) Oral daily  cholestyramine Powder (Sugar-Free) 4 Gram(s) Oral every 24 hours  dexAMETHasone     Tablet 6 milliGRAM(s) Oral daily  enoxaparin Injectable 40 milliGRAM(s) SubCutaneous daily  famotidine    Tablet 20 milliGRAM(s) Oral two times a day  ferrous    sulfate 325 milliGRAM(s) Oral daily  influenza   Vaccine 0.5 milliLiter(s) IntraMuscular once  lactobacillus acidophilus 1 Tablet(s) Oral three times a day  lidocaine   Patch 1 Patch Transdermal every 24 hours  multivitamin/minerals 1 Tablet(s) Oral daily  potassium chloride    Tablet ER 10 milliEquivalent(s) Oral two times a day  pramipexole 1.5 milliGRAM(s) Oral every 24 hours  sulfaSALAzine 500 milliGRAM(s) Oral three times a day  torsemide 5 milliGRAM(s) Oral two times a day    MEDICATIONS  (PRN):  acetaminophen   Tablet .. 650 milliGRAM(s) Oral every 6 hours PRN Temp greater or equal to 38C (100.4F), Mild Pain (1 - 3)  ALBUTerol    90 MICROgram(s) HFA Inhaler 2 Puff(s) Inhalation every 4 hours PRN Shortness of Breath and/or Wheezing  aluminum hydroxide/magnesium hydroxide/simethicone Suspension 30 milliLiter(s) Oral every 4 hours PRN Dyspepsia  benzonatate 100 milliGRAM(s) Oral three times a day PRN Cough  cyclobenzaprine 5 milliGRAM(s) Oral three times a day PRN Muscle Spasm  melatonin 3 milliGRAM(s) Oral at bedtime PRN Insomnia  ondansetron Injectable 4 milliGRAM(s) IV Push every 6 hours PRN Nausea and/or Vomiting      Allergies    No Known Allergies    Intolerances        Vital Signs Last 24 Hrs  T(C): 36.6 (06 Feb 2021 04:54), Max: 36.6 (06 Feb 2021 04:54)  T(F): 97.8 (06 Feb 2021 04:54), Max: 97.8 (06 Feb 2021 04:54)  HR: 85 (06 Feb 2021 04:54) (79 - 85)  BP: 102/59 (06 Feb 2021 04:54) (102/59 - 105/59)  BP(mean): --  RR: 17 (06 Feb 2021 04:54) (17 - 18)  SpO2: 95% (06 Feb 2021 04:54) (95% - 95%)    PHYSICAL EXAM  General: adult in NAD  HEENT: clear oropharynx, anicteric sclera, pink conjunctiva  Neck: supple  CV: normal S1/S2 with no murmur rubs or gallops  Lungs: positive air movement b/l ant lungs,clear to auscultation, no wheezes, no rales  Abdomen: soft non-tender non-distended, no hepatosplenomegaly  Ext: no clubbing cyanosis or edema  Skin: no rashes and no petechiae  Neuro: alert and oriented X 4, no focal deficits  LABS:                          9.7    9.81  )-----------( 361      ( 05 Feb 2021 09:26 )             31.3         Mean Cell Volume : 86.2 fl  Mean Cell Hemoglobin : 26.7 pg  Mean Cell Hemoglobin Concentration : 31.0 gm/dL  Auto Neutrophil # : 7.73 K/uL  Auto Lymphocyte # : 1.37 K/uL  Auto Monocyte # : 0.50 K/uL  Auto Eosinophil # : 0.11 K/uL  Auto Basophil # : 0.01 K/uL  Auto Neutrophil % : 78.8 %  Auto Lymphocyte % : 14.0 %  Auto Monocyte % : 5.1 %  Auto Eosinophil % : 1.1 %  Auto Basophil % : 0.1 %    Serial CBC's  02-05 @ 09:26  Hct-31.3 / Hgb-9.7 / Plat-361 / RBC-3.63 / WBC-9.81          Serial CBC's  02-03 @ 11:04  Hct-32.5 / Hgb-10.4 / Plat-331 / RBC-3.84 / WBC-10.48            02-06    x   |  x   |  x   ----------------------------<  x   x    |  x   |  0.84      TPro  6.4  /  Alb  2.7<L>  /  TBili  0.4  /  DBili  .20  /  AST  14<L>  /  ALT  14  /  AlkPhos  105  02-06          Ferritin, Serum: 229 ng/mL (01-30-21 @ 15:07)  Iron - Total Binding Capacity.: 154 ug/dL (01-28-21 @ 22:55)  Ferritin, Serum: 168 ng/mL (01-28-21 @ 22:45)  Vitamin B12, Serum: 872 pg/mL (01-28-21 @ 22:45)  Folate, Serum: 8.7 ng/mL (01-28-21 @ 22:45)  Reticulocyte Percent: 0.4 % (01-28-21 @ 16:50)  Ferritin, Serum: 161 ng/mL (01-28-21 @ 11:50)  Iron - Total Binding Capacity.: 144 ug/dL (01-28-21 @ 11:47)  Ferritin, Serum: 173 ng/mL (01-28-21 @ 06:22)                BLOOD SMEAR INTERPRETATION:       RADIOLOGY & ADDITIONAL STUDIES:     Patient is a 71y old  Female who presents with a chief complaint of sob (06 Feb 2021 08:32)       Pt is seen and examined  pt is awake and sitting in chair  pt seems comfortable and denies any complaints at this time    HPI:  · HPI Objective Statement: 70yo female who presents with multiple falls and feeling weak today. pt c/o diffuse pain and feeling weak, no vomiting, no diarrhea, no fever, chills, no cough or sob, denies sick contacts at home  In ER patient was found to have COVID PNA with possible colitis.  patient is being admitted for further care and management (28 Jan 2021 10:42)         ROS:  as per hpi    MEDICATIONS  (STANDING):  ascorbic acid 500 milliGRAM(s) Oral daily  budesonide 160 MICROgram(s)/formoterol 4.5 MICROgram(s) Inhaler 2 Puff(s) Inhalation two times a day  cholecalciferol 2000 Unit(s) Oral daily  cholestyramine Powder (Sugar-Free) 4 Gram(s) Oral every 24 hours  dexAMETHasone     Tablet 6 milliGRAM(s) Oral daily  enoxaparin Injectable 40 milliGRAM(s) SubCutaneous daily  famotidine    Tablet 20 milliGRAM(s) Oral two times a day  ferrous    sulfate 325 milliGRAM(s) Oral daily  influenza   Vaccine 0.5 milliLiter(s) IntraMuscular once  lactobacillus acidophilus 1 Tablet(s) Oral three times a day  lidocaine   Patch 1 Patch Transdermal every 24 hours  multivitamin/minerals 1 Tablet(s) Oral daily  potassium chloride    Tablet ER 10 milliEquivalent(s) Oral two times a day  pramipexole 1.5 milliGRAM(s) Oral every 24 hours  sulfaSALAzine 500 milliGRAM(s) Oral three times a day  torsemide 5 milliGRAM(s) Oral two times a day    MEDICATIONS  (PRN):  acetaminophen   Tablet .. 650 milliGRAM(s) Oral every 6 hours PRN Temp greater or equal to 38C (100.4F), Mild Pain (1 - 3)  ALBUTerol    90 MICROgram(s) HFA Inhaler 2 Puff(s) Inhalation every 4 hours PRN Shortness of Breath and/or Wheezing  aluminum hydroxide/magnesium hydroxide/simethicone Suspension 30 milliLiter(s) Oral every 4 hours PRN Dyspepsia  benzonatate 100 milliGRAM(s) Oral three times a day PRN Cough  cyclobenzaprine 5 milliGRAM(s) Oral three times a day PRN Muscle Spasm  melatonin 3 milliGRAM(s) Oral at bedtime PRN Insomnia  ondansetron Injectable 4 milliGRAM(s) IV Push every 6 hours PRN Nausea and/or Vomiting      Allergies    No Known Allergies    Intolerances        Vital Signs Last 24 Hrs  T(C): 36.6 (06 Feb 2021 04:54), Max: 36.6 (06 Feb 2021 04:54)  T(F): 97.8 (06 Feb 2021 04:54), Max: 97.8 (06 Feb 2021 04:54)  HR: 85 (06 Feb 2021 04:54) (79 - 85)  BP: 102/59 (06 Feb 2021 04:54) (102/59 - 105/59)  BP(mean): --  RR: 17 (06 Feb 2021 04:54) (17 - 18)  SpO2: 95% (06 Feb 2021 04:54) (95% - 95%)    PHYSICAL EXAM  General: adult in NAD  HEENT: clear oropharynx, anicteric sclera, pink conjunctiva  Neck: supple  CV: normal S1/S2 with no murmur rubs or gallops  Lungs: positive air movement b/l ant lungs,clear to auscultation, no wheezes, no rales  Abdomen: soft non-tender non-distended, no hepatosplenomegaly  Ext: no clubbing cyanosis or edema  Skin: no rashes and no petechiae  Neuro: alert and oriented X 4, no focal deficits  LABS:                          9.7    9.81  )-----------( 361      ( 05 Feb 2021 09:26 )             31.3         Mean Cell Volume : 86.2 fl  Mean Cell Hemoglobin : 26.7 pg  Mean Cell Hemoglobin Concentration : 31.0 gm/dL  Auto Neutrophil # : 7.73 K/uL  Auto Lymphocyte # : 1.37 K/uL  Auto Monocyte # : 0.50 K/uL  Auto Eosinophil # : 0.11 K/uL  Auto Basophil # : 0.01 K/uL  Auto Neutrophil % : 78.8 %  Auto Lymphocyte % : 14.0 %  Auto Monocyte % : 5.1 %  Auto Eosinophil % : 1.1 %  Auto Basophil % : 0.1 %    Serial CBC's  02-05 @ 09:26  Hct-31.3 / Hgb-9.7 / Plat-361 / RBC-3.63 / WBC-9.81          Serial CBC's  02-03 @ 11:04  Hct-32.5 / Hgb-10.4 / Plat-331 / RBC-3.84 / WBC-10.48            02-06    x   |  x   |  x   ----------------------------<  x   x    |  x   |  0.84      TPro  6.4  /  Alb  2.7<L>  /  TBili  0.4  /  DBili  .20  /  AST  14<L>  /  ALT  14  /  AlkPhos  105  02-06          Ferritin, Serum: 229 ng/mL (01-30-21 @ 15:07)  Iron - Total Binding Capacity.: 154 ug/dL (01-28-21 @ 22:55)  Ferritin, Serum: 168 ng/mL (01-28-21 @ 22:45)  Vitamin B12, Serum: 872 pg/mL (01-28-21 @ 22:45)  Folate, Serum: 8.7 ng/mL (01-28-21 @ 22:45)  Reticulocyte Percent: 0.4 % (01-28-21 @ 16:50)  Ferritin, Serum: 161 ng/mL (01-28-21 @ 11:50)  Iron - Total Binding Capacity.: 144 ug/dL (01-28-21 @ 11:47)  Ferritin, Serum: 173 ng/mL (01-28-21 @ 06:22)

## 2021-02-06 NOTE — PROGRESS NOTE ADULT - SUBJECTIVE AND OBJECTIVE BOX
Patient is a 71y old  Female who presents with a chief complaint of sob     INTERVAL /OVERNIGHT EVENTS: feels unchanged denies any chest pain, no sob, no diarrhea    MEDICATIONS  (STANDING):  ascorbic acid 500 milliGRAM(s) Oral daily  budesonide 160 MICROgram(s)/formoterol 4.5 MICROgram(s) Inhaler 2 Puff(s) Inhalation two times a day  cholecalciferol 2000 Unit(s) Oral daily  cholestyramine Powder (Sugar-Free) 4 Gram(s) Oral every 24 hours  dexAMETHasone     Tablet 6 milliGRAM(s) Oral daily  enoxaparin Injectable 40 milliGRAM(s) SubCutaneous daily  famotidine    Tablet 20 milliGRAM(s) Oral two times a day  ferrous    sulfate 325 milliGRAM(s) Oral daily  influenza   Vaccine 0.5 milliLiter(s) IntraMuscular once  lactobacillus acidophilus 1 Tablet(s) Oral three times a day  lidocaine   Patch 1 Patch Transdermal every 24 hours  multivitamin/minerals 1 Tablet(s) Oral daily  potassium chloride    Tablet ER 10 milliEquivalent(s) Oral two times a day  pramipexole 1.5 milliGRAM(s) Oral every 24 hours  sulfaSALAzine 500 milliGRAM(s) Oral three times a day  torsemide 5 milliGRAM(s) Oral two times a day    MEDICATIONS  (PRN):  acetaminophen   Tablet .. 650 milliGRAM(s) Oral every 6 hours PRN Temp greater or equal to 38C (100.4F), Mild Pain (1 - 3)  ALBUTerol    90 MICROgram(s) HFA Inhaler 2 Puff(s) Inhalation every 4 hours PRN Shortness of Breath and/or Wheezing  aluminum hydroxide/magnesium hydroxide/simethicone Suspension 30 milliLiter(s) Oral every 4 hours PRN Dyspepsia  benzonatate 100 milliGRAM(s) Oral three times a day PRN Cough  cyclobenzaprine 5 milliGRAM(s) Oral three times a day PRN Muscle Spasm  melatonin 3 milliGRAM(s) Oral at bedtime PRN Insomnia  ondansetron Injectable 4 milliGRAM(s) IV Push every 6 hours PRN Nausea and/or Vomiting          Allergies    No Known Allergies    Intolerances        REVIEW OF SYSTEMS:  CONSTITUTIONAL: No fever, feels tired, in better mood today  EYES: No eye pain, visual disturbances, or discharge  ENMT:  No difficulty hearing,   RESPIRATORY: No cough, wheezing, chills or hemoptysis; No shortness of breath  CARDIOVASCULAR: No chest pain, palpitations, dizziness, (+) leg swelling  GASTROINTESTINAL: No abdominal or epigastric pain. No nausea, vomiting, or hematemesis  GENITOURINARY: No dysuria, frequency, hematuria  NEUROLOGICAL: No headaches  SKIN: No lesions   LYMPH NODES: No enlarged glands  MUSCULOSKELETAL: No joint pain  HEME/LYMPH: No easy bruising, or bleeding gums      Vital Signs Last 24 Hrs  T(C): 36.6 (06 Feb 2021 04:54), Max: 36.6 (06 Feb 2021 04:54)  T(F): 97.8 (06 Feb 2021 04:54), Max: 97.8 (06 Feb 2021 04:54)  HR: 85 (06 Feb 2021 04:54) (79 - 85)  BP: 102/59 (06 Feb 2021 04:54) (102/59 - 109/66)  BP(mean): --  RR: 17 (06 Feb 2021 04:54) (17 - 18)  SpO2: 95% (06 Feb 2021 04:54) (93% - 95%)      PHYSICAL EXAM:  GENERAL: NAD  HEAD:  Atraumatic, Normocephalic  EYES: EOMI, PERRLA, conjunctiva and sclera clear  ENMT:  Moist mucous membranes, Good dentition, No lesions  NECK: Supple,  Normal thyroid  NERVOUS SYSTEM:  Alert & Oriented X3, no focal signs  CHEST/LUNG: Clear to auscultation bilaterally; No rales, rhonchi, wheezing, or rubs  HEART: Regular rate and rhythm; No murmurs   ABDOMEN: Soft, Nontender, Bowel sounds present  EXTREMITIES: (+) peripheral edema  LYMPH: No lymphadenopathy noted  SKIN: B/l LE dry skin    LABS:                      9.7    9.81  )-----------( 361      ( 05 Feb 2021 09:26 )             31.3                                10.4   10.48 )-----------( 331      ( 03 Feb 2021 11:04 )             32.5     02-06    x   |  x   |  x   ----------------------------<  x   x    |  x   |  0.84      TPro  6.4  /  Alb  2.7<L>  /  TBili  0.4  /  DBili  .20  /  AST  14<L>  /  ALT  14  /  AlkPhos  105  02-06 02-05    x   |  x   |  x   ----------------------------<  x   x    |  x   |  0.87      TPro  6.5  /  Alb  2.7<L>  /  TBili  0.2  /  DBili  <.10  /  AST  14<L>  /  ALT  12  /  AlkPhos  101  02-05 02-04    x   |  x   |  x   ----------------------------<  x   x    |  x   |  0.95      TPro  6.5  /  Alb  2.6<L>  /  TBili  0.3  /  DBili  <.10  /  AST  14<L>  /  ALT  15  /  AlkPhos  112  02-04 03 Feb 2021 11:04    x      |  x      |  x      ----------------------------<  x      x       |  x      |  0.94     Ca    7.7        02 Feb 2021 12:02    TPro  7.0    /  Alb  2.7    /  TBili  0.3    /  DBili  .10    /  AST  20     /  ALT  18     /  AlkPhos  123    03 Feb 2021 11:04        CAPILLARY BLOOD GLUCOSE

## 2021-02-06 NOTE — PROGRESS NOTE ADULT - PROBLEM SELECTOR PROBLEM 2
Pneumonia
Hypokalemia
Pneumonia
Pneumonia
Hypokalemia
Pneumonia
Pneumonia
Hypokalemia

## 2021-02-07 LAB
ANION GAP SERPL CALC-SCNC: 13 MMOL/L — SIGNIFICANT CHANGE UP (ref 5–17)
BUN SERPL-MCNC: 11 MG/DL — SIGNIFICANT CHANGE UP (ref 7–23)
CALCIUM SERPL-MCNC: 8.4 MG/DL — LOW (ref 8.5–10.1)
CHLORIDE SERPL-SCNC: 104 MMOL/L — SIGNIFICANT CHANGE UP (ref 96–108)
CO2 SERPL-SCNC: 23 MMOL/L — SIGNIFICANT CHANGE UP (ref 22–31)
CREAT SERPL-MCNC: 0.99 MG/DL — SIGNIFICANT CHANGE UP (ref 0.5–1.3)
GLUCOSE SERPL-MCNC: 199 MG/DL — HIGH (ref 70–99)
HCT VFR BLD CALC: 30.7 % — LOW (ref 34.5–45)
HGB BLD-MCNC: 9.7 G/DL — LOW (ref 11.5–15.5)
MCHC RBC-ENTMCNC: 27 PG — SIGNIFICANT CHANGE UP (ref 27–34)
MCHC RBC-ENTMCNC: 31.6 GM/DL — LOW (ref 32–36)
MCV RBC AUTO: 85.5 FL — SIGNIFICANT CHANGE UP (ref 80–100)
NRBC # BLD: 0 /100 WBCS — SIGNIFICANT CHANGE UP (ref 0–0)
PLATELET # BLD AUTO: 361 K/UL — SIGNIFICANT CHANGE UP (ref 150–400)
POTASSIUM SERPL-MCNC: 3.8 MMOL/L — SIGNIFICANT CHANGE UP (ref 3.5–5.3)
POTASSIUM SERPL-SCNC: 3.8 MMOL/L — SIGNIFICANT CHANGE UP (ref 3.5–5.3)
RBC # BLD: 3.59 M/UL — LOW (ref 3.8–5.2)
RBC # FLD: 18.8 % — HIGH (ref 10.3–14.5)
SODIUM SERPL-SCNC: 140 MMOL/L — SIGNIFICANT CHANGE UP (ref 135–145)
WBC # BLD: 11.58 K/UL — HIGH (ref 3.8–10.5)
WBC # FLD AUTO: 11.58 K/UL — HIGH (ref 3.8–10.5)

## 2021-02-07 PROCEDURE — 99233 SBSQ HOSP IP/OBS HIGH 50: CPT | Mod: CS

## 2021-02-07 PROCEDURE — 99232 SBSQ HOSP IP/OBS MODERATE 35: CPT

## 2021-02-07 RX ADMIN — FAMOTIDINE 20 MILLIGRAM(S): 10 INJECTION INTRAVENOUS at 06:26

## 2021-02-07 RX ADMIN — ENOXAPARIN SODIUM 40 MILLIGRAM(S): 100 INJECTION SUBCUTANEOUS at 12:14

## 2021-02-07 RX ADMIN — Medication 5 MILLIGRAM(S): at 06:25

## 2021-02-07 RX ADMIN — Medication 10 MILLIEQUIVALENT(S): at 17:34

## 2021-02-07 RX ADMIN — Medication 1 TABLET(S): at 12:13

## 2021-02-07 RX ADMIN — Medication 325 MILLIGRAM(S): at 12:14

## 2021-02-07 RX ADMIN — LIDOCAINE 1 PATCH: 4 CREAM TOPICAL at 05:10

## 2021-02-07 RX ADMIN — FAMOTIDINE 20 MILLIGRAM(S): 10 INJECTION INTRAVENOUS at 17:34

## 2021-02-07 RX ADMIN — PRAMIPEXOLE DIHYDROCHLORIDE 1.5 MILLIGRAM(S): 0.12 TABLET ORAL at 17:34

## 2021-02-07 RX ADMIN — Medication 500 MILLIGRAM(S): at 06:26

## 2021-02-07 RX ADMIN — Medication 500 MILLIGRAM(S): at 12:13

## 2021-02-07 RX ADMIN — BUDESONIDE AND FORMOTEROL FUMARATE DIHYDRATE 2 PUFF(S): 160; 4.5 AEROSOL RESPIRATORY (INHALATION) at 17:37

## 2021-02-07 RX ADMIN — CYCLOBENZAPRINE HYDROCHLORIDE 5 MILLIGRAM(S): 10 TABLET, FILM COATED ORAL at 12:15

## 2021-02-07 RX ADMIN — Medication 5 MILLIGRAM(S): at 17:35

## 2021-02-07 RX ADMIN — Medication 500 MILLIGRAM(S): at 21:53

## 2021-02-07 RX ADMIN — Medication 10 MILLIEQUIVALENT(S): at 06:26

## 2021-02-07 RX ADMIN — Medication 2000 UNIT(S): at 12:14

## 2021-02-07 RX ADMIN — Medication 1 TABLET(S): at 06:26

## 2021-02-07 RX ADMIN — LIDOCAINE 1 PATCH: 4 CREAM TOPICAL at 17:33

## 2021-02-07 RX ADMIN — Medication 6 MILLIGRAM(S): at 06:26

## 2021-02-07 RX ADMIN — CHOLESTYRAMINE 4 GRAM(S): 4 POWDER, FOR SUSPENSION ORAL at 16:01

## 2021-02-07 RX ADMIN — Medication 1 TABLET(S): at 21:53

## 2021-02-07 RX ADMIN — BUDESONIDE AND FORMOTEROL FUMARATE DIHYDRATE 2 PUFF(S): 160; 4.5 AEROSOL RESPIRATORY (INHALATION) at 06:25

## 2021-02-07 RX ADMIN — LIDOCAINE 1 PATCH: 4 CREAM TOPICAL at 19:08

## 2021-02-07 NOTE — PROGRESS NOTE ADULT - ASSESSMENT
1- COVID-19.  Plan:   completed remdesvir X 5 days  continue steroids to complete X 10 days , will titrate down  continue supportive treatment with o2  ID following  Pulmonary following, ok to discharge once negative  CXR 2/5/21: slight interval radiographic improvement in the left basilar groundglass infiltrate   COVID (-) x2, 02/6/21 02/07/21        2 -Falls associated with syncopal events  Multifactorial: questionable orthostatic changes patient deconditioned, poor oral intake, poor hydration, some episodes have been while standing and patient has h/o lymphedema and b/l knee replacement. The episodes have been unwitnessed.  Patient denies any fecal or urinary incontinence.    Patient has had extensive work/up in another facilities: Echo 12/31/20 normal and repeat 1/31/21 normal as well, duplex LE 12/31/20 normal, carotid US1/3/21: normal, CT head 12/30/20 negative.  A cardiac monitor was done as OP for 2 weeks as per daughter but we don't have the results  while on telemetry no evidence of arrhythmias  negative orthostatics  EEG: negative for seizures  cardiology following  neurology following  Further workup recommended as OP per cardiology: ILR, stress test    as per daughter patient had an appt for sleep study that had to reschedule because patient was admitted, advice to update appt  Patient afraid of MR due to painful neck, open MRI of head w/ w/o could be an option for her, will try today  or tomorrow morning MRI with prior pain medication and ativan  TILT test as OP as well  fall precautions in place  PT for ambulation,  needs SUSANNA.  No further episodes while admitted       3-Hypokalemia.    -supplement potassium provided  - resolved.   -continue monitoring     4- Colitis.    GI Dr. BRADLEY following  occult blood neg  less likely bacterial, no further diarrhea  Ct abdomen 1/28/21 circumferential colonic wall thickening/submucosal edema with mild fat stranding, mild vascular engorgement  consistent with pancolitis.   to continue diet as tolerated  continue with pepcid  h/o cdiff.       5- Diarrhea.  h/o Cdiff   on questran  probiotics  improving  GI following    6- RLS  -continue with Mirapex    7-Anemia  hgb stable  OB was negative  iron panel abnormal, B12 wnl, LDH wnl, low ret count, periph smear unremarkable   received 3 doses of venofer  continue monitoring h/h  Heme-onc following  F/up as OP with GI for further workup, possible colonoscopy    8- Lymphedema  ace wrap b/l ordered     lovenox for DVT prophylaxis.    Spoke with daughter Edelmira  @ [phone] and gave her an update about her mother's condition and latest results

## 2021-02-07 NOTE — PROGRESS NOTE ADULT - SUBJECTIVE AND OBJECTIVE BOX
Patient is a 71y old  Female who presents with a chief complaint of sob     INTERVAL /OVERNIGHT EVENTS: feels unchanged denies any chest pain, no sob, no diarrhea. ready to be d/c to rehab after MRI    MEDICATIONS  (STANDING):  ascorbic acid 500 milliGRAM(s) Oral daily  budesonide 160 MICROgram(s)/formoterol 4.5 MICROgram(s) Inhaler 2 Puff(s) Inhalation two times a day  cholecalciferol 2000 Unit(s) Oral daily  cholestyramine Powder (Sugar-Free) 4 Gram(s) Oral every 24 hours  dexAMETHasone     Tablet 6 milliGRAM(s) Oral daily  enoxaparin Injectable 40 milliGRAM(s) SubCutaneous daily  famotidine    Tablet 20 milliGRAM(s) Oral two times a day  ferrous    sulfate 325 milliGRAM(s) Oral daily  influenza   Vaccine 0.5 milliLiter(s) IntraMuscular once  lactobacillus acidophilus 1 Tablet(s) Oral three times a day  lidocaine   Patch 1 Patch Transdermal every 24 hours  multivitamin/minerals 1 Tablet(s) Oral daily  potassium chloride    Tablet ER 10 milliEquivalent(s) Oral two times a day  pramipexole 1.5 milliGRAM(s) Oral every 24 hours  sulfaSALAzine 500 milliGRAM(s) Oral three times a day  torsemide 5 milliGRAM(s) Oral two times a day    MEDICATIONS  (PRN):  acetaminophen   Tablet .. 650 milliGRAM(s) Oral every 6 hours PRN Temp greater or equal to 38C (100.4F), Mild Pain (1 - 3)  ALBUTerol    90 MICROgram(s) HFA Inhaler 2 Puff(s) Inhalation every 4 hours PRN Shortness of Breath and/or Wheezing  aluminum hydroxide/magnesium hydroxide/simethicone Suspension 30 milliLiter(s) Oral every 4 hours PRN Dyspepsia  benzonatate 100 milliGRAM(s) Oral three times a day PRN Cough  cyclobenzaprine 5 milliGRAM(s) Oral three times a day PRN Muscle Spasm  melatonin 3 milliGRAM(s) Oral at bedtime PRN Insomnia  ondansetron Injectable 4 milliGRAM(s) IV Push every 6 hours PRN Nausea and/or Vomiting      Allergies    No Known Allergies    Intolerances        REVIEW OF SYSTEMS:  CONSTITUTIONAL: No fever, feels tired, in better mood today  EYES: No eye pain, visual disturbances, or discharge  ENMT:  No difficulty hearing,   RESPIRATORY: No cough, wheezing, chills or hemoptysis; No shortness of breath  CARDIOVASCULAR: No chest pain, palpitations, dizziness, (+) leg swelling  GASTROINTESTINAL: No abdominal or epigastric pain. No nausea, vomiting, or hematemesis  GENITOURINARY: No dysuria, frequency, hematuria  NEUROLOGICAL: No headaches  SKIN: No lesions   LYMPH NODES: No enlarged glands  MUSCULOSKELETAL: No joint pain  HEME/LYMPH: No easy bruising, or bleeding gums      Vital Signs Last 24 Hrs  T(C): 36.7 (07 Feb 2021 12:50), Max: 36.7 (07 Feb 2021 12:50)  T(F): 98 (07 Feb 2021 12:50), Max: 98 (07 Feb 2021 12:50)  HR: 79 (07 Feb 2021 12:50) (74 - 82)  BP: 105/59 (07 Feb 2021 12:50) (105/59 - 128/71)  BP(mean): --  RR: 19 (07 Feb 2021 12:50) (17 - 19)  SpO2: 94% (07 Feb 2021 12:50) (91% - 95%)    PHYSICAL EXAM:  GENERAL: NAD  HEAD:  Atraumatic, Normocephalic  EYES: EOMI, PERRLA, conjunctiva and sclera clear  ENMT:  Moist mucous membranes, Good dentition, No lesions  NECK: Supple,  Normal thyroid  NERVOUS SYSTEM:  Alert & Oriented X3, no focal signs  CHEST/LUNG: Clear to auscultation bilaterally; No rales, rhonchi, wheezing, or rubs  HEART: Regular rate and rhythm; No murmurs   ABDOMEN: Soft, Nontender, Bowel sounds present  EXTREMITIES: (+) peripheral edema  LYMPH: No lymphadenopathy noted  SKIN: B/l LE dry skin     LABS:                          9.7    11.58 )-----------( 361      ( 07 Feb 2021 09:53 )             30.7                     9.7    9.81  )-----------( 361      ( 05 Feb 2021 09:26 )             31.3                                10.4   10.48 )-----------( 331      ( 03 Feb 2021 11:04 )             32.5     02-07    140  |  104  |  11  ----------------------------<  199<H>  3.8   |  23  |  0.99    Ca    8.4<L>      07 Feb 2021 09:53    TPro  6.4  /  Alb  2.7<L>  /  TBili  0.4  /  DBili  .20  /  AST  14<L>  /  ALT  14  /  AlkPhos  105  02-06 02-06    x   |  x   |  x   ----------------------------<  x   x    |  x   |  0.84      TPro  6.4  /  Alb  2.7<L>  /  TBili  0.4  /  DBili  .20  /  AST  14<L>  /  ALT  14  /  AlkPhos  105  02-06 02-05    x   |  x   |  x   ----------------------------<  x   x    |  x   |  0.87      TPro  6.5  /  Alb  2.7<L>  /  TBili  0.2  /  DBili  <.10  /  AST  14<L>  /  ALT  12  /  AlkPhos  101  02-05    02-04    x   |  x   |  x   ----------------------------<  x   x    |  x   |  0.95      TPro  6.5  /  Alb  2.6<L>  /  TBili  0.3  /  DBili  <.10  /  AST  14<L>  /  ALT  15  /  AlkPhos  112  02-04 03 Feb 2021 11:04    x      |  x      |  x      ----------------------------<  x      x       |  x      |  0.94     Ca    7.7        02 Feb 2021 12:02    TPro  7.0    /  Alb  2.7    /  TBili  0.3    /  DBili  .10    /  AST  20     /  ALT  18     /  AlkPhos  123    03 Feb 2021 11:04        CAPILLARY BLOOD GLUCOSE

## 2021-02-07 NOTE — PROGRESS NOTE ADULT - ASSESSMENT
contact #  daughter----Mortimer, Kelly  phone # [phone]    IMPRESSION:  71 y/o F PMHx lymphedema, HLD, RA, s/p cervical spine surgery at [facility] around 10/2020--- with prolonged hospital course complicated by C. diff infection (completed treatment course) and dysphagia (s/p PEG tube placement). Patient returned home on 11/24/20. Patient was admitted at [facility] from 11/28/2020---12/1/2020, was admitted with nausea/ vomiting/and abdominal pain, and was discharged 12/1/2020. She has been using a walker to ambulate, admitted with multiple falls and weakness, had ct scan this admission, hematology was consulted for anemia on 1/28 and was seen.  case d/w daughter, lives with  per , 2 daughters    RECOMMENDATION:  Anemia---likely multifactorial  hb is at 9.7 today, hb was at 9.7 yesterday--h/h low but stable, monitor serial h/h, cbc  iron profile--low iron/transferrin saturation, ferritin is an acute phase reactant, as looking for a rapid response, patient is s/p iv iron for 3 days, is on po fe daily  monitor h/h--transfuse prbc as needed for symptomatic anemia  nl ldh, low retic--no evidence of hemolysis  nl b12 level  smear was reviewed, unremarkable  monitor h/h--transfuse prbc as needed for symptomatic anemia or if hb is under 7  ?colitis on ct--gi is following  ct a/p--1/28/21--mild splenomegaly at 13.2 cm  anemia/iron def possible/colitis---gi is following and gi work up as per gi/pt/family to exclude gi pathology/malignancy  covid positive, id is following,management/plan per id/critical care  gi/dvtp--lovenox s/c  d/w patient at bedside at length, prior d/w dtr

## 2021-02-07 NOTE — PROGRESS NOTE ADULT - SUBJECTIVE AND OBJECTIVE BOX
[facility] -- Quang Akers Grossman, Wachsman, Kiran Miranda Savella, Goodger: Office # [phone]    Follow Up:  Syncope, Falls     Subjective/Observations:  Patient seen and examined. Patient awake, alert, OOB to chair. No complaints of chest pain, dyspnea, palpitations or dizziness. No signs of orthopnea or PND. Tolerating room air.     REVIEW OF SYSTEMS: All review of systems is negative for eye, ENT, GI, , allergic, dermatologic, musculoskeletal and neurologic except as described above    PAST MEDICAL & SURGICAL HISTORY:  Obesity (BMI 30-39.9)    Traumatic arthropathy involving lower leg  right    History of Osteoarthritis    Hyperlipidemia    Restless Leg Syndrome    H/O neck surgery    H/O arthroscopy of right knee  2010    Bladder Disorder  Bladder lift 2000    History of Colonoscopy    Ex lap in 2009 for abscess in the baldder    History of Hysterectomy and bladder lift in 2000    MEDICATIONS  (STANDING):  ascorbic acid 500 milliGRAM(s) Oral daily  budesonide 160 MICROgram(s)/formoterol 4.5 MICROgram(s) Inhaler 2 Puff(s) Inhalation two times a day  cholecalciferol 2000 Unit(s) Oral daily  cholestyramine Powder (Sugar-Free) 4 Gram(s) Oral every 24 hours  dexAMETHasone     Tablet 6 milliGRAM(s) Oral daily  enoxaparin Injectable 40 milliGRAM(s) SubCutaneous daily  famotidine    Tablet 20 milliGRAM(s) Oral two times a day  ferrous    sulfate 325 milliGRAM(s) Oral daily  influenza   Vaccine 0.5 milliLiter(s) IntraMuscular once  lactobacillus acidophilus 1 Tablet(s) Oral three times a day  lidocaine   Patch 1 Patch Transdermal every 24 hours  multivitamin/minerals 1 Tablet(s) Oral daily  potassium chloride    Tablet ER 10 milliEquivalent(s) Oral two times a day  pramipexole 1.5 milliGRAM(s) Oral every 24 hours  sulfaSALAzine 500 milliGRAM(s) Oral three times a day  torsemide 5 milliGRAM(s) Oral two times a day    MEDICATIONS  (PRN):  acetaminophen   Tablet .. 650 milliGRAM(s) Oral every 6 hours PRN Temp greater or equal to 38C (100.4F), Mild Pain (1 - 3)  ALBUTerol    90 MICROgram(s) HFA Inhaler 2 Puff(s) Inhalation every 4 hours PRN Shortness of Breath and/or Wheezing  aluminum hydroxide/magnesium hydroxide/simethicone Suspension 30 milliLiter(s) Oral every 4 hours PRN Dyspepsia  benzonatate 100 milliGRAM(s) Oral three times a day PRN Cough  cyclobenzaprine 5 milliGRAM(s) Oral three times a day PRN Muscle Spasm  melatonin 3 milliGRAM(s) Oral at bedtime PRN Insomnia  ondansetron Injectable 4 milliGRAM(s) IV Push every 6 hours PRN Nausea and/or Vomiting    Allergies  No Known Allergies    Vital Signs Last 24 Hrs  T(C): 36.3 (07 Feb 2021 05:25), Max: 36.6 (06 Feb 2021 20:28)  T(F): 97.4 (07 Feb 2021 05:25), Max: 97.9 (06 Feb 2021 20:28)  HR: 77 (07 Feb 2021 05:25) (74 - 82)  BP: 120/69 (07 Feb 2021 05:25) (114/65 - 128/71)  BP(mean): --  RR: 18 (07 Feb 2021 05:25) (17 - 18)  SpO2: 93% (07 Feb 2021 05:25) (91% - 95%)  I&O's Summary    06 Feb 2021 07:01  -  07 Feb 2021 07:00  --------------------------------------------------------  IN: 0 mL / OUT: 700 mL / NET: -700 mL      TELE: Not on telemetry   PHYSICAL EXAM:  Appearance: NAD, no distress, alert, Well developed   HEENT: Moist Mucous Membranes, Anicteric  Cardiovascular: Regular rate and rhythm, Normal S1 S2, No JVD, No murmurs, No rubs, gallops or clicks  Respiratory: Non-labored, Clear to auscultation, No rales, No rhonchi, No wheezing.   Gastrointestinal:  Soft, Non-tender, + BS  Neurologic: Non-focal  Skin: Warm and dry, No visible rashes or ulcers, No ecchymosis, No cyanosis  Musculoskeletal: No clubbing, No cyanosis, No joint swelling/tenderness  Psychiatry: Mood & affect appropriate  Lymph: +1 peripheral edema with chronic venous stasis skin changes.     LABS: All Labs Reviewed:                        9.7    11.58 )-----------( 361      ( 07 Feb 2021 09:53 )             30.7                         9.7    9.81  )-----------( 361      ( 05 Feb 2021 09:26 )             31.3     07 Feb 2021 09:53    140    |  104    |  11     ----------------------------<  199    3.8     |  23     |  0.99   06 Feb 2021 08:13    x      |  x      |  x      ----------------------------<  x      x       |  x      |  0.84   05 Feb 2021 09:26    x      |  x      |  x      ----------------------------<  x      x       |  x      |  0.87     Ca    8.4        07 Feb 2021 09:53    TPro  6.4    /  Alb  2.7    /  TBili  0.4    /  DBili  .20    /  AST  14     /  ALT  14     /  AlkPhos  105    06 Feb 2021 08:13  TPro  6.5    /  Alb  2.7    /  TBili  0.2    /  DBili  <.10   /  AST  14     /  ALT  12     /  AlkPhos  101    05 Feb 2021 09:26      12 Lead ECG:   Ventricular Rate 87 BPM  Atrial Rate 87 BPM  P-R Interval 148 ms  QRS Duration 98 ms  Q-T Interval 388 ms  QTC Calculation(Bazett) 466 ms  P Axis 53 degrees  R Axis -3 degrees  T Axis 10 degrees  Diagnosis Line Normal sinus rhythm  Poor R wave progression  Confirmed by CLARK MIRANDA ([id number]) on 1/28/2021 10:53:29 AM (01-27-21 @ 22:34)    < from: TTE Echo Complete w/o Contrast w/ Doppler (01.31.21 @ 17:17) >  Dimensions:  LA 3.0       Normal Values: 2.0 - 4.0 cm  Ao 3.3        Normal Values: 2.0 - 3.8 cm  SEPTUM 1.2       Normal Values: 0.6 - 1.2 cm  PWT 1.2       Normal Values: 0.6 - 1.1 cm  LVIDd 4.6         Normal Values: 3.0 - 5.6 cm  LVIDs 3.2         Normal Values: 1.8 - 4.0 cm    OBSERVATIONS:  Technically difficult study  Mitral Valve: Mitral annular calcification with thickened leaflets, trace physiologic MR.  Aortic Valve/Aorta: Aortic valve is not well-visualized. Appears calcified with a peak transaortic valve gradient is 18.5 mmHg with a mean transaortic valve gradient 10.9 mmHg.  Tricuspid Valve: normal with trace TR.  Pulmonic Valve: Not well-visualized  Left Atrium: normal  Right Atrium: Not well-visualized  Left Ventricle: normal LV size and systolic function, estimated LVEF of 55-60%.  Right Ventricle: Grossly normal size and systolic function.  Pericardium/Pleura: normal, no significant pericardial effusion.    Conclusion:  Technically difficult study  Normal left ventricular internal dimensions and systolic function, estimated LVEF of 55-60%.  Grossly normal RV size and systolic function.  The aortic valve is not well-visualized, appears calcified with likely mild aortic stenosis  Trace physiologic MR and TR.  No significant pericardial effusion.    < end of copied text >

## 2021-02-07 NOTE — PROGRESS NOTE ADULT - SUBJECTIVE AND OBJECTIVE BOX
Patient is a 71y old  Female who presents with a chief complaint of sob (06 Feb 2021 13:42)       Pt is seen and examined  pt is awake and lying in bed/out of bed to chair  pt seems comfortable and denies any complaints at this time    HPI:  · HPI Objective Statement: 72yo female who presents with multiple falls and feeling weak today. pt c/o diffuse pain and feeling weak, no vomiting, no diarrhea, no fever, chills, no cough or sob, denies sick contacts at home  In ER patient was found to have COVID PNA with possible colitis.  patient is being admitted for further care and management (28 Jan 2021 10:42)         ROS:  Negative except for:    MEDICATIONS  (STANDING):  ascorbic acid 500 milliGRAM(s) Oral daily  budesonide 160 MICROgram(s)/formoterol 4.5 MICROgram(s) Inhaler 2 Puff(s) Inhalation two times a day  cholecalciferol 2000 Unit(s) Oral daily  cholestyramine Powder (Sugar-Free) 4 Gram(s) Oral every 24 hours  dexAMETHasone     Tablet 6 milliGRAM(s) Oral daily  enoxaparin Injectable 40 milliGRAM(s) SubCutaneous daily  famotidine    Tablet 20 milliGRAM(s) Oral two times a day  ferrous    sulfate 325 milliGRAM(s) Oral daily  influenza   Vaccine 0.5 milliLiter(s) IntraMuscular once  lactobacillus acidophilus 1 Tablet(s) Oral three times a day  lidocaine   Patch 1 Patch Transdermal every 24 hours  multivitamin/minerals 1 Tablet(s) Oral daily  potassium chloride    Tablet ER 10 milliEquivalent(s) Oral two times a day  pramipexole 1.5 milliGRAM(s) Oral every 24 hours  sulfaSALAzine 500 milliGRAM(s) Oral three times a day  torsemide 5 milliGRAM(s) Oral two times a day    MEDICATIONS  (PRN):  acetaminophen   Tablet .. 650 milliGRAM(s) Oral every 6 hours PRN Temp greater or equal to 38C (100.4F), Mild Pain (1 - 3)  ALBUTerol    90 MICROgram(s) HFA Inhaler 2 Puff(s) Inhalation every 4 hours PRN Shortness of Breath and/or Wheezing  aluminum hydroxide/magnesium hydroxide/simethicone Suspension 30 milliLiter(s) Oral every 4 hours PRN Dyspepsia  benzonatate 100 milliGRAM(s) Oral three times a day PRN Cough  cyclobenzaprine 5 milliGRAM(s) Oral three times a day PRN Muscle Spasm  melatonin 3 milliGRAM(s) Oral at bedtime PRN Insomnia  ondansetron Injectable 4 milliGRAM(s) IV Push every 6 hours PRN Nausea and/or Vomiting      Allergies    No Known Allergies    Intolerances        Vital Signs Last 24 Hrs  T(C): 36.3 (07 Feb 2021 05:25), Max: 36.6 (06 Feb 2021 20:28)  T(F): 97.4 (07 Feb 2021 05:25), Max: 97.9 (06 Feb 2021 20:28)  HR: 77 (07 Feb 2021 05:25) (74 - 82)  BP: 120/69 (07 Feb 2021 05:25) (114/65 - 128/71)  BP(mean): --  RR: 18 (07 Feb 2021 05:25) (17 - 18)  SpO2: 93% (07 Feb 2021 05:25) (91% - 95%)    PHYSICAL EXAM  General: adult in NAD  HEENT: clear oropharynx, anicteric sclera, pink conjunctiva  Neck: supple  CV: normal S1/S2 with no murmur rubs or gallops  Lungs: positive air movement b/l ant lungs,clear to auscultation, no wheezes, no rales  Abdomen: soft non-tender non-distended, no hepatosplenomegaly  Ext: no clubbing cyanosis or edema  Skin: no rashes and no petechiae  Neuro: alert and oriented X 4, no focal deficits  LABS:                          9.7    11.58 )-----------( 361      ( 07 Feb 2021 09:53 )             30.7         Mean Cell Volume : 85.5 fl  Mean Cell Hemoglobin : 27.0 pg  Mean Cell Hemoglobin Concentration : 31.6 gm/dL  Auto Neutrophil # : x  Auto Lymphocyte # : x  Auto Monocyte # : x  Auto Eosinophil # : x  Auto Basophil # : x  Auto Neutrophil % : x  Auto Lymphocyte % : x  Auto Monocyte % : x  Auto Eosinophil % : x  Auto Basophil % : x    Serial CBC's  02-07 @ 09:53  Hct-30.7 / Hgb-9.7 / Plat-361 / RBC-3.59 / WBC-11.58          Serial CBC's  02-05 @ 09:26  Hct-31.3 / Hgb-9.7 / Plat-361 / RBC-3.63 / WBC-9.81            02-07    140  |  104  |  11  ----------------------------<  199<H>  3.8   |  23  |  0.99    Ca    8.4<L>      07 Feb 2021 09:53    TPro  6.4  /  Alb  2.7<L>  /  TBili  0.4  /  DBili  .20  /  AST  14<L>  /  ALT  14  /  AlkPhos  105  02-06          Ferritin, Serum: 229 ng/mL (01-30-21 @ 15:07)  Iron - Total Binding Capacity.: 154 ug/dL (01-28-21 @ 22:55)  Ferritin, Serum: 168 ng/mL (01-28-21 @ 22:45)  Vitamin B12, Serum: 872 pg/mL (01-28-21 @ 22:45)  Folate, Serum: 8.7 ng/mL (01-28-21 @ 22:45)  Reticulocyte Percent: 0.4 % (01-28-21 @ 16:50)  Ferritin, Serum: 161 ng/mL (01-28-21 @ 11:50)  Iron - Total Binding Capacity.: 144 ug/dL (01-28-21 @ 11:47)                BLOOD SMEAR INTERPRETATION:       RADIOLOGY & ADDITIONAL STUDIES:     Patient is a 71y old  Female who presents with a chief complaint of sob (06 Feb 2021 13:42)       Pt is seen and examined  pt is awake and is sitting in chair--out of bed to chair  pt seems comfortable and denies any complaints at this time    HPI:  · HPI Objective Statement: 70yo female who presents with multiple falls and feeling weak today. pt c/o diffuse pain and feeling weak, no vomiting, no diarrhea, no fever, chills, no cough or sob, denies sick contacts at home  In ER patient was found to have COVID PNA with possible colitis.  patient is being admitted for further care and management (28 Jan 2021 10:42)         ROS:  as per hpi    MEDICATIONS  (STANDING):  ascorbic acid 500 milliGRAM(s) Oral daily  budesonide 160 MICROgram(s)/formoterol 4.5 MICROgram(s) Inhaler 2 Puff(s) Inhalation two times a day  cholecalciferol 2000 Unit(s) Oral daily  cholestyramine Powder (Sugar-Free) 4 Gram(s) Oral every 24 hours  dexAMETHasone     Tablet 6 milliGRAM(s) Oral daily  enoxaparin Injectable 40 milliGRAM(s) SubCutaneous daily  famotidine    Tablet 20 milliGRAM(s) Oral two times a day  ferrous    sulfate 325 milliGRAM(s) Oral daily  influenza   Vaccine 0.5 milliLiter(s) IntraMuscular once  lactobacillus acidophilus 1 Tablet(s) Oral three times a day  lidocaine   Patch 1 Patch Transdermal every 24 hours  multivitamin/minerals 1 Tablet(s) Oral daily  potassium chloride    Tablet ER 10 milliEquivalent(s) Oral two times a day  pramipexole 1.5 milliGRAM(s) Oral every 24 hours  sulfaSALAzine 500 milliGRAM(s) Oral three times a day  torsemide 5 milliGRAM(s) Oral two times a day    MEDICATIONS  (PRN):  acetaminophen   Tablet .. 650 milliGRAM(s) Oral every 6 hours PRN Temp greater or equal to 38C (100.4F), Mild Pain (1 - 3)  ALBUTerol    90 MICROgram(s) HFA Inhaler 2 Puff(s) Inhalation every 4 hours PRN Shortness of Breath and/or Wheezing  aluminum hydroxide/magnesium hydroxide/simethicone Suspension 30 milliLiter(s) Oral every 4 hours PRN Dyspepsia  benzonatate 100 milliGRAM(s) Oral three times a day PRN Cough  cyclobenzaprine 5 milliGRAM(s) Oral three times a day PRN Muscle Spasm  melatonin 3 milliGRAM(s) Oral at bedtime PRN Insomnia  ondansetron Injectable 4 milliGRAM(s) IV Push every 6 hours PRN Nausea and/or Vomiting      Allergies    No Known Allergies    Intolerances        Vital Signs Last 24 Hrs  T(C): 36.3 (07 Feb 2021 05:25), Max: 36.6 (06 Feb 2021 20:28)  T(F): 97.4 (07 Feb 2021 05:25), Max: 97.9 (06 Feb 2021 20:28)  HR: 77 (07 Feb 2021 05:25) (74 - 82)  BP: 120/69 (07 Feb 2021 05:25) (114/65 - 128/71)  BP(mean): --  RR: 18 (07 Feb 2021 05:25) (17 - 18)  SpO2: 93% (07 Feb 2021 05:25) (91% - 95%)    PHYSICAL EXAM  General: adult in NAD  HEENT: clear oropharynx, anicteric sclera, pink conjunctiva  Neck: supple  CV: normal S1/S2 with no murmur rubs or gallops  Lungs: positive air movement b/l ant lungs,clear to auscultation, no wheezes, no rales  Abdomen: soft non-tender non-distended, no hepatosplenomegaly  Ext: no clubbing cyanosis or edema  Skin: no rashes and no petechiae  Neuro: alert and oriented X 4, no focal deficits  LABS:                          9.7    11.58 )-----------( 361      ( 07 Feb 2021 09:53 )             30.7         Mean Cell Volume : 85.5 fl  Mean Cell Hemoglobin : 27.0 pg  Mean Cell Hemoglobin Concentration : 31.6 gm/dL  Auto Neutrophil # : x  Auto Lymphocyte # : x  Auto Monocyte # : x  Auto Eosinophil # : x  Auto Basophil # : x  Auto Neutrophil % : x  Auto Lymphocyte % : x  Auto Monocyte % : x  Auto Eosinophil % : x  Auto Basophil % : x    Serial CBC's  02-07 @ 09:53  Hct-30.7 / Hgb-9.7 / Plat-361 / RBC-3.59 / WBC-11.58          Serial CBC's  02-05 @ 09:26  Hct-31.3 / Hgb-9.7 / Plat-361 / RBC-3.63 / WBC-9.81            02-07    140  |  104  |  11  ----------------------------<  199<H>  3.8   |  23  |  0.99    Ca    8.4<L>      07 Feb 2021 09:53    TPro  6.4  /  Alb  2.7<L>  /  TBili  0.4  /  DBili  .20  /  AST  14<L>  /  ALT  14  /  AlkPhos  105  02-06          Ferritin, Serum: 229 ng/mL (01-30-21 @ 15:07)  Iron - Total Binding Capacity.: 154 ug/dL (01-28-21 @ 22:55)  Ferritin, Serum: 168 ng/mL (01-28-21 @ 22:45)  Vitamin B12, Serum: 872 pg/mL (01-28-21 @ 22:45)  Folate, Serum: 8.7 ng/mL (01-28-21 @ 22:45)  Reticulocyte Percent: 0.4 % (01-28-21 @ 16:50)  Ferritin, Serum: 161 ng/mL (01-28-21 @ 11:50)  Iron - Total Binding Capacity.: 144 ug/dL (01-28-21 @ 11:47)                BLOOD SMEAR INTERPRETATION:       RADIOLOGY & ADDITIONAL STUDIES:

## 2021-02-07 NOTE — PROGRESS NOTE ADULT - SUBJECTIVE AND OBJECTIVE BOX
Neurology follow up note    VERONICA [id number]      Interval History:    Patient feels ok no new complaints.    MEDICATIONS    acetaminophen   Tablet .. 650 milliGRAM(s) Oral every 6 hours PRN  ALBUTerol    90 MICROgram(s) HFA Inhaler 2 Puff(s) Inhalation every 4 hours PRN  aluminum hydroxide/magnesium hydroxide/simethicone Suspension 30 milliLiter(s) Oral every 4 hours PRN  ascorbic acid 500 milliGRAM(s) Oral daily  benzonatate 100 milliGRAM(s) Oral three times a day PRN  budesonide 160 MICROgram(s)/formoterol 4.5 MICROgram(s) Inhaler 2 Puff(s) Inhalation two times a day  cholecalciferol 2000 Unit(s) Oral daily  cholestyramine Powder (Sugar-Free) 4 Gram(s) Oral every 24 hours  cyclobenzaprine 5 milliGRAM(s) Oral three times a day PRN  dexAMETHasone     Tablet 6 milliGRAM(s) Oral daily  enoxaparin Injectable 40 milliGRAM(s) SubCutaneous daily  famotidine    Tablet 20 milliGRAM(s) Oral two times a day  ferrous    sulfate 325 milliGRAM(s) Oral daily  influenza   Vaccine 0.5 milliLiter(s) IntraMuscular once  lactobacillus acidophilus 1 Tablet(s) Oral three times a day  lidocaine   Patch 1 Patch Transdermal every 24 hours  melatonin 3 milliGRAM(s) Oral at bedtime PRN  multivitamin/minerals 1 Tablet(s) Oral daily  ondansetron Injectable 4 milliGRAM(s) IV Push every 6 hours PRN  potassium chloride    Tablet ER 10 milliEquivalent(s) Oral two times a day  pramipexole 1.5 milliGRAM(s) Oral every 24 hours  sulfaSALAzine 500 milliGRAM(s) Oral three times a day  torsemide 5 milliGRAM(s) Oral two times a day      Allergies    No Known Allergies    Intolerances            Vital Signs Last 24 Hrs  T(C): 36.3 (07 Feb 2021 05:25), Max: 36.6 (06 Feb 2021 20:28)  T(F): 97.4 (07 Feb 2021 05:25), Max: 97.9 (06 Feb 2021 20:28)  HR: 77 (07 Feb 2021 05:25) (74 - 82)  BP: 120/69 (07 Feb 2021 05:25) (114/65 - 128/71)  BP(mean): --  RR: 18 (07 Feb 2021 05:25) (17 - 18)  SpO2: 93% (07 Feb 2021 05:25) (91% - 95%)      REVIEW OF SYSTEMS:  Constitutional:  The patient denies fever, chills, or night sweats.  Head:  No headache.  Eyes:  No double vision or blurry vision.  Ears:  No ringing in the ears.  Neck:  Positive history of neck pain, but had cervical surgery done there and arthritis.  Respiratory:  No shortness of breath at present.  Cardiovascular:  No chest pain.  Abdomen:  No nausea, vomiting, or abdominal pain.  Extremities/Neurological:  Occasional musculoskeletal pain in her legs and joints.  Genitourinary:  No burning upon urination.    PHYSICAL EXAMINATION:   HEENT:  Head:  Normocephalic, atraumatic.  Eyes:  No scleral icterus.  Ears:  Hearing bilaterally intact.  NECK:  Supple.  RESPIRATORY:  Decreased breath sounds bilaterally.  CARDIOVASCULAR:  S1 and S2 heard.  ABDOMEN:  Soft and nontender.  EXTREMITIES:  No clubbing or cyanosis were noted.  GENERAL:  Positive surgical scar was noted in the cervical region in the bilateral knees.      NEUROLOGIC:  The patient is awake and alert.  Extraocular movement were intact.  The patient has decreased range of motion of her neck secondary to surgical intervention.  Speech was fluent.  Smile was symmetric.  Motor:  The patient has decreased range of motion of bilateral shoulders.  Able to elevate roughly about 60 degrees off the bed.  Overall strength was 3+/5.  Bilateral lower extremities, the patient is able to move side-to-side.  Had significant lymphedema in the bilateral lower extremities.  Sensory was decreased to light touch in the bilateral lower extremities, but has significant lymphedema.  The patient had impaired proprioception in the bilateral lower extremities.               LABS:  CBC Full  -  ( 07 Feb 2021 09:53 )  WBC Count : 11.58 K/uL  RBC Count : 3.59 M/uL  Hemoglobin : 9.7 g/dL  Hematocrit : 30.7 %  Platelet Count - Automated : 361 K/uL  Mean Cell Volume : 85.5 fl  Mean Cell Hemoglobin : 27.0 pg  Mean Cell Hemoglobin Concentration : 31.6 gm/dL  Auto Neutrophil # : x  Auto Lymphocyte # : x  Auto Monocyte # : x  Auto Eosinophil # : x  Auto Basophil # : x  Auto Neutrophil % : x  Auto Lymphocyte % : x  Auto Monocyte % : x  Auto Eosinophil % : x  Auto Basophil % : x      02-07    140  |  104  |  11  ----------------------------<  199<H>  3.8   |  23  |  0.99    Ca    8.4<L>      07 Feb 2021 09:53    TPro  6.4  /  Alb  2.7<L>  /  TBili  0.4  /  DBili  .20  /  AST  14<L>  /  ALT  14  /  AlkPhos  105  02-06    Hemoglobin A1C:     LIVER FUNCTIONS - ( 06 Feb 2021 08:13 )  Alb: 2.7 g/dL / Pro: 6.4 g/dL / ALK PHOS: 105 U/L / ALT: 14 U/L / AST: 14 U/L / GGT: x           Vitamin B12         RADIOLOGY        ANALYSIS AND PLAN:  A 71-year-old with a history of frequent falls, restless legs syndrome.  For episodes of frequent falls, syncopal events, the patient has had multiple workups in the past for this.  Questionable this could be secondary to any type of underlying orthostatic changes.  The patient does have a history of poor oral intake.  The patient also is mostly sedentary.  Also, the patient does have lymphedema in the bilateral lower extremities.  The patient was to be evaluated for possible underlying narcolepsy with sleep studies.  Telemetry evaluation.  Monitor orthostatics as needed.  For restless leg syndrome, continue the patient on her Mirapex.  For history of possible peripheral neuropathy, supportive therapy.  Fall precautions.  For generalized paresis secondary to underlying infectious type process COVID.  For cervical radiculopathy, continue the patient on her muscle relaxants as needed.  overall more interactive   as per patient does feel stronger   The daughter's name is Edelmira.  Telephone number is [phone] 2/6/2021  EEG was negative for any epileptiform discharges spoke to daughter she does not want any seizure medications which is reasonable   Greater than 45 minutes of time was spent with the patient, plan of care, reviewing data, speaking to the family and  50% of the visit was spent counseling and/or coordinating care with multidisciplinary healthcare team

## 2021-02-07 NOTE — PROGRESS NOTE ADULT - ASSESSMENT
72 y/o F with HLD, syncope, frequent falls, OA, s/p B/l knee replacement and right hip replacement, and back surgery presented to the ED after multiple falls and weakness, found to have COVID Pna and pancolitis.    Syncope, Falls  - Has known syncope and multiple falls.  Her B/L knee pain is likely contributory to her frequent falls.   - For her syncope, per Dr. Akers's note (9/2020), was thought to be from dehydration and CVA.  However, CVA w/u was negative  - Recently in [place] where she was discharged with a 2-week cardiac monitor but unaware of result.  She is unable to recall name of Cardiologist in [place] and is refusing to go back to him  - Outpatient CUS 2/2020) in our office which showed no hemodynamic significant stenosis  - Plan for MRI, f/u results   - TTE in 1/2020 showing normal LVF, EF 60% with bicuspid AV, mild AS.    - Repeat TTE inpatient showed normal LV & RV size and function EF 55-60%, trace MR and TR, mild AS  - No evidence of any arrhythmia while on tele  - She will need an ILR to be arranged as outpatient  - Fall precaution  - Continue Torsemide 5 mg PO BID   - Negative orthostatics  - Continue to encourage PO fluid intake    Covid Pna  - Tolerating RA  - Supportive care  - Continue to encourage incentive spirometer  - Follow Pulm and ID recs    DVT ppx  - Per Primary  - Continue PT    - Monitor and replete lytes, keep K>4, Mg>2.  - All other medical needs as per primary team.  - Other cardiovascular workup will depend on clinical course.  - Will continue to follow.    Levi Silverman, MS FNP, AGAP  Nurse Practitioner- Cardiology   Spectra #[phone]

## 2021-02-08 ENCOUNTER — TRANSCRIPTION ENCOUNTER (OUTPATIENT)
Age: 71
End: 2021-02-08

## 2021-02-08 VITALS
DIASTOLIC BLOOD PRESSURE: 55 MMHG | TEMPERATURE: 98 F | RESPIRATION RATE: 18 BRPM | SYSTOLIC BLOOD PRESSURE: 99 MMHG | OXYGEN SATURATION: 94 % | HEART RATE: 87 BPM

## 2021-02-08 LAB
BASOPHILS # BLD AUTO: 0.02 K/UL — SIGNIFICANT CHANGE UP (ref 0–0.2)
BASOPHILS NFR BLD AUTO: 0.2 % — SIGNIFICANT CHANGE UP (ref 0–2)
EOSINOPHIL # BLD AUTO: 0.11 K/UL — SIGNIFICANT CHANGE UP (ref 0–0.5)
EOSINOPHIL NFR BLD AUTO: 1 % — SIGNIFICANT CHANGE UP (ref 0–6)
HCT VFR BLD CALC: 29.8 % — LOW (ref 34.5–45)
HGB BLD-MCNC: 9 G/DL — LOW (ref 11.5–15.5)
IMM GRANULOCYTES NFR BLD AUTO: 0.4 % — SIGNIFICANT CHANGE UP (ref 0–1.5)
LYMPHOCYTES # BLD AUTO: 2.3 K/UL — SIGNIFICANT CHANGE UP (ref 1–3.3)
LYMPHOCYTES # BLD AUTO: 21.5 % — SIGNIFICANT CHANGE UP (ref 13–44)
MCHC RBC-ENTMCNC: 26.4 PG — LOW (ref 27–34)
MCHC RBC-ENTMCNC: 30.2 GM/DL — LOW (ref 32–36)
MCV RBC AUTO: 87.4 FL — SIGNIFICANT CHANGE UP (ref 80–100)
MONOCYTES # BLD AUTO: 0.83 K/UL — SIGNIFICANT CHANGE UP (ref 0–0.9)
MONOCYTES NFR BLD AUTO: 7.8 % — SIGNIFICANT CHANGE UP (ref 2–14)
NEUTROPHILS # BLD AUTO: 7.38 K/UL — SIGNIFICANT CHANGE UP (ref 1.8–7.4)
NEUTROPHILS NFR BLD AUTO: 69.1 % — SIGNIFICANT CHANGE UP (ref 43–77)
NRBC # BLD: 0 /100 WBCS — SIGNIFICANT CHANGE UP (ref 0–0)
PLATELET # BLD AUTO: 346 K/UL — SIGNIFICANT CHANGE UP (ref 150–400)
RBC # BLD: 3.41 M/UL — LOW (ref 3.8–5.2)
RBC # FLD: 19.3 % — HIGH (ref 10.3–14.5)
WBC # BLD: 10.68 K/UL — HIGH (ref 3.8–10.5)
WBC # FLD AUTO: 10.68 K/UL — HIGH (ref 3.8–10.5)

## 2021-02-08 PROCEDURE — 99239 HOSP IP/OBS DSCHRG MGMT >30: CPT | Mod: CS

## 2021-02-08 PROCEDURE — 70551 MRI BRAIN STEM W/O DYE: CPT | Mod: 26

## 2021-02-08 PROCEDURE — 99232 SBSQ HOSP IP/OBS MODERATE 35: CPT

## 2021-02-08 RX ORDER — DEXAMETHASONE 0.5 MG/5ML
1 ELIXIR ORAL
Qty: 29 | Refills: 0
Start: 2021-02-08 | End: 2021-02-19

## 2021-02-08 RX ORDER — OXYCODONE HYDROCHLORIDE 5 MG/1
5 TABLET ORAL
Refills: 0 | Status: DISCONTINUED | OUTPATIENT
Start: 2021-02-08 | End: 2021-02-08

## 2021-02-08 RX ORDER — LIDOCAINE 4 G/100G
1 CREAM TOPICAL
Qty: 0 | Refills: 0 | DISCHARGE
Start: 2021-02-08

## 2021-02-08 RX ORDER — DEXAMETHASONE 0.5 MG/5ML
1 ELIXIR ORAL
Qty: 49 | Refills: 0
Start: 2021-02-08 | End: 2021-02-19

## 2021-02-08 RX ORDER — CHOLECALCIFEROL (VITAMIN D3) 125 MCG
2000 CAPSULE ORAL
Qty: 0 | Refills: 0 | DISCHARGE
Start: 2021-02-08

## 2021-02-08 RX ORDER — FERROUS SULFATE 325(65) MG
1 TABLET ORAL
Qty: 0 | Refills: 0 | DISCHARGE
Start: 2021-02-08

## 2021-02-08 RX ORDER — ALPRAZOLAM 0.25 MG
0.25 TABLET ORAL ONCE
Refills: 0 | Status: DISCONTINUED | OUTPATIENT
Start: 2021-02-08 | End: 2021-02-08

## 2021-02-08 RX ORDER — CYCLOBENZAPRINE HYDROCHLORIDE 10 MG/1
1 TABLET, FILM COATED ORAL
Qty: 0 | Refills: 0 | DISCHARGE

## 2021-02-08 RX ORDER — POTASSIUM CHLORIDE 20 MEQ
1 PACKET (EA) ORAL
Qty: 0 | Refills: 0 | DISCHARGE
Start: 2021-02-08

## 2021-02-08 RX ORDER — LANOLIN ALCOHOL/MO/W.PET/CERES
1 CREAM (GRAM) TOPICAL
Qty: 0 | Refills: 0 | DISCHARGE
Start: 2021-02-08

## 2021-02-08 RX ORDER — ASCORBIC ACID 60 MG
1 TABLET,CHEWABLE ORAL
Qty: 0 | Refills: 0 | DISCHARGE
Start: 2021-02-08

## 2021-02-08 RX ADMIN — Medication 10 MILLIEQUIVALENT(S): at 05:36

## 2021-02-08 RX ADMIN — FAMOTIDINE 20 MILLIGRAM(S): 10 INJECTION INTRAVENOUS at 17:35

## 2021-02-08 RX ADMIN — Medication 500 MILLIGRAM(S): at 05:35

## 2021-02-08 RX ADMIN — Medication 325 MILLIGRAM(S): at 10:16

## 2021-02-08 RX ADMIN — Medication 6 MILLIGRAM(S): at 05:36

## 2021-02-08 RX ADMIN — FAMOTIDINE 20 MILLIGRAM(S): 10 INJECTION INTRAVENOUS at 05:35

## 2021-02-08 RX ADMIN — Medication 1 TABLET(S): at 10:16

## 2021-02-08 RX ADMIN — OXYCODONE HYDROCHLORIDE 5 MILLIGRAM(S): 5 TABLET ORAL at 08:58

## 2021-02-08 RX ADMIN — LIDOCAINE 1 PATCH: 4 CREAM TOPICAL at 17:34

## 2021-02-08 RX ADMIN — Medication 5 MILLIGRAM(S): at 17:35

## 2021-02-08 RX ADMIN — Medication 1 TABLET(S): at 05:35

## 2021-02-08 RX ADMIN — Medication 0.25 MILLIGRAM(S): at 08:58

## 2021-02-08 RX ADMIN — LIDOCAINE 1 PATCH: 4 CREAM TOPICAL at 05:07

## 2021-02-08 RX ADMIN — PRAMIPEXOLE DIHYDROCHLORIDE 1.5 MILLIGRAM(S): 0.12 TABLET ORAL at 17:35

## 2021-02-08 RX ADMIN — Medication 10 MILLIEQUIVALENT(S): at 17:36

## 2021-02-08 RX ADMIN — BUDESONIDE AND FORMOTEROL FUMARATE DIHYDRATE 2 PUFF(S): 160; 4.5 AEROSOL RESPIRATORY (INHALATION) at 05:36

## 2021-02-08 RX ADMIN — ENOXAPARIN SODIUM 40 MILLIGRAM(S): 100 INJECTION SUBCUTANEOUS at 10:16

## 2021-02-08 RX ADMIN — Medication 500 MILLIGRAM(S): at 10:16

## 2021-02-08 RX ADMIN — BUDESONIDE AND FORMOTEROL FUMARATE DIHYDRATE 2 PUFF(S): 160; 4.5 AEROSOL RESPIRATORY (INHALATION) at 17:36

## 2021-02-08 RX ADMIN — CHOLESTYRAMINE 4 GRAM(S): 4 POWDER, FOR SUSPENSION ORAL at 13:01

## 2021-02-08 RX ADMIN — Medication 2000 UNIT(S): at 10:16

## 2021-02-08 RX ADMIN — Medication 500 MILLIGRAM(S): at 12:25

## 2021-02-08 RX ADMIN — Medication 5 MILLIGRAM(S): at 05:35

## 2021-02-08 RX ADMIN — Medication 1 TABLET(S): at 12:25

## 2021-02-08 NOTE — DISCHARGE NOTE NURSING/CASE MANAGEMENT/SOCIAL WORK - PATIENT PORTAL LINK FT
You can access the FollowMyHealth Patient Portal offered by [facility] by registering at the following website: [email or web address]. By joining [facility]’s FollowMyHealth portal, you will also be able to view your health information using other applications (apps) compatible with our system.

## 2021-02-08 NOTE — DISCHARGE NOTE PROVIDER - HOSPITAL COURSE
FROM ADMISSION H+P:   HPI:  · HPI Objective Statement: 70yo female who presents with multiple falls and feeling weak today. pt c/o diffuse pain and feeling weak, no vomiting, no diarrhea, no fever, chills, no cough or sob, denies sick contacts at home  In ER patient was found to have COVID PNA with possible colitis.  patient is being admitted for further care and management (28 Jan 2021 10:42  ---  HOSPITAL COURSE:     1- COVID-19.  Plan:   completed remdesvir X 5 days  continue steroids to complete X 10 days , will titrate down  continue supportive treatment with o2  ID following  Pulmonary following, ok to discharge once negative  CXR 2/5/21: slight interval radiographic improvement in the left basilar groundglass infiltrate   COVID (-) x2, 02/6/21 02/07/21       2 -Falls associated with syncopal events  Multifactorial: questionable orthostatic changes patient deconditioned, poor oral intake, poor hydration, some episodes have been while standing and patient has h/o lymphedema and b/l knee replacement. The episodes have been unwitnessed.  Patient denies any fecal or urinary incontinence.    Patient has had extensive work/up in another facilities: Echo 12/31/20 normal and repeat 1/31/21 normal as well, duplex LE 12/31/20 normal, carotid US1/3/21: normal, CT head 12/30/20 negative.  A cardiac monitor was done as OP for 2 weeks as per daughter but we don't have the results  while on telemetry no evidence of arrhythmias  negative orthostatics  EEG: negative for seizures  cardiology following  neurology following  Further workup recommended as OP per cardiology: ILR, stress test    as per daughter patient had an appt for sleep study that had to reschedule because patient was admitted, advice to update appt  Patient afraid of MR due to painful neck, open MRI of head w/ w/o could be an option for her, will try today  or tomorrow morning MRI with prior pain medication and ativan  TILT test as OP as well  fall precautions in place  PT for ambulation,  needs SUSANNA.  No further episodes while admitted       3-Hypokalemia.    -supplement potassium provided, resolved.      4- Colitis.    GI Dr. BRADLEY following  occult blood neg  less likely bacterial, no further diarrhea  Ct abdomen 1/28/21 circumferential colonic wall thickening/submucosal edema with mild fat stranding, mild vascular engorgement  consistent with pancolitis.   to continue diet as tolerated  continue with pepcid  h/o cdiff.       5- Diarrhea.  h/o Cdiff   on questran  probiotics  improving  GI following    6- RLS  -continue with Mirapex    7-Anemia  hgb stable  OB was negative  iron panel abnormal, B12 wnl, LDH wnl, low ret count, periph smear unremarkable   received 3 doses of venofer  continue monitoring h/h  Heme-onc following  F/up as OP with GI for further workup, possible colonoscopy    8- Lymphedema  ace wrap b/l ordered  IMPROVE VTE Individual Risk Assessment    RISK                                                                Points    [  ] Previous VTE                                                  3    [  ] Thrombophilia                                               2    [  ] Lower limb paralysis                                      2        (unable to hold up >15 seconds)      [  ] Current Cancer                                              2         (within 6 months)    [  ] Immobilization > 24 hrs                                1    [  ] ICU/CCU stay > 24 hours                              1    [  x] Age > 60                                                      1      ---  CONSULTANTS:     ---  TIME SPENT:  I, the attending physician, was physically present for the key portions of the evaluation and management (E/M) service provided. The total amount of time spent reviewing the hospital notes, laboratory values, imaging findings, assessing/counseling the patient, discussing with consultant physicians, social work, nursing staff was 35 minutes   FROM ADMISSION H+P:   HPI:  · HPI Objective Statement: 72yo female who presents with multiple falls and feeling weak.  pt c/o diffuse pain and feeling weak, no vomiting, no diarrhea, no fever, chills, no cough or sob, denies sick contacts at home  In ER patient was found to have COVID PNA with possible colitis. patient was being admitted for further care and management (28 Jan 2021 10:42  ---  HOSPITAL COURSE:     1- COVID-19.  Plan:   completed remdesvir X 5 days  completed steroids to complete X 10 days , will titrate down  continue supportive treatment with o2  ID following  Pulmonary following, ok to discharge once negative  CXR 2/5/21: slight interval radiographic improvement in the left basilar groundglass infiltrate   COVID (-) x2, 02/6/21 02/07/21       2 -Falls associated with syncopal events  Multifactorial: questionable orthostatic changes patient deconditioned, poor oral intake, poor hydration, some episodes have been while standing and patient has h/o lymphedema and b/l knee replacement. The episodes have been unwitnessed.  Patient denies any fecal or urinary incontinence.    Patient has had extensive work/up in another facilities: Echo 12/31/20 normal and repeat 1/31/21 normal as well, duplex LE 12/31/20 normal, carotid US1/3/21: normal, CT head 12/30/20 negative.  A cardiac monitor was done as OP for 2 weeks as per daughter but we don't have the results  while on telemetry no evidence of arrhythmias  negative orthostatics  EEG: negative for seizures  cardiology following  neurology following  Further workup recommended as OP per cardiology: ILR, stress test    as per daughter patient had an appt for sleep study that had to reschedule because patient was admitted, advice to update appt  Patient afraid of MR due to painful neck, open MRI of head w/ w/o could be an option for her, will try today  or tomorrow morning MRI with prior pain medication and ativan  TILT test as OP as well  fall precautions in place  PT for ambulation,  needs SUSANNA.  No further episodes while admitted       3-Hypokalemia.    -supplement potassium provided, resolved.      4- Colitis.    GI Dr. BRADLEY following  occult blood neg  less likely bacterial, no further diarrhea  Ct abdomen 1/28/21 circumferential colonic wall thickening/submucosal edema with mild fat stranding, mild vascular engorgement  consistent with pancolitis.   to continue diet as tolerated  continue with pepcid  h/o cdiff.       5- Diarrhea.  h/o Cdiff   on questran  probiotics  improving  GI following    6- RLS  -continue with Mirapex    7-Anemia  hgb stable  OB was negative  iron panel abnormal, B12 wnl, LDH wnl, low ret count, periph smear unremarkable   received 3 doses of venofer  continue monitoring h/h  Heme-onc following  F/up as OP with GI for further workup, possible colonoscopy    8- Lymphedema  ace wrap b/l ordered  IMPROVE VTE Individual Risk Assessment    RISK                                                                Points    [  ] Previous VTE                                                  3    [  ] Thrombophilia                                               2    [  ] Lower limb paralysis                                      2        (unable to hold up >15 seconds)      [  ] Current Cancer                                              2         (within 6 months)    [  ] Immobilization > 24 hrs                                1    [  ] ICU/CCU stay > 24 hours                              1    [  x] Age > 60                                                      1      ---  CONSULTANTS:     ---  TIME SPENT:  I, the attending physician, was physically present for the key portions of the evaluation and management (E/M) service provided. The total amount of time spent reviewing the hospital notes, laboratory values, imaging findings, assessing/counseling the patient, discussing with consultant physicians, social work, nursing staff was 35 minutes   FROM ADMISSION H+P:   HPI:  · HPI Objective Statement: 70yo female who presents with multiple falls and feeling weak.  pt c/o diffuse pain and feeling weak, no vomiting, no diarrhea, no fever, chills, no cough or sob, denies sick contacts at home  In ER patient was found to have COVID PNA with possible colitis. patient was being admitted for further care and management (28 Jan 2021 10:42  ---  HOSPITAL COURSE:     1- COVID-19.  Plan:   completed remdesvir X 5 days  completed steroids to complete with tapering dose  ID followed   Pulmonary followed, ok to discharge once negative  CXR 2/5/21: slight interval radiographic improvement in the left basilar groundglass infiltrate   COVID (-) x2, 02/6/21 02/07/21       2 -Falls associated with syncopal events  Multifactorial: questionable orthostatic changes patient deconditioned, poor oral intake, poor hydration, some episodes have been while standing and patient has h/o lymphedema and b/l knee replacement. The episodes have been unwitnessed.  Patient denies any fecal or urinary incontinence.    Patient has had extensive work/up in another facilities: Echo 12/31/20 normal and repeat 1/31/21 normal as well, duplex LE 12/31/20 normal, carotid US1/3/21: normal, CT head 12/30/20 negative.  A cardiac monitor was done as OP for 2 weeks as per daughter but we don't have the results  while on telemetry no evidence of arrhythmias  negative orthostatics  EEG: negative for seizures  cardiology following  neurology following  Further workup recommended as OP per cardiology: ILR, stress test  after SUSANNA  as per daughter patient had an appt for sleep study that had to reschedule because patient was admitted, advice to update appt  MRI head w/o negative    TILT test as OP as well, not during SUSANNA  fall precautions in place  PT for ambulation,  needs SUSANNA.  No further episodes while admitted       3-Hypokalemia.    -supplement potassium provided, resolved.      4- Colitis.    seen by GI Dr. BRADLEY   occult blood neg  less likely bacterial, resolved before discharge  Ct abdomen 1/28/21 circumferential colonic wall thickening/submucosal edema with mild fat stranding, mild vascular engorgement  consistent with pancolitis.   to continue diet as tolerated, continue with pepcid      5- Diarrhea.  h/o Cdiff   treated with questran and probiotics  improved      6- RLS  -continue with Mirapex    7-Anemia  hgb stable  OB was negative  iron panel abnormal, B12 wnl, LDH wnl, low ret count, periph smear unremarkable   received 3 doses of venofer  continue monitoring h/h  Heme-onc followed  F/up as OP with GI for further workup, possible colonoscopy    8- Lymphedema  continue with ace wrap b/l    IMPROVE VTE Individual Risk Assessment  RISK                                                                Points  [  ] Previous VTE                                                  3  [  ] Thrombophilia                                               2  [  ] Lower limb paralysis                                      2        (unable to hold up >15 seconds)    [  ] Current Cancer                                              2         (within 6 months)  [  ] Immobilization > 24 hrs                                1  [  ] ICU/CCU stay > 24 hours                              1  [  x] Age > 60                                                      1    Patient to re-start orencia once discharge from rehab, during mean time to continue with sulfasalazine.   ---  CONSULTANTS:   GI  Neurology  ID  Heme-onc  Cardiology  Pulmonary  ---  TIME SPENT:  I, the attending physician, was physically present for the key portions of the evaluation and management (E/M) service provided. The total amount of time spent reviewing the hospital notes, laboratory values, imaging findings, assessing/counseling the patient, discussing with consultant physicians, social work, nursing staff was 40 minutes

## 2021-02-08 NOTE — PROGRESS NOTE ADULT - PROVIDER SPECIALTY LIST ADULT
Cardiology
Heme/Onc
Hospitalist
Infectious Disease
Neurology
Neurology
Cardiology
Cardiology
Family Medicine
Gastroenterology
Gastroenterology
Heme/Onc
Hospitalist
Infectious Disease
Neurology
Cardiology
Family Medicine
Gastroenterology
Gastroenterology
Heme/Onc
Infectious Disease
Neurology
Pulmonology
Pulmonology
Cardiology
Gastroenterology
Family Medicine
Pulmonology
Gastroenterology
Pulmonology
Pulmonology
Gastroenterology
Pulmonology
Pulmonology
Family Medicine
Family Medicine
Pulmonology
Family Medicine

## 2021-02-08 NOTE — PROGRESS NOTE ADULT - ASSESSMENT
72 y/o F with HLD, syncope, frequent falls, OA, s/p B/l knee replacement and right hip replacement, and back surgery presented to the ED after multiple falls and weakness, found to have COVID Pna and pancolitis.    Syncope, Falls  - Has known syncope and multiple falls.  Her B/L knee pain is likely contributory to her frequent falls.   - For her syncope, per Dr. Akers's note (9/2020), was thought to be from dehydration and CVA.  However, CVA w/u was negative  - Recently in [place] where she was discharged with a 2-week cardiac monitor but unaware of result.  She is unable to recall name of Cardiologist in [place] and is refusing to go back to him  - Outpatient CUS 2/2020) in our office which showed no hemodynamic significant stenosis  - MRI noted  - Repeat TTE inpatient showed normal LV & RV size and function EF 55-60%, trace MR and TR, mild AS  - No evidence of any arrhythmia while on tele  - She will need an ILR to be arranged as outpatient  - Fall precaution  - Continue Torsemide 5 mg PO BID   - Negative orthostatics  - Continue to encourage PO fluid intake    Covid Pna  - Tolerating RA  - Supportive care  - Continue to encourage incentive spirometer  - Follow Pulm and ID recs    DVT ppx  - Per Primary  - Continue PT    -From a cardiac standpoint the patient may be discharged home  - To follow up w/ cardiology in one week   - Monitor and replete lytes, keep K>4, Mg>2.  - All other medical needs as per primary team.  - Other cardiovascular workup will depend on clinical course.  - Will continue to follow.    Valentino Rogel DNP, ANP-c  Cardiology   Spectra #[phone]  72 y/o F with HLD, syncope, frequent falls, OA, s/p B/l knee replacement and right hip replacement, and back surgery presented to the ED after multiple falls and weakness, found to have COVID Pna and pancolitis.    Syncope, Falls  - Has known syncope and multiple falls.  Her B/L knee pain is likely contributory to her frequent falls.   - For her syncope, per Dr. Akers's note (9/2020), was thought to be from dehydration and CVA.  However, CVA w/u was negative  - Recently in [place] where she was discharged with a 2-week cardiac monitor but unaware of result.  She is unable to recall name of Cardiologist in [place] and is refusing to go back to him  - Outpatient CUS 2/2020) in our office which showed no hemodynamic significant stenosis  - MRI noted  - Repeat TTE inpatient showed normal LV & RV size and function EF 55-60%, trace MR and TR, mild AS  - No evidence of any arrhythmia while on tele  - She will need an ILR to be arranged as outpatient  - Fall precaution  - Continue Torsemide 5 mg PO BID   - Negative orthostatics  - Continue to encourage PO fluid intake    Covid Pna  - Tolerating RA  - Supportive care  - Continue to encourage incentive spirometer  - Follow Pulm and ID recs    DVT ppx  - Per Primary  - Continue PT    - From a cardiac standpoint the patient may be discharged home  - To follow up w/ cardiology in one week   - Monitor and replete lytes, keep K>4, Mg>2.  - All other medical needs as per primary team.  - Other cardiovascular workup will depend on clinical course.  - Will continue to follow.    Valentino Rogel DNP, ANP-c  Cardiology   Spectra #[phone]

## 2021-02-08 NOTE — PROGRESS NOTE ADULT - SUBJECTIVE AND OBJECTIVE BOX
Patient is a 71y old  Female who presents with a chief complaint of sob (08 Feb 2021 12:57)       Pt is seen and examined  pt is awake and lying in bed/out of bed to chair  pt seems comfortable and denies any complaints at this time    HPI:  · HPI Objective Statement: 72yo female who presents with multiple falls and feeling weak today. pt c/o diffuse pain and feeling weak, no vomiting, no diarrhea, no fever, chills, no cough or sob, denies sick contacts at home  In ER patient was found to have COVID PNA with possible colitis.  patient is being admitted for further care and management (28 Jan 2021 10:42)         ROS:  Negative except for:    MEDICATIONS  (STANDING):  ascorbic acid 500 milliGRAM(s) Oral daily  budesonide 160 MICROgram(s)/formoterol 4.5 MICROgram(s) Inhaler 2 Puff(s) Inhalation two times a day  cholecalciferol 2000 Unit(s) Oral daily  cholestyramine Powder (Sugar-Free) 4 Gram(s) Oral every 24 hours  dexAMETHasone     Tablet 6 milliGRAM(s) Oral daily  enoxaparin Injectable 40 milliGRAM(s) SubCutaneous daily  famotidine    Tablet 20 milliGRAM(s) Oral two times a day  ferrous    sulfate 325 milliGRAM(s) Oral daily  influenza   Vaccine 0.5 milliLiter(s) IntraMuscular once  lactobacillus acidophilus 1 Tablet(s) Oral three times a day  lidocaine   Patch 1 Patch Transdermal every 24 hours  multivitamin/minerals 1 Tablet(s) Oral daily  potassium chloride    Tablet ER 10 milliEquivalent(s) Oral two times a day  pramipexole 1.5 milliGRAM(s) Oral every 24 hours  sulfaSALAzine 500 milliGRAM(s) Oral three times a day  torsemide 5 milliGRAM(s) Oral two times a day    MEDICATIONS  (PRN):  acetaminophen   Tablet .. 650 milliGRAM(s) Oral every 6 hours PRN Temp greater or equal to 38C (100.4F), Mild Pain (1 - 3)  ALBUTerol    90 MICROgram(s) HFA Inhaler 2 Puff(s) Inhalation every 4 hours PRN Shortness of Breath and/or Wheezing  aluminum hydroxide/magnesium hydroxide/simethicone Suspension 30 milliLiter(s) Oral every 4 hours PRN Dyspepsia  benzonatate 100 milliGRAM(s) Oral three times a day PRN Cough  cyclobenzaprine 5 milliGRAM(s) Oral three times a day PRN Muscle Spasm  melatonin 3 milliGRAM(s) Oral at bedtime PRN Insomnia  ondansetron Injectable 4 milliGRAM(s) IV Push every 6 hours PRN Nausea and/or Vomiting  oxyCODONE    IR 5 milliGRAM(s) Oral four times a day PRN Severe Pain (7 - 10)      Allergies    No Known Allergies    Intolerances        Vital Signs Last 24 Hrs  T(C): 36.8 (08 Feb 2021 13:01), Max: 36.9 (07 Feb 2021 20:45)  T(F): 98.2 (08 Feb 2021 13:01), Max: 98.4 (07 Feb 2021 20:45)  HR: 87 (08 Feb 2021 13:01) (73 - 87)  BP: 99/55 (08 Feb 2021 13:01) (99/55 - 118/66)  BP(mean): --  RR: 18 (08 Feb 2021 13:01) (18 - 18)  SpO2: 94% (08 Feb 2021 13:01) (93% - 96%)    PHYSICAL EXAM  General: adult in NAD  HEENT: clear oropharynx, anicteric sclera, pink conjunctiva  Neck: supple  CV: normal S1/S2 with no murmur rubs or gallops  Lungs: positive air movement b/l ant lungs,clear to auscultation, no wheezes, no rales  Abdomen: soft non-tender non-distended, no hepatosplenomegaly  Ext: no clubbing cyanosis or edema  Skin: no rashes and no petechiae  Neuro: alert and oriented X 4, no focal deficits  LABS:                          9.0    10.68 )-----------( 346      ( 08 Feb 2021 09:15 )             29.8         Mean Cell Volume : 87.4 fl  Mean Cell Hemoglobin : 26.4 pg  Mean Cell Hemoglobin Concentration : 30.2 gm/dL  Auto Neutrophil # : 7.38 K/uL  Auto Lymphocyte # : 2.30 K/uL  Auto Monocyte # : 0.83 K/uL  Auto Eosinophil # : 0.11 K/uL  Auto Basophil # : 0.02 K/uL  Auto Neutrophil % : 69.1 %  Auto Lymphocyte % : 21.5 %  Auto Monocyte % : 7.8 %  Auto Eosinophil % : 1.0 %  Auto Basophil % : 0.2 %    Serial CBC's  02-08 @ 09:15  Hct-29.8 / Hgb-9.0 / Plat-346 / RBC-3.41 / WBC-10.68          Serial CBC's  02-07 @ 09:53  Hct-30.7 / Hgb-9.7 / Plat-361 / RBC-3.59 / WBC-11.58          Serial CBC's  02-05 @ 09:26  Hct-31.3 / Hgb-9.7 / Plat-361 / RBC-3.63 / WBC-9.81            02-07    140  |  104  |  11  ----------------------------<  199<H>  3.8   |  23  |  0.99    Ca    8.4<L>      07 Feb 2021 09:53            Ferritin, Serum: 229 ng/mL (01-30-21 @ 15:07)                BLOOD SMEAR INTERPRETATION:       RADIOLOGY & ADDITIONAL STUDIES:     Patient is a 71y old  Female who presents with a chief complaint of sob (08 Feb 2021 12:57)       Pt is seen and examined  pt is awake and is sitting in chair--out of bed to chair  pt seems comfortable and denies any complaints at this time    HPI:  · HPI Objective Statement: 72yo female who presents with multiple falls and feeling weak today. pt c/o diffuse pain and feeling weak, no vomiting, no diarrhea, no fever, chills, no cough or sob, denies sick contacts at home  In ER patient was found to have COVID PNA with possible colitis.  patient is being admitted for further care and management (28 Jan 2021 10:42)         ROS:  as per hpi    MEDICATIONS  (STANDING):  ascorbic acid 500 milliGRAM(s) Oral daily  budesonide 160 MICROgram(s)/formoterol 4.5 MICROgram(s) Inhaler 2 Puff(s) Inhalation two times a day  cholecalciferol 2000 Unit(s) Oral daily  cholestyramine Powder (Sugar-Free) 4 Gram(s) Oral every 24 hours  dexAMETHasone     Tablet 6 milliGRAM(s) Oral daily  enoxaparin Injectable 40 milliGRAM(s) SubCutaneous daily  famotidine    Tablet 20 milliGRAM(s) Oral two times a day  ferrous    sulfate 325 milliGRAM(s) Oral daily  influenza   Vaccine 0.5 milliLiter(s) IntraMuscular once  lactobacillus acidophilus 1 Tablet(s) Oral three times a day  lidocaine   Patch 1 Patch Transdermal every 24 hours  multivitamin/minerals 1 Tablet(s) Oral daily  potassium chloride    Tablet ER 10 milliEquivalent(s) Oral two times a day  pramipexole 1.5 milliGRAM(s) Oral every 24 hours  sulfaSALAzine 500 milliGRAM(s) Oral three times a day  torsemide 5 milliGRAM(s) Oral two times a day    MEDICATIONS  (PRN):  acetaminophen   Tablet .. 650 milliGRAM(s) Oral every 6 hours PRN Temp greater or equal to 38C (100.4F), Mild Pain (1 - 3)  ALBUTerol    90 MICROgram(s) HFA Inhaler 2 Puff(s) Inhalation every 4 hours PRN Shortness of Breath and/or Wheezing  aluminum hydroxide/magnesium hydroxide/simethicone Suspension 30 milliLiter(s) Oral every 4 hours PRN Dyspepsia  benzonatate 100 milliGRAM(s) Oral three times a day PRN Cough  cyclobenzaprine 5 milliGRAM(s) Oral three times a day PRN Muscle Spasm  melatonin 3 milliGRAM(s) Oral at bedtime PRN Insomnia  ondansetron Injectable 4 milliGRAM(s) IV Push every 6 hours PRN Nausea and/or Vomiting  oxyCODONE    IR 5 milliGRAM(s) Oral four times a day PRN Severe Pain (7 - 10)      Allergies    No Known Allergies    Intolerances        Vital Signs Last 24 Hrs  T(C): 36.8 (08 Feb 2021 13:01), Max: 36.9 (07 Feb 2021 20:45)  T(F): 98.2 (08 Feb 2021 13:01), Max: 98.4 (07 Feb 2021 20:45)  HR: 87 (08 Feb 2021 13:01) (73 - 87)  BP: 99/55 (08 Feb 2021 13:01) (99/55 - 118/66)  BP(mean): --  RR: 18 (08 Feb 2021 13:01) (18 - 18)  SpO2: 94% (08 Feb 2021 13:01) (93% - 96%)    PHYSICAL EXAM  General: adult in NAD  HEENT: clear oropharynx, anicteric sclera, pink conjunctiva  Neck: supple  CV: normal S1/S2 with no murmur rubs or gallops  Lungs: positive air movement b/l ant lungs,clear to auscultation, no wheezes, no rales  Abdomen: soft non-tender non-distended, no hepatosplenomegaly  Ext: no clubbing cyanosis or edema  Skin: no rashes and no petechiae  Neuro: alert and oriented X 4, no focal deficits  LABS:                          9.0    10.68 )-----------( 346      ( 08 Feb 2021 09:15 )             29.8         Mean Cell Volume : 87.4 fl  Mean Cell Hemoglobin : 26.4 pg  Mean Cell Hemoglobin Concentration : 30.2 gm/dL  Auto Neutrophil # : 7.38 K/uL  Auto Lymphocyte # : 2.30 K/uL  Auto Monocyte # : 0.83 K/uL  Auto Eosinophil # : 0.11 K/uL  Auto Basophil # : 0.02 K/uL  Auto Neutrophil % : 69.1 %  Auto Lymphocyte % : 21.5 %  Auto Monocyte % : 7.8 %  Auto Eosinophil % : 1.0 %  Auto Basophil % : 0.2 %    Serial CBC's  02-08 @ 09:15  Hct-29.8 / Hgb-9.0 / Plat-346 / RBC-3.41 / WBC-10.68          Serial CBC's  02-07 @ 09:53  Hct-30.7 / Hgb-9.7 / Plat-361 / RBC-3.59 / WBC-11.58          Serial CBC's  02-05 @ 09:26  Hct-31.3 / Hgb-9.7 / Plat-361 / RBC-3.63 / WBC-9.81            02-07    140  |  104  |  11  ----------------------------<  199<H>  3.8   |  23  |  0.99    Ca    8.4<L>      07 Feb 2021 09:53            Ferritin, Serum: 229 ng/mL (01-30-21 @ 15:07)        < from: US Spleen (02.06.21 @ 02:17) >    EXAM:  US SPLEEN                            PROCEDURE DATE:  02/06/2021          INTERPRETATION:  SPLEEN ULTRASOUND    HISTORY: Splenomegaly on ultrasound on CT November 2020 and now with abdominal pain.    COMPARISON: CT abdomen and pelvis 1/28/2021.    FINDINGS:  The spleen is mildly enlarged, measuring 13.7 x 4.2 x 4.1 cm, with a volume of 122 cc.    Its echogenicity is homogeneous and vascular flow is seen in the splenic hilum.    IMPRESSION:  Mild splenomegaly, not significantly changed compared to CT abdomen pelvis dated 1/28/2021.            EFRA PEREZ MD; Attending Radiologist  This document has been electronically signed. Feb 6 2021  4:10AM    < end of copied text >

## 2021-02-08 NOTE — DISCHARGE NOTE PROVIDER - NSDCCAREPROVSEEN_GEN_ALL_CORE_FT
Eladio, Reba Ma, Henrik Amezcua, Josias Lorenz, Marito Walls Eladio, Reba Ma, Henrik Amezcua, Josias Lorenz, Kaiser Torre, Marito Miranda, Ian Peterson, Richy Feliciano

## 2021-02-08 NOTE — DISCHARGE NOTE PROVIDER - NSDCMRMEDTOKEN_GEN_ALL_CORE_FT
ascorbic acid 500 mg oral tablet: 1 tab(s) orally once a day  cholecalciferol oral tablet: 2000 unit(s) orally once a day  ferrous sulfate 325 mg (65 mg elemental iron) oral tablet: 1 tab(s) orally once a day  lidocaine 5% topical film: Apply topically to affected area  melatonin 3 mg oral tablet: 1 tab(s) orally once a day (at bedtime), As needed, Insomnia  Multiple Vitamins with Minerals oral tablet: 1 tab(s) orally once a day  Orencia 125 mg/mL subcutaneous solution: Inject 1 pen subcutaneously once per week.  oxyCODONE 10 mg oral tablet: 1 tab(s) orally every 6 hours, As Needed   potassium chloride 10 mEq oral tablet, extended release: 1 tab(s) orally 2 times a day  pramipexole 1.5 mg oral tablet: 1 tab(s) orally once a day  sulfaSALAzine 500 mg oral tablet: 3 tab(s) orally 3 times a day    torsemide 5 mg oral tablet: 1 tab(s) orally 2 times a day   ascorbic acid 500 mg oral tablet: 1 tab(s) orally once a day  cholecalciferol oral tablet: 2000 unit(s) orally once a day  DexPak 10 Day Taperpak 1.5 mg oral tablet: Day 1-4: 3 tabs twice per day--,Day 5-8: 2 tabs twice per day, Day 9-12 1tab twice per day  ferrous sulfate 325 mg (65 mg elemental iron) oral tablet: 1 tab(s) orally once a day  lidocaine 5% topical film: Apply topically to affected area  melatonin 3 mg oral tablet: 1 tab(s) orally once a day (at bedtime), As needed, Insomnia  Multiple Vitamins with Minerals oral tablet: 1 tab(s) orally once a day  Orencia 125 mg/mL subcutaneous solution: Inject 1 pen subcutaneously once per week.  oxyCODONE 10 mg oral tablet: 1 tab(s) orally every 6 hours, As Needed   potassium chloride 10 mEq oral tablet, extended release: 1 tab(s) orally 2 times a day  pramipexole 1.5 mg oral tablet: 1 tab(s) orally once a day  sulfaSALAzine 500 mg oral tablet: 3 tab(s) orally 3 times a day    torsemide 5 mg oral tablet: 1 tab(s) orally 2 times a day   ascorbic acid 500 mg oral tablet: 1 tab(s) orally once a day  cholecalciferol oral tablet: 2000 unit(s) orally once a day  dexamethasone 1 mg oral tablet: Day 1-4: 2 tablets twice per day, Day 5-8: 1 tablet twice per day, Day 9-12 1 tablet daily.  ferrous sulfate 325 mg (65 mg elemental iron) oral tablet: 1 tab(s) orally once a day  lidocaine 5% topical film: Apply topically to affected area  melatonin 3 mg oral tablet: 1 tab(s) orally once a day (at bedtime), As needed, Insomnia  Multiple Vitamins with Minerals oral tablet: 1 tab(s) orally once a day  Orencia 125 mg/mL subcutaneous solution: Inject 1 pen subcutaneously once per week.  oxyCODONE 10 mg oral tablet: 1 tab(s) orally every 6 hours, As Needed   potassium chloride 10 mEq oral tablet, extended release: 1 tab(s) orally 2 times a day  pramipexole 1.5 mg oral tablet: 1 tab(s) orally once a day  sulfaSALAzine 500 mg oral tablet: 3 tab(s) orally 3 times a day    torsemide 5 mg oral tablet: 1 tab(s) orally 2 times a day

## 2021-02-08 NOTE — DISCHARGE NOTE PROVIDER - NSDCCPCAREPLAN_GEN_ALL_CORE_FT
PRINCIPAL DISCHARGE DIAGNOSIS  Diagnosis: COVID-19  Assessment and Plan of Treatment: Treated with remdesivir and dexamethasone taperin dose. no O2 needed, discharge to rehab COVID (-)      SECONDARY DISCHARGE DIAGNOSES  Diagnosis: Syncope  Assessment and Plan of Treatment: Multifactorial, no episodes during this admission. extensive workup done so far with negative results. needs to complete after discharge from rehab: TILT test, stress test, ILR, sleep study.   F/up with cardiology.    Diagnosis: Hypokalemia  Assessment and Plan of Treatment: replaced and resolved, to continue with supplements as prescribed    Diagnosis: Fall  Assessment and Plan of Treatment: Fall due to syncope: multifactorial, no syncopal episodes during this admission. evaluted by PT recommending SUSANNA.  extensive workup done, so far workup pending to be done after discharge from rehab: TILT test, stress test, sleep study    Diagnosis: Pneumonia  Assessment and Plan of Treatment: Pneumonia residual from aspiration from prior admission and due to COVID. CXR before discharge reported improvement of infiltrate. advice to continue with incentive sergo. f/up with PCP    Diagnosis: Colitis  Assessment and Plan of Treatment: less likely bacterial, resolved before discharge, advice to continue with probiotics.

## 2021-02-08 NOTE — PROGRESS NOTE ADULT - SUBJECTIVE AND OBJECTIVE BOX
[facility] -- Quang Akers, Itzel Herrera, Kiran Miranda Savella  Office # [phone]      Follow Up:    Syncope, Falls  Subjective/Observations:   No events overnight resting comfortably in bed.  No complaints of chest pain, dyspnea, or palpitations reported. No signs of orthopnea or PND. Tolerating room air     REVIEW OF SYSTEMS: All other review of systems is negative unless indicated above    PAST MEDICAL & SURGICAL HISTORY:  Obesity (BMI 30-39.9)    Traumatic arthropathy involving lower leg  right    History of Osteoarthritis    Hyperlipidemia    Restless Leg Syndrome    H/O neck surgery    H/O arthroscopy of right knee  2010    Bladder Disorder  Bladder lift 2000    History of Colonoscopy    Ex lap in 2009 for abscess in the baldder    History of Hysterectomy and bladder lift in 2000        MEDICATIONS  (STANDING):  ascorbic acid 500 milliGRAM(s) Oral daily  budesonide 160 MICROgram(s)/formoterol 4.5 MICROgram(s) Inhaler 2 Puff(s) Inhalation two times a day  cholecalciferol 2000 Unit(s) Oral daily  cholestyramine Powder (Sugar-Free) 4 Gram(s) Oral every 24 hours  dexAMETHasone     Tablet 6 milliGRAM(s) Oral daily  enoxaparin Injectable 40 milliGRAM(s) SubCutaneous daily  famotidine    Tablet 20 milliGRAM(s) Oral two times a day  ferrous    sulfate 325 milliGRAM(s) Oral daily  influenza   Vaccine 0.5 milliLiter(s) IntraMuscular once  lactobacillus acidophilus 1 Tablet(s) Oral three times a day  lidocaine   Patch 1 Patch Transdermal every 24 hours  multivitamin/minerals 1 Tablet(s) Oral daily  potassium chloride    Tablet ER 10 milliEquivalent(s) Oral two times a day  pramipexole 1.5 milliGRAM(s) Oral every 24 hours  sulfaSALAzine 500 milliGRAM(s) Oral three times a day  torsemide 5 milliGRAM(s) Oral two times a day    MEDICATIONS  (PRN):  acetaminophen   Tablet .. 650 milliGRAM(s) Oral every 6 hours PRN Temp greater or equal to 38C (100.4F), Mild Pain (1 - 3)  ALBUTerol    90 MICROgram(s) HFA Inhaler 2 Puff(s) Inhalation every 4 hours PRN Shortness of Breath and/or Wheezing  aluminum hydroxide/magnesium hydroxide/simethicone Suspension 30 milliLiter(s) Oral every 4 hours PRN Dyspepsia  benzonatate 100 milliGRAM(s) Oral three times a day PRN Cough  cyclobenzaprine 5 milliGRAM(s) Oral three times a day PRN Muscle Spasm  melatonin 3 milliGRAM(s) Oral at bedtime PRN Insomnia  ondansetron Injectable 4 milliGRAM(s) IV Push every 6 hours PRN Nausea and/or Vomiting  oxyCODONE    IR 5 milliGRAM(s) Oral four times a day PRN Severe Pain (7 - 10)      Allergies    No Known Allergies    Intolerances        Vital Signs Last 24 Hrs  T(C): 36.6 (08 Feb 2021 05:14), Max: 36.9 (07 Feb 2021 20:45)  T(F): 97.9 (08 Feb 2021 05:14), Max: 98.4 (07 Feb 2021 20:45)  HR: 73 (08 Feb 2021 05:14) (73 - 79)  BP: 107/61 (08 Feb 2021 05:14) (105/59 - 118/66)  BP(mean): --  RR: 18 (08 Feb 2021 05:14) (18 - 19)  SpO2: 96% (08 Feb 2021 05:14) (93% - 96%)    I&O's Summary    07 Feb 2021 07:01  -  08 Feb 2021 07:00  --------------------------------------------------------  IN: 960 mL / OUT: 0 mL / NET: 960 mL          PHYSICAL EXAM:  TELE: Off tele   Constitutional: NAD, awake and alert, well-developed  HEENT: Moist Mucous Membranes, Anicteric  Pulmonary: Non-labored, breath sounds are clear bilaterally, No wheezing, crackles or rhonchi  Cardiovascular: Regular, S1 and S2 nl, + murmur No rubs, gallops or clicks   Gastrointestinal: Bowel Sounds present, soft, nontender.   Lymph: No lymphadenopathy. No peripheral edema.  Skin: No visible rashes or ulcers.  Psych:  Mood & affect appropriate    LABS: All Labs Reviewed:                        9.0    10.68 )-----------( 346      ( 08 Feb 2021 09:15 )             29.8                         9.7    11.58 )-----------( 361      ( 07 Feb 2021 09:53 )             30.7     07 Feb 2021 09:53    140    |  104    |  11     ----------------------------<  199    3.8     |  23     |  0.99   06 Feb 2021 08:13    x      |  x      |  x      ----------------------------<  x      x       |  x      |  0.84     Ca    8.4        07 Feb 2021 09:53    TPro  6.4    /  Alb  2.7    /  TBili  0.4    /  DBili  .20    /  AST  14     /  ALT  14     /  AlkPhos  105    06 Feb 2021 08:13      Feb 2021 09:26      12 Lead ECG:   Ventricular Rate 87 BPM  Atrial Rate 87 BPM  P-R Interval 148 ms  QRS Duration 98 ms  Q-T Interval 388 ms  QTC Calculation(Bazett) 466 ms  P Axis 53 degrees  R Axis -3 degrees  T Axis 10 degrees  Diagnosis Line Normal sinus rhythm  Poor R wave progression  Confirmed by CLARK MIRANDA ([id number]) on 1/28/2021 10:53:29 AM (01-27-21 @ 22:34)    < from: TTE Echo Complete w/o Contrast w/ Doppler (01.31.21 @ 17:17) >  Dimensions:  LA 3.0       Normal Values: 2.0 - 4.0 cm  Ao 3.3        Normal Values: 2.0 - 3.8 cm  SEPTUM 1.2       Normal Values: 0.6 - 1.2 cm  PWT 1.2       Normal Values: 0.6 - 1.1 cm  LVIDd 4.6         Normal Values: 3.0 - 5.6 cm  LVIDs 3.2         Normal Values: 1.8 - 4.0 cm    OBSERVATIONS:  Technically difficult study  Mitral Valve: Mitral annular calcification with thickened leaflets, trace physiologic MR.  Aortic Valve/Aorta: Aortic valve is not well-visualized. Appears calcified with a peak transaortic valve gradient is 18.5 mmHg with a mean transaortic valve gradient 10.9 mmHg.  Tricuspid Valve: normal with trace TR.  Pulmonic Valve: Not well-visualized  Left Atrium: normal  Right Atrium: Not well-visualized  Left Ventricle: normal LV size and systolic function, estimated LVEF of 55-60%.  Right Ventricle: Grossly normal size and systolic function.  Pericardium/Pleura: normal, no significant pericardial effusion.    Conclusion:  Technically difficult study  Normal left ventricular internal dimensions and systolic function, estimated LVEF of 55-60%.  Grossly normal RV size and systolic function.  The aortic valve is not well-visualized, appears calcified with likely mild aortic stenosis  Trace physiologic MR and TR.  No significant pericardial effusion.    < end of copied text >

## 2021-02-08 NOTE — PROGRESS NOTE ADULT - REASON FOR ADMISSION
sob
sob COVID, falls
sob
sob  COVID
sob

## 2021-02-08 NOTE — PROGRESS NOTE ADULT - SUBJECTIVE AND OBJECTIVE BOX
INTERVAL HPI/OVERNIGHT EVENTS:  chart reviewed, on ra  no diarrhea per nursing  labs noted    MEDICATIONS  (STANDING):  ascorbic acid 500 milliGRAM(s) Oral daily  budesonide 160 MICROgram(s)/formoterol 4.5 MICROgram(s) Inhaler 2 Puff(s) Inhalation two times a day  cholecalciferol 2000 Unit(s) Oral daily  cholestyramine Powder (Sugar-Free) 4 Gram(s) Oral every 24 hours  dexAMETHasone     Tablet 6 milliGRAM(s) Oral daily  enoxaparin Injectable 40 milliGRAM(s) SubCutaneous daily  famotidine    Tablet 20 milliGRAM(s) Oral two times a day  ferrous    sulfate 325 milliGRAM(s) Oral daily  influenza   Vaccine 0.5 milliLiter(s) IntraMuscular once  lactobacillus acidophilus 1 Tablet(s) Oral three times a day  lidocaine   Patch 1 Patch Transdermal every 24 hours  multivitamin/minerals 1 Tablet(s) Oral daily  potassium chloride    Tablet ER 10 milliEquivalent(s) Oral two times a day  pramipexole 1.5 milliGRAM(s) Oral every 24 hours  sulfaSALAzine 500 milliGRAM(s) Oral three times a day  torsemide 5 milliGRAM(s) Oral two times a day    MEDICATIONS  (PRN):  acetaminophen   Tablet .. 650 milliGRAM(s) Oral every 6 hours PRN Temp greater or equal to 38C (100.4F), Mild Pain (1 - 3)  ALBUTerol    90 MICROgram(s) HFA Inhaler 2 Puff(s) Inhalation every 4 hours PRN Shortness of Breath and/or Wheezing  aluminum hydroxide/magnesium hydroxide/simethicone Suspension 30 milliLiter(s) Oral every 4 hours PRN Dyspepsia  benzonatate 100 milliGRAM(s) Oral three times a day PRN Cough  cyclobenzaprine 5 milliGRAM(s) Oral three times a day PRN Muscle Spasm  melatonin 3 milliGRAM(s) Oral at bedtime PRN Insomnia  ondansetron Injectable 4 milliGRAM(s) IV Push every 6 hours PRN Nausea and/or Vomiting  oxyCODONE    IR 5 milliGRAM(s) Oral four times a day PRN Severe Pain (7 - 10)      Allergies    No Known Allergies    Intolerances        Review of Systems:    tele f/u      Vital Signs Last 24 Hrs  T(C): 36.6 (08 Feb 2021 05:14), Max: 36.9 (07 Feb 2021 20:45)  T(F): 97.9 (08 Feb 2021 05:14), Max: 98.4 (07 Feb 2021 20:45)  HR: 73 (08 Feb 2021 05:14) (73 - 79)  BP: 107/61 (08 Feb 2021 05:14) (105/59 - 118/66)  BP(mean): --  RR: 18 (08 Feb 2021 05:14) (18 - 19)  SpO2: 96% (08 Feb 2021 05:14) (93% - 96%)    PHYSICAL EXAM:    tele f/u      LABS:                        9.0    10.68 )-----------( 346      ( 08 Feb 2021 09:15 )             29.8     02-07    140  |  104  |  11  ----------------------------<  199<H>  3.8   |  23  |  0.99    Ca    8.4<L>      07 Feb 2021 09:53            RADIOLOGY & ADDITIONAL TESTS:

## 2021-02-08 NOTE — PROGRESS NOTE ADULT - ASSESSMENT
contact #  daughter----Mortimer, Kelly  phone # [phone]    IMPRESSION:  69 y/o F PMHx lymphedema, HLD, RA, s/p cervical spine surgery at [facility] around 10/2020--- with prolonged hospital course complicated by C. diff infection (completed treatment course) and dysphagia (s/p PEG tube placement). Patient returned home on 11/24/20. Patient was admitted at [facility] from 11/28/2020---12/1/2020, was admitted with nausea/ vomiting/and abdominal pain, and was discharged 12/1/2020. She has been using a walker to ambulate, admitted with multiple falls and weakness, had ct scan this admission, hematology was consulted for anemia on 1/28 and was seen.  case d/w daughter, lives with  per , 2 daughters    RECOMMENDATION:  Anemia---likely multifactorial  hb is at 9 today, hb was at 9.7 yesterday--h/h low but stable, patient is on po iron, monitor serial h/h, cbc  iron profile--low iron/transferrin saturation, ferritin is an acute phase reactant, and as were looking for a rapid response, patient is s/p iv iron for 3 days, now patient is on po fe daily  monitor h/h--transfuse prbc as needed for symptomatic anemia  nl ldh, low retic--no evidence of hemolysis  nl b12 level  smear was reviewed, unremarkable  monitor h/h--transfuse prbc as needed for symptomatic anemia or if hb is under 7  ?colitis on ct--gi is following  ct a/p--1/28/21--mild splenomegaly at 13.2 cm, us spleen--mild splenomegaly, no change c/w ct a/p 1/2021, pt/family plan to follow with her outside pcp and declines any further test or work up for this, declines bm bx  anemia/iron def possible/colitis---gi is following and gi work up as per gi/pt/family to exclude gi pathology/malignancy  covid positive, id is following,management/plan per id/critical care  gi/dvtp--lovenox s/c  d/w patient at bedside at length, prior d/w dtr

## 2021-02-08 NOTE — PROGRESS NOTE ADULT - ASSESSMENT
covid  colitis  anemia  dysphagia      no need for abx for ct findings re: colon  ob neg: monitor cbc daily  cont bacid, questran  cont pepcid daily  diet as tolerated  her peg tube was removed in between her admissions  dc planning in progress

## 2021-02-08 NOTE — PROGRESS NOTE ADULT - ATTENDING COMMENTS
I personally saw and examined the patient in detail.  I have spoken to the above provider regarding the assessment and plan of care.  I reviewed the above assessment and plan of care, and agree.  I have made changes in the body of the note where appropriate.
I saw and examined the patient personally. Spoke with above provider regarding this case. I reviewed the above findings completely.  I agree with the above history, physical, and plan which I have edited where appropriate.
The patient was personally seen and examined, in addition to being examined and evaluated by NP.  All elements of the note were edited where appropriate.
I saw and examined the patient personally. Spoke with above provider regarding this case. I reviewed the above findings completely.  I agree with the above history, physical, and plan which I have edited where appropriate.
Seen/examined. agree with above.
Seen/examined. agree with above.

## 2021-02-08 NOTE — PROGRESS NOTE ADULT - SUBJECTIVE AND OBJECTIVE BOX
Neurology follow up note    VERONICA [id number]      Interval History:    Patient feels ok no new complaints.    MEDICATIONS    acetaminophen   Tablet .. 650 milliGRAM(s) Oral every 6 hours PRN  ALBUTerol    90 MICROgram(s) HFA Inhaler 2 Puff(s) Inhalation every 4 hours PRN  aluminum hydroxide/magnesium hydroxide/simethicone Suspension 30 milliLiter(s) Oral every 4 hours PRN  ascorbic acid 500 milliGRAM(s) Oral daily  benzonatate 100 milliGRAM(s) Oral three times a day PRN  budesonide 160 MICROgram(s)/formoterol 4.5 MICROgram(s) Inhaler 2 Puff(s) Inhalation two times a day  cholecalciferol 2000 Unit(s) Oral daily  cholestyramine Powder (Sugar-Free) 4 Gram(s) Oral every 24 hours  cyclobenzaprine 5 milliGRAM(s) Oral three times a day PRN  dexAMETHasone     Tablet 6 milliGRAM(s) Oral daily  enoxaparin Injectable 40 milliGRAM(s) SubCutaneous daily  famotidine    Tablet 20 milliGRAM(s) Oral two times a day  ferrous    sulfate 325 milliGRAM(s) Oral daily  influenza   Vaccine 0.5 milliLiter(s) IntraMuscular once  lactobacillus acidophilus 1 Tablet(s) Oral three times a day  lidocaine   Patch 1 Patch Transdermal every 24 hours  melatonin 3 milliGRAM(s) Oral at bedtime PRN  multivitamin/minerals 1 Tablet(s) Oral daily  ondansetron Injectable 4 milliGRAM(s) IV Push every 6 hours PRN  oxyCODONE    IR 5 milliGRAM(s) Oral four times a day PRN  potassium chloride    Tablet ER 10 milliEquivalent(s) Oral two times a day  pramipexole 1.5 milliGRAM(s) Oral every 24 hours  sulfaSALAzine 500 milliGRAM(s) Oral three times a day  torsemide 5 milliGRAM(s) Oral two times a day      Allergies    No Known Allergies    Intolerances            Vital Signs Last 24 Hrs  T(C): 36.6 (08 Feb 2021 05:14), Max: 36.9 (07 Feb 2021 20:45)  T(F): 97.9 (08 Feb 2021 05:14), Max: 98.4 (07 Feb 2021 20:45)  HR: 73 (08 Feb 2021 05:14) (73 - 76)  BP: 107/61 (08 Feb 2021 05:14) (107/61 - 118/66)  BP(mean): --  RR: 18 (08 Feb 2021 05:14) (18 - 18)  SpO2: 96% (08 Feb 2021 05:14) (93% - 96%)      REVIEW OF SYSTEMS:  Constitutional:  The patient denies fever, chills, or night sweats.  Head:  No headache.  Eyes:  No double vision or blurry vision.  Ears:  No ringing in the ears.  Neck:  Positive history of neck pain, but had cervical surgery done there and arthritis.  Respiratory:  No shortness of breath at present.  Cardiovascular:  No chest pain.  Abdomen:  No nausea, vomiting, or abdominal pain.  Extremities/Neurological:  Occasional musculoskeletal pain in her legs and joints.  Genitourinary:  No burning upon urination.    PHYSICAL EXAMINATION:   HEENT:  Head:  Normocephalic, atraumatic.  Eyes:  No scleral icterus.  Ears:  Hearing bilaterally intact.  NECK:  Supple.  RESPIRATORY:  Decreased breath sounds bilaterally.  CARDIOVASCULAR:  S1 and S2 heard.  ABDOMEN:  Soft and nontender.  EXTREMITIES:  No clubbing or cyanosis were noted.  GENERAL:  Positive surgical scar was noted in the cervical region in the bilateral knees.      NEUROLOGIC:  The patient is awake and alert.  Extraocular movement were intact.  The patient has decreased range of motion of her neck secondary to surgical intervention.  Speech was fluent.  Smile was symmetric.  Motor:  The patient has decreased range of motion of bilateral shoulders.  Able to elevate roughly about 60 degrees off the bed.  Overall strength was 3+/5.  Bilateral lower extremities, the patient is able to move side-to-side.  Had significant lymphedema in the bilateral lower extremities.  Sensory was decreased to light touch in the bilateral lower extremities, but has significant lymphedema.  The patient had impaired proprioception in the bilateral lower extremities.               LABS:  CBC Full  -  ( 08 Feb 2021 09:15 )  WBC Count : 10.68 K/uL  RBC Count : 3.41 M/uL  Hemoglobin : 9.0 g/dL  Hematocrit : 29.8 %  Platelet Count - Automated : 346 K/uL  Mean Cell Volume : 87.4 fl  Mean Cell Hemoglobin : 26.4 pg  Mean Cell Hemoglobin Concentration : 30.2 gm/dL  Auto Neutrophil # : 7.38 K/uL  Auto Lymphocyte # : 2.30 K/uL  Auto Monocyte # : 0.83 K/uL  Auto Eosinophil # : 0.11 K/uL  Auto Basophil # : 0.02 K/uL  Auto Neutrophil % : 69.1 %  Auto Lymphocyte % : 21.5 %  Auto Monocyte % : 7.8 %  Auto Eosinophil % : 1.0 %  Auto Basophil % : 0.2 %      02-07    140  |  104  |  11  ----------------------------<  199<H>  3.8   |  23  |  0.99    Ca    8.4<L>      07 Feb 2021 09:53      Hemoglobin A1C:       Vitamin B12         RADIOLOGY    ANALYSIS AND PLAN:  A 71-year-old with a history of frequent falls, restless legs syndrome.  For episodes of frequent falls, syncopal events, the patient has had multiple workups in the past for this.  Questionable this could be secondary to any type of underlying orthostatic changes.  The patient does have a history of poor oral intake.  The patient also is mostly sedentary.  Also, the patient does have lymphedema in the bilateral lower extremities.  The patient was to be evaluated for possible underlying narcolepsy with sleep studies.  Telemetry evaluation.  Monitor orthostatics as needed.  For restless leg syndrome, continue the patient on her Mirapex.  For history of possible peripheral neuropathy, supportive therapy.  Fall precautions.  For generalized paresis secondary to underlying infectious type process COVID.  For cervical radiculopathy, continue the patient on her muscle relaxants as needed.  overall more interactive   as per patient does feel stronger   The daughter's name is Edelmira.  Telephone number is [phone] 2/6/2021  EEG was negative for any epileptiform discharges spoke to daughter she does not want any seizure medications which is reasonable   no new events   Greater than 37 minutes of time was spent with the patient, plan of care, reviewing data, speaking to the family and  50% of the visit was spent counseling and/or coordinating care with multidisciplinary healthcare team

## 2021-02-09 ENCOUNTER — TRANSCRIPTION ENCOUNTER (OUTPATIENT)
Age: 71
End: 2021-02-09

## 2021-03-09 ENCOUNTER — APPOINTMENT (OUTPATIENT)
Dept: CARDIOLOGY | Facility: CLINIC | Age: 71
End: 2021-03-09

## 2021-03-09 ENCOUNTER — APPOINTMENT (OUTPATIENT)
Dept: CARDIOLOGY | Facility: CLINIC | Age: 71
End: 2021-03-09
Payer: MEDICARE

## 2021-03-09 VITALS
OXYGEN SATURATION: 96 % | BODY MASS INDEX: 28.88 KG/M2 | WEIGHT: 184 LBS | HEART RATE: 98 BPM | HEIGHT: 67 IN | DIASTOLIC BLOOD PRESSURE: 76 MMHG | SYSTOLIC BLOOD PRESSURE: 150 MMHG

## 2021-03-09 DIAGNOSIS — E87.6 HYPOKALEMIA: ICD-10-CM

## 2021-03-09 DIAGNOSIS — D64.9 ANEMIA, UNSPECIFIED: ICD-10-CM

## 2021-03-09 LAB
ALBUMIN SERPL ELPH-MCNC: 3.8 G/DL
ALP BLD-CCNC: 112 U/L
ALT SERPL-CCNC: 6 U/L
ANION GAP SERPL CALC-SCNC: 11 MMOL/L
AST SERPL-CCNC: 13 U/L
BASOPHILS # BLD AUTO: 0.04 K/UL
BASOPHILS NFR BLD AUTO: 0.6 %
BILIRUB SERPL-MCNC: 0.2 MG/DL
BUN SERPL-MCNC: 6 MG/DL
CALCIUM SERPL-MCNC: 9.3 MG/DL
CHLORIDE SERPL-SCNC: 110 MMOL/L
CO2 SERPL-SCNC: 22 MMOL/L
CREAT SERPL-MCNC: 0.65 MG/DL
EOSINOPHIL # BLD AUTO: 0.2 K/UL
EOSINOPHIL NFR BLD AUTO: 3 %
FERRITIN SERPL-MCNC: 54 NG/ML
GLUCOSE SERPL-MCNC: 92 MG/DL
HCT VFR BLD CALC: 31.7 %
HGB BLD-MCNC: 9.9 G/DL
IMM GRANULOCYTES NFR BLD AUTO: 0.3 %
IRON SATN MFR SERPL: 12 %
IRON SERPL-MCNC: 29 UG/DL
LYMPHOCYTES # BLD AUTO: 2.08 K/UL
LYMPHOCYTES NFR BLD AUTO: 31.3 %
MAN DIFF?: NORMAL
MCHC RBC-ENTMCNC: 28.6 PG
MCHC RBC-ENTMCNC: 31.2 GM/DL
MCV RBC AUTO: 91.6 FL
MONOCYTES # BLD AUTO: 0.59 K/UL
MONOCYTES NFR BLD AUTO: 8.9 %
NEUTROPHILS # BLD AUTO: 3.71 K/UL
NEUTROPHILS NFR BLD AUTO: 55.9 %
PLATELET # BLD AUTO: 299 K/UL
POTASSIUM SERPL-SCNC: 3.3 MMOL/L
PROT SERPL-MCNC: 6.4 G/DL
RBC # BLD: 3.46 M/UL
RBC # FLD: 18.5 %
SODIUM SERPL-SCNC: 143 MMOL/L
TIBC SERPL-MCNC: 236 UG/DL
UIBC SERPL-MCNC: 208 UG/DL
WBC # FLD AUTO: 6.64 K/UL

## 2021-03-09 PROCEDURE — 99215 OFFICE O/P EST HI 40 MIN: CPT

## 2021-03-09 RX ORDER — ABATACEPT 125 MG/ML
INJECTION, SOLUTION SUBCUTANEOUS
Refills: 0 | Status: ACTIVE | COMMUNITY

## 2021-03-09 RX ORDER — FERROUS SULFATE TAB EC 324 MG (65 MG FE EQUIVALENT) 324 (65 FE) MG
324 (65 FE) TABLET DELAYED RESPONSE ORAL TWICE DAILY
Refills: 0 | Status: ACTIVE | COMMUNITY

## 2021-03-09 RX ORDER — POTASSIUM CHLORIDE 750 MG/1
10 TABLET, EXTENDED RELEASE ORAL 4 TIMES DAILY
Qty: 120 | Refills: 0 | Status: ACTIVE | COMMUNITY

## 2021-03-23 LAB
ANION GAP SERPL CALC-SCNC: 13 MMOL/L
BASOPHILS # BLD AUTO: 0.04 K/UL
BASOPHILS NFR BLD AUTO: 0.6 %
BUN SERPL-MCNC: 6 MG/DL
CALCIUM SERPL-MCNC: 9.4 MG/DL
CHLORIDE SERPL-SCNC: 108 MMOL/L
CO2 SERPL-SCNC: 22 MMOL/L
CREAT SERPL-MCNC: 0.73 MG/DL
EOSINOPHIL # BLD AUTO: 0.26 K/UL
EOSINOPHIL NFR BLD AUTO: 3.7 %
GLUCOSE SERPL-MCNC: 95 MG/DL
HCT VFR BLD CALC: 34.7 %
HGB BLD-MCNC: 10.8 G/DL
IMM GRANULOCYTES NFR BLD AUTO: 0.3 %
IRON SATN MFR SERPL: 20 %
IRON SERPL-MCNC: 44 UG/DL
LYMPHOCYTES # BLD AUTO: 2.41 K/UL
LYMPHOCYTES NFR BLD AUTO: 34.6 %
MAN DIFF?: NORMAL
MCHC RBC-ENTMCNC: 29.3 PG
MCHC RBC-ENTMCNC: 31.1 GM/DL
MCV RBC AUTO: 94 FL
MONOCYTES # BLD AUTO: 0.68 K/UL
MONOCYTES NFR BLD AUTO: 9.8 %
NEUTROPHILS # BLD AUTO: 3.56 K/UL
NEUTROPHILS NFR BLD AUTO: 51 %
PLATELET # BLD AUTO: 277 K/UL
POTASSIUM SERPL-SCNC: 4.1 MMOL/L
RBC # BLD: 3.69 M/UL
RBC # FLD: 17.5 %
SODIUM SERPL-SCNC: 143 MMOL/L
TIBC SERPL-MCNC: 219 UG/DL
UIBC SERPL-MCNC: 175 UG/DL
WBC # FLD AUTO: 6.97 K/UL

## 2021-04-13 ENCOUNTER — APPOINTMENT (OUTPATIENT)
Dept: CARDIOLOGY | Facility: CLINIC | Age: 71
End: 2021-04-13
Payer: MEDICARE

## 2021-04-13 VITALS
SYSTOLIC BLOOD PRESSURE: 152 MMHG | HEIGHT: 67 IN | HEART RATE: 98 BPM | DIASTOLIC BLOOD PRESSURE: 80 MMHG | BODY MASS INDEX: 31.08 KG/M2 | WEIGHT: 198 LBS | OXYGEN SATURATION: 95 %

## 2021-04-13 PROCEDURE — 99214 OFFICE O/P EST MOD 30 MIN: CPT

## 2021-04-13 NOTE — DISCUSSION/SUMMARY
[FreeTextEntry1] : Patient has chronic lymphedema of the legs which is being treated with water pills, low-salt diet, and support stockings.  This is been a stable and chronic condition.  Is no chest pain, or shortness of breath.  Her walking is very limited because of back pain.  She is getting physical therapy.\par \par She will stay on her present medication.  She will try careful with her diet to lose weight.  If she has any symptoms or problems she would call me.  If all is well I will see her in 2 months.  She will try taking low-dose aspirin 81 mg twice week to prevent blood clots in her legs and for her carotid artery.  She has any problems she would call me.

## 2021-04-13 NOTE — HISTORY OF PRESENT ILLNESS
[FreeTextEntry1] : I saw Christina Damian in the office today for cardiac follow up. The patient underwent neck surgery 10/21/20 at [facility] repair chronic abnormalities in the bones..She is a 71-year-old white female who was admitted to [facility] 12/19 with episodes of frequent falls and blacking out occurring over the past 4 months. In the hospital evaluation revealed acute kidney insufficiency felt to be due to dehydration. Episodes of falling and loss of consciousness were felt to be due to dehydration and poor perfusion to the brain. Creatinine went from 3.8 down to 1.2 with hydration. There was no evidence for CVA. She had been admitted to [facility] previously with similar symptoms.  She had been keeping herself well hydrated.\par \par She was admitted to [facility] 1/27/21 with shortness of breath and general weakness and falling. Workup demonstrated COVID Pneumonia. Echocardiogram in the hospital demonstrated an ejection fraction of 55-60%. There was mild aortic stenosis there was no arrhythmia in the hospital.  She had been recently admitted to [facility] and had a two-week cardiac monitor. The patient had no ability to give information about that admission. She was discharged to rehab for supportive care. She is now here for a followup. On the reduced dose of torsemide the patient having no more episodes of syncope.\par \par Her past medical history is significant for the absence of hypertension, diabetes, hyperlipidemia. She has no family history of heart disease. She stopped smoking 8 years ago. She's never had a significant cardiac workup. She has no known history of heart disease.\par \par She had an ADALBERTO performed 1/20 that was normal. Carotid Doppler 2/20 showed mild to moderate plaque. Echocardiogram 1/20 showed an ejection fraction of 60% with possible bicuspid aortic valve. There is mild aortic stenosis with a peak gradient of 18. Mild TR with PA pressure of 24. Stage I diastolic dysfunction. Blood work performed 2/20 demonstrated a cholesterol of 135, triglycerides 95, HDL 41, and LDL 73. Blood work 3/9/21 demonstrated Hgb 9.9. Repeat 3/21/21, Hgb 10.8, normal iron.  Patient unfortunately is gaining back some of the weight that she had lost.\par \par

## 2021-06-03 PROCEDURE — 72170 X-RAY EXAM OF PELVIS: CPT

## 2021-06-03 PROCEDURE — 76705 ECHO EXAM OF ABDOMEN: CPT

## 2021-06-03 PROCEDURE — 97535 SELF CARE MNGMENT TRAINING: CPT

## 2021-06-03 PROCEDURE — 97166 OT EVAL MOD COMPLEX 45 MIN: CPT

## 2021-06-03 PROCEDURE — 84145 PROCALCITONIN (PCT): CPT

## 2021-06-03 PROCEDURE — 92611 MOTION FLUOROSCOPY/SWALLOW: CPT

## 2021-06-03 PROCEDURE — 85025 COMPLETE CBC W/AUTO DIFF WBC: CPT

## 2021-06-03 PROCEDURE — 72125 CT NECK SPINE W/O DYE: CPT

## 2021-06-03 PROCEDURE — 82565 ASSAY OF CREATININE: CPT

## 2021-06-03 PROCEDURE — 93306 TTE W/DOPPLER COMPLETE: CPT

## 2021-06-03 PROCEDURE — 82248 BILIRUBIN DIRECT: CPT

## 2021-06-03 PROCEDURE — 81001 URINALYSIS AUTO W/SCOPE: CPT

## 2021-06-03 PROCEDURE — 70551 MRI BRAIN STEM W/O DYE: CPT

## 2021-06-03 PROCEDURE — 82270 OCCULT BLOOD FECES: CPT

## 2021-06-03 PROCEDURE — 85014 HEMATOCRIT: CPT

## 2021-06-03 PROCEDURE — 83615 LACTATE (LD) (LDH) ENZYME: CPT

## 2021-06-03 PROCEDURE — 97110 THERAPEUTIC EXERCISES: CPT

## 2021-06-03 PROCEDURE — 85610 PROTHROMBIN TIME: CPT

## 2021-06-03 PROCEDURE — 74177 CT ABD & PELVIS W/CONTRAST: CPT

## 2021-06-03 PROCEDURE — 97530 THERAPEUTIC ACTIVITIES: CPT

## 2021-06-03 PROCEDURE — A9698: CPT

## 2021-06-03 PROCEDURE — 82272 OCCULT BLD FECES 1-3 TESTS: CPT

## 2021-06-03 PROCEDURE — 94640 AIRWAY INHALATION TREATMENT: CPT

## 2021-06-03 PROCEDURE — 71250 CT THORAX DX C-: CPT

## 2021-06-03 PROCEDURE — 95816 EEG AWAKE AND DROWSY: CPT

## 2021-06-03 PROCEDURE — U0005: CPT

## 2021-06-03 PROCEDURE — 82728 ASSAY OF FERRITIN: CPT

## 2021-06-03 PROCEDURE — 85027 COMPLETE CBC AUTOMATED: CPT

## 2021-06-03 PROCEDURE — 80048 BASIC METABOLIC PNL TOTAL CA: CPT

## 2021-06-03 PROCEDURE — 83605 ASSAY OF LACTIC ACID: CPT

## 2021-06-03 PROCEDURE — 80307 DRUG TEST PRSMV CHEM ANLYZR: CPT

## 2021-06-03 PROCEDURE — 80076 HEPATIC FUNCTION PANEL: CPT

## 2021-06-03 PROCEDURE — 85730 THROMBOPLASTIN TIME PARTIAL: CPT

## 2021-06-03 PROCEDURE — 85379 FIBRIN DEGRADATION QUANT: CPT

## 2021-06-03 PROCEDURE — U0003: CPT

## 2021-06-03 PROCEDURE — 82607 VITAMIN B-12: CPT

## 2021-06-03 PROCEDURE — 84100 ASSAY OF PHOSPHORUS: CPT

## 2021-06-03 PROCEDURE — 93970 EXTREMITY STUDY: CPT

## 2021-06-03 PROCEDURE — 92526 ORAL FUNCTION THERAPY: CPT

## 2021-06-03 PROCEDURE — 74230 X-RAY XM SWLNG FUNCJ C+: CPT

## 2021-06-03 PROCEDURE — 97116 GAIT TRAINING THERAPY: CPT

## 2021-06-03 PROCEDURE — 86140 C-REACTIVE PROTEIN: CPT

## 2021-06-03 PROCEDURE — 73522 X-RAY EXAM HIPS BI 3-4 VIEWS: CPT

## 2021-06-03 PROCEDURE — 82962 GLUCOSE BLOOD TEST: CPT

## 2021-06-03 PROCEDURE — 85045 AUTOMATED RETICULOCYTE COUNT: CPT

## 2021-06-03 PROCEDURE — 82746 ASSAY OF FOLIC ACID SERUM: CPT

## 2021-06-03 PROCEDURE — 97163 PT EVAL HIGH COMPLEX 45 MIN: CPT

## 2021-06-03 PROCEDURE — 80053 COMPREHEN METABOLIC PANEL: CPT

## 2021-06-03 PROCEDURE — 83540 ASSAY OF IRON: CPT

## 2021-06-03 PROCEDURE — 36415 COLL VENOUS BLD VENIPUNCTURE: CPT

## 2021-06-03 PROCEDURE — 85018 HEMOGLOBIN: CPT

## 2021-06-03 PROCEDURE — 86359 T CELLS TOTAL COUNT: CPT

## 2021-06-03 PROCEDURE — 82652 VIT D 1 25-DIHYDROXY: CPT

## 2021-06-03 PROCEDURE — 87040 BLOOD CULTURE FOR BACTERIA: CPT

## 2021-06-03 PROCEDURE — 76857 US EXAM PELVIC LIMITED: CPT

## 2021-06-03 PROCEDURE — 70450 CT HEAD/BRAIN W/O DYE: CPT

## 2021-06-03 PROCEDURE — 83735 ASSAY OF MAGNESIUM: CPT

## 2021-06-03 PROCEDURE — 82550 ASSAY OF CK (CPK): CPT

## 2021-06-03 PROCEDURE — 93005 ELECTROCARDIOGRAM TRACING: CPT

## 2021-06-03 PROCEDURE — 92610 EVALUATE SWALLOWING FUNCTION: CPT

## 2021-06-03 PROCEDURE — 83550 IRON BINDING TEST: CPT

## 2021-06-03 PROCEDURE — 99285 EMERGENCY DEPT VISIT HI MDM: CPT

## 2021-06-03 PROCEDURE — 86360 T CELL ABSOLUTE COUNT/RATIO: CPT

## 2021-06-03 PROCEDURE — 71045 X-RAY EXAM CHEST 1 VIEW: CPT

## 2021-06-03 PROCEDURE — 87086 URINE CULTURE/COLONY COUNT: CPT

## 2021-06-08 ENCOUNTER — APPOINTMENT (OUTPATIENT)
Dept: CARDIOLOGY | Facility: CLINIC | Age: 71
End: 2021-06-08
Payer: MEDICARE

## 2021-06-08 VITALS
HEART RATE: 95 BPM | DIASTOLIC BLOOD PRESSURE: 99 MMHG | WEIGHT: 202 LBS | HEIGHT: 67 IN | SYSTOLIC BLOOD PRESSURE: 156 MMHG | OXYGEN SATURATION: 95 % | BODY MASS INDEX: 31.71 KG/M2

## 2021-06-08 PROCEDURE — 99214 OFFICE O/P EST MOD 30 MIN: CPT

## 2021-06-08 RX ORDER — ASPIRIN 81 MG
81 TABLET, DELAYED RELEASE (ENTERIC COATED) ORAL
Refills: 0 | Status: ACTIVE | COMMUNITY

## 2021-06-08 RX ORDER — PRAMIPEXOLE DIHYDROCHLORIDE 1.5 MG/1
1.5 TABLET ORAL
Refills: 0 | Status: ACTIVE | COMMUNITY

## 2021-06-08 NOTE — HISTORY OF PRESENT ILLNESS
[FreeTextEntry1] : I saw Christina Damian in the office today for cardiac follow up. The patient underwent neck surgery 10/21/20 at [facility] repair chronic abnormalities in the bones..She is a 71-year-old white female who was admitted to [facility] 12/19 with episodes of frequent falls and blacking out occurring over the past 4 months. In the hospital evaluation revealed acute kidney insufficiency felt to be due to dehydration. Episodes of falling and loss of consciousness were felt to be due to dehydration and poor perfusion to the brain. Creatinine went from 3.8 down to 1.2 with hydration. There was no evidence for CVA. She had been admitted to [facility] previously with similar symptoms.  She had been keeping herself well hydrated.\par \par She was admitted to [facility] 1/27/21 with shortness of breath and general weakness and falling. Workup demonstrated COVID Pneumonia. Echocardiogram in the hospital demonstrated an ejection fraction of 55-60%. There was mild aortic stenosis there was no arrhythmia in the hospital.  She had been recently admitted to [facility] and had a two-week cardiac monitor. The patient had no ability to give information about that admission. She was discharged to rehab for supportive care. She is now here for a followup. On the reduced dose of torsemide the patient having no more episodes of syncope.\par \par Her past medical history is significant for the absence of hypertension, diabetes, hyperlipidemia. She has no family history of heart disease. She stopped smoking 8 years ago. She's never had a significant cardiac workup. She has no known history of heart disease.\par \par She had an ADALBERTO performed 1/20 that was normal. Carotid Doppler 2/20 showed mild to moderate plaque. Echocardiogram 1/20 showed an ejection fraction of 60% with possible bicuspid aortic valve. There is mild aortic stenosis with a peak gradient of 18. Mild TR with PA pressure of 24. Stage I diastolic dysfunction. Blood work performed 2/20 demonstrated a cholesterol of 135, triglycerides 95, HDL 41, and LDL 73. Blood work 3/9/21 demonstrated Hgb 9.9. Repeat 3/21/21, Hgb 10.8, normal iron.  Patient unfortunately is gaining back some of the weight that she had lost.\par \par

## 2021-06-08 NOTE — PHYSICAL EXAM
[General Appearance - Well Developed] : well developed [Normal Appearance] : normal appearance [Well Groomed] : well groomed [General Appearance - Well Nourished] : well nourished [No Deformities] : no deformities [General Appearance - In No Acute Distress] : no acute distress [Normal Oral Mucosa] : normal oral mucosa [Normal Jugular Venous A Waves Present] : normal jugular venous A waves present [Normal Jugular Venous V Waves Present] : normal jugular venous V waves present [No Jugular Venous Hagen A Waves] : no jugular venous hagen A waves [Respiration, Rhythm And Depth] : normal respiratory rhythm and effort [Exaggerated Use Of Accessory Muscles For Inspiration] : no accessory muscle use [Auscultation Breath Sounds / Voice Sounds] : lungs were clear to auscultation bilaterally [Bowel Sounds] : normal bowel sounds [Abdomen Soft] : soft [Abdomen Tenderness] : non-tender [Nail Clubbing] : no clubbing of the fingernails [Cyanosis, Localized] : no localized cyanosis [Skin Turgor] : normal skin turgor [Skin Color & Pigmentation] : normal skin color and pigmentation [] : no rash [Oriented To Time, Place, And Person] : oriented to person, place, and time [Impaired Insight] : insight and judgment were intact [No Anxiety] : not feeling anxious [Not Palpable] : not palpable [Normal S1] : normal S1 [Normal Rate] : normal [Normal S2] : normal S2 [II] : a grade 2 [2+] : left 2+ [1+] : left 1+ [No Abnormalities] : the abdominal aorta was not enlarged and no bruit was heard [___ +] : bilateral [unfilled]U+ pretibial pitting edema [FreeTextEntry1] : Walks with a walker [S3] : no S3 [S4] : no S4 [Right Carotid Bruit] : no bruit heard over the right carotid [Left Carotid Bruit] : no bruit heard over the left carotid [Right Femoral Bruit] : no bruit heard over the right femoral artery [Left Femoral Bruit] : no bruit heard over the left femoral artery

## 2021-06-08 NOTE — REVIEW OF SYSTEMS
[Weight Gain (___ Lbs)] : [unfilled] ~Ulb weight gain [Feeling Fatigued] : feeling fatigued [Lower Ext Edema] : lower extremity edema [Joint Pain] : joint pain [Lower Back Pain] : lower back pain [Mid Back Pain] : mid back pain [Upper Back Pain] : upper back pain [Rash] : rash [Depression] : no depression [Anxiety] : no anxiety [Easy Bleeding] : no tendency for easy bleeding [Easy Bruising] : no tendency for easy bruising [Negative] : Genitourinary

## 2021-06-08 NOTE — DISCUSSION/SUMMARY
[FreeTextEntry1] : Patient's cardiac status is stable.  She has had no further episodes of lightheadedness while falling.  Her leg edema is chronic without any weeping or evidence of infection.  She takes the torsemide several times a week but does not take it when she is going out.  Still wraps her legs every day.  Neuropathy is stable.  Main complaint is back pain.  This prevents her from walking.  Does see Dr. Henley for peripheral neuropathy and Dr. Harvey for rheumatoid arthritis.\par \par She will stay on her present medication.  If she does have any further problems she would call me.  We did go over her medication and her blood work I answered her questions.  If all is well I will see her in 3 months.

## 2021-06-14 ENCOUNTER — TRANSCRIPTION ENCOUNTER (OUTPATIENT)
Age: 71
End: 2021-06-14

## 2021-08-05 ENCOUNTER — OUTPATIENT (OUTPATIENT)
Dept: OUTPATIENT SERVICES | Facility: HOSPITAL | Age: 71
LOS: 1 days | End: 2021-08-05
Payer: MEDICARE

## 2021-08-05 DIAGNOSIS — M54.16 RADICULOPATHY, LUMBAR REGION: ICD-10-CM

## 2021-08-05 DIAGNOSIS — Z98.890 OTHER SPECIFIED POSTPROCEDURAL STATES: Chronic | ICD-10-CM

## 2021-08-05 PROCEDURE — 62323 NJX INTERLAMINAR LMBR/SAC: CPT

## 2021-09-08 ENCOUNTER — APPOINTMENT (OUTPATIENT)
Dept: CARDIOLOGY | Facility: CLINIC | Age: 71
End: 2021-09-08
Payer: MEDICARE

## 2021-09-08 VITALS
BODY MASS INDEX: 32.18 KG/M2 | WEIGHT: 205 LBS | HEIGHT: 67 IN | HEART RATE: 83 BPM | DIASTOLIC BLOOD PRESSURE: 80 MMHG | SYSTOLIC BLOOD PRESSURE: 146 MMHG | OXYGEN SATURATION: 96 %

## 2021-09-08 PROCEDURE — 99214 OFFICE O/P EST MOD 30 MIN: CPT

## 2021-09-08 NOTE — REASON FOR VISIT
[Follow-Up - Clinic] : a clinic follow-up of [Syncope] : syncope [Aortic Stenosis] : aortic stenosis [Carotid Artery Stenosis] : carotid stenosis

## 2021-09-08 NOTE — REVIEW OF SYSTEMS
[Weight Gain (___ Lbs)] : [unfilled] ~Ulb weight gain [Feeling Fatigued] : feeling fatigued [Lower Ext Edema] : lower extremity edema [Joint Pain] : joint pain [Lower Back Pain] : lower back pain [Upper Back Pain] : upper back pain [Mid Back Pain] : mid back pain [Rash] : rash [Negative] : Genitourinary [Depression] : no depression [Anxiety] : no anxiety [Easy Bleeding] : no tendency for easy bleeding [Easy Bruising] : no tendency for easy bruising

## 2021-09-08 NOTE — HISTORY OF PRESENT ILLNESS
[FreeTextEntry1] : I saw Christina Damian in the office today for cardiac follow up. The patient underwent neck surgery 10/21/20 at [facility] repair chronic abnormalities in the bones..She is a 71-year-old white female who was admitted to [facility] 12/19 with episodes of frequent falls and blacking out occurring over the past 4 months. In the hospital evaluation revealed acute kidney insufficiency felt to be due to dehydration. Episodes of falling and loss of consciousness were felt to be due to dehydration and poor perfusion to the brain. Creatinine went from 3.8 down to 1.2 with hydration. There was no evidence for CVA. She had been admitted to [facility] previously with similar symptoms.  She had been keeping herself well hydrated.\par \par She was admitted to [facility] 1/27/21 with shortness of breath and general weakness and falling. Workup demonstrated COVID Pneumonia. Echocardiogram in the hospital demonstrated an ejection fraction of 55-60%. There was mild aortic stenosis there was no arrhythmia in the hospital.  She had been recently admitted to [facility] and had a two-week cardiac monitor. The patient had no ability to give information about that admission. She was discharged to rehab for supportive care. She is now here for a followup. On the reduced dose of torsemide the patient having no more episodes of syncope.\par \par Her past medical history is significant for the absence of hypertension, diabetes, hyperlipidemia. She has no family history of heart disease. She stopped smoking 8 years ago. She's never had a significant cardiac workup. She has no known history of heart disease.\par \par She had an ADALBERTO performed 1/20 that was normal. Carotid Doppler 2/20 showed mild to moderate plaque. Echocardiogram 1/20 showed an ejection fraction of 60% with possible bicuspid aortic valve. There is mild aortic stenosis with a peak gradient of 18. Mild TR with PA pressure of 24. Stage I diastolic dysfunction. Blood work performed 2/20 demonstrated a cholesterol of 135, triglycerides 95, HDL 41, and LDL 73. Blood work 3/9/21 demonstrated Hgb 9.9. Repeat 3/21/21, Hgb 10.8, normal iron.  Patient unfortunately is gaining back some of the weight that she had lost.\par \par Back pain is getting worse.  Otherwise everything else is stable.  She has no chest pain, shortness of breath, has had no episodes of lightheadedness or falling.  He does walk with a walker.\par \par

## 2021-09-08 NOTE — PHYSICAL EXAM
[General Appearance - Well Developed] : well developed [Normal Appearance] : normal appearance [Well Groomed] : well groomed [General Appearance - Well Nourished] : well nourished [No Deformities] : no deformities [Normal Oral Mucosa] : normal oral mucosa [General Appearance - In No Acute Distress] : no acute distress [Normal Jugular Venous A Waves Present] : normal jugular venous A waves present [Normal Jugular Venous V Waves Present] : normal jugular venous V waves present [No Jugular Venous Hagen A Waves] : no jugular venous hagen A waves [Exaggerated Use Of Accessory Muscles For Inspiration] : no accessory muscle use [Respiration, Rhythm And Depth] : normal respiratory rhythm and effort [Auscultation Breath Sounds / Voice Sounds] : lungs were clear to auscultation bilaterally [Bowel Sounds] : normal bowel sounds [Abdomen Soft] : soft [Abdomen Tenderness] : non-tender [Cyanosis, Localized] : no localized cyanosis [Nail Clubbing] : no clubbing of the fingernails [Skin Color & Pigmentation] : normal skin color and pigmentation [Skin Turgor] : normal skin turgor [] : no rash [Oriented To Time, Place, And Person] : oriented to person, place, and time [Impaired Insight] : insight and judgment were intact [No Anxiety] : not feeling anxious [Normal Rate] : normal [Not Palpable] : not palpable [Normal S1] : normal S1 [Normal S2] : normal S2 [II] : a grade 2 [2+] : left 2+ [1+] : right 1+ [No Abnormalities] : the abdominal aorta was not enlarged and no bruit was heard [___ +] : bilateral [unfilled]U+ pretibial pitting edema [FreeTextEntry1] : Walks with a walker [S3] : no S3 [S4] : no S4 [Right Carotid Bruit] : no bruit heard over the right carotid [Left Carotid Bruit] : no bruit heard over the left carotid [Right Femoral Bruit] : no bruit heard over the right femoral artery [Left Femoral Bruit] : no bruit heard over the left femoral artery

## 2021-10-09 NOTE — PATIENT PROFILE ADULT - NSPROGENSOURCEINFO_GEN_A_NUR
patient Dizziness resolved.  Patient has acute UTI.  ABx given and prescribed. Patient is now feeling improved and workup is unremarkable for any emergent process.   Patient/family was given full return precautions.  Patient was counseled on red flag symptoms such as fever, severe pain, or focal deficits and advised to return to the ED for these reasons or any reason that was concerning to them.  Patient/ family advised to make close follow up with their primary care provider and specialty clinics (as applicable) to follow up with this visit and continue investigation/treatment.  All questions were answered. Patient/family has shown adequate competence and understanding. Patient/family is agreeable to the plan.

## 2021-12-09 ENCOUNTER — APPOINTMENT (OUTPATIENT)
Dept: CARDIOLOGY | Facility: CLINIC | Age: 71
End: 2021-12-09
Payer: MEDICARE

## 2021-12-09 VITALS
HEART RATE: 90 BPM | WEIGHT: 220 LBS | OXYGEN SATURATION: 95 % | HEIGHT: 67 IN | DIASTOLIC BLOOD PRESSURE: 80 MMHG | SYSTOLIC BLOOD PRESSURE: 163 MMHG | BODY MASS INDEX: 34.53 KG/M2

## 2021-12-09 DIAGNOSIS — I89.0 LYMPHEDEMA, NOT ELSEWHERE CLASSIFIED: ICD-10-CM

## 2021-12-09 DIAGNOSIS — G47.33 OBSTRUCTIVE SLEEP APNEA (ADULT) (PEDIATRIC): ICD-10-CM

## 2021-12-09 PROCEDURE — 99214 OFFICE O/P EST MOD 30 MIN: CPT

## 2021-12-09 RX ORDER — GABAPENTIN 100 MG/1
100 CAPSULE ORAL
Qty: 60 | Refills: 0 | Status: ACTIVE | COMMUNITY

## 2021-12-09 NOTE — DISCUSSION/SUMMARY
[FreeTextEntry1] : The patient's cardiac status remains stable.  She is relatively sedentary but does walk with a walker and has no chest pain, shortness of breath, palpitation.  No further episodes of loss of consciousness.\par \par Her main medical issue is weight.  She needs to try to lose all the weight that she has gained.  She had recent blood work with her rheumatologist Dr. Harvey. I will try to get those records.  Her blood pressure is okay, and her cholesterol has been excellent without medication.  Does have mild to moderate carotid plaque and mild aortic stenosis.\par \par The patient has any symptoms or problems she would call me.  We did go over her lifestyle and I answered all of her questions.  If all is well I would see her in 4 months.

## 2021-12-09 NOTE — REVIEW OF SYSTEMS
[Weight Gain (___ Lbs)] : [unfilled] ~Ulb weight gain [Feeling Fatigued] : feeling fatigued [Lower Ext Edema] : lower extremity edema [Joint Pain] : joint pain [Lower Back Pain] : lower back pain [Mid Back Pain] : mid back pain [Upper Back Pain] : upper back pain [Rash] : rash [Negative] : Genitourinary [Depression] : no depression [Anxiety] : no anxiety [Easy Bleeding] : no tendency for easy bleeding [Easy Bruising] : no tendency for easy bruising

## 2021-12-09 NOTE — HISTORY OF PRESENT ILLNESS
[FreeTextEntry1] : I saw Christina Damian in the office today for cardiac follow up. She is a 71-year-old white female who was admitted to [facility] 12/19 with episodes of frequent falls and blacking out occurring over the past 4 months. In the hospital evaluation revealed acute kidney insufficiency felt to be due to dehydration. Episodes of falling and loss of consciousness were felt to be due to dehydration and poor perfusion to the brain. Creatinine went from 3.8 down to 1.2 with hydration. There was no evidence for CVA. She had been admitted to [facility] previously with similar symptoms.  She had been keeping herself well hydrated.\par \par She was admitted to [facility] 1/27/21 with shortness of breath and general weakness and falling. Workup demonstrated COVID Pneumonia. Echocardiogram in the hospital demonstrated an ejection fraction of 55-60%. There was mild aortic stenosis there was no arrhythmia in the hospital.  She had been recently admitted to [facility] and had a two-week cardiac monitor. The patient had no ability to give information about that admission. She was discharged to rehab for supportive care. She is now here for a followup. On the reduced dose of torsemide the patient having no more episodes of syncope.\par \par Her past medical history is significant for the absence of hypertension, diabetes, hyperlipidemia. She has no family history of heart disease. She stopped smoking 8 years ago. She's never had a significant cardiac workup. She has no known history of heart disease.\par \par She had an ADALBERTO performed 1/20 that was normal. Carotid Doppler 2/20 showed mild to moderate plaque. Echocardiogram 1/20 showed an ejection fraction of 60% with possible bicuspid aortic valve. There is mild aortic stenosis with a peak gradient of 18. Mild TR with PA pressure of 24. Stage I diastolic dysfunction. Blood work performed 2/20 demonstrated a cholesterol of 135, triglycerides 95, HDL 41, and LDL 73. Blood work 3/9/21 demonstrated Hgb 9.9. Repeat 3/21/21, Hgb 10.8, normal iron.  Patient unfortunately is gaining back some of the weight that she had lost.\par \par Back pain is getting worse.  Otherwise everything else is stable.  She has no chest pain, shortness of breath, has had no episodes of lightheadedness or falling.  She does walk with a walker.  Unfortunately she has gained another 15 pounds.\par \par

## 2021-12-09 NOTE — REASON FOR VISIT
[Follow-Up - Clinic] : a clinic follow-up of [Aortic Stenosis] : aortic stenosis [Carotid Artery Stenosis] : carotid stenosis [Syncope] : syncope

## 2022-03-10 ENCOUNTER — APPOINTMENT (OUTPATIENT)
Dept: RHEUMATOLOGY | Facility: CLINIC | Age: 72
End: 2022-03-10

## 2022-04-12 ENCOUNTER — APPOINTMENT (OUTPATIENT)
Dept: CARDIOLOGY | Facility: CLINIC | Age: 72
End: 2022-04-12
Payer: MEDICARE

## 2022-04-12 ENCOUNTER — NON-APPOINTMENT (OUTPATIENT)
Age: 72
End: 2022-04-12

## 2022-04-12 VITALS
DIASTOLIC BLOOD PRESSURE: 76 MMHG | SYSTOLIC BLOOD PRESSURE: 132 MMHG | WEIGHT: 228 LBS | BODY MASS INDEX: 35.71 KG/M2 | OXYGEN SATURATION: 96 % | HEART RATE: 79 BPM

## 2022-04-12 DIAGNOSIS — I35.0 NONRHEUMATIC AORTIC (VALVE) STENOSIS: ICD-10-CM

## 2022-04-12 DIAGNOSIS — I65.29 OCCLUSION AND STENOSIS OF UNSPECIFIED CAROTID ARTERY: ICD-10-CM

## 2022-04-12 DIAGNOSIS — R94.31 ABNORMAL ELECTROCARDIOGRAM [ECG] [EKG]: ICD-10-CM

## 2022-04-12 DIAGNOSIS — R40.20 UNSPECIFIED COMA: ICD-10-CM

## 2022-04-12 PROCEDURE — 93000 ELECTROCARDIOGRAM COMPLETE: CPT

## 2022-04-12 PROCEDURE — 99214 OFFICE O/P EST MOD 30 MIN: CPT

## 2022-04-12 RX ORDER — TORSEMIDE 20 MG/1
20 TABLET ORAL DAILY
Qty: 30 | Refills: 5 | Status: ACTIVE | COMMUNITY
Start: 1900-01-01 | End: 1900-01-01

## 2022-04-12 NOTE — DISCUSSION/SUMMARY
[FreeTextEntry1] : Patient's clinical situation is difficult.  She has severe back pain, needs to walk with a walker, and cannot do any activity.  She sits with her legs down all day long which is not good for her bilateral lymphedema.  Continues to gain weight.\par \par She will try to take the water pill as much as she can.  She uses the aspirin 3 times a week which is good both to help prevent blood clots in her legs and for her carotid artery.\par \par She had blood work with the rheumatologist, Dr. Roman.  I will try to get those results.  She will schedule follow-up echo both to check on her aortic valve as well as her change in her ECG.\par \par If all is well we would see her again in approximately 6 months.  Did go over her clinical situation and I answered all of her questions.

## 2022-04-12 NOTE — HISTORY OF PRESENT ILLNESS
[FreeTextEntry1] : I saw Christina Damian in the office today for cardiac follow up. She is a 72-year-old white female who was admitted to [facility] 12/19 with episodes of frequent falls and blacking out occurring over the past 4 months. In the hospital evaluation revealed acute kidney insufficiency felt to be due to dehydration. Episodes of falling and loss of consciousness were felt to be due to dehydration and poor perfusion to the brain. Creatinine went from 3.8 down to 1.2 with hydration. There was no evidence for CVA. She had been admitted to [facility] previously with similar symptoms.  She had been keeping herself well hydrated.\par \par She was admitted to [facility] 1/27/21 with shortness of breath and general weakness and falling. Workup demonstrated COVID Pneumonia. Echocardiogram in the hospital demonstrated an ejection fraction of 55-60%. There was mild aortic stenosis there was no arrhythmia in the hospital.  She had been recently admitted to [facility] and had a two-week cardiac monitor. The patient had no ability to give information about that admission. She was discharged to rehab for supportive care. She is now here for a followup. On the reduced dose of torsemide the patient having no more episodes of syncope.\par \par Her past medical history is significant for the absence of hypertension, diabetes, hyperlipidemia. She has no family history of heart disease. She stopped smoking 8 years ago. She's never had a significant cardiac workup. She has no known history of heart disease.\par \par She had an ADALBERTO performed 1/20 that was normal. Carotid Doppler 2/20 showed mild to moderate plaque. Echocardiogram 1/20 showed an ejection fraction of 60% with possible bicuspid aortic valve. There is mild aortic stenosis with a peak gradient of 18. Mild TR with PA pressure of 24. Stage I diastolic dysfunction. Blood work performed 2/20 demonstrated a cholesterol of 135, triglycerides 95, HDL 41, and LDL 73. Blood work 3/9/21 demonstrated Hgb 9.9. Repeat 3/21/21, Hgb 10.8, normal iron. \par \par Back pain is getting worse.  Otherwise everything else is stable.  She has no chest pain, shortness of breath, has had no episodes of lightheadedness or falling.  She does walk with a walker.  Unfortunately she has gained another 8 pounds.\par \par ECG demonstrates sinus rhythm.  There is loss of R wave in leads V4 to V6 which is probably lead placement.\par \par

## 2022-04-22 ENCOUNTER — APPOINTMENT (OUTPATIENT)
Dept: CARDIOLOGY | Facility: CLINIC | Age: 72
End: 2022-04-22
Payer: MEDICARE

## 2022-04-22 PROCEDURE — 93306 TTE W/DOPPLER COMPLETE: CPT

## 2022-07-06 NOTE — PHYSICAL THERAPY INITIAL EVALUATION ADULT - TRANSFER SKILLS, REHAB EVAL
"Seen yesterday at [facility] independent

## 2022-07-29 ENCOUNTER — OFFICE (OUTPATIENT)
Dept: URBAN - METROPOLITAN AREA CLINIC 109 | Facility: CLINIC | Age: 72
Setting detail: OPHTHALMOLOGY
End: 2022-07-29
Payer: MEDICARE

## 2022-07-29 DIAGNOSIS — H25.13: ICD-10-CM

## 2022-07-29 DIAGNOSIS — H52.4: ICD-10-CM

## 2022-07-29 PROBLEM — H52.7 REFRACTIVE ERROR: Status: ACTIVE | Noted: 2022-07-29

## 2022-07-29 PROCEDURE — 92015 DETERMINE REFRACTIVE STATE: CPT | Performed by: OPTOMETRIST

## 2022-07-29 PROCEDURE — 92004 COMPRE OPH EXAM NEW PT 1/>: CPT | Performed by: OPTOMETRIST

## 2022-07-29 ASSESSMENT — SPHEQUIV_DERIVED
OD_SPHEQUIV: 1.125
OS_SPHEQUIV: 1.75
OD_SPHEQUIV: 1.375
OS_SPHEQUIV: 1.5

## 2022-07-29 ASSESSMENT — TONOMETRY
OD_IOP_MMHG: 13
OS_IOP_MMHG: 13

## 2022-07-29 ASSESSMENT — CONFRONTATIONAL VISUAL FIELD TEST (CVF)
OD_FINDINGS: FULL
OS_FINDINGS: FULL

## 2022-07-29 ASSESSMENT — REFRACTION_MANIFEST
OD_VA1: 20/20
OD_ADD: +2.50
OS_AXIS: 085
OS_VA1: 20/25-
OS_SPHERE: +1.75
OD_SPHERE: +2.00
OD_AXIS: 110
OS_ADD: +2.50
OS_CYLINDER: -0.50
OD_CYLINDER: -1.75

## 2022-07-29 ASSESSMENT — REFRACTION_AUTOREFRACTION
OS_SPHERE: +2.00
OD_AXIS: 098
OD_SPHERE: +2.25
OS_CYLINDER: -0.50
OD_CYLINDER: -1.75
OS_AXIS: 090

## 2022-07-29 ASSESSMENT — REFRACTION_CURRENTRX
OS_OVR_VA: 20/
OS_ADD: +2.75
OD_ADD: +2.75
OS_SPHERE: +1.50
OD_AXIS: 119
OS_CYLINDER: -0.50
OD_OVR_VA: 20/
OD_CYLINDER: -1.25
OD_SPHERE: +1.25
OS_AXIS: 067

## 2022-07-29 ASSESSMENT — VISUAL ACUITY
OD_BCVA: 20/25-
OS_BCVA: 20/25-1

## 2022-08-25 NOTE — BEHAVIORAL HEALTH ASSESSMENT NOTE - NSBHCHARTREVIEWIMAGING_PSY_A_CORE FT
Walk in < from: Xray Hip 3-4 Views, Bilateral (02.02.21 @ 16:19) >    EXAM:  XR HIPS BI 3-4V                            PROCEDURE DATE:  02/02/2021          INTERPRETATION:  DATE OF STUDY: 2/2/21    COMPARISON: 2/1/21 pelvis; 1/28/21 CT scan of the abdomen and pelvis.    CLINICAL HISTORY:  Hip pain. Assess for fracture.    Technique: Two views of each hip.    FINDINGS:  Redemonstration of a noncemented right total hip arthroplasty.  Orthopedic hardware is in good position and alignment.  No bilateral hip fracture or dislocation.  Redemonstration of mild left hip underlying osteoarthritis.  The visualized bilateral femoral shafts are intact.    IMPRESSION: No acute fracture-subluxation demonstrated.      < end of copied text >

## 2022-09-07 ENCOUNTER — APPOINTMENT (OUTPATIENT)
Dept: ORTHOPEDIC SURGERY | Facility: CLINIC | Age: 72
End: 2022-09-07

## 2022-09-07 VITALS — HEIGHT: 67 IN | BODY MASS INDEX: 35 KG/M2 | WEIGHT: 223 LBS

## 2022-09-07 DIAGNOSIS — M54.16 RADICULOPATHY, LUMBAR REGION: ICD-10-CM

## 2022-09-07 PROCEDURE — 99204 OFFICE O/P NEW MOD 45 MIN: CPT

## 2022-09-07 PROCEDURE — 72100 X-RAY EXAM L-S SPINE 2/3 VWS: CPT

## 2022-09-07 NOTE — PHYSICAL EXAM
[Stooped] : stooped [Walker] : ambulates with walker [Meza's Sign] : negative Meza's sign [Pronator Drift] : negative pronator drift [SLR] : negative straight leg raise [de-identified] : 5 out of 5 motor strength, sensation is intact and symmetrical decreased lumbar range of motion flexion extension and rotation, no palpatory tenderness full range of motion of hips knees shoulders and elbows (all four extremities), no atrophy, negative straight leg raise, no pathological reflexes, bilateral leg swelling, normal ambulation, no apparent distress skin is intact, no palpable lymph nodes, no upper or lower extremity instability, alert and oriented x3 and normal mood. Normal finger-to nose test. \par Lymphedema-bilateral lower extremity swelling. [de-identified] : XR AP Lat Lumbar 09/07/2022 -Reverse lordosis, lumbar disc degenerative disease, mild hip arthritis-reviewed with patient.

## 2022-09-07 NOTE — HISTORY OF PRESENT ILLNESS
[Stable] : stable [de-identified] : 72 year old female  presenting for initial evaluation of lower back pain x several months ago. \par S/P ACDF and posterior cervical surgery in 2020 with Dr. Anderson at [facility]. \par States that her back gave out a few months ago and has been having back pain since then. \par Pain radiates down the B/L LE. \par Denies numbness/tingling. \par Sitting aggravates the pain. \par Takes tylenol but no relief. \par Has tried PT Lumbar x 3 months but no improvement. \par S/P LESI many years ago but no relief. \par Ambulates with a rolling walker. \par No fever chills sweats nausea vomiting no bowel or bladder dysfunction, no recent weight loss or gain no night pain. This history is in addition to the intake form that I personally reviewed.

## 2022-09-07 NOTE — DISCUSSION/SUMMARY
[de-identified] : Lumbar degenerative disc disease with decreased lordosis.\par Lymphedema.\par Discussed all options.\par Lumbar MRI referral.\par F/U after MRI.\par All options discussed including rest, medicine, home exercise, acupuncture, Chiropractic care, Physical Therapy, Pain management, and last resort surgery. All questions were answered, all alternatives discussed and the patient is in complete agreement with that plan. Follow-up appointment as instructed. Any issues and the patient will call or come in sooner.

## 2022-09-07 NOTE — ADDENDUM
[FreeTextEntry1] : This note was written by Jose Kwok on 09/07/2022 acting as scribe for Dr. Too Swann M.D.\par \par I, Too Swann MD, have read and attest that all the information, medical decision making and discharge instructions within are true and accurate.

## 2022-09-28 ENCOUNTER — APPOINTMENT (OUTPATIENT)
Dept: ORTHOPEDIC SURGERY | Facility: CLINIC | Age: 72
End: 2022-09-28

## 2022-09-28 VITALS
SYSTOLIC BLOOD PRESSURE: 156 MMHG | DIASTOLIC BLOOD PRESSURE: 76 MMHG | HEIGHT: 67 IN | WEIGHT: 223 LBS | BODY MASS INDEX: 35 KG/M2 | HEART RATE: 80 BPM

## 2022-09-28 PROCEDURE — 99214 OFFICE O/P EST MOD 30 MIN: CPT

## 2022-11-16 ENCOUNTER — APPOINTMENT (OUTPATIENT)
Dept: OTOLARYNGOLOGY | Facility: CLINIC | Age: 72
End: 2022-11-16

## 2022-11-16 VITALS
SYSTOLIC BLOOD PRESSURE: 147 MMHG | HEART RATE: 83 BPM | HEIGHT: 67 IN | TEMPERATURE: 98 F | DIASTOLIC BLOOD PRESSURE: 74 MMHG | WEIGHT: 223 LBS | BODY MASS INDEX: 35 KG/M2

## 2022-11-16 DIAGNOSIS — R13.12 DYSPHAGIA, OROPHARYNGEAL PHASE: ICD-10-CM

## 2022-11-16 PROCEDURE — 31579 LARYNGOSCOPY TELESCOPIC: CPT

## 2022-11-16 PROCEDURE — 69210 REMOVE IMPACTED EAR WAX UNI: CPT

## 2022-11-16 PROCEDURE — 99204 OFFICE O/P NEW MOD 45 MIN: CPT | Mod: 25

## 2022-11-16 RX ORDER — FAMOTIDINE 20 MG/1
20 TABLET, FILM COATED ORAL
Qty: 90 | Refills: 0 | Status: ACTIVE | COMMUNITY
Start: 2022-11-16 | End: 1900-01-01

## 2022-11-16 RX ORDER — OMEPRAZOLE 20 MG/1
20 CAPSULE, DELAYED RELEASE ORAL DAILY
Qty: 1 | Refills: 3 | Status: ACTIVE | COMMUNITY
Start: 2022-11-16 | End: 1900-01-01

## 2022-11-16 RX ORDER — IPRATROPIUM BROMIDE 42 UG/1
0.06 SPRAY NASAL
Qty: 1 | Refills: 0 | Status: ACTIVE | COMMUNITY
Start: 2022-11-16 | End: 1900-01-01

## 2022-11-16 NOTE — HISTORY OF PRESENT ILLNESS
[de-identified] : 72yF referred by ENT and allergy. S/P ACDF and posterior cervical surgery in 2020 with Dr. Anderson at [facility]. \par Reports dysphagia, occasional choking if the food is not chewed well. \par Pt is not dysphonic, denies SOB, hemoptysis\par Today, right epistaxis, stopped by pressure. This is the first time. \par Nasal congestion, uses saline spray. \par Ambulates with a rolling walker.   \par Had a swallow test done at [facility], occasional aspiration as per pt.\par Patient denies otalgia, otorrhea, ear infections, hearing loss, tinnitus, dizziness, vertigo, headaches related to hearing. \par Not taking reflux medication. \par H/o lymphedema\par Severe dysphagia, feels food stuck, makes her nauseaus, and triues to gag herself with her finger but nothing comes up\par

## 2022-11-16 NOTE — CONSULT LETTER
[Dear  ___] : Dear  [unfilled], [FreeTextEntry2] : Dear Dr. Nathanael Olivares [FreeTextEntry3] :  Kai Real MD, PhD \par Chief, Division of Laryngology \par Department of Otolaryngology \par [facility] \par Pediatric Otolaryngology, [facility] \par  of Otolaryngology \par [employer] [facility] [employer] [facility]

## 2022-12-20 NOTE — PROGRESS NOTE ADULT - PROBLEM SELECTOR PLAN 8
How Severe Is It?: moderate Is This A New Presentation, Or A Follow-Up?: Follow Up Accutane Patient with weakness and Hx hof multiple falls. + Orthostasis d/w dtr at detail, about pain meds & Gabapentin,    -Physical therapy consulted and recommended to discharge to home with outpatient PT when medically ready.

## 2022-12-28 ENCOUNTER — OUTPATIENT (OUTPATIENT)
Dept: OUTPATIENT SERVICES | Facility: HOSPITAL | Age: 72
LOS: 1 days | End: 2022-12-28
Payer: MEDICARE

## 2022-12-28 ENCOUNTER — APPOINTMENT (OUTPATIENT)
Dept: RADIOLOGY | Facility: HOSPITAL | Age: 72
End: 2022-12-28

## 2022-12-28 ENCOUNTER — OUTPATIENT (OUTPATIENT)
Dept: OUTPATIENT SERVICES | Facility: HOSPITAL | Age: 72
LOS: 1 days | Discharge: HOME | End: 2022-12-28

## 2022-12-28 ENCOUNTER — APPOINTMENT (OUTPATIENT)
Dept: SPEECH THERAPY | Facility: HOSPITAL | Age: 72
End: 2022-12-28

## 2022-12-28 DIAGNOSIS — Z98.890 OTHER SPECIFIED POSTPROCEDURAL STATES: Chronic | ICD-10-CM

## 2022-12-28 DIAGNOSIS — R13.12 DYSPHAGIA, OROPHARYNGEAL PHASE: ICD-10-CM

## 2022-12-28 PROCEDURE — 74230 X-RAY XM SWLNG FUNCJ C+: CPT | Mod: 26

## 2022-12-29 NOTE — PLAN
[FreeTextEntry2] : \par 1.) Regular with Thin Liquids\par 2.) Aspiration Precautions\par 3.) Reflux Precautions\par 4.) Maintain Good Oral Hygiene Care\par 5.) Follow up with Physician\par \par SLP provided Patient education regarding preliminary results. Patient verbalized understanding and will follow up with Physician. \par

## 2022-12-29 NOTE — ASSESSMENT
[FreeTextEntry1] : Patient presents with Functional Oral and Pharyngeal Stage swallowing mechanism. The Oral Stage is characterized by adequate oral containment, adequate chewing for solid, adequate bolus manipulation, adequate tongue motion with adequate anterior to posterior transfer of the bolus with adequate oral clearance.  The Pharyngeal Stage is characterized by adequate initiation of the pharyngeal swallow, adequate laryngeal elevation, adequate tongue base retraction and adequate pharyngeal constriction. There is adequate pharyngeal clearance post swallow.  There was No Aspiration observed before, during or after the swallow. \par \par RESULTS: \par No Aspiration observed before, during or after the swallow. \par \par \par Of Note: Cervical Hardware is visualized on  view image consistent with patient's surgical history for ACDF. \par \par

## 2022-12-29 NOTE — HISTORY OF PRESENT ILLNESS
[FreeTextEntry1] : Patient arrived to Radiology for an OutPatient Modified Barium Swallow Study. Patient is a 71 y/o female with PMH as per EMR: \par \par Active Problems\par Abnormal electrocardiogram (794.31) (R94.31)\par Anemia (285.9) (D64.9)\par Aortic stenosis (424.1) (I35.0)\par Calcific tendonitis of foot, left (727.82) (M65.272)\par Callus (700) (L84)\par Carotid artery plaque (433.10) (I65.29)\par Degenerative arthritis of lumbar spine (721.3) (M47.816)\par Disc degeneration, lumbar (722.52) (M51.36)\par Discoloration of skin of foot (709.00) (L81.9)\par Hypokalemia (276.8) (E87.6)\par Leg swelling (729.81) (M79.89)\par LOC (loss of consciousness) (780.09) (R40.20)\par Lumbago (724.2) (M54.50)\par Lumbar radiculopathy (724.4) (M54.16)\par Lymphedema of both lower extremities (457.1) (I89.0)\par Nonhealing nonsurgical wound with fat layer exposed (879.9) (T14.8XXA)\par Non-pressure chronic ulcer of other part of left lower leg limited to breakdown of skin\par (707.19) (L97.821)\par Non-prs chr ulcer oth prt r low leg limited to brkdwn skin (707.19) (L97.811)\par BUBBA (obstructive sleep apnea) (327.23) (G47.33)\par Osteoarthritis of hip (715.95) (M16.9)\par Osteoarthritis of knee (715.36) (M17.9)\par Oth symptoms and signs involving the circ and resp systems (785.9,786.9) (R09.89)\par Plantar fasciitis of left foot (728.71) (M72.2)\par Primary osteoarthritis of left knee (715.16) (M17.12)\par Rheumatoid arthritis (714.0) (M06.9)\par Synovitis of knee (727.09) (M65.9)\par Traumatic arthritis of knee, right (716.16) (M12.561)\par Varicose veins of bilateral lower extremities with other complications (454.8) (I83.893)\par Varicose veins of left lower extremity with both ulcer of other part of lower extremity and\par inflammation (454.2) (I83.228,L97.829)\par Varicose veins of right lower extremity with both ulcer of other part of lower extremity and\par inflammation (454.2) (I83.218,L97.819)\par \par Past Medical History\par History of hypercholesterolemia (V12.29) (Z86.39)\par History of low back pain (V13.59) (Z87.39)\par History of Left knee pain (719.46) (M25.562)\par Personal history of arthritis (V13.4) (Z87.39)\par History of Restless legs syndrome (333.94) (G25.81)\par \par Surgical History\par History of Arthroscopy Knee Right\par History of Bladder Surgery\par History of Exploratory Laparoscopy\par History of Exploratory Laparotomy\par History of Hip replacement\par History of Hysterotomy (Obstetrical)\par History of Knee Replacement\par History of Knee Replacement\par \par \par \par \par ENT Note 11/16.2922 - 72yF h/o cervical spinal surgery with dysphagia, concern for aspiration on previous MBS 2020, difficulty with secretions and postnasal drip. We will start ipratropium to the nose, start head of bed elevation, reflux precautions, start low dose morning ppi and famotidine, obtain MBS and start swallow therapy. RTC 3-4 months unless issues arise sooner. \par \par \par Patient offers c/o "mucous sits in my throat, builds up, gives a choking sensation and want to throw it up but nothing comes up" x 6 months.  Patient eats a Soft Diet with Thin Liquids.  Patient reports ill fitting dentures.  Patient reports no current/repeated pneumonia.   This Modified Barium Swallow Study is to objectively assess the Physiology of the Oral and Pharyngeal Stage swallowing mechanism for treatment plan for least restrictive diet. \par \par Referring MD: Dr. Kai Real \par \par \par \par \par \par

## 2022-12-30 DIAGNOSIS — R13.10 DYSPHAGIA, UNSPECIFIED: ICD-10-CM

## 2023-01-27 NOTE — ED ADULT NURSE NOTE - CAS TRG GENERAL AIRWAY, MLM
Name And Contact Information For Health Care Proxy: Eliseo Connors [phone] Name And Contact Information For Health Care Proxy: Eliseo Connors [phone] Patent

## 2023-01-30 ENCOUNTER — OFFICE (OUTPATIENT)
Dept: URBAN - METROPOLITAN AREA CLINIC 109 | Facility: CLINIC | Age: 73
Setting detail: OPHTHALMOLOGY
End: 2023-01-30
Payer: MEDICARE

## 2023-01-30 DIAGNOSIS — H25.13: ICD-10-CM

## 2023-01-30 PROCEDURE — 99214 OFFICE O/P EST MOD 30 MIN: CPT | Performed by: OPHTHALMOLOGY

## 2023-01-30 ASSESSMENT — REFRACTION_MANIFEST
OD_SPHERE: +2.00
OD_AXIS: 110
OD_ADD: +2.50
OS_VA1: 20/25-
OS_SPHERE: +1.75
OD_CYLINDER: -1.75
OS_CYLINDER: -0.50
OS_AXIS: 085
OD_VA1: 20/20
OS_ADD: +2.50

## 2023-01-30 ASSESSMENT — REFRACTION_AUTOREFRACTION
OS_CYLINDER: -0.50
OS_AXIS: 095
OS_SPHERE: +2.25
OD_SPHERE: +2.00
OD_CYLINDER: -1.75
OD_AXIS: 094

## 2023-01-30 ASSESSMENT — KERATOMETRY
OS_K2POWER_DIOPTERS: 42.12
OD_K1POWER_DIOPTERS: 40.87
OD_AXISANGLE_DEGREES: 023
OS_K1POWER_DIOPTERS: 41.75
OS_AXISANGLE_DEGREES: 139
OD_K2POWER_DIOPTERS: 42.12

## 2023-01-30 ASSESSMENT — VISUAL ACUITY
OD_BCVA: 20/25
OS_BCVA: 20/30

## 2023-01-30 ASSESSMENT — AXIALLENGTH_DERIVED
OS_AL: 23.5823
OD_AL: 23.89
OD_AL: 23.89
OS_AL: 23.39

## 2023-01-30 ASSESSMENT — SPHEQUIV_DERIVED
OS_SPHEQUIV: 1.5
OD_SPHEQUIV: 1.125
OS_SPHEQUIV: 2
OD_SPHEQUIV: 1.125

## 2023-01-30 ASSESSMENT — REFRACTION_CURRENTRX
OS_ADD: +2.75
OD_OVR_VA: 20/
OS_CYLINDER: -0.50
OS_AXIS: 067
OD_SPHERE: +1.25
OD_ADD: +2.75
OS_SPHERE: +1.50
OD_CYLINDER: -1.25
OS_OVR_VA: 20/
OD_AXIS: 119

## 2023-01-30 ASSESSMENT — TONOMETRY
OS_IOP_MMHG: 17
OD_IOP_MMHG: 17

## 2023-01-30 ASSESSMENT — CONFRONTATIONAL VISUAL FIELD TEST (CVF)
OS_FINDINGS: FULL
OD_FINDINGS: FULL

## 2023-03-08 ENCOUNTER — APPOINTMENT (OUTPATIENT)
Dept: ORTHOPEDIC SURGERY | Facility: CLINIC | Age: 73
End: 2023-03-08
Payer: MEDICARE

## 2023-03-08 ENCOUNTER — OFFICE (OUTPATIENT)
Dept: URBAN - METROPOLITAN AREA CLINIC 109 | Facility: CLINIC | Age: 73
Setting detail: OPHTHALMOLOGY
End: 2023-03-08
Payer: MEDICARE

## 2023-03-08 VITALS
DIASTOLIC BLOOD PRESSURE: 76 MMHG | WEIGHT: 220 LBS | HEIGHT: 67 IN | HEART RATE: 86 BPM | SYSTOLIC BLOOD PRESSURE: 166 MMHG | BODY MASS INDEX: 34.53 KG/M2

## 2023-03-08 DIAGNOSIS — H25.12: ICD-10-CM

## 2023-03-08 DIAGNOSIS — M51.36 OTHER INTERVERTEBRAL DISC DEGENERATION, LUMBAR REGION: ICD-10-CM

## 2023-03-08 DIAGNOSIS — M47.812 SPONDYLOSIS W/OUT MYELOPATHY OR RADICULOPATHY, CERVICAL REGION: ICD-10-CM

## 2023-03-08 DIAGNOSIS — H25.13: ICD-10-CM

## 2023-03-08 DIAGNOSIS — M54.2 CERVICALGIA: ICD-10-CM

## 2023-03-08 PROBLEM — H25.11 CATARACT SENILE NUCLEAR SCLEROSIS; RIGHT EYE, LEFT EYE, BOTH EYES: Status: ACTIVE | Noted: 2023-03-08

## 2023-03-08 PROCEDURE — 99213 OFFICE O/P EST LOW 20 MIN: CPT | Performed by: OPHTHALMOLOGY

## 2023-03-08 PROCEDURE — 92136 OPHTHALMIC BIOMETRY: CPT | Performed by: OPHTHALMOLOGY

## 2023-03-08 PROCEDURE — 99214 OFFICE O/P EST MOD 30 MIN: CPT

## 2023-03-08 ASSESSMENT — REFRACTION_MANIFEST
OS_AXIS: 085
OS_SPHERE: +1.75
OS_ADD: +2.50
OS_VA1: 20/NI
OD_SPHERE: +2.00
OS_CYLINDER: -0.50
OD_AXIS: 110
OD_VA1: 20/20
OD_ADD: +2.50
OD_CYLINDER: -1.75

## 2023-03-08 ASSESSMENT — SPHEQUIV_DERIVED
OS_SPHEQUIV: 1.875
OD_SPHEQUIV: 1.125
OS_SPHEQUIV: 1.5
OD_SPHEQUIV: 1.25

## 2023-03-08 ASSESSMENT — AXIALLENGTH_DERIVED
OD_AL: 23.89
OD_AL: 23.84
OS_AL: 23.5823
OS_AL: 23.44

## 2023-03-08 ASSESSMENT — KERATOMETRY
OD_K1K2_AVERAGE: 41.495
OD_AXISANGLE_DEGREES: 113
OS_CYLAXISANGLE_DEGREES: 139
OD_AXISANGLE2_DEGREES: 023
OD_K1POWER_DIOPTERS: 40.87
OD_K2POWER_DIOPTERS: 42.12
OS_K1K2_AVERAGE: 41.935
OS_AXISANGLE2_DEGREES: 139
OD_AXISANGLE_DEGREES: 20
OS_K1POWER_DIOPTERS: 41.62
OD_CYLAXISANGLE_DEGREES: 023
OS_K2POWER_DIOPTERS: 42.12
OD_CYLPOWER_DEGREES: 1.25
OD_K2POWER_DIOPTERS: 42.12
OS_K2POWER_DIOPTERS: 42.12
OS_K1POWER_DIOPTERS: 41.75
OS_CYLPOWER_DEGREES: 0.37
OS_AXISANGLE_DEGREES: 49
OD_K1POWER_DIOPTERS: 40.50
OS_AXISANGLE_DEGREES: 133

## 2023-03-08 ASSESSMENT — REFRACTION_CURRENTRX
OS_AXIS: 067
OS_SPHERE: +1.50
OD_OVR_VA: 20/
OD_SPHERE: +1.25
OD_ADD: +2.75
OS_OVR_VA: 20/
OD_CYLINDER: -1.25
OS_ADD: +2.75
OD_AXIS: 119
OS_CYLINDER: -0.50

## 2023-03-08 ASSESSMENT — REFRACTION_AUTOREFRACTION
OD_CYLINDER: -2.00
OS_AXIS: 97
OS_CYLINDER: -0.25
OS_SPHERE: +2.00
OD_SPHERE: +2.25
OD_AXIS: 100

## 2023-03-08 ASSESSMENT — VISUAL ACUITY
OS_BCVA: 20/30-
OD_BCVA: 20/25-

## 2023-03-08 ASSESSMENT — CONFRONTATIONAL VISUAL FIELD TEST (CVF)
OD_FINDINGS: FULL
OS_FINDINGS: FULL

## 2023-03-29 ENCOUNTER — APPOINTMENT (OUTPATIENT)
Dept: OTOLARYNGOLOGY | Facility: CLINIC | Age: 73
End: 2023-03-29

## 2023-05-18 NOTE — ED ADULT NURSE NOTE - TEMPLATE
Cindy Abarca Patient Age: 64 year old  MESSAGE: Interpreting service used: No    Insurance on file confirmed with caller: Yes    IM/FP- Orders- Question about Current Order-     Name of order: Imaging-  Mammogram (Diagnostic)    and Ultrasound- Area of the body needing imaging:  breast      Question about order: Is there a new breast concern for both breats?-    Provider's home site: [place]- Connect call to [facility] queue- Route message to provider's clinical support pool    Message read back to caller for accuracy: Yes       ALLERGIES:  Patient has no known allergies.  Current Outpatient Medications   Medication Sig Dispense Refill   • metoPROLOL succinate (TOPROL-XL) 25 MG 24 hr tablet TAKE 1 TABLET BY MOUTH EVERY NIGHT 90 tablet 0   • ALPRAZolam (XANAX) 0.25 MG tablet TAKE 1 TABLET BY MOUTH DAILY AS NEEDED FOR ANXIETY 20 tablet 0   • sertraline (ZOLOFT) 25 MG tablet TAKE 1 TABLET BY MOUTH DAILY 30 tablet 1   • pravastatin (PRAVACHOL) 40 MG tablet TAKE 1 TABLET BY MOUTH DAILY AS DIRECTED 90 tablet 3   • MAGNESIUM PO Take by mouth daily.     • Multiple Vitamins-Minerals (MULTIVITAMIN ADULT PO) Take by mouth daily.     • Calcium Carb-Cholecalciferol (CALCIUM/VITAMIN D PO) Take by mouth daily.     • aspirin 81 MG tablet Take 81 mg by mouth daily.        No current facility-administered medications for this visit.     PHARMACY to use: see below          Pharmacy preference(s) on file:   [id number] - [place], [place] - [address] AT [facility] [address]  [address] [address]  Phone: [phone] Fax: [phone]      CALL BACK INFO: Ok to leave response (including medical information) on answering machine      PCP: Kirstie Yuen MD         INS: Payor: BLUE CROSS BLUE SHIELD [place] / Plan: BLUE CHOICE OPT [id number] / Product Type: PPO MISC   PATIENT ADDRESS:  [address] [place] [address]     Fall

## 2023-10-10 NOTE — H&P ADULT - ASSESSMENT
Pt roomed by ED volunteer.  Provided a gown to change into.  Placed up for ERP evaluation.   69 y/o F PMHx lymphedema, HLD, RA, recent cervical spine surgery at [facility] presented to ED with complaint of nausea and vomiting x1 day being admitted with PNA, UTI.    #PNA  -Admit to medicine  -CT chest showing R middle and lower lobe groundglass opacities  -?Possible aspiration with recent vomiting episode  -s/p IV ceftriaxone and azithro in ED, continue for now  -ID, Pulm consulted, appreciate recs  -F/U COVID PCR  -F/U blood cxs    #UTI  -UA positive for UTI, however patient denying any symptoms  -May be asymptomatic bacteruria  -ID consulted, appreciate recs  -F/U urine cx  -Can continue ceftriaxone for now    #Abdominal pain/vomiting  -abdominal pain improved on admission  -recent PEG at [facility], however patient tolerating PO intake  -Dysphagia diet with nectar thick liquids, speech and swallow consulted  -GI consulted, appreciate recs    #RA  -Continue home meds    #Recent cervical spine surgery  -Continue pain control PRN    #Lymphedema  -Continue torsemide  -Patient states she follows with wound care    #DVT ppx  -Lovenox for DVT ppx

## 2024-02-25 NOTE — PHYSICAL THERAPY INITIAL EVALUATION ADULT - FUNCTIONAL LIMITATIONS, PT EVAL
[FreeTextEntry3] : I, Wayne Bowens, solely acted as scribe for Dr. Adelaida Munoz on 02/15/2024. All medical entries made by the Scribe were at my, Dr. Munoz's, direction and personally dictated by me on 02/15/2024. I have reviewed the chart and agree that the record accurately reflects my personal performance of the history, physical exam, assessment and plan. I have also personally directed, reviewed, and agreed with the chart.
home management/community/leisure/self-care

## 2024-03-01 ENCOUNTER — INPATIENT (INPATIENT)
Facility: HOSPITAL | Age: 74
LOS: 1 days | Discharge: LEFT BEFORE DISCHARGE | DRG: 603 | End: 2024-03-03
Attending: INTERNAL MEDICINE | Admitting: INTERNAL MEDICINE
Payer: COMMERCIAL

## 2024-03-01 VITALS
HEART RATE: 90 BPM | DIASTOLIC BLOOD PRESSURE: 65 MMHG | TEMPERATURE: 98 F | SYSTOLIC BLOOD PRESSURE: 136 MMHG | RESPIRATION RATE: 20 BRPM | HEIGHT: 67 IN | OXYGEN SATURATION: 93 % | WEIGHT: 220.02 LBS

## 2024-03-01 DIAGNOSIS — Z98.890 OTHER SPECIFIED POSTPROCEDURAL STATES: Chronic | ICD-10-CM

## 2024-03-01 LAB
ALBUMIN SERPL ELPH-MCNC: 3.1 G/DL — LOW (ref 3.3–5)
ALP SERPL-CCNC: 96 U/L — SIGNIFICANT CHANGE UP (ref 40–120)
ALT FLD-CCNC: 11 U/L — LOW (ref 12–78)
ANION GAP SERPL CALC-SCNC: 7 MMOL/L — SIGNIFICANT CHANGE UP (ref 5–17)
APTT BLD: 24.1 SEC — LOW (ref 24.5–35.6)
AST SERPL-CCNC: 28 U/L — SIGNIFICANT CHANGE UP (ref 15–37)
BASOPHILS # BLD AUTO: 0.03 K/UL — SIGNIFICANT CHANGE UP (ref 0–0.2)
BASOPHILS NFR BLD AUTO: 0.3 % — SIGNIFICANT CHANGE UP (ref 0–2)
BILIRUB SERPL-MCNC: 0.5 MG/DL — SIGNIFICANT CHANGE UP (ref 0.2–1.2)
BUN SERPL-MCNC: 10 MG/DL — SIGNIFICANT CHANGE UP (ref 7–23)
CALCIUM SERPL-MCNC: 8.7 MG/DL — SIGNIFICANT CHANGE UP (ref 8.5–10.1)
CHLORIDE SERPL-SCNC: 107 MMOL/L — SIGNIFICANT CHANGE UP (ref 96–108)
CO2 SERPL-SCNC: 26 MMOL/L — SIGNIFICANT CHANGE UP (ref 22–31)
CREAT SERPL-MCNC: 0.99 MG/DL — SIGNIFICANT CHANGE UP (ref 0.5–1.3)
EGFR: 60 ML/MIN/1.73M2 — SIGNIFICANT CHANGE UP
EOSINOPHIL # BLD AUTO: 0.11 K/UL — SIGNIFICANT CHANGE UP (ref 0–0.5)
EOSINOPHIL NFR BLD AUTO: 1.1 % — SIGNIFICANT CHANGE UP (ref 0–6)
GLUCOSE SERPL-MCNC: 130 MG/DL — HIGH (ref 70–99)
HCT VFR BLD CALC: 27.7 % — LOW (ref 34.5–45)
HGB BLD-MCNC: 8.9 G/DL — LOW (ref 11.5–15.5)
IMM GRANULOCYTES NFR BLD AUTO: 0.4 % — SIGNIFICANT CHANGE UP (ref 0–0.9)
INR BLD: 1.15 RATIO — SIGNIFICANT CHANGE UP (ref 0.85–1.18)
LACTATE SERPL-SCNC: 1.1 MMOL/L — SIGNIFICANT CHANGE UP (ref 0.7–2)
LYMPHOCYTES # BLD AUTO: 1.59 K/UL — SIGNIFICANT CHANGE UP (ref 1–3.3)
LYMPHOCYTES # BLD AUTO: 16.2 % — SIGNIFICANT CHANGE UP (ref 13–44)
MCHC RBC-ENTMCNC: 29.3 PG — SIGNIFICANT CHANGE UP (ref 27–34)
MCHC RBC-ENTMCNC: 32.1 GM/DL — SIGNIFICANT CHANGE UP (ref 32–36)
MCV RBC AUTO: 91.1 FL — SIGNIFICANT CHANGE UP (ref 80–100)
MONOCYTES # BLD AUTO: 0.78 K/UL — SIGNIFICANT CHANGE UP (ref 0–0.9)
MONOCYTES NFR BLD AUTO: 7.9 % — SIGNIFICANT CHANGE UP (ref 2–14)
NEUTROPHILS # BLD AUTO: 7.29 K/UL — SIGNIFICANT CHANGE UP (ref 1.8–7.4)
NEUTROPHILS NFR BLD AUTO: 74.1 % — SIGNIFICANT CHANGE UP (ref 43–77)
NRBC # BLD: 0 /100 WBCS — SIGNIFICANT CHANGE UP (ref 0–0)
PLATELET # BLD AUTO: 177 K/UL — SIGNIFICANT CHANGE UP (ref 150–400)
POTASSIUM SERPL-MCNC: 4.1 MMOL/L — SIGNIFICANT CHANGE UP (ref 3.5–5.3)
POTASSIUM SERPL-SCNC: 4.1 MMOL/L — SIGNIFICANT CHANGE UP (ref 3.5–5.3)
PROT SERPL-MCNC: 7.3 G/DL — SIGNIFICANT CHANGE UP (ref 6–8.3)
PROTHROM AB SERPL-ACNC: 13.4 SEC — HIGH (ref 9.5–13)
RBC # BLD: 3.04 M/UL — LOW (ref 3.8–5.2)
RBC # FLD: 17.1 % — HIGH (ref 10.3–14.5)
SODIUM SERPL-SCNC: 140 MMOL/L — SIGNIFICANT CHANGE UP (ref 135–145)
WBC # BLD: 9.84 K/UL — SIGNIFICANT CHANGE UP (ref 3.8–10.5)
WBC # FLD AUTO: 9.84 K/UL — SIGNIFICANT CHANGE UP (ref 3.8–10.5)

## 2024-03-01 PROCEDURE — 73130 X-RAY EXAM OF HAND: CPT | Mod: 26,LT

## 2024-03-01 PROCEDURE — 99285 EMERGENCY DEPT VISIT HI MDM: CPT

## 2024-03-01 PROCEDURE — 73090 X-RAY EXAM OF FOREARM: CPT | Mod: 26,LT

## 2024-03-01 RX ORDER — ACETAMINOPHEN 500 MG
1000 TABLET ORAL ONCE
Refills: 0 | Status: COMPLETED | OUTPATIENT
Start: 2024-03-01 | End: 2024-03-01

## 2024-03-01 RX ORDER — SODIUM CHLORIDE 9 MG/ML
1000 INJECTION INTRAMUSCULAR; INTRAVENOUS; SUBCUTANEOUS
Refills: 0 | Status: DISCONTINUED | OUTPATIENT
Start: 2024-03-01 | End: 2024-03-03

## 2024-03-01 RX ORDER — CYCLOBENZAPRINE HYDROCHLORIDE 10 MG/1
10 TABLET, FILM COATED ORAL ONCE
Refills: 0 | Status: COMPLETED | OUTPATIENT
Start: 2024-03-01 | End: 2024-03-01

## 2024-03-01 RX ORDER — ONDANSETRON 8 MG/1
4 TABLET, FILM COATED ORAL ONCE
Refills: 0 | Status: COMPLETED | OUTPATIENT
Start: 2024-03-01 | End: 2024-03-01

## 2024-03-01 RX ORDER — MORPHINE SULFATE 50 MG/1
4 CAPSULE, EXTENDED RELEASE ORAL ONCE
Refills: 0 | Status: DISCONTINUED | OUTPATIENT
Start: 2024-03-01 | End: 2024-03-01

## 2024-03-01 RX ADMIN — Medication 100 MILLIGRAM(S): at 22:28

## 2024-03-01 RX ADMIN — ONDANSETRON 4 MILLIGRAM(S): 8 TABLET, FILM COATED ORAL at 22:29

## 2024-03-01 RX ADMIN — Medication 400 MILLIGRAM(S): at 22:28

## 2024-03-01 RX ADMIN — MORPHINE SULFATE 4 MILLIGRAM(S): 50 CAPSULE, EXTENDED RELEASE ORAL at 22:29

## 2024-03-01 RX ADMIN — CYCLOBENZAPRINE HYDROCHLORIDE 10 MILLIGRAM(S): 10 TABLET, FILM COATED ORAL at 22:29

## 2024-03-01 RX ADMIN — SODIUM CHLORIDE 150 MILLILITER(S): 9 INJECTION INTRAMUSCULAR; INTRAVENOUS; SUBCUTANEOUS at 22:27

## 2024-03-01 NOTE — ED PROVIDER NOTE - MUSCULOSKELETAL MINIMAL EXAM
LUE: sutures noted volar wrist. Erythema from the proximal forearm extending down to dorsal hand with significant swelling of the dorsum hand. Pt is able to flex/extend the fingers which is limited due to swelling. No crepitus. No drainage from the laceration site. Surgical scar noted to c-spine and upper thoracic spine. Generalized tenderness to the C-spine/T spine

## 2024-03-01 NOTE — ED PROVIDER NOTE - OBJECTIVE STATEMENT
74-year-old female with history of rheumatoid arthritis, GERD, history of neck surgery in 2020 is presenting with a few different complaints.  Patient reports she sustained a mechanical fall this morning she tripped on uneven dawn and fell forward.  Denies any anticoagulant use.  Denies any headache or LOC.  Reports neck pain.  Patient reports she has underlying neck pain from the rheumatoid arthritis and neck surgery but feels this is exacerbated since the fall.  Patient also reports 2 days ago she sustained a laceration to the left wrist with while using scissors which slipped and cut her wrist.  Patient reports she was seen at [facility] and had stitches placed.  States she changed her dressing yesterday.  This morning when she woke up she noted her left hand was significantly swollen red and painful.  Denies fever.

## 2024-03-01 NOTE — ED PROVIDER NOTE - PROGRESS NOTE DETAILS
CT findings reviewed  C-collar applied  Spine consult request, case d/w Ortho PA Ortho PA recommendations appreciated. Pt can admitted to [facility], C-collar, MRI.   Case d/w Dr. Pagan for admission Admission changed to Dr. MARIELA Zelaya.   Case d/w Dr. Zelaya.

## 2024-03-01 NOTE — ED ADULT TRIAGE NOTE - CHIEF COMPLAINT QUOTE
Patient ambulatory to triage with assistance of a walker.  Patient notable out of breath after a short walk to triage.  Patient states she lacerated her left while opening a package yesterday and received stitches at [facility].  Patient states she woke up to pain and swelling left hand.  Notable swelling to left hand.  Patient states she also tripped  this morning and hit her forehead and nose.  This fall resulted in abrasion to forehead and reaggravation of a previous neck injuries.

## 2024-03-01 NOTE — ED PROVIDER NOTE - CLINICAL SUMMARY MEDICAL DECISION MAKING FREE TEXT BOX
75 yo F with multiple complaints:    1.L hand cellulitis: labs, XR to r/o subQ gas, IV Clinda, pain management. Pt will require admission  2. Fall: eval for head injury, spine fracture. Will get CTs head, neck, Tspine, face. Pain management.     Will reassess.

## 2024-03-02 DIAGNOSIS — Z29.9 ENCOUNTER FOR PROPHYLACTIC MEASURES, UNSPECIFIED: ICD-10-CM

## 2024-03-02 DIAGNOSIS — L03.114 CELLULITIS OF LEFT UPPER LIMB: ICD-10-CM

## 2024-03-02 DIAGNOSIS — S12.000A UNSPECIFIED DISPLACED FRACTURE OF FIRST CERVICAL VERTEBRA, INITIAL ENCOUNTER FOR CLOSED FRACTURE: ICD-10-CM

## 2024-03-02 PROCEDURE — 99223 1ST HOSP IP/OBS HIGH 75: CPT

## 2024-03-02 PROCEDURE — 72125 CT NECK SPINE W/O DYE: CPT | Mod: 26,MC

## 2024-03-02 PROCEDURE — 70486 CT MAXILLOFACIAL W/O DYE: CPT | Mod: 26,MC

## 2024-03-02 PROCEDURE — 93010 ELECTROCARDIOGRAM REPORT: CPT

## 2024-03-02 PROCEDURE — 72128 CT CHEST SPINE W/O DYE: CPT | Mod: 26,MC

## 2024-03-02 PROCEDURE — 70450 CT HEAD/BRAIN W/O DYE: CPT | Mod: 26,MC

## 2024-03-02 RX ORDER — HYDROMORPHONE HYDROCHLORIDE 2 MG/ML
0.5 INJECTION INTRAMUSCULAR; INTRAVENOUS; SUBCUTANEOUS ONCE
Refills: 0 | Status: DISCONTINUED | OUTPATIENT
Start: 2024-03-02 | End: 2024-03-02

## 2024-03-02 RX ORDER — ACETAMINOPHEN 500 MG
650 TABLET ORAL EVERY 6 HOURS
Refills: 0 | Status: DISCONTINUED | OUTPATIENT
Start: 2024-03-02 | End: 2024-03-03

## 2024-03-02 RX ORDER — OXYCODONE HYDROCHLORIDE 5 MG/1
5 TABLET ORAL EVERY 4 HOURS
Refills: 0 | Status: DISCONTINUED | OUTPATIENT
Start: 2024-03-02 | End: 2024-03-03

## 2024-03-02 RX ORDER — LANOLIN ALCOHOL/MO/W.PET/CERES
3 CREAM (GRAM) TOPICAL AT BEDTIME
Refills: 0 | Status: DISCONTINUED | OUTPATIENT
Start: 2024-03-02 | End: 2024-03-03

## 2024-03-02 RX ORDER — NALOXONE HYDROCHLORIDE 4 MG/.1ML
0.4 SPRAY NASAL ONCE
Refills: 0 | Status: DISCONTINUED | OUTPATIENT
Start: 2024-03-02 | End: 2024-03-03

## 2024-03-02 RX ORDER — PRAMIPEXOLE DIHYDROCHLORIDE 0.12 MG/1
1.5 TABLET ORAL DAILY
Refills: 0 | Status: DISCONTINUED | OUTPATIENT
Start: 2024-03-02 | End: 2024-03-03

## 2024-03-02 RX ORDER — ENOXAPARIN SODIUM 100 MG/ML
40 INJECTION SUBCUTANEOUS EVERY 24 HOURS
Refills: 0 | Status: DISCONTINUED | OUTPATIENT
Start: 2024-03-02 | End: 2024-03-03

## 2024-03-02 RX ORDER — OXYCODONE HYDROCHLORIDE 5 MG/1
2.5 TABLET ORAL EVERY 4 HOURS
Refills: 0 | Status: DISCONTINUED | OUTPATIENT
Start: 2024-03-02 | End: 2024-03-03

## 2024-03-02 RX ORDER — SULFASALAZINE 500 MG
500 TABLET ORAL THREE TIMES A DAY
Refills: 0 | Status: DISCONTINUED | OUTPATIENT
Start: 2024-03-02 | End: 2024-03-03

## 2024-03-02 RX ORDER — OXYCODONE HYDROCHLORIDE 5 MG/1
1 TABLET ORAL
Qty: 0 | Refills: 0 | DISCHARGE

## 2024-03-02 RX ORDER — CEFAZOLIN SODIUM 1 G
2000 VIAL (EA) INJECTION EVERY 8 HOURS
Refills: 0 | Status: DISCONTINUED | OUTPATIENT
Start: 2024-03-02 | End: 2024-03-03

## 2024-03-02 RX ORDER — POLYETHYLENE GLYCOL 3350 17 G/17G
17 POWDER, FOR SOLUTION ORAL DAILY
Refills: 0 | Status: DISCONTINUED | OUTPATIENT
Start: 2024-03-02 | End: 2024-03-03

## 2024-03-02 RX ORDER — SENNA PLUS 8.6 MG/1
2 TABLET ORAL AT BEDTIME
Refills: 0 | Status: DISCONTINUED | OUTPATIENT
Start: 2024-03-02 | End: 2024-03-03

## 2024-03-02 RX ADMIN — Medication 650 MILLIGRAM(S): at 20:52

## 2024-03-02 RX ADMIN — Medication 500 MILLIGRAM(S): at 21:02

## 2024-03-02 RX ADMIN — Medication 100 MILLIGRAM(S): at 13:49

## 2024-03-02 RX ADMIN — PRAMIPEXOLE DIHYDROCHLORIDE 1.5 MILLIGRAM(S): 0.12 TABLET ORAL at 16:59

## 2024-03-02 RX ADMIN — Medication 100 MILLIGRAM(S): at 21:02

## 2024-03-02 RX ADMIN — OXYCODONE HYDROCHLORIDE 5 MILLIGRAM(S): 5 TABLET ORAL at 21:43

## 2024-03-02 RX ADMIN — HYDROMORPHONE HYDROCHLORIDE 0.5 MILLIGRAM(S): 2 INJECTION INTRAMUSCULAR; INTRAVENOUS; SUBCUTANEOUS at 00:41

## 2024-03-02 RX ADMIN — Medication 500 MILLIGRAM(S): at 17:01

## 2024-03-02 RX ADMIN — SODIUM CHLORIDE 150 MILLILITER(S): 9 INJECTION INTRAMUSCULAR; INTRAVENOUS; SUBCUTANEOUS at 17:02

## 2024-03-02 RX ADMIN — OXYCODONE HYDROCHLORIDE 5 MILLIGRAM(S): 5 TABLET ORAL at 20:52

## 2024-03-02 RX ADMIN — ENOXAPARIN SODIUM 40 MILLIGRAM(S): 100 INJECTION SUBCUTANEOUS at 17:01

## 2024-03-02 RX ADMIN — Medication 100 MILLIGRAM(S): at 16:56

## 2024-03-02 RX ADMIN — Medication 100 MILLIGRAM(S): at 06:00

## 2024-03-02 RX ADMIN — Medication 650 MILLIGRAM(S): at 21:55

## 2024-03-02 NOTE — H&P ADULT - ASSESSMENT
74-year-old female with history of rheumatoid arthritis, GERD, history of neck surgery in 2020 is presenting with a few different complaints.  Patient reports she sustained a mechanical fall this morning she tripped on uneven dawn and fell forward.  Denies any anticoagulant use.  Denies any headache or LOC.  Reports neck pain.  Patient reports she has underlying neck pain from the rheumatoid arthritis and neck surgery but feels this is exacerbated since the fall.  Patient also reports 2 days ago she sustained a laceration to the left wrist with while using scissors which slipped and cut her wrist.  Patient reports she was seen at [facility] and had stitches placed.  States she changed her dressing yesterday.  This morning when she woke up she noted her left hand was significantly swollen red and painful.  Denies fever. 74-year-old female with history of rheumatoid arthritis, GERD, history of neck surgery in 2020 is presenting with a few different complaints.  Patient reports she sustained a mechanical fall this morning she tripped on uneven dawn and fell forward.  Denies any anticoagulant use.  Denies any headache or LOC.  Reports neck pain.  Patient reports she has underlying neck pain from the rheumatoid arthritis and neck surgery but feels this is exacerbated since the fall.  Patient also reports 2 days ago she sustained a laceration to the left wrist with while using scissors which slipped and cut her wrist.  Patient reports she was seen at [facility] and had stitches placed.  States she changed her dressing yesterday.  This morning when she woke up she noted her left hand was significantly swollen red and painful.  Denies fever.    1 hand cellulitis  - cw abx  - id fu   - fu cultures   - will monitor     2 C1 fracture   - ortho spine fu appreciated  - cw c collar      3 Fall  - ro syncope  - neuro and cards called  - will monitor  - ischemia [place] as per cards     4 DVT prophylaxis

## 2024-03-02 NOTE — H&P ADULT - NSHPLABSRESULTS_GEN_ALL_CORE
Lab Results:  CBC  CBC Full  -  ( 01 Mar 2024 22:15 )  WBC Count : 9.84 K/uL  RBC Count : 3.04 M/uL  Hemoglobin : 8.9 g/dL  Hematocrit : 27.7 %  Platelet Count - Automated : 177 K/uL  Mean Cell Volume : 91.1 fl  Mean Cell Hemoglobin : 29.3 pg  Mean Cell Hemoglobin Concentration : 32.1 gm/dL  Auto Neutrophil # : 7.29 K/uL  Auto Lymphocyte # : 1.59 K/uL  Auto Monocyte # : 0.78 K/uL  Auto Eosinophil # : 0.11 K/uL  Auto Basophil # : 0.03 K/uL  Auto Neutrophil % : 74.1 %  Auto Lymphocyte % : 16.2 %  Auto Monocyte % : 7.9 %  Auto Eosinophil % : 1.1 %  Auto Basophil % : 0.3 %    .		Differential:	[] Automated		[] Manual  Chemistry                        8.9    9.84  )-----------( 177      ( 01 Mar 2024 22:15 )             27.7     03-01    140  |  107  |  10  ----------------------------<  130<H>  4.1   |  26  |  0.99    Ca    8.7      01 Mar 2024 22:15    TPro  7.3  /  Alb  3.1<L>  /  TBili  0.5  /  DBili  x   /  AST  28  /  ALT  11<L>  /  AlkPhos  96  03-01    LIVER FUNCTIONS - ( 01 Mar 2024 22:15 )  Alb: 3.1 g/dL / Pro: 7.3 g/dL / ALK PHOS: 96 U/L / ALT: 11 U/L / AST: 28 U/L / GGT: x           PT/INR - ( 01 Mar 2024 22:15 )   PT: 13.4 sec;   INR: 1.15 ratio         PTT - ( 01 Mar 2024 22:15 )  PTT:24.1 sec  Urinalysis Basic - ( 01 Mar 2024 22:15 )    Color: x / Appearance: x / SG: x / pH: x  Gluc: 130 mg/dL / Ketone: x  / Bili: x / Urobili: x   Blood: x / Protein: x / Nitrite: x   Leuk Esterase: x / RBC: x / WBC x   Sq Epi: x / Non Sq Epi: x / Bacteria: x            MICROBIOLOGY/CULTURES:      RADIOLOGY RESULTS: reviewed

## 2024-03-02 NOTE — ED ADULT NURSE NOTE - OBJECTIVE STATEMENT
75 y/o female received ambulatory from triage. Alert and oriented x4. C/o left hand infection from cut yesterday and s/p fall at home. Denies any LOC or head injury

## 2024-03-02 NOTE — H&P ADULT - HISTORY OF PRESENT ILLNESS
74-year-old female with history of rheumatoid arthritis, GERD, history of neck surgery in 2020 is presenting with a few different complaints.  Patient reports she sustained a mechanical fall this morning she tripped on uneven dawn and fell forward.  Denies any anticoagulant use.  Denies any headache or LOC.  Reports neck pain.  Patient reports she has underlying neck pain from the rheumatoid arthritis and neck surgery but feels this is exacerbated since the fall.  Patient also reports 2 days ago she sustained a laceration to the left wrist with while using scissors which slipped and cut her wrist.  Patient reports she was seen at [facility] and had stitches placed.  States she changed her dressing yesterday.  This morning when she woke up she noted her left hand was significantly swollen red and painful.  Denies fever.

## 2024-03-02 NOTE — CONSULT NOTE ADULT - ASSESSMENT
74-year-old female with a history of rheumatoid arthritis, GERD, neck surgery 2020.  Patient presented to [facility] on March 1, 2024 with complaint of left hand swelling redness and pain.  She reports she was seen at [facility] the day prior to presentation after having a laceration to her left wrist while using scissors to cut open a box.  She had stitches placed.  She was discharged from the emergency room.  At home she noticed redness swelling and pain after removing her dressing.  She denies any fever or chills at home.  She decided to come to the emergency room at [facility] this time.  She also had a fall at home the morning prior to presentation.  In the emergency room patient noted to be afebrile with no leukocytosis.  Patient was given a few doses of clindamycin.       Left hand cellulitis.  Laceration to left wrist.  Cigarette smoker      - Blood cultures pending  - XR Forearm and hand, left from 3/1 reporting   - Start Cefazolin 2gm IV [id number]  - Start Clindamycin 900mg IV q8 hours x 3 doses   - Hold off on PICC/Midline for now unless needed for reasons other than infectious diseases  - Follow up cultures  - Trend Fever  - Trend WBC      Thank you for allowing me to participate in the care of your patient.   Will Follow    I will be available on Teams for any questions.    
74-year-old female with history of rheumatoid arthritis, GERD, history of neck surgery in 2020 is presenting with a few different complaints.  Patient reports she sustained a mechanical fall this morning she tripped on uneven dawn and fell forward.     RLS  RA  GERD  Fall  Ataxic gait  eval for C Spine Fx  OP  OA

## 2024-03-02 NOTE — PATIENT PROFILE ADULT - FALL HARM RISK - UNIVERSAL INTERVENTIONS
Bed in lowest position, wheels locked, appropriate side rails in place/Call bell, personal items and telephone in reach/Instruct patient to call for assistance before getting out of bed or chair/Non-slip footwear when patient is out of bed/[place] to call system/Physically safe environment - no spills, clutter or unnecessary equipment/Purposeful Proactive Rounding/Room/bathroom lighting operational, light cord in reach

## 2024-03-02 NOTE — H&P ADULT - NSHPPHYSICALEXAM_GEN_ALL_CORE
General: WN/WD NAD  PERRLA  Neurology: A&Ox3, nonfocal, BORREGO x 4  Respiratory: CTA B/L  CV: RRR, S1S2, no murmurs, rubs or gallops  Abdominal: Soft, NT, ND +BS, Last BM  Extremities: No edema, + peripheral pulses  Skin Normal

## 2024-03-02 NOTE — CHART NOTE - NSCHARTNOTEFT_GEN_A_CORE
Called by RN for [id number] Called by RN     Reports patient is in pain, patient requesting pain meds  Recently admitted, chart review, patient has cellulitis  Tylenol ordered for pain, added standing low dose opioids  Day team to re-assess

## 2024-03-02 NOTE — ED ADULT NURSE NOTE - PAIN RATING/NUMBER SCALE (0-10): ACTIVITY
Last seen 09/30/19 (annual)  Next visit scheduled 10/01/20    Last refill of Epi-Pen on 07/18/2013     Okay to refill?   3 (mild pain)

## 2024-03-02 NOTE — ED ADULT NURSE NOTE - NSFALLRISKINTERV_ED_ALL_ED
Assistance OOB with selected safe patient handling equipment if applicable/Assistance with ambulation/Communicate fall risk and risk factors to all staff, patient, and family/Monitor gait and stability/Orthostatic vital signs/Provide visual cue: yellow wristband, yellow gown, etc/Reinforce activity limits and safety measures with patient and family/Call bell, personal items and telephone in reach/Instruct patient to call for assistance before getting out of bed/chair/stretcher/Non-slip footwear applied when patient is off stretcher/[place] to call system/Physically safe environment - no spills, clutter or unnecessary equipment/Purposeful Proactive Rounding/Room/bathroom lighting operational, light cord in reach

## 2024-03-02 NOTE — ED ADULT NURSE NOTE - CHIEF COMPLAINT QUOTE
Patient ambulatory to triage with assistance of a walker.  Patient notable out of breath after a short walk to triage.  Patient states she lacerated her left while opening a package yesterday and received stitches at [facility].  Patient states she woke up to pain and swelling left hand.  Notable swelling to left hand.  Patient states she also tripped  this morning and hit her forehead and nose.  This fall resulted in abrasion to forehead and reaggravation of a previous neck injuries.

## 2024-03-02 NOTE — CONSULT NOTE ADULT - SUBJECTIVE AND OBJECTIVE BOX
Pt Name: VERONICA WHALEN    MRN: [id number]      Patient is a 74y Female with pmhx of RA, GERD and hx of C2-T7 fusion done in 2020 presenting to the emergency department with a chief complaint of neck pain. Pt states she fell this morning and tripped on uneven dawn and fell forward. She notes that she typically has neck pain since her surgery but the fall has made things worse. Pain is worse with movement. Denies LOC, denies CP/SOB/N/V/parasthesias. She also notes that she developed a red swollen hand since cutting her hand 2 days ago. Denies fevers/chills  HEALTH ISSUES - PROBLEM Dx:  Cellulitis of left hand    Closed C1 fracture    Need for prophylactic measure        .      REVIEW OF SYSTEMS        ROS is otherwise negative.    PAST MEDICAL & SURGICAL HISTORY:  PAST MEDICAL & SURGICAL HISTORY:  Restless Leg Syndrome      Hyperlipidemia      History of Osteoarthritis      Traumatic arthropathy involving lower leg  right      Obesity (BMI 30-39.9)      History of Hysterectomy and bladder lift in 2000      Ex lap in 2009 for abscess in the baldder      History of Colonoscopy      Bladder Disorder  Bladder lift 2000      H/O arthroscopy of right knee  2010      H/O neck surgery          Allergies: No Known Allergies      Medications: clindamycin IVPB 900 milliGRAM(s) IV Intermittent every 8 hours  clindamycin IVPB      sodium chloride 0.9%. 1000 milliLiter(s) IV Continuous <Continuous>      FAMILY HISTORY:  : non-contributory    Social History:     Ambulation: Walking independently [X ] With Cane [ ] With Walker [ ]  Bedbound [ ]                           8.9    9.84  )-----------( 177      ( 01 Mar 2024 22:15 )             27.7     03-01    140  |  107  |  10  ----------------------------<  130<H>  4.1   |  26  |  0.99    Ca    8.7      01 Mar 2024 22:15    TPro  7.3  /  Alb  3.1<L>  /  TBili  0.5  /  DBili  x   /  AST  28  /  ALT  11<L>  /  AlkPhos  96  03-01      PHYSICAL EXAM:    Vital Signs Last 24 Hrs  T(C): 36.8 (02 Mar 2024 04:47), Max: 36.8 (01 Mar 2024 19:53)  T(F): 98.2 (02 Mar 2024 04:47), Max: 98.2 (01 Mar 2024 19:53)  HR: 88 (02 Mar 2024 04:47) (88 - 90)  BP: 126/68 (02 Mar 2024 04:47) (126/68 - 136/65)  BP(mean): --  RR: 19 (02 Mar 2024 04:47) (19 - 20)  SpO2: 92% (02 Mar 2024 04:47) (92% - 93%)    Parameters below as of 01 Mar 2024 19:53  Patient On (Oxygen Delivery Method): room air      Daily Height in cm: 170.18 (01 Mar 2024 19:53)    Daily     Appearance: Alert, responsive, in no acute distress.    Skin: no rash on visible skin. Skin is clean, dry and intact. No bleeding. No abrasions. No ulcerations.    Vascular: 2+ distal pulses. Cap refill < 2 sec. No signs of venous insuffiency or stasis. No extremity ulcerations. No cyanosis.    Musculoskeletal:         Left Upper Extremity: Atraumatic with normal alignment NROM. No crepitus. No bony tenderness.        Right Upper Extremity: Atraumatic with normal alignment NROM. No crepitus. No bony tenderness.        Left Lower Extremity: Atraumatic with normal alignment NROM. No crepitus. No bony tenderness. noted left hand erythema and swelling       Right Lower Extremity: Atraumatic with normal alignment NROM. No crepitus. No bony tenderness.     Neurological: Sensation is grossly intact to light touch. No focal deficits or weaknesses found.    C-spine: well healed surgical scar, generalize TTP over the C-spine. ROM not tested. Pt currently has a cervical collar placed by the ED           Motor exam: [  ]          [X ] Upper extremity              Bi(c5)  WE(c6)  EE(c7)   FF(c8)                                                R         5/5        5/5        5/5       5/5                                               L          5/5        5/5        5/5       5/5          Imaging Studies:   CT of the cervical spine shows acute nondisplaced fractures of the biltareal C1 lateral masses and acute nondisplaced fractures of the right inferior articular process of C1    A/P:  Pt is a  74y Female with nondisplaced fx of C1 lateral masses and the right inferior articular process with no neurovascular deficits.     PLAN:  * Pain control  *admitted to medicine for possible cellulitis left hand.   * Cervical Collar   * Plan discussed with Dr. Saleh who agrees with above.   
[facility] Cardiology    CHIEF COMPLAINT: Patient is a 74y old  Female who presents with a chief complaint of hand cut and cellulitis (02 Mar 2024 09:10)      HPI:  74-year-old female with history of rheumatoid arthritis, GERD, history of neck surgery in 2020 is presenting with a few different complaints.  Patient reports she sustained a mechanical fall this morning she tripped on uneven dawn and fell forward.  Denies any anticoagulant use.  Denies any headache or LOC.  Reports neck pain.  Patient reports she has underlying neck pain from the rheumatoid arthritis and neck surgery but feels this is exacerbated since the fall.  Patient also reports 2 days ago she sustained a laceration to the left wrist with while using scissors which slipped and cut her wrist.  Patient reports she was seen at [facility] and had stitches placed.  States she changed her dressing yesterday.  This morning when she woke up she noted her left hand was significantly swollen red and painful.  Denies fever. (02 Mar 2024 07:27)    PAST MEDICAL & SURGICAL HISTORY:  Restless Leg Syndrome      Hyperlipidemia      History of Osteoarthritis      Traumatic arthropathy involving lower leg  right      Obesity (BMI 30-39.9)      History of Hysterectomy and bladder lift in 2000      Ex lap in 2009 for abscess in the baldder      History of Colonoscopy      Bladder Disorder  Bladder lift 2000      H/O arthroscopy of right knee  2010      H/O neck surgery        SOCIAL HISTORY: no tobacco  FAMILY HISTORY:   HTN  MEDICATIONS  (STANDING):  ceFAZolin   IVPB 2000 milliGRAM(s) IV Intermittent every 8 hours  clindamycin IVPB 900 milliGRAM(s) IV Intermittent every 8 hours  enoxaparin Injectable 40 milliGRAM(s) SubCutaneous every 24 hours  pramipexole 1.5 milliGRAM(s) Oral daily  sodium chloride 0.9%. 1000 milliLiter(s) (150 mL/Hr) IV Continuous <Continuous>  sulfaSALAzine 500 milliGRAM(s) Oral three times a day    MEDICATIONS  (PRN):  acetaminophen     Tablet .. 650 milliGRAM(s) Oral every 6 hours PRN Temp greater or equal to 38C (100.4F), Mild Pain (1 - 3)  melatonin 3 milliGRAM(s) Oral at bedtime PRN Insomnia    Allergies    No Known Allergies    Intolerances        REVIEW OF SYSTEMS:  CONSTITUTIONAL: weakness, no fevers   EYES/ENT: No visual changes  NECK: No pain or stiffness  RESPIRATORY: No shortness of breath  CARDIOVASCULAR: No chest pain or palpitations  GASTROINTESTINAL: No abdominal pain  GENITOURINARY: No hematuria  NEUROLOGICAL: No weakness  SKIN: No rash  All other review of systems is negative unless indicated above    VITAL SIGNS:   Vital Signs Last 24 Hrs  T(C): 36.9 (02 Mar 2024 07:48), Max: 36.9 (02 Mar 2024 07:48)  T(F): 98.5 (02 Mar 2024 07:48), Max: 98.5 (02 Mar 2024 07:48)  HR: 90 (02 Mar 2024 07:48) (88 - 90)  BP: 118/69 (02 Mar 2024 07:48) (118/69 - 136/65)  BP(mean): --  RR: 19 (02 Mar 2024 04:47) (19 - 20)  SpO2: 92% (02 Mar 2024 04:47) (92% - 93%)    Parameters below as of 01 Mar 2024 19:53  Patient On (Oxygen Delivery Method): room air      I&O's Summary    PHYSICAL EXAM:  Constitutional: NAD  Neurological: Alert and oriented  HEENT: EOMI, no JVD  Cardiovascular: S1 and S2, no murmur  Pulmonary: breath sounds bilaterally  Gastrointestinal: Bowel Sounds present, soft, nontender  Ext: no peripheral edema  Skin: No rashes, No cyanosis.  Psych:  Mood calm    LABS: All Labs Reviewed:                        8.9    9.84  )-----------( 177      ( 01 Mar 2024 22:15 )             27.7     03-01    140  |  107  |  10  ----------------------------<  130<H>  4.1   |  26  |  0.99    Ca    8.7      01 Mar 2024 22:15    TPro  7.3  /  Alb  3.1<L>  /  TBili  0.5  /  DBili  x   /  AST  28  /  ALT  11<L>  /  AlkPhos  96  03-01    PT/INR - ( 01 Mar 2024 22:15 )   PT: 13.4 sec;   INR: 1.15 ratio         PTT - ( 01 Mar 2024 22:15 )  PTT:24.1 sec      CT Head No Cont:   ACC: [id number] EXAM:  CT THORACIC SPINE   ORDERED BY:  NALLELY CORREA     ACC: [id number] EXAM:  CT CERVICAL SPINE   ORDERED BY:  NALLELY CORREA     ACC: [id number] EXAM:  CT BRAIN   ORDERED BY:  NALLELY CORREA     ACC: [id number] EXAM:  CT MAXILLOFACIAL   ORDERED BY:  NALLELY CORREA     PROCEDURE DATE:  03/02/2024          INTERPRETATION:  CT HEAD, CT MAXILLOFACIAL, CT CERVICAL SPINE, CT   THORACIC SPINE:    INDICATIONS:  Trauma, fall, head injury, neck pain. History of rheumatoid   arthritis    TECHNIQUE:  Multiple contiguous axial images were obtained from the   cervical spine to the vertex without the use of intravenous contrast.   Additionally, axial images were obtained through the cervical and   thoracic spine using multislice helical technique. Reformatted coronal   and sagittal images were performed.    COMPARISON EXAMINATION: CT head performed 1/28/2021, CT cervical spine   performed 1/27/2021.    CT HEAD:    FINDINGS:  Brain parenchyma: Gray-white matter discrimination is well preserved.   There is no mass effect or intracranial hemorrhage. Scattered   hypodensities throughout the periventricular and subcortical white matter    are seen most consistent with sequela of chronic microvascular ischemic   change.    Extra-axial compartments: No extra-axial fluid collections are present.   There is prominence of the ventricles and sulci consistent with   generalized parenchymal volume loss. The basal cisterns are patent.   Craniocervical junction and sella turcica are within normal limits.    Calvarium, paranasal sinuses, and orbits: The calvarium is intact.   Paranasal sinuses and mastoid air cells are clear. Orbits unremarkable.    ------    CT MAXILLOFACIAL    FINDINGS:    SOFT TISSUES: Unremarkable.    FACIAL BONES: Noacute facial bone fracture.    MANDIBLE: Normal.    SINONASAL CAVITIES: Normal.    ORBITAL CONTENTS: Normal.    -------    CT CERVICAL SPINE:    FINDINGS:  Redemonstration of C4-C7 laminectomy and ACDF as well as C2-T7 posterior   spinal fusion hardware no evidence of hardware fracture or loosening.   Acute, nondisplaced fractures of the bilateral C1 lateral masses (series   8, image 47). Additional acute, nondisplaced fracture of the right   inferior articular process of C1 (series 8, image 55 and series 608,   image 81). Vertebral body height and alignment maintained. Streak   artifact from spinal fusion hardware renders evaluation of the spinal   canal nondiagnostic.    Prevertebral soft tissues grossly unremarkable. Interstitial prominence   and scarring in the lung apices.    --------    CT THORACIC SPINE:    FINDINGS:  Stable postoperative hardware from C2-T7. No evidence of hardware   fracture/loosening. Thoracic alignment and vertebral body height   maintained. Evaluation of the spinal canal is nondiagnostic from T1-7 due   to streak artifact from adjacent hardware. Lower thoracic spinal canal   appears pain. Osseous demineralization noted.    Interstitial prominence throughout the lungs which may relate to   pulmonary edema. Moderate cardiomegaly partially visualized. Prominent   hiatal hernia measuring up to approximately 7 cm. Aorta nonaneurysmal.    IMPRESSION:    CT HEAD:  No acute intracranial pathology or calvarial fracture.    CT MAXILLOFACIAL:  No acute facial bone fracture.    CT CERVICAL SPINE:  Acute, nondisplaced fractures of the bilateral C1 lateral masses and   acute, nondisplaced fracture of the right inferior articular process of   C1. MRI of the cervical spine could further evaluate for underlying   ligamentous injury.    Alignment maintained.    Stable postoperative change of the cervicothoracic spine as above.    CT THORACIC SPINE:  No acute fracture or traumatic subluxation.    Stable postoperative hardware    This report was discussed with Nallely Correa ()at 3/2/2024 2:04 AM..    --- End of Report ---      BELLA MCGUIRE MD; Attending Radiologist  This document has been electronically signed. Mar  2 2024  2:05AM (03-02-24 @ 01:35)      HPI:  74-year-old female with history of rheumatoid arthritis, GERD, history of neck surgery in 2020 is presenting with a few different complaints.  Patient reports she sustained a mechanical fall this morning she tripped on uneven dawn and fell forward.  Denies any anticoagulant use.  Denies any headache or LOC.  Reports neck pain.  Patient reports she has underlying neck pain from the rheumatoid arthritis and neck surgery but feels this is exacerbated since the fall.  Patient also reports 2 days ago she sustained a laceration to the left wrist with while using scissors which slipped and cut her wrist.  Patient reports she was seen at [facility] and had stitches placed.  States she changed her dressing yesterday.  This morning when she woke up she noted her left hand was significantly swollen red and painful.  Denies fever. (02 Mar 2024 07:27)    Mechanical fall  C1 trauma  pt denies CP or syncope  pt denies LOC  12 lead EKG: NSR, no acute ST T changes  F/U Ortho eval RECS  IV ABX or LUE cellulitis  Check Echo   will follow                
Date/Time Patient Seen:  		  Referring MD:   Data Reviewed	       Patient is a 74y old  Female who presents with a chief complaint of hand cut and cellulitis (02 Mar 2024 07:27)      Subjective/HPI   74-year-old female with history of rheumatoid arthritis, GERD, history of neck surgery in 2020 is presenting with a few different complaints.  Patient reports she sustained a mechanical fall this morning she tripped on uneven dawn and fell forward.  Denies any anticoagulant use.  Denies any headache or LOC.  Reports neck pain.  Patient reports she has underlying neck pain from the rheumatoid arthritis and neck surgery but feels this is exacerbated since the fall.  Patient also reports 2 days ago she sustained a laceration to the left wrist with while using scissors which slipped and cut her wrist.  Patient reports she was seen at [facility] and had stitches placed.  States she changed her dressing yesterday.  This morning when she woke up she noted her left hand was significantly swollen red and painful.  Denies fever.  PAST MEDICAL & SURGICAL HISTORY:  Restless Leg Syndrome    Hyperlipidemia    History of Osteoarthritis    Traumatic arthropathy involving lower leg  right    Obesity (BMI 30-39.9)    History of Hysterectomy and bladder lift in 2000    Ex lap in 2009 for abscess in the baldder    History of Colonoscopy    Bladder Disorder  Bladder lift 2000    H/O arthroscopy of right knee  2010    H/O neck surgery    Patient History:    Past Medical, Past Surgical, and Family History:  PAST MEDICAL HISTORY:  History of Osteoarthritis     Hyperlipidemia     Obesity (BMI 30-39.9)     Restless Leg Syndrome     Traumatic arthropathy involving lower leg right.     PAST SURGICAL HISTORY:  Bladder Disorder Bladder lift 2000    Ex lap in 2009 for abscess in the baldder     H/O arthroscopy of right knee 2010    H/O neck surgery     History of Colonoscopy     History of Hysterectomy and bladder lift in 2000.     Tobacco Screening:  · Core Measure Site	Yes        Medication list         MEDICATIONS  (STANDING):  sodium chloride 0.9%. 1000 milliLiter(s) (150 mL/Hr) IV Continuous <Continuous>    MEDICATIONS  (PRN):         Vitals log        ICU Vital Signs Last 24 Hrs  T(C): 36.9 (02 Mar 2024 07:48), Max: 36.9 (02 Mar 2024 07:48)  T(F): 98.5 (02 Mar 2024 07:48), Max: 98.5 (02 Mar 2024 07:48)  HR: 90 (02 Mar 2024 07:48) (88 - 90)  BP: 118/69 (02 Mar 2024 07:48) (118/69 - 136/65)  BP(mean): --  ABP: --  ABP(mean): --  RR: 19 (02 Mar 2024 04:47) (19 - 20)  SpO2: 92% (02 Mar 2024 04:47) (92% - 93%)    O2 Parameters below as of 01 Mar 2024 19:53  Patient On (Oxygen Delivery Method): room air                 Input and Output:  I&O's Detail      Lab Data                        8.9    9.84  )-----------( 177      ( 01 Mar 2024 22:15 )             27.7     03-01    140  |  107  |  10  ----------------------------<  130<H>  4.1   |  26  |  0.99    Ca    8.7      01 Mar 2024 22:15    TPro  7.3  /  Alb  3.1<L>  /  TBili  0.5  /  DBili  x   /  AST  28  /  ALT  11<L>  /  AlkPhos  96  03-01            Review of Systems	  fall  ataxic gait      Objective     Physical Examination        Pertinent Lab findings & Imaging      Garcias:  NO   Adequate UO     I&O's Detail           Discussed with:     Cultures:	        Radiology  INTERPRETATION:  CT HEAD, CT MAXILLOFACIAL, CT CERVICAL SPINE, CT   THORACIC SPINE:    INDICATIONS:  Trauma, fall, head injury, neck pain. History of rheumatoid   arthritis    TECHNIQUE:  Multiple contiguous axial images were obtained from the   cervical spine to the vertex without the use of intravenous contrast.   Additionally, axial images were obtained through the cervical and   thoracic spine using multislice helical technique. Reformatted coronal   and sagittal images were performed.    COMPARISON EXAMINATION: CT head performed 1/28/2021, CT cervical spine   performed 1/27/2021.    CT HEAD:    FINDINGS:  Brain parenchyma: Gray-white matter discrimination is well preserved.   There is no mass effect or intracranial hemorrhage. Scattered   hypodensities throughout the periventricular and subcortical white matter    are seen most consistent with sequela of chronic microvascular ischemic   change.    Extra-axial compartments: No extra-axial fluid collections are present.   There is prominence of the ventricles and sulci consistent with   generalized parenchymal volume loss. The basal cisterns are patent.   Craniocervical junction and sella turcica are within normal limits.    Calvarium, paranasal sinuses, and orbits: The calvarium is intact.   Paranasal sinuses and mastoid air cells are clear. Orbits unremarkable.    ------    CT MAXILLOFACIAL    FINDINGS:    SOFT TISSUES: Unremarkable.    FACIAL BONES: No acute facial bone fracture.    MANDIBLE: Normal.    SINONASAL CAVITIES: Normal.    ORBITAL CONTENTS: Normal.    -------    CT CERVICAL SPINE:    FINDINGS:  Redemonstration of C4-C7 laminectomy and ACDF as well as C2-T7 posterior   spinal fusion hardware no evidence of hardware fracture or loosening.   Acute, nondisplaced fractures of the bilateral C1 lateral masses (series   8, image 47). Additional acute, nondisplaced fracture of the right   inferior articular process of C1 (series 8, image 55 and series 608,   image 81). Vertebral body height and alignment maintained. Streak   artifact from spinal fusion hardware renders evaluation of the spinal   canal nondiagnostic.    Prevertebral soft tissues grossly unremarkable. Interstitial prominence   and scarring in the lung apices.    --------    CT THORACIC SPINE:    FINDINGS:  Stable postoperative hardware from C2-T7. No evidence of hardware   fracture/loosening. Thoracic alignment and vertebral body height   maintained. Evaluation of the spinal canal is nondiagnostic from T1-7 due   to streak artifact from adjacent hardware. Lower thoracic spinal canal   appears pain. Osseous demineralization noted.    Interstitial prominence throughout the lungs which may relate to   pulmonary edema. Moderate cardiomegaly partially visualized. Prominent   hiatal hernia measuring up to approximately 7 cm. Aorta nonaneurysmal.    IMPRESSION:    CT HEAD:  No acute intracranial pathology or calvarial fracture.    CT MAXILLOFACIAL:  No acute facial bone fracture.    CT CERVICAL SPINE:  Acute, nondisplaced fractures of the bilateral C1 lateral masses and   acute, nondisplaced fracture of the right inferior articular process of   C1. MRI of the cervical spine could further evaluate for underlying   ligamentous injury.    Alignment maintained.    Stable postoperative change of the cervicothoracic spine as above.    CT THORACIC SPINE:  No acute fracture or traumatic subluxation.    Stable postoperative hardware    This report was discussed with Nallely White)at 3/2/2024 2:04 AM..    --- End of Report ---            BELLA MCGUIRE MD; Attending Radiologist  This document has been electronically signed. Mar  2 2024  2:05A                          
[facility]  INFECTIOUS DISEASES   [address] [place] [address]  Tel: [phone]     Fax: [phone]  MD Yasmeen Jones MD Shah, Kaushal, MD Sunjit, Jaspal, MD  ========================================================  Weekend Coverage  =======================================================      [id number]  VERONICA WHALEN    CC: Patient is a 74y old  Female who presents with a chief complaint of hand cut and cellulitis (02 Mar 2024 09:10)      74-year-old female with a history of rheumatoid arthritis, GERD, neck surgery 2020.  Patient presented to [facility] on March 1, 2024 with complaint of left hand swelling redness and pain.  She reports she was seen at [facility] the day prior to presentation after having a laceration to her left wrist while using scissors to cut open a box.  She had stitches placed.  She was discharged from the emergency room.  At home she noticed redness swelling and pain after removing her dressing.  She denies any fever or chills at home.  She decided to come to the emergency room at [facility] this time.  She also had a fall at home the morning prior to presentation.  In the emergency room patient noted to be afebrile with no leukocytosis.  Patient was given a few doses of clindamycin.  Admitted to medical service.  Infectious disease evaluation requested.      Past Medical & Surgical Hx:  Restless Leg Syndrome  Hyperlipidemia  History of Osteoarthritis  Traumatic arthropathy involving lower leg right  Obesity (BMI 30-39.9)  History of Hysterectomy and bladder lift in 2000  Ex lap in 2009 for abscess in the baldder  History of Colonoscopy  Bladder Disorder Bladder lift 2000  H/O arthroscopy of right knee 2010  H/O neck surgery      Social Hx:  Cigarette smoker       FAMILY HISTORY:  Patient unaware of medical problems of her parents      Allergies  No Known Allergies      REVIEW OF SYSTEMS:  CONSTITUTIONAL:  No Fever or chills  HEENT:  No diplopia or blurred vision.  No earache, sore throat or runny nose.  CARDIOVASCULAR:  No chest pain  RESPIRATORY:  No cough, shortness of breath  GASTROINTESTINAL:  No nausea, vomiting or diarrhea.  GENITOURINARY:  No dysuria, frequency or urgency. No Blood in urine  MUSCULOSKELETAL:  no joint aches, no muscle pain  SKIN:  left hand swelling   NEUROLOGIC:  No Headaches      Physical Exam:  GEN: NAD  HEENT: normocephalic and atraumatic. EOMI. PERRL.    NECK: Supple.   LUNGS: CTA B/L.  HEART: RRR  ABDOMEN: Soft, NT, ND.  +BS.    : No CVA tenderness  EXTREMITIES: Without  edema.  MSK: No joint swelling  NEUROLOGIC: No Focal Deficits   SKIN: Left hand with swelling and erythema.       Height (cm): 170.2 (03-01 @ 19:53)  Weight (kg): 99.8 (03-01 @ 19:53)  BMI (kg/m2): 34.5 (03-01 @ 19:53)  BSA (m2): 2.11 (03-01 @ 19:53)    Vitals:  T(F): 98.5 (02 Mar 2024 07:48), Max: 98.5 (02 Mar 2024 07:48)  HR: 90 (02 Mar 2024 07:48)  BP: 118/69 (02 Mar 2024 07:48)  RR: 19 (02 Mar 2024 04:47)  SpO2: 92% (02 Mar 2024 04:47) (92% - 93%)  temp max in last 48H T(F): , Max: 98.5 (03-02-24 @ 07:48)    Current Antibiotics:    Other medications:  sodium chloride 0.9%. 1000 milliLiter(s) IV Continuous <Continuous>                            8.9    9.84  )-----------( 177      ( 01 Mar 2024 22:15 )             27.7     03-01    140  |  107  |  10  ----------------------------<  130<H>  4.1   |  26  |  0.99    Ca    8.7      01 Mar 2024 22:15    TPro  7.3  /  Alb  3.1<L>  /  TBili  0.5  /  DBili  x   /  AST  28  /  ALT  11<L>  /  AlkPhos  96  03-01    RECENT CULTURES:      WBC Count: 9.84 K/uL (03-01-24 @ 22:15)    Creatinine: 0.99 mg/dL (03-01-24 @ 22:15)         < from: Xray Forearm, Left (03.01.24 @ 21:56) >  ACC: [id number] EXAM:  XR FOREARM 2 VIEWS LT   ORDERED BY:  LIZ CORREA     ACC: [id number] EXAM:  XR HAND MIN 3 VIEWS LT   ORDERED BY:  LIZ CORREA     PROCEDURE DATE:  03/01/2024      INTERPRETATION:  Left hand 3 views and left forearm 2 views    CLINICAL HISTORY: Cellulitis rule out subcutaneous gas    FINDINGS: Intact bones and alignment. No evidence of fracture or   suspicious lesion.    Diffuse soft tissue swelling. No evidence of gas like lucencies. No   evidence of radiopaque foreign body    IMPRESSION: Diffuse soft tissue swelling.    If clinically warranted further assessment may include three-phase bone   scan or MRI imaging to assess for early underlying osteomyelitis not yet   apparent on plain film.    --- End of Report ---    < end of copied text >

## 2024-03-03 ENCOUNTER — TRANSCRIPTION ENCOUNTER (OUTPATIENT)
Age: 74
End: 2024-03-03

## 2024-03-03 VITALS
TEMPERATURE: 98 F | SYSTOLIC BLOOD PRESSURE: 120 MMHG | OXYGEN SATURATION: 99 % | HEART RATE: 87 BPM | DIASTOLIC BLOOD PRESSURE: 70 MMHG | RESPIRATION RATE: 18 BRPM

## 2024-03-03 PROCEDURE — 99231 SBSQ HOSP IP/OBS SF/LOW 25: CPT

## 2024-03-03 PROCEDURE — 70450 CT HEAD/BRAIN W/O DYE: CPT | Mod: MC

## 2024-03-03 PROCEDURE — 72128 CT CHEST SPINE W/O DYE: CPT | Mod: MC

## 2024-03-03 PROCEDURE — 73130 X-RAY EXAM OF HAND: CPT

## 2024-03-03 PROCEDURE — 96374 THER/PROPH/DIAG INJ IV PUSH: CPT

## 2024-03-03 PROCEDURE — 99285 EMERGENCY DEPT VISIT HI MDM: CPT | Mod: 25

## 2024-03-03 PROCEDURE — 83605 ASSAY OF LACTIC ACID: CPT

## 2024-03-03 PROCEDURE — 36415 COLL VENOUS BLD VENIPUNCTURE: CPT

## 2024-03-03 PROCEDURE — 96375 TX/PRO/DX INJ NEW DRUG ADDON: CPT

## 2024-03-03 PROCEDURE — 70486 CT MAXILLOFACIAL W/O DYE: CPT | Mod: MC

## 2024-03-03 PROCEDURE — 73090 X-RAY EXAM OF FOREARM: CPT

## 2024-03-03 PROCEDURE — 85025 COMPLETE CBC W/AUTO DIFF WBC: CPT

## 2024-03-03 PROCEDURE — 85730 THROMBOPLASTIN TIME PARTIAL: CPT

## 2024-03-03 PROCEDURE — 80053 COMPREHEN METABOLIC PANEL: CPT

## 2024-03-03 PROCEDURE — 85610 PROTHROMBIN TIME: CPT

## 2024-03-03 PROCEDURE — 72125 CT NECK SPINE W/O DYE: CPT | Mod: MC

## 2024-03-03 PROCEDURE — 93005 ELECTROCARDIOGRAM TRACING: CPT

## 2024-03-03 PROCEDURE — 87040 BLOOD CULTURE FOR BACTERIA: CPT

## 2024-03-03 RX ORDER — FAMOTIDINE 10 MG/ML
20 INJECTION INTRAVENOUS
Refills: 0 | Status: DISCONTINUED | OUTPATIENT
Start: 2024-03-03 | End: 2024-03-03

## 2024-03-03 RX ORDER — DULOXETINE HYDROCHLORIDE 30 MG/1
20 CAPSULE, DELAYED RELEASE ORAL DAILY
Refills: 0 | Status: DISCONTINUED | OUTPATIENT
Start: 2024-03-03 | End: 2024-03-03

## 2024-03-03 RX ORDER — GABAPENTIN 400 MG/1
1 CAPSULE ORAL
Refills: 0 | DISCHARGE

## 2024-03-03 RX ORDER — PROPRANOLOL HCL 160 MG
1 CAPSULE, EXTENDED RELEASE 24HR ORAL
Refills: 0 | DISCHARGE

## 2024-03-03 RX ORDER — PRAMIPEXOLE DIHYDROCHLORIDE 0.12 MG/1
1.5 TABLET ORAL DAILY
Refills: 0 | Status: DISCONTINUED | OUTPATIENT
Start: 2024-03-03 | End: 2024-03-03

## 2024-03-03 RX ORDER — TRAMADOL HYDROCHLORIDE 50 MG/1
50 TABLET ORAL THREE TIMES A DAY
Refills: 0 | Status: DISCONTINUED | OUTPATIENT
Start: 2024-03-03 | End: 2024-03-03

## 2024-03-03 RX ORDER — ACETAMINOPHEN 500 MG
1000 TABLET ORAL ONCE
Refills: 0 | Status: COMPLETED | OUTPATIENT
Start: 2024-03-03 | End: 2024-03-03

## 2024-03-03 RX ORDER — DULOXETINE HYDROCHLORIDE 30 MG/1
1 CAPSULE, DELAYED RELEASE ORAL
Refills: 0 | DISCHARGE

## 2024-03-03 RX ORDER — TETANUS TOXOID, REDUCED DIPHTHERIA TOXOID AND ACELLULAR PERTUSSIS VACCINE, ADSORBED 5; 2.5; 8; 8; 2.5 [IU]/.5ML; [IU]/.5ML; UG/.5ML; UG/.5ML; UG/.5ML
0.5 SUSPENSION INTRAMUSCULAR ONCE
Refills: 0 | Status: DISCONTINUED | OUTPATIENT
Start: 2024-03-03 | End: 2024-03-03

## 2024-03-03 RX ORDER — PRAMIPEXOLE DIHYDROCHLORIDE 0.12 MG/1
1 TABLET ORAL
Qty: 0 | Refills: 0 | DISCHARGE

## 2024-03-03 RX ORDER — FAMOTIDINE 10 MG/ML
1 INJECTION INTRAVENOUS
Refills: 0 | DISCHARGE

## 2024-03-03 RX ORDER — GABAPENTIN 400 MG/1
100 CAPSULE ORAL
Refills: 0 | Status: DISCONTINUED | OUTPATIENT
Start: 2024-03-03 | End: 2024-03-03

## 2024-03-03 RX ORDER — ABATACEPT 125 MG/ML
0 INJECTION, SOLUTION SUBCUTANEOUS
Qty: 0 | Refills: 0 | DISCHARGE

## 2024-03-03 RX ORDER — SULFASALAZINE 500 MG
3 TABLET ORAL
Qty: 0 | Refills: 0 | DISCHARGE

## 2024-03-03 RX ADMIN — Medication 100 MILLIGRAM(S): at 05:03

## 2024-03-03 RX ADMIN — Medication 500 MILLIGRAM(S): at 05:03

## 2024-03-03 RX ADMIN — Medication 400 MILLIGRAM(S): at 04:54

## 2024-03-03 RX ADMIN — Medication 100 MILLIGRAM(S): at 00:13

## 2024-03-03 RX ADMIN — Medication 100 MILLIGRAM(S): at 09:55

## 2024-03-03 RX ADMIN — Medication 1000 MILLIGRAM(S): at 05:50

## 2024-03-03 NOTE — PROGRESS NOTE ADULT - ASSESSMENT
74-year-old female with a history of rheumatoid arthritis, GERD, neck surgery 2020.  Patient presented to [facility] on March 1, 2024 with complaint of left hand swelling redness and pain.  She reports she was seen at [facility] the day prior to presentation after having a laceration to her left wrist while using scissors to cut open a box.  She had stitches placed.  She was discharged from the emergency room.  At home she noticed redness swelling and pain after removing her dressing.  She denies any fever or chills at home.  She decided to come to the emergency room at [facility] this time.  She also had a fall at home the morning prior to presentation.  In the emergency room patient noted to be afebrile with no leukocytosis.  Patient was given a few doses of clindamycin.       Left hand cellulitis.  Laceration to left wrist.  Cigarette smoker      - Blood cultures 3/1 no growth   - XR Forearm and hand, left from 3/1 reporting soft tissue swelling   - Continue Cefazolin 2gm IV [id number]  - Completed Clindamycin 900mg IV q8 hours x 3 doses   - Follow up cultures  - Trend Fever  - Trend WBC      Will Follow    I will be available on Teams for any questions.

## 2024-03-03 NOTE — PROGRESS NOTE ADULT - SUBJECTIVE AND OBJECTIVE BOX
Date/Time Patient Seen:  		  Referring MD:   Data Reviewed	       Patient is a 74y old  Female who presents with a chief complaint of hand cut and cellulitis (02 Mar 2024 15:10)      Subjective/HPI     PAST MEDICAL & SURGICAL HISTORY:  Restless Leg Syndrome    Hyperlipidemia    History of Osteoarthritis    Traumatic arthropathy involving lower leg  right    Obesity (BMI 30-39.9)    History of Hysterectomy and bladder lift in 2000    Ex lap in 2009 for abscess in the baldder    History of Colonoscopy    Bladder Disorder  Bladder lift 2000    H/O arthroscopy of right knee  2010    H/O neck surgery          Medication list         MEDICATIONS  (STANDING):  ceFAZolin   IVPB 2000 milliGRAM(s) IV Intermittent every 8 hours  clindamycin IVPB 900 milliGRAM(s) IV Intermittent every 8 hours  enoxaparin Injectable 40 milliGRAM(s) SubCutaneous every 24 hours  naloxone Injectable 0.4 milliGRAM(s) IV Push once  polyethylene glycol 3350 17 Gram(s) Oral daily  pramipexole 1.5 milliGRAM(s) Oral daily  senna 2 Tablet(s) Oral at bedtime  sodium chloride 0.9%. 1000 milliLiter(s) (150 mL/Hr) IV Continuous <Continuous>  sulfaSALAzine 500 milliGRAM(s) Oral three times a day    MEDICATIONS  (PRN):  acetaminophen     Tablet .. 650 milliGRAM(s) Oral every 6 hours PRN Temp greater or equal to 38C (100.4F), Mild Pain (1 - 3)  bisacodyl 5 milliGRAM(s) Oral daily PRN Constipation  melatonin 3 milliGRAM(s) Oral at bedtime PRN Insomnia  oxyCODONE    IR 2.5 milliGRAM(s) Oral every 4 hours PRN Moderate Pain (4 - 6)  oxyCODONE    IR 5 milliGRAM(s) Oral every 4 hours PRN Severe Pain (7 - 10)         Vitals log        ICU Vital Signs Last 24 Hrs  T(C): 36.7 (03 Mar 2024 05:06), Max: 36.9 (02 Mar 2024 07:48)  T(F): 98.1 (03 Mar 2024 05:06), Max: 98.5 (02 Mar 2024 07:48)  HR: 87 (03 Mar 2024 05:06) (83 - 90)  BP: 120/70 (03 Mar 2024 05:06) (116/64 - 123/75)  BP(mean): --  ABP: --  ABP(mean): --  RR: 18 (03 Mar 2024 05:06) (17 - 18)  SpO2: 99% (03 Mar 2024 05:06) (93% - 99%)    O2 Parameters below as of 03 Mar 2024 05:06  Patient On (Oxygen Delivery Method): nasal cannula  O2 Flow (L/min): 3               Input and Output:  I&O's Detail      Lab Data                        8.9    9.84  )-----------( 177      ( 01 Mar 2024 22:15 )             27.7     03-01    140  |  107  |  10  ----------------------------<  130<H>  4.1   |  26  |  0.99    Ca    8.7      01 Mar 2024 22:15    TPro  7.3  /  Alb  3.1<L>  /  TBili  0.5  /  DBili  x   /  AST  28  /  ALT  11<L>  /  AlkPhos  96  03-01            Review of Systems	      Objective     Physical Examination    heart s1s2  lung dc BS      Pertinent Lab findings & Imaging      Katerine:  NO   Adequate UO     I&O's Detail           Discussed with:     Cultures:	        Radiology

## 2024-03-03 NOTE — DISCHARGE NOTE PROVIDER - EXTENDED VTE YES NO FOR MLM ENOXAPARIN
Detail Level: Simple
Price (Do Not Change): 0.00
Instructions: This plan will send the code FBSE to the PM system.  DO NOT or CHANGE the price.
,

## 2024-03-03 NOTE — PROGRESS NOTE ADULT - ASSESSMENT
74-year-old female with history of rheumatoid arthritis, GERD, history of neck surgery in 2020 is presenting with a few different complaints.  Patient reports she sustained a mechanical fall this morning she tripped on uneven dawn and fell forward.     RLS  RA  GERD  Fall  Ataxic gait  eval for C Spine Fx  OP  OA    on ABX  STSI eval  skin care  ID f/u  Cardio eval noted  pain rx regimen  bowel rx regimen  assist with needs  fall prec  PMR eval  CT imaging reviewed  oral hygiene  skin care  fio2 wean as tolerated

## 2024-03-03 NOTE — PROGRESS NOTE ADULT - SUBJECTIVE AND OBJECTIVE BOX
[facility]  INFECTIOUS DISEASES   [address] [place] [address]  Tel: [phone]     Fax: [phone]  MD Yasmeen Jones MD Shah, Kaushal, MD Sunjit, Jaspal, MD  ========================================================  Weekend Coverage  =======================================================      VERONICA WHALEN [id number]    Follow up: Left hand cellulitis.    Swelling improved   No fevers      Allergies:  No Known Allergies         REVIEW OF SYSTEMS:  CONSTITUTIONAL:  No Fever or chills  HEENT:  No diplopia or blurred vision.  No earache, sore throat or runny nose.  CARDIOVASCULAR:  No chest pain  RESPIRATORY:  No cough, shortness of breath  GASTROINTESTINAL:  No nausea, vomiting or diarrhea.  GENITOURINARY:  No dysuria, frequency or urgency. No Blood in urine  MUSCULOSKELETAL:  no joint aches, no muscle pain  SKIN:  left hand swelling   NEUROLOGIC:  No Headaches      Physical Exam:  GEN: NAD  HEENT: normocephalic and atraumatic. EOMI. PERRL.    NECK: Supple.   LUNGS: CTA B/L.  HEART: RRR  ABDOMEN: Soft, NT, ND.  +BS.    : No CVA tenderness  EXTREMITIES: Without  edema.  MSK: No joint swelling  NEUROLOGIC: No Focal Deficits   SKIN: Left hand with swelling and erythema.       Vitals:  T(F): 98.1 (03 Mar 2024 05:06), Max: 98.4 (02 Mar 2024 15:08)  HR: 87 (03 Mar 2024 05:06)  BP: 120/70 (03 Mar 2024 05:06)  RR: 18 (03 Mar 2024 05:06)  SpO2: 99% (03 Mar 2024 05:06) (93% - 99%)  temp max in last 48H T(F): , Max: 98.5 (03-02-24 @ 07:48)    Current Antibiotics:  ceFAZolin   IVPB 2000 milliGRAM(s) IV Intermittent every 8 hours  clindamycin IVPB 900 milliGRAM(s) IV Intermittent every 8 hours    Other medications:  enoxaparin Injectable 40 milliGRAM(s) SubCutaneous every 24 hours  naloxone Injectable 0.4 milliGRAM(s) IV Push once  polyethylene glycol 3350 17 Gram(s) Oral daily  pramipexole 1.5 milliGRAM(s) Oral daily  senna 2 Tablet(s) Oral at bedtime  sodium chloride 0.9%. 1000 milliLiter(s) IV Continuous <Continuous>  sulfaSALAzine 500 milliGRAM(s) Oral three times a day                            8.9    9.84  )-----------( 177      ( 01 Mar 2024 22:15 )             27.7     03-01    140  |  107  |  10  ----------------------------<  130<H>  4.1   |  26  |  0.99    Ca    8.7      01 Mar 2024 22:15    TPro  7.3  /  Alb  3.1<L>  /  TBili  0.5  /  DBili  x   /  AST  28  /  ALT  11<L>  /  AlkPhos  96  03-01    RECENT CULTURES:  03-01 @ 22:15 .Blood Blood-Peripheral     No growth at 24 hours    03-01 @ 22:10 .Blood Blood-Peripheral     No growth at 24 hours      WBC Count: 9.84 K/uL (03-01-24 @ 22:15)    Creatinine: 0.99 mg/dL (03-01-24 @ 22:15)

## 2024-03-03 NOTE — PROGRESS NOTE ADULT - SUBJECTIVE AND OBJECTIVE BOX
SUBJECTIVE:       Pt seen and examined at bedside. Was complaining of discomfort and pain overnight w/ the hard C-collar. Reports pain well-controlled with medication. No CP, SOB, N/V, F/C, new N/T.    Vital Signs (24 Hrs):  T(C): 36.7 (03-03-24 @ 05:06), Max: 36.9 (03-02-24 @ 15:08)  HR: 87 (03-03-24 @ 05:06) (83 - 87)  BP: 120/70 (03-03-24 @ 05:06) (116/64 - 123/75)  RR: 18 (03-03-24 @ 05:06) (17 - 18)  SpO2: 99% (03-03-24 @ 05:06) (93% - 99%)  Wt(kg): --    LABS:                          8.9    9.84  )-----------( 177      ( 01 Mar 2024 22:15 )             27.7     03-01    140  |  107  |  10  ----------------------------<  130<H>  4.1   |  26  |  0.99    Ca    8.7      01 Mar 2024 22:15    TPro  7.3  /  Alb  3.1<L>  /  TBili  0.5  /  DBili  x   /  AST  28  /  ALT  11<L>  /  AlkPhos  96  03-01    LIVER FUNCTIONS - ( 01 Mar 2024 22:15 )  Alb: 3.1 g/dL / Pro: 7.3 g/dL / ALK PHOS: 96 U/L / ALT: 11 U/L / AST: 28 U/L / GGT: x           PT/INR - ( 01 Mar 2024 22:15 )   PT: 13.4 sec;   INR: 1.15 ratio         PTT - ( 01 Mar 2024 22:15 )  PTT:24.1 sec    EXAM:  General: NAD, awake and alert  Spine: TTP proximal C spine, otherwise NTTP  LUE: noted to have recent wound closed w/ Nylon sutures on volar wrist, no active drainage; +erythema throughout wrist/forearm  Motor:                   C5                C6              C7               C8           T1   R            5/5                5/5            5/5             5/5          5/5  L             5/5               5/5             5/5             4/5*          4/5*    *In setting of pain                L2             L3             L4               L5            S1  R         5/5           5/5          5/5             5/5           5/5  L          5/5          5/5           5/5             5/5           5/5    Sensory:            C5         C6         C7      C8       T1        (0=absent, 1=impaired, 2=normal, NT=not testable)  R         2            2           2        2         2  L          2            2           2        2         2               L2          L3         L4      L5       S1         (0=absent, 1=impaired, 2=normal, NT=not testable)  R         2            2            2        2        2  L          2            2           2        2         2    Negative Babinski b/l  Negative Clonus b/l  Negative Meza b/l      A/P:  74F w/ nondisplaced C1 fx, admitted for cellulitis  -Will need CT LSp noncon prior to DC to r/o fx propagation given hx of spinal fusion  -Will order [place] and deliver to [facility]  -C-collar at all times  -WBAT  -Pain control  -Medical management per primary and appreciated  -DVT ppx per primary  -No acute ortho surgery indicated at this time - will follow CT results and [place] delivery  -Will follow up outpt w/ Dr. Saleh in 1-2 weeks after discharge  -D/w Dr. Saleh who agrees w/ plan   SUBJECTIVE:       Pt seen and examined at bedside. Was complaining of discomfort and pain overnight w/ the hard C-collar. Reports pain well-controlled with medication. No CP, SOB, N/V, F/C, new N/T.    Vital Signs (24 Hrs):  T(C): 36.7 (03-03-24 @ 05:06), Max: 36.9 (03-02-24 @ 15:08)  HR: 87 (03-03-24 @ 05:06) (83 - 87)  BP: 120/70 (03-03-24 @ 05:06) (116/64 - 123/75)  RR: 18 (03-03-24 @ 05:06) (17 - 18)  SpO2: 99% (03-03-24 @ 05:06) (93% - 99%)  Wt(kg): --    LABS:                          8.9    9.84  )-----------( 177      ( 01 Mar 2024 22:15 )             27.7     03-01    140  |  107  |  10  ----------------------------<  130<H>  4.1   |  26  |  0.99    Ca    8.7      01 Mar 2024 22:15    TPro  7.3  /  Alb  3.1<L>  /  TBili  0.5  /  DBili  x   /  AST  28  /  ALT  11<L>  /  AlkPhos  96  03-01    LIVER FUNCTIONS - ( 01 Mar 2024 22:15 )  Alb: 3.1 g/dL / Pro: 7.3 g/dL / ALK PHOS: 96 U/L / ALT: 11 U/L / AST: 28 U/L / GGT: x           PT/INR - ( 01 Mar 2024 22:15 )   PT: 13.4 sec;   INR: 1.15 ratio         PTT - ( 01 Mar 2024 22:15 )  PTT:24.1 sec    EXAM:  General: NAD, awake and alert  Spine: TTP proximal C spine, otherwise NTTP  LUE: noted to have recent wound closed w/ Nylon sutures on volar wrist, no active drainage; +erythema throughout wrist/forearm  Motor:                   C5                C6              C7               C8           T1   R            5/5                5/5            5/5             5/5          5/5  L             5/5               5/5             5/5             4/5*          4/5*    *In setting of pain                L2             L3             L4               L5            S1  R         5/5           5/5          5/5             5/5           5/5  L          5/5          5/5           5/5             5/5           5/5    Sensory:            C5         C6         C7      C8       T1        (0=absent, 1=impaired, 2=normal, NT=not testable)  R         2            2           2        2         2  L          2            2           2        2         2               L2          L3         L4      L5       S1         (0=absent, 1=impaired, 2=normal, NT=not testable)  R         2            2            2        2        2  L          2            2           2        2         2    Negative Babinski b/l  Negative Clonus b/l  Negative Meza b/l      A/P:  74F w/ nondisplaced C1 fx, admitted for cellulitis  -Will need CT LSp noncon prior to DC to r/o fx propagation given hx of spinal fusion  -Will order [place] and deliver to [facility]  -C-collar at all times  -WBAT  -Pain control  -Medical management per primary and appreciated  -DVT ppx per primary  -No acute ortho surgery indicated at this time - will follow CT results and [place] delivery  -Will follow up outpt w/ Dr. Saleh in 1-2 weeks after discharge  -D/w Dr. Saleh who agrees w/ plan    ***ADDENDUM***  Team was informed that the pt left AMA without obtaining her CT LSp or [place]. Will attempt to deliver [place] to home. Will have to follow up outpt w/ Dr. Saleh for further management. Dr. Saleh aware.

## 2024-03-03 NOTE — DISCHARGE NOTE PROVIDER - NSDCCPCAREPLAN_GEN_ALL_CORE_FT
PRINCIPAL DISCHARGE DIAGNOSIS  Diagnosis: Left arm cellulitis  Assessment and Plan of Treatment: started on iv antibiotics  seen by ID  Signed out AMA      SECONDARY DISCHARGE DIAGNOSES  Diagnosis: Closed C1 fracture  Assessment and Plan of Treatment: seen by ortho  c collar placed  pt signed out AMA

## 2024-03-03 NOTE — PROGRESS NOTE ADULT - SUBJECTIVE AND OBJECTIVE BOX
[facility] Cardiology    CHIEF COMPLAINT: Patient is a 74y old  Female who presents with a chief complaint of hand cut and cellulitis (03 Mar 2024 08:12)      Follow Up: [ ] Chest Pain      [ ] Dyspnea     [ ] Palpitations    [ ] Atrial Fibrillation     [ ] Ventricular Dysrhythmia    [ ] Abnormal EKG                      [ ] Abnormal Cardiac Enzymes     [ ] Valvular Disease    HPI:  74-year-old female with history of rheumatoid arthritis, GERD, history of neck surgery in 2020 is presenting with a few different complaints.  Patient reports she sustained a mechanical fall this morning she tripped on uneven dawn and fell forward.  Denies any anticoagulant use.  Denies any headache or LOC.  Reports neck pain.  Patient reports she has underlying neck pain from the rheumatoid arthritis and neck surgery but feels this is exacerbated since the fall.  Patient also reports 2 days ago she sustained a laceration to the left wrist with while using scissors which slipped and cut her wrist.  Patient reports she was seen at [facility] and had stitches placed.  States she changed her dressing yesterday.  This morning when she woke up she noted her left hand was significantly swollen red and painful.  Denies fever. (02 Mar 2024 07:27)    PAST MEDICAL & SURGICAL HISTORY:  Restless Leg Syndrome      Hyperlipidemia      History of Osteoarthritis      Traumatic arthropathy involving lower leg  right      Obesity (BMI 30-39.9)      History of Hysterectomy and bladder lift in 2000      Ex lap in 2009 for abscess in the baldder      History of Colonoscopy      Bladder Disorder  Bladder lift 2000      H/O arthroscopy of right knee  2010      H/O neck surgery        MEDICATIONS  (STANDING):  ceFAZolin   IVPB 2000 milliGRAM(s) IV Intermittent every 8 hours  clindamycin IVPB 900 milliGRAM(s) IV Intermittent every 8 hours  enoxaparin Injectable 40 milliGRAM(s) SubCutaneous every 24 hours  naloxone Injectable 0.4 milliGRAM(s) IV Push once  polyethylene glycol 3350 17 Gram(s) Oral daily  pramipexole 1.5 milliGRAM(s) Oral daily  senna 2 Tablet(s) Oral at bedtime  sodium chloride 0.9%. 1000 milliLiter(s) (150 mL/Hr) IV Continuous <Continuous>  sulfaSALAzine 500 milliGRAM(s) Oral three times a day    MEDICATIONS  (PRN):  acetaminophen     Tablet .. 650 milliGRAM(s) Oral every 6 hours PRN Temp greater or equal to 38C (100.4F), Mild Pain (1 - 3)  bisacodyl 5 milliGRAM(s) Oral daily PRN Constipation  melatonin 3 milliGRAM(s) Oral at bedtime PRN Insomnia  oxyCODONE    IR 5 milliGRAM(s) Oral every 4 hours PRN Severe Pain (7 - 10)  oxyCODONE    IR 2.5 milliGRAM(s) Oral every 4 hours PRN Moderate Pain (4 - 6)    Allergies    No Known Allergies    Intolerances        REVIEW OF SYSTEMS:    CONSTITUTIONAL: No weakness, fevers or chills.   EYES/ENT: No visual changes;    NECK: No pain or stiffness  RESPIRATORY: No cough, wheezing, No shortness of breath  CARDIOVASCULAR: No chest pain or palpitations  GASTROINTESTINAL: No abdominal pain, or hematochezia.  GENITOURINARY: No dysuria orhematuria  NEUROLOGICAL: No numbness or weakness  SKIN: No itching, burning, rashes  All other review of systems is negative unless indicated above    Vital Signs Last 24 Hrs  T(C): 36.7 (03 Mar 2024 05:06), Max: 36.9 (02 Mar 2024 15:08)  T(F): 98.1 (03 Mar 2024 05:06), Max: 98.4 (02 Mar 2024 15:08)  HR: 87 (03 Mar 2024 05:06) (83 - 87)  BP: 120/70 (03 Mar 2024 05:06) (116/64 - 123/75)  BP(mean): --  RR: 18 (03 Mar 2024 05:06) (17 - 18)  SpO2: 99% (03 Mar 2024 05:06) (93% - 99%)    Parameters below as of 03 Mar 2024 05:06  Patient On (Oxygen Delivery Method): nasal cannula  O2 Flow (L/min): 3    I&O's Summary      PHYSICAL EXAM:  Constitutional: NAD  Neurological: Alert, no focal deficits  HEENT: no JVD, EOMI  neck collar  Cardiovascular: Regular, S1 and S2, no murmur  Pulmonary: breath sounds bilaterally  Gastrointestinal: Bowel Sounds present, soft, nontender  EXT:  no peripheral edema  Skin: No rashes.  Psych:  Mood calm  LABS: All Labs Reviewed:                          8.9    9.84  )-----------( 177      ( 01 Mar 2024 22:15 )             27.7     03-01    140  |  107  |  10  ----------------------------<  130<H>  4.1   |  26  |  0.99    Ca    8.7      01 Mar 2024 22:15    TPro  7.3  /  Alb  3.1<L>  /  TBili  0.5  /  DBili  x   /  AST  28  /  ALT  11<L>  /  AlkPhos  96  03-01    PT/INR - ( 01 Mar 2024 22:15 )   PT: 13.4 sec;   INR: 1.15 ratio         PTT - ( 01 Mar 2024 22:15 )  PTT:24.1 sec      CT Head No Cont:   ACC: [id number] EXAM:  CT THORACIC SPINE   ORDERED BY:  NALLELY MOOREIN     ACC: [id number] EXAM:  CT CERVICAL SPINE   ORDERED BY:  NALLELY MOOREIN     ACC: [id number] EXAM:  CT BRAIN   ORDERED BY:  NALLELY CORREA     ACC: [id number] EXAM:  CT MAXILLOFACIAL   ORDERED BY:  NALLELY CORREA     PROCEDURE DATE:  03/02/2024          INTERPRETATION:  CT HEAD, CT MAXILLOFACIAL, CT CERVICAL SPINE, CT   THORACIC SPINE:    INDICATIONS:  Trauma, fall, head injury, neck pain. History of rheumatoid   arthritis    TECHNIQUE:  Multiple contiguous axial images were obtained from the   cervical spine to the vertex without the use of intravenous contrast.   Additionally, axial images were obtained through the cervical and   thoracic spine using multislice helical technique. Reformatted coronal   and sagittal images were performed.    COMPARISON EXAMINATION: CT head performed 1/28/2021, CT cervical spine   performed 1/27/2021.    CT HEAD:    FINDINGS:  Brain parenchyma: Gray-white matter discrimination is well preserved.   There is no mass effect or intracranial hemorrhage. Scattered   hypodensities throughout the periventricular and subcortical white matter    are seen most consistent with sequela of chronic microvascular ischemic   change.    Extra-axial compartments: No extra-axial fluid collections are present.   There is prominence of the ventricles and sulci consistent with   generalized parenchymal volume loss. The basal cisterns are patent.   Craniocervical junction and sella turcica are within normal limits.    Calvarium, paranasal sinuses, and orbits: The calvarium is intact.   Paranasal sinuses and mastoid air cells are clear. Orbits unremarkable.    ------    CT MAXILLOFACIAL    FINDINGS:    SOFT TISSUES: Unremarkable.    FACIAL BONES: Noacute facial bone fracture.    MANDIBLE: Normal.    SINONASAL CAVITIES: Normal.    ORBITAL CONTENTS: Normal.    -------    CT CERVICAL SPINE:    FINDINGS:  Redemonstration of C4-C7 laminectomy and ACDF as well as C2-T7 posterior   spinal fusion hardware no evidence of hardware fracture or loosening.   Acute, nondisplaced fractures of the bilateral C1 lateral masses (series   8, image 47). Additional acute, nondisplaced fracture of the right   inferior articular process of C1 (series 8, image 55 and series 608,   image 81). Vertebral body height and alignment maintained. Streak   artifact from spinal fusion hardware renders evaluation of the spinal   canal nondiagnostic.    Prevertebral soft tissues grossly unremarkable. Interstitial prominence   and scarring in the lung apices.    --------    CT THORACIC SPINE:    FINDINGS:  Stable postoperative hardware from C2-T7. No evidence of hardware   fracture/loosening. Thoracic alignment and vertebral body height   maintained. Evaluation of the spinal canal is nondiagnostic from T1-7 due   to streak artifact from adjacent hardware. Lower thoracic spinal canal   appears pain. Osseous demineralization noted.    Interstitial prominence throughout the lungs which may relate to   pulmonary edema. Moderate cardiomegaly partially visualized. Prominent   hiatal hernia measuring up to approximately 7 cm. Aorta nonaneurysmal.    IMPRESSION:    CT HEAD:  No acute intracranial pathology or calvarial fracture.    CT MAXILLOFACIAL:  No acute facial bone fracture.    CT CERVICAL SPINE:  Acute, nondisplaced fractures of the bilateral C1 lateral masses and   acute, nondisplaced fracture of the right inferior articular process of   C1. MRI of the cervical spine could further evaluate for underlying   ligamentous injury.    Alignment maintained.    Stable postoperative change of the cervicothoracic spine as above.    CT THORACIC SPINE:  No acute fracture or traumatic subluxation.    Stable postoperative hardware    This report was discussed with Nallely Correa ()at 3/2/2024 2:04 AM..    --- End of Report ---  BELLA MCGUIRE MD; Attending Radiologist  This document has been electronically signed. Mar  2 2024  2:05AM (03-02-24 @ 01:35)        HPI:  74-year-old female with history of rheumatoid arthritis, GERD, history of neck surgery in 2020 is presenting with a few different complaints.  Patient reports she sustained a mechanical fall this morning she tripped on uneven dawn and fell forward.  Denies any anticoagulant use.  Denies any headache or LOC.  Reports neck pain.  Patient reports she has underlying neck pain from the rheumatoid arthritis and neck surgery but feels this is exacerbated since the fall.  Patient also reports 2 days ago she sustained a laceration to the left wrist with while using scissors which slipped and cut her wrist.  Patient reports she was seen at [facility] and had stitches placed.  States she changed her dressing yesterday.  This morning when she woke up she noted her left hand was significantly swollen red and painful.  Denies fever. (02 Mar 2024 07:27)    Mechanical fall  C1 trauma  pt denies CP or syncope  pt denies LOC  12 lead EKG: NSR, no acute ST T changes  F/U Ortho eval RECS  IV ABX or LUE cellulitis  Check Echo- Can be done as outpt if no plan for O.R. here  will follow prn  call if needed  Pt can see our group outp [phone]

## 2024-03-03 NOTE — DISCHARGE NOTE PROVIDER - HOSPITAL COURSE
Pt signed out AMA before could be seen.  Spoke  at length  with pt about high risk for sepsis , C1 fracture extension and death  pt understands the risk , has capacity to make decisions , but still wants to leave AMA  Called pts daughter over the phone, who wants pt to stay but could not convince her mom  pt was dressed up and at the elevator when she called her  risk of sepsis and death explained to the daughter as well    PT didn't  wait to be seen or for paperwork for dc  dressed up and left

## 2024-03-03 NOTE — DISCHARGE NOTE PROVIDER - CARE PROVIDER_API CALL
Mat Scales  Infectious Disease  [address] [place] [id number]  Phone: [phone]  Fax: [phone]  Follow Up Time:

## 2024-09-24 NOTE — PATIENT PROFILE ADULT - COMPLETE THE FOLLOWING IF THE PATIENT REFUSES THE INFLUENZA VACCINE:
Risks/benefits discussed with patient/surrogate/Vaccine Information Sheet (VIS) provided-VIS date: 8/15/19
no

## 2024-09-25 ENCOUNTER — OFFICE (OUTPATIENT)
Dept: URBAN - METROPOLITAN AREA CLINIC 109 | Facility: CLINIC | Age: 74
Setting detail: OPHTHALMOLOGY
End: 2024-09-25
Payer: MEDICARE

## 2024-09-25 DIAGNOSIS — H25.13: ICD-10-CM

## 2024-09-25 DIAGNOSIS — H52.4: ICD-10-CM

## 2024-09-25 PROCEDURE — 92015 DETERMINE REFRACTIVE STATE: CPT | Performed by: OPHTHALMOLOGY

## 2024-09-25 PROCEDURE — 99213 OFFICE O/P EST LOW 20 MIN: CPT | Performed by: OPHTHALMOLOGY

## 2024-09-25 ASSESSMENT — CONFRONTATIONAL VISUAL FIELD TEST (CVF)
OD_FINDINGS: FULL
OS_FINDINGS: FULL

## 2025-01-07 NOTE — PLAN
complains of pain/discomfort
[FreeTextEntry1] : Patient examined and evaluated at this time.\par Continue local wound care and offloading with alginate, dry dressing, compression socks, and ACE.\par No vascular intervention at this time.\par Patient to return in 1 week for follow up.

## 2025-06-27 NOTE — SBIRT NOTE ADULT - NSSBIRTUNABLEREM_GEN_A_CORE
Pt ambulating in hallway, pt ambulating with steady gait. Pt not using any assistive devices.   
Never